# Patient Record
Sex: FEMALE | Race: WHITE | NOT HISPANIC OR LATINO | Employment: OTHER | URBAN - METROPOLITAN AREA
[De-identification: names, ages, dates, MRNs, and addresses within clinical notes are randomized per-mention and may not be internally consistent; named-entity substitution may affect disease eponyms.]

---

## 2017-01-03 ENCOUNTER — OFFICE VISIT (OUTPATIENT)
Dept: MEDICAL GROUP | Facility: MEDICAL CENTER | Age: 72
End: 2017-01-03
Payer: MEDICARE

## 2017-01-03 VITALS
DIASTOLIC BLOOD PRESSURE: 80 MMHG | WEIGHT: 124.78 LBS | RESPIRATION RATE: 20 BRPM | TEMPERATURE: 98.8 F | HEIGHT: 65 IN | HEART RATE: 86 BPM | SYSTOLIC BLOOD PRESSURE: 126 MMHG | OXYGEN SATURATION: 91 % | BODY MASS INDEX: 20.79 KG/M2

## 2017-01-03 DIAGNOSIS — I34.0 MITRAL VALVE INSUFFICIENCY, UNSPECIFIED ETIOLOGY: ICD-10-CM

## 2017-01-03 DIAGNOSIS — J96.11 CHRONIC RESPIRATORY FAILURE WITH HYPOXIA (HCC): ICD-10-CM

## 2017-01-03 DIAGNOSIS — I50.42 CHRONIC COMBINED SYSTOLIC AND DIASTOLIC CONGESTIVE HEART FAILURE (HCC): ICD-10-CM

## 2017-01-03 DIAGNOSIS — G47.33 OBSTRUCTIVE SLEEP APNEA SYNDROME: ICD-10-CM

## 2017-01-03 DIAGNOSIS — J44.9 CHRONIC OBSTRUCTIVE PULMONARY DISEASE, UNSPECIFIED COPD TYPE (HCC): ICD-10-CM

## 2017-01-03 PROCEDURE — 99214 OFFICE O/P EST MOD 30 MIN: CPT | Performed by: FAMILY MEDICINE

## 2017-01-03 NOTE — MR AVS SNAPSHOT
"        Kelly Tejeda [facility]   1/3/2017 10:00 AM   Office Visit   MRN: [id number]    Department:  [place] [address]   Dept Phone:  [phone]    Description:  Female : 1945   Provider:  Ritika Jurado M.D.           Reason for Visit     Follow-Up meds      Allergies as of 1/3/2017     Allergen Noted Reactions    Sulfa Drugs 10/05/2016       Rash       You were diagnosed with     Chronic obstructive pulmonary disease, unspecified COPD type ([facility])   [5389775]       Chronic respiratory failure with hypoxia ([facility])   [[id number]]       Obstructive sleep apnea syndrome   [127509]       Mitral valve insufficiency, unspecified etiology   [6521602]       Chronic combined systolic and diastolic congestive heart failure ([facility])   [035479]         Vital Signs     Blood Pressure Pulse Temperature Respirations Height Weight    126/80 mmHg 86 37.1 °C (98.8 °F) 20 1.638 m (5' 4.5\") 56.6 kg (124 lb 12.5 oz)    Body Mass Index Oxygen Saturation Smoking Status             21.10 kg/m2 91% Former Smoker         Basic Information     Date Of Birth Sex Race Ethnicity Preferred Language    1945 Female White Non- English      Your appointments     2017 11:00 AM   Established Patient with Ritika Jurado M.D.   [facility]    [address]  [address]  [facility] [id number] [phone]           You will be receiving a confirmation call a few days before your appointment from our automated call confirmation system.              Problem List              ICD-10-CM Priority Class Noted - Resolved    COPD (chronic obstructive pulmonary disease) (HCC) J44.9   10/11/2016 - Present    Osteopenia M85.80   10/11/2016 - Present    Obstructive sleep apnea syndrome G47.33   10/11/2016 - Present    Abnormal liver function K76.89   2015 - Present    Aortic atherosclerosis (HCC) I70.0   9/10/2015 - Present    Atrial flutter (HCC) I48.92   12/15/2015 - Present "    Congestive heart failure (HCC) I50.9   7/7/2016 - Present    Chronic respiratory failure with hypoxia (HCC) J96.11   11/23/2015 - Present    History of colonic polyps Z86.010   3/3/2008 - Present    Hyperlipidemia E78.5   9/10/2015 - Present    Hypertension I10   3/10/2008 - Present    Mitral valve insufficiency I34.0   9/17/2012 - Present    Prediabetes R73.03   8/15/2013 - Present      Health Maintenance        Date Due Completion Dates    COLONOSCOPY 10/10/1994 ---    BONE DENSITY 10/10/2009 ---    IMM INFLUENZA (1) 9/1/2016 ---    MAMMOGRAM 12/9/2017 12/9/2016, 12/9/2016, 12/9/2016, 12/9/2016, 11/29/2016, 9/11/2015, 9/11/2015    IMM DTaP/Tdap/Td Vaccine (2 - Td) 9/17/2022 9/17/2012            Current Immunizations     13-VALENT PCV PREVNAR 9/10/2015    Pneumococcal polysaccharide vaccine (PPSV-23) 10/27/2016    SHINGLES VACCINE 8/15/2013    Tdap Vaccine 9/17/2012      Below and/or attached are the medications your provider expects you to take. Review all of your home medications and newly ordered medications with your provider and/or pharmacist. Follow medication instructions as directed by your provider and/or pharmacist. Please keep your medication list with you and share with your provider. Update the information when medications are discontinued, doses are changed, or new medications (including over-the-counter products) are added; and carry medication information at all times in the event of emergency situations     Allergies:  SULFA DRUGS - (reactions not documented)               Medications  Valid as of: January 03, 2017 - 10:36 AM    Generic Name Brand Name Tablet Size Instructions for use    Albuterol Sulfate (Aero Soln) albuterol 108 (90 BASE) MCG/ACT Inhale 2 Puffs by mouth every four hours as needed for Shortness of Breath.        Alendronate Sodium (Tab) FOSAMAX 70 MG Take 1 Tab by mouth every 7 days.        Atorvastatin Calcium (Tab) LIPITOR 10 MG Take 1 Tab by mouth every day.         Dabigatran Etexilate Mesylate (Cap) PRADAXA 150 MG Take 1 Cap by mouth 2 Times a Day.        DiltiaZEM HCl (CAPSULE SR 24 HR) CARDIZEM  MG Take 1 Cap by mouth every day.        Fluticasone-Salmeterol (AEROSOL POWDER, BREATH ACTIVATED) ADVAIR 250-50 MCG/DOSE Inhale 1 Puff by mouth 2 times a day.        Furosemide (Tab) LASIX 20 MG Take 1 Tab by mouth 2 times a day.        Magnesium Oxide (Tab) MAG- MG Take 400 mg by mouth every day.        Potassium Chloride (Tab CR) KLOR-CON 10 MEQ Take 1 Tab by mouth 2 times a day.        Tiotropium Bromide Monohydrate (Aero Soln) Tiotropium Bromide Monohydrate 2.5 MCG/ACT Inhale 2 Inhalation by mouth every day.        .                 Medicines prescribed today were sent to:     [id number] - [place], [place] - [address]    [address] [place] [address]    Phone: [phone] Fax: [phone]    Open 24 Hours?: No      Medication refill instructions:       If your prescription bottle indicates you have medication refills left, it is not necessary to call your provider’s office. Please contact your pharmacy and they will refill your medication.    If your prescription bottle indicates you do not have any refills left, you may request refills at any time through one of the following ways: The online [employer] system (except Urgent Care), by calling your provider’s office, or by asking your pharmacy to contact your provider’s office with a refill request. Medication refills are processed only during regular business hours and may not be available until the next business day. Your provider may request additional information or to have a follow-up visit with you prior to refilling your medication.   *Please Note: Medication refills are assigned a new Rx number when refilled electronically. Your pharmacy may indicate that no refills were authorized even though a new prescription for the same medication is available at the pharmacy. Please request the medicine  by name with the pharmacy before contacting your provider for a refill.        Your To Do List     Future Labs/Procedures Complete By Expires    ECHOCARDIOGRAM COMP W/O CONT  As directed 1/4/2018    Scheduling Instructions:    Complete echo      Referral     A referral request has been sent to our patient care coordination department. Please allow 3-5 business days for us to process this request and contact you either by phone or mail. If you do not hear from us by the 5th business day, please call us at [phone].           [employer] Access Code: Activation code not generated  Current [employer] Status: Active

## 2017-01-03 NOTE — PROGRESS NOTES
Subjective:   Kelly Lovett is a 71 y.o. female here today for COPD    Chief Complaint   Patient presents with   • Follow-Up     meds       1. Chronic obstructive pulmonary disease, unspecified COPD type (HCC)  This is chronic. Patient quit smoking 17 years ago. She is currently on Spiriva, Advair, Ventolin as needed. She is needing to use her Ventolin inhaler most days. She does feel short of breath. She does use oxygen at nighttime. Her FEV1 to FVC ratio is 56%. Her FVC is 30%. Her peripheral pulse oximeter today is 91%. She does not have a pulmonologist. She did not have a pulmonologist in the [facility] system. She feels short of breath and has a chronic cough.    2. Chronic respiratory failure with hypoxia (HCC)  This is chronic. Patient has 2 L of oxygen she wears nightly.    3. Obstructive sleep apnea syndrome  This is chronic. Patient has CPAP. She states that she keeps this on about 5 hours at night and then pulls it off. She uses nasal pillows. She uses oxygen the remainder of the night. She does not have a pulmonologist.    4. Mitral valve insufficiency, unspecified etiology  This is chronic. Patient had an echocardiogram done December 2015. She does not have a cardiologist. She does not have any palpitations or chest pain.  Echocardiogram:  1. Normal left ventricle size and wall thickness.  2. Normal systolic function with LVEF of 55% visually. Despite this, the  LVOT VTI measures 10 cm which suggests decreased cardiac output, likely  secondary to tachycardia.  3. Severe diastolic dysfunction suggested by rapid decel time (decel time  of 130 ms).  4. Severe biatrial enlargement.  5. Posterior mitral leaflet prolapse with at least moderate regurgitation.  6. PASP of 45 mmHg based on CVP of 8 mmHg.    Compared to prior study in 2012, the rate is much faster and is affecting  the cardiac output. Mitral regurgitation is difficult to quantify and left  ventricle is more dilated.      5. Chronic combined  "systolic and diastolic congestive heart failure (HCC)  This is chronic. Patient is currently on Lasix 20 mg twice a day, potassium supplementation, diltiazem 120 mg XR. She is not on a beta blocker or ACE inhibitor. She does not have a cardiologist.      Current medicines (including changes today)  Current Outpatient Prescriptions   Medication Sig Dispense Refill   • dabigatran (PRADAXA) 150 MG Cap capsule Take 1 Cap by mouth 2 Times a Day. 180 Cap 3   • furosemide (LASIX) 20 MG Tab Take 1 Tab by mouth 2 times a day. 180 Tab 3   • atorvastatin (LIPITOR) 10 MG Tab Take 1 Tab by mouth every day. 90 Tab 3   • magnesium oxide (MAG-OX) 400 MG Tab Take 400 mg by mouth every day.     • albuterol (VENTOLIN HFA) 108 (90 BASE) MCG/ACT Aero Soln inhalation aerosol Inhale 2 Puffs by mouth every four hours as needed for Shortness of Breath. 3 Inhaler 3   • Tiotropium Bromide Monohydrate (SPIRIVA RESPIMAT) 2.5 MCG/ACT Aero Soln Inhale 2 Inhalation by mouth every day. 3 Inhaler 3   • diltiazem (DILT-XR) 120 MG XR capsule Take 1 Cap by mouth every day. 90 Cap 3   • fluticasone-salmeterol (ADVAIR) 250-50 MCG/DOSE AEROSOL POWDER, BREATH ACTIVATED Inhale 1 Puff by mouth 2 times a day. 1 Inhaler 5   • alendronate (FOSAMAX) 70 MG Tab Take 1 Tab by mouth every 7 days. 12 Tab 3   • potassium chloride ER (K-TAB) 10 MEQ tablet Take 1 Tab by mouth 2 times a day. 180 Tab 3     No current facility-administered medications for this visit.     She  has a past medical history of COPD (chronic obstructive pulmonary disease) (HCC); Hypertension; and Hyperlipidemia.    ROS   No chest pain, no abdominal pain  Positive for chronic cough     Objective:     Blood pressure 126/80, pulse 86, temperature 37.1 °C (98.8 °F), resp. rate 20, height 1.638 m (5' 4.5\"), weight 56.6 kg (124 lb 12.5 oz), SpO2 91 %. Body mass index is 21.1 kg/(m^2).   Physical Exam:  Constitutional: Alert, no distress.  Skin: Warm, dry, good turgor, no rashes in visible areas.  Eye: " Equal, round and reactive, conjunctiva clear, lids normal.  ENMT: Lips without lesions, good dentition, oropharynx clear.  Neck: Trachea midline, no masses, no thyromegaly. No cervical or supraclavicular lymphadenopathy  Respiratory: Unlabored respiratory effort, lungs clear to auscultation, no wheezes, no ronchi.  Cardiovascular: 3/6 murmur throughout, no edema.  Abdomen: Soft, non-tender, no masses, no hepatosplenomegaly.  Psych: Alert and oriented x3, normal affect and mood.      Assessment and Plan:   The following treatment plan was discussed    1. Chronic obstructive pulmonary disease, unspecified COPD type (HCC)  This is chronic and not controlled  PFTs reviewed  We did discuss changing medication management. I did recommend pulmonology consultation given her chronic respiratory failure, COPD, MARY. Patient agrees that this would be a good idea.  Referral to pulmonology has been placed  Continue current medication regimen until consultation with pulmonology  - REFERRAL TO PULMONOLOGY    2. Chronic respiratory failure with hypoxia (HCC)  This is chronic, likely secondary to COPD and MARY  Continue nighttime oxygen  - REFERRAL TO PULMONOLOGY    3. Obstructive sleep apnea syndrome  This is chronic and stable  Continue CPAP   REFERRAL TO PULMONOLOGY    4. Mitral valve insufficiency, unspecified etiology  This is chronic  Previous echocardiogram from last year reviewed  Repeat echocardiogram  - ECHOCARDIOGRAM COMP W/O CONT; Future    5. Chronic combined systolic and diastolic congestive heart failure (HCC)  This is chronic and stable  Patient is currently not on adequate medical management  Echocardiogram ordered  We did discuss cardiology referral, patient would like to proceed with pulmonology referral for CPAP her echocardiogram says. We will bring this up at the next appointment in 3 months   - ECHOCARDIOGRAM COMP W/O CONT; Future      Followup: Return in about 3 months (around 4/3/2017) for COPD, CHF, aflutter,  long.

## 2017-01-12 DIAGNOSIS — I50.42 CHRONIC COMBINED SYSTOLIC AND DIASTOLIC CONGESTIVE HEART FAILURE (HCC): ICD-10-CM

## 2017-01-12 RX ORDER — POTASSIUM CHLORIDE 750 MG/1
10 TABLET, FILM COATED, EXTENDED RELEASE ORAL 2 TIMES DAILY
Qty: 180 TAB | Refills: 3 | Status: SHIPPED | OUTPATIENT
Start: 2017-01-12 | End: 2017-01-17 | Stop reason: SDUPTHER

## 2017-01-17 DIAGNOSIS — I50.42 CHRONIC COMBINED SYSTOLIC AND DIASTOLIC CONGESTIVE HEART FAILURE (HCC): ICD-10-CM

## 2017-01-17 RX ORDER — POTASSIUM CHLORIDE 750 MG/1
10 TABLET, FILM COATED, EXTENDED RELEASE ORAL DAILY
Qty: 90 TAB | Refills: 3 | Status: SHIPPED | OUTPATIENT
Start: 2017-01-17 | End: 2018-02-07 | Stop reason: SDUPTHER

## 2017-01-23 ENCOUNTER — APPOINTMENT (OUTPATIENT)
Dept: CARDIOLOGY | Facility: MEDICAL CENTER | Age: 72
End: 2017-01-23
Attending: FAMILY MEDICINE
Payer: MEDICARE

## 2017-02-09 ENCOUNTER — APPOINTMENT (OUTPATIENT)
Dept: CARDIOLOGY | Facility: MEDICAL CENTER | Age: 72
End: 2017-02-09
Attending: FAMILY MEDICINE
Payer: MEDICARE

## 2017-03-07 ENCOUNTER — HOSPITAL ENCOUNTER (OUTPATIENT)
Dept: CARDIOLOGY | Facility: MEDICAL CENTER | Age: 72
End: 2017-03-07
Attending: FAMILY MEDICINE
Payer: MEDICARE

## 2017-03-07 DIAGNOSIS — I34.0 MITRAL VALVE INSUFFICIENCY, UNSPECIFIED ETIOLOGY: ICD-10-CM

## 2017-03-07 DIAGNOSIS — I50.42 CHRONIC COMBINED SYSTOLIC AND DIASTOLIC CONGESTIVE HEART FAILURE (HCC): ICD-10-CM

## 2017-03-07 LAB
LV EJECT FRACT  99904: 60
LV EJECT FRACT MOD 2C 99903: 63.46
LV EJECT FRACT MOD 4C 99902: 67.07
LV EJECT FRACT MOD BP 99901: 65.45

## 2017-03-07 PROCEDURE — 93306 TTE W/DOPPLER COMPLETE: CPT

## 2017-03-08 ENCOUNTER — TELEPHONE (OUTPATIENT)
Dept: MEDICAL GROUP | Facility: LAB | Age: 72
End: 2017-03-08

## 2017-03-08 DIAGNOSIS — I34.0 SEVERE MITRAL REGURGITATION: ICD-10-CM

## 2017-03-10 ENCOUNTER — OFFICE VISIT (OUTPATIENT)
Dept: CARDIOLOGY | Facility: MEDICAL CENTER | Age: 72
End: 2017-03-10
Payer: MEDICARE

## 2017-03-10 ENCOUNTER — TELEPHONE (OUTPATIENT)
Dept: CARDIOLOGY | Facility: MEDICAL CENTER | Age: 72
End: 2017-03-10

## 2017-03-10 VITALS
SYSTOLIC BLOOD PRESSURE: 120 MMHG | HEART RATE: 98 BPM | OXYGEN SATURATION: 93 % | WEIGHT: 118 LBS | DIASTOLIC BLOOD PRESSURE: 72 MMHG | HEIGHT: 65 IN | BODY MASS INDEX: 19.66 KG/M2

## 2017-03-10 DIAGNOSIS — I34.0 SEVERE MITRAL REGURGITATION: ICD-10-CM

## 2017-03-10 DIAGNOSIS — I10 ESSENTIAL HYPERTENSION: ICD-10-CM

## 2017-03-10 DIAGNOSIS — I49.8 ATRIAL ARRHYTHMIA: ICD-10-CM

## 2017-03-10 PROCEDURE — G8419 CALC BMI OUT NRM PARAM NOF/U: HCPCS | Performed by: INTERNAL MEDICINE

## 2017-03-10 PROCEDURE — 1101F PT FALLS ASSESS-DOCD LE1/YR: CPT | Performed by: INTERNAL MEDICINE

## 2017-03-10 PROCEDURE — 99205 OFFICE O/P NEW HI 60 MIN: CPT | Performed by: INTERNAL MEDICINE

## 2017-03-10 ASSESSMENT — ENCOUNTER SYMPTOMS
BLOOD IN STOOL: 0
FALLS: 0
NAUSEA: 0
SHORTNESS OF BREATH: 1
DEPRESSION: 0
HEADACHES: 0
PALPITATIONS: 1
EYE PAIN: 0
BLURRED VISION: 0
HALLUCINATIONS: 0
FEVER: 0
ABDOMINAL PAIN: 0
MYALGIAS: 0
VOMITING: 0
CHILLS: 0
LOSS OF CONSCIOUSNESS: 0
ORTHOPNEA: 0
DOUBLE VISION: 0
DIZZINESS: 0
BRUISES/BLEEDS EASILY: 0
CLAUDICATION: 0
EYE DISCHARGE: 0
SENSORY CHANGE: 0
PND: 0
COUGH: 0
SPEECH CHANGE: 0
WEIGHT LOSS: 0

## 2017-03-10 NOTE — PROGRESS NOTES
Subjective:   Kelly Lovett is a 71 y.o. female who presents today for cardiac care and evaluation because of shortness of breath along with abnormal transthoracic echocardiogram finding. Under study, there was evidence of possible severe mitral regurgitation with mitral valve prolapse. I looked at the ultrasound myself and there appears to be redundant tissue at the tips of the mitral valve leaflets causing the mitral valve prolapse and possible severe mitral regurgitation. Patient does have a history of this particular phenomenon which was told to her 5 years ago at [facility] in [place]. Patient does get winded upon walking far distance up incline. She sometimes feel tired. She was diagnosed with COPD recently as well. She is on treatment for that at this time. She also has a history of atrial arrhythmias. She was told that it might have been atrial flutter. I do not have any documentation of that is up now. But patient is on anticoagulation by [place]. Therefore with an educated guess, I presume that patient was diagnosed with atrial flutter.    Past Medical History   Diagnosis Date   • COPD (chronic obstructive pulmonary disease) (CMS-HCC)    • Hypertension    • Hyperlipidemia      Past Surgical History   Procedure Laterality Date   • Oophorectomy     • Appendectomy       1967     Family History   Problem Relation Age of Onset   • Cancer Father      colon cancer    • Hypertension Mother    • Cancer Sister 68     ovarian cancer     History   Smoking status   • Former Smoker -- 1.00 packs/day for 38 years   • Types: Cigarettes   • Quit date: 10/11/2000   Smokeless tobacco   • Never Used     Allergies   Allergen Reactions   • Sulfa Drugs      Rash      Outpatient Encounter Prescriptions as of 3/10/2017   Medication Sig Dispense Refill   • potassium chloride ER (K-TAB) 10 MEQ tablet Take 1 Tab by mouth every day. 90 Tab 3   • dabigatran (PRADAXA) 150 MG Cap capsule Take 1 Cap by mouth 2 Times a  Day. 180 Cap 3   • furosemide (LASIX) 20 MG Tab Take 1 Tab by mouth 2 times a day. 180 Tab 3   • atorvastatin (LIPITOR) 10 MG Tab Take 1 Tab by mouth every day. 90 Tab 3   • magnesium oxide (MAG-OX) 400 MG Tab Take 400 mg by mouth every day.     • albuterol (VENTOLIN HFA) 108 (90 BASE) MCG/ACT Aero Soln inhalation aerosol Inhale 2 Puffs by mouth every four hours as needed for Shortness of Breath. 3 Inhaler 3   • Tiotropium Bromide Monohydrate (SPIRIVA RESPIMAT) 2.5 MCG/ACT Aero Soln Inhale 2 Inhalation by mouth every day. 3 Inhaler 3   • diltiazem (DILT-XR) 120 MG XR capsule Take 1 Cap by mouth every day. 90 Cap 3   • fluticasone-salmeterol (ADVAIR) 250-50 MCG/DOSE AEROSOL POWDER, BREATH ACTIVATED Inhale 1 Puff by mouth 2 times a day. 1 Inhaler 5   • alendronate (FOSAMAX) 70 MG Tab Take 1 Tab by mouth every 7 days. 12 Tab 3     No facility-administered encounter medications on file as of 3/10/2017.     Review of Systems   Constitutional: Negative for fever, chills, weight loss and malaise/fatigue.   HENT: Negative for ear discharge, ear pain, hearing loss and nosebleeds.    Eyes: Negative for blurred vision, double vision, pain and discharge.   Respiratory: Positive for shortness of breath. Negative for cough.    Cardiovascular: Positive for palpitations. Negative for chest pain, orthopnea, claudication, leg swelling and PND.   Gastrointestinal: Negative for nausea, vomiting, abdominal pain, blood in stool and melena.   Genitourinary: Negative for dysuria and hematuria.   Musculoskeletal: Negative for myalgias, joint pain and falls.   Skin: Negative for itching and rash.   Neurological: Negative for dizziness, sensory change, speech change, loss of consciousness and headaches.   Endo/Heme/Allergies: Negative for environmental allergies. Does not bruise/bleed easily.   Psychiatric/Behavioral: Negative for depression, suicidal ideas and hallucinations.        Objective:   /72 mmHg  Pulse 98  Ht 1.638 m (5'  "4.5\")  Wt 53.524 kg (118 lb)  BMI 19.95 kg/m2  SpO2 93%    Physical Exam   Constitutional: She is oriented to person, place, and time.   HENT:   Head: Normocephalic and atraumatic.   Eyes: EOM are normal.   Neck: No JVD present.   Cardiovascular: Normal rate, regular rhythm and intact distal pulses.  Exam reveals no gallop and no friction rub.    Murmur heard.  3/6 systolic mumur heard best at lateral side area (MV).     Pulmonary/Chest: No respiratory distress.   Distant breath sounds due to copd     Abdominal: She exhibits no distension. There is no tenderness. There is no rebound and no guarding.   Musculoskeletal: She exhibits no edema or tenderness.   Lymphadenopathy:     She has no cervical adenopathy.   Neurological: She is alert and oriented to person, place, and time.   Skin: Skin is dry.   Psychiatric: She has a normal mood and affect.   Nursing note and vitals reviewed.      Assessment:     1. Essential hypertension  EKG    RIH ZIO PATCH MONITOR   2. Severe mitral regurgitation  RIH ZIO PATCH MONITOR   3. Atrial arrhythmia  RIH ZIO PATCH MONITOR       Medical Decision Making:  Today's Assessment / Status / Plan:     At this time, I will recommend to proceed with a transesophageal echocardiogram to further assess the nature of her mitral valve.  I also would like to get 2 weeks event monitor to assess for atrial arrhythmias burden.  I explained to her that eventually she might require open heart surgery for her valvular repair or replacement. Patient is agreeable to recommendations at this time.  "

## 2017-03-10 NOTE — MR AVS SNAPSHOT
"        Kelly Tejeda [facility]   3/10/2017 7:45 AM   Office Visit   MRN: [id number]    Department:  [facility]   Dept Phone:  [phone]    Description:  Female : 1945   Provider:  Dalia Li M.D.           Reason for Visit     New Patient           Allergies as of 3/10/2017     Allergen Noted Reactions    Sulfa Drugs 10/05/2016       Rash       You were diagnosed with     Essential hypertension   [3314642]       Severe mitral regurgitation   [944630]       Atrial arrhythmia   [992173]         Vital Signs     Blood Pressure Pulse Height Weight Body Mass Index Oxygen Saturation    120/72 mmHg 98 1.638 m (5' 4.5\") 53.524 kg (118 lb) 19.95 kg/m2 93%    Smoking Status                   Former Smoker           Basic Information     Date Of Birth Sex Race Ethnicity Preferred Language    1945 Female White Non- English      Your appointments     Mar 23, 2017 11:00 AM   XRAY 15 with PULMONARY DX 1   [facility] - Pulmonary ([address])    [address] [place], [place] [address] [place] [address]               Mar 23, 2017 11:20 AM   New Patient Pulmonary with A Rotation   [facility] (--)    [address] [place] [address] [address]  [place] [address]   [phone]            2017 11:00 AM   Established Patient with Ritika Jurado M.D.   [facility] (--)    [address] [facility] [address]  [place] [address] [phone]           You will be receiving a confirmation call a few days before your appointment from our automated call confirmation system.              Problem List              ICD-10-CM Priority Class Noted - Resolved    COPD (chronic obstructive pulmonary disease) (CMS-[facility]) J44.9   10/11/2016 - Present    Osteopenia M85.80   10/11/2016 - Present    Obstructive sleep apnea syndrome G47.33   10/11/2016 - Present    Abnormal liver function K76.89   2015 - Present    Aortic atherosclerosis (CMS-HCC) I70.0   9/10/2015 - Present    Atrial flutter " (CMS-HCC) I48.92   12/15/2015 - Present    Congestive heart failure (CMS-HCC) I50.9   7/7/2016 - Present    Chronic respiratory failure with hypoxia (CMS-HCC) J96.11   11/23/2015 - Present    History of colonic polyps Z86.010   3/3/2008 - Present    Hyperlipidemia E78.5   9/10/2015 - Present    Hypertension I10   3/10/2008 - Present    Mitral valve insufficiency I34.0   9/17/2012 - Present    Prediabetes R73.03   8/15/2013 - Present    Severe mitral regurgitation I34.0   3/8/2017 - Present      Health Maintenance        Date Due Completion Dates    COLONOSCOPY 10/10/1994 ---    BONE DENSITY 10/10/2009 ---    IMM INFLUENZA (1) 9/1/2016 ---    MAMMOGRAM 12/9/2017 12/9/2016, 12/9/2016, 12/9/2016, 12/9/2016, 11/29/2016, 9/11/2015, 9/11/2015    IMM DTaP/Tdap/Td Vaccine (2 - Td) 9/17/2022 9/17/2012            Results       Current Immunizations     13-VALENT PCV PREVNAR 9/10/2015    Pneumococcal polysaccharide vaccine (PPSV-23) 10/27/2016    SHINGLES VACCINE 8/15/2013    Tdap Vaccine 9/17/2012      Below and/or attached are the medications your provider expects you to take. Review all of your home medications and newly ordered medications with your provider and/or pharmacist. Follow medication instructions as directed by your provider and/or pharmacist. Please keep your medication list with you and share with your provider. Update the information when medications are discontinued, doses are changed, or new medications (including over-the-counter products) are added; and carry medication information at all times in the event of emergency situations     Allergies:  SULFA DRUGS - (reactions not documented)               Medications  Valid as of: March 10, 2017 -  8:41 AM    Generic Name Brand Name Tablet Size Instructions for use    Albuterol Sulfate (Aero Soln) albuterol 108 (90 BASE) MCG/ACT Inhale 2 Puffs by mouth every four hours as needed for Shortness of Breath.        Alendronate Sodium (Tab) FOSAMAX 70 MG Take 1 Tab  by mouth every 7 days.        Atorvastatin Calcium (Tab) LIPITOR 10 MG Take 1 Tab by mouth every day.        Dabigatran Etexilate Mesylate (Cap) PRADAXA 150 MG Take 1 Cap by mouth 2 Times a Day.        DiltiaZEM HCl (CAPSULE SR 24 HR) CARDIZEM  MG Take 1 Cap by mouth every day.        Fluticasone-Salmeterol (AEROSOL POWDER, BREATH ACTIVATED) ADVAIR 250-50 MCG/DOSE Inhale 1 Puff by mouth 2 times a day.        Furosemide (Tab) LASIX 20 MG Take 1 Tab by mouth 2 times a day.        Magnesium Oxide (Tab) MAG- MG Take 400 mg by mouth every day.        Potassium Chloride (Tab CR) KLOR-CON 10 MEQ Take 1 Tab by mouth every day.        Tiotropium Bromide Monohydrate (Aero Soln) Tiotropium Bromide Monohydrate 2.5 MCG/ACT Inhale 2 Inhalation by mouth every day.        .                 Medicines prescribed today were sent to:     [id number] - [place], [place] - [id number] [place] [address]    Phone: [phone] Fax: [phone]    Open 24 Hours?: No      Medication refill instructions:       If your prescription bottle indicates you have medication refills left, it is not necessary to call your provider’s office. Please contact your pharmacy and they will refill your medication.    If your prescription bottle indicates you do not have any refills left, you may request refills at any time through one of the following ways: The online [employer] system (except Urgent Care), by calling your provider’s office, or by asking your pharmacy to contact your provider’s office with a refill request. Medication refills are processed only during regular business hours and may not be available until the next business day. Your provider may request additional information or to have a follow-up visit with you prior to refilling your medication.   *Please Note: Medication refills are assigned a new Rx number when refilled electronically. Your pharmacy may indicate that no refills were authorized even  though a new prescription for the same medication is available at the pharmacy. Please request the medicine by name with the pharmacy before contacting your provider for a refill.        Your To Do List     Future Labs/Procedures Complete By Annie GAMBINO PATCH MONITOR  As directed 3/11/2018         [employer] Access Code: Activation code not generated  Current [employer] Status: Active

## 2017-03-10 NOTE — TELEPHONE ENCOUNTER
Patient is scheduled on 3-13-17 for a PAGE with Dr. Guillermo marsh [facility]. Patient was told to hold lasix in am. Patient was told to check in at 7:00 am for a 9:00 procedure. [facility] notified on 3-10-17.

## 2017-03-10 NOTE — Clinical Note
[facility]   [address] [place] [address] [address]  [place] [address]  Phone: [phone]  Fax: [phone]              Kelly Lovett  1945    Encounter Date: 3/10/2017    Dalia Li M.D.          PROGRESS NOTE:  Subjective:   Kelly oLvett is a 71 y.o. female who presents today for cardiac care and evaluation because of shortness of breath along with abnormal transthoracic echocardiogram finding. Under study, there was evidence of possible severe mitral regurgitation with mitral valve prolapse. I looked at the ultrasound myself and there appears to be redundant tissue at the tips of the mitral valve leaflets causing the mitral valve prolapse and possible severe mitral regurgitation. Patient does have a history of this particular phenomenon which was told to her 5 years ago at [facility] in [place]. Patient does get winded upon walking far distance up incline. She sometimes feel tired. She was diagnosed with COPD recently as well. She is on treatment for that at this time. She also has a history of atrial arrhythmias. She was told that it might have been atrial flutter. I do not have any documentation of that is up now. But patient is on anticoagulation by [facility]. Therefore with an educated guess, I presume that patient was diagnosed with atrial flutter.    Past Medical History   Diagnosis Date   • COPD (chronic obstructive pulmonary disease) (CMS-HCC)    • Hypertension    • Hyperlipidemia      Past Surgical History   Procedure Laterality Date   • Oophorectomy     • Appendectomy       1967     Family History   Problem Relation Age of Onset   • Cancer Father      colon cancer    • Hypertension Mother    • Cancer Sister 68     ovarian cancer     History   Smoking status   • Former Smoker -- 1.00 packs/day for 38 years   • Types: Cigarettes   • Quit date: 10/11/2000   Smokeless tobacco   • Never Used     Allergies   Allergen Reactions   •  Sulfa Drugs      Rash      Outpatient Encounter Prescriptions as of 3/10/2017   Medication Sig Dispense Refill   • potassium chloride ER (K-TAB) 10 MEQ tablet Take 1 Tab by mouth every day. 90 Tab 3   • dabigatran (PRADAXA) 150 MG Cap capsule Take 1 Cap by mouth 2 Times a Day. 180 Cap 3   • furosemide (LASIX) 20 MG Tab Take 1 Tab by mouth 2 times a day. 180 Tab 3   • atorvastatin (LIPITOR) 10 MG Tab Take 1 Tab by mouth every day. 90 Tab 3   • magnesium oxide (MAG-OX) 400 MG Tab Take 400 mg by mouth every day.     • albuterol (VENTOLIN HFA) 108 (90 BASE) MCG/ACT Aero Soln inhalation aerosol Inhale 2 Puffs by mouth every four hours as needed for Shortness of Breath. 3 Inhaler 3   • Tiotropium Bromide Monohydrate (SPIRIVA RESPIMAT) 2.5 MCG/ACT Aero Soln Inhale 2 Inhalation by mouth every day. 3 Inhaler 3   • diltiazem (DILT-XR) 120 MG XR capsule Take 1 Cap by mouth every day. 90 Cap 3   • fluticasone-salmeterol (ADVAIR) 250-50 MCG/DOSE AEROSOL POWDER, BREATH ACTIVATED Inhale 1 Puff by mouth 2 times a day. 1 Inhaler 5   • alendronate (FOSAMAX) 70 MG Tab Take 1 Tab by mouth every 7 days. 12 Tab 3     No facility-administered encounter medications on file as of 3/10/2017.     Review of Systems   Constitutional: Negative for fever, chills, weight loss and malaise/fatigue.   HENT: Negative for ear discharge, ear pain, hearing loss and nosebleeds.    Eyes: Negative for blurred vision, double vision, pain and discharge.   Respiratory: Positive for shortness of breath. Negative for cough.    Cardiovascular: Positive for palpitations. Negative for chest pain, orthopnea, claudication, leg swelling and PND.   Gastrointestinal: Negative for nausea, vomiting, abdominal pain, blood in stool and melena.   Genitourinary: Negative for dysuria and hematuria.   Musculoskeletal: Negative for myalgias, joint pain and falls.   Skin: Negative for itching and rash.   Neurological: Negative for dizziness, sensory change, speech change, loss of  "consciousness and headaches.   Endo/Heme/Allergies: Negative for environmental allergies. Does not bruise/bleed easily.   Psychiatric/Behavioral: Negative for depression, suicidal ideas and hallucinations.        Objective:   /72 mmHg  Pulse 98  Ht 1.638 m (5' 4.5\")  Wt 53.524 kg (118 lb)  BMI 19.95 kg/m2  SpO2 93%    Physical Exam   Constitutional: She is oriented to person, place, and time.   HENT:   Head: Normocephalic and atraumatic.   Eyes: EOM are normal.   Neck: No JVD present.   Cardiovascular: Normal rate, regular rhythm and intact distal pulses.  Exam reveals no gallop and no friction rub.    Murmur heard.  3/6 systolic mumur heard best at lateral side area (MV).     Pulmonary/Chest: No respiratory distress.   Distant breath sounds due to copd     Abdominal: She exhibits no distension. There is no tenderness. There is no rebound and no guarding.   Musculoskeletal: She exhibits no edema or tenderness.   Lymphadenopathy:     She has no cervical adenopathy.   Neurological: She is alert and oriented to person, place, and time.   Skin: Skin is dry.   Psychiatric: She has a normal mood and affect.   Nursing note and vitals reviewed.      Assessment:     1. Essential hypertension  EKG    RIH ZIO PATCH MONITOR   2. Severe mitral regurgitation  RIH ZIO PATCH MONITOR   3. Atrial arrhythmia  RIH ZIO PATCH MONITOR       Medical Decision Making:  Today's Assessment / Status / Plan:     At this time, I will recommend to proceed with a transesophageal echocardiogram to further assess the nature of her mitral valve.  I also would like to get 2 weeks event monitor to assess for atrial arrhythmias burden.  I explained to her that eventually she might require open heart surgery for her valvular repair or replacement. Patient is agreeable to recommendations at this time.      Ritika Jurado M.D.  [address] [address]  VIA In Basket                   "

## 2017-03-11 LAB — EKG IMPRESSION: NORMAL

## 2017-03-13 ENCOUNTER — HOSPITAL ENCOUNTER (OUTPATIENT)
Facility: MEDICAL CENTER | Age: 72
End: 2017-03-13
Attending: INTERNAL MEDICINE | Admitting: INTERNAL MEDICINE
Payer: MEDICARE

## 2017-03-13 VITALS
SYSTOLIC BLOOD PRESSURE: 119 MMHG | RESPIRATION RATE: 15 BRPM | DIASTOLIC BLOOD PRESSURE: 71 MMHG | OXYGEN SATURATION: 98 % | HEART RATE: 82 BPM | HEIGHT: 66 IN | TEMPERATURE: 97.2 F | WEIGHT: 117.95 LBS | BODY MASS INDEX: 18.96 KG/M2

## 2017-03-13 PROCEDURE — 700111 HCHG RX REV CODE 636 W/ 250 OVERRIDE (IP)

## 2017-03-13 PROCEDURE — 93312 ECHO TRANSESOPHAGEAL: CPT

## 2017-03-13 PROCEDURE — 93320 DOPPLER ECHO COMPLETE: CPT

## 2017-03-13 PROCEDURE — 93325 DOPPLER ECHO COLOR FLOW MAPG: CPT

## 2017-03-13 RX ORDER — SODIUM CHLORIDE 9 MG/ML
INJECTION, SOLUTION INTRAVENOUS
Status: DISCONTINUED | OUTPATIENT
Start: 2017-03-13 | End: 2017-03-13 | Stop reason: HOSPADM

## 2017-03-13 ASSESSMENT — PAIN SCALES - GENERAL
PAINLEVEL_OUTOF10: 0

## 2017-03-13 NOTE — OR NURSING
0900: Report received from SOY Rubio.    0915: Patient to PPU 06    0917: Patient provided with water. Patient tolerating PO intake.  Patient belongings returned to patient family at bedside.  Patient and family updated regarding Plan of care.    0930: Report given to SOY Acuna.

## 2017-03-13 NOTE — IP AVS SNAPSHOT
3/13/2017          [facility]  [address]  [place] [address]    Dear Kelly:    [facility] wants to ensure your discharge home is safe and you or your loved ones have had all your questions answered regarding your care after you leave the hospital.    You may receive a telephone call within two days of your discharge.  This call is to make certain you understand your discharge instructions as well as ensure we provided you with the best care possible during your stay with us.     The call will only last approximately 3-5 minutes and will be done by a nurse.    Once again, we want to ensure your discharge home is safe and that you have a clear understanding of any next steps in your care.  If you have any questions or concerns, please do not hesitate to contact us, we are here for you.  Thank you for choosing [facility] for your healthcare needs.    Sincerely,    Dio Lomas    [facility]

## 2017-03-13 NOTE — OR NURSING
0981 report received from lee, currently pt aaox4, denies pain, s.o.b, no c/o chest pain, family at the bed side.  1013 discharge instructions given to patient, patient verbalize understanding of the orders, iv discontinued tip intact, prior to dc no c/o chest pain, s.o.b  1015 pt escorted via w/c with all her personal belongings.

## 2017-03-13 NOTE — PROCEDURES
Patient was brought to cath lab holding.  Consent was obtained. Risks and benefits of procedures were explained.    Anesthesia was used for sedation process.    Complication: none    Diagnosis: No evidence of left atrial appendage thrombus. Also there is evidence of SEVERE mitral regurgitation with eccentric jets. There are redundant tissues seen at the mitral valve leaflets.      Dalia Li MD.  [facility].

## 2017-03-13 NOTE — IP AVS SNAPSHOT
" Home Care Instructions                                                                                                                Name:[facility]  Medical Record Number:[id number]  CSN: [id number]    YOB: 1945   Age: 71 y.o.  Sex: female  HT:1.676 m (5' 6\") WT: 53.5 kg (117 lb 15.1 oz)          Admit Date: 3/13/2017     Discharge Date:   Today's Date: 3/13/2017  Attending Doctor:  Dalia Li M.D.                  Allergies:  Sulfa drugs                Discharge Instructions         ACTIVITY: Rest and take it easy for the first 24 hours.  A responsible adult is recommended to remain with you during that time.  It is normal to feel sleepy.  We encourage you to not do anything that requires balance, judgment or coordination.    MILD FLU-LIKE SYMPTOMS ARE NORMAL. YOU MAY EXPERIENCE GENERALIZED MUSCLE ACHES, THROAT IRRITATION, HEADACHE AND/OR SOME NAUSEA.    FOR 24 HOURS DO NOT:  Drive, operate machinery or run household appliances.  Drink beer or alcoholic beverages.   Make important decisions or sign legal documents.    SPECIAL INSTRUCTIONS: follow up with Dr Neri [phone]    DIET: To avoid nausea, slowly advance diet as tolerated, avoiding spicy or greasy foods for the first day.  Add more substantial food to your diet according to your physician's instructions.  Babies can be fed formula or breast milk as soon as they are hungry.  INCREASE FLUIDS AND FIBER TO AVOID CONSTIPATION.    SURGICAL DRESSING/BATHING: follow up with Dr Neri [phone]    FOLLOW-UP APPOINTMENT:  A follow-up appointment should be arranged with your doctor in [phone]; call to schedule.    You should CALL YOUR PHYSICIAN if you develop:  Fever greater than 101 degrees F.  Pain not relieved by medication, or persistent nausea or vomiting.  Excessive bleeding (blood soaking through dressing) or unexpected drainage from the wound.  Extreme redness or swelling around the incision site, drainage of pus or foul smelling " drainage.  Inability to urinate or empty your bladder within 8 hours.  Problems with breathing or chest pain.    You should call 911 if you develop problems with breathing or chest pain.  If you are unable to contact your doctor or surgical center, you should go to the nearest emergency room or urgent care center.  Physician's telephone #: [phone]    If any questions arise, call your doctor.  If your doctor is not available, please feel free to call the Surgical Center at [phone]  The Center is open Monday through Friday from 7AM to 7PM.  You can also call the HEALTH HOTLINE open 24 hours/day, 7 days/week and speak to a nurse at [phone], or toll free at [phone].    A registered nurse may call you a few days after your surgery to see how you are doing after your procedure.    MEDICATIONS: Resume taking daily medication.  Take prescribed pain medication with food.  If no medication is prescribed, you may take non-aspirin pain medication if needed.  PAIN MEDICATION CAN BE VERY CONSTIPATING.  Take a stool softener or laxative such as senokot, pericolace, or milk of magnesia if needed    If your physician has prescribed pain medication that includes Acetaminophen (Tylenol), do not take additional Acetaminophen (Tylenol) while taking the prescribed medication.    Depression / Suicide Risk    As you are discharged from this [facility] facility, it is important to learn how to keep safe from harming yourself.    Recognize the warning signs:  · Abrupt changes in personality, positive or negative- including increase in energy   · Giving away possessions  · Change in eating patterns- significant weight changes-  positive or negative  · Change in sleeping patterns- unable to sleep or sleeping all the time   · Unwillingness or inability to communicate  · Depression  · Unusual sadness, discouragement and loneliness  · Talk of wanting to die  · Neglect of personal appearance   · Rebelliousness- reckless  behavior  · Withdrawal from people/activities they love  · Confusion- inability to concentrate     If you or a loved one observes any of these behaviors or has concerns about self-harm, here's what you can do:  · Talk about it- your feelings and reasons for harming yourself  · Remove any means that you might use to hurt yourself (examples: pills, rope, extension cords, firearm)  · Get professional help from the community (Mental Health, Substance Abuse, psychological counseling)  · Do not be alone:Call your Safe Contact- someone whom you trust who will be there for you.  · Call your local CRISIS HOTLINE 471-0162 or 610-965-7530  · Call your local Children's Mobile Crisis Response Team [place] (067) 569-0868 or www.TradeCloud.nl  · Call the toll free National Suicide Prevention Hotlines   · National Suicide Prevention Lifeline 756-798-OZJB (5662)  [place] [employer] Line Network 800-SUICIDE (579-3958)  Transesophageal Echocardiogram  Transesophageal echocardiography (PAGE) is a special type of test that produces images of the heart by using sound waves (echocardiogram). This type of echocardiography can obtain better images of the heart than standard echocardiography. PAGE is done by passing a flexible tube down the esophagus. The heart is located in front of the esophagus. Because the heart and esophagus are close to one another, your health care provider can take very clear, detailed pictures of the heart via ultrasound waves.  PAGE may be done:  · If your health care provider needs more information based on standard echocardiography findings.  · If you had a stroke. This might have happened because a clot formed in your heart. PAGE can visualize different areas of the heart and check for clots.  · To check valve anatomy and function.  · To check for infection on the inside of your heart (endocarditis).  · To evaluate the dividing wall (septum) of the heart and presence of a hole that did not close after birth  (patent foramen ovale or atrial septal defect).  · To help diagnose a tear in the wall of the aorta (aortic dissection).  · During cardiac valve surgery. This allows the surgeon to assess the valve repair before closing the chest.  · During a variety of other cardiac procedures to guide positioning of catheters.  · Sometimes before a cardioversion, which is a shock to convert heart rhythm back to normal.  LET YOUR HEALTH CARE PROVIDER KNOW ABOUT:   · Any allergies you have.  · All medicines you are taking, including vitamins, herbs, eye drops, creams, and over-the-counter medicines.  · Previous problems you or members of your family have had with the use of anesthetics.  · Any blood disorders you have.  · Previous surgeries you have had.  · Medical conditions you have.  · Swallowing difficulties.  · An esophageal obstruction.  RISKS AND COMPLICATIONS   Generally, PAGE is a safe procedure. However, as with any procedure, complications can occur. Possible complications include an esophageal tear (rupture).  BEFORE THE PROCEDURE   · Do not eat or drink for 6 hours before the procedure or as directed by your health care provider.  · Arrange for someone to drive you home after the procedure. Do not drive yourself home. During the procedure, you will be given medicines that can continue to make you feel drowsy and can impair your reflexes.  · An IV access tube will be started in the arm.  PROCEDURE   · A medicine to help you relax (sedative) will be given through the IV access tube.  · A medicine may be sprayed or gargled to numb the back of the throat.  · Your blood pressure, heart rate, and breathing (vital signs) will be monitored during the procedure.  · The PAGE probe is a long, flexible tube. The tip of the probe is placed into the back of the mouth, and you will be asked to swallow. This helps to pass the tip of the probe into the esophagus. Once the tip of the probe is in the correct area, your health care provider  can take pictures of the heart.  · PAGE is usually not a painful procedure. You may feel the probe press against the back of the throat. The probe does not enter the trachea and does not affect your breathing.  AFTER THE PROCEDURE   · You will be in bed, resting, until you have fully returned to consciousness.  · When you first awaken, your throat may feel slightly sore and will probably still feel numb. This will improve slowly over time.  · You will not be allowed to eat or drink until it is clear that the numbness has improved.  · Once you have been able to drink, urinate, and sit on the edge of the bed without feeling sick to your stomach (nausea) or dizzy, you may be cleared to go home.  · You should have a friend or family member with you for the next 24 hours after your procedure.     This information is not intended to replace advice given to you by your health care provider. Make sure you discuss any questions you have with your health care provider.     Document Released: 03/09/2004 Document Revised: 12/23/2014 Document Reviewed: 06/19/2014  1calendar Interactive Patient Education ©2016 [employer]  ·        Medication List      ASK your doctor about these medications        Instructions    albuterol 108 (90 BASE) MCG/ACT Aers inhalation aerosol   Commonly known as:  VENTOLIN HFA    Inhale 2 Puffs by mouth every four hours as needed for Shortness of Breath.   Dose:  2 Puff       atorvastatin 10 MG Tabs   Commonly known as:  LIPITOR    Take 1 Tab by mouth every day.   Dose:  10 mg       dabigatran 150 MG Caps capsule   Commonly known as:  PRADAXA    Take 1 Cap by mouth 2 Times a Day.   Dose:  150 mg       diltiazem 120 MG XR capsule   Commonly known as:  DILT-XR    Take 1 Cap by mouth every day.   Dose:  120 mg       fluticasone-salmeterol 250-50 MCG/DOSE Aepb   Commonly known as:  ADVAIR    Inhale 1 Puff by mouth 2 times a day.   Dose:  1 Puff       furosemide 20 MG Tabs   Commonly known as:  LASIX    Take  1 Tab by mouth 2 times a day.   Dose:  20 mg       magnesium oxide 400 MG Tabs   Commonly known as:  MAG-OX    Take 400 mg by mouth every day.   Dose:  400 mg       potassium chloride ER 10 MEQ tablet   Commonly known as:  K-TAB    Take 1 Tab by mouth every day.   Dose:  10 mEq       Tiotropium Bromide Monohydrate 2.5 MCG/ACT Aers   Commonly known as:  SPIRIVA RESPIMAT    Inhale 2 Inhalation by mouth every day.   Dose:  2 Inhalation               Medication Information     Above and/or attached are the medications your physician expects you to take upon discharge. Review all of your home medications and newly ordered medications with your doctor and/or pharmacist. Follow medication instructions as directed by your doctor and/or pharmacist. Please keep your medication list with you and share with your physician. Update the information when medications are discontinued, doses are changed, or new medications (including over-the-counter products) are added; and carry medication information at all times in the event of emergency situations.        Resources     Quit Smoking / Tobacco Use:    I understand the use of any tobacco products increases my chance of suffering from future heart disease or stroke and could cause other illnesses which may shorten my life. Quitting the use of tobacco products is the single most important thing I can do to improve my health. For further information on smoking / tobacco cessation call a Toll Free Quit Line at 1-928.675.3422 (*National [employer] [place]) or 1-878.993.6548 (American Lung Association) or you can access the web based program at www.lungusa.org.    [place] Tobacco Users Help Line:  (242) 581-4128       Toll Free: 1-716.367.6643  Quit Tobacco Program [facility] (543)059-5662    Crisis Hotline:    [place] Crisis Hotline:  1-094-FWXNQQC or 1-928.301.8760    [place] Crisis Hotline:    1-868.659.2265 or 124-954-6567    Discharge Survey:   Thank you for  choosing [facility]. We hope we did everything we could to make your hospital stay a pleasant one. You may be receiving a survey and we would appreciate your time and participation in answering the questions. Your input is very valuable to us in our efforts to improve our service to our patients and their families.            Signatures     My signature on this form indicates that:    1. I acknowledge receipt and understanding of these Home Care Instruction.  2. My questions regarding this information have been answered to my satisfaction.  3. I have formulated a plan with my discharge nurse to obtain my prescribed medications for home.    __________________________________      __________________________________                   Patient Signature                                 Guardian/Responsible Adult Signature      __________________________________                 __________       ________                       Nurse Signature                                               Date                 Time

## 2017-03-13 NOTE — DISCHARGE INSTRUCTIONS
ACTIVITY: Rest and take it easy for the first 24 hours.  A responsible adult is recommended to remain with you during that time.  It is normal to feel sleepy.  We encourage you to not do anything that requires balance, judgment or coordination.    MILD FLU-LIKE SYMPTOMS ARE NORMAL. YOU MAY EXPERIENCE GENERALIZED MUSCLE ACHES, THROAT IRRITATION, HEADACHE AND/OR SOME NAUSEA.    FOR 24 HOURS DO NOT:  Drive, operate machinery or run household appliances.  Drink beer or alcoholic beverages.   Make important decisions or sign legal documents.    SPECIAL INSTRUCTIONS: follow up with Dr Neri [phone]    DIET: To avoid nausea, slowly advance diet as tolerated, avoiding spicy or greasy foods for the first day.  Add more substantial food to your diet according to your physician's instructions.  Babies can be fed formula or breast milk as soon as they are hungry.  INCREASE FLUIDS AND FIBER TO AVOID CONSTIPATION.    SURGICAL DRESSING/BATHING: follow up with Dr Neri [phone]    FOLLOW-UP APPOINTMENT:  A follow-up appointment should be arranged with your doctor in [phone]; call to schedule.    You should CALL YOUR PHYSICIAN if you develop:  Fever greater than 101 degrees F.  Pain not relieved by medication, or persistent nausea or vomiting.  Excessive bleeding (blood soaking through dressing) or unexpected drainage from the wound.  Extreme redness or swelling around the incision site, drainage of pus or foul smelling drainage.  Inability to urinate or empty your bladder within 8 hours.  Problems with breathing or chest pain.    You should call 911 if you develop problems with breathing or chest pain.  If you are unable to contact your doctor or surgical center, you should go to the nearest emergency room or urgent care center.  Physician's telephone #: [phone]    If any questions arise, call your doctor.  If your doctor is not available, please feel free to call the Surgical Center at [phone]  The Center is open Monday through  Friday from 7AM to 7PM.  You can also call the HEALTH HOTLINE open 24 hours/day, 7 days/week and speak to a nurse at [phone], or toll free at [phone].    A registered nurse may call you a few days after your surgery to see how you are doing after your procedure.    MEDICATIONS: Resume taking daily medication.  Take prescribed pain medication with food.  If no medication is prescribed, you may take non-aspirin pain medication if needed.  PAIN MEDICATION CAN BE VERY CONSTIPATING.  Take a stool softener or laxative such as senokot, pericolace, or milk of magnesia if needed    If your physician has prescribed pain medication that includes Acetaminophen (Tylenol), do not take additional Acetaminophen (Tylenol) while taking the prescribed medication.    Depression / Suicide Risk    As you are discharged from this [facility] facility, it is important to learn how to keep safe from harming yourself.    Recognize the warning signs:  · Abrupt changes in personality, positive or negative- including increase in energy   · Giving away possessions  · Change in eating patterns- significant weight changes-  positive or negative  · Change in sleeping patterns- unable to sleep or sleeping all the time   · Unwillingness or inability to communicate  · Depression  · Unusual sadness, discouragement and loneliness  · Talk of wanting to die  · Neglect of personal appearance   · Rebelliousness- reckless behavior  · Withdrawal from people/activities they love  · Confusion- inability to concentrate     If you or a loved one observes any of these behaviors or has concerns about self-harm, here's what you can do:  · Talk about it- your feelings and reasons for harming yourself  · Remove any means that you might use to hurt yourself (examples: pills, rope, extension cords, firearm)  · Get professional help from the community (Mental Health, Substance Abuse, psychological counseling)  · Do not be alone:Call your Safe Contact-  someone whom you trust who will be there for you.  · Call your local CRISIS HOTLINE 982-2009 or 088-978-3984  · Call your local Children's Mobile Crisis Response Team [place] (295) 515-9108 or www.LevelUp  · Call the toll free National Suicide Prevention Hotlines   · National Suicide Prevention Lifeline 719-647-UHJP (9937)  [facility] Line Network 800-SUICIDE (921-5483)  Transesophageal Echocardiogram  Transesophageal echocardiography (PAGE) is a special type of test that produces images of the heart by using sound waves (echocardiogram). This type of echocardiography can obtain better images of the heart than standard echocardiography. PAGE is done by passing a flexible tube down the esophagus. The heart is located in front of the esophagus. Because the heart and esophagus are close to one another, your health care provider can take very clear, detailed pictures of the heart via ultrasound waves.  PAGE may be done:  · If your health care provider needs more information based on standard echocardiography findings.  · If you had a stroke. This might have happened because a clot formed in your heart. PAGE can visualize different areas of the heart and check for clots.  · To check valve anatomy and function.  · To check for infection on the inside of your heart (endocarditis).  · To evaluate the dividing wall (septum) of the heart and presence of a hole that did not close after birth (patent foramen ovale or atrial septal defect).  · To help diagnose a tear in the wall of the aorta (aortic dissection).  · During cardiac valve surgery. This allows the surgeon to assess the valve repair before closing the chest.  · During a variety of other cardiac procedures to guide positioning of catheters.  · Sometimes before a cardioversion, which is a shock to convert heart rhythm back to normal.  LET YOUR HEALTH CARE PROVIDER KNOW ABOUT:   · Any allergies you have.  · All medicines you are taking, including vitamins,  herbs, eye drops, creams, and over-the-counter medicines.  · Previous problems you or members of your family have had with the use of anesthetics.  · Any blood disorders you have.  · Previous surgeries you have had.  · Medical conditions you have.  · Swallowing difficulties.  · An esophageal obstruction.  RISKS AND COMPLICATIONS   Generally, PAGE is a safe procedure. However, as with any procedure, complications can occur. Possible complications include an esophageal tear (rupture).  BEFORE THE PROCEDURE   · Do not eat or drink for 6 hours before the procedure or as directed by your health care provider.  · Arrange for someone to drive you home after the procedure. Do not drive yourself home. During the procedure, you will be given medicines that can continue to make you feel drowsy and can impair your reflexes.  · An IV access tube will be started in the arm.  PROCEDURE   · A medicine to help you relax (sedative) will be given through the IV access tube.  · A medicine may be sprayed or gargled to numb the back of the throat.  · Your blood pressure, heart rate, and breathing (vital signs) will be monitored during the procedure.  · The PAGE probe is a long, flexible tube. The tip of the probe is placed into the back of the mouth, and you will be asked to swallow. This helps to pass the tip of the probe into the esophagus. Once the tip of the probe is in the correct area, your health care provider can take pictures of the heart.  · PAGE is usually not a painful procedure. You may feel the probe press against the back of the throat. The probe does not enter the trachea and does not affect your breathing.  AFTER THE PROCEDURE   · You will be in bed, resting, until you have fully returned to consciousness.  · When you first awaken, your throat may feel slightly sore and will probably still feel numb. This will improve slowly over time.  · You will not be allowed to eat or drink until it is clear that the numbness has  improved.  · Once you have been able to drink, urinate, and sit on the edge of the bed without feeling sick to your stomach (nausea) or dizzy, you may be cleared to go home.  · You should have a friend or family member with you for the next 24 hours after your procedure.     This information is not intended to replace advice given to you by your health care provider. Make sure you discuss any questions you have with your health care provider.     Document Released: 03/09/2004 Document Revised: 12/23/2014 Document Reviewed: 06/19/2014  [employer] Interactive Patient Education ©2016 [employer]  ·

## 2017-03-14 ENCOUNTER — TELEPHONE (OUTPATIENT)
Dept: PULMONOLOGY | Facility: HOSPICE | Age: 72
End: 2017-03-14

## 2017-03-14 DIAGNOSIS — J96.11 CHRONIC RESPIRATORY FAILURE WITH HYPOXIA (HCC): ICD-10-CM

## 2017-03-14 DIAGNOSIS — J44.9 CHRONIC OBSTRUCTIVE PULMONARY DISEASE, UNSPECIFIED COPD TYPE (HCC): ICD-10-CM

## 2017-03-14 NOTE — TELEPHONE ENCOUNTER
New pulmonary pt jessi 3/23/2017-no record of recent CXR in EPIC. Please sign order if exam appropriate for visit. Thank you!

## 2017-03-15 ENCOUNTER — OFFICE VISIT (OUTPATIENT)
Dept: CARDIOLOGY | Facility: MEDICAL CENTER | Age: 72
End: 2017-03-15
Payer: MEDICARE

## 2017-03-15 ENCOUNTER — TELEPHONE (OUTPATIENT)
Dept: MEDICAL GROUP | Facility: LAB | Age: 72
End: 2017-03-15

## 2017-03-15 ENCOUNTER — TELEPHONE (OUTPATIENT)
Dept: CARDIOLOGY | Facility: MEDICAL CENTER | Age: 72
End: 2017-03-15

## 2017-03-15 VITALS
OXYGEN SATURATION: 90 % | HEART RATE: 115 BPM | BODY MASS INDEX: 19.61 KG/M2 | HEIGHT: 66 IN | WEIGHT: 122 LBS | DIASTOLIC BLOOD PRESSURE: 82 MMHG | SYSTOLIC BLOOD PRESSURE: 128 MMHG

## 2017-03-15 DIAGNOSIS — I10 ESSENTIAL HYPERTENSION: ICD-10-CM

## 2017-03-15 DIAGNOSIS — F41.9 ANXIETY: ICD-10-CM

## 2017-03-15 DIAGNOSIS — I48.92 ATRIAL FLUTTER, UNSPECIFIED TYPE (HCC): ICD-10-CM

## 2017-03-15 DIAGNOSIS — I49.8 ATRIAL ARRHYTHMIA: ICD-10-CM

## 2017-03-15 DIAGNOSIS — I34.0 SEVERE MITRAL REGURGITATION: ICD-10-CM

## 2017-03-15 PROCEDURE — 3017F COLORECTAL CA SCREEN DOC REV: CPT | Mod: 1P | Performed by: INTERNAL MEDICINE

## 2017-03-15 PROCEDURE — G8419 CALC BMI OUT NRM PARAM NOF/U: HCPCS | Performed by: INTERNAL MEDICINE

## 2017-03-15 PROCEDURE — 3014F SCREEN MAMMO DOC REV: CPT | Performed by: INTERNAL MEDICINE

## 2017-03-15 PROCEDURE — G8484 FLU IMMUNIZE NO ADMIN: HCPCS | Performed by: INTERNAL MEDICINE

## 2017-03-15 PROCEDURE — 99214 OFFICE O/P EST MOD 30 MIN: CPT | Performed by: INTERNAL MEDICINE

## 2017-03-15 PROCEDURE — 1036F TOBACCO NON-USER: CPT | Performed by: INTERNAL MEDICINE

## 2017-03-15 PROCEDURE — 4040F PNEUMOC VAC/ADMIN/RCVD: CPT | Performed by: INTERNAL MEDICINE

## 2017-03-15 PROCEDURE — G8432 DEP SCR NOT DOC, RNG: HCPCS | Performed by: INTERNAL MEDICINE

## 2017-03-15 PROCEDURE — 1101F PT FALLS ASSESS-DOCD LE1/YR: CPT | Performed by: INTERNAL MEDICINE

## 2017-03-15 RX ORDER — ALPRAZOLAM 0.25 MG/1
0.25 TABLET ORAL 3 TIMES DAILY PRN
Qty: 90 TAB | Refills: 0 | Status: SHIPPED | OUTPATIENT
Start: 2017-03-15 | End: 2017-03-16

## 2017-03-15 ASSESSMENT — ENCOUNTER SYMPTOMS
EYE PAIN: 0
PALPITATIONS: 0
COUGH: 0
CHILLS: 0
HEADACHES: 0
MYALGIAS: 0
BRUISES/BLEEDS EASILY: 0
HALLUCINATIONS: 0
WEIGHT LOSS: 0
VOMITING: 0
BLURRED VISION: 0
LOSS OF CONSCIOUSNESS: 0
FALLS: 0
SPEECH CHANGE: 0
DIZZINESS: 0
EYE DISCHARGE: 0
ORTHOPNEA: 0
FEVER: 0
PND: 0
DOUBLE VISION: 0
BLOOD IN STOOL: 0
ABDOMINAL PAIN: 0
NAUSEA: 0
DEPRESSION: 0
SENSORY CHANGE: 0
SHORTNESS OF BREATH: 1
CLAUDICATION: 0

## 2017-03-15 NOTE — MR AVS SNAPSHOT
"        Kelly Tejeda [facility]   3/15/2017 7:45 AM   Office Visit   MRN: [id number]    Department:  [facility]   Dept Phone:  [phone]    Description:  Female : 1945   Provider:  Dalia Li M.D.           Reason for Visit     Follow-Up           Allergies as of 3/15/2017     Allergen Noted Reactions    Sulfa Drugs 10/05/2016       Rash       You were diagnosed with     Essential hypertension   [6027622]       Atrial arrhythmia   [668176]       Severe mitral regurgitation   [721531]         Vital Signs     Blood Pressure Pulse Height Weight Body Mass Index Oxygen Saturation    128/82 mmHg 115 1.676 m (5' 6\") 55.339 kg (122 lb) 19.70 kg/m2 90%    Smoking Status                   Former Smoker           Basic Information     Date Of Birth Sex Race Ethnicity Preferred Language    1945 Female White Non- English      Your appointments     Mar 16, 2017  9:30 AM   ZIOPATCH with HOLTER-CAM B   [facility] (--)    [address], [address]  [place] [id number] [phone]            Mar 16, 2017  1:15 PM   Scheduled Pre Admission with PREADMIT 5   PREADMIT TESTS [facility] (--)    [address]  [place] [id number] [phone]            Mar 23, 2017 11:00 AM   XRAY 15 with PULMONARY DX 1   [facility] - Pulmonary ([address])    [address]  [place] [address]               Mar 23, 2017 11:20 AM   New Patient Pulmonary with A Rotation   [facility] (--)    [address]  [address]  [place] [id number] [phone]            2017 11:00 AM   Established Patient with Ritika Jurado M.D.   [facility] [place] [facility] (--)    [address]  [address]  [place] [address] [phone]           You will be receiving a confirmation call a few days before your appointment from our automated call confirmation system.              Problem List              ICD-10-CM Priority Class Noted - Resolved   "    COPD (chronic obstructive pulmonary disease) (CMS-HCC) J44.9   10/11/2016 - Present    Osteopenia M85.80   10/11/2016 - Present    Obstructive sleep apnea syndrome G47.33   10/11/2016 - Present    Abnormal liver function K76.89   12/9/2015 - Present    Aortic atherosclerosis (CMS-HCC) I70.0   9/10/2015 - Present    Atrial flutter (CMS-HCC) I48.92   12/15/2015 - Present    Congestive heart failure (CMS-HCC) I50.9   7/7/2016 - Present    Chronic respiratory failure with hypoxia (CMS-HCC) J96.11   11/23/2015 - Present    History of colonic polyps Z86.010   3/3/2008 - Present    Hyperlipidemia E78.5   9/10/2015 - Present    Hypertension I10   3/10/2008 - Present    Mitral valve insufficiency I34.0   9/17/2012 - Present    Prediabetes R73.03   8/15/2013 - Present    Severe mitral regurgitation I34.0   3/8/2017 - Present      Health Maintenance        Date Due Completion Dates    COLONOSCOPY 10/10/1994 ---    BONE DENSITY 10/10/2009 ---    IMM INFLUENZA (1) 9/1/2016 ---    MAMMOGRAM 12/9/2017 12/9/2016, 12/9/2016, 12/9/2016, 12/9/2016, 11/29/2016, 9/11/2015, 9/11/2015    IMM DTaP/Tdap/Td Vaccine (2 - Td) 9/17/2022 9/17/2012            Current Immunizations     13-VALENT PCV PREVNAR 9/10/2015    Pneumococcal polysaccharide vaccine (PPSV-23) 10/27/2016    SHINGLES VACCINE 8/15/2013    Tdap Vaccine 9/17/2012      Below and/or attached are the medications your provider expects you to take. Review all of your home medications and newly ordered medications with your provider and/or pharmacist. Follow medication instructions as directed by your provider and/or pharmacist. Please keep your medication list with you and share with your provider. Update the information when medications are discontinued, doses are changed, or new medications (including over-the-counter products) are added; and carry medication information at all times in the event of emergency situations     Allergies:  SULFA DRUGS - (reactions not documented)                Medications  Valid as of: March 15, 2017 -  9:04 AM    Generic Name Brand Name Tablet Size Instructions for use    Albuterol Sulfate (Aero Soln) albuterol 108 (90 BASE) MCG/ACT Inhale 2 Puffs by mouth every four hours as needed for Shortness of Breath.        Atorvastatin Calcium (Tab) LIPITOR 10 MG Take 1 Tab by mouth every day.        Dabigatran Etexilate Mesylate (Cap) PRADAXA 150 MG Take 1 Cap by mouth 2 Times a Day.        DiltiaZEM HCl (CAPSULE SR 24 HR) CARDIZEM  MG Take 1 Cap by mouth every day.        Fluticasone-Salmeterol (AEROSOL POWDER, BREATH ACTIVATED) ADVAIR 250-50 MCG/DOSE Inhale 1 Puff by mouth 2 times a day.        Furosemide (Tab) LASIX 20 MG Take 1 Tab by mouth 2 times a day.        Magnesium Oxide (Tab) MAG- MG Take 400 mg by mouth every day.        Potassium Chloride (Tab CR) KLOR-CON 10 MEQ Take 1 Tab by mouth every day.        Tiotropium Bromide Monohydrate (Aero Soln) Tiotropium Bromide Monohydrate 2.5 MCG/ACT Inhale 2 Inhalation by mouth every day.        .                 Medicines prescribed today were sent to:     [id number] - [place], [place] - [address] [facility] [address]    Phone: [phone] Fax: [phone]    Open 24 Hours?: No      Medication refill instructions:       If your prescription bottle indicates you have medication refills left, it is not necessary to call your provider’s office. Please contact your pharmacy and they will refill your medication.    If your prescription bottle indicates you do not have any refills left, you may request refills at any time through one of the following ways: The online [employer] system (except Urgent Care), by calling your provider’s office, or by asking your pharmacy to contact your provider’s office with a refill request. Medication refills are processed only during regular business hours and may not be available until the next business day. Your provider may request additional  information or to have a follow-up visit with you prior to refilling your medication.   *Please Note: Medication refills are assigned a new Rx number when refilled electronically. Your pharmacy may indicate that no refills were authorized even though a new prescription for the same medication is available at the pharmacy. Please request the medicine by name with the pharmacy before contacting your provider for a refill.           [email or web address] Access Code: Activation code not generated  Current [employer] Status: Active

## 2017-03-15 NOTE — PROGRESS NOTES
Patient requests medication for anxiety. She will be having open heart surgery for her mitral valve. We will discuss this further at her appointment in April, but I have sent in a prescription for Xanax 0.25 mg. Her  was checked. I did outline the risks of these medications with her.    Ritika Jurado M.D.

## 2017-03-15 NOTE — TELEPHONE ENCOUNTER
Patient is scheduled on 3-17-17 for a coronary angiogram diagnostic only per  To to be done with Dr. JOSE LUIS [facility]. Patient was told to hold pradaxa for 48hrs prior and to hold lasix am of procedure. Patient was told to check in at 9:00am for an 11:00 procedure.

## 2017-03-15 NOTE — PROGRESS NOTES
Subjective:   Kelly [place] is a 71 y.o. female who presents today for cardiac care and evaluation because of shortness of breath along with abnormal transthoracic echocardiogram finding. Under study, there was evidence of possible severe mitral regurgitation with mitral valve prolapse. I looked at the ultrasound myself and there appears to be redundant tissue at the tips of the mitral valve leaflets causing the mitral valve prolapse and possible severe mitral regurgitation. Patient does have a history of this particular phenomenon which was told to her 5 years ago at [facility] in [place]. Patient does get winded upon walking far distance up incline. She sometimes feel tired. She was diagnosed with COPD recently as well. She is on treatment for that at this time. She also has a history of atrial arrhythmias. She was told that it might have been atrial flutter. I reviewed her records from [facility] and she saw her cardiologist in November 2015. At that time, she had a diagnosis of atrial flutter. The plan from [place] at that time was to repeat the transthoracic echocardiogram and if she had severe mitral regurgitation while in sinus rhythm and they would refer her for valve surgery.    I performed a transesophageal echocardiogram here recently which showed evidence of severe mitral valve regurgitation. There is evidence of redundant tissue at the tips of her mitral valve leaflets. The leaflets are prolapsing both anterior and posterior. Patient does get winded these days with exertion. She also has COPD.    Past Medical History   Diagnosis Date   • COPD (chronic obstructive pulmonary disease) (CMS-HCC)    • Hypertension    • Hyperlipidemia      Past Surgical History   Procedure Laterality Date   • Oophorectomy     • Appendectomy       1967     Family History   Problem Relation Age of Onset   • Cancer Father      colon cancer    • Hypertension Mother    • Cancer Sister 68     ovarian cancer      History   Smoking status   • Former Smoker -- 1.00 packs/day for 38 years   • Types: Cigarettes   • Quit date: 10/11/2000   Smokeless tobacco   • Never Used     Allergies   Allergen Reactions   • Sulfa Drugs      Rash      Outpatient Encounter Prescriptions as of 3/15/2017   Medication Sig Dispense Refill   • potassium chloride ER (K-TAB) 10 MEQ tablet Take 1 Tab by mouth every day. 90 Tab 3   • dabigatran (PRADAXA) 150 MG Cap capsule Take 1 Cap by mouth 2 Times a Day. 180 Cap 3   • furosemide (LASIX) 20 MG Tab Take 1 Tab by mouth 2 times a day. 180 Tab 3   • atorvastatin (LIPITOR) 10 MG Tab Take 1 Tab by mouth every day. 90 Tab 3   • magnesium oxide (MAG-OX) 400 MG Tab Take 400 mg by mouth every day.     • albuterol (VENTOLIN HFA) 108 (90 BASE) MCG/ACT Aero Soln inhalation aerosol Inhale 2 Puffs by mouth every four hours as needed for Shortness of Breath. 3 Inhaler 3   • Tiotropium Bromide Monohydrate (SPIRIVA RESPIMAT) 2.5 MCG/ACT Aero Soln Inhale 2 Inhalation by mouth every day. 3 Inhaler 3   • diltiazem (DILT-XR) 120 MG XR capsule Take 1 Cap by mouth every day. 90 Cap 3   • fluticasone-salmeterol (ADVAIR) 250-50 MCG/DOSE AEROSOL POWDER, BREATH ACTIVATED Inhale 1 Puff by mouth 2 times a day. 1 Inhaler 5     No facility-administered encounter medications on file as of 3/15/2017.     Review of Systems   Constitutional: Negative for fever, chills, weight loss and malaise/fatigue.   HENT: Negative for ear discharge, ear pain, hearing loss and nosebleeds.    Eyes: Negative for blurred vision, double vision, pain and discharge.   Respiratory: Positive for shortness of breath. Negative for cough.    Cardiovascular: Negative for chest pain, palpitations, orthopnea, claudication, leg swelling and PND.   Gastrointestinal: Negative for nausea, vomiting, abdominal pain, blood in stool and melena.   Genitourinary: Negative for dysuria and hematuria.   Musculoskeletal: Negative for myalgias, joint pain and falls.   Skin:  "Negative for itching and rash.   Neurological: Negative for dizziness, sensory change, speech change, loss of consciousness and headaches.   Endo/Heme/Allergies: Negative for environmental allergies. Does not bruise/bleed easily.   Psychiatric/Behavioral: Negative for depression, suicidal ideas and hallucinations.        Objective:   /82 mmHg  Pulse 115  Ht 1.676 m (5' 6\")  Wt 55.339 kg (122 lb)  BMI 19.70 kg/m2  SpO2 90%    Physical Exam   Constitutional: She is oriented to person, place, and time. No distress.   HENT:   Head: Normocephalic and atraumatic.   Eyes: EOM are normal.   Neck: No JVD present.   Cardiovascular: Normal rate, regular rhythm, normal heart sounds and intact distal pulses.  Exam reveals no gallop and no friction rub.    No murmur heard.  Pulmonary/Chest: No respiratory distress. She has no wheezes. She has no rales. She exhibits no tenderness.   Abdominal: She exhibits no distension. There is no tenderness. There is no rebound and no guarding.   Musculoskeletal: She exhibits no edema or tenderness.   Lymphadenopathy:     She has no cervical adenopathy.   Neurological: She is alert and oriented to person, place, and time.   Skin: Skin is dry.   Psychiatric: She has a normal mood and affect.   Nursing note and vitals reviewed.      Assessment:     1. Essential hypertension     2. Atrial arrhythmia     3. Severe mitral regurgitation     4. Atrial flutter, unspecified type (CMS-HCC)         Medical Decision Making:  Today's Assessment / Status / Plan:     At this time, I do think that patient is indicated to undergo workup for possible surgical repair or replacement of her mitral valve.    I spent a significant amount of time about 45 minutes talking to patient's family and her about the options. At this time, she has agreed to proceed with a diagnostic coronary angiogram. We will also refer her to see our CT surgeon team for further evaluation of possible surgical intervention.    I " will see patient back in clinic with lab tests and studies results in 1 months.    I thank you Dr. Jurado for referring patient to our Cardiology Clinic today.

## 2017-03-15 NOTE — Clinical Note
[facility]   [address]  [place] [address]  Phone: [phone]  Fax: [phone]              Kelly [place]  1945    Encounter Date: 3/15/2017    Dalia Li M.D.          PROGRESS NOTE:  Subjective:   Kelly [place] is a 71 y.o. female who presents today for cardiac care and evaluation because of shortness of breath along with abnormal transthoracic echocardiogram finding. Under study, there was evidence of possible severe mitral regurgitation with mitral valve prolapse. I looked at the ultrasound myself and there appears to be redundant tissue at the tips of the mitral valve leaflets causing the mitral valve prolapse and possible severe mitral regurgitation. Patient does have a history of this particular phenomenon which was told to her 5 years ago at [facility] in [place]. Patient does get winded upon walking far distance up incline. She sometimes feel tired. She was diagnosed with COPD recently as well. She is on treatment for that at this time. She also has a history of atrial arrhythmias. She was told that it might have been atrial flutter. I reviewed her records from [facility] and she saw her cardiologist in November 2015. At that time, she had a diagnosis of atrial flutter. The plan from [place] at that time was to repeat the transthoracic echocardiogram and if she had severe mitral regurgitation while in sinus rhythm and they would refer her for valve surgery.    I performed a transesophageal echocardiogram here recently which showed evidence of severe mitral valve regurgitation. There is evidence of redundant tissue at the tips of her mitral valve leaflets. The leaflets are prolapsing both anterior and posterior. Patient does get winded these days with exertion. She also has COPD.    Past Medical History   Diagnosis Date   • COPD (chronic obstructive pulmonary disease) (CMS-[facility])    • Hypertension      • Hyperlipidemia      Past Surgical History   Procedure Laterality Date   • Oophorectomy     • Appendectomy       1967     Family History   Problem Relation Age of Onset   • Cancer Father      colon cancer    • Hypertension Mother    • Cancer Sister 68     ovarian cancer     History   Smoking status   • Former Smoker -- 1.00 packs/day for 38 years   • Types: Cigarettes   • Quit date: 10/11/2000   Smokeless tobacco   • Never Used     Allergies   Allergen Reactions   • Sulfa Drugs      Rash      Outpatient Encounter Prescriptions as of 3/15/2017   Medication Sig Dispense Refill   • potassium chloride ER (K-TAB) 10 MEQ tablet Take 1 Tab by mouth every day. 90 Tab 3   • dabigatran (PRADAXA) 150 MG Cap capsule Take 1 Cap by mouth 2 Times a Day. 180 Cap 3   • furosemide (LASIX) 20 MG Tab Take 1 Tab by mouth 2 times a day. 180 Tab 3   • atorvastatin (LIPITOR) 10 MG Tab Take 1 Tab by mouth every day. 90 Tab 3   • magnesium oxide (MAG-OX) 400 MG Tab Take 400 mg by mouth every day.     • albuterol (VENTOLIN HFA) 108 (90 BASE) MCG/ACT Aero Soln inhalation aerosol Inhale 2 Puffs by mouth every four hours as needed for Shortness of Breath. 3 Inhaler 3   • Tiotropium Bromide Monohydrate (SPIRIVA RESPIMAT) 2.5 MCG/ACT Aero Soln Inhale 2 Inhalation by mouth every day. 3 Inhaler 3   • diltiazem (DILT-XR) 120 MG XR capsule Take 1 Cap by mouth every day. 90 Cap 3   • fluticasone-salmeterol (ADVAIR) 250-50 MCG/DOSE AEROSOL POWDER, BREATH ACTIVATED Inhale 1 Puff by mouth 2 times a day. 1 Inhaler 5     No facility-administered encounter medications on file as of 3/15/2017.     Review of Systems   Constitutional: Negative for fever, chills, weight loss and malaise/fatigue.   HENT: Negative for ear discharge, ear pain, hearing loss and nosebleeds.    Eyes: Negative for blurred vision, double vision, pain and discharge.   Respiratory: Positive for shortness of breath. Negative for cough.    Cardiovascular: Negative for chest pain,  "palpitations, orthopnea, claudication, leg swelling and PND.   Gastrointestinal: Negative for nausea, vomiting, abdominal pain, blood in stool and melena.   Genitourinary: Negative for dysuria and hematuria.   Musculoskeletal: Negative for myalgias, joint pain and falls.   Skin: Negative for itching and rash.   Neurological: Negative for dizziness, sensory change, speech change, loss of consciousness and headaches.   Endo/Heme/Allergies: Negative for environmental allergies. Does not bruise/bleed easily.   Psychiatric/Behavioral: Negative for depression, suicidal ideas and hallucinations.        Objective:   /82 mmHg  Pulse 115  Ht 1.676 m (5' 6\")  Wt 55.339 kg (122 lb)  BMI 19.70 kg/m2  SpO2 90%    Physical Exam   Constitutional: She is oriented to person, place, and time. No distress.   HENT:   Head: Normocephalic and atraumatic.   Eyes: EOM are normal.   Neck: No JVD present.   Cardiovascular: Normal rate, regular rhythm, normal heart sounds and intact distal pulses.  Exam reveals no gallop and no friction rub.    No murmur heard.  Pulmonary/Chest: No respiratory distress. She has no wheezes. She has no rales. She exhibits no tenderness.   Abdominal: She exhibits no distension. There is no tenderness. There is no rebound and no guarding.   Musculoskeletal: She exhibits no edema or tenderness.   Lymphadenopathy:     She has no cervical adenopathy.   Neurological: She is alert and oriented to person, place, and time.   Skin: Skin is dry.   Psychiatric: She has a normal mood and affect.   Nursing note and vitals reviewed.      Assessment:     1. Essential hypertension     2. Atrial arrhythmia     3. Severe mitral regurgitation     4. Atrial flutter, unspecified type (CMS-HCC)         Medical Decision Making:  Today's Assessment / Status / Plan:     At this time, I do think that patient is indicated to undergo workup for possible surgical repair or replacement of her mitral valve.    I spent a significant " amount of time about 45 minutes talking to patient's family and her about the options. At this time, she has agreed to proceed with a diagnostic coronary angiogram. We will also refer her to see our CT surgeon team for further evaluation of possible surgical intervention.    I will see patient back in clinic with lab tests and studies results in 1 months.    I thank you Dr. Jurado for referring patient to our Cardiology Clinic today.        Ritika Jurado M.D.  [address] [address]  VIA In Basket

## 2017-03-16 DIAGNOSIS — Z01.810 PRE-OPERATIVE CARDIOVASCULAR EXAMINATION: ICD-10-CM

## 2017-03-16 DIAGNOSIS — Z01.812 PRE-OPERATIVE LABORATORY EXAMINATION: ICD-10-CM

## 2017-03-16 LAB
ANION GAP SERPL CALC-SCNC: 11 MMOL/L (ref 0–11.9)
BUN SERPL-MCNC: 21 MG/DL (ref 8–22)
CALCIUM SERPL-MCNC: 10.2 MG/DL (ref 8.5–10.5)
CHLORIDE SERPL-SCNC: 99 MMOL/L (ref 96–112)
CO2 SERPL-SCNC: 28 MMOL/L (ref 20–33)
CREAT SERPL-MCNC: 0.86 MG/DL (ref 0.5–1.4)
EKG IMPRESSION: NORMAL
ERYTHROCYTE [DISTWIDTH] IN BLOOD BY AUTOMATED COUNT: 44.3 FL (ref 35.9–50)
GFR SERPL CREATININE-BSD FRML MDRD: >60 ML/MIN/1.73 M 2
GLUCOSE SERPL-MCNC: 95 MG/DL (ref 65–99)
HCT VFR BLD AUTO: 45.3 % (ref 37–47)
HGB BLD-MCNC: 14.7 G/DL (ref 12–16)
INR PPP: 0.94 (ref 0.87–1.13)
MCH RBC QN AUTO: 31.3 PG (ref 27–33)
MCHC RBC AUTO-ENTMCNC: 32.5 G/DL (ref 33.6–35)
MCV RBC AUTO: 96.4 FL (ref 81.4–97.8)
PLATELET # BLD AUTO: 267 K/UL (ref 164–446)
PMV BLD AUTO: 9.8 FL (ref 9–12.9)
POTASSIUM SERPL-SCNC: 3.6 MMOL/L (ref 3.6–5.5)
PROTHROMBIN TIME: 12.9 SEC (ref 12–14.6)
RBC # BLD AUTO: 4.7 M/UL (ref 4.2–5.4)
SODIUM SERPL-SCNC: 138 MMOL/L (ref 135–145)
WBC # BLD AUTO: 7.8 K/UL (ref 4.8–10.8)

## 2017-03-16 PROCEDURE — 36415 COLL VENOUS BLD VENIPUNCTURE: CPT

## 2017-03-16 PROCEDURE — 80048 BASIC METABOLIC PNL TOTAL CA: CPT

## 2017-03-16 PROCEDURE — 85610 PROTHROMBIN TIME: CPT

## 2017-03-16 PROCEDURE — 85027 COMPLETE CBC AUTOMATED: CPT

## 2017-03-17 ENCOUNTER — HOSPITAL ENCOUNTER (OUTPATIENT)
Facility: MEDICAL CENTER | Age: 72
End: 2017-03-17
Attending: INTERNAL MEDICINE | Admitting: INTERNAL MEDICINE
Payer: MEDICARE

## 2017-03-17 VITALS
BODY MASS INDEX: 19.31 KG/M2 | DIASTOLIC BLOOD PRESSURE: 68 MMHG | HEART RATE: 74 BPM | OXYGEN SATURATION: 90 % | HEIGHT: 66 IN | SYSTOLIC BLOOD PRESSURE: 124 MMHG | WEIGHT: 120.15 LBS | RESPIRATION RATE: 18 BRPM | TEMPERATURE: 98 F

## 2017-03-17 DIAGNOSIS — F41.9 ANXIETY: ICD-10-CM

## 2017-03-17 PROBLEM — I10 ESSENTIAL (PRIMARY) HYPERTENSION: Status: ACTIVE | Noted: 2017-03-17

## 2017-03-17 PROCEDURE — 307093 HCHG TR BAND RADIAL

## 2017-03-17 PROCEDURE — C1892 INTRO/SHEATH,FIXED,PEEL-AWAY: HCPCS

## 2017-03-17 PROCEDURE — 99152 MOD SED SAME PHYS/QHP 5/>YRS: CPT

## 2017-03-17 PROCEDURE — 700102 HCHG RX REV CODE 250 W/ 637 OVERRIDE(OP): Performed by: INTERNAL MEDICINE

## 2017-03-17 PROCEDURE — C1769 GUIDE WIRE: HCPCS

## 2017-03-17 PROCEDURE — 93458 L HRT ARTERY/VENTRICLE ANGIO: CPT

## 2017-03-17 PROCEDURE — 700101 HCHG RX REV CODE 250

## 2017-03-17 PROCEDURE — 700111 HCHG RX REV CODE 636 W/ 250 OVERRIDE (IP)

## 2017-03-17 PROCEDURE — 303418 HCHG 4FR ULTIMATE CATHETER

## 2017-03-17 PROCEDURE — A9270 NON-COVERED ITEM OR SERVICE: HCPCS | Performed by: INTERNAL MEDICINE

## 2017-03-17 RX ORDER — SODIUM CHLORIDE 9 MG/ML
INJECTION, SOLUTION INTRAVENOUS CONTINUOUS
Status: DISCONTINUED | OUTPATIENT
Start: 2017-03-17 | End: 2017-03-17 | Stop reason: HOSPADM

## 2017-03-17 RX ORDER — VERAPAMIL HYDROCHLORIDE 2.5 MG/ML
INJECTION, SOLUTION INTRAVENOUS
Status: COMPLETED
Start: 2017-03-17 | End: 2017-03-17

## 2017-03-17 RX ORDER — ONDANSETRON 2 MG/ML
4 INJECTION INTRAMUSCULAR; INTRAVENOUS EVERY 6 HOURS PRN
Status: DISCONTINUED | OUTPATIENT
Start: 2017-03-17 | End: 2017-03-17 | Stop reason: HOSPADM

## 2017-03-17 RX ORDER — ACETAMINOPHEN 325 MG/1
325 TABLET ORAL EVERY 4 HOURS PRN
Status: DISCONTINUED | OUTPATIENT
Start: 2017-03-17 | End: 2017-03-17 | Stop reason: HOSPADM

## 2017-03-17 RX ORDER — MIDAZOLAM HYDROCHLORIDE 1 MG/ML
INJECTION INTRAMUSCULAR; INTRAVENOUS
Status: COMPLETED
Start: 2017-03-17 | End: 2017-03-17

## 2017-03-17 RX ORDER — HEPARIN SODIUM,PORCINE 1000/ML
VIAL (ML) INJECTION
Status: COMPLETED
Start: 2017-03-17 | End: 2017-03-17

## 2017-03-17 RX ORDER — LIDOCAINE HYDROCHLORIDE 20 MG/ML
INJECTION, SOLUTION INFILTRATION; PERINEURAL
Status: COMPLETED
Start: 2017-03-17 | End: 2017-03-17

## 2017-03-17 RX ADMIN — NITROGLYCERIN 10 ML: 20 INJECTION INTRAVENOUS at 14:13

## 2017-03-17 RX ADMIN — LIDOCAINE HYDROCHLORIDE: 20 INJECTION, SOLUTION INFILTRATION; PERINEURAL at 14:13

## 2017-03-17 RX ADMIN — FENTANYL CITRATE 50 MCG: 50 INJECTION, SOLUTION INTRAMUSCULAR; INTRAVENOUS at 14:18

## 2017-03-17 RX ADMIN — HEPARIN SODIUM: 1000 INJECTION, SOLUTION INTRAVENOUS; SUBCUTANEOUS at 14:13

## 2017-03-17 RX ADMIN — MIDAZOLAM 1.5 MG: 1 INJECTION INTRAMUSCULAR; INTRAVENOUS at 14:18

## 2017-03-17 RX ADMIN — VERAPAMIL HYDROCHLORIDE 5 MG: 2.5 INJECTION, SOLUTION INTRAVENOUS at 14:13

## 2017-03-17 RX ADMIN — HEPARIN SODIUM 2000 UNITS: 200 INJECTION, SOLUTION INTRAVENOUS at 14:13

## 2017-03-17 ASSESSMENT — PAIN SCALES - GENERAL
PAINLEVEL_OUTOF10: 0
PAINLEVEL_OUTOF10: 0

## 2017-03-17 NOTE — IP AVS SNAPSHOT
" Home Care Instructions                                                                                                                Name:Kelly Tejeda [facility]  Medical Record Number:[id number]  CSN: [id number]    YOB: 1945   Age: 71 y.o.  Sex: female  HT:1.676 m (5' 6\") WT: 54.5 kg (120 lb 2.4 oz)          Admit Date: 3/17/2017     Discharge Date:   Today's Date: 3/17/2017  Attending Doctor:  BRII Gaona*                  Allergies:  Sulfa drugs              Follow-up Information     1. Follow up with ELIOT Bean In 1 week.    Specialty:  Cardiology    Contact information    [address]  [address]  [place] [id number] [phone]          2. Follow up with Lacey oPrras M.D. On 3/29/2017.    Specialty:  Cardiac Surgery    Contact information    [address]  [address]  [place] [address]  [phone]          Discharge Instructions         ACTIVITY: Rest and take it easy for the first 24 hours.  A responsible adult is recommended to remain with you during that time.  It is normal to feel sleepy.  We encourage you to not do anything that requires balance, judgment or coordination.    MILD FLU-LIKE SYMPTOMS ARE NORMAL. YOU MAY EXPERIENCE GENERALIZED MUSCLE ACHES, THROAT IRRITATION, HEADACHE AND/OR SOME NAUSEA.    FOR 24 HOURS DO NOT:  Drive, operate machinery or run household appliances.  Drink beer or alcoholic beverages.   Make important decisions or sign legal documents.    SPECIAL INSTRUCTIONS:     DIET: To avoid nausea, slowly advance diet as tolerated, avoiding spicy or greasy foods for the first day.  Add more substantial food to your diet according to your physician's instructions.  Babies can be fed formula or breast milk as soon as they are hungry.  INCREASE FLUIDS AND FIBER TO AVOID CONSTIPATION.    SURGICAL DRESSING/BATHING: May remove dressing in 24 hours. May shower in 24 hours. Do not submerge in water for 7 days.    FOLLOW-UP APPOINTMENT:  A follow-up appointment " should be arranged with your doctor; call to schedule.    You should CALL YOUR PHYSICIAN if you develop:  Fever greater than 101 degrees F.  Pain not relieved by medication, or persistent nausea or vomiting.  Excessive bleeding (blood soaking through dressing) or unexpected drainage from the wound.  Extreme redness or swelling around the incision site, drainage of pus or foul smelling drainage.  Inability to urinate or empty your bladder within 8 hours.  Problems with breathing or chest pain.    You should call 911 if you develop problems with breathing or chest pain.  If you are unable to contact your doctor or surgical center, you should go to the nearest emergency room or urgent care center.  Physician's telephone #: Dr. Porras [phone]      If any questions arise, call your doctor.  If your doctor is not available, please feel free to call the Surgical Center at [phone].  The Center is open Monday through Friday from 7AM to 7PM.  You can also call the [employer] HOTLINE open 24 hours/day, 7 days/week and speak to a nurse at [phone], or toll free at [phone].    A registered nurse may call you a few days after your surgery to see how you are doing after your procedure.    MEDICATIONS: Resume taking daily medication.  Take prescribed pain medication with food.  If no medication is prescribed, you may take non-aspirin pain medication if needed.  PAIN MEDICATION CAN BE VERY CONSTIPATING.  Take a stool softener or laxative such as senokot, pericolace, or milk of magnesia if needed.    If your physician has prescribed pain medication that includes Acetaminophen (Tylenol), do not take additional Acetaminophen (Tylenol) while taking the prescribed medication.    Depression / Suicide Risk    As you are discharged from this [facility] facility, it is important to learn how to keep safe from harming yourself.    Recognize the warning signs:  · Abrupt changes in personality, positive or negative-  including increase in energy   · Giving away possessions  · Change in eating patterns- significant weight changes-  positive or negative  · Change in sleeping patterns- unable to sleep or sleeping all the time   · Unwillingness or inability to communicate  · Depression  · Unusual sadness, discouragement and loneliness  · Talk of wanting to die  · Neglect of personal appearance   · Rebelliousness- reckless behavior  · Withdrawal from people/activities they love  · Confusion- inability to concentrate     If you or a loved one observes any of these behaviors or has concerns about self-harm, here's what you can do:  · Talk about it- your feelings and reasons for harming yourself  · Remove any means that you might use to hurt yourself (examples: pills, rope, extension cords, firearm)  · Get professional help from the community (Mental Health, Substance Abuse, psychological counseling)  · Do not be alone:Call your Safe Contact- someone whom you trust who will be there for you.  · Call your local CRISIS HOTLINE 624-2401 or 751-122-1612  · Call your local Children's Mobile Crisis Response Team [place] (894) 450-6860 or wwwtrueAnthem  · Call the toll free National Suicide Prevention Hotlines   · National Suicide Prevention Lifeline 638-741-VGEX (6457)  National Hope Line Network 800-SUICIDE (258-8258)      Radial Catherization Discharge Instructions      · Do not subject hand/arm to any forceful movements for 24 hours    i.e. supporting weight when rising from the chair or bed.   · Do not drive a car for 24 hours  · You may remove the dressing tomorrow  · You may shower on the day following your procedure.  Do not take a tub bath or submerge the puncture site in water for 3 days following the procedure.  · No Lifting more than 3-5 pounds with affected wrist for 5 days  · Follow up with  2-4 weeks.  · Increase fluids for 2 days post procedure.  · Continue all previous medications unless otherwise  instructed.    If bleeding should occur following discharge:  · Sit down and apply firm pressure to site with your fingers for 10 minutes  · If the bleeding stops, continue to sit quietly, keeping your wrist straight for 2 hours.  Notify physician as soon as possible ( [phone])  · If bleeding does not stop after 10 minutes, or if there is a large amount of bleeding or spurting, call 911 immediately.  Do not drive yourself to the hospital.       Medication List      START taking these medications        Instructions    sertraline 50 MG Tabs   Commonly known as:  ZOLOFT    Doctor's comments:  Take 1/2 tab PO daily x 1 week then increase to 1 tab daily   Take 1 Tab by mouth every day.   Dose:  50 mg         CONTINUE taking these medications        Instructions    albuterol 108 (90 BASE) MCG/ACT Aers inhalation aerosol   Commonly known as:  VENTOLIN HFA    Inhale 2 Puffs by mouth every four hours as needed for Shortness of Breath.   Dose:  2 Puff       atorvastatin 10 MG Tabs   Commonly known as:  LIPITOR    Take 1 Tab by mouth every day.   Dose:  10 mg       dabigatran 150 MG Caps capsule   Commonly known as:  PRADAXA    Take 1 Cap by mouth 2 Times a Day.   Dose:  150 mg       diltiazem 120 MG XR capsule   Commonly known as:  DILT-XR    Take 1 Cap by mouth every day.   Dose:  120 mg       fluticasone-salmeterol 250-50 MCG/DOSE Aepb   Commonly known as:  ADVAIR    Inhale 1 Puff by mouth 2 times a day.   Dose:  1 Puff       furosemide 20 MG Tabs   Commonly known as:  LASIX    Take 1 Tab by mouth 2 times a day.   Dose:  20 mg       magnesium oxide 400 MG Tabs   Commonly known as:  MAG-OX    Take 400 mg by mouth every day.   Dose:  400 mg       potassium chloride ER 10 MEQ tablet   Commonly known as:  K-TAB    Take 1 Tab by mouth every day.   Dose:  10 mEq       Tiotropium Bromide Monohydrate 2.5 MCG/ACT Aers   Commonly known as:  SPIRIVA RESPIMAT    Inhale 2 Inhalation by mouth every day.   Dose:  2 Inhalation                Medication Information     Above and/or attached are the medications your physician expects you to take upon discharge. Review all of your home medications and newly ordered medications with your doctor and/or pharmacist. Follow medication instructions as directed by your doctor and/or pharmacist. Please keep your medication list with you and share with your physician. Update the information when medications are discontinued, doses are changed, or new medications (including over-the-counter products) are added; and carry medication information at all times in the event of emergency situations.        Resources     Quit Smoking / Tobacco Use:    I understand the use of any tobacco products increases my chance of suffering from future heart disease or stroke and could cause other illnesses which may shorten my life. Quitting the use of tobacco products is the single most important thing I can do to improve my health. For further information on smoking / tobacco cessation call a Toll Free Quit Line at 1-961.268.5137 (*National [employer] [place]) or 1-849.141.9020 (American Lung Association) or you can access the web based program at www.lungInMyShow.org.    [place] Tobacco Users Help Line:  (257) 976-8696       Toll Free: 1-334.541.9721  Quit Tobacco Program [facility] (850)859-0850    Crisis Hotline:    [place] Crisis Hotline:  1-142-JCEDZQW or 1-851.959.7834    [place] Crisis Hotline:    1-869.309.3634 or 184-350-6165    Discharge Survey:   Thank you for choosing [facility]. We hope we did everything we could to make your hospital stay a pleasant one. You may be receiving a survey and we would appreciate your time and participation in answering the questions. Your input is very valuable to us in our efforts to improve our service to our patients and their families.            Signatures     My signature on this form indicates that:    1. I acknowledge receipt and understanding of these Home  Care Instruction.  2. My questions regarding this information have been answered to my satisfaction.  3. I have formulated a plan with my discharge nurse to obtain my prescribed medications for home.    __________________________________      __________________________________                   Patient Signature                                 Guardian/Responsible Adult Signature      __________________________________                 __________       ________                       Nurse Signature                                               Date                 Time

## 2017-03-17 NOTE — DISCHARGE INSTRUCTIONS
ACTIVITY: Rest and take it easy for the first 24 hours.  A responsible adult is recommended to remain with you during that time.  It is normal to feel sleepy.  We encourage you to not do anything that requires balance, judgment or coordination.    MILD FLU-LIKE SYMPTOMS ARE NORMAL. YOU MAY EXPERIENCE GENERALIZED MUSCLE ACHES, THROAT IRRITATION, HEADACHE AND/OR SOME NAUSEA.    FOR 24 HOURS DO NOT:  Drive, operate machinery or run household appliances.  Drink beer or alcoholic beverages.   Make important decisions or sign legal documents.    SPECIAL INSTRUCTIONS:     DIET: To avoid nausea, slowly advance diet as tolerated, avoiding spicy or greasy foods for the first day.  Add more substantial food to your diet according to your physician's instructions.  Babies can be fed formula or breast milk as soon as they are hungry.  INCREASE FLUIDS AND FIBER TO AVOID CONSTIPATION.    SURGICAL DRESSING/BATHING: May remove dressing in 24 hours. May shower in 24 hours. Do not submerge in water for 7 days.    FOLLOW-UP APPOINTMENT:  A follow-up appointment should be arranged with your doctor; call to schedule.    You should CALL YOUR PHYSICIAN if you develop:  Fever greater than 101 degrees F.  Pain not relieved by medication, or persistent nausea or vomiting.  Excessive bleeding (blood soaking through dressing) or unexpected drainage from the wound.  Extreme redness or swelling around the incision site, drainage of pus or foul smelling drainage.  Inability to urinate or empty your bladder within 8 hours.  Problems with breathing or chest pain.    You should call 911 if you develop problems with breathing or chest pain.  If you are unable to contact your doctor or surgical center, you should go to the nearest emergency room or urgent care center.  Physician's telephone #: Dr. Porras [phone]      If any questions arise, call your doctor.  If your doctor is not available, please feel free to call the Surgical Center at  [phone].  The Center is open Monday through Friday from 7AM to 7PM.  You can also call the HEALTH HOTLINE open 24 hours/day, 7 days/week and speak to a nurse at [phone], or toll free at [phone].    A registered nurse may call you a few days after your surgery to see how you are doing after your procedure.    MEDICATIONS: Resume taking daily medication.  Take prescribed pain medication with food.  If no medication is prescribed, you may take non-aspirin pain medication if needed.  PAIN MEDICATION CAN BE VERY CONSTIPATING.  Take a stool softener or laxative such as senokot, pericolace, or milk of magnesia if needed.    If your physician has prescribed pain medication that includes Acetaminophen (Tylenol), do not take additional Acetaminophen (Tylenol) while taking the prescribed medication.    Depression / Suicide Risk    As you are discharged from this [facility] facility, it is important to learn how to keep safe from harming yourself.    Recognize the warning signs:  · Abrupt changes in personality, positive or negative- including increase in energy   · Giving away possessions  · Change in eating patterns- significant weight changes-  positive or negative  · Change in sleeping patterns- unable to sleep or sleeping all the time   · Unwillingness or inability to communicate  · Depression  · Unusual sadness, discouragement and loneliness  · Talk of wanting to die  · Neglect of personal appearance   · Rebelliousness- reckless behavior  · Withdrawal from people/activities they love  · Confusion- inability to concentrate     If you or a loved one observes any of these behaviors or has concerns about self-harm, here's what you can do:  · Talk about it- your feelings and reasons for harming yourself  · Remove any means that you might use to hurt yourself (examples: pills, rope, extension cords, firearm)  · Get professional help from the community (Mental Health, Substance Abuse, psychological  counseling)  · Do not be alone:Call your Safe Contact- someone whom you trust who will be there for you.  · Call your local CRISIS HOTLINE 237-4672 or 014-144-2537  · Call your local Children's Mobile Crisis Response Team [place] (045) 330-3799 or www.[employer]  · Call the toll free National Suicide Prevention Hotlines   · National Suicide Prevention Lifeline 116-997-TMXX (7720)  [place] Line Network 800-SUICIDE (630-1365)      Radial Catherization Discharge Instructions      · Do not subject hand/arm to any forceful movements for 24 hours    i.e. supporting weight when rising from the chair or bed.   · Do not drive a car for 24 hours  · You may remove the dressing tomorrow  · You may shower on the day following your procedure.  Do not take a tub bath or submerge the puncture site in water for 3 days following the procedure.  · No Lifting more than 3-5 pounds with affected wrist for 5 days  · Follow up with  2-4 weeks.  · Increase fluids for 2 days post procedure.  · Continue all previous medications unless otherwise instructed.    If bleeding should occur following discharge:  · Sit down and apply firm pressure to site with your fingers for 10 minutes  · If the bleeding stops, continue to sit quietly, keeping your wrist straight for 2 hours.  Notify physician as soon as possible ( [phone])  · If bleeding does not stop after 10 minutes, or if there is a large amount of bleeding or spurting, call 911 immediately.  Do not drive yourself to the hospital.

## 2017-03-17 NOTE — CATH LAB
Immediate Post-Operative Note      PreOp Diagnosis: Severe MR, in need of pre-op coronary evaluation    PostOp Diagnosis: Severe MR, NL LVSF, no sig CAD    Procedure(s) :  Coronary Angiography, Left Heart Catheterization, Left Ventriculography    Surgeon(s):  Rosy Mccallum M.D.    Type of Anesthesia: Moderate Sedation    Specimen: None    Estimated Blood Loss: 5 cc's      Findings: As above    Complications: none      Rosy Mccallum M.D.  3/17/2017 2:22 PM

## 2017-03-17 NOTE — IP AVS SNAPSHOT
3/17/2017          [facility]  [address]  [place] [address]    Dear Kelly:    [facility] wants to ensure your discharge home is safe and you or your loved ones have had all your questions answered regarding your care after you leave the hospital.    You may receive a telephone call within two days of your discharge.  This call is to make certain you understand your discharge instructions as well as ensure we provided you with the best care possible during your stay with us.     The call will only last approximately 3-5 minutes and will be done by a nurse.    Once again, we want to ensure your discharge home is safe and that you have a clear understanding of any next steps in your care.  If you have any questions or concerns, please do not hesitate to contact us, we are here for you.  Thank you for choosing [facility] for your healthcare needs.    Sincerely,    Dio Lomas    [facility]

## 2017-03-20 NOTE — PROCEDURES
DATE OF SERVICE:  03/17/2017    REFERRING CARDIOLOGIST:  Jose Luis Li MD    PREOPERATIVE DIAGNOSIS:  Severe mitral regurgitation, in need of pre-mitral   valve surgery evaluation of coronary artery.    POSTOPERATIVE DIAGNOSES:  1.  Severe mitral regurgitation.  2.  Normal left ventricular systolic function, ejection fraction greater than   75%.  3.  No significant coronary artery disease.    PROCEDURE:  1.  Left heart catheterization with left ventriculography.  2.  Selective coronary angiography.    COMPLICATIONS:  None.    DESCRIPTION OF PROCEDURE:  After informed consent was obtained, the patient   brought to cardiac catheterization laboratory in fasting state.  Quinton test   was carried out on the right wrist and found to be negative.  Right wrist and   right groin were prepped and draped in the usual sterile fashion.  Versed and   fentanyl administered for conscious sedation.  The right wrist was then   prepped.  The 2% Xylocaine was used as a local anesthesia.  A 4-Cuban sheath   was then placed in the right radial artery using Seldinger technique.  A 2.5   mg verapamil, 100 mcg of nitroglycerin, and 3000 units of heparin was then   administered into the radial sheath using 4-Cuban ultimate 1 catheter left   ventricle was entered.  Left ventriculography was performed using 24 mL of   contrast injected over 3 seconds.  Left heart pullback was subsequently   performed.  Next, selective angiography of the right coronary artery was   performed using this catheter.  The same catheter was then used to perform   selective angiography of the left coronary artery in multiple views.    Catheter was subsequently removed.  Radial sheath was then removed.    Hemostasis was obtained using Terumo TR wrist band.  Patient tolerated the   procedure well and left cardiac catheterization laboratory in stable   condition.    FINDINGS:  1.  HEMODYNAMICS:  LV systolic pressure was about 90 with LV index pressure of   10-12.  There  was no gradient across the aortic valve.  2.  LEFT VENTRICULOGRAPHY:  Showed normal-sized left ventricle with   hyperdynamic LV systolic function, ejection fraction of greater than 70%.    There was severe mitral regurgitation and too severely enlarged left atrium.  3.  No significant coronary artery disease.    The left main is a large caliber vessel.  It is angiographically free of   disease.  It bifurcated into left anterior descending artery and left   circumflex artery.    The left anterior descending is a large caliber vessel that extends   slightly beyond the apex. It is mildly calcified in proximal portion. It gives off a   relatively long medium-to-large diagonal branch proximally followed   by medium-sized diagonal branch in the mid segment.  There is about 20-30%   stenosis, proximal portion of the first diagonal branch, about 5-10% proximal   left anterior descending stenosis, but no flow limiting disease seen and this   was normal.    The left circumflex artery is a large caliber vessel as well in the range   of 4 mm in diameter.  It gives rises to a couple relatively large obtuse   marginal branches and one medium sized obtuse marginal branches in the mid   segment.  There is no significant disease in the left circumflex artery or its   major branches.    The right coronary is a dominant system.  The right coronary artery is   around 3.5 mm in diameter, gives rise to a conus branch, several small acute   marginal branches, large posterolateral branch and a posterior descending   artery.  No significant disease noted in the right coronary arteries major   branches.    PLAN:  Limit right wrist movements for 24 hours.  Referred to   cardiovascular surgery for consideration of mitral valve repair or   replacement.       ____________________________________     MD KIM GALLO / WILL    DD:  03/17/2017 16:59:06  DT:  03/17/2017 17:47:34    D#:  [id number]  Job#:  [id number]

## 2017-03-23 ENCOUNTER — APPOINTMENT (OUTPATIENT)
Dept: RADIOLOGY | Facility: IMAGING CENTER | Age: 72
End: 2017-03-23
Payer: MEDICARE

## 2017-03-23 ENCOUNTER — OFFICE VISIT (OUTPATIENT)
Dept: PULMONOLOGY | Facility: HOSPICE | Age: 72
End: 2017-03-23
Payer: MEDICARE

## 2017-03-23 VITALS
HEART RATE: 90 BPM | OXYGEN SATURATION: 92 % | SYSTOLIC BLOOD PRESSURE: 110 MMHG | RESPIRATION RATE: 16 BRPM | DIASTOLIC BLOOD PRESSURE: 80 MMHG | TEMPERATURE: 98.2 F

## 2017-03-23 DIAGNOSIS — G47.33 OBSTRUCTIVE SLEEP APNEA SYNDROME: ICD-10-CM

## 2017-03-23 DIAGNOSIS — J44.9 CHRONIC OBSTRUCTIVE PULMONARY DISEASE, UNSPECIFIED COPD TYPE (HCC): ICD-10-CM

## 2017-03-23 DIAGNOSIS — J96.11 CHRONIC RESPIRATORY FAILURE WITH HYPOXIA (HCC): ICD-10-CM

## 2017-03-23 PROCEDURE — 71020 DX-CHEST-2 VIEWS: CPT | Mod: TC | Performed by: INTERNAL MEDICINE

## 2017-03-23 PROCEDURE — G8420 CALC BMI NORM PARAMETERS: HCPCS | Performed by: INTERNAL MEDICINE

## 2017-03-23 PROCEDURE — 1101F PT FALLS ASSESS-DOCD LE1/YR: CPT | Performed by: INTERNAL MEDICINE

## 2017-03-23 PROCEDURE — 99204 OFFICE O/P NEW MOD 45 MIN: CPT | Performed by: INTERNAL MEDICINE

## 2017-03-23 RX ORDER — DILTIAZEM HYDROCHLORIDE 120 MG/1
CAPSULE, COATED, EXTENDED RELEASE ORAL
Status: ON HOLD | COMMUNITY
Start: 2017-03-13 | End: 2017-04-20

## 2017-03-23 NOTE — PROGRESS NOTES
Kelly Lovett is a 71 y.o. female here for severe COPD. Patient was referred by her primary care doctor.    History of Present Illness:    This lady relocated to [place] in July of last year. She has severe COPD, followed at [facility], in [place]. She is on a good program with Advair or Spiriva and rescue albuterol. She is on oxygen 2 L at night and has been on that for years. I have no adjustments to make her medicine regimen, it works well.    She declines a flu vaccine, believes it caused pneumonia in the past. I tried to explain to her that this was unlikely but at present she will not accept a flu vaccine. Pneumovax and Prevnar have been administered.    Lung function testing shows severe obstruction, performed in October 2016. She has spontaneous pursed lip breathing at times, but does not require oxygen with simple walking. No significant purulence or hemoptysis.    She is anticipating cardiac surgery evaluation for severe mitral regurgitation within the next couple of weeks. Cardiac catheterization showed clear coronaries. We could see her in the perioperative phase if she requires surgery.    She does not have any triggers to include allergy smoke or cold air, her COPD is severe but no active bronchospasm or significant sputum production. Reassess in 3 months.    Constitutional ROS: No unexpected change in weight, No unexplained fevers, sweats, or chills  Eyes: No change in vision or blurring or double vision  Mouth/Throat ROS: No sore throat, No recent change in voice or hoarseness  Pulmonary ROS: See present history for pertinent positives  Cardiovascular ROS: No chest pain  Gastrointestinal ROS: No abdominal pain, No abdominal bloating or early satiety  Musculoskeletal/Extremities ROS: No clubbing, No cyanosis  Hematologic/Lymphatic ROS: No abnormal bleeding  Neurologic ROS: No weakness  Psychiatric ROS: No depression  Allergic/Immunologic: No rhinitis or urticaria     Current Outpatient  Prescriptions   Medication Sig Dispense Refill   • diltiazem CD (CARDIZEM CD) 120 MG CAPSULE SR 24 HR      • sertraline (ZOLOFT) 50 MG Tab Take 1 Tab by mouth every day. 30 Tab 3   • potassium chloride ER (K-TAB) 10 MEQ tablet Take 1 Tab by mouth every day. 90 Tab 3   • dabigatran (PRADAXA) 150 MG Cap capsule Take 1 Cap by mouth 2 Times a Day. 180 Cap 3   • furosemide (LASIX) 20 MG Tab Take 1 Tab by mouth 2 times a day. 180 Tab 3   • atorvastatin (LIPITOR) 10 MG Tab Take 1 Tab by mouth every day. 90 Tab 3   • magnesium oxide (MAG-OX) 400 MG Tab Take 400 mg by mouth every day.     • albuterol (VENTOLIN HFA) 108 (90 BASE) MCG/ACT Aero Soln inhalation aerosol Inhale 2 Puffs by mouth every four hours as needed for Shortness of Breath. 3 Inhaler 3   • Tiotropium Bromide Monohydrate (SPIRIVA RESPIMAT) 2.5 MCG/ACT Aero Soln Inhale 2 Inhalation by mouth every day. 3 Inhaler 3   • diltiazem (DILT-XR) 120 MG XR capsule Take 1 Cap by mouth every day. 90 Cap 3   • fluticasone-salmeterol (ADVAIR) 250-50 MCG/DOSE AEROSOL POWDER, BREATH ACTIVATED Inhale 1 Puff by mouth 2 times a day. 1 Inhaler 5     No current facility-administered medications for this visit.       Social History   Substance Use Topics   • Smoking status: Former Smoker -- 0.50 packs/day for 38 years     Types: Cigarettes     Quit date: 10/11/2001   • Smokeless tobacco: Never Used   • Alcohol Use: 8.4 oz/week     14 Shots of liquor per week      Comment: 3 per day        Past Medical History   Diagnosis Date   • COPD (chronic obstructive pulmonary disease) (CMS-HCC)    • Hypertension    • Hyperlipidemia    • Heart valve disease    • Emphysema of lung (CMS-HCC)    • High cholesterol    • Sleep apnea      uses cpap   • Breath shortness      pt reports using o2 at night 2L, no problems at this time   • Chickenpox    • Mumps        Past Surgical History   Procedure Laterality Date   • Oophorectomy     • Appendectomy       1967       Allergies: Sulfa drugs    Family  History   Problem Relation Age of Onset   • Cancer Father      colon cancer    • Hypertension Mother    • Cancer Sister 68     ovarian cancer       Physical Examination    Filed Vitals:    03/23/17 1122   BP: 110/80   Pulse: 90   Resp: 16   Temp: 36.8 °C (98.2 °F)       General Appearance: alert, no distress  Skin: Skin color, texture, turgor normal. No rashes or lesions.  Eyes: negative  Oropharynx: Lips, mucosa, and tongue normal. Teeth and gums normal. Oropharynx moist and without lesion  Lungs: positive findings: Markedly diminished but clear  Heart: negative. RRR without murmur, gallop, or rubs.  No ectopy.  Abdomen: Abdomen soft, non-tender. BS normal. No masses,  No organomegaly  Extremities: Extremities normal. No deformities, edema, or skin discoloration  Peripheral Pulses: Normal  Neurologic: intact  No unusual adenopathy cervical or supraclavicular    II (soft palate, uvula, fauces visible)    Imaging: Described above    PFTS: Described above      Assessment and Plan  1. Chronic obstructive pulmonary disease, unspecified COPD type (CMS-HCC)    2. Obstructive sleep apnea syndrome      This lady relocated to [place] in July of last year. She has severe COPD, followed at [facility], in [place]. She is on a good program with Advair or Spiriva and rescue albuterol. She is on oxygen 2 L at night and has been on that for years. I have no adjustments to make her medicine regimen, it works well.    She declines a flu vaccine, believes it caused pneumonia in the past. I tried to explain to her that this was unlikely but at present she will not accept a flu vaccine. Pneumovax and Prevnar have been administered.    Lung function testing shows severe obstruction, performed in October 2016. She has spontaneous pursed lip breathing at times, but does not require oxygen with simple walking. No significant purulence or hemoptysis.    She is anticipating cardiac surgery evaluation for severe mitral regurgitation within the  next couple of weeks. Cardiac catheterization showed clear coronaries. We could see her in the perioperative phase if she requires surgery.    She does not have any triggers to include allergy smoke or cold air, her COPD is severe but no active bronchospasm or significant sputum production. Reassess in 3 months.  Followup Return in about 3 months (around 6/23/2017) for follow up visit with Dr. Qian Mario.

## 2017-03-23 NOTE — MR AVS SNAPSHOT
Kelly Tejeda Carrollmervinbernadine   3/23/2017 11:20 AM   Office Visit   MRN: [id number]    Department:  [facility]   Dept Phone:  [phone]    Description:  Female : 1945   Provider:  Qian Mario M.D.           Allergies as of 3/23/2017     Allergen Noted Reactions    Sulfa Drugs 10/05/2016   Rash    Rash       You were diagnosed with     Chronic obstructive pulmonary disease, unspecified COPD type (CMS-HCC)   [0142919]       Obstructive sleep apnea syndrome   [180785]         Vital Signs     Blood Pressure Pulse Temperature Respirations Oxygen Saturation Last Menstrual Period    110/80 mmHg 90 36.8 °C (98.2 °F) 16 92% (LMP Unknown)    Smoking Status                   Former Smoker           Basic Information     Date Of Birth Sex Race Ethnicity Preferred Language    1945 Female White Non- English      Your appointments     2017 11:00 AM   Established Patient with Ritika Jurado M.D.   [facility] (--)    [address] [id number] [phone]           You will be receiving a confirmation call a few days before your appointment from our automated call confirmation system.              Problem List              ICD-10-CM Priority Class Noted - Resolved    COPD (chronic obstructive pulmonary disease) (CMS-[facility]) J44.9   10/11/2016 - Present    Osteopenia M85.80   10/11/2016 - Present    Obstructive sleep apnea syndrome G47.33   10/11/2016 - Present    Abnormal liver function K76.89   2015 - Present    Aortic atherosclerosis (CMS-HCC) I70.0   9/10/2015 - Present    Atrial flutter (CMS-[facility]) I48.92   12/15/2015 - Present    Congestive heart failure (CMS-[facility]) I50.9   2016 - Present    Chronic respiratory failure with hypoxia (CMS-[facility]) J96.11   2015 - Present    History of colonic polyps Z86.010   3/3/2008 - Present    Hyperlipidemia E78.5   9/10/2015 - Present    Hypertension I10   3/10/2008 - Present    Mitral valve insufficiency I34.0   9/17/2012 - Present    Prediabetes R73.03   8/15/2013 - Present    Severe mitral regurgitation I34.0   3/8/2017 - Present    Anxiety F41.9   3/15/2017 - Present    Essential (primary) hypertension I10   3/17/2017 - Present      Health Maintenance        Date Due Completion Dates    COLONOSCOPY 10/10/1994 ---    BONE DENSITY 10/10/2009 ---    IMM INFLUENZA (1) 9/1/2016 ---    MAMMOGRAM 12/9/2017 12/9/2016, 12/9/2016, 12/9/2016, 12/9/2016, 11/29/2016, 9/11/2015, 9/11/2015    IMM DTaP/Tdap/Td Vaccine (2 - Td) 9/17/2022 9/17/2012            Current Immunizations     13-VALENT PCV PREVNAR 9/10/2015    Influenza TIV (IM) 1/1/2014    Pneumococcal polysaccharide vaccine (PPSV-23) 10/27/2016    SHINGLES VACCINE 8/15/2013    Tdap Vaccine 9/17/2012      Below and/or attached are the medications your provider expects you to take. Review all of your home medications and newly ordered medications with your provider and/or pharmacist. Follow medication instructions as directed by your provider and/or pharmacist. Please keep your medication list with you and share with your provider. Update the information when medications are discontinued, doses are changed, or new medications (including over-the-counter products) are added; and carry medication information at all times in the event of emergency situations     Allergies:  SULFA DRUGS - Rash               Medications  Valid as of: March 23, 2017 - 11:40 AM    Generic Name Brand Name Tablet Size Instructions for use    Albuterol Sulfate (Aero Soln) albuterol 108 (90 BASE) MCG/ACT Inhale 2 Puffs by mouth every four hours as needed for Shortness of Breath.        Atorvastatin Calcium (Tab) LIPITOR 10 MG Take 1 Tab by mouth every day.        Dabigatran Etexilate Mesylate (Cap) PRADAXA 150 MG Take 1 Cap by mouth 2 Times a Day.        DiltiaZEM HCl (CAPSULE SR 24 HR) CARDIZEM  MG Take 1 Cap by mouth every day.        DiltiaZEM HCl Coated Beads  (CAPSULE SR 24 HR) CARDIZEM  MG         Fluticasone-Salmeterol (AEROSOL POWDER, BREATH ACTIVATED) ADVAIR 250-50 MCG/DOSE Inhale 1 Puff by mouth 2 times a day.        Furosemide (Tab) LASIX 20 MG Take 1 Tab by mouth 2 times a day.        Magnesium Oxide (Tab) MAG- MG Take 400 mg by mouth every day.        Potassium Chloride (Tab CR) KLOR-CON 10 MEQ Take 1 Tab by mouth every day.        Sertraline HCl (Tab) ZOLOFT 50 MG Take 1 Tab by mouth every day.        Tiotropium Bromide Monohydrate (Aero Soln) Tiotropium Bromide Monohydrate 2.5 MCG/ACT Inhale 2 Inhalation by mouth every day.        .                 Medicines prescribed today were sent to:     [id number] - [place], [place] - [address]    [address] [place] [address]    Phone: [phone] Fax: [phone]    Open 24 Hours?: No      Medication refill instructions:       If your prescription bottle indicates you have medication refills left, it is not necessary to call your provider’s office. Please contact your pharmacy and they will refill your medication.    If your prescription bottle indicates you do not have any refills left, you may request refills at any time through one of the following ways: The online [employer] system (except Urgent Care), by calling your provider’s office, or by asking your pharmacy to contact your provider’s office with a refill request. Medication refills are processed only during regular business hours and may not be available until the next business day. Your provider may request additional information or to have a follow-up visit with you prior to refilling your medication.   *Please Note: Medication refills are assigned a new Rx number when refilled electronically. Your pharmacy may indicate that no refills were authorized even though a new prescription for the same medication is available at the pharmacy. Please request the medicine by name with the pharmacy before contacting your provider for a  refill.           MyChart Access Code: Activation code not generated  Current [employer] Status: Active

## 2017-03-23 NOTE — PATIENT INSTRUCTIONS
This lady relocated to [place] in July of last year. She has severe COPD, followed at [facility], in [place]. She is on a good program with Advair or Spiriva and rescue albuterol. She is on oxygen 2 L at night and has been on that for years. I have no adjustments to make her medicine regimen, it works well.    She declines a flu vaccine, believes it caused pneumonia in the past. I tried to explain to her that this was unlikely but at present she will not accept a flu vaccine. Pneumovax and Prevnar have been administered.    Lung function testing shows severe obstruction, performed in October 2016. She has spontaneous pursed lip breathing at times, but does not require oxygen with simple walking. No significant purulence or hemoptysis.    She is anticipating cardiac surgery evaluation for severe mitral regurgitation within the next couple of weeks. Cardiac catheterization showed clear coronaries. We could see her in the perioperative phase if she requires surgery.    She does not have any triggers to include allergy smoke or cold air, her COPD is severe but no active bronchospasm or significant sputum production. Reassess in 3 months.

## 2017-04-04 ENCOUNTER — OFFICE VISIT (OUTPATIENT)
Dept: MEDICAL GROUP | Facility: LAB | Age: 72
End: 2017-04-04
Payer: MEDICARE

## 2017-04-04 VITALS
BODY MASS INDEX: 20.59 KG/M2 | HEART RATE: 67 BPM | DIASTOLIC BLOOD PRESSURE: 56 MMHG | RESPIRATION RATE: 16 BRPM | TEMPERATURE: 99.3 F | WEIGHT: 120.59 LBS | OXYGEN SATURATION: 93 % | HEIGHT: 64 IN | SYSTOLIC BLOOD PRESSURE: 120 MMHG

## 2017-04-04 DIAGNOSIS — F41.9 ANXIETY: ICD-10-CM

## 2017-04-04 DIAGNOSIS — J44.9 CHRONIC OBSTRUCTIVE PULMONARY DISEASE, UNSPECIFIED COPD TYPE (HCC): ICD-10-CM

## 2017-04-04 DIAGNOSIS — I34.0 SEVERE MITRAL REGURGITATION: ICD-10-CM

## 2017-04-04 PROCEDURE — 4040F PNEUMOC VAC/ADMIN/RCVD: CPT | Performed by: FAMILY MEDICINE

## 2017-04-04 PROCEDURE — 1101F PT FALLS ASSESS-DOCD LE1/YR: CPT | Performed by: FAMILY MEDICINE

## 2017-04-04 PROCEDURE — 1036F TOBACCO NON-USER: CPT | Performed by: FAMILY MEDICINE

## 2017-04-04 PROCEDURE — 3014F SCREEN MAMMO DOC REV: CPT | Performed by: FAMILY MEDICINE

## 2017-04-04 PROCEDURE — 99214 OFFICE O/P EST MOD 30 MIN: CPT | Performed by: FAMILY MEDICINE

## 2017-04-04 PROCEDURE — G8432 DEP SCR NOT DOC, RNG: HCPCS | Performed by: FAMILY MEDICINE

## 2017-04-04 PROCEDURE — G8420 CALC BMI NORM PARAMETERS: HCPCS | Performed by: FAMILY MEDICINE

## 2017-04-04 NOTE — MR AVS SNAPSHOT
"        Kelly Lovett   2017 11:00 AM   Office Visit   MRN: [id number]    Department:  [facility]   Dept Phone:  [phone]    Description:  Female : 1945   Provider:  Ritika Jurado M.D.           Reason for Visit     Follow-Up COPD,      Heart Problem HAVING OPEN HEART SURGERY      Allergies as of 2017     Allergen Noted Reactions    Sulfa Drugs 10/05/2016   Rash    Rash       You were diagnosed with     Severe mitral regurgitation   [825217]         Vital Signs     Blood Pressure Pulse Temperature Respirations Height Weight    120/56 mmHg 67 37.4 °C (99.3 °F) 16 1.638 m (5' 4.49\") 54.7 kg (120 lb 9.5 oz)    Body Mass Index Oxygen Saturation Last Menstrual Period Smoking Status          20.39 kg/m2 93% (LMP Unknown) Former Smoker        Basic Information     Date Of Birth Sex Race Ethnicity Preferred Language    1945 Female White Non- English      Your appointments     2017 10:15 AM   CAROTID DUPLEX with VASCULAR LAB [facility] EXAM 6   NON-INVASIVE LAB [facility] ([facility])    [address] [id number] [phone]           No prep            May 19, 2017 11:00 AM   Established Patient with Ritika Jurado M.D.   [facility] (--)    [address] [id number] [phone]           You will be receiving a confirmation call a few days before your appointment from our automated call confirmation system.              Problem List              ICD-10-CM Priority Class Noted - Resolved    COPD (chronic obstructive pulmonary disease) (CMS-[facility]) J44.9   10/11/2016 - Present    Osteopenia M85.80   10/11/2016 - Present    Obstructive sleep apnea syndrome G47.33   10/11/2016 - Present    Abnormal liver function K76.89   2015 - Present    Aortic atherosclerosis (CMS-[facility]) I70.0   9/10/2015 - Present    Atrial flutter (CMS-[facility]) I48.92   12/15/2015 - Present    Congestive heart failure " (CMS-HCC) I50.9   7/7/2016 - Present    Chronic respiratory failure with hypoxia (CMS-HCC) J96.11   11/23/2015 - Present    History of colonic polyps Z86.010   3/3/2008 - Present    Hyperlipidemia E78.5   9/10/2015 - Present    Hypertension I10   3/10/2008 - Present    Mitral valve insufficiency I34.0   9/17/2012 - Present    Prediabetes R73.03   8/15/2013 - Present    Severe mitral regurgitation I34.0   3/8/2017 - Present    Anxiety F41.9   3/15/2017 - Present    Essential (primary) hypertension I10   3/17/2017 - Present      Health Maintenance        Date Due Completion Dates    COLONOSCOPY 10/10/1994 ---    BONE DENSITY 10/10/2009 ---    MAMMOGRAM 12/9/2017 12/9/2016, 12/9/2016, 12/9/2016, 12/9/2016, 11/29/2016, 9/11/2015, 9/11/2015    IMM DTaP/Tdap/Td Vaccine (2 - Td) 9/17/2022 9/17/2012            Current Immunizations     13-VALENT PCV PREVNAR 9/10/2015    Influenza TIV (IM) 1/1/2014    Pneumococcal polysaccharide vaccine (PPSV-23) 10/27/2016    SHINGLES VACCINE 8/15/2013    Tdap Vaccine 9/17/2012      Below and/or attached are the medications your provider expects you to take. Review all of your home medications and newly ordered medications with your provider and/or pharmacist. Follow medication instructions as directed by your provider and/or pharmacist. Please keep your medication list with you and share with your provider. Update the information when medications are discontinued, doses are changed, or new medications (including over-the-counter products) are added; and carry medication information at all times in the event of emergency situations     Allergies:  SULFA DRUGS - Rash               Medications  Valid as of: April 04, 2017 - 11:21 AM    Generic Name Brand Name Tablet Size Instructions for use    Albuterol Sulfate (Aero Soln) albuterol 108 (90 BASE) MCG/ACT Inhale 2 Puffs by mouth every four hours as needed for Shortness of Breath.        Atorvastatin Calcium (Tab) LIPITOR 10 MG Take 1 Tab by  mouth every day.        Dabigatran Etexilate Mesylate (Cap) PRADAXA 150 MG Take 1 Cap by mouth 2 Times a Day.        DiltiaZEM HCl (CAPSULE SR 24 HR) CARDIZEM  MG Take 1 Cap by mouth every day.        DiltiaZEM HCl Coated Beads (CAPSULE SR 24 HR) CARDIZEM  MG         Fluticasone-Salmeterol (AEROSOL POWDER, BREATH ACTIVATED) ADVAIR 250-50 MCG/DOSE Inhale 1 Puff by mouth 2 times a day.        Furosemide (Tab) LASIX 20 MG Take 1 Tab by mouth 2 times a day.        Magnesium Oxide (Tab) MAG- MG Take 400 mg by mouth every day.        Potassium Chloride (Tab CR) KLOR-CON 10 MEQ Take 1 Tab by mouth every day.        Sertraline HCl (Tab) ZOLOFT 50 MG Take 1 Tab by mouth every day.        Tiotropium Bromide Monohydrate (Aero Soln) Tiotropium Bromide Monohydrate 2.5 MCG/ACT Inhale 2 Inhalation by mouth every day.        .                 Medicines prescribed today were sent to:     [id number] - [place], [place] - [address] [address]    Phone: [phone] Fax: [phone]    Open 24 Hours?: No      Medication refill instructions:       If your prescription bottle indicates you have medication refills left, it is not necessary to call your provider’s office. Please contact your pharmacy and they will refill your medication.    If your prescription bottle indicates you do not have any refills left, you may request refills at any time through one of the following ways: The online [employer] system (except Urgent Care), by calling your provider’s office, or by asking your pharmacy to contact your provider’s office with a refill request. Medication refills are processed only during regular business hours and may not be available until the next business day. Your provider may request additional information or to have a follow-up visit with you prior to refilling your medication.   *Please Note: Medication refills are assigned a new Rx number when refilled electronically. Your  pharmacy may indicate that no refills were authorized even though a new prescription for the same medication is available at the pharmacy. Please request the medicine by name with the pharmacy before contacting your provider for a refill.           MyChart Access Code: Activation code not generated  Current [employer] Status: Active

## 2017-04-04 NOTE — PROGRESS NOTES
Subjective:   Kelly Lovett is a 71 y.o. female here today for   Chief Complaint   Patient presents with   • Follow-Up     COPD,     • Heart Problem     HAVING OPEN HEART SURGERY       1. Severe mitral regurgitation  This is chronic. She had an echocardiogram done 3/13/17 which showed severe mitral regurgitation. We got her in urgently with cardiology, Dr. Li. He referred her to Dr. Porras. She is scheduled for surgery on 4/20/17. She is nervous for the procedure but does feel overall good about it. She has strong family support in town, she lives by houses away from her sister. She also has 2 daughters that are flying into town for the surgery.    Echocardiogram 3/13/17  No mitral stenosis. Severe mitral regurgitation with eccentric jets.   Prolapse of the anterior mitral leaflet was present. Prolapse of the   posterior mitral leaflet was present. There are redundant tissues seen   on the mitral valve leaflets.   No aortic stenosis. Trace aortic insufficiency.   The aortic valve was not thoroughly evaluated due to desaturation and   focus was on the mitral valve.      2. Anxiety  This is chronic. After finding out about an upcoming heart surgery, she became severely anxious. We started Zoloft 50 mg. She is not having any side effects from this medication. She is still feeling anxious at times. We did discuss that heart surgery can result in severe depression and anxiety and getting a counselor now may be a great idea.    3. Chronic obstructive pulmonary disease, unspecified COPD type (CMS-HCC)  This is chronic. Patient is seeing Dr. Qian Mario with pulmonology. He has not changed her medications, Advair and Spiriva. She has a rescue inhaler if needed. She takes oxygen 2 L at nighttime. She has had recent pulmonary lung function testing which shows severe COPD. She denies significant cough but does have shortness of breath while walking  CXR:   Hyperinflation.  Mild cardiomegaly.  Atherosclerotic  "plaque.    Current medicines (including changes today)  Current Outpatient Prescriptions   Medication Sig Dispense Refill   • sertraline (ZOLOFT) 50 MG Tab Take 1 Tab by mouth every day. 30 Tab 3   • Tiotropium Bromide Monohydrate (SPIRIVA RESPIMAT) 2.5 MCG/ACT Aero Soln Inhale 2 Inhalation by mouth every day. 3 Inhaler 3   • fluticasone-salmeterol (ADVAIR) 250-50 MCG/DOSE AEROSOL POWDER, BREATH ACTIVATED Inhale 1 Puff by mouth 2 times a day. 1 Inhaler 5   • diltiazem CD (CARDIZEM CD) 120 MG CAPSULE SR 24 HR      • potassium chloride ER (K-TAB) 10 MEQ tablet Take 1 Tab by mouth every day. 90 Tab 3   • dabigatran (PRADAXA) 150 MG Cap capsule Take 1 Cap by mouth 2 Times a Day. 180 Cap 3   • furosemide (LASIX) 20 MG Tab Take 1 Tab by mouth 2 times a day. 180 Tab 3   • atorvastatin (LIPITOR) 10 MG Tab Take 1 Tab by mouth every day. 90 Tab 3   • magnesium oxide (MAG-OX) 400 MG Tab Take 400 mg by mouth every day.     • albuterol (VENTOLIN HFA) 108 (90 BASE) MCG/ACT Aero Soln inhalation aerosol Inhale 2 Puffs by mouth every four hours as needed for Shortness of Breath. 3 Inhaler 3   • diltiazem (DILT-XR) 120 MG XR capsule Take 1 Cap by mouth every day. 90 Cap 3     No current facility-administered medications for this visit.     She  has a past medical history of COPD (chronic obstructive pulmonary disease) (CMS-HCC); Hypertension; Hyperlipidemia; Heart valve disease; Emphysema of lung (CMS-HCC); High cholesterol; Sleep apnea; Breath shortness; Chickenpox; and Mumps.    ROS   No fevers  No bowel changes  No LE edema  Positive for shortness of breath     Objective:     Blood pressure 120/56, pulse 67, temperature 37.4 °C (99.3 °F), resp. rate 16, height 1.638 m (5' 4.49\"), weight 54.7 kg (120 lb 9.5 oz), SpO2 93 %. Body mass index is 20.39 kg/(m^2).   Physical Exam:  Constitutional: Alert, no distress.  Skin: Warm, dry, good turgor, no rashes in visible areas.  Eye: Equal, round and reactive, conjunctiva clear, lids " normal.  ENMT: Lips without lesions, good dentition, oropharynx clear.  Neck: Trachea midline, no masses, no thyromegaly. No cervical or supraclavicular lymphadenopathy  Respiratory: Unlabored respiratory effort, lungs clear to auscultation, no wheezes, no ronchi.  Cardiovascular: Normal S1, S2, RRR, no murmur, no edema.  Abdomen: Soft, non-tender, no masses, no hepatosplenomegaly.  Psych: Alert and oriented x3, normal affect and mood.      Assessment and Plan:   The following treatment plan was discussed    1. Severe mitral regurgitation  Chronic, not controlled  Patient scheduled for open heart mitral valve replacement with Dr. Porras  I will plan to see her 3-4 weeks postop to check on her mood and recovery    2. Anxiety  Chronic, stable  Discussed importance of getting a counselor now has post heart surgery may cause significant depression or anxiety  Patient does have strong social support in town  Continue Zoloft 50 mg daily    3. Chronic obstructive pulmonary disease, unspecified COPD type (CMS-HCC)  Chronic, severe but stable  Continue Spiriva, Advair, rescue inhaler if needed  Continue nocturnal oxygen 2 L  Follow-up with pulmonology, Dr. Mario      Followup: Return in about 7 weeks (around 5/20/2017) for postop, anxiety.       This note was created using voice recognition software. I have made every reasonable attempt to correct errors, however, I do anticipate some grammatical errors.

## 2017-04-12 DIAGNOSIS — J44.9 CHRONIC OBSTRUCTIVE PULMONARY DISEASE, UNSPECIFIED COPD TYPE (HCC): ICD-10-CM

## 2017-04-12 NOTE — TELEPHONE ENCOUNTER
Was the patient seen in the last year in this department? Yes     Does patient have an active prescription for medications requested? No     Received Request Via: Patient     Last visit:4/4/17

## 2017-04-19 ENCOUNTER — HOSPITAL ENCOUNTER (INPATIENT)
Dept: CARDIOLOGY | Facility: MEDICAL CENTER | Age: 72
DRG: 219 | End: 2017-04-19
Attending: THORACIC SURGERY (CARDIOTHORACIC VASCULAR SURGERY) | Admitting: THORACIC SURGERY (CARDIOTHORACIC VASCULAR SURGERY)
Payer: MEDICARE

## 2017-04-19 ENCOUNTER — HOSPITAL ENCOUNTER (OUTPATIENT)
Dept: RADIOLOGY | Facility: MEDICAL CENTER | Age: 72
DRG: 219 | End: 2017-04-19
Attending: THORACIC SURGERY (CARDIOTHORACIC VASCULAR SURGERY) | Admitting: THORACIC SURGERY (CARDIOTHORACIC VASCULAR SURGERY)
Payer: MEDICARE

## 2017-04-19 ENCOUNTER — HOSPITAL ENCOUNTER (OUTPATIENT)
Dept: RADIOLOGY | Facility: MEDICAL CENTER | Age: 72
DRG: 219 | End: 2017-04-19
Attending: THORACIC SURGERY (CARDIOTHORACIC VASCULAR SURGERY)
Payer: MEDICARE

## 2017-04-19 DIAGNOSIS — I48.91 ATRIAL FIBRILLATION, UNSPECIFIED TYPE (HCC): ICD-10-CM

## 2017-04-19 DIAGNOSIS — Z01.810 PRE-OPERATIVE CARDIOVASCULAR EXAMINATION: ICD-10-CM

## 2017-04-19 DIAGNOSIS — I34.1 J.B. BARLOW'S SYNDROME: ICD-10-CM

## 2017-04-19 LAB
ABO GROUP BLD: NORMAL
ALBUMIN SERPL BCP-MCNC: 4.5 G/DL (ref 3.2–4.9)
ALBUMIN/GLOB SERPL: 1.7 G/DL
ALP SERPL-CCNC: 74 U/L (ref 30–99)
ALT SERPL-CCNC: 16 U/L (ref 2–50)
ANION GAP SERPL CALC-SCNC: 11 MMOL/L (ref 0–11.9)
APPEARANCE UR: CLEAR
APTT PPP: 26.4 SEC (ref 24.7–36)
AST SERPL-CCNC: 24 U/L (ref 12–45)
BASOPHILS # BLD AUTO: 0.8 % (ref 0–1.8)
BASOPHILS # BLD: 0.08 K/UL (ref 0–0.12)
BILIRUB SERPL-MCNC: 0.7 MG/DL (ref 0.1–1.5)
BILIRUB UR QL STRIP.AUTO: NEGATIVE
BLD GP AB SCN SERPL QL: NORMAL
BUN SERPL-MCNC: 22 MG/DL (ref 8–22)
CALCIUM SERPL-MCNC: 9.9 MG/DL (ref 8.5–10.5)
CHLORIDE SERPL-SCNC: 100 MMOL/L (ref 96–112)
CO2 SERPL-SCNC: 23 MMOL/L (ref 20–33)
COLOR UR: YELLOW
CREAT SERPL-MCNC: 0.86 MG/DL (ref 0.5–1.4)
EKG IMPRESSION: NORMAL
EOSINOPHIL # BLD AUTO: 0.08 K/UL (ref 0–0.51)
EOSINOPHIL NFR BLD: 0.8 % (ref 0–6.9)
ERYTHROCYTE [DISTWIDTH] IN BLOOD BY AUTOMATED COUNT: 45.5 FL (ref 35.9–50)
EST. AVERAGE GLUCOSE BLD GHB EST-MCNC: 117 MG/DL
GFR SERPL CREATININE-BSD FRML MDRD: >60 ML/MIN/1.73 M 2
GLOBULIN SER CALC-MCNC: 2.6 G/DL (ref 1.9–3.5)
GLUCOSE SERPL-MCNC: 102 MG/DL (ref 65–99)
GLUCOSE UR STRIP.AUTO-MCNC: NEGATIVE MG/DL
HBA1C MFR BLD: 5.7 % (ref 0–5.6)
HCT VFR BLD AUTO: 40.2 % (ref 37–47)
HGB BLD-MCNC: 13.6 G/DL (ref 12–16)
IMM GRANULOCYTES # BLD AUTO: 0.03 K/UL (ref 0–0.11)
IMM GRANULOCYTES NFR BLD AUTO: 0.3 % (ref 0–0.9)
INR PPP: 0.89 (ref 0.87–1.13)
KETONES UR STRIP.AUTO-MCNC: NEGATIVE MG/DL
LEUKOCYTE ESTERASE UR QL STRIP.AUTO: NEGATIVE
LYMPHOCYTES # BLD AUTO: 1.44 K/UL (ref 1–4.8)
LYMPHOCYTES NFR BLD: 14.6 % (ref 22–41)
MCH RBC QN AUTO: 32.2 PG (ref 27–33)
MCHC RBC AUTO-ENTMCNC: 33.8 G/DL (ref 33.6–35)
MCV RBC AUTO: 95.3 FL (ref 81.4–97.8)
MICRO URNS: NORMAL
MONOCYTES # BLD AUTO: 0.9 K/UL (ref 0–0.85)
MONOCYTES NFR BLD AUTO: 9.1 % (ref 0–13.4)
NEUTROPHILS # BLD AUTO: 7.32 K/UL (ref 2–7.15)
NEUTROPHILS NFR BLD: 74.4 % (ref 44–72)
NITRITE UR QL STRIP.AUTO: NEGATIVE
NRBC # BLD AUTO: 0 K/UL
NRBC BLD AUTO-RTO: 0 /100 WBC
PH UR STRIP.AUTO: 5 [PH]
PLATELET # BLD AUTO: 251 K/UL (ref 164–446)
PMV BLD AUTO: 9.2 FL (ref 9–12.9)
POTASSIUM SERPL-SCNC: 3.4 MMOL/L (ref 3.6–5.5)
PROT SERPL-MCNC: 7.1 G/DL (ref 6–8.2)
PROT UR QL STRIP: NEGATIVE MG/DL
PROTHROMBIN TIME: 12.3 SEC (ref 12–14.6)
RBC # BLD AUTO: 4.22 M/UL (ref 4.2–5.4)
RBC UR QL AUTO: NEGATIVE
RH BLD: NORMAL
SODIUM SERPL-SCNC: 134 MMOL/L (ref 135–145)
SP GR UR STRIP.AUTO: 1.01
WBC # BLD AUTO: 9.9 K/UL (ref 4.8–10.8)

## 2017-04-19 PROCEDURE — 86900 BLOOD TYPING SEROLOGIC ABO: CPT

## 2017-04-19 PROCEDURE — 71020 DX-CHEST-2 VIEWS: CPT

## 2017-04-19 PROCEDURE — 700102 HCHG RX REV CODE 250 W/ 637 OVERRIDE(OP): Performed by: THORACIC SURGERY (CARDIOTHORACIC VASCULAR SURGERY)

## 2017-04-19 PROCEDURE — 81003 URINALYSIS AUTO W/O SCOPE: CPT

## 2017-04-19 PROCEDURE — 700101 HCHG RX REV CODE 250: Performed by: THORACIC SURGERY (CARDIOTHORACIC VASCULAR SURGERY)

## 2017-04-19 PROCEDURE — 86901 BLOOD TYPING SEROLOGIC RH(D): CPT

## 2017-04-19 PROCEDURE — 700111 HCHG RX REV CODE 636 W/ 250 OVERRIDE (IP): Performed by: THORACIC SURGERY (CARDIOTHORACIC VASCULAR SURGERY)

## 2017-04-19 PROCEDURE — 93880 EXTRACRANIAL BILAT STUDY: CPT | Mod: 26 | Performed by: SURGERY

## 2017-04-19 PROCEDURE — 93010 ELECTROCARDIOGRAM REPORT: CPT | Performed by: INTERNAL MEDICINE

## 2017-04-19 PROCEDURE — 85610 PROTHROMBIN TIME: CPT

## 2017-04-19 PROCEDURE — 80053 COMPREHEN METABOLIC PANEL: CPT

## 2017-04-19 PROCEDURE — 85025 COMPLETE CBC W/AUTO DIFF WBC: CPT

## 2017-04-19 PROCEDURE — 93005 ELECTROCARDIOGRAM TRACING: CPT | Performed by: THORACIC SURGERY (CARDIOTHORACIC VASCULAR SURGERY)

## 2017-04-19 PROCEDURE — 700105 HCHG RX REV CODE 258: Performed by: THORACIC SURGERY (CARDIOTHORACIC VASCULAR SURGERY)

## 2017-04-19 PROCEDURE — 85730 THROMBOPLASTIN TIME PARTIAL: CPT

## 2017-04-19 PROCEDURE — 93880 EXTRACRANIAL BILAT STUDY: CPT

## 2017-04-19 PROCEDURE — 83036 HEMOGLOBIN GLYCOSYLATED A1C: CPT

## 2017-04-19 PROCEDURE — 86850 RBC ANTIBODY SCREEN: CPT

## 2017-04-19 NOTE — CARE PLAN
Problem: Pre Op  Goal: Optimal preparation for CABG/Heart Valve surgery  Intervention: Pre Op education to patient/significant other. Provide patient [facility] Patient Guideline for Cardiac Surgery (See Pt. Ed.)  Discussed anatomy and physiology of cardiac surgery with patient and family to include pre-op regimen. Reviewed post-op expectations to include  the use of incentive spirometry with return demonstration, ventilator management, cardiac monitoring, tubes and drains, early ambulation, and expected length of stay. Also provided information on Cardiac Rehab and how to schedule an appointment. Patient and family state full understanding of all information given.  Intervention: Baseline assessment documented to include IS volume, weight, bilateral BP and peripheral pulses.  1500 mL  Intervention: NPO at midnight except cardiac medications. (No ASA, coumadin or Plavix)  Instructed patient nothing to eat or drink after midnight the night prior to scheduled surgery date.  Intervention: Shower with chlorhexidine x 2  Instructed patient to wash entire body with chlorhexedine wipes prior to bedtime the night before surgery.

## 2017-04-20 ENCOUNTER — APPOINTMENT (OUTPATIENT)
Dept: RADIOLOGY | Facility: MEDICAL CENTER | Age: 72
DRG: 219 | End: 2017-04-20
Attending: THORACIC SURGERY (CARDIOTHORACIC VASCULAR SURGERY)
Payer: MEDICARE

## 2017-04-20 ENCOUNTER — RESOLUTE PROFESSIONAL BILLING HOSPITAL PROF FEE (OUTPATIENT)
Dept: OTHER | Facility: MEDICAL CENTER | Age: 72
End: 2017-04-20
Payer: MEDICARE

## 2017-04-20 ENCOUNTER — HOSPITAL ENCOUNTER (INPATIENT)
Facility: MEDICAL CENTER | Age: 72
LOS: 15 days | DRG: 219 | End: 2017-05-05
Attending: THORACIC SURGERY (CARDIOTHORACIC VASCULAR SURGERY) | Admitting: THORACIC SURGERY (CARDIOTHORACIC VASCULAR SURGERY)
Payer: MEDICARE

## 2017-04-20 DIAGNOSIS — I34.0 SEVERE MITRAL REGURGITATION: ICD-10-CM

## 2017-04-20 LAB
ABO GROUP BLD: NORMAL
ACT BLD: 131 SEC (ref 74–137)
ACT BLD: 137 SEC (ref 74–137)
ACT BLD: 693 SEC (ref 74–137)
ACT BLD: 755 SEC (ref 74–137)
ACT BLD: 811 SEC (ref 74–137)
ACT BLD: >1000 SEC (ref 74–137)
APTT PPP: 35.4 SEC (ref 24.7–36)
BASE EXCESS BLDA CALC-SCNC: -2 MMOL/L (ref -4–3)
BASE EXCESS BLDA CALC-SCNC: -3 MMOL/L (ref -4–3)
BASE EXCESS BLDA CALC-SCNC: -4 MMOL/L (ref -4–3)
BASE EXCESS BLDA CALC-SCNC: -4 MMOL/L (ref -4–3)
BASE EXCESS BLDA CALC-SCNC: -5 MMOL/L (ref -4–3)
BASE EXCESS BLDA CALC-SCNC: -9 MMOL/L (ref -4–3)
BASE EXCESS BLDA CALC-SCNC: 0 MMOL/L (ref -4–3)
BASE EXCESS BLDA CALC-SCNC: 0 MMOL/L (ref -4–3)
BASE EXCESS BLDA CALC-SCNC: 1 MMOL/L (ref -4–3)
BASE EXCESS BLDA CALC-SCNC: 2 MMOL/L (ref -4–3)
BASE EXCESS BLDA CALC-SCNC: 3 MMOL/L (ref -4–3)
BASE EXCESS BLDV CALC-SCNC: -1 MMOL/L (ref -4–3)
BODY TEMPERATURE: ABNORMAL DEGREES
CA-I BLD ISE-SCNC: 0.9 MMOL/L (ref 1.1–1.3)
CA-I BLD ISE-SCNC: 0.91 MMOL/L (ref 1.1–1.3)
CA-I BLD ISE-SCNC: 0.94 MMOL/L (ref 1.1–1.3)
CA-I BLD ISE-SCNC: 0.94 MMOL/L (ref 1.1–1.3)
CA-I BLD ISE-SCNC: 0.95 MMOL/L (ref 1.1–1.3)
CA-I BLD ISE-SCNC: 0.96 MMOL/L (ref 1.1–1.3)
CA-I BLD ISE-SCNC: 0.96 MMOL/L (ref 1.1–1.3)
CA-I BLD ISE-SCNC: 1.23 MMOL/L (ref 1.1–1.3)
CO2 BLDA-SCNC: 21 MMOL/L (ref 20–33)
CO2 BLDA-SCNC: 23 MMOL/L (ref 20–33)
CO2 BLDA-SCNC: 24 MMOL/L (ref 20–33)
CO2 BLDA-SCNC: 24 MMOL/L (ref 20–33)
CO2 BLDA-SCNC: 25 MMOL/L (ref 20–33)
CO2 BLDA-SCNC: 26 MMOL/L (ref 20–33)
CO2 BLDA-SCNC: 27 MMOL/L (ref 20–33)
CO2 BLDA-SCNC: 28 MMOL/L (ref 20–33)
CO2 BLDA-SCNC: 28 MMOL/L (ref 20–33)
CO2 BLDA-SCNC: 29 MMOL/L (ref 20–33)
CO2 BLDA-SCNC: 31 MMOL/L (ref 20–33)
CO2 BLDV-SCNC: 25 MMOL/L (ref 20–33)
EKG IMPRESSION: NORMAL
GLUCOSE BLD-MCNC: 101 MG/DL (ref 65–99)
GLUCOSE BLD-MCNC: 131 MG/DL (ref 65–99)
GLUCOSE BLD-MCNC: 177 MG/DL (ref 65–99)
GLUCOSE BLD-MCNC: 185 MG/DL (ref 65–99)
GLUCOSE BLD-MCNC: 88 MG/DL (ref 65–99)
GLUCOSE BLD-MCNC: 89 MG/DL (ref 65–99)
GLUCOSE BLD-MCNC: 95 MG/DL (ref 65–99)
GLUCOSE BLD-MCNC: 95 MG/DL (ref 65–99)
HCO3 BLDA-SCNC: 19.1 MMOL/L (ref 17–25)
HCO3 BLDA-SCNC: 21.7 MMOL/L (ref 17–25)
HCO3 BLDA-SCNC: 22.3 MMOL/L (ref 17–25)
HCO3 BLDA-SCNC: 22.5 MMOL/L (ref 17–25)
HCO3 BLDA-SCNC: 23.7 MMOL/L (ref 17–25)
HCO3 BLDA-SCNC: 24.6 MMOL/L (ref 17–25)
HCO3 BLDA-SCNC: 25.5 MMOL/L (ref 17–25)
HCO3 BLDA-SCNC: 26.1 MMOL/L (ref 17–25)
HCO3 BLDA-SCNC: 26.2 MMOL/L (ref 17–25)
HCO3 BLDA-SCNC: 28 MMOL/L (ref 17–25)
HCO3 BLDA-SCNC: 29 MMOL/L (ref 17–25)
HCO3 BLDV-SCNC: 24.3 MMOL/L (ref 24–28)
HCT VFR BLD CALC: 17 % (ref 37–47)
HCT VFR BLD CALC: 22 % (ref 37–47)
HCT VFR BLD CALC: 22 % (ref 37–47)
HCT VFR BLD CALC: 23 % (ref 37–47)
HCT VFR BLD CALC: 24 % (ref 37–47)
HCT VFR BLD CALC: 29 % (ref 37–47)
HCT VFR BLD CALC: 30 % (ref 37–47)
HGB BLD-MCNC: 10.2 G/DL (ref 12–16)
HGB BLD-MCNC: 5.8 G/DL (ref 12–16)
HGB BLD-MCNC: 7.5 G/DL (ref 12–16)
HGB BLD-MCNC: 7.5 G/DL (ref 12–16)
HGB BLD-MCNC: 7.8 G/DL (ref 12–16)
HGB BLD-MCNC: 8.2 G/DL (ref 12–16)
HGB BLD-MCNC: 9.9 G/DL (ref 12–16)
INR PPP: 1.61 (ref 0.87–1.13)
MAGNESIUM SERPL-MCNC: 2.4 MG/DL (ref 1.5–2.5)
O2/TOTAL GAS SETTING VFR VENT: 100 %
O2/TOTAL GAS SETTING VFR VENT: 40 %
O2/TOTAL GAS SETTING VFR VENT: 80 %
PCO2 BLDA: 36.3 MMHG (ref 26–37)
PCO2 BLDA: 40 MMHG (ref 26–37)
PCO2 BLDA: 42.4 MMHG (ref 26–37)
PCO2 BLDA: 44.8 MMHG (ref 26–37)
PCO2 BLDA: 45.7 MMHG (ref 26–37)
PCO2 BLDA: 48.2 MMHG (ref 26–37)
PCO2 BLDA: 49.5 MMHG (ref 26–37)
PCO2 BLDA: 53.3 MMHG (ref 26–37)
PCO2 BLDA: 55.7 MMHG (ref 26–37)
PCO2 BLDA: 56.2 MMHG (ref 26–37)
PCO2 BLDA: 57.8 MMHG (ref 26–37)
PCO2 BLDV: 40.2 MMHG (ref 41–51)
PCO2 TEMP ADJ BLDA: 46.7 MMHG (ref 26–37)
PCO2 TEMP ADJ BLDA: 49.5 MMHG (ref 26–37)
PCO2 TEMP ADJ BLDA: 53.1 MMHG (ref 26–37)
PCO2 TEMP ADJ BLDA: 53.1 MMHG (ref 26–37)
PCO2 TEMP ADJ BLDA: 59.9 MMHG (ref 26–37)
PH BLDA: 7.14 [PH] (ref 7.4–7.5)
PH BLDA: 7.24 [PH] (ref 7.4–7.5)
PH BLDA: 7.28 [PH] (ref 7.4–7.5)
PH BLDA: 7.29 [PH] (ref 7.4–7.5)
PH BLDA: 7.29 [PH] (ref 7.4–7.5)
PH BLDA: 7.31 [PH] (ref 7.4–7.5)
PH BLDA: 7.33 [PH] (ref 7.4–7.5)
PH BLDA: 7.36 [PH] (ref 7.4–7.5)
PH BLDA: 7.37 [PH] (ref 7.4–7.5)
PH BLDA: 7.43 [PH] (ref 7.4–7.5)
PH BLDA: 7.44 [PH] (ref 7.4–7.5)
PH BLDV: 7.39 [PH] (ref 7.31–7.45)
PH TEMP ADJ BLDA: 7.16 [PH] (ref 7.4–7.5)
PH TEMP ADJ BLDA: 7.25 [PH] (ref 7.4–7.5)
PH TEMP ADJ BLDA: 7.29 [PH] (ref 7.4–7.5)
PH TEMP ADJ BLDA: 7.3 [PH] (ref 7.4–7.5)
PH TEMP ADJ BLDA: 7.33 [PH] (ref 7.4–7.5)
PLATELET # BLD AUTO: 108 K/UL (ref 164–446)
PO2 BLDA: 106 MMHG (ref 64–87)
PO2 BLDA: 330 MMHG (ref 64–87)
PO2 BLDA: 375 MMHG (ref 64–87)
PO2 BLDA: 447 MMHG (ref 64–87)
PO2 BLDA: 473 MMHG (ref 64–87)
PO2 BLDA: 483 MMHG (ref 64–87)
PO2 BLDA: 95 MMHG (ref 64–87)
PO2 BLDA: 96 MMHG (ref 64–87)
PO2 BLDA: 99 MMHG (ref 64–87)
PO2 BLDA: >500 MMHG (ref 64–87)
PO2 BLDA: >500 MMHG (ref 64–87)
PO2 BLDV: 51 MMHG (ref 25–40)
PO2 TEMP ADJ BLDA: 100 MMHG (ref 64–87)
PO2 TEMP ADJ BLDA: 106 MMHG (ref 64–87)
PO2 TEMP ADJ BLDA: 443 MMHG (ref 64–87)
PO2 TEMP ADJ BLDA: 89 MMHG (ref 64–87)
PO2 TEMP ADJ BLDA: 92 MMHG (ref 64–87)
POTASSIUM BLD-SCNC: 3.4 MMOL/L (ref 3.6–5.5)
POTASSIUM BLD-SCNC: 4.2 MMOL/L (ref 3.6–5.5)
POTASSIUM BLD-SCNC: 4.3 MMOL/L (ref 3.6–5.5)
POTASSIUM BLD-SCNC: 4.4 MMOL/L (ref 3.6–5.5)
POTASSIUM BLD-SCNC: 4.6 MMOL/L (ref 3.6–5.5)
POTASSIUM BLD-SCNC: 4.7 MMOL/L (ref 3.6–5.5)
POTASSIUM SERPL-SCNC: 3.2 MMOL/L (ref 3.6–5.5)
POTASSIUM SERPL-SCNC: 4.6 MMOL/L (ref 3.6–5.5)
PROTHROMBIN TIME: 19.6 SEC (ref 12–14.6)
SAO2 % BLDA: 100 % (ref 93–99)
SAO2 % BLDA: 95 % (ref 93–99)
SAO2 % BLDA: 96 % (ref 93–99)
SAO2 % BLDA: 96 % (ref 93–99)
SAO2 % BLDA: 98 % (ref 93–99)
SAO2 % BLDV: 86 %
SODIUM BLD-SCNC: 135 MMOL/L (ref 135–145)
SODIUM BLD-SCNC: 136 MMOL/L (ref 135–145)
SODIUM BLD-SCNC: 137 MMOL/L (ref 135–145)
SODIUM BLD-SCNC: 137 MMOL/L (ref 135–145)
SODIUM BLD-SCNC: 139 MMOL/L (ref 135–145)
SPECIMEN DRAWN FROM PATIENT: ABNORMAL

## 2017-04-20 PROCEDURE — 502627 HCHG HEMOSTAT, SURGICEL 4X4: Performed by: THORACIC SURGERY (CARDIOTHORACIC VASCULAR SURGERY)

## 2017-04-20 PROCEDURE — 700101 HCHG RX REV CODE 250

## 2017-04-20 PROCEDURE — 500016: Performed by: THORACIC SURGERY (CARDIOTHORACIC VASCULAR SURGERY)

## 2017-04-20 PROCEDURE — 85610 PROTHROMBIN TIME: CPT

## 2017-04-20 PROCEDURE — 85014 HEMATOCRIT: CPT | Mod: 91

## 2017-04-20 PROCEDURE — 85347 COAGULATION TIME ACTIVATED: CPT | Mod: 91

## 2017-04-20 PROCEDURE — A6402 STERILE GAUZE <= 16 SQ IN: HCPCS | Performed by: THORACIC SURGERY (CARDIOTHORACIC VASCULAR SURGERY)

## 2017-04-20 PROCEDURE — C2618 PROBE/NEEDLE, CRYO: HCPCS | Performed by: THORACIC SURGERY (CARDIOTHORACIC VASCULAR SURGERY)

## 2017-04-20 PROCEDURE — 88305 TISSUE EXAM BY PATHOLOGIST: CPT

## 2017-04-20 PROCEDURE — 700105 HCHG RX REV CODE 258

## 2017-04-20 PROCEDURE — 71010 DX-CHEST-PORTABLE (1 VIEW): CPT

## 2017-04-20 PROCEDURE — 700102 HCHG RX REV CODE 250 W/ 637 OVERRIDE(OP)

## 2017-04-20 PROCEDURE — A9270 NON-COVERED ITEM OR SERVICE: HCPCS

## 2017-04-20 PROCEDURE — P9045 ALBUMIN (HUMAN), 5%, 250 ML: HCPCS

## 2017-04-20 PROCEDURE — 110371 HCHG SHELL REV 272: Performed by: THORACIC SURGERY (CARDIOTHORACIC VASCULAR SURGERY)

## 2017-04-20 PROCEDURE — 02UG0JZ SUPPLEMENT MITRAL VALVE WITH SYNTHETIC SUBSTITUTE, OPEN APPROACH: ICD-10-PCS | Performed by: THORACIC SURGERY (CARDIOTHORACIC VASCULAR SURGERY)

## 2017-04-20 PROCEDURE — 85730 THROMBOPLASTIN TIME PARTIAL: CPT

## 2017-04-20 PROCEDURE — 700105 HCHG RX REV CODE 258: Performed by: NURSE PRACTITIONER

## 2017-04-20 PROCEDURE — P9047 ALBUMIN (HUMAN), 25%, 50ML: HCPCS

## 2017-04-20 PROCEDURE — 94640 AIRWAY INHALATION TREATMENT: CPT

## 2017-04-20 PROCEDURE — 503000 HCHG SUTURE, OHS: Performed by: THORACIC SURGERY (CARDIOTHORACIC VASCULAR SURGERY)

## 2017-04-20 PROCEDURE — 501519 HCHG SUTURE, E PACK: Performed by: THORACIC SURGERY (CARDIOTHORACIC VASCULAR SURGERY)

## 2017-04-20 PROCEDURE — 160048 HCHG OR STATISTICAL LEVEL 1-5: Performed by: THORACIC SURGERY (CARDIOTHORACIC VASCULAR SURGERY)

## 2017-04-20 PROCEDURE — 500385 HCHG DRAIN, PLEUROVAC ADUL: Performed by: THORACIC SURGERY (CARDIOTHORACIC VASCULAR SURGERY)

## 2017-04-20 PROCEDURE — 110382 HCHG SHELL REV 271: Performed by: THORACIC SURGERY (CARDIOTHORACIC VASCULAR SURGERY)

## 2017-04-20 PROCEDURE — 501506 HCHG SUTURE GUIDE, VALVE REPLACEMENT: Performed by: THORACIC SURGERY (CARDIOTHORACIC VASCULAR SURGERY)

## 2017-04-20 PROCEDURE — 02580ZZ DESTRUCTION OF CONDUCTION MECHANISM, OPEN APPROACH: ICD-10-PCS | Performed by: THORACIC SURGERY (CARDIOTHORACIC VASCULAR SURGERY)

## 2017-04-20 PROCEDURE — 84132 ASSAY OF SERUM POTASSIUM: CPT | Mod: 91

## 2017-04-20 PROCEDURE — C1729 CATH, DRAINAGE: HCPCS | Performed by: THORACIC SURGERY (CARDIOTHORACIC VASCULAR SURGERY)

## 2017-04-20 PROCEDURE — 93005 ELECTROCARDIOGRAM TRACING: CPT | Performed by: NURSE PRACTITIONER

## 2017-04-20 PROCEDURE — 500734 HCHG INSERT, STEALTH: Performed by: THORACIC SURGERY (CARDIOTHORACIC VASCULAR SURGERY)

## 2017-04-20 PROCEDURE — 700105 HCHG RX REV CODE 258: Performed by: THORACIC SURGERY (CARDIOTHORACIC VASCULAR SURGERY)

## 2017-04-20 PROCEDURE — 700102 HCHG RX REV CODE 250 W/ 637 OVERRIDE(OP): Performed by: NURSE PRACTITIONER

## 2017-04-20 PROCEDURE — 160042 HCHG SURGERY MINUTES - EA ADDL 1 MIN LEVEL 5: Performed by: THORACIC SURGERY (CARDIOTHORACIC VASCULAR SURGERY)

## 2017-04-20 PROCEDURE — 700111 HCHG RX REV CODE 636 W/ 250 OVERRIDE (IP): Performed by: THORACIC SURGERY (CARDIOTHORACIC VASCULAR SURGERY)

## 2017-04-20 PROCEDURE — 93321 DOPPLER ECHO F-UP/LMTD STD: CPT

## 2017-04-20 PROCEDURE — 94002 VENT MGMT INPAT INIT DAY: CPT

## 2017-04-20 PROCEDURE — 99292 CRITICAL CARE ADDL 30 MIN: CPT | Performed by: INTERNAL MEDICINE

## 2017-04-20 PROCEDURE — 502240 HCHG MISC OR SUPPLY RC 0272: Performed by: THORACIC SURGERY (CARDIOTHORACIC VASCULAR SURGERY)

## 2017-04-20 PROCEDURE — 160031 HCHG SURGERY MINUTES - 1ST 30 MINS LEVEL 5: Performed by: THORACIC SURGERY (CARDIOTHORACIC VASCULAR SURGERY)

## 2017-04-20 PROCEDURE — A9270 NON-COVERED ITEM OR SERVICE: HCPCS | Performed by: NURSE PRACTITIONER

## 2017-04-20 PROCEDURE — 94770 HCHG CO2 EXPIRED GAS DETERMINATION: CPT

## 2017-04-20 PROCEDURE — 500053 HCHG BANDAGE, ELASTIC 4: Performed by: THORACIC SURGERY (CARDIOTHORACIC VASCULAR SURGERY)

## 2017-04-20 PROCEDURE — 5A1221Z PERFORMANCE OF CARDIAC OUTPUT, CONTINUOUS: ICD-10-PCS | Performed by: THORACIC SURGERY (CARDIOTHORACIC VASCULAR SURGERY)

## 2017-04-20 PROCEDURE — 700111 HCHG RX REV CODE 636 W/ 250 OVERRIDE (IP)

## 2017-04-20 PROCEDURE — B24BZZ4 ULTRASONOGRAPHY OF HEART WITH AORTA, TRANSESOPHAGEAL: ICD-10-PCS | Performed by: THORACIC SURGERY (CARDIOTHORACIC VASCULAR SURGERY)

## 2017-04-20 PROCEDURE — 82803 BLOOD GASES ANY COMBINATION: CPT | Mod: 91

## 2017-04-20 PROCEDURE — C1898 LEAD, PMKR, OTHER THAN TRANS: HCPCS | Performed by: THORACIC SURGERY (CARDIOTHORACIC VASCULAR SURGERY)

## 2017-04-20 PROCEDURE — A4606 OXYGEN PROBE USED W OXIMETER: HCPCS | Performed by: THORACIC SURGERY (CARDIOTHORACIC VASCULAR SURGERY)

## 2017-04-20 PROCEDURE — 500002 HCHG ADHESIVE, DERMABOND: Performed by: THORACIC SURGERY (CARDIOTHORACIC VASCULAR SURGERY)

## 2017-04-20 PROCEDURE — 93325 DOPPLER ECHO COLOR FLOW MAPG: CPT

## 2017-04-20 PROCEDURE — 160009 HCHG ANES TIME/MIN: Performed by: THORACIC SURGERY (CARDIOTHORACIC VASCULAR SURGERY)

## 2017-04-20 PROCEDURE — 94150 VITAL CAPACITY TEST: CPT

## 2017-04-20 PROCEDURE — 84295 ASSAY OF SERUM SODIUM: CPT | Mod: 91

## 2017-04-20 PROCEDURE — 37799 UNLISTED PX VASCULAR SURGERY: CPT

## 2017-04-20 PROCEDURE — 82962 GLUCOSE BLOOD TEST: CPT | Mod: 91

## 2017-04-20 PROCEDURE — 700111 HCHG RX REV CODE 636 W/ 250 OVERRIDE (IP): Performed by: NURSE PRACTITIONER

## 2017-04-20 PROCEDURE — 02L70ZK OCCLUSION OF LEFT ATRIAL APPENDAGE, OPEN APPROACH: ICD-10-PCS | Performed by: THORACIC SURGERY (CARDIOTHORACIC VASCULAR SURGERY)

## 2017-04-20 PROCEDURE — 503001 HCHG PERFUSION: Performed by: THORACIC SURGERY (CARDIOTHORACIC VASCULAR SURGERY)

## 2017-04-20 PROCEDURE — 501673 HCHG TUBING, PRESSURE 6' W/MALE LL: Performed by: THORACIC SURGERY (CARDIOTHORACIC VASCULAR SURGERY)

## 2017-04-20 PROCEDURE — 82330 ASSAY OF CALCIUM: CPT

## 2017-04-20 PROCEDURE — 93010 ELECTROCARDIOGRAM REPORT: CPT | Performed by: INTERNAL MEDICINE

## 2017-04-20 PROCEDURE — 501745 HCHG WIRE, SURGICAL STEEL: Performed by: THORACIC SURGERY (CARDIOTHORACIC VASCULAR SURGERY)

## 2017-04-20 PROCEDURE — 770022 HCHG ROOM/CARE - ICU (200)

## 2017-04-20 PROCEDURE — 83735 ASSAY OF MAGNESIUM: CPT

## 2017-04-20 PROCEDURE — 93312 ECHO TRANSESOPHAGEAL: CPT

## 2017-04-20 PROCEDURE — 85049 AUTOMATED PLATELET COUNT: CPT

## 2017-04-20 PROCEDURE — 82947 ASSAY GLUCOSE BLOOD QUANT: CPT | Mod: 91

## 2017-04-20 PROCEDURE — 700101 HCHG RX REV CODE 250: Performed by: INTERNAL MEDICINE

## 2017-04-20 PROCEDURE — 500890 HCHG PACK, OPEN HEART: Performed by: THORACIC SURGERY (CARDIOTHORACIC VASCULAR SURGERY)

## 2017-04-20 PROCEDURE — 88304 TISSUE EXAM BY PATHOLOGIST: CPT

## 2017-04-20 PROCEDURE — 700102 HCHG RX REV CODE 250 W/ 637 OVERRIDE(OP): Performed by: THORACIC SURGERY (CARDIOTHORACIC VASCULAR SURGERY)

## 2017-04-20 PROCEDURE — 99291 CRITICAL CARE FIRST HOUR: CPT | Performed by: INTERNAL MEDICINE

## 2017-04-20 DEVICE — ANNULO. RING MITRAL 4600-36 ---ALWAYS SHIP OVERNIGHT---: Type: IMPLANTABLE DEVICE | Status: FUNCTIONAL

## 2017-04-20 RX ORDER — LIDOCAINE HYDROCHLORIDE 10 MG/ML
INJECTION, SOLUTION INFILTRATION; PERINEURAL
Status: COMPLETED
Start: 2017-04-20 | End: 2017-04-20

## 2017-04-20 RX ORDER — BISACODYL 10 MG
10 SUPPOSITORY, RECTAL RECTAL
Status: DISCONTINUED | OUTPATIENT
Start: 2017-04-20 | End: 2017-05-05 | Stop reason: HOSPADM

## 2017-04-20 RX ORDER — TIOTROPIUM BROMIDE 18 UG/1
1 CAPSULE ORAL; RESPIRATORY (INHALATION)
Status: DISCONTINUED | OUTPATIENT
Start: 2017-04-20 | End: 2017-04-22

## 2017-04-20 RX ORDER — PROMETHAZINE HYDROCHLORIDE 25 MG/1
25 SUPPOSITORY RECTAL EVERY 6 HOURS PRN
Status: DISCONTINUED | OUTPATIENT
Start: 2017-04-20 | End: 2017-05-05 | Stop reason: HOSPADM

## 2017-04-20 RX ORDER — LACTULOSE 20 G/30ML
30 SOLUTION ORAL
Status: DISCONTINUED | OUTPATIENT
Start: 2017-04-20 | End: 2017-05-05 | Stop reason: HOSPADM

## 2017-04-20 RX ORDER — ACETAMINOPHEN 325 MG/1
650 TABLET ORAL EVERY 4 HOURS PRN
Status: DISCONTINUED | OUTPATIENT
Start: 2017-04-20 | End: 2017-05-05 | Stop reason: HOSPADM

## 2017-04-20 RX ORDER — SODIUM CHLORIDE 9 MG/ML
INJECTION, SOLUTION INTRAVENOUS
Status: COMPLETED
Start: 2017-04-20 | End: 2017-04-20

## 2017-04-20 RX ORDER — OXYCODONE HYDROCHLORIDE 10 MG/1
10 TABLET ORAL
Status: DISCONTINUED | OUTPATIENT
Start: 2017-04-20 | End: 2017-04-29

## 2017-04-20 RX ORDER — NITROGLYCERIN 20 MG/100ML
0-100 INJECTION INTRAVENOUS CONTINUOUS
Status: DISCONTINUED | OUTPATIENT
Start: 2017-04-20 | End: 2017-04-21

## 2017-04-20 RX ORDER — IPRATROPIUM BROMIDE AND ALBUTEROL SULFATE 2.5; .5 MG/3ML; MG/3ML
SOLUTION RESPIRATORY (INHALATION)
Status: COMPLETED
Start: 2017-04-20 | End: 2017-04-20

## 2017-04-20 RX ORDER — DIPHENHYDRAMINE HCL 25 MG
25 TABLET ORAL
Status: DISCONTINUED | OUTPATIENT
Start: 2017-04-20 | End: 2017-05-05 | Stop reason: HOSPADM

## 2017-04-20 RX ORDER — DEXTROSE MONOHYDRATE 25 G/50ML
25 INJECTION, SOLUTION INTRAVENOUS PRN
Status: DISCONTINUED | OUTPATIENT
Start: 2017-04-20 | End: 2017-04-21

## 2017-04-20 RX ORDER — AMOXICILLIN 250 MG
1 CAPSULE ORAL NIGHTLY
Status: DISCONTINUED | OUTPATIENT
Start: 2017-04-20 | End: 2017-05-05 | Stop reason: HOSPADM

## 2017-04-20 RX ORDER — POTASSIUM CHLORIDE 7.45 MG/ML
10 INJECTION INTRAVENOUS ONCE
Status: COMPLETED | OUTPATIENT
Start: 2017-04-20 | End: 2017-04-20

## 2017-04-20 RX ORDER — CEFAZOLIN SODIUM 2 G/100ML
2 INJECTION, SOLUTION INTRAVENOUS ONCE
Status: ACTIVE | OUTPATIENT
Start: 2017-04-20 | End: 2017-04-21

## 2017-04-20 RX ORDER — ESMOLOL HYDROCHLORIDE 10 MG/ML
0-300 INJECTION, SOLUTION INTRAVENOUS CONTINUOUS
Status: DISCONTINUED | OUTPATIENT
Start: 2017-04-20 | End: 2017-04-21

## 2017-04-20 RX ORDER — ONDANSETRON 2 MG/ML
4 INJECTION INTRAMUSCULAR; INTRAVENOUS EVERY 6 HOURS PRN
Status: DISCONTINUED | OUTPATIENT
Start: 2017-04-20 | End: 2017-05-05 | Stop reason: HOSPADM

## 2017-04-20 RX ORDER — ALBUTEROL SULFATE 90 UG/1
2 AEROSOL, METERED RESPIRATORY (INHALATION) EVERY 4 HOURS PRN
Status: DISCONTINUED | OUTPATIENT
Start: 2017-04-20 | End: 2017-05-05 | Stop reason: HOSPADM

## 2017-04-20 RX ORDER — ACETAMINOPHEN 650 MG/1
650 SUPPOSITORY RECTAL EVERY 4 HOURS PRN
Status: DISCONTINUED | OUTPATIENT
Start: 2017-04-20 | End: 2017-05-05 | Stop reason: HOSPADM

## 2017-04-20 RX ORDER — SODIUM CHLORIDE 9 MG/ML
INJECTION, SOLUTION INTRAVENOUS CONTINUOUS
Status: DISCONTINUED | OUTPATIENT
Start: 2017-04-20 | End: 2017-05-05 | Stop reason: HOSPADM

## 2017-04-20 RX ORDER — IPRATROPIUM BROMIDE AND ALBUTEROL SULFATE 2.5; .5 MG/3ML; MG/3ML
3 SOLUTION RESPIRATORY (INHALATION)
Status: DISCONTINUED | OUTPATIENT
Start: 2017-04-20 | End: 2017-05-05 | Stop reason: HOSPADM

## 2017-04-20 RX ORDER — BUDESONIDE AND FORMOTEROL FUMARATE DIHYDRATE 160; 4.5 UG/1; UG/1
2 AEROSOL RESPIRATORY (INHALATION) 2 TIMES DAILY
Status: DISCONTINUED | OUTPATIENT
Start: 2017-04-20 | End: 2017-05-05 | Stop reason: HOSPADM

## 2017-04-20 RX ORDER — HYDROCODONE BITARTRATE AND ACETAMINOPHEN 5; 325 MG/1; MG/1
1-2 TABLET ORAL EVERY 4 HOURS PRN
Status: DISCONTINUED | OUTPATIENT
Start: 2017-04-20 | End: 2017-04-23

## 2017-04-20 RX ORDER — OXYCODONE HYDROCHLORIDE 5 MG/1
5 TABLET ORAL
Status: DISCONTINUED | OUTPATIENT
Start: 2017-04-20 | End: 2017-04-29

## 2017-04-20 RX ORDER — IPRATROPIUM BROMIDE AND ALBUTEROL SULFATE 2.5; .5 MG/3ML; MG/3ML
3 SOLUTION RESPIRATORY (INHALATION)
Status: DISCONTINUED | OUTPATIENT
Start: 2017-04-20 | End: 2017-04-21

## 2017-04-20 RX ORDER — FUROSEMIDE 20 MG/1
20 TABLET ORAL DAILY
COMMUNITY
End: 2018-06-18 | Stop reason: SDUPTHER

## 2017-04-20 RX ORDER — ATORVASTATIN CALCIUM 10 MG/1
10 TABLET, FILM COATED ORAL DAILY
Status: DISCONTINUED | OUTPATIENT
Start: 2017-04-20 | End: 2017-05-05 | Stop reason: HOSPADM

## 2017-04-20 RX ORDER — SODIUM CHLORIDE 9 MG/ML
500 INJECTION, SOLUTION INTRAVENOUS ONCE
Status: COMPLETED | OUTPATIENT
Start: 2017-04-20 | End: 2017-04-20

## 2017-04-20 RX ORDER — TRAMADOL HYDROCHLORIDE 50 MG/1
50 TABLET ORAL EVERY 4 HOURS PRN
Status: DISCONTINUED | OUTPATIENT
Start: 2017-04-20 | End: 2017-05-05 | Stop reason: HOSPADM

## 2017-04-20 RX ORDER — POTASSIUM CHLORIDE 14.9 MG/ML
20 INJECTION INTRAVENOUS ONCE
Status: COMPLETED | OUTPATIENT
Start: 2017-04-20 | End: 2017-04-20

## 2017-04-20 RX ORDER — AMOXICILLIN 250 MG
1 CAPSULE ORAL
Status: DISCONTINUED | OUTPATIENT
Start: 2017-04-20 | End: 2017-05-05 | Stop reason: HOSPADM

## 2017-04-20 RX ORDER — MIDAZOLAM HYDROCHLORIDE 1 MG/ML
.5-2 INJECTION INTRAMUSCULAR; INTRAVENOUS
Status: DISCONTINUED | OUTPATIENT
Start: 2017-04-20 | End: 2017-04-21

## 2017-04-20 RX ORDER — ENEMA 19; 7 G/133ML; G/133ML
1 ENEMA RECTAL
Status: DISCONTINUED | OUTPATIENT
Start: 2017-04-20 | End: 2017-05-05 | Stop reason: HOSPADM

## 2017-04-20 RX ORDER — DOCUSATE SODIUM 100 MG/1
100 CAPSULE, LIQUID FILLED ORAL EVERY MORNING
Status: DISCONTINUED | OUTPATIENT
Start: 2017-04-20 | End: 2017-05-05 | Stop reason: HOSPADM

## 2017-04-20 RX ORDER — MORPHINE SULFATE 4 MG/ML
4 INJECTION, SOLUTION INTRAMUSCULAR; INTRAVENOUS
Status: DISCONTINUED | OUTPATIENT
Start: 2017-04-20 | End: 2017-04-21

## 2017-04-20 RX ORDER — ALUMINA, MAGNESIA, AND SIMETHICONE 2400; 2400; 240 MG/30ML; MG/30ML; MG/30ML
30 SUSPENSION ORAL EVERY 4 HOURS PRN
Status: DISCONTINUED | OUTPATIENT
Start: 2017-04-20 | End: 2017-05-05 | Stop reason: HOSPADM

## 2017-04-20 RX ORDER — SODIUM CHLORIDE, SODIUM GLUCONATE, SODIUM ACETATE, POTASSIUM CHLORIDE AND MAGNESIUM CHLORIDE 526; 502; 368; 37; 30 MG/100ML; MG/100ML; MG/100ML; MG/100ML; MG/100ML
INJECTION, SOLUTION INTRAVENOUS PRN
Status: DISCONTINUED | OUTPATIENT
Start: 2017-04-20 | End: 2017-04-21

## 2017-04-20 RX ORDER — CEFAZOLIN SODIUM 2 G/100ML
2 INJECTION, SOLUTION INTRAVENOUS ONCE
Status: COMPLETED | OUTPATIENT
Start: 2017-04-20 | End: 2017-04-20

## 2017-04-20 RX ADMIN — SODIUM CHLORIDE 500 ML: 9 INJECTION, SOLUTION INTRAVENOUS at 12:07

## 2017-04-20 RX ADMIN — HYDROCODONE BITARTRATE AND ACETAMINOPHEN 1 TABLET: 5; 325 TABLET ORAL at 00:00

## 2017-04-20 RX ADMIN — LIDOCAINE HYDROCHLORIDE 0.3 ML: 10 INJECTION, SOLUTION INFILTRATION; PERINEURAL at 05:45

## 2017-04-20 RX ADMIN — TRAMADOL HYDROCHLORIDE 50 MG: 50 TABLET, COATED ORAL at 20:46

## 2017-04-20 RX ADMIN — SODIUM CHLORIDE 500 ML: 9 INJECTION, SOLUTION INTRAVENOUS at 20:17

## 2017-04-20 RX ADMIN — IPRATROPIUM BROMIDE AND ALBUTEROL SULFATE 3 ML: .5; 3 SOLUTION RESPIRATORY (INHALATION) at 19:29

## 2017-04-20 RX ADMIN — SODIUM CHLORIDE, SODIUM GLUCONATE, SODIUM ACETATE, POTASSIUM CHLORIDE AND MAGNESIUM CHLORIDE 1000 ML: 526; 502; 368; 37; 30 INJECTION, SOLUTION INTRAVENOUS at 16:13

## 2017-04-20 RX ADMIN — AMIODARONE HYDROCHLORIDE 150 MG: 50 INJECTION, SOLUTION INTRAVENOUS at 20:19

## 2017-04-20 RX ADMIN — STANDARDIZED SENNA CONCENTRATE AND DOCUSATE SODIUM 1 TABLET: 8.6; 5 TABLET, FILM COATED ORAL at 20:17

## 2017-04-20 RX ADMIN — MAGNESIUM SULFATE IN DEXTROSE 1 G: 10 INJECTION, SOLUTION INTRAVENOUS at 12:27

## 2017-04-20 RX ADMIN — INSULIN HUMAN 1 UNITS: 100 INJECTION, SOLUTION PARENTERAL at 13:00

## 2017-04-20 RX ADMIN — SODIUM BICARBONATE 50 MEQ: 84 INJECTION, SOLUTION INTRAVENOUS at 14:14

## 2017-04-20 RX ADMIN — ONDANSETRON 4 MG: 2 INJECTION INTRAMUSCULAR; INTRAVENOUS at 17:48

## 2017-04-20 RX ADMIN — SODIUM CHLORIDE 500 ML: 9 INJECTION, SOLUTION INTRAVENOUS at 16:13

## 2017-04-20 RX ADMIN — POTASSIUM CHLORIDE 10 MEQ: 7.46 INJECTION, SOLUTION INTRAVENOUS at 19:17

## 2017-04-20 RX ADMIN — ONDANSETRON 4 MG: 2 INJECTION INTRAMUSCULAR; INTRAVENOUS at 23:55

## 2017-04-20 RX ADMIN — SODIUM BICARBONATE 50 MEQ: 84 INJECTION, SOLUTION INTRAVENOUS at 13:40

## 2017-04-20 RX ADMIN — PHENYLEPHRINE HYDROCHLORIDE 10 MCG/MIN: 10 INJECTION INTRAVENOUS at 21:26

## 2017-04-20 RX ADMIN — POTASSIUM CHLORIDE 20 MEQ: 14.9 INJECTION, SOLUTION INTRAVENOUS at 20:50

## 2017-04-20 RX ADMIN — CALCIUM GLUCONATE 3 G: 94 INJECTION, SOLUTION INTRAVENOUS at 16:55

## 2017-04-20 RX ADMIN — SODIUM CHLORIDE, SODIUM GLUCONATE, SODIUM ACETATE, POTASSIUM CHLORIDE AND MAGNESIUM CHLORIDE 1000 ML: 526; 502; 368; 37; 30 INJECTION, SOLUTION INTRAVENOUS at 12:00

## 2017-04-20 RX ADMIN — AMIODARONE HYDROCHLORIDE 1 MG/MIN: 50 INJECTION, SOLUTION INTRAVENOUS at 22:41

## 2017-04-20 RX ADMIN — BUDESONIDE AND FORMOTEROL FUMARATE DIHYDRATE 2 PUFF: 160; 4.5 AEROSOL RESPIRATORY (INHALATION) at 19:28

## 2017-04-20 RX ADMIN — MUPIROCIN 1 APPLICATION: 20 OINTMENT TOPICAL at 05:45

## 2017-04-20 RX ADMIN — IPRATROPIUM BROMIDE AND ALBUTEROL SULFATE 3 ML: .5; 3 SOLUTION RESPIRATORY (INHALATION) at 11:40

## 2017-04-20 RX ADMIN — SODIUM CHLORIDE, SODIUM GLUCONATE, SODIUM ACETATE, POTASSIUM CHLORIDE AND MAGNESIUM CHLORIDE 1000 ML: 526; 502; 368; 37; 30 INJECTION, SOLUTION INTRAVENOUS at 13:11

## 2017-04-20 RX ADMIN — HYDROCODONE BITARTRATE AND ACETAMINOPHEN 1 TABLET: 5; 325 TABLET ORAL at 18:11

## 2017-04-20 RX ADMIN — CEFAZOLIN SODIUM 2 G: 2 INJECTION, SOLUTION INTRAVENOUS at 16:12

## 2017-04-20 RX ADMIN — SODIUM BICARBONATE 50 MEQ: 84 INJECTION, SOLUTION INTRAVENOUS at 14:22

## 2017-04-20 RX ADMIN — SODIUM CHLORIDE 2 UNITS/HR: 9 INJECTION, SOLUTION INTRAVENOUS at 12:00

## 2017-04-20 RX ADMIN — IPRATROPIUM BROMIDE AND ALBUTEROL SULFATE 3 ML: .5; 3 SOLUTION RESPIRATORY (INHALATION) at 22:38

## 2017-04-20 ASSESSMENT — PULMONARY FUNCTION TESTS: FVC: 1.2

## 2017-04-20 ASSESSMENT — LIFESTYLE VARIABLES
EVER_SMOKED: YES
ALCOHOL_USE: YES
EVER_SMOKED: YES

## 2017-04-20 ASSESSMENT — COPD QUESTIONNAIRES
COPD SCREENING SCORE: 7
HAVE YOU SMOKED AT LEAST 100 CIGARETTES IN YOUR ENTIRE LIFE: YES
DO YOU EVER COUGH UP ANY MUCUS OR PHLEGM?: NO/ONLY WITH OCCASIONAL COLDS OR INFECTIONS
DURING THE PAST 4 WEEKS HOW MUCH DID YOU FEEL SHORT OF BREATH: SOME OF THE TIME

## 2017-04-20 ASSESSMENT — PAIN SCALES - GENERAL
PAINLEVEL_OUTOF10: 3
PAINLEVEL_OUTOF10: 0
PAINLEVEL_OUTOF10: 0
PAINLEVEL_OUTOF10: 4
PAINLEVEL_OUTOF10: 0

## 2017-04-20 NOTE — PROGRESS NOTES
Most recent ABG showing mixed respiratory metabolic acidosis. 1 Amp of Bicarb given per protocol. Will recheck in 30 mins. Pt now raising eyebrows when spoken to, not following commands yet.

## 2017-04-20 NOTE — IP AVS SNAPSHOT
" <p align=\"LEFT\"><IMG SRC=\"//[email or web address]/blob$/Images/[email or web address]\" alt=\"Image\" WIDTH=\"50%\" HEIGHT=\"200\" BORDER=\"\"></p>                   [facility]  Medical Record Number:[id number]  CSN: [id number]    YOB: 1945   Age: 71 y.o.  Sex: female  HT:1.638 m (5' 4.49\") WT: 54.5 kg (120 lb 2.4 oz)          Admit Date: 4/20/2017     Discharge Date:   Today's Date: 5/5/2017  Attending Doctor:  Lacey Porras M.D.                  Allergies:  Sulfa drugs          Your appointments     May 19, 2017 11:00 AM   Established Patient with Ritika Jurado M.D.   [facility] (--)    [address]  [address]  [place] [address] [phone]           You will be receiving a confirmation call a few days before your appointment from our automated call confirmation system.            May 25, 2017 12:40 PM   PREVIOUS PATIENT with ELIOT Roy   [facility] (--)    [address], [address]  [place] [id number] [phone]              Follow-up Information     1. Follow up with Lacey Porras M.D. On 6/5/2017.    Specialty:  Cardiac Surgery    Why:  11:30 am    Contact information    [id number]  [address]  [facility] [id number] [phone]           Medication List      Take these Medications        Instructions    albuterol 108 (90 BASE) MCG/ACT Aers inhalation aerosol   Commonly known as:  VENTOLIN HFA    Inhale 2 Puffs by mouth every four hours as needed for Shortness of Breath.   Dose:  2 Puff       amiodarone 200 MG Tabs   Commonly known as:  CORDARONE    Take 1 Tab by mouth every day.   Dose:  200 mg       aspirin 81 MG Chew chewable tablet   Commonly known as:  ASA    Take 1 Tab by mouth every day.   Dose:  81 mg       atorvastatin 10 MG Tabs   Commonly known as:  LIPITOR    Take 1 Tab by mouth every day.   Dose:  10 mg       fluticasone-salmeterol 250-50 MCG/DOSE Aepb   Commonly known as:  ADVAIR    Inhale 1 Puff by mouth 2 " times a day.   Dose:  1 Puff       furosemide 20 MG Tabs   Commonly known as:  LASIX    Take 20 mg by mouth every day.   Dose:  20 mg       magnesium oxide 400 MG Tabs   Commonly known as:  MAG-OX    Take 400 mg by mouth 2 times a day.   Dose:  400 mg       potassium chloride ER 10 MEQ tablet   Commonly known as:  K-TAB    Take 1 Tab by mouth every day.   Dose:  10 mEq       sertraline 50 MG Tabs   Commonly known as:  ZOLOFT    Doctor's comments:  Take 1/2 tab PO daily x 1 week then increase to 1 tab daily   Take 1 Tab by mouth every day.   Dose:  50 mg       Tiotropium Bromide Monohydrate 2.5 MCG/ACT Aers   Commonly known as:  SPIRIVA RESPIMAT    Inhale 2 Inhalation by mouth every day.   Dose:  2 Inhalation       tramadol 50 MG Tabs   Commonly known as:  ULTRAM    Take 1 Tab by mouth every 6 hours as needed (Moderate Pain (NRS Pain Scale 4-6; CPOT Pain Scale 3-5) if opiates not ordered or tolerated).   Dose:  50 mg       warfarin 5 MG Tabs   Commonly known as:  COUMADIN    Take 1 Tab by mouth COUMADIN-DAILY. Titrate to INR 2-3.   Dose:  5 mg

## 2017-04-20 NOTE — FLOWSHEET NOTE
04/20/17 1645   Weaning Parameters   RR (bpm) 13   #FVC / Vital Capacity (liters)  1.2   NIF (cm H2O)  -30   Rapid Shallow Breathing Index (RR/VT) 38   Spontaneous VE 6.8   Spontaneous    Weaning Trial   Weaning Trial Initiated Yes

## 2017-04-20 NOTE — OP REPORT
DATE OF SERVICE:  04/20/2017    REFERRING PHYSICIAN:  Jose Luis Li MD    PREOPERATIVE DIAGNOSES:  Severe mitral regurgitation (4+, degenerative),   bileaflet mitral valve prolapse, paroxysmal atrial fibrillation, severe   chronic obstructive pulmonary disease (on home oxygen and steroids),   hypertension, dyslipidemia.    POSTOPERATIVE DIAGNOSES:  Severe mitral regurgitation (4+, degenerative),   bileaflet mitral valve prolapse, paroxysmal atrial fibrillation, severe   chronic obstructive pulmonary disease (on home oxygen and steroids),   hypertension, dyslipidemia.    PROCEDURE:  Radical mitral valve repair (P2, triangular resection, 36 mm   Anna flexible annuloplasty band), left-sided maze procedure, left atrial   appendage ligation and intraoperative transesophageal echocardiography.    SURGEON:  Lacey Porras MD    FIRST ASSISTANT:  EDY See.    ANESTHESIOLOGIST:  Sae Beaulieu MD    ANESTHESIA:  General endotracheal.    DRAINS:  Mediastinal chest tubes x2 (32-Czech straight and angled).    MISCELLANEOUS:  Temporary epicardial ventricular pacemaker wires.    COMPLICATIONS:  None.    INDICATIONS:  Patient is a very pleasant 71-year-old white female with severe   chronic obstructive pulmonary disease.  Echocardiography showed severe mitral   regurgitation and good left ventricular ejection fraction of approximately   70%.  Cardiac catheterization did not show any hemodynamically significant   coronary artery disease.    DESCRIPTION OF PROCEDURE:  The patient was brought to the operating room and   placed on the operating room table in the supine position.  After successful   induction of general anesthesia and endotracheal intubation, the patient was   prepped and draped in the usual sterile fashion.  EDY See, assisted   with retraction during the operation and closed the sternal wound.    Intraoperative transesophageal echocardiography showed severe mitral   regurgitation, prolapse of the  P2 segment of the posterior mitral valve   leaflet, ruptured P2 primary cord and moderate tricuspid regurgitation.  Her   left ventricular ejection fraction was normal at approximately 60%.  An   incision was made from a sternal notch to the xiphoid.  The sternum was opened   longitudinally with a sternal saw.  Hemostasis was obtained with   electrocautery at the sternal edges.  The patient was systemically   heparinized.  The pericardium was opened longitudinally and tented anteriorly   with Ethibond stay stitches.  The aortic cannula was inserted first followed   by dual-stage venous cannula.  An antegrade cardioplegia cannula was placed in   the ascending aorta.  Cardiopulmonary bypass was instituted.  The aorta was   cross-clamped and the patient was given 1 L of cold cardioplegia in an   antegrade fashion.  There was prompt cardiac arrest.  Ice slush was placed on   the heart for further myocardial protection.  A phrenic nerve protector pad   was used.  From this point on, cardioplegia was given in an antegrade fashion   every 15-20 minutes while the aorta was crossclamped.  The left atrial   appendage was ligated at its base and excised.  The stump was oversewn in 2   layers using #4-0 Prolene sutures.  A left atriotomy was performed just below   the interatrial groove.  The Whisktronic cryoprobe was used to perform the   left-sided maze procedure.  Ablations were done around the pulmonary veins,   between the incision and the mitral valve annulus at the 5 o'clock position   and between the left inferior pulmonary vein and the orifice of the left   atrial appendage.  There was severe prolapse of the P2 scallop of the   posterior mitral valve leaflet.  A small triangular resection of this scallop   was performed and the edges were reapproximated using #4-0 Prolene sutures in   a figure-of-eight fashion.  #2-0 Ethibond stitches were then placed around   the mitral valve annulus from trigone to trigone.  A 36 mm  Anna flexible   annuloplasty band was then placed in the mitral valve annulus and secured in   place with the Ethibond stitches.  Testing of the repair with cold saline did   not show any mitral regurgitation.  Rewarming of the patient was initiated.    The left atriotomy was closed in 2 layers using #4-0 Prolene sutures.  The   aortic cross-clamp was removed.  Aortic cross-clamp time was 67 minutes.    Total cardiopulmonary bypass time was 107 minutes.  The left ventricle was   deaired in the usual fashion.  The carbon dioxide, which had been released   over the operative field during the operation was discontinued.  A straight   and an angled 32-Cambodian chest tubes were placed in the mediastinum.  Temporary   epicardial ventricular pacemaker wires were inserted.  The patient was   electrically cardioverted into a third degree heart block.  She was then   externally paced in a ventricular fashion at the rate of 90.  She eventually   returned into sinus rhythm and the external pacing was discontinued.  The   antegrade cardioplegia cannula was removed.  When the patient was adequately   warmed, she was slowly taken off cardiopulmonary bypass, which she did not   tolerate well.  We had great difficulty ventilating the patient.    Cardiopulmonary bypass was reinstituted and Dr. Beaulieu used the bronchoscope to   examine the airway.  She then received albuterol treatment and was given   Solu-Medrol.  The patient is slowly responding and at this point, we are able   to ventilator a little better.  She was again taken off cardiopulmonary bypass   and this time, she tolerated much better.  The dual-stage venous cannula was   removed.  Protamine was given to reverse the effects of the heparin.  The   aortic cannula was removed.  When adequate hemostasis had been obtained, the   sternum was reapproximated using size 5 sternal wires and the remainder of the   incision was closed in 3 layers using Vicryl sutures.   Intraoperative   transesophageal echocardiography showed an excellent mitral valve repair with   no residual mitral regurgitation.  Her left ventricular ejection fraction   remained the same at approximately 60-70%.  There were no apparent   complications.  The patient tolerated the procedure well and left the   operating room in guarded condition.       ____________________________________     MD ALESSANDRO SINGH / WILL    DD:  04/20/2017 11:11:14  DT:  04/20/2017 11:45:18    D#:  [id number]  Job#:  [id number]

## 2017-04-20 NOTE — IP AVS SNAPSHOT
" Home Care Instructions                                                                                                                  Name:Kelly Tejeda [facility]  Medical Record Number:[id number]  CSN: [id number]    YOB: 1945   Age: 71 y.o.  Sex: female  HT:1.638 m (5' 4.49\") WT: 54.5 kg (120 lb 2.4 oz)          Admit Date: 4/20/2017     Discharge Date:   Today's Date: 5/5/2017  Attending Doctor:  Lacey Porras M.D.                  Allergies:  Sulfa drugs            Discharge Instructions       Discharge Instructions    Discharged to home by car with relative. Discharged via wheelchair, hospital escort: Yes.  Special equipment needed: Not Applicable    Be sure to schedule a follow-up appointment with your primary care doctor or any specialists as instructed.     Discharge Plan:   Influenza Vaccine Indication: Patient Refuses    I understand that a diet low in cholesterol, fat, and sodium is recommended for good health. Unless I have been given specific instructions below for another diet, I accept this instruction as my diet prescription.   Other diet: cardiac    Special Instructions: heart surgery and coumadin    Cardiac Surgery Discharge Instructions/[facility]    Activity:  1. NO driving for 4 weeks after surgery. You may ride as a passenger.  2. NO lifting of any item over 10 lbs (e.g. gallon of milk) for 6 weeks after surgery.  Do not raise both arms above head, only one at a time.  Do not push or pull with your arms.  3. Walk as much as possible! Walk a minimum of 4 times per day. Depending on your fatigue and comfort level, you may walk as much as you wish. There is no maximum.  4. Other physical activities (sex, housework, gardening, etc.) are OK after 4 weeks or after 8 weeks if you want to golf (start by putting, then advance to chipping, then to driving).  5. Continue using incentive spirometer for 2 weeks.  If you are going home on oxygen and you were not on oxygen prior to " surgery, keep using until you are oxygen free.  6. Weigh daily and write it down starting  the next morning after you arrive home. Call your doctor for a weight gain of 2-4 lbs in 1-2 days.    Incision Care:  1. SHOWER EVERYDAY-no baths. Make sure to clean your incision(s) twice daily with plain, perfume and dye-free soap (if you shower in the morning, stand at the sink at bedtime and clean incision with soap and water). Then pat incision(s) dry with clean towel. Avoid creams or lotions on the incision(s).    2. If there is any increase in redness or swelling, or separation of the incision line, or thick drainage* from any of the incisions, call [facility] ([phone]). * Clear, thin drainage is not abnormal especially from the leg incision and/or chest tube sites.                    3. Continue to wear your AJITH Stockings for 4 weeks on the leg(s) with the incision(s) or swelling. You may take off the stocking(s) when in bed or when the leg(s) are elevated.    General Instructions:  1. If you are on the blood thinner Coumadin (warfarin), you will need your coumadin levels checked periodically.  2. You have been referred to Cardiac Rehab which is highly recommended for you after heart surgery. You can start Cardiac Rehab 30 days after surgery.  If you do not have an appointment at the time of discharge call [phone] to schedule an appointment.  3. Your Primary Care Doctor typically handles Home Oxygen. The Home Oxygen service that drops off your tanks should be checking your oxygen saturation levels. Oxygen may be stopped when you are > 90 % saturated on room air. Check with your Primary Care Doctor if you are unsure.    Prescription Refills/Questions:   Call your usual Pharmacy for ALL refills   1. Pain medication questions only: Call [facility] at [phone].  (Remember that refills may require additional physician approval and will need at least 24 hours’ notice. Please call for refills  on Mondays through Fridays from 9 am to 4 pm).  2. For all other medications (except pain medication): Call your Cardiology Group or Primary Care Doctor (not [facility]) for refills or questions.    [facility] IS ALWAYS AVAILABLE TO ANSWER YOUR QUESTIONS. DO NOT HESITATE TO CALL!    · Is patient discharged on Warfarin / Coumadin?   Yes    You are on the blood thinner Coumadin (warfarin) and you will need your coumadin levels checked periodically. For any questions call the [facility] @ [phone]. The home health nurse will draw your blood and the coumadin clinic will call with any change to your dose.      IMPORTANT: HOW TO USE THIS INFORMATION:  This is a summary and does NOT have all possible information about this product. This information does not assure that this product is safe, effective, or appropriate for you. This information is not individual medical advice and does not substitute for the advice of your health care professional. Always ask your health care professional for complete information about this product and your specific health needs.      WARFARIN - ORAL (WARF-uh-rin)      COMMON BRAND NAME(S): Coumadin      WARNING:  Warfarin can cause very serious (possibly fatal) bleeding. This is more likely to occur when you first start taking this medication or if you take too much warfarin. To decrease your risk for bleeding, your doctor or other health care provider will monitor you closely and check your lab results (INR test) to make sure you are not taking too much warfarin. Keep all medical and laboratory appointments. Tell your doctor right away if you notice any signs of serious bleeding. See also Side Effects section.      USES:  This medication is used to treat blood clots (such as in deep vein thrombosis-DVT or pulmonary embolus-PE) and/or to prevent new clots from forming in your body. Preventing harmful blood clots helps to reduce the risk of a stroke or heart  "attack. Conditions that increase your risk of developing blood clots include a certain type of irregular heart rhythm (atrial fibrillation), heart valve replacement, recent heart attack, and certain surgeries (such as hip/knee replacement). Warfarin is commonly called a \"blood thinner,\" but the more correct term is \"anticoagulant.\" It helps to keep blood flowing smoothly in your body by decreasing the amount of certain substances (clotting proteins) in your blood.      HOW TO USE:  Read the Medication Guide provided by your pharmacist before you start taking warfarin and each time you get a refill. If you have any questions, ask your doctor or pharmacist. Take this medication by mouth with or without food as directed by your doctor or other health care professional, usually once a day. It is very important to take it exactly as directed. Do not increase the dose, take it more frequently, or stop using it unless directed by your doctor. Dosage is based on your medical condition, laboratory tests (such as INR), and response to treatment. Your doctor or other health care provider will monitor you closely while you are taking this medication to determine the right dose for you. Use this medication regularly to get the most benefit from it. To help you remember, take it at the same time each day. It is important to eat a balanced, consistent diet while taking warfarin. Some foods can affect how warfarin works in your body and may affect your treatment and dose. Avoid sudden large increases or decreases in your intake of foods high in vitamin K (such as broccoli, cauliflower, cabbage, brussels sprouts, kale, spinach, and other green leafy vegetables, liver, green tea, certain vitamin supplements). If you are trying to lose weight, check with your doctor before you try to go on a diet. Cranberry products may also affect how your warfarin works. Limit the amount of cranberry juice (16 ounces/480 milliliters a day) or other " cranberry products you may drink or eat.      SIDE EFFECTS:  Nausea, loss of appetite, or stomach/abdominal pain may occur. If any of these effects persist or worsen, tell your doctor or pharmacist promptly. Remember that your doctor has prescribed this medication because he or she has judged that the benefit to you is greater than the risk of side effects. Many people using this medication do not have serious side effects. This medication can cause serious bleeding if it affects your blood clotting proteins too much (shown by unusually high INR lab results). Even if your doctor stops your medication, this risk of bleeding can continue for up to a week. Tell your doctor right away if you have any signs of serious bleeding, including: unusual pain/swelling/discomfort, unusual/easy bruising, prolonged bleeding from cuts or gums, persistent/frequent nosebleeds, unusually heavy/prolonged menstrual flow, pink/dark urine, coughing up blood, vomit that is bloody or looks like coffee grounds, severe headache, dizziness/fainting, unusual or persistent tiredness/weakness, bloody/black/tarry stools, chest pain, shortness of breath, difficulty swallowing. Tell your doctor right away if any of these unlikely but serious side effects occur: persistent nausea/vomiting, severe stomach/abdominal pain, yellowing eyes/skin. This drug rarely has caused very serious (possibly fatal) problems if its effects lead to small blood clots (usually at the beginning of treatment). This can lead to severe skin/tissue damage that may require surgery or amputation if left untreated. Patients with certain blood conditions (protein C or S deficiency) may be at greater risk. Get medical help right away if any of these rare but serious side effects occur: painful/red/purplish patches on the skin (such as on the toe, breast, abdomen), change in the amount of urine, vision changes, confusion, slurred speech, weakness on one side of the body. A very  serious allergic reaction to this drug is rare. However, get medical help right away if you notice any symptoms of a serious allergic reaction, including: rash, itching/swelling (especially of the face/tongue/throat), severe dizziness, trouble breathing. This is not a complete list of possible side effects. If you notice other effects not listed above, contact your doctor or pharmacist. In the [place] - Call your doctor for medical advice about side effects. You may report side effects to FDA at 2-467-YDW-6939. In [place] - Call your doctor for medical advice about side effects. You may report side effects to [facility] [place] at 1-146.130.4822.      PRECAUTIONS:  Before taking warfarin, tell your doctor or pharmacist if you are allergic to it; or if you have any other allergies. This product may contain inactive ingredients, which can cause allergic reactions or other problems. Talk to your pharmacist for more details. Before using this medication, tell your doctor or pharmacist your medical history, especially of: blood disorders (such as anemia, hemophilia), bleeding problems (such as bleeding of the stomach/intestines, bleeding in the brain), blood vessel disorders (such as aneurysms), recent major injury/surgery, liver disease, alcohol use, mental/mood disorders (including memory problems), frequent falls/injuries. It is important that all your doctors and dentists know that you take warfarin. Before having surgery or any medical/dental procedures, tell your doctor or dentist that you are taking this medication and about all the products you use (including prescription drugs, nonprescription drugs, and herbal products). Avoid getting injections into the muscles. If you must have an injection into a muscle (for example, a flu shot), it should be given in the arm. This way, it will be easier to check for bleeding and/or apply pressure bandages. This medication may cause stomach bleeding. Daily use of alcohol while using  this medicine will increase your risk for stomach bleeding and may also affect how this medication works. Limit or avoid alcoholic beverages. If you have not been eating well, if you have an illness or infection that causes fever, vomiting, or diarrhea for more than 2 days, or if you start using any antibiotic medications, contact your doctor or pharmacist immediately because these conditions can affect how warfarin works. This medication can cause heavy bleeding. To lower the chance of getting cut, bruised, or injured, use great caution with sharp objects like safety razors and nail cutters. Use an electric razor when shaving and a soft toothbrush when brushing your teeth. Avoid activities such as contact sports. If you fall or injure yourself, especially if you hit your head, call your doctor immediately. Your doctor may need to check you. The Food & Drug Administration has stated that generic warfarin products are interchangeable. However, consult your doctor or pharmacist before switching warfarin products. Be careful not to take more than one medication that contains warfarin unless specifically directed by the doctor or health care provider who is monitoring your warfarin treatment. Older adults may be at greater risk for bleeding while using this drug. This medication is not recommended for use during pregnancy because of serious (possibly fatal) harm to an unborn baby. Discuss the use of reliable forms of birth control with your doctor. If you become pregnant or think you may be pregnant, tell your doctor immediately. If you are planning pregnancy, discuss a plan for managing your condition with your doctor before you become pregnant. Your doctor may switch the type of medication you use during pregnancy. Very small amounts of this medication may pass into breast milk but is unlikely to harm a nursing infant. Consult your doctor before breast-feeding.      DRUG INTERACTIONS:  Drug interactions may change how  "your medications work or increase your risk for serious side effects. This document does not contain all possible drug interactions. Keep a list of all the products you use (including prescription/nonprescription drugs and herbal products) and share it with your doctor and pharmacist. Do not start, stop, or change the dosage of any medicines without your doctor's approval. Warfarin interacts with many prescription, nonprescription, vitamin, and herbal products. This includes medications that are applied to the skin or inside the vagina or rectum. The interactions with warfarin usually result in an increase or decrease in the \"blood-thinning\" (anticoagulant) effect. Your doctor or other health care professional should closely monitor you to prevent serious bleeding or clotting problems. While taking warfarin, it is very important to tell your doctor or pharmacist of any changes in medications, vitamins, or herbal products that you are taking. Some products that may interact with this drug include: capecitabine, imatinib, mifepristone. Aspirin, aspirin-like drugs (salicylates), and nonsteroidal anti-inflammatory drugs (NSAIDs such as ibuprofen, naproxen, celecoxib) may have effects similar to warfarin. These drugs may increase the risk of bleeding problems if taken during treatment with warfarin. Carefully check all prescription/nonprescription product labels (including drugs applied to the skin such as pain-relieving creams) since the products may contain NSAIDs or salicylates. Talk to your doctor about using a different medication (such as acetaminophen) to treat pain/fever. Low-dose aspirin and related drugs (such as clopidogrel, ticlopidine) should be continued if prescribed by your doctor for specific medical reasons such as heart attack or stroke prevention. Consult your doctor or pharmacist for more details. Many herbal products interact with warfarin. Tell your doctor before taking any herbal products, " especially bromelains, coenzyme Q10, cranberry, danshen, dong quai, fenugreek, garlic, ginkgo biloba, ginseng, and Ashley's wort, among others. This medication may interfere with a certain laboratory test to measure theophylline levels, possibly causing false test results. Make sure laboratory personnel and all your doctors know you use this drug.      OVERDOSE:  If overdose is suspected, contact a poison control center or emergency room immediately. [place] residents can call the  National Poison Hotline at 1-509.162.3738. [place] residents can call a provincial poison control center. Symptoms of overdose may include: bloody/black/tarry stools, pink/dark urine, unusual/prolonged bleeding.      NOTES:  Do not share this medication with others. Laboratory and/or medical tests (such as INR, complete blood count) must be performed periodically to monitor your progress or check for side effects. Consult your doctor for more details.      MISSED DOSE:  For the best possible benefit, do not miss any doses. If you do miss a dose and remember on the same day, take it as soon as you remember. If you remember on the next day, skip the missed dose and resume your usual dosing schedule. Do not double the dose to catch up because this could increase your risk for bleeding. Keep a record of missed doses to give to your doctor or pharmacist. Contact your doctor or pharmacist if you miss 2 or more doses in a row.      STORAGE:  Store at room temperature away from light and moisture. Do not store in the bathroom. Keep all medications away from children and pets. Do not flush medications down the toilet or pour them into a drain unless instructed to do so. Properly discard this product when it is  or no longer needed. Consult your pharmacist or local waste disposal company for more details about how to safely discard your product.      MEDICAL ALERT:  Your condition and medication can cause complications in a medical emergency.  For information about enrolling in MedicAlert, call 1-745.332.2376 ([place]) or 1-685.270.8361 ([place]).      Information last revised October 2010 Copyright(c) 2010 [employer]      · Is patient Post Blood Transfusion?  No    Depression / Suicide Risk    As you are discharged from this [facility] facility, it is important to learn how to keep safe from harming yourself.    Recognize the warning signs:  · Abrupt changes in personality, positive or negative- including increase in energy   · Giving away possessions  · Change in eating patterns- significant weight changes-  positive or negative  · Change in sleeping patterns- unable to sleep or sleeping all the time   · Unwillingness or inability to communicate  · Depression  · Unusual sadness, discouragement and loneliness  · Talk of wanting to die  · Neglect of personal appearance   · Rebelliousness- reckless behavior  · Withdrawal from people/activities they love  · Confusion- inability to concentrate     If you or a loved one observes any of these behaviors or has concerns about self-harm, here's what you can do:  · Talk about it- your feelings and reasons for harming yourself  · Remove any means that you might use to hurt yourself (examples: pills, rope, extension cords, firearm)  · Get professional help from the community (Mental Health, Substance Abuse, psychological counseling)  · Do not be alone:Call your Safe Contact- someone whom you trust who will be there for you.  · Call your local CRISIS HOTLINE 758-8715 or 553-731-6290  · Call your local Children's Mobile Crisis Response Team [place] (129) 617-8121 or www.[employer]  · Call the toll free National Suicide Prevention Hotlines   · National Suicide Prevention Lifeline 842-117-LCSF (3765)  · National Hope Line Network 800-SUICIDE (936-7942)        Your appointments     May 19, 2017 11:00 AM   Established Patient with Ritika Jurado M.D.   [facility] (--)       [address]  [address]  [place] [id number] [phone]           You will be receiving a confirmation call a few days before your appointment from our automated call confirmation system.            May 25, 2017 12:40 PM   PREVIOUS PATIENT with LISSETH Roy.   [facility] (--)    [address], [address]  [place] [id number] [phone]              Follow-up Information     1. Follow up with Lacey Porras M.D. On 6/5/2017.    Specialty:  Cardiac Surgery    Why:  11:30 am    Contact information    [id number]  [address]  [place] [address]  [phone]           Discharge Medication Instructions:    Below are the medications your physician expects you to take upon discharge:    Review all your home medications and newly ordered medications with your doctor and/or pharmacist. Follow medication instructions as directed by your doctor and/or pharmacist.    Please keep your medication list with you and share with your physician.               Medication List      START taking these medications        Instructions    Morning Afternoon Evening Bedtime    amiodarone 200 MG Tabs   Last time this was given:  200 mg on 5/5/2017  8:50 AM   Commonly known as:  CORDARONE        Take 1 Tab by mouth every day.   Dose:  200 mg                        tramadol 50 MG Tabs   Last time this was given:  50 mg on 5/5/2017 12:52 PM   Commonly known as:  ULTRAM        Take 1 Tab by mouth every 6 hours as needed (Moderate Pain (NRS Pain Scale 4-6; CPOT Pain Scale 3-5) if opiates not ordered or tolerated).   Dose:  50 mg                        warfarin 5 MG Tabs   Last time this was given:  5 mg on 5/4/2017  5:19 PM   Commonly known as:  COUMADIN        Take 1 Tab by mouth COUMADIN-DAILY. Titrate to INR 2-3.   Dose:  5 mg                          CONTINUE taking these medications        Instructions    Morning Afternoon Evening Bedtime    albuterol 108 (90 BASE) MCG/ACT Aers  inhalation aerosol   Last time this was given:  2 Puffs on 5/3/2017  8:07 AM   Commonly known as:  VENTOLIN HFA        Inhale 2 Puffs by mouth every four hours as needed for Shortness of Breath.   Dose:  2 Puff                        aspirin 81 MG Chew chewable tablet   Commonly known as:  ASA        Take 1 Tab by mouth every day.   Dose:  81 mg                        atorvastatin 10 MG Tabs   Last time this was given:  10 mg on 5/5/2017  8:50 AM   Commonly known as:  LIPITOR        Take 1 Tab by mouth every day.   Dose:  10 mg                        fluticasone-salmeterol 250-50 MCG/DOSE Aepb   Commonly known as:  ADVAIR        Inhale 1 Puff by mouth 2 times a day.   Dose:  1 Puff                        furosemide 20 MG Tabs   Last time this was given:  20 mg on 5/5/2017  8:50 AM   Commonly known as:  LASIX        Take 20 mg by mouth every day.   Dose:  20 mg                        magnesium oxide 400 MG Tabs   Commonly known as:  MAG-OX        Take 400 mg by mouth 2 times a day.   Dose:  400 mg                        potassium chloride ER 10 MEQ tablet   Last time this was given:  20 mEq on 5/5/2017  8:49 AM   Commonly known as:  K-TAB        Take 1 Tab by mouth every day.   Dose:  10 mEq                        sertraline 50 MG Tabs   Last time this was given:  50 mg on 5/5/2017  8:49 AM   Commonly known as:  ZOLOFT        Doctor's comments:  Take 1/2 tab PO daily x 1 week then increase to 1 tab daily   Take 1 Tab by mouth every day.   Dose:  50 mg                        Tiotropium Bromide Monohydrate 2.5 MCG/ACT Aers   Commonly known as:  SPIRIVA RESPIMAT        Inhale 2 Inhalation by mouth every day.   Dose:  2 Inhalation                          STOP taking these medications     dabigatran 150 MG Caps capsule   Commonly known as:  PRADAXA               diltiazem 120 MG XR capsule   Commonly known as:  DILT-XR                    Where to Get Your Medications      Information about where to get these medications  is not yet available     ! Ask your nurse or doctor about these medications    - amiodarone 200 MG Tabs  - tramadol 50 MG Tabs  - warfarin 5 MG Tabs            Orders for after discharge     DME O2 New Set Up    Complete by:  As directed        REFERRAL TO ANTICOAGULATION MONITORING    Complete by:  As directed    If this Referral to the anticoagulation clinic is being ordered with a Referral to home health, then schedule the anticoagulation visit after the home health treatments are completed.       REFERRAL TO HOME HEALTH    Complete by:  As directed    Home health will create and establish a plan of care       REFERRAL TO HOME HEALTH    Complete by:  As directed    Home health will create and establish a plan of care       REFERRAL TO INTENSIVE CARDIAC REHAB/CARDIAC REHAB    Complete by:  As directed    Qualifying Diagnosis for Cardiac Rehab:    -Myocardial Infarction  -Old Myocardial Infarction  -Coronary Artery Bypass Surgery  -Stable Angina Pectoris  -PTCA Status with or without Stents  -Heart Valve Replaced by other means  -Heart Transplant   -Aneurysm Repair    Provider Exercise Prescription for Treatment Plan:  -Outpatient Cardiac Rehab (CR)/Intensive Cardiac Rehab (ICR), duration based on patient progress 1-3 times per week up to a total of 36/72 sessions over a period of up to 18/36 weeks    -Progressive interval exercise training for 20-45 minutes, 1-3 times per week in Cardiac Rehab utilizing Treadmill, Elliptical, Stationary Cycling, Arm Ergometer, other conditioning activities and appropriate home program supplement    -Light resistance training 2-4 weeks post PTCA, 2-4 weeks post MI, or 4-6 weeks post CABG, 4-6 weeks post aneurysm repair (20 # max)    -% TARGET HEART RATE OR MET’s:        RPE of 11-16 on a scale of 6-20       GXT Data:  40% - 80% exercise capacity using %HR max       HR below ischemic threshold (if determined, HR restriction)    -Education to promote an active healthy lifestyle and  reduction of personal health risk factors    -Follow [facility] Policies and Procedures for Phase II CR/ICR             Instructions           Diet / Nutrition:    Follow any diet instructions given to you by your doctor or the dietician, including how much salt (sodium) you are allowed each day.    If you are overweight, talk to your doctor about a weight reduction plan.    Activity:    Remain physically active following your doctor's instructions about exercise and activity.    Rest often.     Any time you become even a little tired or short of breath, SIT DOWN and rest.    Worsening Symptoms:    Report any of the following signs and symptoms to the doctor's office immediately:    *Pain of jaw, arm, or neck  *Chest pain not relieved by medication                               *Dizziness or loss of consciousness  *Difficulty breathing even when at rest   *More tired than usual                                       *Bleeding drainage or swelling of surgical site  *Swelling of feet, ankles, legs or stomach                 *Fever (>100ºF)  *Pink or blood tinged sputum  *Weight gain (3lbs/day or 5lbs /week)           *Shock from internal defibrillator (if applicable)  *Palpitations or irregular heartbeats                *Cool and/or numb extremities    Stroke Awareness    Common Risk Factors for Stroke include:    Age  Atrial Fibrillation  Carotid Artery Stenosis  Diabetes Mellitus  Excessive alcohol consumption  High blood pressure  Overweight   Physical inactivity  Smoking    Warning signs and symptoms of a stroke include:    *Sudden numbness or weakness of the face, arm or leg (especially on one side of the body).  *Sudden confusion, trouble speaking or understanding.  *Sudden trouble seeing in one or both eyes.  *Sudden trouble walking, dizziness, loss of balance or coordination.Sudden severe headache with no known cause.    It is very important to get treatment quickly when a stroke occurs. If you experience any of the  above warning signs, call [phone] immediately.                   Disclaimer         Quit Smoking / Tobacco Use:    I understand the use of any tobacco products increases my chance of suffering from future heart disease or stroke and could cause other illnesses which may shorten my life. Quitting the use of tobacco products is the single most important thing I can do to improve my health. For further information on smoking / tobacco cessation call a Toll Free Quit Line at 1-272.296.9251 (*[employer] [place]) or 1-591.260.4140 (American Lung Association) or you can access the web based program at www.lungusa.org.    [place] Tobacco Users Help Line:  (551) 695-7145       Toll Free: 1-668.952.3589  Quit Tobacco Program [facility] Management Services [phone]    Crisis Hotline:    Day Crisis Hotline:  0-619-KYLNQHN or 1-234.310.2311    [place] Crisis Hotline:    1-253.738.7253 or 134-743-5081    Discharge Survey:   Thank you for choosing [facility]. We hope we did everything we could to make your hospital stay a pleasant one. You may be receiving a phone survey and we would appreciate your time and participation in answering the questions. Your input is very valuable to us in our efforts to improve our service to our patients and their families.        My signature on this form indicates that:    1. I have reviewed and understand the above information.  2. My questions regarding this information have been answered to my satisfaction.  3. I have formulated a plan with my discharge nurse to obtain my prescribed medications for home.                  Disclaimer         __________________________________                     __________       ________                       Patient Signature                                                 Date                    Time

## 2017-04-20 NOTE — IP AVS SNAPSHOT
5/5/2017    [facility]  [address]  [place] [address]    Dear Kelly:    [facility] wants to ensure your discharge home is safe and you or your loved ones have had all of your questions answered regarding your care after you leave the hospital.    Below is a list of resources and contact information should you have any questions regarding your hospital stay, follow-up instructions, or active medical symptoms.    Questions or Concerns Regarding… Contact   Medical Questions Related to Your Discharge  (7 days a week, 8am-5pm) Contact a Nurse Care Coordinator   [phone]   Medical Questions Not Related to Your Discharge  (24 hours a day / 7 days a week)  Contact the Nurse Health Line   [phone]    Medications or Discharge Instructions Refer to your discharge packet   or contact your [facility] Primary Care Provider   [phone]   Follow-up Appointment(s) Schedule your appointment via Pro[employer]   or contact Scheduling [phone]   Billing Review your statement via Proterro  or contact Billing [phone]   Medical Records Review your records via Proterro   or contact Medical Records [phone]     You may receive a telephone call within two days of discharge. This call is to make certain you understand your discharge instructions and have the opportunity to have any questions answered. You can also easily access your medical information, test results and upcoming appointments via the Pro[employer]ro free online health management tool. You can learn more and sign up at [employer]/Proterro. For assistance setting up your Proter[employer] account, please call [phone].    Once again, we want to ensure your discharge home is safe and that you have a clear understanding of any next steps in your care. If you have any questions or concerns, please do not hesitate to contact us, we are here for you. Thank you for choosing [facility] for your healthcare needs.    Sincerely,    Your [facility] Healthcare Team

## 2017-04-20 NOTE — OR SURGEON
Immediate Post-Operative Note      PreOp Diagnosis: MR, AF, COPD    PostOp Diagnosis: MR, AF, COPD    Procedure(s):  RADICAL MVRepair (P2 triangular resection, 36mm Anna flexible annuloplasty band)  L SIDED MAZE PROCEDURE  PAGE    Surgeon(s):  Lacey Porras M.D.    Assistant:  EDY See    Anesthesiologist/Type of Anesthesia:  Anesthesiologist: Sae Beaulieu M.D.  Anesthesia Technician: Danilo Bravo/General    Surgical Staff:  Assistant: IGOR Wan  Circulator: Osiris Seaman R.N.  Perfusionist: Josue Albright  Scrub Person: JONATHAN Mckay IV; Adina Urbano    Specimen: ERI, P2 piece    Estimated Blood Loss: Min    Findings: MR    Complications: None        4/20/2017 11:00 AM Lacey Porras

## 2017-04-20 NOTE — PROGRESS NOTES
Call placed to Donna XIE, regarding current patient status including vitals, after 2L's of Plamsalyte now finished, Pt more Hypotensive, and requiring increased dose of Epi. Orders received to give 1 amp of Ca+ Chloride and a 3rd Liter of plasmalyte.

## 2017-04-20 NOTE — PROGRESS NOTES
Pt arrived to room with OR team. Bedside report received from Dr. Beaulieu. All lines, tubes, and drips verified. Art line zeroed. Pt with stable /65, SR 95. RT and 2nd RN at bedside. Dr. Morales also at bedside at request of Dr. Beaulieu. EKG called. Will draw labs and check ABG shortly.

## 2017-04-20 NOTE — CONSULTS
DATE OF SERVICE:  04/20/2017    REQUESTING PHYSICIAN:  Lacey Porras MD    CONSULTING PHYSICIAN:  Fernandez Morales MD    TYPE OF CONSULTATION:  Pulmonary medicine and critical care medicine.    REASON FOR CONSULTATION:  Evaluation and management of acute exacerbation of   chronic obstructive pulmonary disease as well as postoperative ventilator   management and assist with critical care management.    CHIEF COMPLAINT:  The patient is not able to provide.    HISTORY OF PRESENT ILLNESS:  I was kindly asked by Dr. Porras to see and   evaluate this lady regarding the above problems.  This is a 71-year-old lady.    She has a history of severe stage III COPD with an FEV1 of 0.94 liters, which   is 41% of predicted.  Her DLCO was 29% of predicted.  She has chronic   hypoxemic respiratory failure and is on 2 L of domiciliary oxygen 24 hours a   day.  She recently saw Dr. Qian Mario with [facility] in the office   for preoperative evaluation.  She was doing well on her bronchodilator   regimen.  Today, she was admitted to the hospital for cardiac surgery.  She   has a history of mitral regurgitation and today, Dr. Porras took her to the   operating theater and performed radical mitral valve repair (P2, triangular   resection, 36 mm Anna flexible annuloplasty band).  Additionally, she   underwent a left-sided maze procedure with left atrial appendage ligation and   intraoperative echocardiography.  I have discussed the case at length with Dr. Sae Beaulieu her anesthesiologist as well as Dr. Porras at the bedside.    Her surgery went rather well and was uncomplicated.  Towards the end of the   case as she was coming off of cardiopulmonary bypass, she had difficulty with   ventilation and her lung compliance deteriorated with marked increase in   airway resistance.  She received 250 mg of intravenous methylprednisolone and   multiple doses of nebulized albuterol.  With these interventions, she    improved.  She was successfully weaned off of bypass.  She is now in the   cardiac intensive care unit.  She is critically ill on full mechanical   ventilatory support.  She was unable to be ventilated with adaptive servo   ventilation and she is now on controlled mechanical ventilation without   difficulty.  I have reviewed her end-tidal CO2 waveforms and she clearly has   significant obstructive airways disease with changes consistent with severe   COPD.    CURRENT MEDICATIONS:  She is on dexmedetomidine drip, aspirin 81 mg a day,   atorvastatin 10 mg a day, cefazolin per protocol, magnesium supplements.    ALLERGIES:  TO SULFA.    PAST SURGICAL HISTORY:  She has had an oophorectomy and appendectomy.    ILLNESSES:  Severe mitral regurgitation, severe stage III COPD, chronic   hypoxemic respiratory failure, on oxygen at night, atrial flutter, systemic   arterial hypertension, dyslipidemia, anxiety, obstructive sleep apnea, she is   on CPAP at night.    SOCIAL HISTORY:  Review of the record showed that  she quit smoking in 2000,   prior to that she smoked a pack of cigarettes a day for 38 years.  She   apparently drinks 3 vodka beverages a day.    FAMILY HISTORY:  Not obtainable.    REVIEW OF SYSTEMS:  Not obtainable.    PHYSICAL EXAMINATION:  VITAL SIGNS:  Her temperature is 97, her blood pressure is 89/56, heart rate   94, respiratory rate is 20.  GENERAL:  She is a sedated lady on the ventilator.  HEENT:  Her head is normocephalic, atraumatic.  Her sinuses are nontender.    Nares patent.  Oropharynx with dry mucous membranes.  An endotracheal tube is   in place.  NECK:  Trachea midline, supple.  CHEST:  Symmetrical dressing is in place.  Chest tubes are noted.  HEART:  Regular rhythm.  She is in sinus rhythm.  LUNGS:  Breath sounds are markedly diminished.  There is reasonable air   movement.  There is no dullness to percussion.  There is no wheezing.  ABDOMEN:  Soft, nondistended, nontender.  No  masses.  EXTREMITIES:  No clubbing, cyanosis or edema.  NEUROLOGIC:  She is sedated.    DIAGNOSTIC DATA:  Her chest x-ray shows markedly hyperexpanded lungs.  Her   hemoglobin is 10.2, hematocrit 30.  Sodium 134, potassium 3.4, chloride 100,   CO2 of 23, BUN 22, creatinine 0.86, glucose is 102.  Arterial blood gas   reveals a pH of 7.29, pCO2 of 47, pO2 of 443.    IMPRESSION:  1.  Postoperative ventilator management.  2.  Status post mitral valve repair, left atrial appendage ligation and maze   procedure.  3.  History of atrial flutter and severe mitral regurgitation.  4.  Acute exacerbation of severe stage III chronic obstructive pulmonary disease.  5.  History of atrial flutter, she is in sinus rhythm.  6.  Systemic arterial hypertension.  7.  Dyslipidemia.  8.  Anxiety.  9.  Obstructive sleep apnea.  She uses CPAP at night.  10.  Regular alcohol use.    PLAN AND MEDICAL DECISION MAKING:  This lady is critically ill.  She is on   full mechanical ventilatory support.  I am going to deliver bronchodilators in   line.  We will wean her sedation as tolerated and try to wean her from the   ventilator as tolerated.  Her blood sugars will be very strictly controlled.    At the current time, her prognosis is quite guarded and she is critically ill.    She had significant bronchospasm, poor lung compliance, increased airway   resistance as she was coming off of cardiopulmonary bypass.    I have spent 90 minutes providing direct critical care services at the   bedside.  There has been no time overlap.  The time spent excludes the time   spent performing procedures (39854, [id number]).    The case has been reviewed at length with Dr. Porras at the bedside, Dr. Sae Beaulieu at the bedside as well as with nursing and respiratory therapy.    Thank you Dr. Porras for allowing us to participate in the care of this   lady.  We will continue to follow her with great interest.       ____________________________________     PING  MD OLY RODRÍGUEZ / WILL    DD:  04/20/2017 12:02:47  DT:  04/20/2017 12:39:54    D#:  [id number]  Job#:  [id number]    cc: ADRIANA MITCHELL MD

## 2017-04-20 NOTE — PROGRESS NOTES
Repeat ABG showed to Dr. Morales. Ph 7.27, pCO2 53.1, PO2 92, BE -4. V.O. From Dr. Morales to give 2 more amps of Bicarb at this time.

## 2017-04-20 NOTE — IP AVS SNAPSHOT
Bloomspot Access Code: Activation code not generated  Current Bloomspot Status: Active    [employer]  A secure, online tool to manage your health information     [email or web address]’s Bloomspot® is a secure, online tool that connects you to your personalized health information from the privacy of your home -- day or night - making it very easy for you to manage your healthcare. Once the activation process is completed, you can even access your medical information using the Bloomspot felicitas, which is available for free in the Apple Felicitas store or Google Play store.     Bloomspot provides the following levels of access (as shown below):   My Chart Features   [facility] Primary Care Doctor [facility]  Specialists [facility]  Urgent  Care Non-[facility]  Primary Care  Doctor   Email your healthcare team securely and privately 24/7 X X X X   Manage appointments: schedule your next appointment; view details of past/upcoming appointments X      Request prescription refills. X      View recent personal medical records, including lab and immunizations X X X X   View health record, including health history, allergies, medications X X X X   Read reports about your outpatient visits, procedures, consult and ER notes X X X X   See your discharge summary, which is a recap of your hospital and/or ER visit that includes your diagnosis, lab results, and care plan. X X       How to register for Bloomspot:  1. Go to  [email or web address].  2. Click on the Sign Up Now box, which takes you to the New Member Sign Up page. You will need to provide the following information:  a. Enter your Bloomspot Access Code exactly as it appears at the top of this page. (You will not need to use this code after you’ve completed the sign-up process. If you do not sign up before the expiration date, you must request a new code.)   b. Enter your date of birth.   c. Enter your home email address.   d. Click Submit, and follow the next screen’s instructions.  3. Create a Bloomspot ID. This will  be your [employer] login ID and cannot be changed, so think of one that is secure and easy to remember.  4. Create a [employer] password. You can change your password at any time.  5. Enter your Password Reset Question and Answer. This can be used at a later time if you forget your password.   6. Enter your e-mail address. This allows you to receive e-mail notifications when new information is available in [employer].  7. Click Sign Up. You can now view your health information.    For assistance activating your [employer] account, call [phone]

## 2017-04-20 NOTE — PROGRESS NOTES
Pt's vent settings have been weaned down over past 2 hours. Has now been on Spontaneous 5/8 for last 30 mins. Repeat ABG still showing slight respiratory acidosis Ph 7.30, pCO2 59.9, PaO2 100%. Breathing parameters also met, Spoke with Dr. Morales who gave orders to extubate.

## 2017-04-20 NOTE — PROGRESS NOTES
Pt placed on SIMV settings, off of normal weaning parameters per heart protocol. Will work closely with Dr. Morales regarding readiness to extubate.

## 2017-04-20 NOTE — PROGRESS NOTES
The Medication Reconciliation process has been completed by interviewing the patient    Allergies have been reviewed  Antibiotic use in 30 days - NONE    Home Pharmacy:  [facility]

## 2017-04-21 ENCOUNTER — APPOINTMENT (OUTPATIENT)
Dept: RADIOLOGY | Facility: MEDICAL CENTER | Age: 72
DRG: 219 | End: 2017-04-21
Attending: NURSE PRACTITIONER
Payer: MEDICARE

## 2017-04-21 LAB
ANION GAP SERPL CALC-SCNC: 6 MMOL/L (ref 0–11.9)
BUN SERPL-MCNC: 14 MG/DL (ref 8–22)
CALCIUM SERPL-MCNC: 7.4 MG/DL (ref 8.5–10.5)
CHLORIDE SERPL-SCNC: 104 MMOL/L (ref 96–112)
CO2 SERPL-SCNC: 26 MMOL/L (ref 20–33)
CREAT SERPL-MCNC: 0.65 MG/DL (ref 0.5–1.4)
EKG IMPRESSION: NORMAL
ERYTHROCYTE [DISTWIDTH] IN BLOOD BY AUTOMATED COUNT: 47.3 FL (ref 35.9–50)
GFR SERPL CREATININE-BSD FRML MDRD: >60 ML/MIN/1.73 M 2
GLUCOSE BLD-MCNC: 107 MG/DL (ref 65–99)
GLUCOSE BLD-MCNC: 112 MG/DL (ref 65–99)
GLUCOSE BLD-MCNC: 112 MG/DL (ref 65–99)
GLUCOSE BLD-MCNC: 114 MG/DL (ref 65–99)
GLUCOSE BLD-MCNC: 117 MG/DL (ref 65–99)
GLUCOSE BLD-MCNC: 118 MG/DL (ref 65–99)
GLUCOSE BLD-MCNC: 121 MG/DL (ref 65–99)
GLUCOSE BLD-MCNC: 122 MG/DL (ref 65–99)
GLUCOSE BLD-MCNC: 124 MG/DL (ref 65–99)
GLUCOSE BLD-MCNC: 129 MG/DL (ref 65–99)
GLUCOSE BLD-MCNC: 134 MG/DL (ref 65–99)
GLUCOSE BLD-MCNC: 135 MG/DL (ref 65–99)
GLUCOSE BLD-MCNC: 140 MG/DL (ref 65–99)
GLUCOSE BLD-MCNC: 140 MG/DL (ref 65–99)
GLUCOSE BLD-MCNC: 143 MG/DL (ref 65–99)
GLUCOSE BLD-MCNC: 148 MG/DL (ref 65–99)
GLUCOSE BLD-MCNC: 163 MG/DL (ref 65–99)
GLUCOSE BLD-MCNC: 186 MG/DL (ref 65–99)
GLUCOSE BLD-MCNC: 67 MG/DL (ref 65–99)
GLUCOSE BLD-MCNC: 77 MG/DL (ref 65–99)
GLUCOSE BLD-MCNC: 80 MG/DL (ref 65–99)
GLUCOSE BLD-MCNC: 84 MG/DL (ref 65–99)
GLUCOSE BLD-MCNC: 99 MG/DL (ref 65–99)
GLUCOSE SERPL-MCNC: 127 MG/DL (ref 65–99)
HCT VFR BLD AUTO: 28.2 % (ref 37–47)
HGB BLD-MCNC: 9.1 G/DL (ref 12–16)
INR PPP: 1.1 (ref 0.87–1.13)
LV EJECT FRACT  99904: 65
MAGNESIUM SERPL-MCNC: 2.1 MG/DL (ref 1.5–2.5)
MCH RBC QN AUTO: 31.8 PG (ref 27–33)
MCHC RBC AUTO-ENTMCNC: 32.3 G/DL (ref 33.6–35)
MCV RBC AUTO: 98.6 FL (ref 81.4–97.8)
PLATELET # BLD AUTO: 123 K/UL (ref 164–446)
PMV BLD AUTO: 10.3 FL (ref 9–12.9)
POTASSIUM SERPL-SCNC: 4 MMOL/L (ref 3.6–5.5)
POTASSIUM SERPL-SCNC: 4.1 MMOL/L (ref 3.6–5.5)
PROTHROMBIN TIME: 14.6 SEC (ref 12–14.6)
RBC # BLD AUTO: 2.86 M/UL (ref 4.2–5.4)
SODIUM SERPL-SCNC: 136 MMOL/L (ref 135–145)
WBC # BLD AUTO: 19.6 K/UL (ref 4.8–10.8)

## 2017-04-21 PROCEDURE — 770022 HCHG ROOM/CARE - ICU (200)

## 2017-04-21 PROCEDURE — 94640 AIRWAY INHALATION TREATMENT: CPT

## 2017-04-21 PROCEDURE — 93010 ELECTROCARDIOGRAM REPORT: CPT | Performed by: INTERNAL MEDICINE

## 2017-04-21 PROCEDURE — 99233 SBSQ HOSP IP/OBS HIGH 50: CPT | Performed by: INTERNAL MEDICINE

## 2017-04-21 PROCEDURE — 94760 N-INVAS EAR/PLS OXIMETRY 1: CPT

## 2017-04-21 PROCEDURE — 82962 GLUCOSE BLOOD TEST: CPT | Mod: 91

## 2017-04-21 PROCEDURE — 80048 BASIC METABOLIC PNL TOTAL CA: CPT

## 2017-04-21 PROCEDURE — 84132 ASSAY OF SERUM POTASSIUM: CPT

## 2017-04-21 PROCEDURE — 700111 HCHG RX REV CODE 636 W/ 250 OVERRIDE (IP): Performed by: NURSE PRACTITIONER

## 2017-04-21 PROCEDURE — 83735 ASSAY OF MAGNESIUM: CPT

## 2017-04-21 PROCEDURE — 700102 HCHG RX REV CODE 250 W/ 637 OVERRIDE(OP): Performed by: NURSE PRACTITIONER

## 2017-04-21 PROCEDURE — A9270 NON-COVERED ITEM OR SERVICE: HCPCS | Performed by: NURSE PRACTITIONER

## 2017-04-21 PROCEDURE — 71010 DX-CHEST-PORTABLE (1 VIEW): CPT

## 2017-04-21 PROCEDURE — 85610 PROTHROMBIN TIME: CPT

## 2017-04-21 PROCEDURE — 85027 COMPLETE CBC AUTOMATED: CPT

## 2017-04-21 PROCEDURE — 51798 US URINE CAPACITY MEASURE: CPT

## 2017-04-21 PROCEDURE — 700102 HCHG RX REV CODE 250 W/ 637 OVERRIDE(OP): Performed by: INTERNAL MEDICINE

## 2017-04-21 PROCEDURE — 93005 ELECTROCARDIOGRAM TRACING: CPT | Performed by: NURSE PRACTITIONER

## 2017-04-21 PROCEDURE — 700101 HCHG RX REV CODE 250: Performed by: INTERNAL MEDICINE

## 2017-04-21 PROCEDURE — 700112 HCHG RX REV CODE 229: Performed by: NURSE PRACTITIONER

## 2017-04-21 PROCEDURE — 700111 HCHG RX REV CODE 636 W/ 250 OVERRIDE (IP): Performed by: THORACIC SURGERY (CARDIOTHORACIC VASCULAR SURGERY)

## 2017-04-21 PROCEDURE — A9270 NON-COVERED ITEM OR SERVICE: HCPCS | Performed by: INTERNAL MEDICINE

## 2017-04-21 RX ORDER — POTASSIUM CHLORIDE 750 MG/1
10 TABLET, FILM COATED, EXTENDED RELEASE ORAL DAILY
Status: DISCONTINUED | OUTPATIENT
Start: 2017-04-21 | End: 2017-04-24

## 2017-04-21 RX ORDER — FUROSEMIDE 10 MG/ML
20 INJECTION INTRAMUSCULAR; INTRAVENOUS
Status: DISCONTINUED | OUTPATIENT
Start: 2017-04-21 | End: 2017-05-04

## 2017-04-21 RX ORDER — POTASSIUM CHLORIDE 7.45 MG/ML
10 INJECTION INTRAVENOUS ONCE
Status: COMPLETED | OUTPATIENT
Start: 2017-04-21 | End: 2017-04-21

## 2017-04-21 RX ORDER — IPRATROPIUM BROMIDE AND ALBUTEROL SULFATE 2.5; .5 MG/3ML; MG/3ML
3 SOLUTION RESPIRATORY (INHALATION)
Status: DISCONTINUED | OUTPATIENT
Start: 2017-04-22 | End: 2017-04-22

## 2017-04-21 RX ORDER — AMIODARONE HYDROCHLORIDE 200 MG/1
400 TABLET ORAL TWICE DAILY
Status: DISCONTINUED | OUTPATIENT
Start: 2017-04-21 | End: 2017-05-01

## 2017-04-21 RX ADMIN — INSULIN HUMAN 15 UNITS: 100 INJECTION, SUSPENSION SUBCUTANEOUS at 09:42

## 2017-04-21 RX ADMIN — HYDROCODONE BITARTRATE AND ACETAMINOPHEN 1 TABLET: 5; 325 TABLET ORAL at 10:19

## 2017-04-21 RX ADMIN — AMIODARONE HYDROCHLORIDE 400 MG: 200 TABLET ORAL at 20:35

## 2017-04-21 RX ADMIN — SERTRALINE 50 MG: 50 TABLET, FILM COATED ORAL at 09:33

## 2017-04-21 RX ADMIN — TRAMADOL HYDROCHLORIDE 50 MG: 50 TABLET, COATED ORAL at 04:00

## 2017-04-21 RX ADMIN — HYDROCODONE BITARTRATE AND ACETAMINOPHEN 1 TABLET: 5; 325 TABLET ORAL at 14:23

## 2017-04-21 RX ADMIN — STANDARDIZED SENNA CONCENTRATE AND DOCUSATE SODIUM 1 TABLET: 8.6; 5 TABLET, FILM COATED ORAL at 20:35

## 2017-04-21 RX ADMIN — IPRATROPIUM BROMIDE AND ALBUTEROL SULFATE 3 ML: .5; 3 SOLUTION RESPIRATORY (INHALATION) at 19:45

## 2017-04-21 RX ADMIN — POTASSIUM CHLORIDE 10 MEQ: 7.46 INJECTION, SOLUTION INTRAVENOUS at 06:21

## 2017-04-21 RX ADMIN — TRAMADOL HYDROCHLORIDE 50 MG: 50 TABLET, COATED ORAL at 20:35

## 2017-04-21 RX ADMIN — BUDESONIDE AND FORMOTEROL FUMARATE DIHYDRATE 2 PUFF: 160; 4.5 AEROSOL RESPIRATORY (INHALATION) at 19:47

## 2017-04-21 RX ADMIN — AMIODARONE HYDROCHLORIDE 400 MG: 200 TABLET ORAL at 09:33

## 2017-04-21 RX ADMIN — HYDROCODONE BITARTRATE AND ACETAMINOPHEN 1 TABLET: 5; 325 TABLET ORAL at 17:43

## 2017-04-21 RX ADMIN — FUROSEMIDE 20 MG: 10 INJECTION, SOLUTION INTRAVENOUS at 09:34

## 2017-04-21 RX ADMIN — ATORVASTATIN CALCIUM 10 MG: 10 TABLET, FILM COATED ORAL at 09:33

## 2017-04-21 RX ADMIN — ASPIRIN 81 MG: 81 TABLET ORAL at 09:33

## 2017-04-21 RX ADMIN — INSULIN HUMAN 1 UNITS: 100 INJECTION, SOLUTION PARENTERAL at 00:04

## 2017-04-21 RX ADMIN — INSULIN HUMAN 1 UNITS: 100 INJECTION, SOLUTION PARENTERAL at 08:12

## 2017-04-21 RX ADMIN — INSULIN HUMAN 1 UNITS: 100 INJECTION, SOLUTION PARENTERAL at 03:32

## 2017-04-21 RX ADMIN — BUDESONIDE AND FORMOTEROL FUMARATE DIHYDRATE 2 PUFF: 160; 4.5 AEROSOL RESPIRATORY (INHALATION) at 07:14

## 2017-04-21 RX ADMIN — IPRATROPIUM BROMIDE AND ALBUTEROL SULFATE 3 ML: .5; 3 SOLUTION RESPIRATORY (INHALATION) at 11:23

## 2017-04-21 RX ADMIN — MAGNESIUM SULFATE IN DEXTROSE 1 G: 10 INJECTION, SOLUTION INTRAVENOUS at 12:11

## 2017-04-21 RX ADMIN — POTASSIUM CHLORIDE 10 MEQ: 750 TABLET, FILM COATED, EXTENDED RELEASE ORAL at 09:33

## 2017-04-21 RX ADMIN — INSULIN HUMAN 15 UNITS: 100 INJECTION, SUSPENSION SUBCUTANEOUS at 14:19

## 2017-04-21 RX ADMIN — DOCUSATE SODIUM 100 MG: 100 CAPSULE ORAL at 09:33

## 2017-04-21 RX ADMIN — INSULIN HUMAN 1 UNITS: 100 INJECTION, SOLUTION PARENTERAL at 06:17

## 2017-04-21 RX ADMIN — POTASSIUM CHLORIDE 10 MEQ: 7.46 INJECTION, SOLUTION INTRAVENOUS at 02:41

## 2017-04-21 RX ADMIN — HYDROCODONE BITARTRATE AND ACETAMINOPHEN 1 TABLET: 5; 325 TABLET ORAL at 08:35

## 2017-04-21 RX ADMIN — INSULIN HUMAN 15 UNITS: 100 INJECTION, SUSPENSION SUBCUTANEOUS at 21:16

## 2017-04-21 RX ADMIN — IPRATROPIUM BROMIDE AND ALBUTEROL SULFATE 3 ML: .5; 3 SOLUTION RESPIRATORY (INHALATION) at 07:12

## 2017-04-21 RX ADMIN — HYDROCODONE BITARTRATE AND ACETAMINOPHEN 1 TABLET: 5; 325 TABLET ORAL at 06:11

## 2017-04-21 RX ADMIN — TRAMADOL HYDROCHLORIDE 50 MG: 50 TABLET, COATED ORAL at 09:33

## 2017-04-21 RX ADMIN — TIOTROPIUM BROMIDE 1 CAPSULE: 18 CAPSULE ORAL; RESPIRATORY (INHALATION) at 07:14

## 2017-04-21 RX ADMIN — ONDANSETRON 4 MG: 2 INJECTION INTRAMUSCULAR; INTRAVENOUS at 08:35

## 2017-04-21 RX ADMIN — PROCHLORPERAZINE EDISYLATE 10 MG: 5 INJECTION INTRAMUSCULAR; INTRAVENOUS at 10:14

## 2017-04-21 ASSESSMENT — PAIN SCALES - GENERAL
PAINLEVEL_OUTOF10: 0
PAINLEVEL_OUTOF10: 8
PAINLEVEL_OUTOF10: 6
PAINLEVEL_OUTOF10: 3
PAINLEVEL_OUTOF10: 2
PAINLEVEL_OUTOF10: 7
PAINLEVEL_OUTOF10: 7
PAINLEVEL_OUTOF10: 3
PAINLEVEL_OUTOF10: 4
PAINLEVEL_OUTOF10: 0
PAINLEVEL_OUTOF10: 8
PAINLEVEL_OUTOF10: 3
PAINLEVEL_OUTOF10: 0
PAINLEVEL_OUTOF10: 7
PAINLEVEL_OUTOF10: 8
PAINLEVEL_OUTOF10: 1

## 2017-04-21 ASSESSMENT — ENCOUNTER SYMPTOMS
EYES NEGATIVE: 1
PSYCHIATRIC NEGATIVE: 1
CARDIOVASCULAR NEGATIVE: 1
NEUROLOGICAL NEGATIVE: 1
MUSCULOSKELETAL NEGATIVE: 1
RESPIRATORY NEGATIVE: 1
GASTROINTESTINAL NEGATIVE: 1

## 2017-04-21 NOTE — PROGRESS NOTES
Pulmonary Critical Care Progress Note    Date of service: 4/21/2017     Interval Events:  24 hour interval history reviewed  Reason for visit:  Atelectasis, COPD, MARY      2 lpm NC  1200 IS      PFSH:  No change.    Respiratory:     Pulse Oximetry: 99 %  CXR with increased bibasilar atelectasis  2 L NC  Few coarse crackles, no wheezing  No increased SOB or cough     Recent Labs      04/20/17   1405  04/20/17   1525  04/20/17   1650   ISTATAPH  7.237*  7.330*  7.309*   ISTATAPCO2  55.7*  49.5*  57.8*   ISTATAPO2  99*  106*  95*   ISTATATCO2  25  28  31   [id number]  96  98  96   ISTATARTHCO3  23.7  26.1*  29.0*   ISTATARTBE  -4  0  2   ISTATTEMP  35.9 C  37.0 C  37.8 C   ISTATFIO2  40  40  40   ISTATSPEC  Arterial  Arterial  Arterial   ISTATAPHTC  7.252*  7.330*  7.297*   RCJJHMTO4MP  92*  106*  100*       HemoDynamics:  Pulse: 76, Heart Rate (Monitored): 81  Arterial BP: 103/63 mmHg, NIBP: 105/69 mmHg  CVP (mm Hg): (!) 147 MM HG  SR  No angina, palp, syncope    Neuro:  Awake and alert  No HA, Sz    Fluids:  Intake/Output       04/19/17 0700 - 04/20/17 0659 (Not Admitted) 04/20/17 0700 - 04/21/17 0659 04/21/17 0700 - 04/22/17 0659      9148-7016 0788-3836 Total 8465-1026 7898-6260 Total 8183-3360 7425-6049 Total       Intake    P.O.  --  -- --  100  800 900  --  -- --    P.O. -- -- -- 100 800 900 -- -- --    I.V.  --  -- --  4389.5  1136.8 5526.3  --  -- --    Crystalloid Intake -- -- -- 1300 -- 1300 -- -- --    Precedex Volume -- -- -- 19.8 -- 19.8 -- -- --    Amiodarone Volume -- -- -- -- 325.7 325.7 -- -- --    Phenylephrine Volume -- -- -- -- 32 32 -- -- --    Insulin Volume -- -- -- 48 70.4 118.4 -- -- --    Epinephrine Volume -- -- -- 21.7 8.7 30.4 -- -- --    IV Volume (Plasmalyte) -- -- -- 3000 500 3500 -- -- --    IV Piggyback Volume (IV Piggyback) -- -- -- -- 200 200 -- -- --    Blood  --  -- --  325  -- 325  --  -- --    Cell Saver Volume (mL) -- -- -- 325 -- 325 -- -- --    Total Intake -- -- -- 4814.5  1936.8 6751.3 -- -- --       Output    Urine  --  -- --    750 2885  --  -- --    Indwelling Cathether -- -- --  750 2885 -- -- --    Drains  --  -- --  210  220 430  --  -- --    Mediastinal Chest Tube 1 -- -- -- 210 220 430 -- -- --    Total Output -- -- -- 2345 970 3315 -- -- --       Net I/O     -- -- -- 2469.5 966.8 3436.3 -- -- --        Weight: 59.5 kg (131 lb 2.8 oz)  Recent Labs      17   1730 17   0500   SODIUM  134*   --    --    --   136   POTASSIUM  3.4*  4.6  3.2*  4.1  4.0   CHLORIDE  100   --    --    --   104   CO2  23   --    --    --   26   BUN  22   --    --    --   14   CREATININE  0.86   --    --    --   0.65   MAGNESIUM   --   2.4   --   2.1   --    CALCIUM  9.9   --    --    --   7.4*       GI/Nutrition:  Abd soft ND/NT  No N/V/P    Liver Function  Recent Labs      1735  17   0500   ALTSGPT  16   --    ASTSGOT  24   --    ALKPHOSPHAT  74   --    TBILIRUBIN  0.7   --    GLUCOSE  102*  127*       Heme:  Recent Labs      17   1120  17   0500   RBC  4.22   --   2.86*   HEMOGLOBIN  13.6   --   9.1*   HEMATOCRIT  40.2   --   28.2*   PLATELETCT  251  108*  123*   PROTHROMBTM  12.3  19.6*  14.6   APTT  26.4  35.4   --    INR  0.89  1.61*  1.10       Infectious Disease:  Temp  Av.5 °C (99.5 °F)  Min: 37.5 °C (99.5 °F)  Max: 37.5 °C (99.5 °F)  Monitored Temp  Av.8 °C (98.2 °F)  Min: 35.7 °C (96.3 °F)  Max: 37.9 °C (100.2 °F)    Recent Labs      17   0935  17   0500   WBC  9.9  19.6*   NEUTSPOLYS  74.40*   --    LYMPHOCYTES  14.60*   --    MONOCYTES  9.10   --    EOSINOPHILS  0.80   --    BASOPHILS  0.80   --    ASTSGOT  24   --    ALTSGPT  16   --    ALKPHOSPHAT  74   --    TBILIRUBIN  0.7   --      Current Facility-Administered Medications   Medication Dose Frequency Provider Last Rate Last Dose   • potassium chloride in water (KCL) ivpb 10 mEq  10 mEq Once Donna L  Johnathan A.P.N.   10 mEq at 04/21/17 0621   • insulin NPH (HUMULIN,NOVOLIN) injection 15 Units  15 Units Q8HRS Donna Mann A.P.N.       • insulin lispro (HUMALOG) injection 11 Units  11 Units TID AC Donna Mann A.P.N.       • insulin lispro (HUMALOG) injection 0-20 Units  0-20 Units ACHS & 0200 Donna Mann A.P.N.       • insulin lispro (HUMALOG) injection 3 Units  3 Units PRN Donna Mann A.P.N.       • atorvastatin (LIPITOR) tablet 10 mg  10 mg DAILY YUMIKO Wan.P.N.   Stopped at 04/20/17 1145   • budesonide-formoterol (SYMBICORT) 160-4.5 MCG/ACT inhaler 2 Puff  2 Puff BID YUMIKO Wan.P.N.   2 Puff at 04/20/17 1928   • sertraline (ZOLOFT) tablet 50 mg  50 mg DAILY YUMIKO Wan.P.N.   Stopped at 04/20/17 1145   • albuterol inhaler 2 Puff  2 Puff Q4HRS PRN YUMIKO Wan.P.N.       • Respiratory Care per Protocol   Continuous RT YUMIKO Wan.P.N.       • NS infusion   Continuous YUMIKO Wan.P.N. 10 mL/hr at 04/20/17 1207 500 mL at 04/20/17 1207   • K+ Scale: Goal of 4.5  1 Each Q6HRS YUMIKO Wan.P.N.   1 Each at 04/21/17 0600   • Pharmacy Consult Request ...Pain Management Review 1 Each  1 Each PRN YUMIKO Wan.P.N.       • docusate sodium (COLACE) capsule 100 mg  100 mg QAM YUMIKO Wan.P.N.   Stopped at 04/20/17 1145    And   • senna-docusate (PERICOLACE or SENOKOT S) 8.6-50 MG per tablet 1 Tab  1 Tab Nightly YUMIKO Wan.P.N.   1 Tab at 04/20/17 2017    And   • senna-docusate (PERICOLACE or SENOKOT S) 8.6-50 MG per tablet 1 Tab  1 Tab Q24HRS PRN Tayler Kimball, A.P.N.        And   • lactulose 20 GM/30ML solution 30 mL  30 mL Q24HRS PRN Tayler Kimball, A.P.N.        And   • bisacodyl (DULCOLAX) suppository 10 mg  10 mg Q24HRS PRN Tayler Kimball, A.P.N.        And   • fleet enema 133 mL  1 Each Once PRN Tayler Kimball, A.P.N.       • magnesium sulfate ivpb premix 1 g  1 g QDAY Tayler Kimball, A.P.N.   Stopped at 04/20/17 1327   • aspirin EC  (ECOTRIN) tablet 81 mg  81 mg DAILY Tayler Kimball A.P.N.       • MD ALERT... warfarin (COUMADIN) per pharmacy protocol   pharmacy to dose Tayler Kimball A.P.N.       • electrolyte-A (PLASMALYTE-A) infusion   PRN Tayler Kimball A.P.N.   1,000 mL at 04/20/17 1613   • clevidipine (CLEVIPREX) IV emulsion  0-10 mg/hr Continuous Tayler Kimball, A.P.N.   Stopped at 04/20/17 1145   • nitroglycerin 50 mg in D5W 250 ml infusion  0-100 mcg/min Continuous Tayler Kimball, A.P.N.   Stopped at 04/20/17 1145   • esmolol (BREVIBLOC) 2.5 g/250 mL NS infusion (PREMIX)  0-300 mcg/kg/min Continuous Tayler Kimball, A.P.N.   Stopped at 04/20/17 1145   • oxycodone immediate-release (ROXICODONE) tablet 5 mg  5 mg Q3HRS PRN Tayler Kimball A.P.N.   5 mg at 04/20/17 2029   • oxycodone immediate release (ROXICODONE) tablet 10 mg  10 mg Q3HRS PRN Tayler Kimball, A.P.N.       • tramadol (ULTRAM) 50 MG tablet 50 mg  50 mg Q4HRS PRN Tayler Kimball, A.P.N.   50 mg at 04/21/17 0400   • midazolam (VERSED) 2 MG/2ML injection 0.5-2 mg  0.5-2 mg Q HOUR PRN Tayler Kimball, A.P.N.       • dexmedetomidine (PRECEDEX) 200 mcg in NS 50 mL infusion  0-1.5 mcg/kg/hr Continuous Tayler Kimball, A.P.N.   Stopped at 04/20/17 1530   • sodium bicarbonate 8.4 % injection 50 mEq  50 mEq Q HOUR PRN Tayler Kimball, A.P.N.   50 mEq at 04/20/17 1422   • morphine (pf) 4 mg/ml injection 4 mg  4 mg Q HOUR PRN Tayler Kimball, A.P.N.       • ondansetron (ZOFRAN) syringe/vial injection 4 mg  4 mg Q6HRS PRN Tayler Kimball, A.P.N.   4 mg at 04/20/17 3185    Or   • prochlorperazine (COMPAZINE) injection 10 mg  10 mg Q6HRS PRN Tayler Kimball, A.P.N.        Or   • promethazine (PHENERGAN) suppository 25 mg  25 mg Q6HRS PRN Tayler Kimball, A.P.N.       • acetaminophen (TYLENOL) tablet 650 mg  650 mg Q4HRS PRN Tayler Kimball, A.P.N.        Or   • acetaminophen (TYLENOL) suppository 650 mg  650 mg Q4HRS PRN Tayler Kimball, A.P.N.       • mag hydrox-al hydrox-simeth (MAALOX PLUS ES  or MYLANTA DS) suspension 30 mL  30 mL Q4HRS PRN Tayler Kimball A.P.N.       • diphenhydrAMINE (BENADRYL) tablet/capsule 25 mg  25 mg HS PRN - MR X 1 Tayler Kimball, A.P.N.       • hydrocodone-acetaminophen (NORCO) 5-325 MG per tablet 1-2 Tab  1-2 Tab Q4HRS PRN Tayler Kimball A.P.N.   1 Tab at 04/21/17 0611   • insulin regular human (HUMULIN/NOVOLIN R) 62.5 Units in  mL infusion per protocol  1-6 Units/hr Continuous Lacey Porras M.D. 14 mL/hr at 04/21/17 0617 3.5 Units/hr at 04/21/17 0617    And   • insulin regular (HUMULIN R) injection 0-10 Units  0-10 Units PRN Lacey Porras M.D.   1 Units at 04/21/17 0617    And   • dextrose 50% (D50W) injection 25 mL  25 mL PRN Lacey Porras M.D.       • ipratropium-albuterol (DUONEB) nebulizer solution 3 mL  3 mL Q4HRS (RT) Fernandez Hays M.D.   3 mL at 04/20/17 2238   • ipratropium-albuterol (DUONEB) nebulizer solution 3 mL  3 mL Q2HRS PRN (RT) Fernandez Hays M.D.       • tiotropium (SPIRIVA) 18 MCG inhalation capsule 1 Cap  1 Cap QDAILY (RT) Fernandez Hays M.D.   Stopped at 04/20/17 1730   • phenylephrine (JOSEE-SYNEPHRINE) 40,000 mcg in  mL Infusion  0-50 mcg/min Continuous Donna Mann A.P.N.   Stopped at 04/21/17 0530   • amiodarone (CORDARONE) 450 mg in D5W 250 mL Infusion  0.5-1 mg/min Continuous Tayler Kimball A.P.N. 17 mL/hr at 04/21/17 0501 0.5 mg/min at 04/21/17 0501   • epinephrine 1 mg/mL(1:1000) 4 mg in  mL Infusion  0-0.2 mcg/kg/min Continuous Lacey Porras M.D.   Stopped at 04/20/17 2127     Last reviewed on 4/20/2017  6:03 AM by Randy Licona    Quality  Measures:  Labs reviewed, Medications reviewed and Radiology images reviewed  Avila catheter: No Avila      DVT Prophylaxis: Warfarin (Coumadin)  DVT prophylaxis - mechanical: SCDs  Ulcer prophylaxis: Not indicated            Assessment and Plan:    Atelectasis   - cont IS, PEP therapy  Status post mitral valve repair, left atrial appendage  ligation and maze procedure  Acute exacerbation of severe stage III chronic obstructive pulmonary disease   - improved   - cont BDs  History of atrial flutter - in SR  Systemic arterial hypertension   - cont BP control  Dyslipidemia  Anxiety  Obstructive sleep apnea - CPAP at night    Discussed with RN, RT, Team     I, Shanda Roger (Scribe), am scribing for, and in the presence of, Fernandez Morales M.D.    Electronically signed by: Shanda Roger (Scribe), 4/21/2017    IFernandez M.D. personally performed the services described in this documentation, as scribed by Shanda Rogre in my presence, and it is both accurate and complete.

## 2017-04-21 NOTE — PROGRESS NOTES
Assumed care of patient at shift change. Bedside report received from previous RN. Pt awake, HIDALGO, denies pain at this time. VSS. All gtts and lines verified.   at bedside.

## 2017-04-21 NOTE — CARE PLAN
Problem: Post Op Day 1 CABG/Heart Valve Replacement  Goal: Optimal care of the post op CABG/heart valve replacement Post Op Day 1  Intervention: EKG and CXR completed  done  Intervention: All valve patients: PT/INR daily  done  Intervention: Antibiotics are discontinued within 24 hours of anesthesia end time unless indication documented for continuation beyond 24 hours  D/c'd      Intervention: Daily Weights  documented  Intervention: Up in chair for all meals  For breakfast, refusing for lunch, not hungry   Intervention: Ambulate in am if stable. First ambulation is 25 feet. Repeat x 3 as tolerated. Ambulate again before bed.  25 feet, with standby assist   Intervention: Discontinue larsen catheter unless documented reason for continuation  done  Intervention: Remove original surgical dressing after 24 hrs, leave open to air unless otherwise specified by physician  Removed          Intervention: Consider chest tube removal by MD  To remain per APRN   Intervention: IS q 1 hour while awake and record best IS volume  Best IS 1250mL   Intervention: Saline lock IV  Done     Intervention: Transfer to [facility], begin VS q 4 hours  Transitioned   Intervention: After 24th hour post-anesthesia end time, transition patient to Cardiac Surgery SQ Insulin Protocol  Transitioned

## 2017-04-21 NOTE — CARE PLAN
Problem: Day of surgery post CABG/Heart valve replacement  Goal: Stabilization in immediate post op period  Intervention: VS q 15 min x 4 hours, then q 1 hour. Include temperature immediately upon arrival. Check CO/CI q 2-4 hours and PRN  Done      Intervention: If radial artery used, elevate arm, no BP checks or needle sticks from affected arm, monitor ulnar pulse and capillary refill  Done  Intervention: First post op hour labs and diagnostics per MD order  Done  Intervention: Serum K q 6 hours x 24 hours. ABG and CBC prn.  Done  Intervention: For FSBS greater than 130, start Post Cardiac Surgery Insulin Drip Protocol  Started   Intervention: FSBS frequency as per Cardiac Surgery Insulin Drip Protocol  Done  Intervention: For patients on Beta Blockers: verify dose given prior to surgery or within 6 hours after arrival to the unit  N/A  Intervention: Chest tube to 20 cm suction, record CT drainage with VS  DOne                Intervention: For CT drainage > 300 cc in first post op hour and/or 150 cc in subsequent hours: platelets, coag screen, fibrinogen, H&H per order  N/A  Intervention: Titrate and wean off vasoactive drips per patient’s condition and per MD order while maintaining SBP  mmHg per MD order  Done     Intervention: VAP protocol in place  In place                Intervention: Wean from vent per protocol (see protocol), extubation goal with 2-6 hours post op.  Weaned down and extubated per Pulmonolgy. Protocol wean not appropriate with this Pt.   Intervention: IS q 1 hour while awake post extubation  DOne

## 2017-04-21 NOTE — CARE PLAN
Problem: Day of surgery post CABG/Heart valve replacement  Goal: Stabilization in immediate post op period  Intervention: VS q 15 min x 4 hours, then q 1 hour. Include temperature immediately upon arrival. Check CO/CI q 2-4 hours and PRN  done  Intervention: If radial artery used, elevate arm, no BP checks or needle sticks from affected arm, monitor ulnar pulse and capillary refill  done  Intervention: Serum K q 6 hours x 24 hours. ABG and CBC prn.  done  Intervention: FSBS frequency as per Cardiac Surgery Insulin Drip Protocol  done  Intervention: Chest tube to 20 cm suction, record CT drainage with VS  done  Intervention: For CT drainage > 300 cc in first post op hour and/or 150 cc in subsequent hours: platelets, coag screen, fibrinogen, H&H per order  N/A  Intervention: Titrate and wean off vasoactive drips per patient’s condition and per MD order while maintaining SBP  mmHg per MD order  Weaned off ronnie gtt at 0530  Intervention: IS q 1 hour while awake post extubation  Best IS 1250  Intervention: Out of bed, dangle 4 hours post extubation  done  Intervention: Up in chair 4 hours, day of extubation  done  Intervention: Maintain all original surgical dressings for 24 hours  done  Intervention: Clear liquids post extubation, advance as tolerated  done  Intervention: A-Fib and DVT prophylaxis per MD order or contraindications documented (refer to DVT/VTE problem on Care Plan)  completed  Intervention: Amiodarone protocol per MD order  done

## 2017-04-21 NOTE — PROGRESS NOTES
Cardiovascular Surgery Progress Note    [facility]  [facility] [id number]  : 1945  Admit Date: 2017  5:20 AM  Procedure:  Procedure(s) and Anesthesia Type:     * MITRAL VALVE REPAIR  - General     * MAZE PROCEDURE, Left atrial appendage ligation - General     * PAGE - General  1 Day Post-Op    Vitals:  Patient Vitals for the past 8 hrs:   Monitored Temp SpO2 O2 Delivery O2 (LPM) Pulse Heart Rate (Monitored) Resp NIBP Weight   17 0715 - 100 % - 2 79 79 15 - -   17 0700 - - - - - - - - 59.5 kg (131 lb 2.8 oz)   17 0600 37 °C (98.6 °F) 99 % Silicone Nasal Cannula 2 76 81 (!) 22 - -   17 0500 36.8 °C (98.2 °F) 100 % - - 78 78 18 105/69 mmHg -   17 0400 36.6 °C (97.9 °F) 100 % CPAP 2 75 76 (!) 10 (!) 97/63 mmHg -   17 0307 - 99 % - - 79 79 16 - -   17 0300 36.6 °C (97.9 °F) 99 % - - 77 77 12 (!) 81/55 mmHg -     Temp (24hrs), Av.5 °C (99.5 °F), Min:37.5 °C (99.5 °F), Max:37.5 °C (99.5 °F)      Respiratory:  Coleman Vent Mode: Spont, Rate (breaths/min): 8, PEEP/CPAP: 8, FiO2: 40, P Peak (PIP): 15, P MEAN: 9.5 Respiration: 15, Pulse Oximetry: 100 %, O2 Daily Delivery Respiratory : Silicone Nasal Cannula  Chest Tube Group Right;Left;Mediastinal Straight, Angled 32-Tube Status / Drainage: Patent;Sutured in Place;Serosanguinous;Small, Chest Tube Group Right;Left;Mediastinal Straight, Angled 32-Device: Closed Drainage System;Suction 20 cm Water;Air Leak Present  Chest Tube Drains:     Mediastinal Chest Tube 1: 60 ml    Fluids:    Intake/Output Summary (Last 24 hours) at 17 1014  Last data filed at 17 0600   Gross per 24 hour   Intake 6751.3 ml   Output   3315 ml   Net 3436.3 ml     Admit weight: Weight: 54.7 kg (120 lb 9.5 oz)  Current weight: Weight: 59.5 kg (131 lb 2.8 oz) (17 0700)    Labs:  Recent Labs      17   0935  17   1120  17   0500   WBC  9.9   --   19.6*   RBC  4.22   --   2.86*   HEMOGLOBIN  13.6   --   9.1*    HEMATOCRIT  40.2   --   28.2*   MCV  95.3   --   98.6*   MCH  32.2   --   31.8   MCHC  33.8   --   32.3*   RDW  45.5   --   47.3   PLATELETCT  251  108*  123*   MPV  9.2   --   10.3     Recent Labs      04/19/17   0935   NEUTSPOLYS  74.40*   LYMPHOCYTES  14.60*   MONOCYTES  9.10   EOSINOPHILS  0.80   BASOPHILS  0.80     Recent Labs      04/19/17   0935   04/20/17   1730 04/21/17 04/21/17   0500   SODIUM  134*   --    --    --   136   POTASSIUM  3.4*   < >  3.2*  4.1  4.0   CHLORIDE  100   --    --    --   104   CO2  23   --    --    --   26   GLUCOSE  102*   --    --    --   127*   BUN  22   --    --    --   14   CREATININE  0.86   --    --    --   0.65   CALCIUM  9.9   --    --    --   7.4*    < > = values in this interval not displayed.     Recent Labs      04/19/17   0935  04/20/17   1120  04/21/17   0500   APTT  26.4  35.4   --    INR  0.89  1.61*  1.10       Medications:  • insulin NPH  15 Units     • insulin lispro  11 Units     • insulin lispro  0-20 Units     • amiodarone  400 mg     • furosemide  20 mg     • potassium chloride (KCl)  10 mEq     • atorvastatin  10 mg     • budesonide-formoterol  2 Puff     • sertraline  50 mg     • K+ Scale: Goal of 4.5  1 Each     • docusate sodium  100 mg      And   • senna-docusate  1 Tab     • magnesium sulfate  1 g Stopped (04/20/17 1327)   • aspirin EC  81 mg     • MD ALERT... warfarin       • ipratropium-albuterol  3 mL     • tiotropium  1 Cap         Exam:   Review of Systems   Constitutional: Positive for malaise/fatigue.   HENT: Negative.    Eyes: Negative.    Respiratory: Negative.    Cardiovascular: Negative.    Gastrointestinal: Negative.    Genitourinary: Negative.    Musculoskeletal: Negative.    Skin: Negative.    Neurological: Negative.    Endo/Heme/Allergies: Negative.    Psychiatric/Behavioral: Negative.        Physical Exam   Constitutional: She is oriented to person, place, and time. She appears well-developed and well-nourished.   HENT:   Head:  Normocephalic.   Eyes: Pupils are equal, round, and reactive to light.   Neck: Normal range of motion. No JVD present.   Cardiovascular: Normal rate, regular rhythm and normal heart sounds.    Pulmonary/Chest: Effort normal and breath sounds normal.   Bases diminished   Abdominal: Soft. Bowel sounds are normal. She exhibits no distension. There is no guarding.   Genitourinary:   larsen   Musculoskeletal: Normal range of motion.   Neurological: She is alert and oriented to person, place, and time.   Skin: Skin is warm and dry.   Surgical incisions CDI   Psychiatric: She has a normal mood and affect. Her behavior is normal.       Quality Measures:   EKG reviewed, Medications reviewed, Labs reviewed and Radiology images reviewed  Larsen catheter: One or Two Days Post Surgery (Day of Surgery being Day 0)  Central line in place: Concentrated IV drugs and Need for access    DVT Prophylaxis: Contraindicated - High bleeding risk  DVT prophylaxis - mechanical: SCDs  Ulcer prophylaxis: Yes    Assessed for rehab: Patient was assess for and/or received rehabilitation services during this hospitalization      Assessment/Plan:  POD 1 HDS, NSR--some runs VT ?afib with aberrancy? Last night, started on amio gtt.  Extubated, neuro grossly intact.  CT output min, mod airleak.  CXR no pneumothorax.  Adequate UOP, wts up, +3L. labs noted.  PLAN:  Keep CTs.  DC larsen.  STart gentle diuresis.  Change to PO amio.  Stress cough/deepbreath/IS. AMB.        Active Hospital Problems    Diagnosis   • Severe mitral regurgitation [I34.0]

## 2017-04-21 NOTE — PROGRESS NOTES
EDY wilcox updated that patient having runs of vtach, pt had 5 beat runs x2. Pt needing low dose epi to maintain pressures. Order recevied for amiodarone bolus over 30 minutes and 500 ml bolus. If needed start low neosynephrine gtt.

## 2017-04-21 NOTE — DISCHARGE PLANNING
Care Transition Team Assessment    Completed screening and pt anticipates discharging with home health services.     Information Source  Orientation : Oriented x 4  Information Given By: Patient  Informant's Name: Kelly  Who is responsible for making decisions for patient? : Patient    Elopement Risk  Legal Hold: No  Ambulatory or Self Mobile in Wheelchair: No-Not an Elopement Risk  Elopement Risk: Not at Risk for Elopement    Interdisciplinary Discharge Planning  Primary Care Physician: Dr. Metcalf  Lives with - Patient's Self Care Capacity: Spouse  Patient or legal guardian wants to designate a caregiver (see row info): Yes  Caregiver name: Cruz Lovett  Caregiver relationship to patient:   Caregiver contact info: [phone]  ([facility] – [place], [place]) Authorization for Release of Health Information has been completed: Yes  Support Systems: Spouse / Significant Other, Family Member(s)  Do You Take your Prescribed Medications Regularly: Yes  Able to Return to Previous ADL's: Future Time w/Therapy  Mobility Issues: No  Prior Services: None  Patient Expects to be Discharged to:: Home with home health  Assistance Needed: No  Durable Medical Equipment: Home Oxygen  DME Provider / Phone: Paid for by family    Discharge Preparedness  What is your plan after discharge?: Home health care  What are your discharge supports?: Spouse (daughters)  Prior Functional Level: Ambulatory, Drives Self, Independent with Activities of Daily Living, Independent with Medication Management  Difficulity with ADLs: None  Difficulity with IADLs: None    Functional Assesment  Prior Functional Level: Ambulatory, Drives Self, Independent with Activities of Daily Living, Independent with Medication Management    Finances  Financial Barriers to Discharge: No  Prescription Coverage: Yes ([employer])    Vision / Hearing Impairment  Vision Impairment : No  Hearing Impairment : No    Values / Beliefs / Concerns  Values / Beliefs Concerns :  No  Spiritual Requests During Hospitalization: No    Domestic Abuse  Physical Abuse or Sexual Abuse: No  Verbal Abuse or Emotional Abuse: No    Psychological Assessment  History of Substance Abuse: None    Discharge Risks or Barriers  Discharge risks or barriers?: No    Anticipated Discharge Information  Anticipated discharge disposition: Home  Discharge Address: [address] [place] [address] [place] [id number]  Discharge Contact Phone Number: [phone]

## 2017-04-21 NOTE — CARE PLAN
Problem: Nutritional:  Goal: Patient to verbalize or demonstrate understanding of diet  Outcome: MET Date Met:  04/21/17

## 2017-04-21 NOTE — PROGRESS NOTES
Inpatient Anticoagulation Service Note    Date: 4/21/2017  Reason for Anticoagulation: Mitral Valve Repair        Hemoglobin Value: 9.1  Hematocrit Value: 28.2  Lab Platelet Value: 123  Target INR: 2.0 to 3.0    INR from last 7 days     Date/Time INR Value    04/21/17 0500 1.1    04/20/17 1120 (!)1.61    04/19/17 0935 0.89        Dose from last 7 days     Date/Time Dose (mg)    04/21/17 1300 0        Average Dose (mg):  (new start)  Significant Interactions: Aspirin, Statin, Other (Comments) (Zoloft)  Bridge Therapy: No     Comments: Pt is POD 1 for MV repair. Chest tubes to remain in place. Will not start warfarin dosing.Reassess tomorrow.          Steven Sin, PharmD

## 2017-04-21 NOTE — PROGRESS NOTES
12 hour chart check     Monitor Summary   Sinus Rhythm, first degree block, HR 72-84  ME 0.2 QRS 0.08 QT 0.4

## 2017-04-22 ENCOUNTER — APPOINTMENT (OUTPATIENT)
Dept: RADIOLOGY | Facility: MEDICAL CENTER | Age: 72
DRG: 219 | End: 2017-04-22
Attending: NURSE PRACTITIONER
Payer: MEDICARE

## 2017-04-22 LAB
ANION GAP SERPL CALC-SCNC: 5 MMOL/L (ref 0–11.9)
BUN SERPL-MCNC: 18 MG/DL (ref 8–22)
CALCIUM SERPL-MCNC: 8 MG/DL (ref 8.5–10.5)
CHLORIDE SERPL-SCNC: 100 MMOL/L (ref 96–112)
CO2 SERPL-SCNC: 29 MMOL/L (ref 20–33)
CREAT SERPL-MCNC: 0.71 MG/DL (ref 0.5–1.4)
ERYTHROCYTE [DISTWIDTH] IN BLOOD BY AUTOMATED COUNT: 48.5 FL (ref 35.9–50)
GFR SERPL CREATININE-BSD FRML MDRD: >60 ML/MIN/1.73 M 2
GLUCOSE BLD-MCNC: 101 MG/DL (ref 65–99)
GLUCOSE BLD-MCNC: 111 MG/DL (ref 65–99)
GLUCOSE BLD-MCNC: 153 MG/DL (ref 65–99)
GLUCOSE BLD-MCNC: 90 MG/DL (ref 65–99)
GLUCOSE BLD-MCNC: 93 MG/DL (ref 65–99)
GLUCOSE SERPL-MCNC: 73 MG/DL (ref 65–99)
HCT VFR BLD AUTO: 27.1 % (ref 37–47)
HGB BLD-MCNC: 8.7 G/DL (ref 12–16)
INR PPP: 0.97 (ref 0.87–1.13)
MCH RBC QN AUTO: 31.9 PG (ref 27–33)
MCHC RBC AUTO-ENTMCNC: 32.1 G/DL (ref 33.6–35)
MCV RBC AUTO: 99.3 FL (ref 81.4–97.8)
PLATELET # BLD AUTO: 120 K/UL (ref 164–446)
PMV BLD AUTO: 10.3 FL (ref 9–12.9)
POTASSIUM SERPL-SCNC: 3.9 MMOL/L (ref 3.6–5.5)
PROTHROMBIN TIME: 13.2 SEC (ref 12–14.6)
RBC # BLD AUTO: 2.73 M/UL (ref 4.2–5.4)
SODIUM SERPL-SCNC: 134 MMOL/L (ref 135–145)
WBC # BLD AUTO: 18.9 K/UL (ref 4.8–10.8)

## 2017-04-22 PROCEDURE — A9270 NON-COVERED ITEM OR SERVICE: HCPCS | Performed by: NURSE PRACTITIONER

## 2017-04-22 PROCEDURE — G8979 MOBILITY GOAL STATUS: HCPCS | Mod: CI

## 2017-04-22 PROCEDURE — G8978 MOBILITY CURRENT STATUS: HCPCS | Mod: CJ

## 2017-04-22 PROCEDURE — G8988 SELF CARE GOAL STATUS: HCPCS | Mod: CI

## 2017-04-22 PROCEDURE — 80048 BASIC METABOLIC PNL TOTAL CA: CPT

## 2017-04-22 PROCEDURE — 94640 AIRWAY INHALATION TREATMENT: CPT

## 2017-04-22 PROCEDURE — 85610 PROTHROMBIN TIME: CPT

## 2017-04-22 PROCEDURE — 700102 HCHG RX REV CODE 250 W/ 637 OVERRIDE(OP): Performed by: NURSE PRACTITIONER

## 2017-04-22 PROCEDURE — 94660 CPAP INITIATION&MGMT: CPT

## 2017-04-22 PROCEDURE — 99233 SBSQ HOSP IP/OBS HIGH 50: CPT | Performed by: INTERNAL MEDICINE

## 2017-04-22 PROCEDURE — G8987 SELF CARE CURRENT STATUS: HCPCS | Mod: CJ

## 2017-04-22 PROCEDURE — 97163 PT EVAL HIGH COMPLEX 45 MIN: CPT

## 2017-04-22 PROCEDURE — 700111 HCHG RX REV CODE 636 W/ 250 OVERRIDE (IP): Performed by: NURSE PRACTITIONER

## 2017-04-22 PROCEDURE — 770022 HCHG ROOM/CARE - ICU (200)

## 2017-04-22 PROCEDURE — 51798 US URINE CAPACITY MEASURE: CPT

## 2017-04-22 PROCEDURE — 700112 HCHG RX REV CODE 229: Performed by: NURSE PRACTITIONER

## 2017-04-22 PROCEDURE — 71010 DX-CHEST-PORTABLE (1 VIEW): CPT

## 2017-04-22 PROCEDURE — 97165 OT EVAL LOW COMPLEX 30 MIN: CPT

## 2017-04-22 PROCEDURE — 85027 COMPLETE CBC AUTOMATED: CPT

## 2017-04-22 PROCEDURE — 82962 GLUCOSE BLOOD TEST: CPT | Mod: 91

## 2017-04-22 PROCEDURE — 700101 HCHG RX REV CODE 250: Performed by: INTERNAL MEDICINE

## 2017-04-22 RX ORDER — TIOTROPIUM BROMIDE 18 UG/1
1 CAPSULE ORAL; RESPIRATORY (INHALATION) DAILY
Status: DISCONTINUED | OUTPATIENT
Start: 2017-04-23 | End: 2017-05-05 | Stop reason: HOSPADM

## 2017-04-22 RX ORDER — ALPRAZOLAM 0.25 MG/1
0.25 TABLET ORAL 3 TIMES DAILY PRN
Status: DISCONTINUED | OUTPATIENT
Start: 2017-04-22 | End: 2017-05-05 | Stop reason: HOSPADM

## 2017-04-22 RX ADMIN — SERTRALINE 50 MG: 50 TABLET, FILM COATED ORAL at 07:50

## 2017-04-22 RX ADMIN — TRAMADOL HYDROCHLORIDE 50 MG: 50 TABLET, COATED ORAL at 05:46

## 2017-04-22 RX ADMIN — AMIODARONE HYDROCHLORIDE 400 MG: 200 TABLET ORAL at 07:48

## 2017-04-22 RX ADMIN — POTASSIUM CHLORIDE 10 MEQ: 750 TABLET, FILM COATED, EXTENDED RELEASE ORAL at 07:51

## 2017-04-22 RX ADMIN — ATORVASTATIN CALCIUM 10 MG: 10 TABLET, FILM COATED ORAL at 07:50

## 2017-04-22 RX ADMIN — IPRATROPIUM BROMIDE AND ALBUTEROL SULFATE 3 ML: .5; 3 SOLUTION RESPIRATORY (INHALATION) at 08:10

## 2017-04-22 RX ADMIN — HYDROCODONE BITARTRATE AND ACETAMINOPHEN 1 TABLET: 5; 325 TABLET ORAL at 13:15

## 2017-04-22 RX ADMIN — TRAMADOL HYDROCHLORIDE 50 MG: 50 TABLET, COATED ORAL at 18:52

## 2017-04-22 RX ADMIN — ALPRAZOLAM 0.25 MG: 0.25 TABLET ORAL at 13:15

## 2017-04-22 RX ADMIN — AMIODARONE HYDROCHLORIDE 400 MG: 200 TABLET ORAL at 21:27

## 2017-04-22 RX ADMIN — HYDROCODONE BITARTRATE AND ACETAMINOPHEN 1 TABLET: 5; 325 TABLET ORAL at 07:50

## 2017-04-22 RX ADMIN — HYDROCODONE BITARTRATE AND ACETAMINOPHEN 1 TABLET: 5; 325 TABLET ORAL at 02:00

## 2017-04-22 RX ADMIN — STANDARDIZED SENNA CONCENTRATE AND DOCUSATE SODIUM 1 TABLET: 8.6; 5 TABLET, FILM COATED ORAL at 21:27

## 2017-04-22 RX ADMIN — DIPHENHYDRAMINE HCL 25 MG: 25 TABLET ORAL at 21:27

## 2017-04-22 RX ADMIN — DOCUSATE SODIUM 100 MG: 100 CAPSULE ORAL at 07:50

## 2017-04-22 RX ADMIN — ASPIRIN 81 MG: 81 TABLET ORAL at 07:50

## 2017-04-22 RX ADMIN — IPRATROPIUM BROMIDE AND ALBUTEROL SULFATE 3 ML: .5; 3 SOLUTION RESPIRATORY (INHALATION) at 15:06

## 2017-04-22 RX ADMIN — FUROSEMIDE 20 MG: 10 INJECTION, SOLUTION INTRAVENOUS at 07:51

## 2017-04-22 RX ADMIN — TIOTROPIUM BROMIDE 1 CAPSULE: 18 CAPSULE ORAL; RESPIRATORY (INHALATION) at 08:13

## 2017-04-22 RX ADMIN — BUDESONIDE AND FORMOTEROL FUMARATE DIHYDRATE 2 PUFF: 160; 4.5 AEROSOL RESPIRATORY (INHALATION) at 08:12

## 2017-04-22 RX ADMIN — IPRATROPIUM BROMIDE AND ALBUTEROL SULFATE 3 ML: .5; 3 SOLUTION RESPIRATORY (INHALATION) at 12:28

## 2017-04-22 RX ADMIN — DIPHENHYDRAMINE HCL 25 MG: 25 TABLET ORAL at 00:09

## 2017-04-22 RX ADMIN — MAGNESIUM SULFATE IN DEXTROSE 1 G: 10 INJECTION, SOLUTION INTRAVENOUS at 13:17

## 2017-04-22 ASSESSMENT — ENCOUNTER SYMPTOMS
MUSCULOSKELETAL NEGATIVE: 1
EYES NEGATIVE: 1
GASTROINTESTINAL NEGATIVE: 1
NEUROLOGICAL NEGATIVE: 1
PSYCHIATRIC NEGATIVE: 1
RESPIRATORY NEGATIVE: 1
CARDIOVASCULAR NEGATIVE: 1

## 2017-04-22 ASSESSMENT — PAIN SCALES - GENERAL
PAINLEVEL_OUTOF10: 5
PAINLEVEL_OUTOF10: 4
PAINLEVEL_OUTOF10: 5
PAINLEVEL_OUTOF10: 5
PAINLEVEL_OUTOF10: 3
PAINLEVEL_OUTOF10: 5
PAINLEVEL_OUTOF10: 3
PAINLEVEL_OUTOF10: 2

## 2017-04-22 ASSESSMENT — GAIT ASSESSMENTS
ASSISTIVE DEVICE: WHEELCHAIR PUSH
DISTANCE (FEET): 100
GAIT LEVEL OF ASSIST: CONTACT GUARD ASSIST

## 2017-04-22 ASSESSMENT — ACTIVITIES OF DAILY LIVING (ADL): TOILETING: INDEPENDENT

## 2017-04-22 NOTE — PROGRESS NOTES
PMA updated that patient air leak worse overnight, subcutaneous air now noted, CT output 380 ml overnight. BP stable, HR stable. CXR ordered. Also notified that patient not able to void on own, last bladder scan result 138 ml. Per Dr. Braxton monitor for now. Pt not having any distress.

## 2017-04-22 NOTE — CARE PLAN
Problem: Post op day 2 CABG/Heart Valve Replacement  Goal: Optimal care of the post op CABG/heart valve replacement post op day 2  Intervention: FSBS: when 2 consecutive BS < 130 after post op day 2, discontinue FSBS unless patient is insulin dependent diabetic  To be done day shift  Intervention: Daily Weights  Pt refused to get on scale this am. Day shift RN informed  Intervention: Up in chair for all meals  Pt up in chair for breakfast  Intervention: Ambulate, increasing the distance each time x 3 and before bed  done  Intervention: Stand at sink and wash up with assistance. Clean incisions twice daily with soap and water.  To be done day shift  Intervention: IS q 1 hour while awake and record best IS volume  IS 1000  Intervention: Consider removal of larsen and chest tube if not already done  Pt having urinary retention, larsen may be replaced, CT output almost 400 ml overnight.

## 2017-04-22 NOTE — PROGRESS NOTES
Pulmonary Critical Care Progress Note    Date of service: 4/22/2017     Interval Events:  24 hour interval history reviewed  Reason for visit:  Atelectasis, COPD, MARY      Air leak present  SQ on right neck and chest  PAF - amiodarone  Chest tube 450  NC 1L  Incentive spirometry 1000 mL       PFSH:  No change.    Respiratory:     Pulse Oximetry: 95 %  CXR with unchanged atelectasis - no PTX  Air leak in chest tube  1 L NC  Few coarse crackles, no wheezing  No increased SOB or cough     Recent Labs      04/20/17   1405  04/20/17   1525  04/20/17   1650   ISTATAPH  7.237*  7.330*  7.309*   ISTATAPCO2  55.7*  49.5*  57.8*   ISTATAPO2  99*  106*  95*   ISTATATCO2  25  28  31   [id number]  96  98  96   ISTATARTHCO3  23.7  26.1*  29.0*   ISTATARTBE  -4  0  2   ISTATTEMP  35.9 C  37.0 C  37.8 C   ISTATFIO2  40  40  40   ISTATSPEC  Arterial  Arterial  Arterial   ISTATAPHTC  7.252*  7.330*  7.297*   [id number]  92*  106*  100*       HemoDynamics:  Pulse: 88, Heart Rate (Monitored): 88  Arterial BP: 109/58 mmHg, NIBP: 112/58 mmHg  CVP (mm Hg): 6 MM HG  SR  No angina, palp, syncope    Neuro:  Awake and alert  No HA, Sz    Fluids:  Intake/Output       04/20/17 0700 - 04/21/17 0659 04/21/17 0700 - 04/22/17 0659 04/22/17 0700 - 04/23/17 0659      3147-2516 7290-4003 Total 6224-9622 5307-7674 Total 6187-0059 2856-2458 Total       Intake    P.O.  100  800 900  720  940 1660  --  -- --    P.O. 100 800 900  -- -- --    I.V.  4389.5  1136.8 5526.3  205.1  -- 205.1  --  -- --    Crystalloid Intake 1300 -- 1300 -- -- -- -- -- --    Precedex Volume 19.8 -- 19.8 -- -- -- -- -- --    Amiodarone Volume -- 325.7 325.7 34 -- 34 -- -- --    Phenylephrine Volume -- 32 32 -- -- -- -- -- --    Insulin Volume 48 70.4 118.4 71.1 -- 71.1 -- -- --    Epinephrine Volume 21.7 8.7 30.4 -- -- -- -- -- --    IV Volume (Plasmalyte) 3000 500 3500 -- -- -- -- -- --    IV Piggyback Volume (IV Piggyback) -- 200 200 100 -- 100 -- -- --    Blood   325  -- 325  --  -- --  --  -- --    Cell Saver Volume (mL) 325 -- 325 -- -- -- -- -- --    Total Intake 4814.5 1936.8 6751.3 925.1 940 1865.1 -- -- --       Output    Urine  2135  750 2885  375  650 1025  --  -- --    Indwelling Cathether 2135 750 2885 375 -- 375 -- -- --    Void (ml) -- -- -- -- 650 650 -- -- --    Drains  210  220 430  240  360 600  --  -- --    Mediastinal Chest Tube 1 210 220 430 240 360 600 -- -- --    Total Output 2345 970 3315 615 1010 1625 -- -- --       Net I/O     2469.5 966.8 3436.3 310.1 -70 240.1 -- -- --        Weight:  (pt refused to get on scale at this time)  Recent Labs      17   0500  17   0540   SODIUM  134*   --    --    --   136  134*   POTASSIUM  3.4*  4.6   < >  4.1  4.0  3.9   CHLORIDE  100   --    --    --   104  100   CO2  23   --    --    --   26  29   BUN  22   --    --    --   14  18   CREATININE  0.86   --    --    --   0.65  0.71   MAGNESIUM   --   2.4   --   2.1   --    --    CALCIUM  9.9   --    --    --   7.4*  8.0*    < > = values in this interval not displayed.       GI/Nutrition:  Abd soft ND/NT  No N/V/P    Liver Function  Recent Labs      17   0500  17   0540   ALTSGPT  16   --    --    ASTSGOT  24   --    --    ALKPHOSPHAT  74   --    --    TBILIRUBIN  0.7   --    --    GLUCOSE  102*  127*  73       Heme:  Recent Labs      17   1120  17   0500  17   0540   RBC  4.22   --   2.86*  2.73*   HEMOGLOBIN  13.6   --   9.1*  8.7*   HEMATOCRIT  40.2   --   28.2*  27.1*   PLATELETCT  251  108*  123*  120*   PROTHROMBTM  12.3  19.6*  14.6  13.2   APTT  26.4  35.4   --    --    INR  0.89  1.61*  1.10  0.97       Infectious Disease:  Temp  Av.6 °C (97.8 °F)  Min: 36 °C (96.8 °F)  Max: 37 °C (98.6 °F)  Monitored Temp  Av.9 °C (98.4 °F)  Min: 36.9 °C (98.4 °F)  Max: 36.9 °C (98.4 °F)    Recent Labs      17   0935  17   8927   04/22/17   0540   WBC  9.9  19.6*  18.9*   NEUTSPOLYS  74.40*   --    --    LYMPHOCYTES  14.60*   --    --    MONOCYTES  9.10   --    --    EOSINOPHILS  0.80   --    --    BASOPHILS  0.80   --    --    ASTSGOT  24   --    --    ALTSGPT  16   --    --    ALKPHOSPHAT  74   --    --    TBILIRUBIN  0.7   --    --      Current Facility-Administered Medications   Medication Dose Frequency Provider Last Rate Last Dose   • insulin NPH (HUMULIN,NOVOLIN) injection 15 Units  15 Units Q8HRS Donna Mann, A.P.N.   Stopped at 04/22/17 0600   • insulin lispro (HUMALOG) injection 11 Units  11 Units TID AC Donna Mann, A.P.N.   Stopped at 04/21/17 1700   • insulin lispro (HUMALOG) injection 0-20 Units  0-20 Units ACHS & 0200 Donna Mann, A.P.N.   Stopped at 04/22/17 0200   • amiodarone (CORDARONE) tablet 400 mg  400 mg TWICE DAILY Donna Mann, A.P.N.   400 mg at 04/21/17 2035   • furosemide (LASIX) injection 20 mg  20 mg Q DAY Donna Mann, A.P.N.   20 mg at 04/21/17 0934   • potassium chloride ER (KLOR-CON) tablet 10 mEq  10 mEq DAILY Donna Mann, A.P.N.   10 mEq at 04/21/17 0933   • ipratropium-albuterol (DUONEB) nebulizer solution 3 mL  3 mL 4X/DAY (RT) Fernandez Hays M.D.       • atorvastatin (LIPITOR) tablet 10 mg  10 mg DAILY Tayler Kibmall A.P.N.   10 mg at 04/21/17 0933   • budesonide-formoterol (SYMBICORT) 160-4.5 MCG/ACT inhaler 2 Puff  2 Puff BID Tayler Kimball A.P.N.   2 Puff at 04/21/17 1947   • sertraline (ZOLOFT) tablet 50 mg  50 mg DAILY Tayler Kimball A.P.N.   50 mg at 04/21/17 0933   • albuterol inhaler 2 Puff  2 Puff Q4HRS PRN YUMIKO Wan.P.N.       • Respiratory Care per Protocol   Continuous RT YUMIKO Wan.P.N.       • NS infusion   Continuous YUMIKO Wan.P.N. 10 mL/hr at 04/20/17 1207 500 mL at 04/20/17 1207   • Pharmacy Consult Request ...Pain Management Review 1 Each  1 Each PRN YUMIKO Wan.P.N.       • docusate sodium (COLACE) capsule 100 mg  100 mg  QAM Tayler Kimball, A.P.N.   100 mg at 04/21/17 0933    And   • senna-docusate (PERICOLACE or SENOKOT S) 8.6-50 MG per tablet 1 Tab  1 Tab Nightly Tayler Kimball, A.P.N.   1 Tab at 04/21/17 2035    And   • senna-docusate (PERICOLACE or SENOKOT S) 8.6-50 MG per tablet 1 Tab  1 Tab Q24HRS PRN Tayler Kimball, A.P.N.        And   • lactulose 20 GM/30ML solution 30 mL  30 mL Q24HRS PRN Tayler Kimball A.P.N.        And   • bisacodyl (DULCOLAX) suppository 10 mg  10 mg Q24HRS PRN Tayler Kimball, A.P.N.        And   • fleet enema 133 mL  1 Each Once PRN Tayler Kimball, A.P.N.       • magnesium sulfate ivpb premix 1 g  1 g QDAY Tayler Kimball A.P.N.   Stopped at 04/21/17 1311   • aspirin EC (ECOTRIN) tablet 81 mg  81 mg DAILY Tayler Kimball A.P.N.   81 mg at 04/21/17 0933   • MD ALERT... warfarin (COUMADIN) per pharmacy protocol   pharmacy to dose Tayler Kimball A.P.N.       • oxycodone immediate-release (ROXICODONE) tablet 5 mg  5 mg Q3HRS PRN Tayler Kimball, A.P.N.   5 mg at 04/20/17 2029   • oxycodone immediate release (ROXICODONE) tablet 10 mg  10 mg Q3HRS PRN Tayler Kimball, A.P.N.       • tramadol (ULTRAM) 50 MG tablet 50 mg  50 mg Q4HRS PRN Tayler Kimball, A.P.N.   50 mg at 04/22/17 0546   • ondansetron (ZOFRAN) syringe/vial injection 4 mg  4 mg Q6HRS PRN Tayler Kimball, A.P.N.   4 mg at 04/21/17 0835    Or   • prochlorperazine (COMPAZINE) injection 10 mg  10 mg Q6HRS PRN Tayler Kimball, A.P.N.   10 mg at 04/21/17 1014    Or   • promethazine (PHENERGAN) suppository 25 mg  25 mg Q6HRS PRN Tayler Kimball, A.P.N.       • acetaminophen (TYLENOL) tablet 650 mg  650 mg Q4HRS PRN Tayler Kimball, A.P.N.        Or   • acetaminophen (TYLENOL) suppository 650 mg  650 mg Q4HRS PRN Tayler Kimball, A.P.N.       • mag hydrox-al hydrox-simeth (MAALOX PLUS ES or MYLANTA DS) suspension 30 mL  30 mL Q4HRS PRN Tayler Kimball, A.P.N.       • diphenhydrAMINE (BENADRYL) tablet/capsule 25 mg  25 mg HS PRN - MR X 1 Tayler Kimball, A.P.N.    25 mg at 04/22/17 0009   • hydrocodone-acetaminophen (NORCO) 5-325 MG per tablet 1-2 Tab  1-2 Tab Q4HRS PRN IGOR Wan   1 Tab at 04/22/17 0200   • ipratropium-albuterol (DUONEB) nebulizer solution 3 mL  3 mL Q2HRS PRN (RT) Fernandez Morales M.D.       • tiotropium (SPIRIVA) 18 MCG inhalation capsule 1 Cap  1 Cap QDAILY (RT) Fernandez Morales M.D.   1 Cap at 04/21/17 0714     Last reviewed on 4/20/2017  6:03 AM by Hyacinth Keith NASH    Quality  Measures:  Labs reviewed, Medications reviewed and Radiology images reviewed  Avila catheter: No Avila      DVT Prophylaxis: Warfarin (Coumadin)  DVT prophylaxis - mechanical: SCDs  Ulcer prophylaxis: Not indicated            Assessment and Plan:    Atelectasis   - cont IS, PEP therapy  Status post mitral valve repair, left atrial appendage ligation and maze procedure  Acute exacerbation of severe stage III chronic obstructive pulmonary disease   - improved   - cont BDs  Subcutaneous emphysema - no PTX on CXR   - keep chest tubes  History of atrial flutter - in SR  Systemic arterial hypertension   - cont BP control  Dyslipidemia  Anxiety  Obstructive sleep apnea - CPAP at night    Discussed with RN, RT, Team     ILourdes (Sigrid), am scribing for, and in the presence of, Fernandez Morales M.D.    Electronically signed by: Lourdes Miller (Sigrid), 4/22/2017    IFernandez M.D. personally performed the services described in this documentation, as scribed by Lourdes Miller in my presence, and it is both accurate and complete.

## 2017-04-22 NOTE — THERAPY
"Occupational Therapy Evaluation completed.   Functional Status:  Min assist to CGA for functional transfers with reminders to maintain precautions; assist for LE care; SBA for light grooming/hygiene at sink;   Plan of Care: Will benefit from Occupational Therapy 2 times per week  Discharge Recommendations:  Equipment: Will Continue to Assess for Equipment Needs. Post-acute therapy Discharge to home with outpatient or home health for additional skilled therapy services.    See \"Rehab Therapy-Acute\" Patient Summary Report for complete documentation.    "

## 2017-04-22 NOTE — PROGRESS NOTES
APN also updated on patient worsening air leak from chest tubes and subcutaneous air, also on patient inability to void. Per APN insert larsen if unable to void again, no straight cath, and call if CXR reveals pneumothorax. This RN passed this information on to day RN.

## 2017-04-22 NOTE — PROGRESS NOTES
Pt not voided since catheter was removed during day shift. Bladder scan done, showed 820 ml. Pt straight cathed per protocol. 650 ml out.

## 2017-04-22 NOTE — THERAPY
"Physical Therapy Evaluation completed.   Bed Mobility:  Supine to Sit:  (pt sitting in chair upon arrival )  Transfers: Sit to Stand: Moderate Assist  Gait: Level Of Assist: Contact Guard Assist with Wheelchair     Pt can be self limiting, anxious.   Plan of Care: Will benefit from Physical Therapy 4 times per week  Discharge Recommendations: Equipment: Will Continue to Assess for Equipment Needs. Post-acute therapy Discharge to home with outpatient or home health for additional skilled therapy services.    See \"Rehab Therapy-Acute\" Patient Summary Report for complete documentation.     "

## 2017-04-22 NOTE — PROGRESS NOTES
Cardiovascular Surgery Progress Note    Name: Kelly Lovett  MRN: [id number]  : 1945  Admit Date: 2017  5:20 AM  Procedure:  Procedure(s) and Anesthesia Type:     * MITRAL VALVE REPAIR  - General     * MAZE PROCEDURE, Left atrial appendage ligation - General     * PAGE - General  2 Day Post-Op    Vitals:  Patient Vitals for the past 8 hrs:   Temp SpO2 O2 Delivery O2 (LPM) Pulse Resp NIBP Weight   17 0815 - 95 % - 2 85 18 - -   17 0800 36.1 °C (97 °F) - None (Room Air) - - - (!) 90/58 mmHg -   17 0600 36.6 °C (97.9 °F) - - - 88 (!) 22 112/58 mmHg -   17 0400 - 95 % Nasal CPAP 2 - - - -     Temp (24hrs), Av.4 °C (97.6 °F), Min:36 °C (96.8 °F), Max:37 °C (98.6 °F)      Respiratory:    Respiration: 18, Pulse Oximetry: 95 %, O2 Daily Delivery Respiratory : Silicone Nasal Cannula  Chest Tube Group Right;Left;Mediastinal Straight, Angled 32-Tube Status / Drainage: Patent;Sutured in Place;Small;Sanguinous, Chest Tube Group Right;Left;Mediastinal Straight, Angled 32-Device: Dry Closed Drainage System;Suction 20 cm Water;Air Leak Present  Chest Tube Drains:     Mediastinal Chest Tube 1: 70 ml    Fluids:    Intake/Output Summary (Last 24 hours) at 17 1037  Last data filed at 17 0600   Gross per 24 hour   Intake   1541 ml   Output   1130 ml   Net    411 ml     Admit weight: Weight: 54.7 kg (120 lb 9.5 oz)  Current weight: Weight:  (pt refused to get on scale at this time) (17 0600)    Labs:  Recent Labs      17   1120  17   0500  17   0540   WBC   --   19.6*  18.9*   RBC   --   2.86*  2.73*   HEMOGLOBIN   --   9.1*  8.7*   HEMATOCRIT   --   28.2*  27.1*   MCV   --   98.6*  99.3*   MCH   --   31.8  31.9   MCHC   --   32.3*  32.1*   RDW   --   47.3  48.5   PLATELETCT  108*  123*  120*   MPV   --   10.3  10.3         Recent Labs     17   0500  17   0540   SODIUM   --   136  134*   POTASSIUM  4.1  4.0  3.9   CHLORIDE   --   104   100   CO2   --   26  29   GLUCOSE   --   127*  73   BUN   --   14  18   CREATININE   --   0.65  0.71   CALCIUM   --   7.4*  8.0*     Recent Labs      04/20/17   1120  04/21/17   0500  04/22/17   0540   APTT  35.4   --    --    INR  1.61*  1.10  0.97       Medications:  • insulin NPH  15 Units     • insulin lispro  11 Units     • insulin lispro  0-20 Units     • amiodarone  400 mg     • furosemide  20 mg     • potassium chloride (KCl)  10 mEq     • ipratropium-albuterol  3 mL     • atorvastatin  10 mg     • budesonide-formoterol  2 Puff     • sertraline  50 mg     • docusate sodium  100 mg      And   • senna-docusate  1 Tab     • magnesium sulfate  1 g Stopped (04/21/17 1311)   • aspirin EC  81 mg     • MD ALERT... warfarin       • tiotropium  1 Cap         Exam:   Review of Systems   HENT: Negative.    Eyes: Negative.    Respiratory: Negative.    Cardiovascular: Negative.    Gastrointestinal: Negative.    Genitourinary: Negative.    Musculoskeletal: Negative.    Skin: Negative.    Neurological: Negative.    Endo/Heme/Allergies: Negative.    Psychiatric/Behavioral: Negative.        Physical Exam   Constitutional: She is oriented to person, place, and time. She appears well-developed and well-nourished.   HENT:   Head: Normocephalic.   Eyes: Pupils are equal, round, and reactive to light.   Neck: Normal range of motion. No JVD present.   Cardiovascular: Normal rate, regular rhythm and normal heart sounds.    Pulmonary/Chest: Effort normal and breath sounds normal.   Bases diminished  R chest crepitus, mild   Abdominal: Soft. Bowel sounds are normal. She exhibits no distension. There is no guarding.   Musculoskeletal: Normal range of motion.   Neurological: She is alert and oriented to person, place, and time.   Skin: Skin is warm and dry.   Surgical incisions CDI   Psychiatric: She has a normal mood and affect. Her behavior is normal.       Quality Measures:   EKG reviewed, Medications reviewed, Labs reviewed and  Radiology images reviewed  Larsen catheter: One or Two Days Post Surgery (Day of Surgery being Day 0)  Central line in place: Concentrated IV drugs and Need for access    DVT Prophylaxis: Contraindicated - High bleeding risk  DVT prophylaxis - mechanical: SCDs  Ulcer prophylaxis: Yes    Assessed for rehab: Patient was assess for and/or received rehabilitation services during this hospitalization      Assessment/Plan:  POD 1 HDS, NSR--some runs VT ?afib with aberrancy? Last night, started on amio gtt.  Extubated, neuro grossly intact.  CT output min, mod airleak.  CXR no pneumothorax.  Adequate UOP, wts up, +3L. labs noted.  PLAN:  Keep CTs.  DC larsen.  STart gentle diuresis.  Change to PO amio.  Stress cough/deepbreath/IS. AMB.    POD 2 HDS, NSR.  Crepitus Right upper chest, mild. CXR without pnuemothorax. On 2 l nc. CT output min, mod airleak remains--connections recheck, and redressed. Good bowel sounds, not passing gas, no distention/n/v. W/w. INR trending up.  DC temp wires. Add xanax anxiety. Urine retention overnight, straight cath--to reinsert larsen if unable to void again.  AMB/IS.  CXR in AM.       Active Hospital Problems    Diagnosis   • Severe mitral regurgitation [I34.0]

## 2017-04-22 NOTE — PROGRESS NOTES
Inpatient Anticoagulation Service Note    Date: 4/22/2017  Reason for Anticoagulation: Mitral Valve Repair        Hemoglobin Value: 8.7  Hematocrit Value: 27.1  Lab Platelet Value: 120  Target INR: 2.0 to 3.0    INR from last 7 days     Date/Time INR Value    04/22/17 0540 0.97    04/21/17 0500 1.1    04/20/17 1120 (!)1.61    04/19/17 0935 0.89        Dose from last 7 days     Date/Time Dose (mg)    04/22/17 1300 0    04/21/17 1300 0        Average Dose (mg):  (new start)  Significant Interactions: Aspirin, Statin, Other (Comments) (Zoloft)  Bridge Therapy: No     Comments: Chest tube to remain in place. Will not start warfarin yet. Reassess tomorrow.          Steven Sin, PharmD

## 2017-04-23 ENCOUNTER — APPOINTMENT (OUTPATIENT)
Dept: RADIOLOGY | Facility: MEDICAL CENTER | Age: 72
DRG: 219 | End: 2017-04-23
Attending: NURSE PRACTITIONER
Payer: MEDICARE

## 2017-04-23 ENCOUNTER — APPOINTMENT (OUTPATIENT)
Dept: RADIOLOGY | Facility: MEDICAL CENTER | Age: 72
DRG: 219 | End: 2017-04-23
Attending: INTERNAL MEDICINE
Payer: MEDICARE

## 2017-04-23 LAB
ANION GAP SERPL CALC-SCNC: 3 MMOL/L (ref 0–11.9)
BUN SERPL-MCNC: 19 MG/DL (ref 8–22)
CALCIUM SERPL-MCNC: 7.9 MG/DL (ref 8.5–10.5)
CHLORIDE SERPL-SCNC: 99 MMOL/L (ref 96–112)
CO2 SERPL-SCNC: 31 MMOL/L (ref 20–33)
CREAT SERPL-MCNC: 0.66 MG/DL (ref 0.5–1.4)
ERYTHROCYTE [DISTWIDTH] IN BLOOD BY AUTOMATED COUNT: 46.7 FL (ref 35.9–50)
GFR SERPL CREATININE-BSD FRML MDRD: >60 ML/MIN/1.73 M 2
GLUCOSE BLD-MCNC: 245 MG/DL (ref 65–99)
GLUCOSE SERPL-MCNC: 94 MG/DL (ref 65–99)
HCT VFR BLD AUTO: 25.4 % (ref 37–47)
HGB BLD-MCNC: 8.2 G/DL (ref 12–16)
INR PPP: 0.98 (ref 0.87–1.13)
MCH RBC QN AUTO: 32 PG (ref 27–33)
MCHC RBC AUTO-ENTMCNC: 32.3 G/DL (ref 33.6–35)
MCV RBC AUTO: 99.2 FL (ref 81.4–97.8)
PLATELET # BLD AUTO: 129 K/UL (ref 164–446)
PMV BLD AUTO: 10.2 FL (ref 9–12.9)
POTASSIUM SERPL-SCNC: 4.6 MMOL/L (ref 3.6–5.5)
PROTHROMBIN TIME: 13.3 SEC (ref 12–14.6)
RBC # BLD AUTO: 2.56 M/UL (ref 4.2–5.4)
SODIUM SERPL-SCNC: 133 MMOL/L (ref 135–145)
WBC # BLD AUTO: 14.8 K/UL (ref 4.8–10.8)

## 2017-04-23 PROCEDURE — 80048 BASIC METABOLIC PNL TOTAL CA: CPT

## 2017-04-23 PROCEDURE — 770022 HCHG ROOM/CARE - ICU (200)

## 2017-04-23 PROCEDURE — 700112 HCHG RX REV CODE 229: Performed by: NURSE PRACTITIONER

## 2017-04-23 PROCEDURE — 700111 HCHG RX REV CODE 636 W/ 250 OVERRIDE (IP): Performed by: NURSE PRACTITIONER

## 2017-04-23 PROCEDURE — 94660 CPAP INITIATION&MGMT: CPT

## 2017-04-23 PROCEDURE — 71250 CT THORAX DX C-: CPT

## 2017-04-23 PROCEDURE — 32551 INSERTION OF CHEST TUBE: CPT | Performed by: INTERNAL MEDICINE

## 2017-04-23 PROCEDURE — A9270 NON-COVERED ITEM OR SERVICE: HCPCS | Performed by: NURSE PRACTITIONER

## 2017-04-23 PROCEDURE — 700102 HCHG RX REV CODE 250 W/ 637 OVERRIDE(OP): Performed by: NURSE PRACTITIONER

## 2017-04-23 PROCEDURE — 31500 INSERT EMERGENCY AIRWAY: CPT

## 2017-04-23 PROCEDURE — 99291 CRITICAL CARE FIRST HOUR: CPT | Performed by: INTERNAL MEDICINE

## 2017-04-23 PROCEDURE — 71010 DX-CHEST-PORTABLE (1 VIEW): CPT

## 2017-04-23 PROCEDURE — 70450 CT HEAD/BRAIN W/O DYE: CPT

## 2017-04-23 PROCEDURE — 82962 GLUCOSE BLOOD TEST: CPT

## 2017-04-23 PROCEDURE — 0BH17EZ INSERTION OF ENDOTRACHEAL AIRWAY INTO TRACHEA, VIA NATURAL OR ARTIFICIAL OPENING: ICD-10-PCS | Performed by: INTERNAL MEDICINE

## 2017-04-23 PROCEDURE — 99292 CRITICAL CARE ADDL 30 MIN: CPT | Mod: 25 | Performed by: INTERNAL MEDICINE

## 2017-04-23 PROCEDURE — 700101 HCHG RX REV CODE 250: Performed by: INTERNAL MEDICINE

## 2017-04-23 PROCEDURE — 5A1945Z RESPIRATORY VENTILATION, 24-96 CONSECUTIVE HOURS: ICD-10-PCS | Performed by: INTERNAL MEDICINE

## 2017-04-23 PROCEDURE — 0W9930Z DRAINAGE OF RIGHT PLEURAL CAVITY WITH DRAINAGE DEVICE, PERCUTANEOUS APPROACH: ICD-10-PCS | Performed by: INTERNAL MEDICINE

## 2017-04-23 PROCEDURE — 700111 HCHG RX REV CODE 636 W/ 250 OVERRIDE (IP): Performed by: INTERNAL MEDICINE

## 2017-04-23 PROCEDURE — 85610 PROTHROMBIN TIME: CPT

## 2017-04-23 PROCEDURE — 94640 AIRWAY INHALATION TREATMENT: CPT

## 2017-04-23 PROCEDURE — 31500 INSERT EMERGENCY AIRWAY: CPT | Performed by: INTERNAL MEDICINE

## 2017-04-23 PROCEDURE — 32551 INSERTION OF CHEST TUBE: CPT

## 2017-04-23 PROCEDURE — 85027 COMPLETE CBC AUTOMATED: CPT

## 2017-04-23 RX ORDER — VECURONIUM BROMIDE 1 MG/ML
10 INJECTION, POWDER, LYOPHILIZED, FOR SOLUTION INTRAVENOUS ONCE
Status: COMPLETED | OUTPATIENT
Start: 2017-04-23 | End: 2017-04-23

## 2017-04-23 RX ORDER — SODIUM CHLORIDE 9 MG/ML
500 INJECTION, SOLUTION INTRAVENOUS ONCE
Status: COMPLETED | OUTPATIENT
Start: 2017-04-23 | End: 2017-04-23

## 2017-04-23 RX ORDER — LIDOCAINE HYDROCHLORIDE 10 MG/ML
1-2 INJECTION, SOLUTION INFILTRATION; PERINEURAL
Status: DISCONTINUED | OUTPATIENT
Start: 2017-04-23 | End: 2017-04-26 | Stop reason: ALTCHOICE

## 2017-04-23 RX ORDER — ETOMIDATE 2 MG/ML
20 INJECTION INTRAVENOUS ONCE
Status: COMPLETED | OUTPATIENT
Start: 2017-04-23 | End: 2017-04-23

## 2017-04-23 RX ORDER — NOREPINEPHRINE BITARTRATE 1 MG/ML
INJECTION, SOLUTION INTRAVENOUS
Status: ACTIVE
Start: 2017-04-23 | End: 2017-04-24

## 2017-04-23 RX ORDER — FAMOTIDINE 20 MG/1
20 TABLET, FILM COATED ORAL EVERY 12 HOURS
Status: DISCONTINUED | OUTPATIENT
Start: 2017-04-23 | End: 2017-04-27

## 2017-04-23 RX ORDER — CHLORHEXIDINE GLUCONATE ORAL RINSE 1.2 MG/ML
15 SOLUTION DENTAL 2 TIMES DAILY
Status: DISCONTINUED | OUTPATIENT
Start: 2017-04-23 | End: 2017-04-26

## 2017-04-23 RX ORDER — PROPOFOL 10 MG/ML
30 INJECTION, EMULSION INTRAVENOUS ONCE
Status: COMPLETED | OUTPATIENT
Start: 2017-04-23 | End: 2017-04-23

## 2017-04-23 RX ORDER — DEXTROSE MONOHYDRATE 50 MG/ML
INJECTION, SOLUTION INTRAVENOUS
Status: ACTIVE
Start: 2017-04-23 | End: 2017-04-24

## 2017-04-23 RX ORDER — SODIUM CHLORIDE 9 MG/ML
INJECTION, SOLUTION INTRAVENOUS
Status: ACTIVE
Start: 2017-04-23 | End: 2017-04-24

## 2017-04-23 RX ADMIN — PROPOFOL 30 MG: 10 INJECTION, EMULSION INTRAVENOUS at 21:35

## 2017-04-23 RX ADMIN — SERTRALINE 50 MG: 50 TABLET, FILM COATED ORAL at 08:01

## 2017-04-23 RX ADMIN — SODIUM CHLORIDE 500 ML: 9 INJECTION, SOLUTION INTRAVENOUS at 22:10

## 2017-04-23 RX ADMIN — ATORVASTATIN CALCIUM 10 MG: 10 TABLET, FILM COATED ORAL at 08:01

## 2017-04-23 RX ADMIN — IPRATROPIUM BROMIDE AND ALBUTEROL SULFATE 3 ML: .5; 3 SOLUTION RESPIRATORY (INHALATION) at 20:55

## 2017-04-23 RX ADMIN — FUROSEMIDE 20 MG: 10 INJECTION, SOLUTION INTRAVENOUS at 08:03

## 2017-04-23 RX ADMIN — HYDROCODONE BITARTRATE AND ACETAMINOPHEN 2 TABLET: 5; 325 TABLET ORAL at 04:45

## 2017-04-23 RX ADMIN — BUDESONIDE AND FORMOTEROL FUMARATE DIHYDRATE 2 PUFF: 160; 4.5 AEROSOL RESPIRATORY (INHALATION) at 08:00

## 2017-04-23 RX ADMIN — PROPOFOL 20 MCG/KG/MIN: 10 INJECTION, EMULSION INTRAVENOUS at 21:35

## 2017-04-23 RX ADMIN — AMIODARONE HYDROCHLORIDE 400 MG: 200 TABLET ORAL at 08:01

## 2017-04-23 RX ADMIN — VECURONIUM BROMIDE 10 MG: 1 INJECTION, POWDER, LYOPHILIZED, FOR SOLUTION INTRAVENOUS at 21:20

## 2017-04-23 RX ADMIN — ASPIRIN 81 MG: 81 TABLET ORAL at 08:01

## 2017-04-23 RX ADMIN — TIOTROPIUM BROMIDE 1 CAPSULE: 18 CAPSULE ORAL; RESPIRATORY (INHALATION) at 08:02

## 2017-04-23 RX ADMIN — FENTANYL CITRATE 100 MCG: 50 INJECTION INTRAMUSCULAR; INTRAVENOUS at 21:50

## 2017-04-23 RX ADMIN — ETOMIDATE 20 MG: 2 INJECTION, SOLUTION INTRAVENOUS at 21:20

## 2017-04-23 RX ADMIN — POTASSIUM CHLORIDE 10 MEQ: 750 TABLET, FILM COATED, EXTENDED RELEASE ORAL at 08:01

## 2017-04-23 RX ADMIN — DOCUSATE SODIUM 100 MG: 100 CAPSULE ORAL at 08:01

## 2017-04-23 ASSESSMENT — ENCOUNTER SYMPTOMS
CARDIOVASCULAR NEGATIVE: 1
PSYCHIATRIC NEGATIVE: 1
EYES NEGATIVE: 1
NEUROLOGICAL NEGATIVE: 1
MUSCULOSKELETAL NEGATIVE: 1
RESPIRATORY NEGATIVE: 1
GASTROINTESTINAL NEGATIVE: 1

## 2017-04-23 ASSESSMENT — PAIN SCALES - GENERAL
PAINLEVEL_OUTOF10: 8
PAINLEVEL_OUTOF10: 0
PAINLEVEL_OUTOF10: 3
PAINLEVEL_OUTOF10: 0
PAINLEVEL_OUTOF10: 0

## 2017-04-23 NOTE — PROGRESS NOTES
Airleak in C tubes.  Sub q air has extended up neck to face (r side) and in L neck. , No resp distress. VSS, A & O x 4. Pt denies any CP, SOB, dizziness, or other pain. POC explained, pt verbalizes an understanding. Educated pt regarding falls and fall safety, pt verbalizes an understanding. Socks on, call light in place, bed alarm on, bed lowered and locked, all comfort measures in place and needs met at this time.

## 2017-04-23 NOTE — PROGRESS NOTES
Pt and family concerned about increased in swelling in face and neck.  Pt unable to open right eye.  MD Cresencio Sullivan at bedside to explain POC to family. Per MD increase ct suction to 40.

## 2017-04-23 NOTE — CARE PLAN
Problem: Post op day 2 CABG/Heart Valve Replacement  Goal: Optimal care of the post op CABG/heart valve replacement post op day 2  Outcome: PROGRESSING AS EXPECTED  Intervention: FSBS: when 2 consecutive BS < 130 after post op day 2, discontinue FSBS unless patient is insulin dependent diabetic  fsbs d'cd. Two consecutive bs less than 130.  Intervention: Daily Weights  Pt. Weighed on standing scale. 63.5 kg  Intervention: Up in chair for all meals  Up to chair today for all meals  Intervention: Ambulate, increasing the distance each time x 3 and before bed  Ambulate in mays three times today  Intervention: Stand at sink and wash up with assistance. Clean incisions twice daily with soap and water.  Stood at sink and brushed teeth  Intervention: IS q 1 hour while awake and record best IS volume  IS q hour, pulled 1000  Intervention: Consider pacer wire removal by MD  Pacer wires removed today  Intervention: Consider removal of larsen and chest tube if not already done  Larsen removed, chest tube remains

## 2017-04-23 NOTE — PROGRESS NOTES
Cardiovascular Surgery Progress Note    [facility]  [facility]N: [id number]  : 1945  Admit Date: 2017  5:20 AM  Procedure:  Procedure(s) and Anesthesia Type:     * MITRAL VALVE REPAIR  - General     * MAZE PROCEDURE, Left atrial appendage ligation - General     * PAGE - General  3 Day Post-Op    Vitals:  Patient Vitals for the past 8 hrs:   Temp SpO2 O2 Delivery O2 (LPM) Pulse Resp NIBP   17 1058 - 95 % - - 79 - -   17 0800 36.3 °C (97.4 °F) - Nasal Cannula 2 - - -   17 0752 - 94 % - 2 85 16 -   17 0700 - 100 % - - 76 - -   17 0600 - 100 % - - 75 - -   17 0500 - 100 % - - 76 - (!) 99/49 mmHg     Temp (24hrs), Av.7 °C (98.1 °F), Min:36.3 °C (97.4 °F), Max:37.1 °C (98.8 °F)      Respiratory:    Respiration: 16, Pulse Oximetry: 95 %, O2 Daily Delivery Respiratory : Silicone Nasal Cannula  Chest Tube Group Right;Left;Mediastinal Straight, Angled 32-Tube Status / Drainage: Patent;Sutured in Place;Small;Sanguinous, Chest Tube Group Right;Left;Mediastinal Straight, Angled 32-Device: Dry Closed Drainage System;Suction 20 cm Water;Air Leak Present  Chest Tube Drains:     Mediastinal Chest Tube 1: 250 ml    Fluids:    Intake/Output Summary (Last 24 hours) at 17 1201  Last data filed at 17 0200   Gross per 24 hour   Intake      0 ml   Output    590 ml   Net   -590 ml     Admit weight: Weight: 54.7 kg (120 lb 9.5 oz)  Current weight: Weight:  (pt refused to get on scale at this time) (17 0600)    Labs:  Recent Labs      17   0500  17   0540  17   0555   WBC  19.6*  18.9*  14.8*   RBC  2.86*  2.73*  2.56*   HEMOGLOBIN  9.1*  8.7*  8.2*   HEMATOCRIT  28.2*  27.1*  25.4*   MCV  98.6*  99.3*  99.2*   MCH  31.8  31.9  32.0   MCHC  32.3*  32.1*  32.3*   RDW  47.3  48.5  46.7   PLATELETCT  123*  120*  129*   MPV  10.3  10.3  10.2         Recent Labs      17   0500  17   0540  17   0555   SODIUM  136  134*  133*   POTASSIUM  " 4.0  3.9  4.6   CHLORIDE  104  100  99   CO2  26  29  31   GLUCOSE  127*  73  94   BUN  14  18  19   CREATININE  0.65  0.71  0.66   CALCIUM  7.4*  8.0*  7.9*     Recent Labs      04/21/17   0500  04/22/17   0540  04/23/17   0555   INR  1.10  0.97  0.98       Medications:  • tiotropium  1 Cap     • amiodarone  400 mg     • furosemide  20 mg     • potassium chloride (KCl)  10 mEq     • atorvastatin  10 mg     • budesonide-formoterol  2 Puff     • sertraline  50 mg     • docusate sodium  100 mg      And   • senna-docusate  1 Tab     • aspirin EC  81 mg     • MD ALERT... warfarin           Exam:   Review of Systems   HENT: Negative.         Right cheek \"puffed up\"   Eyes: Negative.    Respiratory: Negative.    Cardiovascular: Negative.    Gastrointestinal: Negative.    Genitourinary: Negative.    Musculoskeletal: Negative.    Skin: Negative.    Neurological: Negative.    Endo/Heme/Allergies: Negative.    Psychiatric/Behavioral: Negative.        Physical Exam   Constitutional: She is oriented to person, place, and time. She appears well-developed and well-nourished.   HENT:   Head: Normocephalic.   Eyes: Pupils are equal, round, and reactive to light.   Neck: Normal range of motion. No JVD present.   Cardiovascular: Normal rate, regular rhythm and normal heart sounds.    Pulmonary/Chest: Effort normal and breath sounds normal.   Bases diminished  R chest crepitus  SUB q air extends up to R face   Abdominal: Soft. Bowel sounds are normal. She exhibits no distension. There is no guarding.   Musculoskeletal: Normal range of motion.   Neurological: She is alert and oriented to person, place, and time.   Skin: Skin is warm and dry.   Surgical incisions CDI   Psychiatric: She has a normal mood and affect. Her behavior is normal.       Quality Measures:   EKG reviewed, Medications reviewed, Labs reviewed and Radiology images reviewed  Avila catheter: No Avila  Central line in place: Concentrated IV drugs and Need for " access    DVT Prophylaxis: Contraindicated - High bleeding risk  DVT prophylaxis - mechanical: SCDs  Ulcer prophylaxis: Yes    Assessed for rehab: Patient was assess for and/or received rehabilitation services during this hospitalization      Assessment/Plan:  POD 1 HDS, NSR--some runs VT ?afib with aberrancy? Last night, started on amio gtt.  Extubated, neuro grossly intact.  CT output min, mod airleak.  CXR no pneumothorax.  Adequate UOP, wts up, +3L. labs noted.  PLAN:  Keep CTs.  DC larsen.  STart gentle diuresis.  Change to PO amio.  Stress cough/deepbreath/IS. AMB.    POD 2 HDS, NSR.  Crepitus Right upper chest, mild. CXR without pnuemothorax. On 2 l nc. CT output min, mod airleak remains--connections recheck, and redressed. Good bowel sounds, not passing gas, no distention/n/v. W/w. INR trending up.  DC temp wires. Add xanax anxiety. Urine retention overnight, straight cath--to reinsert larsen if unable to void again.  AMB/IS.  CXR in AM.   POD 3 HDS NSR.  Airleak remains.  Sub q air has now extended up neck, Right facial swelling.  No resp distress.  CXR with bilateral chest wall air and pneumomediastinum.  Dr. Estrada consulted, will see patient Monday AM.  CT chest today.  CTs to be placed to 2 separate pleural vacs. DC temp wires.  CPM.      Active Hospital Problems    Diagnosis   • Severe mitral regurgitation [I34.0]

## 2017-04-23 NOTE — PROGRESS NOTES
Ambulated in mays pushing wc. Tolerated fair. Becomes sob with minimal activity. 02 increased to 6 L when walking. Currently sitting up in chair eating dinner. Daughter at bedside. Monitor, SR, 80's  .20/.08/36

## 2017-04-23 NOTE — PROGRESS NOTES
Inpatient Anticoagulation Service Note    Date: 4/23/2017  Reason for Anticoagulation: Mitral Valve Repair        Hemoglobin Value: 8.2  Hematocrit Value: 25.4  Lab Platelet Value: 129  Target INR: 2.0 to 3.0    INR from last 7 days     Date/Time INR Value    04/23/17 0555 0.98    04/22/17 0540 0.97    04/21/17 0500 1.1    04/20/17 1120 (!)1.61    04/19/17 0935 0.89        Dose from last 7 days     Date/Time Dose (mg)    04/23/17 0555 0    04/22/17 1300 0    04/21/17 1300 0        Average Dose (mg):  (new start)  Significant Interactions: Amiodarone, Aspirin, Statin, Other (Comments)  Bridge Therapy: No     Comments: Chest tubes to remain in place for now.  Continue to hold warfarin.    Plan:  INR with morning labs.     Pharmacist suggested discharge dosing: Unable to determine at this time     Angelica Ellison

## 2017-04-23 NOTE — CARE PLAN
Problem: Post Op Day 3 CABG/Heart Valve replacement  Goal: Optimal care of the post op CABG/Heart Valve replacement post op day 3  Intervention: Shower daily and clean incisions twice daily with soap and water  CHG bath given and incisions cleaned x2  Intervention: Up in chair for all meals  Up to chair with meals  Intervention: Ambulate, increasing the distance each time x 3 and before bed  Pt ambulating  Intervention: IS q 1 hour while awake and record best IS volume  IS 1250  Intervention: Consider removal of larsen, chest tube and pacer wire if not already done  Chest tube in place.

## 2017-04-24 ENCOUNTER — APPOINTMENT (OUTPATIENT)
Dept: RADIOLOGY | Facility: MEDICAL CENTER | Age: 72
DRG: 219 | End: 2017-04-24
Attending: INTERNAL MEDICINE
Payer: MEDICARE

## 2017-04-24 PROBLEM — J86.0 BRONCHOPLEURAL FISTULA (HCC): Status: ACTIVE | Noted: 2017-04-24

## 2017-04-24 LAB
ALBUMIN SERPL BCP-MCNC: 3.1 G/DL (ref 3.2–4.9)
ALBUMIN/GLOB SERPL: 1.7 G/DL
ALP SERPL-CCNC: 70 U/L (ref 30–99)
ALT SERPL-CCNC: 23 U/L (ref 2–50)
ANION GAP SERPL CALC-SCNC: 4 MMOL/L (ref 0–11.9)
AST SERPL-CCNC: 25 U/L (ref 12–45)
BASE EXCESS BLDA CALC-SCNC: 7 MMOL/L (ref -4–3)
BASOPHILS # BLD AUTO: 0.1 % (ref 0–1.8)
BASOPHILS # BLD: 0.01 K/UL (ref 0–0.12)
BILIRUB SERPL-MCNC: 0.4 MG/DL (ref 0.1–1.5)
BODY TEMPERATURE: ABNORMAL DEGREES
BUN SERPL-MCNC: 17 MG/DL (ref 8–22)
CALCIUM SERPL-MCNC: 7.9 MG/DL (ref 8.5–10.5)
CHLORIDE SERPL-SCNC: 101 MMOL/L (ref 96–112)
CO2 BLDA-SCNC: 34 MMOL/L (ref 20–33)
CO2 SERPL-SCNC: 30 MMOL/L (ref 20–33)
COMMENT 1642: NORMAL
CREAT SERPL-MCNC: 0.59 MG/DL (ref 0.5–1.4)
CRP SERPL HS-MCNC: 4.37 MG/DL (ref 0–0.75)
EOSINOPHIL # BLD AUTO: 0.07 K/UL (ref 0–0.51)
EOSINOPHIL NFR BLD: 0.6 % (ref 0–6.9)
ERYTHROCYTE [DISTWIDTH] IN BLOOD BY AUTOMATED COUNT: 45.9 FL (ref 35.9–50)
GFR SERPL CREATININE-BSD FRML MDRD: >60 ML/MIN/1.73 M 2
GLOBULIN SER CALC-MCNC: 1.8 G/DL (ref 1.9–3.5)
GLUCOSE SERPL-MCNC: 169 MG/DL (ref 65–99)
HCO3 BLDA-SCNC: 32.5 MMOL/L (ref 17–25)
HCT VFR BLD AUTO: 25.4 % (ref 37–47)
HGB BLD-MCNC: 8.2 G/DL (ref 12–16)
IMM GRANULOCYTES # BLD AUTO: 0.07 K/UL (ref 0–0.11)
IMM GRANULOCYTES NFR BLD AUTO: 0.6 % (ref 0–0.9)
INR PPP: 0.99 (ref 0.87–1.13)
LYMPHOCYTES # BLD AUTO: 1.42 K/UL (ref 1–4.8)
LYMPHOCYTES NFR BLD: 12.4 % (ref 22–41)
MAGNESIUM SERPL-MCNC: 1.8 MG/DL (ref 1.5–2.5)
MCH RBC QN AUTO: 31.5 PG (ref 27–33)
MCHC RBC AUTO-ENTMCNC: 32.3 G/DL (ref 33.6–35)
MCV RBC AUTO: 97.7 FL (ref 81.4–97.8)
MONOCYTES # BLD AUTO: 1.11 K/UL (ref 0–0.85)
MONOCYTES NFR BLD AUTO: 9.7 % (ref 0–13.4)
MORPHOLOGY BLD-IMP: NORMAL
NEUTROPHILS # BLD AUTO: 8.73 K/UL (ref 2–7.15)
NEUTROPHILS NFR BLD: 76.6 % (ref 44–72)
NRBC # BLD AUTO: 0 K/UL
NRBC BLD AUTO-RTO: 0 /100 WBC
O2/TOTAL GAS SETTING VFR VENT: 100 %
PCO2 BLDA: 49.6 MMHG (ref 26–37)
PCO2 TEMP ADJ BLDA: 48.7 MMHG (ref 26–37)
PH BLDA: 7.42 [PH] (ref 7.4–7.5)
PH TEMP ADJ BLDA: 7.43 [PH] (ref 7.4–7.5)
PHOSPHATE SERPL-MCNC: 1.8 MG/DL (ref 2.5–4.5)
PLATELET # BLD AUTO: 138 K/UL (ref 164–446)
PMV BLD AUTO: 10.3 FL (ref 9–12.9)
PO2 BLDA: 350 MMHG (ref 64–87)
PO2 TEMP ADJ BLDA: 348 MMHG (ref 64–87)
POTASSIUM SERPL-SCNC: 3.7 MMOL/L (ref 3.6–5.5)
PREALB SERPL-MCNC: 8 MG/DL (ref 18–38)
PROT SERPL-MCNC: 4.9 G/DL (ref 6–8.2)
PROTHROMBIN TIME: 13.4 SEC (ref 12–14.6)
RBC # BLD AUTO: 2.6 M/UL (ref 4.2–5.4)
SAO2 % BLDA: 100 % (ref 93–99)
SODIUM SERPL-SCNC: 135 MMOL/L (ref 135–145)
SPECIMEN DRAWN FROM PATIENT: ABNORMAL
WBC # BLD AUTO: 11.4 K/UL (ref 4.8–10.8)

## 2017-04-24 PROCEDURE — 700102 HCHG RX REV CODE 250 W/ 637 OVERRIDE(OP): Performed by: INTERNAL MEDICINE

## 2017-04-24 PROCEDURE — 700101 HCHG RX REV CODE 250: Performed by: THORACIC SURGERY (CARDIOTHORACIC VASCULAR SURGERY)

## 2017-04-24 PROCEDURE — 700102 HCHG RX REV CODE 250 W/ 637 OVERRIDE(OP): Performed by: NURSE PRACTITIONER

## 2017-04-24 PROCEDURE — 99291 CRITICAL CARE FIRST HOUR: CPT | Mod: 25 | Performed by: INTERNAL MEDICINE

## 2017-04-24 PROCEDURE — 700111 HCHG RX REV CODE 636 W/ 250 OVERRIDE (IP): Performed by: NURSE PRACTITIONER

## 2017-04-24 PROCEDURE — 700105 HCHG RX REV CODE 258: Performed by: THORACIC SURGERY (CARDIOTHORACIC VASCULAR SURGERY)

## 2017-04-24 PROCEDURE — 71010 DX-CHEST-PORTABLE (1 VIEW): CPT

## 2017-04-24 PROCEDURE — 37799 UNLISTED PX VASCULAR SURGERY: CPT

## 2017-04-24 PROCEDURE — 82803 BLOOD GASES ANY COMBINATION: CPT

## 2017-04-24 PROCEDURE — 84100 ASSAY OF PHOSPHORUS: CPT

## 2017-04-24 PROCEDURE — 770022 HCHG ROOM/CARE - ICU (200)

## 2017-04-24 PROCEDURE — 700101 HCHG RX REV CODE 250: Performed by: INTERNAL MEDICINE

## 2017-04-24 PROCEDURE — 85610 PROTHROMBIN TIME: CPT

## 2017-04-24 PROCEDURE — 86140 C-REACTIVE PROTEIN: CPT

## 2017-04-24 PROCEDURE — 700111 HCHG RX REV CODE 636 W/ 250 OVERRIDE (IP): Performed by: THORACIC SURGERY (CARDIOTHORACIC VASCULAR SURGERY)

## 2017-04-24 PROCEDURE — 03HY32Z INSERTION OF MONITORING DEVICE INTO UPPER ARTERY, PERCUTANEOUS APPROACH: ICD-10-PCS | Performed by: INTERNAL MEDICINE

## 2017-04-24 PROCEDURE — 4A133B1 MONITORING OF ARTERIAL PRESSURE, PERIPHERAL, PERCUTANEOUS APPROACH: ICD-10-PCS | Performed by: INTERNAL MEDICINE

## 2017-04-24 PROCEDURE — A9270 NON-COVERED ITEM OR SERVICE: HCPCS | Performed by: INTERNAL MEDICINE

## 2017-04-24 PROCEDURE — 84134 ASSAY OF PREALBUMIN: CPT

## 2017-04-24 PROCEDURE — 83735 ASSAY OF MAGNESIUM: CPT

## 2017-04-24 PROCEDURE — 94003 VENT MGMT INPAT SUBQ DAY: CPT

## 2017-04-24 PROCEDURE — 85025 COMPLETE CBC W/AUTO DIFF WBC: CPT

## 2017-04-24 PROCEDURE — A9270 NON-COVERED ITEM OR SERVICE: HCPCS | Performed by: NURSE PRACTITIONER

## 2017-04-24 PROCEDURE — 80053 COMPREHEN METABOLIC PANEL: CPT

## 2017-04-24 PROCEDURE — 700105 HCHG RX REV CODE 258: Performed by: INTERNAL MEDICINE

## 2017-04-24 PROCEDURE — 36620 INSERTION CATHETER ARTERY: CPT | Performed by: INTERNAL MEDICINE

## 2017-04-24 PROCEDURE — 4A133J1 MONITORING OF ARTERIAL PULSE, PERIPHERAL, PERCUTANEOUS APPROACH: ICD-10-PCS | Performed by: INTERNAL MEDICINE

## 2017-04-24 PROCEDURE — 700111 HCHG RX REV CODE 636 W/ 250 OVERRIDE (IP): Performed by: INTERNAL MEDICINE

## 2017-04-24 RX ORDER — SODIUM CHLORIDE 9 MG/ML
INJECTION, SOLUTION INTRAVENOUS
Status: ACTIVE
Start: 2017-04-24 | End: 2017-04-24

## 2017-04-24 RX ORDER — POTASSIUM CHLORIDE 1.5 G/1.58G
10 POWDER, FOR SOLUTION ORAL DAILY
Status: DISCONTINUED | OUTPATIENT
Start: 2017-04-24 | End: 2017-04-25

## 2017-04-24 RX ORDER — POTASSIUM CHLORIDE 750 MG/1
10 TABLET, FILM COATED, EXTENDED RELEASE ORAL DAILY
Status: DISCONTINUED | OUTPATIENT
Start: 2017-04-24 | End: 2017-04-25

## 2017-04-24 RX ORDER — MAGNESIUM SULFATE HEPTAHYDRATE 40 MG/ML
2 INJECTION, SOLUTION INTRAVENOUS ONCE
Status: COMPLETED | OUTPATIENT
Start: 2017-04-24 | End: 2017-04-24

## 2017-04-24 RX ADMIN — MAGNESIUM SULFATE IN WATER 2 G: 40 INJECTION, SOLUTION INTRAVENOUS at 17:29

## 2017-04-24 RX ADMIN — PROPOFOL 40 MCG/KG/MIN: 10 INJECTION, EMULSION INTRAVENOUS at 18:26

## 2017-04-24 RX ADMIN — CHLORHEXIDINE GLUCONATE 15 ML: 1.2 RINSE ORAL at 20:20

## 2017-04-24 RX ADMIN — CHLORHEXIDINE GLUCONATE 15 ML: 1.2 RINSE ORAL at 01:59

## 2017-04-24 RX ADMIN — CHLORHEXIDINE GLUCONATE 15 ML: 1.2 RINSE ORAL at 08:14

## 2017-04-24 RX ADMIN — SODIUM PHOSPHATE, MONOBASIC, MONOHYDRATE AND SODIUM PHOSPHATE, DIBASIC, ANHYDROUS 30 MMOL: 276; 142 INJECTION, SOLUTION INTRAVENOUS at 11:01

## 2017-04-24 RX ADMIN — STANDARDIZED SENNA CONCENTRATE AND DOCUSATE SODIUM 1 TABLET: 8.6; 5 TABLET, FILM COATED ORAL at 20:20

## 2017-04-24 RX ADMIN — ASPIRIN 81 MG: 81 TABLET ORAL at 08:15

## 2017-04-24 RX ADMIN — FAMOTIDINE 20 MG: 20 TABLET, FILM COATED ORAL at 01:59

## 2017-04-24 RX ADMIN — ATORVASTATIN CALCIUM 10 MG: 10 TABLET, FILM COATED ORAL at 08:15

## 2017-04-24 RX ADMIN — AMIODARONE HYDROCHLORIDE 400 MG: 200 TABLET ORAL at 08:14

## 2017-04-24 RX ADMIN — POTASSIUM CHLORIDE 10 MEQ: 1.5 POWDER, FOR SOLUTION ORAL at 10:21

## 2017-04-24 RX ADMIN — PROPOFOL 40 MCG/KG/MIN: 10 INJECTION, EMULSION INTRAVENOUS at 02:50

## 2017-04-24 RX ADMIN — SERTRALINE 50 MG: 50 TABLET, FILM COATED ORAL at 08:15

## 2017-04-24 RX ADMIN — FENTANYL CITRATE 25 MCG/HR: 50 INJECTION, SOLUTION INTRAMUSCULAR; INTRAVENOUS at 00:46

## 2017-04-24 RX ADMIN — NOREPINEPHRINE BITARTRATE 5 MCG/MIN: 1 INJECTION INTRAVENOUS at 10:21

## 2017-04-24 RX ADMIN — FENTANYL CITRATE 25 MCG: 50 INJECTION INTRAMUSCULAR; INTRAVENOUS at 10:26

## 2017-04-24 RX ADMIN — FAMOTIDINE 20 MG: 10 INJECTION INTRAVENOUS at 08:15

## 2017-04-24 RX ADMIN — FAMOTIDINE 20 MG: 20 TABLET, FILM COATED ORAL at 20:20

## 2017-04-24 RX ADMIN — AMIODARONE HYDROCHLORIDE 400 MG: 200 TABLET ORAL at 20:20

## 2017-04-24 RX ADMIN — NOREPINEPHRINE BITARTRATE 4 MCG/MIN: 1 INJECTION INTRAVENOUS at 05:41

## 2017-04-24 RX ADMIN — PROPOFOL 40 MCG/KG/MIN: 10 INJECTION, EMULSION INTRAVENOUS at 10:21

## 2017-04-24 RX ADMIN — FUROSEMIDE 20 MG: 10 INJECTION, SOLUTION INTRAVENOUS at 08:15

## 2017-04-24 RX ADMIN — FENTANYL CITRATE 50 MCG: 50 INJECTION INTRAMUSCULAR; INTRAVENOUS at 01:05

## 2017-04-24 ASSESSMENT — ENCOUNTER SYMPTOMS
EYES NEGATIVE: 1
MUSCULOSKELETAL NEGATIVE: 1
CARDIOVASCULAR NEGATIVE: 1
RESPIRATORY NEGATIVE: 1
NEUROLOGICAL NEGATIVE: 1
PSYCHIATRIC NEGATIVE: 1
GASTROINTESTINAL NEGATIVE: 1

## 2017-04-24 ASSESSMENT — PAIN SCALES - GENERAL
PAINLEVEL_OUTOF10: ASSUMED PAIN PRESENT

## 2017-04-24 NOTE — PROGRESS NOTES
"  Trauma/Surgical Progress Note    Author: Cash Estrada Date & Time created: 4/24/2017   10:53 AM     Interval Events:  Perioperative air leak needing multiple chest tubes  Moderate bilateral pneumothorax on CT treated with bilateral chest tubes  Mediastinal tubes in place as well  Lungs expanded on current cxr  Continuous air leak in one tube  Continue current chest tubes  Thoracoscopy as last resort.    Review of Systems   Unable to perform ROS: intubated     Hemodynamics:  Pulse 81, temperature 36.2 °C (97.2 °F), resp. rate 18, height 1.638 m (5' 4.49\"), weight 61.1 kg (134 lb 11.2 oz), SpO2 99 %, not currently breastfeeding.     Respiratory:  Coleman Vent Mode: APVCMV, Rate (breaths/min): 16, PEEP/CPAP: 5, FiO2: 30, P Peak (PIP): 23, P MEAN: 9 Respiration: 18, Pulse Oximetry: 99 %, O2 Daily Delivery Respiratory : Silicone Nasal Cannula  Chest Tube Group Right-Tube Status / Drainage: Sutured in Place;Patent;Draining, Chest Tube Group Left-Tube Status / Drainage: Patent;Sutured in Place;Draining, Chest Tube Group Right;Left;Mediastinal Straight, Angled 32-Tube Status / Drainage: Patent;Sutured in Place;Small;Sanguinous, Chest Tube Group Right-Device: Suction 20 cm Water;Closed Drainage System, Chest Tube Group Left-Device: Suction 20 cm Water;Closed Drainage System, Chest Tube Group Right;Left;Mediastinal Straight, Angled 32-Device: Dry Closed Drainage System;Suction 20 cm Water;Air Leak Present  Given By:: Mouthpiece, Work Of Breathing / Effort: Vented  RUL Breath Sounds: Crackles, RML Breath Sounds: Crackles, RLL Breath Sounds: Diminished, DELLA Breath Sounds: Crackles, LLL Breath Sounds: Diminished  Fluids:    Intake/Output Summary (Last 24 hours) at 04/24/17 1053  Last data filed at 04/24/17 0600   Gross per 24 hour   Intake 1019.97 ml   Output   2632 ml   Net -1612.03 ml     Admit Weight: 54.7 kg (120 lb 9.5 oz)  Current Weight: 61.1 kg (134 lb 11.2 oz)    Physical Exam   Eyes:   Subcutaneous air "   Cardiovascular: Regular rhythm.    Pulmonary/Chest: She has no wheezes.   intubated   Skin: She is not diaphoretic.       Medical Decision Making/Problem List:    Active Hospital Problems    Diagnosis   • Severe mitral regurgitation [I34.0]     Core Measures & Quality Metrics:  Labs reviewed, Medications reviewed and Radiology images reviewed  Avila catheter: Critically Ill - Requiring Accurate Measurement of Urinary Output                  EDY Score  Discussed patient condition with Family and RN.

## 2017-04-24 NOTE — PROCEDURES
Procedure Note     Date: 4/23/2017    Time: 2115    Procedure: Chest tube placement    Site: R chest     Pre-operative diagnosis: Large pneumothorax with SQ emphysema and acute hypoxemic respiratory failure    Post-operative diagnosis: Large pneumothorax with SQ emphysema and acute hypoxemic respiratory failure    Consent: Emergency, implied consent     Procedure: Patient positioned, prepped, and draped in as sterile fashion. Using landmark technique, a 2 cm incision with an 11# blade scalpel made in the R mid-axillary line in the 4-5th intercostal space. Incision widened with hemostats using blunt dissection and pleural space entered. A 20 F chest tube placed into the pleural space and connected to pleurovac with continuous wall suction at 20cm H2O. Air and minimal serous fluid returned into the pleurovac and patient had improvement in oxygenation and tachycardia. Chest tube secured in place with 0-0 silk suture and dressed with vasoline gauze and tape.     EBL: minimal    Complications: none    CXR: appropriately positioned R chest tube with side-port well into pleural cavity and no obvious residual PTX    Jeremy Gonda, MD   Critical Care Medicine

## 2017-04-24 NOTE — PROGRESS NOTES
Pt with increased SOB after getting back to bed from bathroom.  Oxygen needs increased, RT administered breathing treatment emergently, then applied oxymask at 15 L, then BVM.  Pulmonologist paged, pt was intubated emergently.  Daughter at bedside.

## 2017-04-24 NOTE — CARE PLAN
Problem: Post Op Day 3 CABG/Heart Valve replacement  Goal: Optimal care of the post op CABG/Heart Valve replacement post op day 3  Intervention: Daily Weights  done  Intervention: Shower daily and clean incisions twice daily with soap and water  Pt intubated  Intervention: Up in chair for all meals  Pt intubated  Intervention: Ambulate, increasing the distance each time x 3 and before bed  Pt intubated  Intervention: IS q 1 hour while awake and record best IS volume  Pt intubated  Intervention: Consider removal of larsen, chest tube and pacer wire if not already done  Chest tubes placed and new larsen placed.

## 2017-04-24 NOTE — PROCEDURES
Procedure Note    Date: 4/23/2017  Time: 2100    Procedure: Intubation    Indication: Acute respiratory failure, AMS  Consent: Informed consent obtained from patient or designated decision maker after explaining the benefits/risks of the procedure including but not limited to airway/dental trauma, hypoxia/hypercarbia, bleeding, aspiration/infection, vascular/nerve or other deep structure injury, arrythmia, or death. Patient or surrogate expressed understanding and agreement and signed consent which can be found in the patient's chart.    Procedure: After obtaining consent, a time-out was performed. Airway assessed and patient found to have Mallampati class 1.  Equipment prepared and RT/RN at bedside. Patient pre-oxygenated with 100% FiO2.  After medication delivery, a 4.0 Glidescope blade used to acheive direct visualization and a grade 1 view. A 7.5 ETT was placed with 1 attempt(s) into the trachea directly through the vocal cords. Appropriate placement confirmed by direct visualization, bilateral chest and epigastric auscultation, misting in the ETT, and ETCO2 detector. ETT secured in place and patient connected to ventilator. Sedation/analgesia continued as appropriate. Patient tolerated procedure well without any difficulties and remains in care of bedside nurse and respiratory therapy. CXR will be performed to confirm appropriate placement of ETT.    Medications: etomidate 20 mg, vecuronium 10 mg  Complications: none  CXR: pending    Jeremy Gonda, MD  Critical Care Medicine

## 2017-04-24 NOTE — PROGRESS NOTES
Assumed care of patient at 2130. PMA and APN STAT paged for patient desaturating in the 50's, not responding. Pt needing BVM to get oxygen saturations up. Dr. Gonda at bedside, pt urgently intubated and two new chest tubes placed, one left pleural and one right pleural.     Dr. Brice heart surgeon and APN up to bedside around 2200. Updated family on pt's current status.     Dr. Gonda placed arterial line at 0115.

## 2017-04-24 NOTE — PROCEDURES
Procedure Note     Date: 4/23/2017    Time: 2125    Procedure: Chest tube placement    Site: Left chest     Pre-operative diagnosis: Large pneumothorax with SQ emphysema and acute hypoxemic respiratory failure    Post-operative diagnosis: Large pneumothorax with SQ emphysema and acute hypoxemic respiratory failure    Consent: Emergency, implied consent     Procedure: Patient positioned, prepped, and draped in as sterile fashion. Using landmarks technique, a 2 cm incision with an 11# blade scalpel made in the left mid-axillary line in the 4-5th intercostal space. Incision widened with hemostats using blunt dissection and pleural space entered. A 20 F chest tube placed into the pleural space and connected to pleurovac with continuous wall suction at 20cm H2O. Air and serous fluid returned into the pleurovac and patient showed improved oxygenation and tachycardia. Chest tube secured in place with 0-0 silk suture and dressed with vasoline gauze and tape.     EBL: 3 mL    Complications: none    CXR: appropriately positioned chest tube with tip at apex of pleural cavity, no obvious residual PTX    Jeremy Gonda, MD   Critical Care Medicine

## 2017-04-24 NOTE — CONSULTS
CHIEF COMPLAINT:  Persisant air leak following cardiac surgery    REQUESTING PHYSICIAN: Dr. Brice    HISTORY OF PRESENT ILLNESS:   Mediastinal chest tube air leak and subcutaneous air following mitral valve replacement.  Bilateral chest tube placement for pneumothorax.  Lungs are now expanded.  Persisent air leak from mediastinal tube persists.  Mechanical ventilation.        PAST MEDICAL HISTORY:  has a past medical history of COPD (chronic obstructive pulmonary disease) (CMS-[facility]); Hypertension; Hyperlipidemia; Heart valve disease; Emphysema of lung (CMS-[facility]); High cholesterol; Sleep apnea; and Breath shortness.     PAST SURGICAL HISTORY:  has past surgical history that includes oophorectomy; appendectomy; mitral valve repair (4/20/2017); maze procedure (Left, 4/20/2017); and idalmis (4/20/2017).     ALLERGIES:   Allergies   Allergen Reactions   • Sulfa Drugs Rash     Rxn - years ago in her 20's        CURRENT MEDICATIONS:   Home Medications     Reviewed by Randy Licona (Pharmacy Tech) on 04/20/17 at 0603  Med List Status: Complete    Medication Last Dose Status    albuterol (VENTOLIN HFA) 108 (90 BASE) MCG/ACT Aero Soln inhalation aerosol <week Active    atorvastatin (LIPITOR) 10 MG Tab 4/19/2017 Active    dabigatran (PRADAXA) 150 MG Cap capsule 4/16/2017 Active    diltiazem (DILT-XR) 120 MG XR capsule 4/19/2017 Active    fluticasone-salmeterol (ADVAIR) 250-50 MCG/DOSE AEROSOL POWDER, BREATH ACTIVATED 4/19/2017 Active    furosemide (LASIX) 20 MG Tab 4/19/2017 Active    magnesium oxide (MAG-OX) 400 MG Tab 4/19/2017 Active    potassium chloride ER (K-TAB) 10 MEQ tablet 3/16/2017 Active    sertraline (ZOLOFT) 50 MG Tab 4/19/2017 Active    Tiotropium Bromide Monohydrate (SPIRIVA RESPIMAT) 2.5 MCG/ACT Aero Soln 4/19/2017 Active                FAMILY HISTORY:   Family History   Problem Relation Age of Onset   • Cancer Father      colon cancer    • Hypertension Mother    • Cancer Sister 68     ovarian cancer     "    SOCIAL HISTORY:   Social History     Social History Main Topics   • Smoking status: Former Smoker -- 0.50 packs/day for 38 years     Types: Cigarettes     Quit date: 10/11/2001   • Smokeless tobacco: Never Used   • Alcohol Use: 8.4 oz/week     14 Shots of liquor per week      Comment: 3 per day   • Drug Use: No   • Sexual Activity:     Partners: Male       REVIEW OF SYSTEMS: Comprehensive review of systems is not possible.  Intubated    PHYSICAL EXAMINATION:     GENERAL:  sedate  VITAL SIGNS: Pulse 81, temperature 36.2 °C (97.2 °F), resp. rate 18, height 1.638 m (5' 4.49\"), weight 61.1 kg (134 lb 11.2 oz), SpO2 99 %, not currently breastfeeding.  HEAD AND NECK: Pupils: equal    NECK: No JVD. Trachea midline.   CHEST: Breath sounds   CARDIOVASCULAR: Regular rhythm  ABDOMEN: Soft, no tenderness guarding or peritoneal findings  EXTREMITIES: Examination of the upper and lower extremities : edema  NEUROLOGIC: Glenda Coma Score is 11 T    LABORATORY VALUES:   Recent Labs      04/22/17   0540  04/23/17   0555  04/24/17   0530   WBC  18.9*  14.8*  11.4*   RBC  2.73*  2.56*  2.60*   HEMOGLOBIN  8.7*  8.2*  8.2*   HEMATOCRIT  27.1*  25.4*  25.4*   MCV  99.3*  99.2*  97.7   MCH  31.9  32.0  31.5   MCHC  32.1*  32.3*  32.3*   RDW  48.5  46.7  45.9   PLATELETCT  120*  129*  138*   MPV  10.3  10.2  10.3     Recent Labs      04/22/17   0540  04/23/17   0555  04/24/17   0530   SODIUM  134*  133*  135   POTASSIUM  3.9  4.6  3.7   CHLORIDE  100  99  101   CO2  29  31  30   GLUCOSE  73  94  169*   BUN  18  19  17   CREATININE  0.71  0.66  0.59   CALCIUM  8.0*  7.9*  7.9*     Recent Labs      04/22/17   0540  04/23/17   0555  04/24/17   0530   ASTSGOT   --    --   25   ALTSGPT   --    --   23   TBILIRUBIN   --    --   0.4   ALKPHOSPHAT   --    --   70   GLOBULIN   --    --   1.8*   INR  0.97  0.98  0.99     Recent Labs      04/22/17   0540  04/23/17   0555  04/24/17   0530   INR  0.97  0.98  0.99        IMAGING: "   DX-CHEST-PORTABLE (1 VIEW)   Final Result      No significant change      DX-ABDOMEN FOR TUBE PLACEMENT   Final Result      Enteric tube has been placed and appears to enter the stomach.      CT-HEAD W/O   Final Result      No acute intracranial abnormality identified.      DX-CHEST-PORTABLE (1 VIEW)   Final Result      1.  All lines and tubes appear appropriately located      2.  Extensive chest wall air      CT-CHEST (THORAX) W/O   Final Result      1.  Moderate size pneumothorax noted bilaterally. Pleural fluid is also noted bilaterally.      2.  Consolidation noted posteriorly in each lung base and in the right middle lobe could be due to atelectasis although pneumonia is also possible.   3.  2. Midline chest tubes are noted. One extends below the left cardiac ventricle and the other extends just to the right of midline into the mid to superior mediastinal region.      4.  Extensive soft tissue emphysema is identified as described above.      5.  Sternotomy noted with sternotomy wires in place.               DX-CHEST-PORTABLE (1 VIEW)   Final Result      No significant change      DX-CHEST-PORTABLE (1 VIEW)   Final Result         1. No significant interval change.      TRANSESOPHAGEAL ECHO W/O CONT   Final Result      DX-CHEST-PORTABLE (1 VIEW)   Final Result         1. Interval extubation. New patchy bibasilar opacities, likely atelectasis.      DX-CHEST-PORTABLE (1 VIEW)   Final Result      1.  Endotracheal tube tip projects just below the thoracic inlet. Consider advancement.      2.  Lines and tubes appear otherwise appropriately located      Carotid Duplex STAT   Final Result      DX-CHEST-2 VIEWS   Final Result      1.  Stable mild cardiomegaly.      2.  Pulmonary hyperinflation.      3.  Atherosclerosis          IMPRESSION AND PLAN:     Active Hospital Problems    Diagnosis   • Severe mitral regurgitation [I34.0]     Bronchopleural fistula.  Continuious air leak.  Chest tubes in place.  Observation .   Thoracotomy for continued leak.  Hopefully this can be avoided. Patient and Family counseled.     ____________________________________   Cash Estrada MD, FACS      DD: 4/24/2017   DT: 10:38 AM

## 2017-04-24 NOTE — PROGRESS NOTES
Pulmonary/Critical Care Medicine   Progress Note    Date of service: 4/23/2017  Time: 2050    Called to bedside emergently for sudden onset hypoxemia and now AMS after getting out of bed to bathroom. O2 saturations dropped down to the 50% range temporarily. Able to get back in bed with assistance but rapidly becoming less responsive and mottled. RT providing BVM assistance at time of my arrival with SaO2 to 100% but remained minimally responsive but Roberto and able to stick out tongue to command. Noted CT chest from earlier today that showed moderate sized B pneumothoraces. On exam, patient with significant SQ emphysema of chest wall, neck, face and periorbital (R>L) with diminshed BS bilaterally and hyperexpanded chest with decreased airflow. /85 at time but . Decision made to intubate patient and start on full vent support with B chest tube placement for probable worsening PTX with early tamponade physiology that seemed to occur after getting out of bed to BR. Mediastinal CTs noted to water seal and placed immediately to suction. See procedure notes for additional details. Procedures went well and patient given 500NS bolus for transient hypotension. Levophed gtt ordered to keep MAP>65 but not yet needed. VS improved after procedures and a CT head ordered to eval for possible CVA as another cause of decompensation. Family at bedside throughout and questions/concerns addressed. Dr. Summers arrived to examine patient and assist as well. Plans for thoracic surgery (Dr. Estrada) to see in the morning, CT head now, sedation vacation in the morning and resume daily SBTs unless plans to operate on chest in the near future. Avila catheter and NGT replaced.     I spent 98 min in reviewing the patient's condition, physical examination, laboratory and imaging data, prior documentation, in discussion with RN, RT, family, patient, /daughter/sister/brother-in-law, and Dr. Summers, and in  formulating an assessment/plan.    Critical Care time: 98 min. No time overlap, procedures not included in time.  [id number]

## 2017-04-24 NOTE — DIETARY
"  Nutrition Support Assessment - Female    Kelly Lovett is a 71 y.o. female with admitting DX of MV repair  MAZE      Pertinent History: COPD, hypertension, hyperlipidemia, heart valve disease, emphysema of lung, high cholesterol, sleep apnea, shortness of breath  Allergies: Sulfa drugs  Height: 163.8 cm (5' 4.49\")  Weight: 61.1 kg (134 lb 11.2 oz)   Weight used in calculations:  52.4 kg (115 lbs) - BMI 19.5; stand up scale wt.  Pt fluid +3676 mL for recent wt taken  Ideal Body Weight:  55.6 kg (122.5 lbs)    Pertinent Labs: Glucose 169, Albumin 3.1, Phosphorus 1.8, Pre-albumin 8.0  Last BM: 17  Pertinent Medications: Lipitor, colace, pericolace, pepcid, lasix, magnesium sulfate, KLOR-CON, sodium phosphate, Levophed (4 mcg), Propofol (12.5 mL/hr)  Pertinent Fluids: NS infusion 10 mL/hr  Surgery / Procedures: Radical MV Repair, L Sided Maze procedure, left atrial appendage ligation and intraoperative transeophageal echocardiography, PAGE, Intubated (), chest tube placement (right and left), arterial line placement  Skin:  Surgical incision heart prep; not being followed by the Wound Team at this time       Estimated Needs:  MSJ x 1.2 = 1233 kcal/day; [facility] RMR = (vent L/min 5.3, T max/24 hours 36.3) = 999 kcal/day   Total Calories/day:  1000 - 1300 (Calories/k - 25)  Total grams protein/day:  63 - 73 (gram protein/k.2 - 1.4)  Total fluids/day:  1312 - 1572 ml (25-30 mL/kg)        Assessment / Evaluation:   Pt admitted for scheduled Radical mitral valve repair, Maze procedure, PAGE  Pt was intubated following acute respiratory failure, AMS  Pt currently on 12.5 mL/hr of propofol, providing 330 kcal/day  Orders to start TF today @ 25 mL/hr  Consult received for indirect metabolic cart; study not indicated at this time.  Will order PRN.    Plan / Recommendation:     · While on propofol, change TF to Peptamin Intense @ 25 mL/hr and advance per protocol to goal rate of 35 mL/hr " providing 1170 kcal, 78 grams of protein and 705 mL of free water per day.     · When off of propofol, change TF to Impact Peptide 1.5 @ 35 mL/hr providing 1260 kcal, 79 grams of protein and 647 mL of free water per day.  · Fluids per MD    RD will continue to follow per department policy.

## 2017-04-24 NOTE — PROGRESS NOTES
Assumed care at 1900. Bedside report received from Jerri. Patient's chart and MAR reviewed. Pt complains of mild  pain at chest tube sites at this time. Pt is A & O 4. Patient was updated on plan of care for the day. Questions answered and concerns addressed.  Pt denies any additional needs at this time. White board updated. Call light, phone and personal belongings within reach.

## 2017-04-24 NOTE — PROGRESS NOTES
Cardiovascular Surgery Progress Note    [facility]  [facility]: [id number]  : 1945  Admit Date: 2017  5:20 AM  Procedure:  Procedure(s) and Anesthesia Type:     * MITRAL VALVE REPAIR  - General     * MAZE PROCEDURE, Left atrial appendage ligation - General     * PAGE - General  4 Day Post-Op    Vitals:  Patient Vitals for the past 8 hrs:   Monitored Temp SpO2 O2 Delivery Pulse Heart Rate (Monitored) Resp NIBP Weight   17 0814 - - - 81 - - - -   17 0717 - 99 % - - 79 - - -   17 0500 36.5 °C (97.7 °F) 97 % - 75 74 18 (!) 86/63 mmHg 61.1 kg (134 lb 11.2 oz)   17 0400 36.5 °C (97.7 °F) 97 % Ventilator 74 74 16 119/72 mmHg -   17 0300 36.4 °C (97.5 °F) 97 % - 67 67 16 (!) 92/64 mmHg -   17 0241 - 100 % - - 67 - - -   17 0200 36.5 °C (97.7 °F) 100 % - 67 67 18 (!) 92/65 mmHg -   17 0130 - 100 % - - 65 - - -     Temp (24hrs), Av.3 °C (97.3 °F), Min:36.2 °C (97.2 °F), Max:36.3 °C (97.4 °F)      Respiratory:  Coleman Vent Mode: APVCMV, Rate (breaths/min): 16, PEEP/CPAP: 5, FiO2: 30, P Peak (PIP): 23, P MEAN: 9 Respiration: 18, Pulse Oximetry: 99 %, O2 Daily Delivery Respiratory : Silicone Nasal Cannula  Chest Tube Group Right-Tube Status / Drainage: Sutured in Place;Patent;Draining, Chest Tube Group Left-Tube Status / Drainage: Patent;Sutured in Place;Draining, Chest Tube Group Right;Left;Mediastinal Straight, Angled 32-Tube Status / Drainage: Patent;Sutured in Place;Small;Sanguinous, Chest Tube Group Right-Device: Suction 20 cm Water;Closed Drainage System, Chest Tube Group Left-Device: Suction 20 cm Water;Closed Drainage System, Chest Tube Group Right;Left;Mediastinal Straight, Angled 32-Device: Dry Closed Drainage System;Suction 20 cm Water;Air Leak Present  Chest Tube Drains:   Left Chest Tube 1: 0 mlRight Chest Tube 1: 5 mlMediastinal Chest Tube 1: 5 ml    Fluids:    Intake/Output Summary (Last 24 hours) at 17 0938  Last data filed at  "04/24/17 0600   Gross per 24 hour   Intake 1019.97 ml   Output   3407 ml   Net -2387.03 ml     Admit weight: Weight: 54.7 kg (120 lb 9.5 oz)  Current weight: Weight: 61.1 kg (134 lb 11.2 oz) (04/24/17 0500)    Labs:  Recent Labs      04/22/17   0540  04/23/17   0555  04/24/17   0530   WBC  18.9*  14.8*  11.4*   RBC  2.73*  2.56*  2.60*   HEMOGLOBIN  8.7*  8.2*  8.2*   HEMATOCRIT  27.1*  25.4*  25.4*   MCV  99.3*  99.2*  97.7   MCH  31.9  32.0  31.5   MCHC  32.1*  32.3*  32.3*   RDW  48.5  46.7  45.9   PLATELETCT  120*  129*  138*   MPV  10.3  10.2  10.3     Recent Labs      04/24/17   0530   NEUTSPOLYS  76.60*   LYMPHOCYTES  12.40*   MONOCYTES  9.70   EOSINOPHILS  0.60   BASOPHILS  0.10     Recent Labs      04/22/17   0540  04/23/17   0555  04/24/17   0530   SODIUM  134*  133*  135   POTASSIUM  3.9  4.6  3.7   CHLORIDE  100  99  101   CO2  29  31  30   GLUCOSE  73  94  169*   BUN  18  19  17   CREATININE  0.71  0.66  0.59   CALCIUM  8.0*  7.9*  7.9*     Recent Labs      04/22/17   0540  04/23/17   0555  04/24/17   0530   INR  0.97  0.98  0.99       Medications:  • NS       • potassium chloride ER  10 mEq      Or   • potassium chloride  10 mEq     • magnesium sulfate  2 g     • sodium phosphate 30 mmol ivpb  30 mmol     • NS       • chlorhexidine  15 mL     • MD ALERT...Adult ICU Electrolyte Replacement per Pharmacy Protocol       • famotidine  20 mg      Or   • famotidine  20 mg     • tiotropium  1 Cap     • amiodarone  400 mg     • furosemide  20 mg     • atorvastatin  10 mg     • budesonide-formoterol  2 Puff     • sertraline  50 mg     • docusate sodium  100 mg      And   • senna-docusate  1 Tab     • aspirin EC  81 mg     • MD ALERT... warfarin           Exam:   Review of Systems   HENT: Negative.         Right cheek \"puffed up\"   Eyes: Negative.    Respiratory: Negative.    Cardiovascular: Negative.    Gastrointestinal: Negative.    Genitourinary: Negative.    Musculoskeletal: Negative.    Skin: Negative.  "   Neurological: Negative.    Endo/Heme/Allergies: Negative.    Psychiatric/Behavioral: Negative.        Physical Exam   Constitutional: She is oriented to person, place, and time. She appears well-developed and well-nourished.   HENT:   Head: Normocephalic.   Eyes: Pupils are equal, round, and reactive to light.   Neck: Normal range of motion. No JVD present.   Cardiovascular: Normal rate, regular rhythm and normal heart sounds.    Pulmonary/Chest: Effort normal and breath sounds normal.   Bases diminished  R chest crepitus  SUB q air extends up to R face   Abdominal: Soft. Bowel sounds are normal. She exhibits no distension. There is no guarding.   Musculoskeletal: Normal range of motion.   Neurological: She is alert and oriented to person, place, and time.   Skin: Skin is warm and dry.   Surgical incisions CDI   Psychiatric: She has a normal mood and affect. Her behavior is normal.       Quality Measures:   EKG reviewed, Medications reviewed, Labs reviewed and Radiology images reviewed  Larsen catheter: No Larsen  Central line in place: Concentrated IV drugs and Need for access    DVT Prophylaxis: Contraindicated - High bleeding risk  DVT prophylaxis - mechanical: SCDs  Ulcer prophylaxis: Yes    Assessed for rehab: Patient was assess for and/or received rehabilitation services during this hospitalization      Assessment/Plan:  POD 1 HDS, NSR--some runs VT ?afib with aberrancy? Last night, started on amio gtt.  Extubated, neuro grossly intact.  CT output min, mod airleak.  CXR no pneumothorax.  Adequate UOP, wts up, +3L. labs noted.  PLAN:  Keep CTs.  DC larsen.  STart gentle diuresis.  Change to PO amio.  Stress cough/deepbreath/IS. AMB.    POD 2 HDS, NSR.  Crepitus Right upper chest, mild. CXR without pnuemothorax. On 2 l nc. CT output min, mod airleak remains--connections recheck, and redressed. Good bowel sounds, not passing gas, no distention/n/v. W/w. INR trending up.  DC temp wires. Add xanax anxiety. Urine  retention overnight, straight cath--to reinsert larsen if unable to void again.  AMB/IS.  CXR in AM.   POD 3 HDS NSR.  Airleak remains.  Sub q air has now extended up neck, Right facial swelling.  No resp distress.  CXR with bilateral chest wall air and pneumomediastinum.  Dr. Estrada consulted, will see patient Monday AM.  CT chest today.  CTs to be placed to 2 separate pleural vacs. DC temp wires.  CPM.  POD 4 re-intubated last night.  Chest tubes inserted pleural.  Per CT scan moderate bilateral pneumothorax.  Sedated.  + air leaks  Both chest tubes.  Sean to see patient today.        Active Hospital Problems    Diagnosis   • Severe mitral regurgitation [I34.0]

## 2017-04-24 NOTE — PROCEDURES
Procedure Note    Date: 4/24/2017  Time: 0115    Procedure: Arterial Line placement  Site: R femoral artery    Indication: Shock needing continuous BP monitoring  Consent: Informed consent obtained from patient or designated decision maker after explaining the benefits/risks of the procedure including but not limited to bleeding, infection, nerve or other deep structure injury, or failure of placement. Patient or surrogate expressed understanding and agreement and signed consent which can be found in the patient's chart.    Procedure: After obtaining consent, a time-out was performed. Patient positioned, prepped, and draped in sterile fashion.  0 mL of local anesthetic injected (1% lidocaine without epinephrine) achieving appropriate comfort level for patient. A 18 gauge arrow catheter was placed using Seldinger technique. Appropriate arterial blood visualized from the catheter and the arterial waveform confirmed on the monitor. Line secured and dressed in place. Patient tolerated procedure well without any difficulties and left in care of bedside nurse.     EBL: minimal  Complications: none, failed attempt (vasospasm occurred) at R radial artery after checking Quinton's test to assess for collateral circulation    Jeremy Gonda, MD  Critical Care Medicine

## 2017-04-24 NOTE — PROGRESS NOTES
Pulmonary Critical Care Progress Note    Date of service: 4/24/2017     Interval Events:  24 hour interval history reviewed  Reason for visit:  Atelectasis, COPD, MARY, pneumothoraces    TM 97.9   Negative 2.2 over last 24 hours, Positive 800 cc since admit.  97% on 30% FIO2  CXR Ongoing sub Q emphysema, b/l lungs re expanded  Reintubated last night and bilateral chest tubes placed.  Left chest tube with leak.   Sedated but follows and awakens   SR, BP support with levo at 4  Bolus last night.   No SBTs.         PFSH:  No change.    Respiratory:  Coleman Vent Mode: APVCMV, Rate (breaths/min): 16, Vt Target (mL): 330, PEEP/CPAP: 5, FiO2: 30, Static Compliance (ml / cm H2O): 43, Control VTE (exp VT): 334  Pulse Oximetry: 99 %  Air leak in Lt chest tube  Few coarse crackles, no wheezing       Recent Labs      04/24/17   0137   ISTATAPH  7.425   ISTATAPCO2  49.6*   ISTATAPO2  350*   ISTATATCO2  34*   [id number]  100*   ISTATARTHCO3  32.5*   ISTATARTBE  7*   ISTATTEMP  36.6 C   ISTATFIO2  100   ISTATSPEC  Arterial   ISTATAPHTC  7.431   [id number]  348*       HemoDynamics:  Pulse: 75, Heart Rate (Monitored): 79  Arterial BP: 110/56 mmHg, NIBP: (!) 86/63 mmHg  CVP (mm Hg): (!) 8 MM HG  SR    Neuro:  sedate    Fluids:  Intake/Output       04/22/17 0700 - 04/23/17 0659 04/23/17 0700 - 04/24/17 0659 04/24/17 0700 - 04/25/17 0659      1140-0440 4439-3852 Total 4629-9454 7071-9275 Total 7096-5155 5770-0253 Total       Intake    P.O.  --  -- --  560  -- 560  --  -- --    P.O. -- -- -- 560 -- 560 -- -- --    I.V.  --  -- --  --  680 680  --  -- --    Propofol Volume -- -- -- -- 125.5 125.5 -- -- --    Norepinephrine Volume -- -- -- -- 54.5 54.5 -- -- --    IV Volume (Plasmalyte) -- -- -- -- 500 500 -- -- --    Enteral  --  -- --  --  100 100  --  -- --    Enteral Volume -- -- -- -- 100 100 -- -- --    Total Intake -- -- --  -- -- --       Output    Urine  --   2466 1470  --  -- --    Number of Times  Voided 1 x 3 x 4 x 3 x -- 3 x -- -- --    Indwelling Cathether -- -- -- -- 1000 1000 -- -- --    Void (ml) --  300 2140 -- -- --    Drains  250  -- 250  250  217 467  --  -- --    Mediastinal Chest Tube 2 -- -- -- 140 67 207 -- -- --    Right Chest Tube 1 -- -- -- -- 30 30 -- -- --    Mediastinal Chest Tube 1 250 -- 250 110 35 145 -- -- --    Left Chest Tube 1 -- -- -- -- 85 85 -- -- --    Stool  --  -- --  --  -- --  --  -- --    Number of Times Stooled -- -- -- 1 x -- 1 x -- -- --    Total Output 250 019 155 [id number] 3726 2235 2688 -- -- --       Net I/O     -250 -875 -936 -9597 -290 -1785 -- -- --        Weight: 61.1 kg (134 lb 11.2 oz)  Recent Labs      1740  1755  17   0530   SODIUM  134*  133*  135   POTASSIUM  3.9  4.6  3.7   CHLORIDE  100  99  101   CO2  29  31  30   BUN  18  19  17   CREATININE  0.71  0.66  0.59   MAGNESIUM   --    --   1.8   PHOSPHORUS   --    --   1.8*   CALCIUM  8.0*  7.9*  7.9*       GI/Nutrition:  Abd soft ND/NT  No N/V/P    Liver Function  Recent Labs      17   0517   0530   ALTSGPT   --    --   23   ASTSGOT   --    --   25   ALKPHOSPHAT   --    --   70   TBILIRUBIN   --    --   0.4   PREALBUMIN   --    --   8.0*   GLUCOSE  73  94  169*       Heme:  Recent Labs      1755  17   0530   RBC  2.73*  2.56*   --    HEMOGLOBIN  8.7*  8.2*   --    HEMATOCRIT  27.1*  25.4*   --    PLATELETCT  120*  129*   --    PROTHROMBTM  13.2  13.3  13.4   INR  0.97  0.98  0.99       Infectious Disease:  Temp  Av.3 °C (97.3 °F)  Min: 36.2 °C (97.2 °F)  Max: 36.3 °C (97.4 °F)  Monitored Temp  Av.5 °C (97.7 °F)  Min: 36.4 °C (97.5 °F)  Max: 36.6 °C (97.9 °F)    Recent Labs      17   0540  17   0555  17   0530   WBC  18.9*  14.8*   --    ASTSGOT   --    --   25   ALTSGPT   --    --   23   ALKPHOSPHAT   --    --   70   TBILIRUBIN   --    --   0.4     Current Facility-Administered  Medications   Medication Dose Frequency Provider Last Rate Last Dose   • SODIUM CHLORIDE 0.9 % IV SOLN           • propofol (DIPRIVAN) injection  5-80 mcg/kg/min Continuous Jeremy M Gonda, M.D. 12.5 mL/hr at 04/24/17 0547 35 mcg/kg/min at 04/24/17 0547   • SODIUM CHLORIDE 0.9 % IV SOLN           • NOREPINEPHRINE BITARTRATE 1 MG/ML IV SOLN           • DEXTROSE 5 % IV SOLN           • norepinephrine (LEVOPHED) 8 mg in  mL Infusion  0.5-30 mcg/min Continuous Jeremy M Gonda, M.D. 9.4 mL/hr at 04/24/17 0634 5 mcg/min at 04/24/17 0634   • chlorhexidine (PERIDEX) 0.12 % solution 15 mL  15 mL BID Jeremy M Gonda, M.D.   15 mL at 04/24/17 0159   • lidocaine (XYLOCAINE) 1%  injection  1-2 mL Q30 MIN PRN Jeremy M Gonda, M.D.       • MD ALERT...Adult ICU Electrolyte Replacement per Pharmacy Protocol   pharmacy to dose Jeremy M Gonda, M.D.       • fentaNYL (SUBLIMAZE) injection 25 mcg  25 mcg Q HOUR PRN Jeremy M Gonda, M.D.        Or   • fentaNYL (SUBLIMAZE) injection 50 mcg  50 mcg Q HOUR PRN Jeremy M Gonda, M.D.   50 mcg at 04/24/17 0105    Or   • fentaNYL (SUBLIMAZE) injection 100 mcg  100 mcg Q HOUR PRN Jeremy M Gonda, M.D.       • fentaNYL (SUBLIMAZE) 50 mcg/mL in 50mL   Continuous Jeremy M Gonda, M.D. 0.5 mL/hr at 04/24/17 0046 25 mcg/hr at 04/24/17 0046   • famotidine (PEPCID) tablet 20 mg  20 mg Q12HRS Jeremy M Gonda, M.D.   20 mg at 04/24/17 0159    Or   • famotidine (PEPCID) injection 20 mg  20 mg Q12HRS Jeremy M Gonda, M.D.       • alprazolam (XANAX) tablet 0.25 mg  0.25 mg TID PRN Donna L Johnathan, A.P.N.   0.25 mg at 04/22/17 1315   • tiotropium (SPIRIVA) 18 MCG inhalation capsule 1 Cap  1 Cap DAILY Fernandez Hays M.D.   Stopped at 04/24/17 0900   • amiodarone (CORDARONE) tablet 400 mg  400 mg TWICE DAILY Donna Mann A.P.N.   Stopped at 04/23/17 2100   • furosemide (LASIX) injection 20 mg  20 mg Q DAY Donna Mann A.P.N.   20 mg at 04/23/17 0803   • potassium chloride ER (KLOR-CON) tablet 10 mEq   10 mEq DAILY Donna Mann A.P.N.   10 mEq at 04/23/17 0801   • atorvastatin (LIPITOR) tablet 10 mg  10 mg DAILY Tayler Kimball A.P.N.   10 mg at 04/23/17 0801   • budesonide-formoterol (SYMBICORT) 160-4.5 MCG/ACT inhaler 2 Puff  2 Puff BID Tayler Kimball A.P.N.   Stopped at 04/23/17 2100   • sertraline (ZOLOFT) tablet 50 mg  50 mg DAILY Tayler Kimball A.P.N.   50 mg at 04/23/17 0801   • albuterol inhaler 2 Puff  2 Puff Q4HRS PRN Tayler Kimball A.P.N.   2 Puff at 04/22/17 1516   • Respiratory Care per Protocol   Continuous RT Tayler Kimball A.P.N.       • NS infusion   Continuous Tayler Kimball A.P.N. 10 mL/hr at 04/20/17 1207 500 mL at 04/20/17 1207   • Pharmacy Consult Request ...Pain Management Review 1 Each  1 Each PRN Tayler Kimball A.P.N.       • docusate sodium (COLACE) capsule 100 mg  100 mg QAM Tayler Kimball A.P.N.   Stopped at 04/24/17 0900    And   • senna-docusate (PERICOLACE or SENOKOT S) 8.6-50 MG per tablet 1 Tab  1 Tab Nightly Tayler Kimball A.P.N.   Stopped at 04/23/17 2100    And   • senna-docusate (PERICOLACE or SENOKOT S) 8.6-50 MG per tablet 1 Tab  1 Tab Q24HRS PRN Tayler Kimball A.P.N.        And   • lactulose 20 GM/30ML solution 30 mL  30 mL Q24HRS PRN Tayler Kimball A.P.N.        And   • bisacodyl (DULCOLAX) suppository 10 mg  10 mg Q24HRS PRN Tayler Kimball A.P.N.        And   • fleet enema 133 mL  1 Each Once PRN Tayler Kimball A.P.N.       • aspirin EC (ECOTRIN) tablet 81 mg  81 mg DAILY YUMIKO Wan.P.N.   81 mg at 04/23/17 0801   • MD ALERT... warfarin (COUMADIN) per pharmacy protocol   pharmacy to dose YUMIKO Wan.P.N.       • oxycodone immediate-release (ROXICODONE) tablet 5 mg  5 mg Q3HRS PRN Tayler Kimball A.P.N.   5 mg at 04/20/17 2029   • oxycodone immediate release (ROXICODONE) tablet 10 mg  10 mg Q3HRS PRN Tayler Kimball A.P.N.       • tramadol (ULTRAM) 50 MG tablet 50 mg  50 mg Q4HRS PRN Tayler Kimball A.P.N.   50 mg at 04/22/17 1852   •  ondansetron (ZOFRAN) syringe/vial injection 4 mg  4 mg Q6HRS PRN Tayler Kimball, A.P.N.   4 mg at 04/21/17 0835    Or   • prochlorperazine (COMPAZINE) injection 10 mg  10 mg Q6HRS PRN Tayler Kimball, A.P.N.   10 mg at 04/21/17 1014    Or   • promethazine (PHENERGAN) suppository 25 mg  25 mg Q6HRS PRN Tayler Kimball, A.P.N.       • acetaminophen (TYLENOL) tablet 650 mg  650 mg Q4HRS PRN Tayler Kimball, A.P.N.        Or   • acetaminophen (TYLENOL) suppository 650 mg  650 mg Q4HRS PRN Tayler Kimball, A.P.N.       • mag hydrox-al hydrox-simeth (MAALOX PLUS ES or MYLANTA DS) suspension 30 mL  30 mL Q4HRS PRN Tayler Kimball, A.P.N.       • diphenhydrAMINE (BENADRYL) tablet/capsule 25 mg  25 mg HS PRN - MR X 1 Tayler Kimball, A.P.N.   25 mg at 04/22/17 2127   • ipratropium-albuterol (DUONEB) nebulizer solution 3 mL  3 mL Q2HRS PRN (RT) Fernandez Hays M.D.   3 mL at 04/23/17 2055     Last reviewed on 4/20/2017  6:03 AM by Randy Licona    Quality  Measures:  Labs reviewed, Medications reviewed and Radiology images reviewed  Avila catheter: No Avila      DVT Prophylaxis: Warfarin (Coumadin)  DVT prophylaxis - mechanical: SCDs  Ulcer prophylaxis: Not indicated        Lines:   RIJ 4/20    Drips:   fentanyl at 50  levophed at 5  prop at 35     ABX: none     Cultures: none     Echo from 4/20 with EF of 65%       Assessment and Plan:  Bilateral pneumothoraces with bronchopleural fistula and increased subcutaneous emphysema   - CT reviewed   - keep CT to suction   - s/p thoracic surgery consult   - intubated 4/23   Status post mitral valve repair, left atrial appendage ligation and maze procedure  Acute exacerbation of severe stage III chronic obstructive pulmonary disease   - cont BDs   - rt protocol   - monitor for need of steroids/abx  History of atrial flutter - in SR  Systemic arterial hypertension   - cont BP control  Dyslipidemia  Anxiety  Obstructive sleep apnea - CPAP at night      Critical Care Time:   35 minutes  Date of service:  4/24/17  No time overlap  Time excludes procedures  Discussed with RN, RT, Team    I, Monica Estrella (Sigrid), am scribing for, and in the presence of, Dr. Leda M.D..  Electronically signed by: Monica Estrella (Sigrid), 4/24/2017  I, Dr. Leda M.D. personally performed the services described in this documentation, as scribed by Monica Estrella in my presence, and it is both accurate and complete.

## 2017-04-24 NOTE — DOCUMENTATION QUERY
DOCUMENTATION QUERY    PROVIDERS: Please select “Cosign w/ note”to reply to query.    To better represent the severity of illness of your patient, please review the following information and exercise your independent professional judgment in responding to this query.     Shock is documented in the Procedure note for Arterial Line placement dated 4/24/17. Based upon the clinical findings, risk factors, and treatment, can this diagnosis be further specified?    • Septic shock  • Cardiogenic shock  • Hypovolemic shock  • Other type of shock  • Hypotension without shock  • Unable to determine  • Other explanation of clinical findings          The medical record reflects the following:   Clinical Findings Shock  - needing continuous Blood Pressure monitoring    Treatment Femoral arterial line placement, right  Levophed infusion   Ventilated  Chest tubes to suction, bilateral   Risk Factors Age  Mitral valve repair  Bilateral pneumothorax - large  Acute hypoxemic respiratory failure  SQ emphysema  Bronchopleural fistula   Location within medical record  Progress Notes, Radiology Results and Procedure Notes     Thank you,   Rina Roger RN  Clinical   [phone]

## 2017-04-24 NOTE — RESPIRATORY CARE
Intubation Assist    Intubation assist performed yes  Reason for intubation resp failure  Positive Color Change on EZCap? yes  Difficult Intubation/Number of attempts no/1  Evidence of aspiration none    Events/Summary/Plan: Pt intubated with MD  (04/23/17 2120)

## 2017-04-24 NOTE — CARE PLAN
Problem: Ventilation Defect:  Goal: Ability to achieve and maintain unassisted ventilation or tolerate decreased levels of ventilator support  Outcome: PROGRESSING AS EXPECTED  Intervention: Support and monitor invasive and noninvasive mechanical ventilation  Adult Ventilation Update    Total Vent Days: 2      Patient Lines/Drains/Airways Status    Active Airway      Name: Placement date: Placement time: Site: Days:     Airway Group ET Tube Oral 7.5 04/23/17 2120  Oral  less than 1                     Intervention: Perform ventilator associated pneumonia prevention interventions  See VAP Flowsheet

## 2017-04-24 NOTE — CARE PLAN
Problem: Ventilation Defect:  Goal: Ability to achieve and maintain unassisted ventilation or tolerate decreased levels of ventilator support  Adult Ventilation Update    Total Vent Days: 2      Patient Lines/Drains/Airways Status    Active Airway      Name: Placement date: Placement time: Site: Days:     Airway Group ET Tube Oral 7.5 04/23/17 2120  Oral  less than 1                     #FVC / Vital Capacity (liters) : 1.2 (04/20/17 1645)  NIF (cm H2O) : -30 (04/20/17 1645)  Rapid Shallow Breathing Index (RR/VT): 38 (04/20/17 1645)  Plateau Pressure (Q Shift): 22 (04/24/17 0241)  Static Compliance (ml / cm H2O): 39 (04/24/17 1441)    Patient failed trials because of Barriers to Wean: Other (Comments) (RN requested to hold SBT) (04/24/17 0717)  Barriers to SBT    Length of Weaning Trial                   Cough: Productive (04/24/17 1441)  Sputum Amount: Scant (04/24/17 1441)  Sputum Color: White;Clear (04/24/17 1441)  Sputum Consistency: Thick;Thin (04/24/17 1441)      Events/Summary/Plan: Vent check, no changs made at this time, pt remains on CMV. (04/24/17 1441)

## 2017-04-25 ENCOUNTER — APPOINTMENT (OUTPATIENT)
Dept: RADIOLOGY | Facility: MEDICAL CENTER | Age: 72
DRG: 219 | End: 2017-04-25
Attending: INTERNAL MEDICINE
Payer: MEDICARE

## 2017-04-25 LAB
ANION GAP SERPL CALC-SCNC: 5 MMOL/L (ref 0–11.9)
BASE EXCESS BLDA CALC-SCNC: 10 MMOL/L (ref -4–3)
BASOPHILS # BLD AUTO: 0.1 % (ref 0–1.8)
BASOPHILS # BLD: 0.01 K/UL (ref 0–0.12)
BODY TEMPERATURE: ABNORMAL DEGREES
BUN SERPL-MCNC: 18 MG/DL (ref 8–22)
CALCIUM SERPL-MCNC: 7.7 MG/DL (ref 8.5–10.5)
CHLORIDE SERPL-SCNC: 101 MMOL/L (ref 96–112)
CO2 BLDA-SCNC: 36 MMOL/L (ref 20–33)
CO2 SERPL-SCNC: 34 MMOL/L (ref 20–33)
CREAT SERPL-MCNC: 0.53 MG/DL (ref 0.5–1.4)
EKG IMPRESSION: NORMAL
EOSINOPHIL # BLD AUTO: 0.23 K/UL (ref 0–0.51)
EOSINOPHIL NFR BLD: 2.4 % (ref 0–6.9)
ERYTHROCYTE [DISTWIDTH] IN BLOOD BY AUTOMATED COUNT: 47 FL (ref 35.9–50)
GFR SERPL CREATININE-BSD FRML MDRD: >60 ML/MIN/1.73 M 2
GLUCOSE SERPL-MCNC: 123 MG/DL (ref 65–99)
HCO3 BLDA-SCNC: 34.4 MMOL/L (ref 17–25)
HCT VFR BLD AUTO: 25.5 % (ref 37–47)
HGB BLD-MCNC: 8.3 G/DL (ref 12–16)
IMM GRANULOCYTES # BLD AUTO: 0.06 K/UL (ref 0–0.11)
IMM GRANULOCYTES NFR BLD AUTO: 0.6 % (ref 0–0.9)
INR PPP: 1.04 (ref 0.87–1.13)
LYMPHOCYTES # BLD AUTO: 1.82 K/UL (ref 1–4.8)
LYMPHOCYTES NFR BLD: 19.1 % (ref 22–41)
MAGNESIUM SERPL-MCNC: 2 MG/DL (ref 1.5–2.5)
MCH RBC QN AUTO: 32.2 PG (ref 27–33)
MCHC RBC AUTO-ENTMCNC: 32.5 G/DL (ref 33.6–35)
MCV RBC AUTO: 98.8 FL (ref 81.4–97.8)
MONOCYTES # BLD AUTO: 1.26 K/UL (ref 0–0.85)
MONOCYTES NFR BLD AUTO: 13.2 % (ref 0–13.4)
NEUTROPHILS # BLD AUTO: 6.17 K/UL (ref 2–7.15)
NEUTROPHILS NFR BLD: 64.6 % (ref 44–72)
NRBC # BLD AUTO: 0 K/UL
NRBC BLD AUTO-RTO: 0 /100 WBC
O2/TOTAL GAS SETTING VFR VENT: 30 %
PCO2 BLDA: 42.8 MMHG (ref 26–37)
PCO2 TEMP ADJ BLDA: 43.8 MMHG (ref 26–37)
PH BLDA: 7.51 [PH] (ref 7.4–7.5)
PH TEMP ADJ BLDA: 7.5 [PH] (ref 7.4–7.5)
PHOSPHATE SERPL-MCNC: 2.7 MG/DL (ref 2.5–4.5)
PLATELET # BLD AUTO: 171 K/UL (ref 164–446)
PMV BLD AUTO: 9.9 FL (ref 9–12.9)
PO2 BLDA: 72 MMHG (ref 64–87)
PO2 TEMP ADJ BLDA: 75 MMHG (ref 64–87)
POTASSIUM SERPL-SCNC: 3.4 MMOL/L (ref 3.6–5.5)
PROTHROMBIN TIME: 13.9 SEC (ref 12–14.6)
RBC # BLD AUTO: 2.58 M/UL (ref 4.2–5.4)
SAO2 % BLDA: 96 % (ref 93–99)
SODIUM SERPL-SCNC: 140 MMOL/L (ref 135–145)
SPECIMEN DRAWN FROM PATIENT: ABNORMAL
TRIGL SERPL-MCNC: 109 MG/DL (ref 0–149)
WBC # BLD AUTO: 9.6 K/UL (ref 4.8–10.8)

## 2017-04-25 PROCEDURE — 700102 HCHG RX REV CODE 250 W/ 637 OVERRIDE(OP): Performed by: NURSE PRACTITIONER

## 2017-04-25 PROCEDURE — A9270 NON-COVERED ITEM OR SERVICE: HCPCS | Performed by: NURSE PRACTITIONER

## 2017-04-25 PROCEDURE — 83735 ASSAY OF MAGNESIUM: CPT

## 2017-04-25 PROCEDURE — 94667 MNPJ CHEST WALL 1ST: CPT

## 2017-04-25 PROCEDURE — 85610 PROTHROMBIN TIME: CPT

## 2017-04-25 PROCEDURE — 37799 UNLISTED PX VASCULAR SURGERY: CPT

## 2017-04-25 PROCEDURE — 700102 HCHG RX REV CODE 250 W/ 637 OVERRIDE(OP): Performed by: INTERNAL MEDICINE

## 2017-04-25 PROCEDURE — 85025 COMPLETE CBC W/AUTO DIFF WBC: CPT

## 2017-04-25 PROCEDURE — A9270 NON-COVERED ITEM OR SERVICE: HCPCS | Performed by: INTERNAL MEDICINE

## 2017-04-25 PROCEDURE — 93010 ELECTROCARDIOGRAM REPORT: CPT | Performed by: INTERNAL MEDICINE

## 2017-04-25 PROCEDURE — 82803 BLOOD GASES ANY COMBINATION: CPT

## 2017-04-25 PROCEDURE — 84100 ASSAY OF PHOSPHORUS: CPT

## 2017-04-25 PROCEDURE — 700111 HCHG RX REV CODE 636 W/ 250 OVERRIDE (IP): Performed by: NURSE PRACTITIONER

## 2017-04-25 PROCEDURE — 94150 VITAL CAPACITY TEST: CPT

## 2017-04-25 PROCEDURE — 94669 MECHANICAL CHEST WALL OSCILL: CPT

## 2017-04-25 PROCEDURE — 80048 BASIC METABOLIC PNL TOTAL CA: CPT

## 2017-04-25 PROCEDURE — 94003 VENT MGMT INPAT SUBQ DAY: CPT

## 2017-04-25 PROCEDURE — 99291 CRITICAL CARE FIRST HOUR: CPT | Performed by: INTERNAL MEDICINE

## 2017-04-25 PROCEDURE — 700111 HCHG RX REV CODE 636 W/ 250 OVERRIDE (IP): Performed by: INTERNAL MEDICINE

## 2017-04-25 PROCEDURE — 770022 HCHG ROOM/CARE - ICU (200)

## 2017-04-25 PROCEDURE — 71010 DX-CHEST-PORTABLE (1 VIEW): CPT

## 2017-04-25 PROCEDURE — 84478 ASSAY OF TRIGLYCERIDES: CPT

## 2017-04-25 PROCEDURE — 93005 ELECTROCARDIOGRAM TRACING: CPT | Performed by: INTERNAL MEDICINE

## 2017-04-25 RX ORDER — POTASSIUM CHLORIDE 1.5 G/1.58G
20 POWDER, FOR SOLUTION ORAL 2 TIMES DAILY
Status: DISCONTINUED | OUTPATIENT
Start: 2017-04-25 | End: 2017-05-05 | Stop reason: HOSPADM

## 2017-04-25 RX ORDER — POTASSIUM CHLORIDE 750 MG/1
20 TABLET, FILM COATED, EXTENDED RELEASE ORAL 2 TIMES DAILY
Status: DISCONTINUED | OUTPATIENT
Start: 2017-04-25 | End: 2017-05-05 | Stop reason: HOSPADM

## 2017-04-25 RX ADMIN — BUDESONIDE AND FORMOTEROL FUMARATE DIHYDRATE 2 PUFF: 160; 4.5 AEROSOL RESPIRATORY (INHALATION) at 20:15

## 2017-04-25 RX ADMIN — ONDANSETRON 4 MG: 2 INJECTION INTRAMUSCULAR; INTRAVENOUS at 15:55

## 2017-04-25 RX ADMIN — AMIODARONE HYDROCHLORIDE 400 MG: 200 TABLET ORAL at 08:14

## 2017-04-25 RX ADMIN — AMIODARONE HYDROCHLORIDE 400 MG: 200 TABLET ORAL at 20:09

## 2017-04-25 RX ADMIN — FUROSEMIDE 20 MG: 10 INJECTION, SOLUTION INTRAVENOUS at 08:15

## 2017-04-25 RX ADMIN — ATORVASTATIN CALCIUM 10 MG: 10 TABLET, FILM COATED ORAL at 08:15

## 2017-04-25 RX ADMIN — POTASSIUM CHLORIDE 20 MEQ: 1.5 POWDER, FOR SOLUTION ORAL at 12:14

## 2017-04-25 RX ADMIN — FAMOTIDINE 20 MG: 20 TABLET, FILM COATED ORAL at 08:14

## 2017-04-25 RX ADMIN — POTASSIUM CHLORIDE 20 MEQ: 1.5 POWDER, FOR SOLUTION ORAL at 20:09

## 2017-04-25 RX ADMIN — FAMOTIDINE 20 MG: 20 TABLET, FILM COATED ORAL at 20:09

## 2017-04-25 RX ADMIN — SERTRALINE 50 MG: 50 TABLET, FILM COATED ORAL at 08:14

## 2017-04-25 RX ADMIN — TRAMADOL HYDROCHLORIDE 50 MG: 50 TABLET, COATED ORAL at 20:09

## 2017-04-25 RX ADMIN — POTASSIUM CHLORIDE 10 MEQ: 1.5 POWDER, FOR SOLUTION ORAL at 08:14

## 2017-04-25 RX ADMIN — ASPIRIN 81 MG: 81 TABLET ORAL at 08:15

## 2017-04-25 RX ADMIN — PROPOFOL 40 MCG/KG/MIN: 10 INJECTION, EMULSION INTRAVENOUS at 02:00

## 2017-04-25 ASSESSMENT — PAIN SCALES - GENERAL
PAINLEVEL_OUTOF10: 4
PAINLEVEL_OUTOF10: ASSUMED PAIN PRESENT
PAINLEVEL_OUTOF10: 4
PAINLEVEL_OUTOF10: ASSUMED PAIN PRESENT
PAINLEVEL_OUTOF10: ASSUMED PAIN PRESENT
PAINLEVEL_OUTOF10: 6
PAINLEVEL_OUTOF10: ASSUMED PAIN PRESENT
PAINLEVEL_OUTOF10: 4
PAINLEVEL_OUTOF10: 4
PAINLEVEL_OUTOF10: ASSUMED PAIN PRESENT
PAINLEVEL_OUTOF10: 4
PAINLEVEL_OUTOF10: 4

## 2017-04-25 ASSESSMENT — PULMONARY FUNCTION TESTS: FVC: .8

## 2017-04-25 ASSESSMENT — ENCOUNTER SYMPTOMS
PSYCHIATRIC NEGATIVE: 1
GASTROINTESTINAL NEGATIVE: 1
CARDIOVASCULAR NEGATIVE: 1
EYES NEGATIVE: 1
MUSCULOSKELETAL NEGATIVE: 1
RESPIRATORY NEGATIVE: 1
NEUROLOGICAL NEGATIVE: 1

## 2017-04-25 NOTE — CARE PLAN
Problem: Knowledge Deficit  Goal: Knowledge of disease process/condition, treatment plan, diagnostic tests, and medications will improve  Outcome: PROGRESSING AS EXPECTED  Patient educated on extubation process.     Problem: Pain Management  Goal: Pain level will decrease to patient’s comfort goal  Outcome: PROGRESSING AS EXPECTED  Pain managed with medications.

## 2017-04-25 NOTE — PROGRESS NOTES
Pulmonary Critical Care Progress Note    Date of service: 4/25/2017     Interval Events:  24 hour interval history reviewed  Reason for visit:  Atelectasis, COPD, MARY, pneumothoraces    Tmax 100.6  -1.1L over the last 24hr, even since admit  98% on 30% FiO2  CXR with ongoing diffuse sub Q air, possible trace right apical pneumo. Otherwise, grossly unchanged from prior  Coumadin held for chest tubes  Left Ct with ongoing air leak    PFSH:  No change.      Respiratory:  Coleman Vent Mode: APVCMV, Rate (breaths/min): 16, Vt Target (mL): 330, PEEP/CPAP: 5, FiO2: 30, Static Compliance (ml / cm H2O): 38, Control VTE (exp VT): 327  Pulse Oximetry: 98 %  Air leak in left chest tube  Few coarse crackles, no wheezing  SBT now, tolerating well  PEEP 5 at 30%     Recent Labs      04/24/17   0137  04/25/17   0438   ISTATAPH  7.425  7.512*   ISTATAPCO2  49.6*  42.8*   ISTATAPO2  350*  72   ISTATATCO2  34*  36*   [id number]  100*  96   ISTATARTHCO3  32.5*  34.4*   ISTATARTBE  7*  10*   ISTATTEMP  36.6 C  37.5 C   ISTATFIO2  100  30   ISTATSPEC  Arterial  Arterial   ISTATAPHTC  7.431  7.504*   [id number]  348*  75       HemoDynamics:  Pulse: 73, Heart Rate (Monitored): 73  Arterial BP: 114/56 mmHg, NIBP: (!) 92/62 mmHg  CVP (mm Hg): (!) 7 MM HG  SR      Neuro:  Fentanyl 50  Levophed off  Prop off      Fluids:  Intake/Output       04/23/17 0700 - 04/24/17 0659 04/24/17 0700 - 04/25/17 0659 04/25/17 0700 - 04/26/17 0659      0700-1859 1900-0659 Total 0700-1859 1900-0659 Total 0700-1859 1900-0659 Total       Intake    P.O.  560  -- 560  --  -- --  --  -- --    P.O. 560 -- 560 -- -- -- -- -- --    I.V.  --  680 680  218.6  267.3 485.9  --  -- --    Propofol Volume -- 125.5 125.5 135.2 198 333.2 -- -- --    Norepinephrine Volume -- 54.5 54.5 83.4 69.3 152.7 -- -- --    IV Volume (Plasmalyte) -- 500 500 -- -- -- -- -- --    Other  --  -- --  --  90 90  --  -- --    Medications (P.O./ Enteral Liquids) -- -- -- -- 90 90 -- -- --     Enteral  --  100 100  250  490 740  --  -- --    Enteral Volume -- 100 100 250 400 650 -- -- --    Free Water / Tube Flush -- -- -- -- 90 90 -- -- --    Total Intake  468.6 847.3 1315.9 -- -- --       Output    Urine  1840  1300 3140  1950  360 2310  --  -- --    Number of Times Voided 3 x -- 3 x -- -- -- -- -- --    Indwelling Cathether -- 1000 1000 2938 386 4062 -- -- --    Void (ml) 8232 944 4373 -- -- -- -- -- --    Drains  250  217 467  41  75 116  --  -- --    Mediastinal Chest Tube 2 140 67 207 3 20 23 -- -- --    Residual Amount (ml) (Discarded) -- -- -- -- 0 0 -- -- --    Right Chest Tube 1 -- 30 30 3 20 23 -- -- --    Mediastinal Chest Tube 1 110 35 145 20 10 30 -- -- --    Left Chest Tube 1 -- 85 85 15 25 40 -- -- --    Stool  --  -- --  --  -- --  --  -- --    Number of Times Stooled 1 x -- 1 x -- -- -- -- -- --    Total Output 2090 1517 3607 9759 568 9636 -- -- --       Net I/O     -1530 -737 -2267 -1522.4 412.3 -1110.1 -- -- --        Weight: 61.3 kg (135 lb 2.3 oz)  Recent Labs      17   0517   0530  17   0450   SODIUM  133*  135  140   POTASSIUM  4.6  3.7  3.4*   CHLORIDE  99  101  101   CO2  31  30  34*   BUN  19  17  18   CREATININE  0.66  0.59  0.53   MAGNESIUM   --   1.8  2.0   PHOSPHORUS   --   1.8*  2.7   CALCIUM  7.9*  7.9*  7.7*       GI/Nutrition:  Abd soft ND/NT  No N/V/P  Tolerating enteral TF      Liver Function  Recent Labs      17   0555  17   0530  17   0450   ALTSGPT   --   23   --    ASTSGOT   --   25   --    ALKPHOSPHAT   --   70   --    TBILIRUBIN   --   0.4   --    PREALBUMIN   --   8.0*   --    GLUCOSE  94  169*  123*       Heme:  Recent Labs      17   0555  17   0530  17   0450   RBC  2.56*  2.60*  2.58*   HEMOGLOBIN  8.2*  8.2*  8.3*   HEMATOCRIT  25.4*  25.4*  25.5*   PLATELETCT  129*  138*  171   PROTHROMBTM  13.3  13.4  13.9   INR  0.98  0.99  1.04       Infectious Disease:  Temp  Av.8 °C (100.1 °F)   Min: 37.6 °C (99.7 °F)  Max: 38.1 °C (100.6 °F)  Monitored Temp  Av.7 °C (99.8 °F)  Min: 37.2 °C (99 °F)  Max: 38.1 °C (100.6 °F)    Recent Labs      17   0555  17   0530  17   0450   WBC  14.8*  11.4*  9.6   NEUTSPOLYS   --   76.60*  64.60   LYMPHOCYTES   --   12.40*  19.10*   MONOCYTES   --   9.70  13.20   EOSINOPHILS   --   0.60  2.40   BASOPHILS   --   0.10  0.10   ASTSGOT   --   25   --    ALTSGPT   --   23   --    ALKPHOSPHAT   --   70   --    TBILIRUBIN   --   0.4   --      Current Facility-Administered Medications   Medication Dose Frequency Provider Last Rate Last Dose   • potassium chloride ER (KLOR-CON) tablet 10 mEq  10 mEq DAILY Donna L Johnathan, A.P.N.        Or   • potassium chloride (KLOR-CON) 20 MEQ packet 10 mEq  10 mEq DAILY Donna L Johnathan, A.P.N.   10 mEq at 17 1021   • propofol (DIPRIVAN) injection  5-80 mcg/kg/min Continuous Jeremy M Gonda, M.D. 14.3 mL/hr at 17 0200 40 mcg/kg/min at 17 0200   • norepinephrine (LEVOPHED) 8 mg in  mL Infusion  0.5-30 mcg/min Continuous Jeremy M Gonda, M.D. 5.6 mL/hr at 17 0250 3 mcg/min at 17 0250   • chlorhexidine (PERIDEX) 0.12 % solution 15 mL  15 mL BID Jeremy M Gonda, M.D.   15 mL at 17 2020   • lidocaine (XYLOCAINE) 1%  injection  1-2 mL Q30 MIN PRN Jeremy M Gonda, M.D.       • MD ALERT...Adult ICU Electrolyte Replacement per Pharmacy Protocol   pharmacy to dose Jeremy M Gonda, M.D.       • fentaNYL (SUBLIMAZE) injection 25 mcg  25 mcg Q HOUR PRN Jeremy M Gonda, M.D.   25 mcg at 17 1026    Or   • fentaNYL (SUBLIMAZE) injection 50 mcg  50 mcg Q HOUR PRN Jeremy M Gonda, M.D.   50 mcg at 17 0105    Or   • fentaNYL (SUBLIMAZE) injection 100 mcg  100 mcg Q HOUR PRN Jeremy M Gonda, M.D.       • fentaNYL (SUBLIMAZE) 50 mcg/mL in 50mL   Continuous Jeremy M Gonda, M.D. 1 mL/hr at 17 50 mcg/hr at 17   • famotidine (PEPCID) tablet 20 mg  20 mg Q12HRS Jeremy M Gonda,  M.D.   20 mg at 04/24/17 2020    Or   • famotidine (PEPCID) injection 20 mg  20 mg Q12HRS Jeremy M Gonda, M.D.   20 mg at 04/24/17 0815   • alprazolam (XANAX) tablet 0.25 mg  0.25 mg TID PRN Donna Mann, A.P.N.   0.25 mg at 04/22/17 1315   • tiotropium (SPIRIVA) 18 MCG inhalation capsule 1 Cap  1 Cap DAILY Fernandez Hays M.D.   Stopped at 04/24/17 0900   • amiodarone (CORDARONE) tablet 400 mg  400 mg TWICE DAILY Donna Mann, A.P.N.   400 mg at 04/24/17 2020   • furosemide (LASIX) injection 20 mg  20 mg Q DAY Donna Mann, A.P.N.   20 mg at 04/24/17 0815   • atorvastatin (LIPITOR) tablet 10 mg  10 mg DAILY Tayler Kimball A.P.N.   10 mg at 04/24/17 0815   • budesonide-formoterol (SYMBICORT) 160-4.5 MCG/ACT inhaler 2 Puff  2 Puff BID Tayler Kimball A.P.N.   Stopped at 04/23/17 2100   • sertraline (ZOLOFT) tablet 50 mg  50 mg DAILY Tayler Kimball, A.P.N.   50 mg at 04/24/17 0815   • albuterol inhaler 2 Puff  2 Puff Q4HRS PRN Tayler Kimball A.P.N.   2 Puff at 04/22/17 1516   • Respiratory Care per Protocol   Continuous RT Tayler Kimball, A.P.N.       • NS infusion   Continuous Tayler Kimball A.P.N. 10 mL/hr at 04/20/17 1207 500 mL at 04/20/17 1207   • Pharmacy Consult Request ...Pain Management Review 1 Each  1 Each PRN Tayler Kimball A.P.N.       • docusate sodium (COLACE) capsule 100 mg  100 mg QAM Tayler Kimball A.P.N.   Stopped at 04/24/17 0900    And   • senna-docusate (PERICOLACE or SENOKOT S) 8.6-50 MG per tablet 1 Tab  1 Tab Nightly Tayler Kimball, A.P.N.   1 Tab at 04/24/17 2020    And   • senna-docusate (PERICOLACE or SENOKOT S) 8.6-50 MG per tablet 1 Tab  1 Tab Q24HRS PRN Tayler Kimball, A.P.N.        And   • lactulose 20 GM/30ML solution 30 mL  30 mL Q24HRS PRN Tayler Kimball, A.P.N.        And   • bisacodyl (DULCOLAX) suppository 10 mg  10 mg Q24HRS PRN Tayler Kimball, A.P.N.        And   • fleet enema 133 mL  1 Each Once PRN Tayler Kimball, A.P.N.       • aspirin EC (ECOTRIN) tablet  81 mg  81 mg DAILY Tayler Kimball, A.P.N.   81 mg at 04/24/17 0815   • oxycodone immediate-release (ROXICODONE) tablet 5 mg  5 mg Q3HRS PRN Tayler Kimball, A.P.N.   5 mg at 04/20/17 2029   • oxycodone immediate release (ROXICODONE) tablet 10 mg  10 mg Q3HRS PRN Tayler Kimball, A.P.N.   10 mg at 04/24/17 1021   • tramadol (ULTRAM) 50 MG tablet 50 mg  50 mg Q4HRS PRN Tayler Kimball, A.P.N.   50 mg at 04/22/17 1852   • ondansetron (ZOFRAN) syringe/vial injection 4 mg  4 mg Q6HRS PRN Tayler Kimball, A.P.N.   4 mg at 04/21/17 0835    Or   • prochlorperazine (COMPAZINE) injection 10 mg  10 mg Q6HRS PRN Tayler Kimball, A.P.N.   10 mg at 04/21/17 1014    Or   • promethazine (PHENERGAN) suppository 25 mg  25 mg Q6HRS PRN Tayler Kimball, A.P.N.       • acetaminophen (TYLENOL) tablet 650 mg  650 mg Q4HRS PRN Tayler Kimball, A.P.N.        Or   • acetaminophen (TYLENOL) suppository 650 mg  650 mg Q4HRS PRN Tayler Kimball, A.P.N.       • mag hydrox-al hydrox-simeth (MAALOX PLUS ES or MYLANTA DS) suspension 30 mL  30 mL Q4HRS PRN Tayler Kimball, A.P.N.       • diphenhydrAMINE (BENADRYL) tablet/capsule 25 mg  25 mg HS PRN - MR X 1 Tayler Kimball, A.P.N.   25 mg at 04/22/17 2127   • ipratropium-albuterol (DUONEB) nebulizer solution 3 mL  3 mL Q2HRS PRN (RT) Fernandez Hays M.D.   3 mL at 04/23/17 2055     Last reviewed on 4/20/2017  6:03 AM by Hyacinth P. Inna, PhT    Quality  Measures:  Labs reviewed, Medications reviewed and Radiology images reviewed  Avila catheter: No Avila      DVT Prophylaxis: Warfarin (Coumadin)  DVT prophylaxis - mechanical: SCDs  Ulcer prophylaxis: Not indicated        Lines:   RIJ 4/20    Drips:   Fentanyl 50  Levophed off  Prop off    ABX:   None     Cultures:   None     Echo from 4/20 with EF of 65%       Assessment and Plan:  Bilateral pneumothoraces with bronchopleural fistula and increased subcutaneous emphysema   - CT reviewed   - keep CT to suction   - s/p thoracic surgery consult   -  intubated 4/23    - left ct with ongoing leak   - sbt and plan to extubate  Status post mitral valve repair, left atrial appendage ligation and maze procedure  Acute exacerbation of severe stage III chronic obstructive pulmonary disease   - cont BDs   - rt protocol   - monitor for need of steroids/abx  History of atrial flutter - in SR  Systemic arterial hypertension   - cont BP control  Dyslipidemia  Anxiety  Obstructive sleep apnea - CPAP at night      Critical Care Time:  37 minutes  Date of service:  4/25/17  No time overlap  Time excludes procedures  Discussed with RN, RT, Team      I, Lourdes Miller (Teraibe), am scribing for, and in the presence of, Lew Tompkins M.D.    Electronically signed by: Lourdes Miller (Sigrid), 4/25/2017    ILew M.D. personally performed the services described in this documentation, as scribed by Lourdes Miller in my presence, and it is both accurate and complete.

## 2017-04-25 NOTE — RESPIRATORY CARE
Ventilator Weaning Update    Patient is on vent day 3.  SBT was tolerated for a minimum of 1 hours on settings of 5/5.    Wean parameters for this SBT were:  #FVC / Vital Capacity (liters) : 0.8 (04/25/17 0920)  NIF (cm H2O) : -29 (04/25/17 0920)  Rapid Shallow Breathing Index (RR/VT): 31 (04/25/17 0920)  RR (bpm): 13 (04/25/17 0920)  Spontaneous VE: 6.5 (04/25/17 0920)  Spontaneous VT: 435 (04/25/17 0920)            Events/Summary/Plan: Parameters collected pt placed back on rest settings (04/25/17 0920)

## 2017-04-25 NOTE — CARE PLAN
Problem: Post Op Day 4 CABG/Heart Valve Replacement  Goal: Optimal care of the Post Op CABG/Heart Valve replacement Post Op Day 4  Intervention: Daily Weights  Weight taken in am  Intervention: Shower daily and clean incisions twice daily with soap and water  Patient reintubated, CHG completed  Intervention: Up in chair for all meals  Patient intubated and on bed rest  Intervention: Ambulate, increasing the distance each time x 3 and before bed  Bed rest  Intervention: IS q 1 hour while awake and record best IS volume  intubated  Intervention: Consider removal of larsen, chest tube and pacer wire if not already done  New chest tubes placed and larsen still requried

## 2017-04-25 NOTE — PROGRESS NOTES
Cardiovascular Surgery Progress Note    Name: Kelly Lovett  MRN: [id number]  : 1945  Admit Date: 2017  5:20 AM  Procedure:  Procedure(s) and Anesthesia Type:     * MITRAL VALVE REPAIR  - General     * MAZE PROCEDURE, Left atrial appendage ligation - General     * PAGE - General  4 Day Post-Op    Vitals:  Patient Vitals for the past 8 hrs:   Temp Monitored Temp SpO2 O2 Delivery Pulse Heart Rate (Monitored) Resp NIBP Weight   17 0813 - - 96 % - - 74 - - -   17 0700 - 37.7 °C (99.9 °F) 98 % - 73 73 16 (!) 92/62 mmHg -   17 0644 - - 98 % - - 71 - - -   17 0600 - 37.6 °C (99.7 °F) 98 % - 69 69 16 (!) 85/59 mmHg -   17 0500 - 37.5 °C (99.5 °F) 98 % - 70 70 16 (!) 94/66 mmHg -   17 0437 - - 98 % - - 70 - - -   17 0400 37.6 °C (99.7 °F) 37.6 °C (99.7 °F) 97 % Ventilator 70 70 16 (!) 84/60 mmHg 61.3 kg (135 lb 2.3 oz)   17 0301 - - 97 % - - 68 - - -   17 0300 - 37.7 °C (99.9 °F) 97 % - 68 - 16 (!) 76/56 mmHg -   17 0200 - 37.7 °C (99.9 °F) 96 % - 65 65 16 (!) 79/56 mmHg -     Temp (24hrs), Av.8 °C (100.1 °F), Min:37.6 °C (99.7 °F), Max:38.1 °C (100.6 °F)      Respiratory:  Coleman Vent Mode: Spont, Rate (breaths/min): 16, PEEP/CPAP: 5, FiO2: 30, P Peak (PIP): 11, P MEAN: 7 Respiration: 16, Pulse Oximetry: 96 %  Chest Tube Group Right-Tube Status / Drainage: Sutured in Place;Patent;Draining;Small;Serosanguinous, Chest Tube Group Left-Tube Status / Drainage: Patent;Sutured in Place;Draining, Chest Tube Group Right;Left;Mediastinal Straight, Angled 32-Tube Status / Drainage: Small;Sanguinous;Subcutaneous Air;Patent;Sutured in Place, Chest Tube Group Right-Device: Suction 20 cm Water;Dry Closed Drainage System, Chest Tube Group Left-Device: Suction 20 cm Water;Closed Drainage System;Air Leak Present, Chest Tube Group Right;Left;Mediastinal Straight, Angled 32-Device: Dry Closed Drainage System;Suction 20 cm Water;Air Leak Present  Chest Tube  "Drains:   Left Chest Tube 1: 25 mlRight Chest Tube 1: 20 mlMediastinal Chest Tube 1: 10 ml    Fluids:    Intake/Output Summary (Last 24 hours) at 04/25/17 0902  Last data filed at 04/25/17 0600   Gross per 24 hour   Intake 1223.19 ml   Output   2256 ml   Net -1032.81 ml     Admit weight: Weight: 54.7 kg (120 lb 9.5 oz)  Current weight: Weight: 61.3 kg (135 lb 2.3 oz) (04/25/17 0400)    Labs:  Recent Labs      04/23/17   0555  04/24/17   0530  04/25/17   0450   WBC  14.8*  11.4*  9.6   RBC  2.56*  2.60*  2.58*   HEMOGLOBIN  8.2*  8.2*  8.3*   HEMATOCRIT  25.4*  25.4*  25.5*   MCV  99.2*  97.7  98.8*   MCH  32.0  31.5  32.2   MCHC  32.3*  32.3*  32.5*   RDW  46.7  45.9  47.0   PLATELETCT  129*  138*  171   MPV  10.2  10.3  9.9     Recent Labs      04/24/17   0530  04/25/17   0450   NEUTSPOLYS  76.60*  64.60   LYMPHOCYTES  12.40*  19.10*   MONOCYTES  9.70  13.20   EOSINOPHILS  0.60  2.40   BASOPHILS  0.10  0.10     Recent Labs      04/23/17   0555  04/24/17   0530  04/25/17   0450   SODIUM  133*  135  140   POTASSIUM  4.6  3.7  3.4*   CHLORIDE  99  101  101   CO2  31  30  34*   GLUCOSE  94  169*  123*   BUN  19  17  18   CREATININE  0.66  0.59  0.53   CALCIUM  7.9*  7.9*  7.7*     Recent Labs      04/23/17   0555  04/24/17   0530  04/25/17   0450   INR  0.98  0.99  1.04       Medications:  • potassium chloride ER  10 mEq      Or   • potassium chloride  10 mEq     • chlorhexidine  15 mL     • MD ALERT...Adult ICU Electrolyte Replacement per Pharmacy Protocol       • famotidine  20 mg      Or   • famotidine  20 mg     • tiotropium  1 Cap     • amiodarone  400 mg     • furosemide  20 mg     • atorvastatin  10 mg     • budesonide-formoterol  2 Puff     • sertraline  50 mg     • docusate sodium  100 mg      And   • senna-docusate  1 Tab     • aspirin EC  81 mg         Exam:   Review of Systems   Unable to perform ROS: intubated   HENT: Negative.         Right cheek \"puffed up\"   Eyes: Negative.    Respiratory: Negative.  "   Cardiovascular: Negative.    Gastrointestinal: Negative.    Genitourinary: Negative.    Musculoskeletal: Negative.    Skin: Negative.    Neurological: Negative.    Endo/Heme/Allergies: Negative.    Psychiatric/Behavioral: Negative.        Physical Exam   Constitutional: She is oriented to person, place, and time. She appears well-developed and well-nourished.   vented   HENT:   Head: Normocephalic.   Eyes: Pupils are equal, round, and reactive to light.   Neck: Normal range of motion. No JVD present.   Cardiovascular: Normal rate, regular rhythm and normal heart sounds.    Pulmonary/Chest: Effort normal and breath sounds normal.   Bases diminished  R chest crepitus  SUB q air extends up to R face   Abdominal: Soft. Bowel sounds are normal. She exhibits no distension. There is no guarding.   Musculoskeletal: Normal range of motion.   Neurological: She is alert and oriented to person, place, and time.   Skin: Skin is warm and dry.   Surgical incisions CDI   Psychiatric: She has a normal mood and affect. Her behavior is normal.       Quality Measures:   EKG reviewed, Medications reviewed, Labs reviewed and Radiology images reviewed  Larsen catheter: No Larsen  Central line in place: Concentrated IV drugs and Need for access    DVT Prophylaxis: Contraindicated - High bleeding risk  DVT prophylaxis - mechanical: SCDs  Ulcer prophylaxis: Yes    Assessed for rehab: Patient was assess for and/or received rehabilitation services during this hospitalization      Assessment/Plan:  POD 1 HDS, NSR--some runs VT ?afib with aberrancy? Last night, started on amio gtt.  Extubated, neuro grossly intact.  CT output min, mod airleak.  CXR no pneumothorax.  Adequate UOP, wts up, +3L. labs noted.  PLAN:  Keep CTs.  DC larsen.  STart gentle diuresis.  Change to PO amio.  Stress cough/deepbreath/IS. AMB.    POD 2 HDS, NSR.  Crepitus Right upper chest, mild. CXR without pnuemothorax. On 2 l nc. CT output min, mod airleak remains--connections  recheck, and redressed. Good bowel sounds, not passing gas, no distention/n/v. W/w. INR trending up.  DC temp wires. Add xanax anxiety. Urine retention overnight, straight cath--to reinsert larsen if unable to void again.  AMB/IS.  CXR in AM.   POD 3 HDS NSR.  Airleak remains.  Sub q air has now extended up neck, Right facial swelling.  No resp distress.  CXR with bilateral chest wall air and pneumomediastinum.  Dr. Estrada consulted, will see patient Monday AM.  CT chest today.  CTs to be placed to 2 separate pleural vacs. DC temp wires.  CPM.  POD 4 re-intubated last night.  Chest tubes inserted pleural.  Per CT scan moderate bilateral pneumothorax.  Sedated.  + air leaks  Both chest tubes.  Sean to see patient today.    POD 5 Sedated/levo---wakes and follows appropriately, weaning off levo as wakes.  CXR small R apical pneumothorax.  Right face swelling improved.  SBTs this AM< likely to extubate today.  Ailreak on 1 CT remains.  Keep all CTs today.  CPM.       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

## 2017-04-25 NOTE — PROGRESS NOTES
Report received from gilberto Lima. Patient intubated and sedated, levophed running with stable vitals. Continuous fent drip running, femoral art line in place in trendelenburg position. Daughter at bedside, all questions answered.

## 2017-04-26 ENCOUNTER — APPOINTMENT (OUTPATIENT)
Dept: RADIOLOGY | Facility: MEDICAL CENTER | Age: 72
DRG: 219 | End: 2017-04-26
Attending: INTERNAL MEDICINE
Payer: MEDICARE

## 2017-04-26 LAB
ANION GAP SERPL CALC-SCNC: 3 MMOL/L (ref 0–11.9)
BASOPHILS # BLD AUTO: 0.2 % (ref 0–1.8)
BASOPHILS # BLD: 0.02 K/UL (ref 0–0.12)
BUN SERPL-MCNC: 23 MG/DL (ref 8–22)
CALCIUM SERPL-MCNC: 8.5 MG/DL (ref 8.5–10.5)
CHLORIDE SERPL-SCNC: 101 MMOL/L (ref 96–112)
CO2 SERPL-SCNC: 38 MMOL/L (ref 20–33)
CREAT SERPL-MCNC: 0.53 MG/DL (ref 0.5–1.4)
EOSINOPHIL # BLD AUTO: 0.21 K/UL (ref 0–0.51)
EOSINOPHIL NFR BLD: 2.1 % (ref 0–6.9)
ERYTHROCYTE [DISTWIDTH] IN BLOOD BY AUTOMATED COUNT: 48.3 FL (ref 35.9–50)
GFR SERPL CREATININE-BSD FRML MDRD: >60 ML/MIN/1.73 M 2
GLUCOSE BLD-MCNC: 136 MG/DL (ref 65–99)
GLUCOSE SERPL-MCNC: 137 MG/DL (ref 65–99)
HCT VFR BLD AUTO: 26.4 % (ref 37–47)
HCT VFR BLD CALC: 37 % (ref 37–47)
HGB BLD-MCNC: 12.6 G/DL (ref 12–16)
HGB BLD-MCNC: 8.3 G/DL (ref 12–16)
IMM GRANULOCYTES # BLD AUTO: 0.08 K/UL (ref 0–0.11)
IMM GRANULOCYTES NFR BLD AUTO: 0.8 % (ref 0–0.9)
INR PPP: 0.95 (ref 0.87–1.13)
LYMPHOCYTES # BLD AUTO: 1.08 K/UL (ref 1–4.8)
LYMPHOCYTES NFR BLD: 10.6 % (ref 22–41)
MAGNESIUM SERPL-MCNC: 1.9 MG/DL (ref 1.5–2.5)
MCH RBC QN AUTO: 31.7 PG (ref 27–33)
MCHC RBC AUTO-ENTMCNC: 31.4 G/DL (ref 33.6–35)
MCV RBC AUTO: 100.8 FL (ref 81.4–97.8)
MONOCYTES # BLD AUTO: 1.38 K/UL (ref 0–0.85)
MONOCYTES NFR BLD AUTO: 13.5 % (ref 0–13.4)
NEUTROPHILS # BLD AUTO: 7.42 K/UL (ref 2–7.15)
NEUTROPHILS NFR BLD: 72.8 % (ref 44–72)
NRBC # BLD AUTO: 0 K/UL
NRBC BLD AUTO-RTO: 0 /100 WBC
PHOSPHATE SERPL-MCNC: 3.6 MG/DL (ref 2.5–4.5)
PLATELET # BLD AUTO: 180 K/UL (ref 164–446)
PMV BLD AUTO: 9.9 FL (ref 9–12.9)
POTASSIUM SERPL-SCNC: 4.2 MMOL/L (ref 3.6–5.5)
PROTHROMBIN TIME: 13 SEC (ref 12–14.6)
RBC # BLD AUTO: 2.62 M/UL (ref 4.2–5.4)
SODIUM SERPL-SCNC: 142 MMOL/L (ref 135–145)
WBC # BLD AUTO: 10.2 K/UL (ref 4.8–10.8)

## 2017-04-26 PROCEDURE — 82962 GLUCOSE BLOOD TEST: CPT

## 2017-04-26 PROCEDURE — 83735 ASSAY OF MAGNESIUM: CPT

## 2017-04-26 PROCEDURE — 85610 PROTHROMBIN TIME: CPT

## 2017-04-26 PROCEDURE — 97535 SELF CARE MNGMENT TRAINING: CPT

## 2017-04-26 PROCEDURE — 700102 HCHG RX REV CODE 250 W/ 637 OVERRIDE(OP): Performed by: INTERNAL MEDICINE

## 2017-04-26 PROCEDURE — 97530 THERAPEUTIC ACTIVITIES: CPT

## 2017-04-26 PROCEDURE — 99291 CRITICAL CARE FIRST HOUR: CPT | Performed by: INTERNAL MEDICINE

## 2017-04-26 PROCEDURE — 700111 HCHG RX REV CODE 636 W/ 250 OVERRIDE (IP): Performed by: INTERNAL MEDICINE

## 2017-04-26 PROCEDURE — 71010 DX-CHEST-PORTABLE (1 VIEW): CPT

## 2017-04-26 PROCEDURE — 700102 HCHG RX REV CODE 250 W/ 637 OVERRIDE(OP): Performed by: NURSE PRACTITIONER

## 2017-04-26 PROCEDURE — 94669 MECHANICAL CHEST WALL OSCILL: CPT

## 2017-04-26 PROCEDURE — 80048 BASIC METABOLIC PNL TOTAL CA: CPT

## 2017-04-26 PROCEDURE — A9270 NON-COVERED ITEM OR SERVICE: HCPCS | Performed by: INTERNAL MEDICINE

## 2017-04-26 PROCEDURE — P9047 ALBUMIN (HUMAN), 25%, 50ML: HCPCS | Performed by: INTERNAL MEDICINE

## 2017-04-26 PROCEDURE — A9270 NON-COVERED ITEM OR SERVICE: HCPCS | Performed by: NURSE PRACTITIONER

## 2017-04-26 PROCEDURE — 85025 COMPLETE CBC W/AUTO DIFF WBC: CPT

## 2017-04-26 PROCEDURE — 84100 ASSAY OF PHOSPHORUS: CPT

## 2017-04-26 PROCEDURE — 700111 HCHG RX REV CODE 636 W/ 250 OVERRIDE (IP): Performed by: NURSE PRACTITIONER

## 2017-04-26 PROCEDURE — 700112 HCHG RX REV CODE 229: Performed by: NURSE PRACTITIONER

## 2017-04-26 PROCEDURE — 94668 MNPJ CHEST WALL SBSQ: CPT

## 2017-04-26 PROCEDURE — 700111 HCHG RX REV CODE 636 W/ 250 OVERRIDE (IP)

## 2017-04-26 PROCEDURE — 770022 HCHG ROOM/CARE - ICU (200)

## 2017-04-26 RX ORDER — MAGNESIUM SULFATE HEPTAHYDRATE 40 MG/ML
2 INJECTION, SOLUTION INTRAVENOUS ONCE
Status: COMPLETED | OUTPATIENT
Start: 2017-04-26 | End: 2017-04-26

## 2017-04-26 RX ORDER — SODIUM CHLORIDE 9 MG/ML
INJECTION, SOLUTION INTRAVENOUS
Status: ACTIVE
Start: 2017-04-26 | End: 2017-04-27

## 2017-04-26 RX ORDER — ALBUMIN (HUMAN) 12.5 G/50ML
25 SOLUTION INTRAVENOUS ONCE
Status: COMPLETED | OUTPATIENT
Start: 2017-04-26 | End: 2017-04-26

## 2017-04-26 RX ADMIN — BUDESONIDE AND FORMOTEROL FUMARATE DIHYDRATE 2 PUFF: 160; 4.5 AEROSOL RESPIRATORY (INHALATION) at 08:00

## 2017-04-26 RX ADMIN — DIPHENHYDRAMINE HCL 25 MG: 25 TABLET ORAL at 22:07

## 2017-04-26 RX ADMIN — AMIODARONE HYDROCHLORIDE 400 MG: 200 TABLET ORAL at 07:46

## 2017-04-26 RX ADMIN — AMIODARONE HYDROCHLORIDE 400 MG: 200 TABLET ORAL at 20:23

## 2017-04-26 RX ADMIN — BUDESONIDE AND FORMOTEROL FUMARATE DIHYDRATE 2 PUFF: 160; 4.5 AEROSOL RESPIRATORY (INHALATION) at 20:25

## 2017-04-26 RX ADMIN — ATORVASTATIN CALCIUM 10 MG: 10 TABLET, FILM COATED ORAL at 07:46

## 2017-04-26 RX ADMIN — FAMOTIDINE 20 MG: 20 TABLET, FILM COATED ORAL at 07:46

## 2017-04-26 RX ADMIN — TIOTROPIUM BROMIDE 1 CAPSULE: 18 CAPSULE ORAL; RESPIRATORY (INHALATION) at 08:01

## 2017-04-26 RX ADMIN — TRAMADOL HYDROCHLORIDE 50 MG: 50 TABLET, COATED ORAL at 06:50

## 2017-04-26 RX ADMIN — TRAMADOL HYDROCHLORIDE 50 MG: 50 TABLET, COATED ORAL at 02:44

## 2017-04-26 RX ADMIN — ASPIRIN 81 MG: 81 TABLET ORAL at 07:46

## 2017-04-26 RX ADMIN — Medication 25 G: at 10:45

## 2017-04-26 RX ADMIN — FAMOTIDINE 20 MG: 20 TABLET, FILM COATED ORAL at 20:24

## 2017-04-26 RX ADMIN — MAGNESIUM SULFATE IN WATER 2 G: 40 INJECTION, SOLUTION INTRAVENOUS at 08:06

## 2017-04-26 RX ADMIN — STANDARDIZED SENNA CONCENTRATE AND DOCUSATE SODIUM 1 TABLET: 8.6; 5 TABLET, FILM COATED ORAL at 20:21

## 2017-04-26 RX ADMIN — FENTANYL CITRATE 75 MCG/HR: 50 INJECTION, SOLUTION INTRAMUSCULAR; INTRAVENOUS at 02:35

## 2017-04-26 RX ADMIN — TRAMADOL HYDROCHLORIDE 50 MG: 50 TABLET, COATED ORAL at 20:20

## 2017-04-26 RX ADMIN — SERTRALINE 50 MG: 50 TABLET, FILM COATED ORAL at 07:46

## 2017-04-26 RX ADMIN — FUROSEMIDE 20 MG: 10 INJECTION, SOLUTION INTRAVENOUS at 07:47

## 2017-04-26 RX ADMIN — DOCUSATE SODIUM 100 MG: 100 CAPSULE ORAL at 08:06

## 2017-04-26 RX ADMIN — POTASSIUM CHLORIDE 20 MEQ: 750 TABLET, FILM COATED, EXTENDED RELEASE ORAL at 20:22

## 2017-04-26 RX ADMIN — POTASSIUM CHLORIDE 20 MEQ: 1.5 POWDER, FOR SOLUTION ORAL at 07:47

## 2017-04-26 ASSESSMENT — ENCOUNTER SYMPTOMS
CARDIOVASCULAR NEGATIVE: 1
PSYCHIATRIC NEGATIVE: 1
MUSCULOSKELETAL NEGATIVE: 1
EYES NEGATIVE: 1
GASTROINTESTINAL NEGATIVE: 1
NEUROLOGICAL NEGATIVE: 1
RESPIRATORY NEGATIVE: 1

## 2017-04-26 ASSESSMENT — PAIN SCALES - GENERAL
PAINLEVEL_OUTOF10: 4
PAINLEVEL_OUTOF10: 3
PAINLEVEL_OUTOF10: 2
PAINLEVEL_OUTOF10: 2
PAINLEVEL_OUTOF10: 3
PAINLEVEL_OUTOF10: 3
PAINLEVEL_OUTOF10: 4
PAINLEVEL_OUTOF10: 2
PAINLEVEL_OUTOF10: 3
PAINLEVEL_OUTOF10: 3
PAINLEVEL_OUTOF10: 4

## 2017-04-26 ASSESSMENT — LIFESTYLE VARIABLES
AVERAGE NUMBER OF DAYS PER WEEK YOU HAVE A DRINK CONTAINING ALCOHOL: 7
DO YOU DRINK ALCOHOL: YES
TOTAL SCORE: 2
EVER HAD A DRINK FIRST THING IN THE MORNING TO STEADY YOUR NERVES TO GET RID OF A HANGOVER: NO
DOES PATIENT WANT TO STOP DRINKING: YES
TOTAL SCORE: 2
DOES PATIENT WANT TO TALK TO SOMEONE ABOUT QUITTING: NO
TOTAL SCORE: 2
CONSUMPTION TOTAL: POSITIVE
HAVE YOU EVER FELT YOU SHOULD CUT DOWN ON YOUR DRINKING: YES
HOW MANY TIMES IN THE PAST YEAR HAVE YOU HAD 5 OR MORE DRINKS IN A DAY: 0
EVER FELT BAD OR GUILTY ABOUT YOUR DRINKING: YES
ON A TYPICAL DAY WHEN YOU DRINK ALCOHOL HOW MANY DRINKS DO YOU HAVE: 2
HAVE PEOPLE ANNOYED YOU BY CRITICIZING YOUR DRINKING: NO

## 2017-04-26 ASSESSMENT — GAIT ASSESSMENTS: GAIT LEVEL OF ASSIST: UNABLE TO PARTICIPATE

## 2017-04-26 NOTE — CARE PLAN
Problem: Post Op Day 4 CABG/Heart Valve Replacement  Goal: Optimal care of the Post Op CABG/Heart Valve replacement Post Op Day 4  Intervention: Daily Weights  Done  Intervention: Shower daily and clean incisions twice daily with soap and water  Done  Intervention: Up in chair for all meals  In chair for breakfast  Intervention: Ambulate, increasing the distance each time x 3 and before bed  Stood and transferred to chair  Intervention: IS q 1 hour while awake and record best IS volume  Best   Intervention: Consider removal of larsen, chest tube and pacer wire if not already done  To be reevaluated

## 2017-04-26 NOTE — CARE PLAN
Problem: Nutritional:  Goal: Achieve adequate nutritional intake  Patient will consume ~50% of meals.  Outcome: NOT MET

## 2017-04-26 NOTE — DIETARY
Nutrition Services: Transition to PO diet    Cortrak removed, TF D/c'd, and pt started on PO diet. Pt to meet nutritional needs with adequate PO intake.    Nutrition Representative will see pt for menu options.  RD following for adequate intake.

## 2017-04-26 NOTE — THERAPY
"Occupational Therapy Treatment completed with focus on ADLs, ADL transfers, patient education, caregiver training and upper extremity function.  Functional Status:  Pt seen today for OT tx now that pt is s/p extubation. Pt able to perform sit<>stand with Luis F, cues for scooting/rocking. Pt maintained standing for 1 min, BP taken and low so patient was returned to sitting at RN's request. Pt able to don/doff socks with supv in 4 point. Tolerated gentle AROM to BUE to maintain ROM. Pt edu on appropriate AE/DME and energy conservation. Pt limited by weakness, fatigue which impacts independence in ADLs and functional mobility.   Plan of Care: Will benefit from Occupational Therapy 3 times per week  Discharge Recommendations:  Equipment Will Continue to Assess for Equipment Needs. Post-acute therapy Discharge to home with outpatient or home health for additional skilled therapy services.    See \"Rehab Therapy-Acute\" Patient Summary Report for complete documentation.   "

## 2017-04-26 NOTE — CARE PLAN
Problem: Pain Management  Goal: Pain level will decrease to patient’s comfort goal  Outcome: PROGRESSING AS EXPECTED  Intervention: Follow pain managment plan developed in collaboration with patient and Interdisciplinary Team  Drips stopped. Pt tolerating PO meds.      Problem: Psychosocial Needs:  Goal: Level of anxiety will decrease  Outcome: PROGRESSING AS EXPECTED  Anxiety triggered when moving forward with treatment plans. Pt reminded of support surrounding patient.

## 2017-04-26 NOTE — THERAPY
"Physical Therapy Treatment completed.   Bed Mobility:  Supine to Sit:  (up in chair)  Transfers: Sit to Stand: Minimal Assist (x2)  Gait: Level Of Assist: Unable to Participate with Will Continue to Assess for Equipment Needs       Plan of Care: Will benefit from Physical Therapy 4 times per week  Discharge Recommendations: Equipment: Will Continue to Assess for Equipment Needs. Post-acute therapy Discharge to home with outpatient or home health for additional skilled therapy services.     See \"Rehab Therapy-Acute\" Patient Summary Report for complete documentation.       "

## 2017-04-26 NOTE — PROGRESS NOTES
Pulmonary Critical Care Progress Note    Date of service: 4/26/2017     Interval Events:  24 hour interval history reviewed  Reason for visit:  Atelectasis, COPD, MARY, pneumothoraces  Tmax 100.2  -1.1 over last 24 hours and -1.5 since admit   CXR with ongoing suq q air. Otherwise unchanged.   4/10 pain   Stopped fent   SR, BP ok  Swallowing well   2L NC   Hematoma to left forearm noted and bruising to femoral art line.   incentive spirometry 1000 mL      PFSH:  No change.      Respiratory:     Pulse Oximetry: 97 %   Improved sub q air. Breath sounds are diminished with some crepitations.    Air leak in left chest tube       Recent Labs      04/24/17   0137  04/25/17   0438   ISTATAPH  7.425  7.512*   ISTATAPCO2  49.6*  42.8*   ISTATAPO2  350*  72   ISTATATCO2  34*  36*   [id number]  100*  96   ISTATARTHCO3  32.5*  34.4*   ISTATARTBE  7*  10*   ISTATTEMP  36.6 C  37.5 C   ISTATFIO2  100  30   ISTATSPEC  Arterial  Arterial   ISTATAPHTC  7.431  7.504*   [id number]  348*  75       HemoDynamics:  Pulse: 80, Heart Rate (Monitored): 80  Arterial BP: 122/56 mmHg, NIBP: (!) 87/53 mmHg  CVP (mm Hg): 3 MM HG  SR. 1+ lower extremity edema       Neuro:  Alert and oriented X4. Follows commands.       Fluids:  Intake/Output       04/24/17 0700 - 04/25/17 0659 04/25/17 0700 - 04/26/17 0659 04/26/17 0700 - 04/27/17 0659      0700-1859 1900-0659 Total 5540-9098 7807-9229 Total 4926-3241 8279-3765 Total       Intake    I.V.  218.6  267.3 485.9  50.8  6.4 57.2  --  -- --    PCA End of Shift Total Volume (ml) -- -- -- -- 6.4 6.4 -- -- --    Propofol Volume 135.2 198 333.2 30.3 -- 30.3 -- -- --    Norepinephrine Volume 83.4 69.3 152.7 20.6 -- 20.6 -- -- --    Other  --  90 90  180  60 240  --  -- --    Medications (P.O./ Enteral Liquids) -- 90 90 180 60 240 -- -- --    Enteral  250  490 740  600  510 1110  --  -- --    Enteral Volume 250 400 650 420 420 840 -- -- --    Free Water / Tube Flush -- 90 90 180 90 270 -- -- --    Total  Intake 468.6 847.3 1315.9 830.8 576.4 1407.2 -- -- --       Output    Urine    360 2310  1800  565 2365  --  -- --    Indwelling Cathether 1823 401 1040 8961 641 1143 -- -- --    Drains  41  75 116  85  95 180  --  -- --    Mediastinal Chest Tube 2 3 20 23 20 30 50 -- -- --    Residual Amount (ml) (Discarded) -- 0 0 -- 0 0 -- -- --    Right Chest Tube 1 3 20 23 10 30 40 -- -- --    Mediastinal Chest Tube 1 20 10 30 10 15 25 -- -- --    Left Chest Tube 1 15 25 40 45 20 65 -- -- --    Total Output 8871 177 6722 9707 219 4724 -- -- --       Net I/O     -1522.4 412.3 -1110.1 -1054.2 -83.6 -1137.8 -- -- --        Weight: 59.9 kg (132 lb 0.9 oz)  Recent Labs      1720   SODIUM  135  140  142   POTASSIUM  3.7  3.4*  4.2   CHLORIDE  101  101  101   CO2  30  34*  38*   BUN  17  18  23*   CREATININE  0.59  0.53  0.53   MAGNESIUM  1.8  2.0  1.9   PHOSPHORUS  1.8*  2.7  3.6   CALCIUM  7.9*  7.7*  8.5       GI/Nutrition:  Abdomen is soft with positive bowel sounds.   Tolerating enteral TF      Liver Function  Recent Labs      17   0520   ALTSGPT  23   --    --    ASTSGOT  25   --    --    ALKPHOSPHAT  70   --    --    TBILIRUBIN  0.4   --    --    PREALBUMIN  8.0*   --    --    GLUCOSE  169*  123*  137*       Heme:  Recent Labs      17   0520  17   0608   RBC  2.60*  2.58*  2.62*   --    HEMOGLOBIN  8.2*  8.3*  8.3*   --    HEMATOCRIT  25.4*  25.5*  26.4*   --    PLATELETCT  138*  171  180   --    PROTHROMBTM  13.4  13.9   --   13.0   INR  0.99  1.04   --   0.95       Infectious Disease:  Temp  Av.5 °C (99.5 °F)  Min: 37.2 °C (99 °F)  Max: 37.9 °C (100.2 °F)  Monitored Temp  Av.5 °C (99.5 °F)  Min: 37.2 °C (99 °F)  Max: 37.9 °C (100.2 °F)    Recent Labs      17   0530  17   0450  17   0520   WBC  11.4*  9.6  10.2   NEUTSPOLYS  76.60*  64.60  72.80*   LYMPHOCYTES   12.40*  19.10*  10.60*   MONOCYTES  9.70  13.20  13.50*   EOSINOPHILS  0.60  2.40  2.10   BASOPHILS  0.10  0.10  0.20   ASTSGOT  25   --    --    ALTSGPT  23   --    --    ALKPHOSPHAT  70   --    --    TBILIRUBIN  0.4   --    --      Current Facility-Administered Medications   Medication Dose Frequency Provider Last Rate Last Dose   • potassium chloride ER (KLOR-CON) tablet 20 mEq  20 mEq BID Lew Tompkins M.D.        Or   • potassium chloride (KLOR-CON) 20 MEQ packet 20 mEq  20 mEq BID Lew Tompkins M.D.   20 mEq at 04/25/17 2009   • propofol (DIPRIVAN) injection  5-80 mcg/kg/min Continuous Jeremy M Gonda, M.D.   Stopped at 04/25/17 0807   • norepinephrine (LEVOPHED) 8 mg in  mL Infusion  0.5-30 mcg/min Continuous Jeremy M Gonda, M.D.   Stopped at 04/25/17 1000   • chlorhexidine (PERIDEX) 0.12 % solution 15 mL  15 mL BID Jeremy M Gonda, M.D.   Stopped at 04/25/17 0900   • lidocaine (XYLOCAINE) 1%  injection  1-2 mL Q30 MIN PRN Jeremy M Gonda, M.D.       • MD ALERT...Adult ICU Electrolyte Replacement per Pharmacy Protocol   pharmacy to dose Jeremy M Gonda, M.D.       • fentaNYL (SUBLIMAZE) injection 25 mcg  25 mcg Q HOUR PRN Jeremy M Gonda, M.D.   25 mcg at 04/24/17 1026    Or   • fentaNYL (SUBLIMAZE) injection 50 mcg  50 mcg Q HOUR PRN Jeremy M Gonda, M.D.   50 mcg at 04/24/17 0105    Or   • fentaNYL (SUBLIMAZE) injection 100 mcg  100 mcg Q HOUR PRN Jeremy M Gonda, M.D.       • fentaNYL (SUBLIMAZE) 50 mcg/mL in 50mL   Continuous Jeremy M Gonda, M.D. 1.5 mL/hr at 04/26/17 0235 75 mcg/hr at 04/26/17 0235   • famotidine (PEPCID) tablet 20 mg  20 mg Q12HRS Jeremy M Gonda, M.D.   20 mg at 04/25/17 2009   • alprazolam (XANAX) tablet 0.25 mg  0.25 mg TID PRN Donna Mann A.P.N.   0.25 mg at 04/22/17 1315   • tiotropium (SPIRIVA) 18 MCG inhalation capsule 1 Cap  1 Cap DAILY Fernandez Hays M.D.   Stopped at 04/24/17 0900   • amiodarone (CORDARONE) tablet 400 mg  400 mg TWICE DAILY Donna Mann,  A.P.N.   400 mg at 04/25/17 2009   • furosemide (LASIX) injection 20 mg  20 mg Q DAY Donna Mann A.P.N.   20 mg at 04/25/17 0815   • atorvastatin (LIPITOR) tablet 10 mg  10 mg DAILY Tayler Kimball A.P.N.   10 mg at 04/25/17 0815   • budesonide-formoterol (SYMBICORT) 160-4.5 MCG/ACT inhaler 2 Puff  2 Puff BID Tayler Kimball A.P.N.   2 Puff at 04/25/17 2015   • sertraline (ZOLOFT) tablet 50 mg  50 mg DAILY Tayler Kimball A.P.N.   50 mg at 04/25/17 0814   • albuterol inhaler 2 Puff  2 Puff Q4HRS PRN Tayler Kimball A.P.N.   2 Puff at 04/22/17 1516   • Respiratory Care per Protocol   Continuous RT Tayler Kimball A.P.N.       • NS infusion   Continuous Tayler Kimball A.P.N. 10 mL/hr at 04/20/17 1207 500 mL at 04/20/17 1207   • Pharmacy Consult Request ...Pain Management Review 1 Each  1 Each PRN Tayler Kimball A.P.N.       • docusate sodium (COLACE) capsule 100 mg  100 mg QAM Tayler Kimball A.P.N.   Stopped at 04/24/17 0900    And   • senna-docusate (PERICOLACE or SENOKOT S) 8.6-50 MG per tablet 1 Tab  1 Tab Nightly Tayler Kimball A.P.N.   1 Tab at 04/24/17 2020    And   • senna-docusate (PERICOLACE or SENOKOT S) 8.6-50 MG per tablet 1 Tab  1 Tab Q24HRS PRN Tayler Kimball A.P.N.        And   • lactulose 20 GM/30ML solution 30 mL  30 mL Q24HRS PRN Tayler Kimball A.P.N.        And   • bisacodyl (DULCOLAX) suppository 10 mg  10 mg Q24HRS PRN Tayler Kimball A.P.N.        And   • fleet enema 133 mL  1 Each Once PRN Tayler Kimball, A.P.N.       • aspirin EC (ECOTRIN) tablet 81 mg  81 mg DAILY Tayler Kimball, A.P.N.   81 mg at 04/25/17 0815   • oxycodone immediate-release (ROXICODONE) tablet 5 mg  5 mg Q3HRS PRN Tayler Kimball, A.P.N.   5 mg at 04/20/17 2029   • oxycodone immediate release (ROXICODONE) tablet 10 mg  10 mg Q3HRS PRN Tayler Kimball, A.P.N.   10 mg at 04/24/17 1021   • tramadol (ULTRAM) 50 MG tablet 50 mg  50 mg Q4HRS PRN Tayler Kimball, A.P.N.   50 mg at 04/26/17 0650   • ondansetron (ZOFRAN)  syringe/vial injection 4 mg  4 mg Q6HRS PRN Tayler Kimball, A.P.N.   4 mg at 04/25/17 1555    Or   • prochlorperazine (COMPAZINE) injection 10 mg  10 mg Q6HRS PRN Tayler Kimball, A.P.N.   10 mg at 04/21/17 1014    Or   • promethazine (PHENERGAN) suppository 25 mg  25 mg Q6HRS PRN Tayler Kimball, A.P.N.       • acetaminophen (TYLENOL) tablet 650 mg  650 mg Q4HRS PRN Tayler Kimball, A.P.N.        Or   • acetaminophen (TYLENOL) suppository 650 mg  650 mg Q4HRS PRN Tayler Kimball, A.P.N.       • mag hydrox-al hydrox-simeth (MAALOX PLUS ES or MYLANTA DS) suspension 30 mL  30 mL Q4HRS PRN Tayler Kimball, A.P.N.       • diphenhydrAMINE (BENADRYL) tablet/capsule 25 mg  25 mg HS PRN - MR X 1 Tayler Kimball, A.P.N.   25 mg at 04/22/17 2127   • ipratropium-albuterol (DUONEB) nebulizer solution 3 mL  3 mL Q2HRS PRN (RT) Fernandez Hays M.D.   3 mL at 04/23/17 2055     Last reviewed on 4/20/2017  6:03 AM by Hyacinth Keith [facility]    Quality  Measures:  Labs reviewed, Medications reviewed and Radiology images reviewed  Avila catheter: No Avila      DVT Prophylaxis: Warfarin (Coumadin)  DVT prophylaxis - mechanical: SCDs  Ulcer prophylaxis: Not indicated        Lines:   RIJ 4/20    Drips:   Fentanyl off  Levophed off  Prop off    ABX:   None     Cultures:   None     Echo from 4/20 with EF of 65%       Assessment and Plan:  Bilateral pneumothoraces with bronchopleural fistula and increased subcutaneous emphysema   - keep CT to suction   - s/p thoracic surgery consult   - intubated 4/23-25   - left ct with ongoing leak  Status post mitral valve repair, left atrial appendage ligation and maze procedure  Acute exacerbation of severe stage III chronic obstructive pulmonary disease   - cont BDs   - rt protocol   - monitor for need of steroids/abx   - rt/02 protocols   - mobilze  History of atrial flutter - in SR  Systemic arterial hypertension   - cont BP control  Dyslipidemia  Anxiety  Obstructive sleep apnea - CPAP at  night      Critical Care Time:  32 minutes  Date of service:  4/26/17  No time overlap  Time excludes procedures  Discussed with RN, RT, Team      I, Monica Estrella (Sigrid), am scribing for, and in the presence of, Dr. Leda M.D.  Electronically signed by: Monica Estrella (Sigrid), 4/26/2017  I, Dr. Leda M.D. personally performed the services described in this documentation, as scribed by Monica Estrella in my presence, and it is both accurate and complete.

## 2017-04-26 NOTE — PROGRESS NOTES
Cardiovascular Surgery Progress Note    [facility]  [id number]  : 1945  Admit Date: 2017  5:20 AM  Procedure:  Procedure(s) and Anesthesia Type:     * MITRAL VALVE REPAIR  - General     * MAZE PROCEDURE, Left atrial appendage ligation - General     * PAGE - General  5 Day Post-Op    Vitals:  Patient Vitals for the past 8 hrs:   Temp Monitored Temp SpO2 O2 (LPM) Pulse Heart Rate (Monitored) Resp Weight   17 0822 - - 97 % 2 80 81 16 -   17 0600 - 37.3 °C (99.1 °F) - - 80 80 (!) 21 -   17 0500 - 37.2 °C (99 °F) - - 82 79 (!) 9 -   17 0400 37.2 °C (99 °F) 37.2 °C (99 °F) - - 85 79 12 59.9 kg (132 lb 0.9 oz)   17 0300 - 37.3 °C (99.1 °F) - - 75 74 (!) 11 -   17 0244 - 37.2 °C (99 °F) 97 % - 77 78 16 -     Temp (24hrs), Av.4 °C (99.4 °F), Min:37.2 °C (99 °F), Max:37.7 °C (99.9 °F)      Respiratory:    Respiration: 16, Pulse Oximetry: 97 %, O2 Daily Delivery Respiratory : Silicone Nasal Cannula  Chest Tube Group Right-Tube Status / Drainage: Sutured in Place;Patent;Draining;Small;Serosanguinous, Chest Tube Group Left-Tube Status / Drainage: Patent;Sutured in Place;Draining, Chest Tube Group Right;Left;Mediastinal Straight, Angled 32-Tube Status / Drainage: Small;Sanguinous;Subcutaneous Air;Patent;Sutured in Place, Chest Tube Group Right-Device: Suction 20 cm Water;Dry Closed Drainage System, Chest Tube Group Left-Device: Suction 20 cm Water;Closed Drainage System;Air Leak Present, Chest Tube Group Right;Left;Mediastinal Straight, Angled 32-Device: Dry Closed Drainage System;Suction 20 cm Water;Air Leak Present  Chest Tube Drains:   Left Chest Tube 1: 20 mlRight Chest Tube 1: 30 mlMediastinal Chest Tube 1: 15 ml    Fluids:    Intake/Output Summary (Last 24 hours) at 17 1038  Last data filed at 17 0600   Gross per 24 hour   Intake 1036.4 ml   Output   2045 ml   Net -1008.6 ml     Admit weight: Weight: 54.7 kg (120 lb 9.5 oz)  Current weight:  "Weight: 59.9 kg (132 lb 0.9 oz) (04/26/17 0400)    Labs:  Recent Labs      04/24/17   0530  04/25/17   0450  04/26/17   0520   WBC  11.4*  9.6  10.2   RBC  2.60*  2.58*  2.62*   HEMOGLOBIN  8.2*  8.3*  8.3*   HEMATOCRIT  25.4*  25.5*  26.4*   MCV  97.7  98.8*  100.8*   MCH  31.5  32.2  31.7   MCHC  32.3*  32.5*  31.4*   RDW  45.9  47.0  48.3   PLATELETCT  138*  171  180   MPV  10.3  9.9  9.9     Recent Labs      04/24/17   0530  04/25/17   0450  04/26/17   0520   NEUTSPOLYS  76.60*  64.60  72.80*   LYMPHOCYTES  12.40*  19.10*  10.60*   MONOCYTES  9.70  13.20  13.50*   EOSINOPHILS  0.60  2.40  2.10   BASOPHILS  0.10  0.10  0.20     Recent Labs      04/24/17   0530  04/25/17   0450  04/26/17   0520   SODIUM  135  140  142   POTASSIUM  3.7  3.4*  4.2   CHLORIDE  101  101  101   CO2  30  34*  38*   GLUCOSE  169*  123*  137*   BUN  17  18  23*   CREATININE  0.59  0.53  0.53   CALCIUM  7.9*  7.7*  8.5     Recent Labs      04/24/17   0530  04/25/17   0450  04/26/17   0608   INR  0.99  1.04  0.95       Medications:  • albumin human 25%  25 g     • potassium chloride ER  20 mEq      Or   • potassium chloride  20 mEq     • MD ALERT...Adult ICU Electrolyte Replacement per Pharmacy Protocol       • famotidine  20 mg     • tiotropium  1 Cap     • amiodarone  400 mg     • furosemide  20 mg     • atorvastatin  10 mg     • budesonide-formoterol  2 Puff     • sertraline  50 mg     • docusate sodium  100 mg      And   • senna-docusate  1 Tab     • aspirin EC  81 mg         Exam:   Review of Systems   Unable to perform ROS: intubated   HENT: Negative.         Right cheek \"puffed up\"   Eyes: Negative.    Respiratory: Negative.    Cardiovascular: Negative.    Gastrointestinal: Negative.    Genitourinary: Negative.    Musculoskeletal: Negative.    Skin: Negative.    Neurological: Negative.    Endo/Heme/Allergies: Negative.    Psychiatric/Behavioral: Negative.        Physical Exam   Constitutional: She is oriented to person, place, and " time. She appears well-developed and well-nourished.   HENT:   Head: Normocephalic.   Eyes: Pupils are equal, round, and reactive to light.   Neck: Normal range of motion. No JVD present.   Cardiovascular: Normal rate, regular rhythm and normal heart sounds.    Pulmonary/Chest: Effort normal and breath sounds normal.   Bases diminished  R chest crepitus  SUB q air extends up to R face   Abdominal: Soft. Bowel sounds are normal. She exhibits no distension. There is no guarding.   Musculoskeletal: Normal range of motion.   Neurological: She is alert and oriented to person, place, and time.   Skin: Skin is warm and dry.   Surgical incisions CDI   Psychiatric: She has a normal mood and affect. Her behavior is normal.       Quality Measures:   EKG reviewed, Medications reviewed, Labs reviewed and Radiology images reviewed  Larsen catheter: No Larsen  Central line in place: Concentrated IV drugs and Need for access    DVT Prophylaxis: Contraindicated - High bleeding risk  DVT prophylaxis - mechanical: SCDs  Ulcer prophylaxis: Yes    Assessed for rehab: Patient was assess for and/or received rehabilitation services during this hospitalization      Assessment/Plan:  POD 1 HDS, NSR--some runs VT ?afib with aberrancy? Last night, started on amio gtt.  Extubated, neuro grossly intact.  CT output min, mod airleak.  CXR no pneumothorax.  Adequate UOP, wts up, +3L. labs noted.  PLAN:  Keep CTs.  DC larsen.  STart gentle diuresis.  Change to PO amio.  Stress cough/deepbreath/IS. AMB.    POD 2 HDS, NSR.  Crepitus Right upper chest, mild. CXR without pnuemothorax. On 2 l nc. CT output min, mod airleak remains--connections recheck, and redressed. Good bowel sounds, not passing gas, no distention/n/v. W/w. INR trending up.  DC temp wires. Add xanax anxiety. Urine retention overnight, straight cath--to reinsert larsen if unable to void again.  AMB/IS.  CXR in AM.   POD 3 HDS NSR.  Airleak remains.  Sub q air has now extended up neck, Right  facial swelling.  No resp distress.  CXR with bilateral chest wall air and pneumomediastinum.  Dr. Estrada consulted, will see patient Monday AM.  CT chest today.  CTs to be placed to 2 separate pleural vacs. DC temp wires.  CPM.  POD 4 re-intubated last night.  Chest tubes inserted pleural.  Per CT scan moderate bilateral pneumothorax.  Sedated.  + air leaks  Both chest tubes.  Sean to see patient today.    POD 5 Sedated/levo---wakes and follows appropriately, weaning off levo as wakes.  CXR small R apical pneumothorax.  Right face swelling improved.  SBTs this AM< likely to extubate today.  Ailreak on 1 CT remains.  Keep all CTs today.  CPM.   POD 6  HDS, NSR.. Extubated, neuro intact.  CXR improved.  Airleak remains visible on the Right Angle Mediastinal CT. welling face improving.  PLAN: Keep CTs today.  DC arlting and NG tube, increase diet as tolerated.  AMB/IS.  Will keep larsen today until more ambulatory with multiple CTs. Increase to full dose asa until able to start coumadin      Active Hospital Problems    Diagnosis   • Bronchopleural fistula (CMS-HCC) [J86.0]   • Severe mitral regurgitation [I34.0]

## 2017-04-27 ENCOUNTER — APPOINTMENT (OUTPATIENT)
Dept: RADIOLOGY | Facility: MEDICAL CENTER | Age: 72
DRG: 219 | End: 2017-04-27
Attending: INTERNAL MEDICINE
Payer: MEDICARE

## 2017-04-27 LAB
ABO GROUP BLD: NORMAL
ANION GAP SERPL CALC-SCNC: 2 MMOL/L (ref 0–11.9)
BARCODED ABORH UBTYP: 6200
BARCODED PRD CODE UBPRD: NORMAL
BARCODED UNIT NUM UBUNT: NORMAL
BASOPHILS # BLD AUTO: 0.2 % (ref 0–1.8)
BASOPHILS # BLD: 0.02 K/UL (ref 0–0.12)
BLD GP AB SCN SERPL QL: NORMAL
BUN SERPL-MCNC: 14 MG/DL (ref 8–22)
CALCIUM SERPL-MCNC: 8.3 MG/DL (ref 8.5–10.5)
CHLORIDE SERPL-SCNC: 96 MMOL/L (ref 96–112)
CO2 SERPL-SCNC: 38 MMOL/L (ref 20–33)
COMPONENT R 8504R: NORMAL
CREAT SERPL-MCNC: 0.45 MG/DL (ref 0.5–1.4)
EOSINOPHIL # BLD AUTO: 0.2 K/UL (ref 0–0.51)
EOSINOPHIL NFR BLD: 2.4 % (ref 0–6.9)
ERYTHROCYTE [DISTWIDTH] IN BLOOD BY AUTOMATED COUNT: 47.3 FL (ref 35.9–50)
GFR SERPL CREATININE-BSD FRML MDRD: >60 ML/MIN/1.73 M 2
GLUCOSE SERPL-MCNC: 94 MG/DL (ref 65–99)
HCT VFR BLD AUTO: 24.9 % (ref 37–47)
HGB BLD-MCNC: 7.8 G/DL (ref 12–16)
IMM GRANULOCYTES # BLD AUTO: 0.1 K/UL (ref 0–0.11)
IMM GRANULOCYTES NFR BLD AUTO: 1.2 % (ref 0–0.9)
INR PPP: 0.99 (ref 0.87–1.13)
LYMPHOCYTES # BLD AUTO: 1.24 K/UL (ref 1–4.8)
LYMPHOCYTES NFR BLD: 14.9 % (ref 22–41)
MAGNESIUM SERPL-MCNC: 1.8 MG/DL (ref 1.5–2.5)
MCH RBC QN AUTO: 31.1 PG (ref 27–33)
MCHC RBC AUTO-ENTMCNC: 31.3 G/DL (ref 33.6–35)
MCV RBC AUTO: 99.2 FL (ref 81.4–97.8)
MONOCYTES # BLD AUTO: 1.13 K/UL (ref 0–0.85)
MONOCYTES NFR BLD AUTO: 13.5 % (ref 0–13.4)
NEUTROPHILS # BLD AUTO: 5.65 K/UL (ref 2–7.15)
NEUTROPHILS NFR BLD: 67.8 % (ref 44–72)
NRBC # BLD AUTO: 0 K/UL
NRBC BLD AUTO-RTO: 0 /100 WBC
PLATELET # BLD AUTO: 205 K/UL (ref 164–446)
PMV BLD AUTO: 9.5 FL (ref 9–12.9)
POTASSIUM SERPL-SCNC: 4.3 MMOL/L (ref 3.6–5.5)
PRODUCT TYPE UPROD: NORMAL
PROTHROMBIN TIME: 13.4 SEC (ref 12–14.6)
RBC # BLD AUTO: 2.51 M/UL (ref 4.2–5.4)
RH BLD: NORMAL
SODIUM SERPL-SCNC: 136 MMOL/L (ref 135–145)
UNIT STATUS USTAT: NORMAL
WBC # BLD AUTO: 8.3 K/UL (ref 4.8–10.8)

## 2017-04-27 PROCEDURE — 80048 BASIC METABOLIC PNL TOTAL CA: CPT

## 2017-04-27 PROCEDURE — 86850 RBC ANTIBODY SCREEN: CPT

## 2017-04-27 PROCEDURE — 700102 HCHG RX REV CODE 250 W/ 637 OVERRIDE(OP): Performed by: NURSE PRACTITIONER

## 2017-04-27 PROCEDURE — 700112 HCHG RX REV CODE 229: Performed by: NURSE PRACTITIONER

## 2017-04-27 PROCEDURE — 94668 MNPJ CHEST WALL SBSQ: CPT

## 2017-04-27 PROCEDURE — P9016 RBC LEUKOCYTES REDUCED: HCPCS

## 2017-04-27 PROCEDURE — 700102 HCHG RX REV CODE 250 W/ 637 OVERRIDE(OP): Performed by: INTERNAL MEDICINE

## 2017-04-27 PROCEDURE — 36430 TRANSFUSION BLD/BLD COMPNT: CPT

## 2017-04-27 PROCEDURE — 30233N1 TRANSFUSION OF NONAUTOLOGOUS RED BLOOD CELLS INTO PERIPHERAL VEIN, PERCUTANEOUS APPROACH: ICD-10-PCS | Performed by: THORACIC SURGERY (CARDIOTHORACIC VASCULAR SURGERY)

## 2017-04-27 PROCEDURE — 700105 HCHG RX REV CODE 258

## 2017-04-27 PROCEDURE — 86923 COMPATIBILITY TEST ELECTRIC: CPT

## 2017-04-27 PROCEDURE — A9270 NON-COVERED ITEM OR SERVICE: HCPCS | Performed by: NURSE PRACTITIONER

## 2017-04-27 PROCEDURE — 83735 ASSAY OF MAGNESIUM: CPT

## 2017-04-27 PROCEDURE — 71010 DX-CHEST-PORTABLE (1 VIEW): CPT

## 2017-04-27 PROCEDURE — 770022 HCHG ROOM/CARE - ICU (200)

## 2017-04-27 PROCEDURE — A9270 NON-COVERED ITEM OR SERVICE: HCPCS | Performed by: INTERNAL MEDICINE

## 2017-04-27 PROCEDURE — 86901 BLOOD TYPING SEROLOGIC RH(D): CPT

## 2017-04-27 PROCEDURE — 99233 SBSQ HOSP IP/OBS HIGH 50: CPT | Performed by: INTERNAL MEDICINE

## 2017-04-27 PROCEDURE — 700111 HCHG RX REV CODE 636 W/ 250 OVERRIDE (IP): Performed by: NURSE PRACTITIONER

## 2017-04-27 PROCEDURE — 94669 MECHANICAL CHEST WALL OSCILL: CPT

## 2017-04-27 PROCEDURE — 86900 BLOOD TYPING SEROLOGIC ABO: CPT

## 2017-04-27 PROCEDURE — 85025 COMPLETE CBC W/AUTO DIFF WBC: CPT

## 2017-04-27 PROCEDURE — 85610 PROTHROMBIN TIME: CPT

## 2017-04-27 PROCEDURE — 700111 HCHG RX REV CODE 636 W/ 250 OVERRIDE (IP): Performed by: PHARMACIST

## 2017-04-27 RX ORDER — MAGNESIUM SULFATE HEPTAHYDRATE 40 MG/ML
2 INJECTION, SOLUTION INTRAVENOUS ONCE
Status: COMPLETED | OUTPATIENT
Start: 2017-04-27 | End: 2017-04-27

## 2017-04-27 RX ORDER — SODIUM CHLORIDE 9 MG/ML
INJECTION, SOLUTION INTRAVENOUS
Status: COMPLETED
Start: 2017-04-27 | End: 2017-04-27

## 2017-04-27 RX ADMIN — FUROSEMIDE 20 MG: 10 INJECTION, SOLUTION INTRAVENOUS at 09:16

## 2017-04-27 RX ADMIN — BUDESONIDE AND FORMOTEROL FUMARATE DIHYDRATE 2 PUFF: 160; 4.5 AEROSOL RESPIRATORY (INHALATION) at 09:41

## 2017-04-27 RX ADMIN — TRAMADOL HYDROCHLORIDE 50 MG: 50 TABLET, COATED ORAL at 10:25

## 2017-04-27 RX ADMIN — AMIODARONE HYDROCHLORIDE 400 MG: 200 TABLET ORAL at 09:15

## 2017-04-27 RX ADMIN — AMIODARONE HYDROCHLORIDE 400 MG: 200 TABLET ORAL at 19:54

## 2017-04-27 RX ADMIN — POTASSIUM CHLORIDE 20 MEQ: 750 TABLET, FILM COATED, EXTENDED RELEASE ORAL at 19:54

## 2017-04-27 RX ADMIN — BUDESONIDE AND FORMOTEROL FUMARATE DIHYDRATE 2 PUFF: 160; 4.5 AEROSOL RESPIRATORY (INHALATION) at 19:53

## 2017-04-27 RX ADMIN — POTASSIUM CHLORIDE 20 MEQ: 1.5 POWDER, FOR SOLUTION ORAL at 09:15

## 2017-04-27 RX ADMIN — DOCUSATE SODIUM 100 MG: 100 CAPSULE ORAL at 09:15

## 2017-04-27 RX ADMIN — TRAMADOL HYDROCHLORIDE 50 MG: 50 TABLET, COATED ORAL at 19:54

## 2017-04-27 RX ADMIN — TIOTROPIUM BROMIDE 1 CAPSULE: 18 CAPSULE ORAL; RESPIRATORY (INHALATION) at 09:18

## 2017-04-27 RX ADMIN — TRAMADOL HYDROCHLORIDE 50 MG: 50 TABLET, COATED ORAL at 14:46

## 2017-04-27 RX ADMIN — STANDARDIZED SENNA CONCENTRATE AND DOCUSATE SODIUM 1 TABLET: 8.6; 5 TABLET, FILM COATED ORAL at 19:54

## 2017-04-27 RX ADMIN — MAGNESIUM SULFATE IN WATER 2 G: 40 INJECTION, SOLUTION INTRAVENOUS at 09:16

## 2017-04-27 RX ADMIN — DIPHENHYDRAMINE HCL 25 MG: 25 TABLET ORAL at 21:07

## 2017-04-27 RX ADMIN — SERTRALINE 50 MG: 50 TABLET, FILM COATED ORAL at 09:14

## 2017-04-27 RX ADMIN — TRAMADOL HYDROCHLORIDE 50 MG: 50 TABLET, COATED ORAL at 06:22

## 2017-04-27 RX ADMIN — SODIUM CHLORIDE 500 ML: 9 INJECTION, SOLUTION INTRAVENOUS at 12:34

## 2017-04-27 RX ADMIN — FAMOTIDINE 20 MG: 20 TABLET, FILM COATED ORAL at 09:15

## 2017-04-27 RX ADMIN — ATORVASTATIN CALCIUM 10 MG: 10 TABLET, FILM COATED ORAL at 09:14

## 2017-04-27 ASSESSMENT — ENCOUNTER SYMPTOMS
GASTROINTESTINAL NEGATIVE: 1
EYES NEGATIVE: 1
NEUROLOGICAL NEGATIVE: 1
PSYCHIATRIC NEGATIVE: 1
CARDIOVASCULAR NEGATIVE: 1
MUSCULOSKELETAL NEGATIVE: 1
RESPIRATORY NEGATIVE: 1

## 2017-04-27 ASSESSMENT — PAIN SCALES - GENERAL
PAINLEVEL_OUTOF10: 4
PAINLEVEL_OUTOF10: 4
PAINLEVEL_OUTOF10: 5
PAINLEVEL_OUTOF10: 0
PAINLEVEL_OUTOF10: 4
PAINLEVEL_OUTOF10: ASSUMED PAIN PRESENT
PAINLEVEL_OUTOF10: 4
PAINLEVEL_OUTOF10: 2
PAINLEVEL_OUTOF10: 6
PAINLEVEL_OUTOF10: 5
PAINLEVEL_OUTOF10: 4
PAINLEVEL_OUTOF10: 5

## 2017-04-27 NOTE — PROGRESS NOTES
"  Trauma/Surgical Progress Note    Author: Cash Estrada Date & Time created: 4/26/2017   10:30 AM     Interval Events:    persisent air leak, mediastinal tube  Lateral tubes to water seal  Reduce suction on mediastinal tube  CXR:  Lung expanded.     Pt and family counseled.      ROS  Hemodynamics:  Pulse 85, temperature 37.3 °C (99.1 °F), resp. rate 20, height 1.638 m (5' 4.49\"), weight 60.5 kg (133 lb 6.1 oz), SpO2 97 %, not currently breastfeeding.     Respiratory:    Respiration: 20, Pulse Oximetry: 97 %, O2 Daily Delivery Respiratory : Silicone Nasal Cannula  Chest Tube Group Right-Tube Status / Drainage: Sutured in Place;Patent;Draining;Small;Serosanguinous, Chest Tube Group Left-Tube Status / Drainage: Patent;Sutured in Place;Draining, Chest Tube Group Right;Left;Mediastinal Straight, Angled 32-Tube Status / Drainage: Small;Sanguinous;Subcutaneous Air;Patent;Sutured in Place, Chest Tube Group Right-Device: Suction 20 cm Water;Dry Closed Drainage System, Chest Tube Group Left-Device: Suction 20 cm Water;Closed Drainage System;Air Leak Present, Chest Tube Group Right;Left;Mediastinal Straight, Angled 32-Device: Dry Closed Drainage System;Suction 20 cm Water;Air Leak Present  PEP/CPT Method: Positive Airway Pressure Device, Work Of Breathing / Effort: Mild  RUL Breath Sounds: Diminished, RML Breath Sounds: Diminished, RLL Breath Sounds: Diminished, DELLA Breath Sounds: Diminished, LLL Breath Sounds: Diminished  Fluids:    Intake/Output Summary (Last 24 hours) at 04/27/17 1055  Last data filed at 04/27/17 1000   Gross per 24 hour   Intake   1620 ml   Output   2485 ml   Net   -865 ml     Admit Weight: 54.7 kg (120 lb 9.5 oz)  Current Weight: 60.5 kg (133 lb 6.1 oz)    Physical Exam    Medical Decision Making/Problem List:    Active Hospital Problems    Diagnosis   • Bronchopleural fistula (CMS-HCC) [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

## 2017-04-27 NOTE — PROGRESS NOTES
Cardiovascular Surgery Progress Note    [facility]  MRN: [id number]  : 1945  Admit Date: 2017  5:20 AM  Procedure:  Procedure(s) and Anesthesia Type:     * MITRAL VALVE REPAIR  - General     * MAZE PROCEDURE, Left atrial appendage ligation - General     * PAGE - General  6 Day Post-Op    Vitals:  Patient Vitals for the past 8 hrs:   Temp Monitored Temp SpO2 O2 Delivery O2 (LPM) Pulse Resp BP NIBP   17 1345 37.6 °C (99.7 °F) - 99 % - - 88 18 (!) 93/64 mmHg -   17 1330 37.6 °C (99.7 °F) - 99 % - - 89 17 (!) 84/50 mmHg -   17 1315 37.6 °C (99.7 °F) - 98 % - - 90 18 (!) 82/45 mmHg -   17 1300 37.5 °C (99.5 °F) - 98 % - - 91 19 (!) 84/52 mmHg -   17 1245 37.5 °C (99.5 °F) - 99 % - - 91 18 (!) 88/51 mmHg -   17 1104 - - 97 % - 2 91 20 - -   17 1000 - 37.2 °C (99 °F) 97 % Silicone Nasal Cannula - 85 - - 102/60 mmHg   17 0900 - - 98 % - - 96 - - -   17 0800 37.3 °C (99.1 °F) - 100 % Silicone Nasal Cannula 2 97 20 - (!) 96/66 mmHg   17 0700 - - 94 % - - 93 - - -   17 0622 - 37 °C (98.6 °F) 99 % - 2 84 (!) 24 - 118/68 mmHg     Temp (24hrs), Av.4 °C (99.4 °F), Min:37.1 °C (98.8 °F), Max:37.6 °C (99.7 °F)      Respiratory:    Respiration: 18, Pulse Oximetry: 99 %, O2 Daily Delivery Respiratory : Silicone Nasal Cannula  Chest Tube Group Right-Tube Status / Drainage: Sutured in Place;Patent;Draining;Small;Serosanguinous, Chest Tube Group Left-Tube Status / Drainage: Patent;Sutured in Place;Draining, Chest Tube Group Right;Left;Mediastinal Straight, Angled 32-Tube Status / Drainage: Small;Sanguinous;Subcutaneous Air;Patent;Sutured in Place, Chest Tube Group Right-Device: Suction 20 cm Water;Dry Closed Drainage System, Chest Tube Group Left-Device: Suction 20 cm Water;Closed Drainage System;Air Leak Present, Chest Tube Group Right;Left;Mediastinal Straight, Angled 32-Device: Dry Closed Drainage System;Suction 20 cm Water;Air Leak  "Present  Chest Tube Drains:   Left Chest Tube 1:  (will record overall shift total)Right Chest Tube 1: 40 mlMediastinal Chest Tube 1: 20 ml    Fluids:    Intake/Output Summary (Last 24 hours) at 04/27/17 1421  Last data filed at 04/27/17 1245   Gross per 24 hour   Intake   1400 ml   Output   1835 ml   Net   -435 ml     Admit weight: Weight: 54.7 kg (120 lb 9.5 oz)  Current weight: Weight: 60.5 kg (133 lb 6.1 oz) (04/27/17 0400)    Labs:  Recent Labs      04/25/17   0450 04/26/17   0520  04/27/17   0425   WBC  9.6  10.2  8.3   RBC  2.58*  2.62*  2.51*   HEMOGLOBIN  8.3*  8.3*  7.8*   HEMATOCRIT  25.5*  26.4*  24.9*   MCV  98.8*  100.8*  99.2*   MCH  32.2  31.7  31.1   MCHC  32.5*  31.4*  31.3*   RDW  47.0  48.3  47.3   PLATELETCT  171  180  205   MPV  9.9  9.9  9.5     Recent Labs      04/25/17 0450 04/26/17   0520  04/27/17   0425   NEUTSPOLYS  64.60  72.80*  67.80   LYMPHOCYTES  19.10*  10.60*  14.90*   MONOCYTES  13.20  13.50*  13.50*   EOSINOPHILS  2.40  2.10  2.40   BASOPHILS  0.10  0.20  0.20     Recent Labs      04/25/17 0450 04/26/17   0520  04/27/17   0425   SODIUM  140  142  136   POTASSIUM  3.4*  4.2  4.3   CHLORIDE  101  101  96   CO2  34*  38*  38*   GLUCOSE  123*  137*  94   BUN  18  23*  14   CREATININE  0.53  0.53  0.45*   CALCIUM  7.7*  8.5  8.3*     Recent Labs      04/25/17 0450 04/26/17   0608  04/27/17   0425   INR  1.04  0.95  0.99       Medications:  • aspirin EC  325 mg     • potassium chloride ER  20 mEq      Or   • potassium chloride  20 mEq     • MD ALERT...Adult ICU Electrolyte Replacement per Pharmacy Protocol       • tiotropium  1 Cap     • amiodarone  400 mg     • furosemide  20 mg     • atorvastatin  10 mg     • budesonide-formoterol  2 Puff     • sertraline  50 mg     • docusate sodium  100 mg      And   • senna-docusate  1 Tab         Exam:   Review of Systems   Unable to perform ROS: intubated   HENT: Negative.         Right cheek \"puffed up\"   Eyes: Negative.  "   Respiratory: Negative.    Cardiovascular: Negative.    Gastrointestinal: Negative.    Genitourinary: Negative.    Musculoskeletal: Negative.    Skin: Negative.    Neurological: Negative.    Endo/Heme/Allergies: Negative.    Psychiatric/Behavioral: Negative.        Physical Exam   Constitutional: She is oriented to person, place, and time. She appears well-developed and well-nourished.   HENT:   Head: Normocephalic.   Eyes: Pupils are equal, round, and reactive to light.   Neck: Normal range of motion. No JVD present.   Cardiovascular: Normal rate, regular rhythm and normal heart sounds.    Pulmonary/Chest: Effort normal and breath sounds normal.   Bases diminished  R chest crepitus  SUB q air extends up to R face   Abdominal: Soft. Bowel sounds are normal. She exhibits no distension. There is no guarding.   Musculoskeletal: Normal range of motion.   Neurological: She is alert and oriented to person, place, and time.   Skin: Skin is warm and dry.   Surgical incisions CDI   Psychiatric: She has a normal mood and affect. Her behavior is normal.       Quality Measures:   EKG reviewed, Medications reviewed, Labs reviewed and Radiology images reviewed  Larsen catheter: No Larsen  Central line in place: Concentrated IV drugs and Need for access    DVT Prophylaxis: Contraindicated - High bleeding risk  DVT prophylaxis - mechanical: SCDs  Ulcer prophylaxis: Yes    Assessed for rehab: Patient was assess for and/or received rehabilitation services during this hospitalization      Assessment/Plan:  POD 1 HDS, NSR--some runs VT ?afib with aberrancy? Last night, started on amio gtt.  Extubated, neuro grossly intact.  CT output min, mod airleak.  CXR no pneumothorax.  Adequate UOP, wts up, +3L. labs noted.  PLAN:  Keep CTs.  DC larsen.  STart gentle diuresis.  Change to PO amio.  Stress cough/deepbreath/IS. AMB.    POD 2 HDS, NSR.  Crepitus Right upper chest, mild. CXR without pnuemothorax. On 2 l nc. CT output min, mod airleak  remains--connections recheck, and redressed. Good bowel sounds, not passing gas, no distention/n/v. W/w. INR trending up.  DC temp wires. Add xanax anxiety. Urine retention overnight, straight cath--to reinsert larsen if unable to void again.  AMB/IS.  CXR in AM.   POD 3 HDS NSR.  Airleak remains.  Sub q air has now extended up neck, Right facial swelling.  No resp distress.  CXR with bilateral chest wall air and pneumomediastinum.  Dr. Estrada consulted, will see patient Monday AM.  CT chest today.  CTs to be placed to 2 separate pleural vacs. DC temp wires.  CPM.  POD 4 re-intubated last night.  Chest tubes inserted pleural.  Per CT scan moderate bilateral pneumothorax.  Sedated.  + air leaks  Both chest tubes.  Sean to see patient today.    POD 5 Sedated/levo---wakes and follows appropriately, weaning off levo as wakes.  CXR small R apical pneumothorax.  Right face swelling improved.  SBTs this AM< likely to extubate today.  Ailreak on 1 CT remains.  Keep all CTs today.  CPM.   POD 6  HDS, NSR.. Extubated, neuro intact.  CXR improved.  Airleak remains visible on the Right Angle Mediastinal CT. welling face improving.  PLAN: Keep CTs today.  DC arlting and NG tube, increase diet as tolerated.  AMB/IS.  Will keep larsen today until more ambulatory with multiple CTs. Increase to full dose asa until able to start coumadin  POD 7 HDS, SR, neuro intact.  Wounds CDI.  Chest tubes still with airleaks.  Up in chair today.  Amb/IS.  CPM.      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

## 2017-04-27 NOTE — PROGRESS NOTES
Bedside report received. Assumed Patient care. Patient A&Ox4. Moderate complaints of pain at this time. Initial assessment completed. Lines and drips verified. Patient's plan of care discussed with Patient and family; Patient's questions answered at this time.      Bed is in lowest position, siderails raised and call light is within reach.

## 2017-04-27 NOTE — CARE PLAN
Problem: Post Op Day 4 CABG/Heart Valve Replacement  Goal: Optimal care of the Post Op CABG/Heart Valve replacement Post Op Day 4  Intervention: Daily Weights  Completed at 0400 daily  Intervention: Shower daily and clean incisions twice daily with soap and water  Patient given bed bath and cleaned incisions twice daily with soap and water  Intervention: Up in chair for all meals  Completed  Intervention: Ambulate, increasing the distance each time x 3 and before bed  Patient ambulated as much as tolerated during day shift  Intervention: IS q 1 hour while awake and record best IS volume  Completed  Intervention: Consider removal of larsen, chest tube and pacer wire if not already done  Larsen and chest tubes to be kept in place at this time.   Intervention: Discharge Education  To be completed prior to discharge

## 2017-04-27 NOTE — FACE TO FACE
Face to Face Supporting Documentation - Home Health    The encounter with this patient was in whole or in part the primary reason for home health admission.    Date of encounter:   Patient:                    MRN:                       YOB: 2018  Kelly Lovett  [id number]  1945     Home health to see patient for:  Skilled Nursing care for assessment, interventions & education    Skilled need for:  Surgical Aftercare wound care, vital signs, medication management, disease education.     Skilled nursing interventions to include:  Wound Care    Homebound status evidenced by:  Have a condition such that leaving his or her home is medically contraindicated. Leaving home requires a considerable and taxing effort. There is a normal inability to leave the home.    Community Physician to provide follow up care: Ritika Jurado M.D.     Optional Interventions? No      I certify the face to face encounter for this home health care referral meets the CMS requirements and the encounter/clinical assessment with the patient was, in whole, or in part, for the medical condition(s) listed above, which is the primary reason for home health care. Based on my clinical findings: the service(s) are medically necessary, support the need for home health care, and the homebound criteria are met.  I certify that this patient has had a face to face encounter by myself.  JULIO CÉSAR Ruiz. - NPI: [id number]

## 2017-04-27 NOTE — CARE PLAN
Problem: Post Op Day 4 CABG/Heart Valve Replacement  Goal: Optimal care of the Post Op CABG/Heart Valve replacement Post Op Day 4  Intervention: Daily Weights  Done  Intervention: Shower daily and clean incisions twice daily with soap and water  Done  Intervention: Up in chair for all meals  Done  Intervention: Ambulate, increasing the distance each time x 3 and before bed  Done  Intervention: IS q 1 hour while awake and record best IS volume  Best IS 1000 mL  Intervention: Consider removal of larsen, chest tube and pacer wire if not already done  To be reevaluated

## 2017-04-27 NOTE — PROGRESS NOTES
Pulmonary Critical Care Progress Note    Date of service: 4/27/2017     Interval Events:  24 hour interval history reviewed  Reason for visit:  Atelectasis, COPD, MARY, pneumothoraces  ROS with negative nausea, vomiting and abdominal pain. Positive chest pain. Positive shortness of breath. All other systems are negative.     Tmax 99.7   -670 cc over last 24 hours.  -2.1 L since admit.  99% on 2 L NC  CXR shows improved right basilar atelectasis. Otherwise grossly unchanged   Alert and oriented X4  SR, BP   Tramadol for pain  750-1000 mL on IS      PFSH:  No change.      Respiratory:     Pulse Oximetry: 99 % on 2L NC  Clear with decreasing crepitations remaining.  Air leak improved to left chest tube only and now minimal    Recent Labs      04/25/17   0438   ISTATAPH  7.512*   ISTATAPCO2  42.8*   ISTATAPO2  72   ISTATATCO2  36*   [id number]  96   ISTATARTHCO3  34.4*   ISTATARTBE  10*   ISTATTEMP  37.5 C   ISTATFIO2  30   ISTATSPEC  Arterial   ISTATAPHTC  7.504*   [id number]  75       HemoDynamics:  Pulse: 84, Heart Rate (Monitored): 80  Arterial BP: 106/55 mmHg, NIBP: 118/68 mmHg  CVP (mm Hg): (!) 289 MM HG  SR. 1+ lower extremity edema       Neuro:  Alert and oriented X4. Follows commands.       Fluids:  Intake/Output       04/25/17 0700 - 04/26/17 0659 04/26/17 0700 - 04/27/17 0659 04/27/17 0700 - 04/28/17 0659      [id number] 3193-1371 Total 7886-9657 6689-6371 Total 2053-5831 0025-8009 Total       Intake    P.O.  --  -- --  1700  250 1950  --  -- --    P.O. -- -- -- 1037 176 3481 -- -- --    I.V.  50.8  6.4 57.2  --  -- --  --  -- --    PCA End of Shift Total Volume (ml) -- 6.4 6.4 -- -- -- -- -- --    Propofol Volume 30.3 -- 30.3 -- -- -- -- -- --    Norepinephrine Volume 20.6 -- 20.6 -- -- -- -- -- --    Other  180  60 240  --  -- --  --  -- --    Medications (P.O./ Enteral Liquids) 180 60 240 -- -- -- -- -- --    Enteral  600  510 1110  70  -- 70  --  -- --    Enteral Volume 420 420 840 70 -- 70 -- -- --     Free Water / Tube Flush 180 90 270 -- -- -- -- -- --    Total Intake 830.8 576.4 1407.2 2617 436 7687 -- -- --       Output    Urine  1800  565 2365  1550  965 2515  --  -- --    Indwelling Cathether 7541 625 2646 6002 191 2507 -- -- --    Drains  85  95 180  95  85 180  --  -- --    Mediastinal Chest Tube 2 20 30 50 40 20 60 -- -- --    Residual Amount (ml) (Discarded) -- 0 0 -- -- -- -- -- --    Right Chest Tube 1 10 30 40 40 40 80 -- -- --    Mediastinal Chest Tube 1 10 15 25 10 20 30 -- -- --    Left Chest Tube 1 45 20 65 5 5 10 -- -- --    Total Output 1153 937 6979 1645 1050 2695 -- -- --       Net I/O     -1054.2 -83.6 -1137.8 125 -800 -145 -- -- --        Weight: 60.5 kg (133 lb 6.1 oz)  Recent Labs      17   0450  17   0520  17   0425   SODIUM  140  142  136   POTASSIUM  3.4*  4.2  4.3   CHLORIDE  101  101  96   CO2  34*  38*  38*   BUN  18  23*  14   CREATININE  0.53  0.53  0.45*   MAGNESIUM  2.0  1.9  1.8   PHOSPHORUS  2.7  3.6   --    CALCIUM  7.7*  8.5  8.3*       GI/Nutrition:  Abdomen is soft. Bowel sounds are distant and hypoactive   PO      Liver Function  Recent Labs      17   0450  17   0520  17   0425   GLUCOSE  123*  137*  94       Heme:  Recent Labs      17   04517   0520  17   0608  17   0425   RBC  2.58*  2.62*   --   2.51*   HEMOGLOBIN  8.3*  8.3*   --   7.8*   HEMATOCRIT  25.5*  26.4*   --   24.9*   PLATELETCT  171  180   --   205   PROTHROMBTM  13.9   --   13.0  13.4   INR  1.04   --   0.95  0.99       Infectious Disease:  Temp  Av.3 °C (99.1 °F)  Min: 37.1 °C (98.8 °F)  Max: 37.6 °C (99.7 °F)  Monitored Temp  Av.2 °C (99 °F)  Min: 37 °C (98.6 °F)  Max: 37.6 °C (99.7 °F)    Recent Labs      17   0450  17   0520  17   0425   WBC  9.6  10.2  8.3   NEUTSPOLYS  64.60  72.80*  67.80   LYMPHOCYTES  19.10*  10.60*  14.90*   MONOCYTES  13.20  13.50*  13.50*   EOSINOPHILS  2.40  2.10  2.40   BASOPHILS   0.10  0.20  0.20     Current Facility-Administered Medications   Medication Dose Frequency Provider Last Rate Last Dose   • aspirin EC (ECOTRIN) tablet 325 mg  325 mg DAILY Donna Mann, A.P.N.       • potassium chloride ER (KLOR-CON) tablet 20 mEq  20 mEq BID Lew Tompkins M.D.   20 mEq at 04/26/17 2022    Or   • potassium chloride (KLOR-CON) 20 MEQ packet 20 mEq  20 mEq BID Lew Tompkins M.D.   20 mEq at 04/26/17 0747   • propofol (DIPRIVAN) injection  5-80 mcg/kg/min Continuous Jeremy M Gonda, M.D.   Stopped at 04/25/17 0807   • norepinephrine (LEVOPHED) 8 mg in  mL Infusion  0.5-30 mcg/min Continuous Jeremy M Gonda, M.D.   Stopped at 04/25/17 1000   • MD ALERT...Adult ICU Electrolyte Replacement per Pharmacy Protocol   pharmacy to dose Jeremy M Gonda, M.D.       • fentaNYL (SUBLIMAZE) injection 25 mcg  25 mcg Q HOUR PRN Jeremy M Gonda, M.D.   25 mcg at 04/24/17 1026    Or   • fentaNYL (SUBLIMAZE) injection 50 mcg  50 mcg Q HOUR PRN Jeremy M Gonda, M.D.   50 mcg at 04/24/17 0105    Or   • fentaNYL (SUBLIMAZE) injection 100 mcg  100 mcg Q HOUR PRN Jeremy M Gonda, M.D.       • fentaNYL (SUBLIMAZE) 50 mcg/mL in 50mL   Continuous Jeremy M Gonda, M.D.   Stopped at 04/26/17 0800   • famotidine (PEPCID) tablet 20 mg  20 mg Q12HRS Jeremy M Gonda, M.D.   20 mg at 04/26/17 2024   • alprazolam (XANAX) tablet 0.25 mg  0.25 mg TID PRN Donna Mann, A.P.N.   0.25 mg at 04/22/17 1315   • tiotropium (SPIRIVA) 18 MCG inhalation capsule 1 Cap  1 Cap DAILY Fernandez Hays M.D.   1 Cap at 04/26/17 0801   • amiodarone (CORDARONE) tablet 400 mg  400 mg TWICE DAILY Donna Mann, A.P.N.   400 mg at 04/26/17 2023   • furosemide (LASIX) injection 20 mg  20 mg Q DAY Donna Mann, A.P.N.   20 mg at 04/26/17 0747   • atorvastatin (LIPITOR) tablet 10 mg  10 mg DAILY Tayler Kimball, A.P.N.   10 mg at 04/26/17 0746   • budesonide-formoterol (SYMBICORT) 160-4.5 MCG/ACT inhaler 2 Puff  2 Puff BID Tayler Kimball,  A.P.N.   2 Puff at 04/26/17 2025   • sertraline (ZOLOFT) tablet 50 mg  50 mg DAILY Tayler Kimball A.P.N.   50 mg at 04/26/17 0746   • albuterol inhaler 2 Puff  2 Puff Q4HRS PRN Tayler Kimball A.P.N.   2 Puff at 04/22/17 1516   • Respiratory Care per Protocol   Continuous RT Tayler Kimball A.P.N.       • NS infusion   Continuous Tayler Kimball A.P.N. 10 mL/hr at 04/20/17 1207 500 mL at 04/20/17 1207   • docusate sodium (COLACE) capsule 100 mg  100 mg QAM Tayler Kimblal, A.P.N.   100 mg at 04/26/17 0806    And   • senna-docusate (PERICOLACE or SENOKOT S) 8.6-50 MG per tablet 1 Tab  1 Tab Nightly Tayler Kimball, A.P.N.   1 Tab at 04/26/17 2021    And   • senna-docusate (PERICOLACE or SENOKOT S) 8.6-50 MG per tablet 1 Tab  1 Tab Q24HRS PRN Tayler Kimball, A.P.N.        And   • lactulose 20 GM/30ML solution 30 mL  30 mL Q24HRS PRN Tayler Kimball, A.P.N.        And   • bisacodyl (DULCOLAX) suppository 10 mg  10 mg Q24HRS PRN Tayler Kimball, A.P.N.        And   • fleet enema 133 mL  1 Each Once PRN Tayler Kimball, A.P.N.       • oxycodone immediate-release (ROXICODONE) tablet 5 mg  5 mg Q3HRS PRN Tayler Kimball, A.P.N.   5 mg at 04/20/17 2029   • oxycodone immediate release (ROXICODONE) tablet 10 mg  10 mg Q3HRS PRN Tayler Kimball, A.P.N.   10 mg at 04/24/17 1021   • tramadol (ULTRAM) 50 MG tablet 50 mg  50 mg Q4HRS PRN Tayler Kimball, A.P.N.   50 mg at 04/27/17 0622   • ondansetron (ZOFRAN) syringe/vial injection 4 mg  4 mg Q6HRS PRN Tayler Kimball, A.P.N.   4 mg at 04/25/17 1555    Or   • prochlorperazine (COMPAZINE) injection 10 mg  10 mg Q6HRS PRN Tayler Kimball, A.P.N.   10 mg at 04/21/17 1014    Or   • promethazine (PHENERGAN) suppository 25 mg  25 mg Q6HRS PRN Tayler Kimball, A.P.N.       • acetaminophen (TYLENOL) tablet 650 mg  650 mg Q4HRS PRN Tayler Kimball, A.P.N.        Or   • acetaminophen (TYLENOL) suppository 650 mg  650 mg Q4HRS PRN Tayler Kimball, A.P.N.       • mag hydrox-al hydrox-simeth (MAALOX PLUS  ES or MYLANTA DS) suspension 30 mL  30 mL Q4HRS PRN Tayler Kimball A.P.N.       • diphenhydrAMINE (BENADRYL) tablet/capsule 25 mg  25 mg HS PRN - MR X 1 Tayler Kimball A.P.N.   25 mg at 04/26/17 2207   • ipratropium-albuterol (DUONEB) nebulizer solution 3 mL  3 mL Q2HRS PRN (RT) Fernandez Hays M.D.   3 mL at 04/23/17 2055     Last reviewed on 4/20/2017  6:03 AM by Randy Licona    Quality  Measures:  Labs reviewed, Medications reviewed and Radiology images reviewed                    Lines:   ALEX 4/20    Drips:   none    ABX:   None     Cultures:   None     Echo from 4/20 with EF of 65%     Lasix 20 Qday      Assessment and Plan:  Bilateral pneumothoraces with bronchopleural fistula and increased subcutaneous emphysema   - keep Left CT to suction   - s/p thoracic surgery consult   - intubated 4/23-25   - left ct with improving leak  Status post mitral valve repair, left atrial appendage ligation and maze procedure  Acute exacerbation of severe stage III chronic obstructive pulmonary disease   - cont BDs   - rt protocol   - monitor for need of steroids/abx   - rt/02 protocols   - mobilze  History of atrial flutter - in SR  Systemic arterial hypertension   - cont BP control  Dyslipidemia  Anxiety  Obstructive sleep apnea       Date of service:  4/27/17  No time overlap  Time excludes procedures  Discussed with RN, RT, Team      IMonica), am scribing for, and in the presence of, Dr. Leda M.D.  Electronically signed by: Monica Blair), 4/27/2017  IDr. Ldea M.D. personally performed the services described in this documentation, as scribed by Monica Estrella in my presence, and it is both accurate and complete.

## 2017-04-28 ENCOUNTER — APPOINTMENT (OUTPATIENT)
Dept: RADIOLOGY | Facility: MEDICAL CENTER | Age: 72
DRG: 219 | End: 2017-04-28
Attending: INTERNAL MEDICINE
Payer: MEDICARE

## 2017-04-28 ENCOUNTER — PATIENT OUTREACH (OUTPATIENT)
Dept: HEALTH INFORMATION MANAGEMENT | Facility: OTHER | Age: 72
End: 2017-04-28

## 2017-04-28 ENCOUNTER — HOME HEALTH ADMISSION (OUTPATIENT)
Dept: HOME HEALTH SERVICES | Facility: HOME HEALTHCARE | Age: 72
End: 2017-04-28
Payer: MEDICARE

## 2017-04-28 LAB
ANION GAP SERPL CALC-SCNC: 4 MMOL/L (ref 0–11.9)
BASOPHILS # BLD AUTO: 0.4 % (ref 0–1.8)
BASOPHILS # BLD: 0.03 K/UL (ref 0–0.12)
BUN SERPL-MCNC: 16 MG/DL (ref 8–22)
CALCIUM SERPL-MCNC: 8.4 MG/DL (ref 8.5–10.5)
CHLORIDE SERPL-SCNC: 94 MMOL/L (ref 96–112)
CO2 SERPL-SCNC: 36 MMOL/L (ref 20–33)
CREAT SERPL-MCNC: 0.54 MG/DL (ref 0.5–1.4)
EOSINOPHIL # BLD AUTO: 0.13 K/UL (ref 0–0.51)
EOSINOPHIL NFR BLD: 1.6 % (ref 0–6.9)
ERYTHROCYTE [DISTWIDTH] IN BLOOD BY AUTOMATED COUNT: 56.3 FL (ref 35.9–50)
GFR SERPL CREATININE-BSD FRML MDRD: >60 ML/MIN/1.73 M 2
GLUCOSE SERPL-MCNC: 92 MG/DL (ref 65–99)
HCT VFR BLD AUTO: 33.7 % (ref 37–47)
HGB BLD-MCNC: 11 G/DL (ref 12–16)
IMM GRANULOCYTES # BLD AUTO: 0.09 K/UL (ref 0–0.11)
IMM GRANULOCYTES NFR BLD AUTO: 1.1 % (ref 0–0.9)
INR PPP: 1.03 (ref 0.87–1.13)
LYMPHOCYTES # BLD AUTO: 1.43 K/UL (ref 1–4.8)
LYMPHOCYTES NFR BLD: 17.5 % (ref 22–41)
MAGNESIUM SERPL-MCNC: 1.9 MG/DL (ref 1.5–2.5)
MCH RBC QN AUTO: 30.6 PG (ref 27–33)
MCHC RBC AUTO-ENTMCNC: 32.6 G/DL (ref 33.6–35)
MCV RBC AUTO: 93.6 FL (ref 81.4–97.8)
MONOCYTES # BLD AUTO: 1.3 K/UL (ref 0–0.85)
MONOCYTES NFR BLD AUTO: 15.9 % (ref 0–13.4)
NEUTROPHILS # BLD AUTO: 5.19 K/UL (ref 2–7.15)
NEUTROPHILS NFR BLD: 63.5 % (ref 44–72)
NRBC # BLD AUTO: 0 K/UL
NRBC BLD AUTO-RTO: 0 /100 WBC
PLATELET # BLD AUTO: 259 K/UL (ref 164–446)
PMV BLD AUTO: 9 FL (ref 9–12.9)
POTASSIUM SERPL-SCNC: 4.4 MMOL/L (ref 3.6–5.5)
PROTHROMBIN TIME: 13.8 SEC (ref 12–14.6)
RBC # BLD AUTO: 3.6 M/UL (ref 4.2–5.4)
SODIUM SERPL-SCNC: 134 MMOL/L (ref 135–145)
WBC # BLD AUTO: 8.2 K/UL (ref 4.8–10.8)

## 2017-04-28 PROCEDURE — 700105 HCHG RX REV CODE 258

## 2017-04-28 PROCEDURE — 700102 HCHG RX REV CODE 250 W/ 637 OVERRIDE(OP): Performed by: INTERNAL MEDICINE

## 2017-04-28 PROCEDURE — 700102 HCHG RX REV CODE 250 W/ 637 OVERRIDE(OP): Performed by: NURSE PRACTITIONER

## 2017-04-28 PROCEDURE — A9270 NON-COVERED ITEM OR SERVICE: HCPCS | Performed by: NURSE PRACTITIONER

## 2017-04-28 PROCEDURE — 80048 BASIC METABOLIC PNL TOTAL CA: CPT

## 2017-04-28 PROCEDURE — 85025 COMPLETE CBC W/AUTO DIFF WBC: CPT

## 2017-04-28 PROCEDURE — 700111 HCHG RX REV CODE 636 W/ 250 OVERRIDE (IP): Performed by: PHARMACIST

## 2017-04-28 PROCEDURE — 94668 MNPJ CHEST WALL SBSQ: CPT

## 2017-04-28 PROCEDURE — 83735 ASSAY OF MAGNESIUM: CPT

## 2017-04-28 PROCEDURE — 85610 PROTHROMBIN TIME: CPT

## 2017-04-28 PROCEDURE — 99233 SBSQ HOSP IP/OBS HIGH 50: CPT | Performed by: INTERNAL MEDICINE

## 2017-04-28 PROCEDURE — 71010 DX-CHEST-PORTABLE (1 VIEW): CPT

## 2017-04-28 PROCEDURE — 94669 MECHANICAL CHEST WALL OSCILL: CPT

## 2017-04-28 PROCEDURE — 700111 HCHG RX REV CODE 636 W/ 250 OVERRIDE (IP): Performed by: NURSE PRACTITIONER

## 2017-04-28 PROCEDURE — A9270 NON-COVERED ITEM OR SERVICE: HCPCS | Performed by: INTERNAL MEDICINE

## 2017-04-28 PROCEDURE — 770022 HCHG ROOM/CARE - ICU (200)

## 2017-04-28 RX ORDER — MAGNESIUM SULFATE HEPTAHYDRATE 40 MG/ML
2 INJECTION, SOLUTION INTRAVENOUS ONCE
Status: COMPLETED | OUTPATIENT
Start: 2017-04-28 | End: 2017-04-28

## 2017-04-28 RX ORDER — SODIUM CHLORIDE 9 MG/ML
INJECTION, SOLUTION INTRAVENOUS
Status: COMPLETED
Start: 2017-04-28 | End: 2017-04-28

## 2017-04-28 RX ADMIN — POTASSIUM CHLORIDE 20 MEQ: 750 TABLET, FILM COATED, EXTENDED RELEASE ORAL at 20:42

## 2017-04-28 RX ADMIN — DIPHENHYDRAMINE HCL 25 MG: 25 TABLET ORAL at 21:47

## 2017-04-28 RX ADMIN — FUROSEMIDE 20 MG: 10 INJECTION, SOLUTION INTRAVENOUS at 08:34

## 2017-04-28 RX ADMIN — ATORVASTATIN CALCIUM 10 MG: 10 TABLET, FILM COATED ORAL at 08:35

## 2017-04-28 RX ADMIN — SERTRALINE 50 MG: 50 TABLET, FILM COATED ORAL at 08:34

## 2017-04-28 RX ADMIN — TRAMADOL HYDROCHLORIDE 50 MG: 50 TABLET, COATED ORAL at 20:42

## 2017-04-28 RX ADMIN — ASPIRIN 325 MG: 325 TABLET, DELAYED RELEASE ORAL at 08:34

## 2017-04-28 RX ADMIN — TIOTROPIUM BROMIDE 1 CAPSULE: 18 CAPSULE ORAL; RESPIRATORY (INHALATION) at 08:34

## 2017-04-28 RX ADMIN — TRAMADOL HYDROCHLORIDE 50 MG: 50 TABLET, COATED ORAL at 10:28

## 2017-04-28 RX ADMIN — POTASSIUM CHLORIDE 20 MEQ: 750 TABLET, FILM COATED, EXTENDED RELEASE ORAL at 08:34

## 2017-04-28 RX ADMIN — BUDESONIDE AND FORMOTEROL FUMARATE DIHYDRATE 2 PUFF: 160; 4.5 AEROSOL RESPIRATORY (INHALATION) at 08:34

## 2017-04-28 RX ADMIN — BUDESONIDE AND FORMOTEROL FUMARATE DIHYDRATE 2 PUFF: 160; 4.5 AEROSOL RESPIRATORY (INHALATION) at 20:42

## 2017-04-28 RX ADMIN — AMIODARONE HYDROCHLORIDE 400 MG: 200 TABLET ORAL at 08:34

## 2017-04-28 RX ADMIN — SODIUM CHLORIDE 250 ML: 9 INJECTION, SOLUTION INTRAVENOUS at 08:47

## 2017-04-28 RX ADMIN — AMIODARONE HYDROCHLORIDE 400 MG: 200 TABLET ORAL at 20:42

## 2017-04-28 RX ADMIN — MAGNESIUM SULFATE IN WATER 2 G: 40 INJECTION, SOLUTION INTRAVENOUS at 08:35

## 2017-04-28 ASSESSMENT — ENCOUNTER SYMPTOMS
RESPIRATORY NEGATIVE: 1
GASTROINTESTINAL NEGATIVE: 1
MUSCULOSKELETAL NEGATIVE: 1
NEUROLOGICAL NEGATIVE: 1
EYES NEGATIVE: 1
PSYCHIATRIC NEGATIVE: 1
CARDIOVASCULAR NEGATIVE: 1

## 2017-04-28 ASSESSMENT — PAIN SCALES - GENERAL
PAINLEVEL_OUTOF10: 0
PAINLEVEL_OUTOF10: 5
PAINLEVEL_OUTOF10: 0
PAINLEVEL_OUTOF10: 4
PAINLEVEL_OUTOF10: 5
PAINLEVEL_OUTOF10: 6

## 2017-04-28 NOTE — CARE PLAN
Problem: Nutritional:  Goal: Achieve adequate nutritional intake  Patient will consume ~50% of meals.   Outcome: MET Date Met:  04/28/17

## 2017-04-28 NOTE — CARE PLAN
Problem: Knowledge Deficit  Goal: Knowledge of disease process/condition, treatment plan, diagnostic tests, and medications will improve  Outcome: PROGRESSING AS EXPECTED  Discussed POC with pt and family. Answered all questions appropriately. White board updated. All medications and interventions explained before performed. Pt verbalized understanding.         Problem: Mobility  Goal: Risk for activity intolerance will decrease  Outcome: PROGRESSING AS EXPECTED  Pt sitting up in chair at beginning of shift. Pt ambulated with steady gait from chair to bed. No complaints of any discomfort or SOB at this time.

## 2017-04-28 NOTE — PROGRESS NOTES
"  Trauma/Surgical Progress Note    Author: Cash Estrada Date & Time created: 4/27/2017   10:33 PM     Interval Events:  Air leak improved  CXR lungs expanded , atelectasis worse  OOB, ambulating  Suction reduced on mediastinal tubes  Others to water seal       ROS  Hemodynamics:  Blood pressure 93/64, pulse 74, temperature 37.6 °C (99.7 °F), resp. rate 13, height 1.638 m (5' 4.49\"), weight 60.5 kg (133 lb 6.1 oz), SpO2 100 %, not currently breastfeeding.     Respiratory:    Respiration: 13, Pulse Oximetry: 100 %, O2 Daily Delivery Respiratory : Silicone Nasal Cannula  Chest Tube Group Right-Tube Status / Drainage: Sutured in Place;Patent;Draining;Small;Serosanguinous, Chest Tube Group Left-Tube Status / Drainage: Patent;Sutured in Place;Draining, Chest Tube Group Right;Left;Mediastinal Straight, Angled 32-Tube Status / Drainage: Small;Sanguinous;Subcutaneous Air;Patent;Sutured in Place, Chest Tube Group Right-Device: Suction 20 cm Water;Dry Closed Drainage System, Chest Tube Group Left-Device: Suction 20 cm Water;Closed Drainage System;Air Leak Present, Chest Tube Group Right;Left;Mediastinal Straight, Angled 32-Device: Dry Closed Drainage System;Suction 20 cm Water;Air Leak Present  PEP/CPT Method: Positive Airway Pressure Device, Work Of Breathing / Effort: Mild  RUL Breath Sounds: Diminished, RML Breath Sounds: Diminished, RLL Breath Sounds: Diminished, DELLA Breath Sounds: Diminished, LLL Breath Sounds: Diminished  Fluids:    Intake/Output Summary (Last 24 hours) at 04/27/17 2233  Last data filed at 04/27/17 1800   Gross per 24 hour   Intake   1290 ml   Output   2713 ml   Net  -1423 ml     Admit Weight: 54.7 kg (120 lb 9.5 oz)  Current Weight: 60.5 kg (133 lb 6.1 oz)    Physical Exam   Cardiovascular: Normal rate.    Pulmonary/Chest: No respiratory distress. She has no wheezes.   Abdominal: Soft. There is no tenderness.   Skin: She is not diaphoretic.       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

## 2017-04-28 NOTE — PROGRESS NOTES
Assumed care report received. Assessment completed. AOx4. Pt sitting in chair complains of pain 5/10, medicated see MAR. No other complaints at this time. Plan of care discussed, verbalized understanding. Fall precautions in place. Call light within reach. Family at bedside. White board updated. Pt assisted back into bed. Heart pillow at bedside. Chest tube output marked with day SOY Bean.

## 2017-04-28 NOTE — PROGRESS NOTES
Cardiovascular Surgery Progress Note    Name: Kelly Tejeda [facility]  MRN: [id number]  : 1945  Admit Date: 2017  5:20 AM  Procedure:  Procedure(s) and Anesthesia Type:     * MITRAL VALVE REPAIR  - General     * MAZE PROCEDURE, Left atrial appendage ligation - General     * PAGE - General  7 Day Post-Op    Vitals:  Patient Vitals for the past 8 hrs:   Temp Monitored Temp SpO2 O2 Delivery O2 (LPM) Pulse Heart Rate (Monitored) Resp NIBP   17 1157 - - 95 % - 2 88 89 20 -   17 1130 - 37.5 °C (99.5 °F) - - - 86 88 (!) 22 101/69 mmHg   17 1003 - 37.3 °C (99.1 °F) - - - 89 90 14 102/70 mmHg   17 0900 - 37.3 °C (99.1 °F) 92 % Silicone Nasal Cannula 2 92 92 20 122/75 mmHg   17 0827 35.8 °C (96.5 °F) - 95 % - 2 90 90 19 -   17 0811 - - 97 % - 2 - - 20 -   17 0800 - 37.4 °C (99.3 °F) - - - 88 88 19 133/76 mmHg   17 0700 - 37.2 °C (99 °F) 98 % Silicone Nasal Cannula 2 86 87 (!) 27 123/84 mmHg   17 0600 - 37.1 °C (98.8 °F) 96 % - - 79 86 18 -   17 0500 - 37 °C (98.6 °F) 99 % - - 80 80 16 (!) 94/66 mmHg     Temp (24hrs), Av.3 °C (99.2 °F), Min:35.8 °C (96.5 °F), Max:37.6 °C (99.7 °F)      Respiratory:    Respiration: 20, Pulse Oximetry: 95 %, O2 Daily Delivery Respiratory : Silicone Nasal Cannula  Chest Tube Group Right-Tube Status / Drainage: Sutured in Place;Patent;Draining;Small;Serosanguinous, Chest Tube Group Left-Tube Status / Drainage: Patent;Sutured in Place;Draining, Chest Tube Group Right;Left;Mediastinal Straight, Angled 32-Tube Status / Drainage: Small;Sanguinous;Subcutaneous Air;Patent;Sutured in Place, Chest Tube Group Right-Device: Dry Closed Drainage System;Suction 20 cm Water, Chest Tube Group Left-Device: Suction 20 cm Water;Closed Drainage System;Air Leak Present, Chest Tube Group Right;Left;Mediastinal Straight, Angled 32-Device: Dry Closed Drainage System;Suction 20 cm Water;Air Leak Present  Chest Tube Drains:   Left Chest Tube 1: 10  "mlRight Chest Tube 1: 0 mlMediastinal Chest Tube 1: 8 ml    Fluids:    Intake/Output Summary (Last 24 hours) at 04/28/17 1243  Last data filed at 04/28/17 1200   Gross per 24 hour   Intake   1530 ml   Output   3081 ml   Net  -1551 ml     Admit weight: Weight: 54.7 kg (120 lb 9.5 oz)  Current weight: Weight: 59.1 kg (130 lb 4.7 oz) (04/28/17 0400)    Labs:  Recent Labs      04/26/17   0520  04/27/17   0425  04/28/17   0434   WBC  10.2  8.3  8.2   RBC  2.62*  2.51*  3.60*   HEMOGLOBIN  8.3*  7.8*  11.0*   HEMATOCRIT  26.4*  24.9*  33.7*   MCV  100.8*  99.2*  93.6   MCH  31.7  31.1  30.6   MCHC  31.4*  31.3*  32.6*   RDW  48.3  47.3  56.3*   PLATELETCT  180  205  259   MPV  9.9  9.5  9.0     Recent Labs      04/26/17   0520  04/27/17   0425  04/28/17   0434   NEUTSPOLYS  72.80*  67.80  63.50   LYMPHOCYTES  10.60*  14.90*  17.50*   MONOCYTES  13.50*  13.50*  15.90*   EOSINOPHILS  2.10  2.40  1.60   BASOPHILS  0.20  0.20  0.40     Recent Labs      04/26/17   0520  04/27/17   0425  04/28/17   0434   SODIUM  142  136  134*   POTASSIUM  4.2  4.3  4.4   CHLORIDE  101  96  94*   CO2  38*  38*  36*   GLUCOSE  137*  94  92   BUN  23*  14  16   CREATININE  0.53  0.45*  0.54   CALCIUM  8.5  8.3*  8.4*     Recent Labs      04/26/17   0608  04/27/17   0425  04/28/17   0508   INR  0.95  0.99  1.03       Medications:  • aspirin EC  325 mg     • potassium chloride ER  20 mEq      Or   • potassium chloride  20 mEq     • MD ALERT...Adult ICU Electrolyte Replacement per Pharmacy Protocol       • tiotropium  1 Cap     • amiodarone  400 mg     • furosemide  20 mg     • atorvastatin  10 mg     • budesonide-formoterol  2 Puff     • sertraline  50 mg     • docusate sodium  100 mg      And   • senna-docusate  1 Tab         Exam:   Review of Systems   Unable to perform ROS: intubated   HENT: Negative.         Right cheek \"puffed up\"   Eyes: Negative.    Respiratory: Negative.    Cardiovascular: Negative.    Gastrointestinal: Negative.  "   Genitourinary: Negative.    Musculoskeletal: Negative.    Skin: Negative.    Neurological: Negative.    Endo/Heme/Allergies: Negative.    Psychiatric/Behavioral: Negative.        Physical Exam   Constitutional: She is oriented to person, place, and time. She appears well-developed and well-nourished.   HENT:   Head: Normocephalic.   Eyes: Pupils are equal, round, and reactive to light.   Neck: Normal range of motion. No JVD present.   Cardiovascular: Normal rate, regular rhythm and normal heart sounds.    Pulmonary/Chest: Effort normal and breath sounds normal.   Bases diminished  R chest crepitus  SUB q air extends up to R face   Abdominal: Soft. Bowel sounds are normal. She exhibits no distension. There is no guarding.   Musculoskeletal: Normal range of motion.   Neurological: She is alert and oriented to person, place, and time.   Skin: Skin is warm and dry.   Surgical incisions CDI   Psychiatric: She has a normal mood and affect. Her behavior is normal.       Quality Measures:   EKG reviewed, Medications reviewed, Labs reviewed and Radiology images reviewed  Larsen catheter: No Larsen  Central line in place: Concentrated IV drugs and Need for access    DVT Prophylaxis: Contraindicated - High bleeding risk  DVT prophylaxis - mechanical: SCDs  Ulcer prophylaxis: Yes    Assessed for rehab: Patient was assess for and/or received rehabilitation services during this hospitalization      Assessment/Plan:  POD 1 HDS, NSR--some runs VT ?afib with aberrancy? Last night, started on amio gtt.  Extubated, neuro grossly intact.  CT output min, mod airleak.  CXR no pneumothorax.  Adequate UOP, wts up, +3L. labs noted.  PLAN:  Keep CTs.  DC larsen.  STart gentle diuresis.  Change to PO amio.  Stress cough/deepbreath/IS. AMB.    POD 2 HDS, NSR.  Crepitus Right upper chest, mild. CXR without pnuemothorax. On 2 l nc. CT output min, mod airleak remains--connections recheck, and redressed. Good bowel sounds, not passing gas, no  distention/n/v. W/w. INR trending up.  DC temp wires. Add xanax anxiety. Urine retention overnight, straight cath--to reinsert larsen if unable to void again.  AMB/IS.  CXR in AM.   POD 3 HDS NSR.  Airleak remains.  Sub q air has now extended up neck, Right facial swelling.  No resp distress.  CXR with bilateral chest wall air and pneumomediastinum.  Dr. Estrada consulted, will see patient Monday AM.  CT chest today.  CTs to be placed to 2 separate pleural vacs. DC temp wires.  CPM.  POD 4 re-intubated last night.  Chest tubes inserted pleural.  Per CT scan moderate bilateral pneumothorax.  Sedated.  + air leaks  Both chest tubes.  Sean to see patient today.    POD 5 Sedated/levo---wakes and follows appropriately, weaning off levo as wakes.  CXR small R apical pneumothorax.  Right face swelling improved.  SBTs this AM< likely to extubate today.  Ailreak on 1 CT remains.  Keep all CTs today.  CPM.   POD 6  HDS, NSR.. Extubated, neuro intact.  CXR improved.  Airleak remains visible on the Right Angle Mediastinal CT. welling face improving.  PLAN: Keep CTs today.  DC arlting and NG tube, increase diet as tolerated.  AMB/IS.  Will keep larsen today until more ambulatory with multiple CTs. Increase to full dose asa until able to start coumadin  POD 7 HDS, SR, neuro intact.  Wounds CDI.  Chest tubes still with airleaks.  Up in chair today.  Amb/IS.  CPM.  POD 8 HDS, SR, neuro intact.  Wounds CDI.  Chest tubes without airleaks today.  DC mediastinal tubes. CPM.       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

## 2017-04-28 NOTE — FLOWSHEET NOTE
04/28/17 1622   Events/Summary/Plan   Events/Summary/Plan IS/PEP   Interdisciplinary Plan of Care-Goals (Indications)   Hyperinflation Protocol Indications Pre or Post-op Abdominal, Thoracic or Orthopedic Surgery   Interdisciplinary Plan of Care-Outcomes    Hyperinflation Protocol Goals/Outcome Stable Vital Capacity x24 hrs and Patient Understands / uses I.S.   Education   Education Yes - Pt. / Family has been Instructed in use of Respiratory Equipment   PEP/CPT Group   PEP/CPT/Airway Clearance Therapy Yes   #CPT (Mechanical) Yes   PEP/CPT Method Positive Airway Pressure Device   CPT Settings Yes   Pressure 15   Incentive Spirometry Group   Incentive Spirometry Instruction Yes   Breathing Exercises Yes   Incentive Spirometer Volume 1000 mL   Respiratory WDL   Respiratory (WDL) X   Chest Exam   Work Of Breathing / Effort Mild   Respiration 19   Pulse 85   Heart Rate (Monitored) 86   Breath Sounds   RUL Breath Sounds Clear   RML Breath Sounds Diminished   RLL Breath Sounds Diminished   DELLA Breath Sounds Clear   LLL Breath Sounds Diminished   Secretions   How Sputum Obtained Spontaneous   Sputum Amount Unable to Evaluate   Sputum Color Unable to Evaluate   Sputum Consistency Unable to Evaluate   Oximetry   Continuous Oximetry Yes   Oxygen   Pulse Oximetry 99 %   O2 (LPM) 2   O2 Daily Delivery Respiratory  Silicone Nasal Cannula

## 2017-04-28 NOTE — DISCHARGE PLANNING
Received choice form for HH services, referral has been sent to [facility] per patient and choice form request.

## 2017-04-28 NOTE — DISCHARGE PLANNING
Home health order received.  Discussed with patient.  Choice form completed for [facility].     DC home with HHC when cleared by CTS.

## 2017-04-28 NOTE — CARE PLAN
Problem: Post Op Day 4 CABG/Heart Valve Replacement  Goal: Optimal care of the Post Op CABG/Heart Valve replacement Post Op Day 4  Intervention: Daily Weights  Done      Intervention: Shower daily and clean incisions twice daily with soap and water  Incision cleaned with soap and water  Intervention: Up in chair for all meals  Done. For all meals  Intervention: IS q 1 hour while awake and record best IS volume  1000  Intervention: Consider removal of larsen, chest tube and pacer wire if not already done  Mediastinal tubes d/c'ed. Larsen is still in place.

## 2017-04-28 NOTE — PROGRESS NOTES
Pulmonary Critical Care Progress Note    Date of service: 4/28/2017     Interval Events:  24 hour interval history reviewed  Reason for visit:  Atelectasis, COPD, MARY, pneumothoraces  ROS with negative nausea, vomiting and abdominal pain. Positive chest pain. Positive shortness of breath. All other systems are negative.     Tmax 99.9 °F   -1.3 L over last 24 hours.  -3.4 L since admit.  99% on 2 L NC  CXR relatively unchanged compared to 4/27  Mobilized yesterday   Transfusion yesterday   2 liters NC   1000 IS   No abx   Lasix 20 per day     PFSH:  No change.      Respiratory:     Pulse Oximetry: 96 % on 2L NC  Lungs are clear to auscultation bilaterally.           Invalid input(s): [id number]    HemoDynamics:  Pulse: 79, Heart Rate (Monitored): 86  Blood Pressure : (!) 93/64 mmHg, NIBP: (!) 94/66 mmHg    HR regular SR. Lower extremities without edema.     Neuro:  Alert and oriented X4.     Fluids:  Intake/Output       04/26/17 0700 - 04/27/17 0659 04/27/17 0700 - 04/28/17 0659 04/28/17 0700 - 04/29/17 0659      9143-4262 2269-0561 Total 7332-5068 5202-1098 Total 3906-2134 4950-6618 Total       Intake    P.O.  1700  250 1950  960  120 1080  --  -- --    P.O. 7718 350 7292  -- -- --    I.V.  --  -- --  50  -- 50  --  -- --    Magnesium Sulfate Volume -- -- -- 50 -- 50 -- -- --    Blood  --  -- --  280  -- 280  --  -- --    Volume (RELEASE RED BLOOD CELLS) -- -- -- 280 -- 280 -- -- --    Enteral  70  -- 70  --  -- --  --  -- --    Enteral Volume 70 -- 70 -- -- -- -- -- --    Total Intake 7218 038 3691 8623 693 3699 -- -- --       Output    Urine  1550  965 2515  1950  813 2763  --  -- --    Indwelling Cathether 3624 243 8519 4766 121 4801 -- -- --    Drains  95  85 180  18  -- 18  --  -- --    Mediastinal Chest Tube 2 40 20 60 0 -- 0 -- -- --    Right Chest Tube 1 40 40 80 0 -- 0 -- -- --    Mediastinal Chest Tube 1 10 20 30 8 -- 8 -- -- --    Left Chest Tube 1 5 5 10 10 -- 10 -- -- --    Stool  --   -- --  --  -- --  --  -- --    Number of Times Stooled -- -- -- -- 1 x 1 x -- -- --    Total Output 1645 1050 2695 7689 851 9809 -- -- --       Net I/O     125 -800 -675 -678 -693 -1371 -- -- --        Weight: 59.1 kg (130 lb 4.7 oz)  Recent Labs      17   043   SODIUM  142  136  134*   POTASSIUM  4.2  4.3  4.4   CHLORIDE  101  96  94*   CO2  38*  38*  36*   BUN  23*  14  16   CREATININE  0.53  0.45*  0.54   MAGNESIUM  1.9  1.8  1.9   PHOSPHORUS  3.6   --    --    CALCIUM  8.5  8.3*  8.4*       GI/Nutrition:  Abdomen is soft and non tender. Positive bowel sounds.     Liver Function  Recent Labs      17   043   GLUCOSE  137*  94  92       Heme:  Recent Labs      17   0617   0434  17   0508   RBC  2.62*   --   2.51*  3.60*   --    HEMOGLOBIN  8.3*   --   7.8*  11.0*   --    HEMATOCRIT  26.4*   --   24.9*  33.7*   --    PLATELETCT  180   --   205  259   --    PROTHROMBTM   --   13.0  13.4   --   13.8   INR   --   0.95  0.99   --   1.03       Infectious Disease:  Temp  Av.6 °C (99.6 °F)  Min: 37.3 °C (99.1 °F)  Max: 37.6 °C (99.7 °F)  Monitored Temp  Av.3 °C (99.2 °F)  Min: 37 °C (98.6 °F)  Max: 37.7 °C (99.9 °F)    Recent Labs      17   0434   WBC  10.2  8.3  8.2   NEUTSPOLYS  72.80*  67.80  63.50   LYMPHOCYTES  10.60*  14.90*  17.50*   MONOCYTES  13.50*  13.50*  15.90*   EOSINOPHILS  2.10  2.40  1.60   BASOPHILS  0.20  0.20  0.40     Current Facility-Administered Medications   Medication Dose Frequency Provider Last Rate Last Dose   • aspirin EC (ECOTRIN) tablet 325 mg  325 mg DAILY IGOR Braun   Stopped at 17 0900   • potassium chloride ER (KLOR-CON) tablet 20 mEq  20 mEq BID Lew Tompkins M.D.   20 mEq at 17 1954    Or   • potassium chloride (KLOR-CON) 20 MEQ packet 20 mEq  20 mEq BID Lew Tompkins  M.D.   20 mEq at 04/27/17 0915   • MD ALERT...Adult ICU Electrolyte Replacement per Pharmacy Protocol   pharmacy to dose Jeremy M Gonda, M.D.       • alprazolam (XANAX) tablet 0.25 mg  0.25 mg TID PRN Donna Mann, A.P.N.   0.25 mg at 04/22/17 1315   • tiotropium (SPIRIVA) 18 MCG inhalation capsule 1 Cap  1 Cap DAILY Fernandez Hays M.D.   1 Cap at 04/27/17 0918   • amiodarone (CORDARONE) tablet 400 mg  400 mg TWICE DAILY Donna Mann, A.P.N.   400 mg at 04/27/17 1954   • furosemide (LASIX) injection 20 mg  20 mg Q DAY Donna Mann, A.P.N.   20 mg at 04/27/17 0916   • atorvastatin (LIPITOR) tablet 10 mg  10 mg DAILY Tayler Kimball, A.P.N.   10 mg at 04/27/17 0914   • budesonide-formoterol (SYMBICORT) 160-4.5 MCG/ACT inhaler 2 Puff  2 Puff BID Tayler Kimball, A.P.N.   2 Puff at 04/27/17 1953   • sertraline (ZOLOFT) tablet 50 mg  50 mg DAILY Tayler Kimball, A.P.N.   50 mg at 04/27/17 0914   • albuterol inhaler 2 Puff  2 Puff Q4HRS PRN Tayler Kimball A.P.N.   2 Puff at 04/22/17 1516   • Respiratory Care per Protocol   Continuous RT Tayler Kimball, A.P.N.       • NS infusion   Continuous Tayler Kimball, A.P.N. 10 mL/hr at 04/27/17 0700     • docusate sodium (COLACE) capsule 100 mg  100 mg QAM Tayler Kimball, A.P.N.   100 mg at 04/27/17 0915    And   • senna-docusate (PERICOLACE or SENOKOT S) 8.6-50 MG per tablet 1 Tab  1 Tab Nightly Tayler Kimball, A.P.N.   1 Tab at 04/27/17 1954    And   • senna-docusate (PERICOLACE or SENOKOT S) 8.6-50 MG per tablet 1 Tab  1 Tab Q24HRS PRN Tayler Kimball, A.P.N.        And   • lactulose 20 GM/30ML solution 30 mL  30 mL Q24HRS PRN Tayler Kimball, A.P.N.        And   • bisacodyl (DULCOLAX) suppository 10 mg  10 mg Q24HRS PRN Tayler Kimball, A.P.N.        And   • fleet enema 133 mL  1 Each Once PRN Tayler Kimball, A.P.N.       • oxycodone immediate-release (ROXICODONE) tablet 5 mg  5 mg Q3HRS PRN Tayler Kimball, A.P.N.   5 mg at 04/20/17 2029   • oxycodone immediate  release (ROXICODONE) tablet 10 mg  10 mg Q3HRS PRN Tayler Kimball, A.P.N.   10 mg at 04/24/17 1021   • tramadol (ULTRAM) 50 MG tablet 50 mg  50 mg Q4HRS PRN Tayler Kimball, A.P.N.   50 mg at 04/27/17 1954   • ondansetron (ZOFRAN) syringe/vial injection 4 mg  4 mg Q6HRS PRN Tayler Kimball, A.P.N.   4 mg at 04/25/17 1555    Or   • prochlorperazine (COMPAZINE) injection 10 mg  10 mg Q6HRS PRN Tayler Kimball, A.P.N.   10 mg at 04/21/17 1014    Or   • promethazine (PHENERGAN) suppository 25 mg  25 mg Q6HRS PRN Tayler Kimball, A.P.N.       • acetaminophen (TYLENOL) tablet 650 mg  650 mg Q4HRS PRN Tayler Kimball, A.P.N.        Or   • acetaminophen (TYLENOL) suppository 650 mg  650 mg Q4HRS PRN Tayler Kimball, A.P.N.       • mag hydrox-al hydrox-simeth (MAALOX PLUS ES or MYLANTA DS) suspension 30 mL  30 mL Q4HRS PRN Tayler Kimball, A.P.N.       • diphenhydrAMINE (BENADRYL) tablet/capsule 25 mg  25 mg HS PRN - MR X 1 Tayler Kimball, A.P.N.   25 mg at 04/27/17 2107   • ipratropium-albuterol (DUONEB) nebulizer solution 3 mL  3 mL Q2HRS PRN (RT) Fernandez Hays M.D.   3 mL at 04/23/17 2055     Last reviewed on 4/20/2017  6:03 AM by Randy Licona    Quality  Measures:  Labs reviewed, Medications reviewed and Radiology images reviewed                    Lines:   ALEX 4/20    Drips:   none    ABX:   None     Cultures:   None     Echo from 4/20 with EF of 65%     Lasix 20 Qday      Assessment and Plan:  Bilateral pneumothoraces with bronchopleural fistula and increased subcutaneous emphysema   - keep Left CT to suction   - s/p thoracic surgery consult   - intubated 4/23-25   - left ct with continually improving leak  Status post mitral valve repair, left atrial appendage ligation and maze procedure  Acute exacerbation of severe stage III chronic obstructive pulmonary disease   - cont BDs   - rt protocol   - monitor for need of steroids/abx   - rt/02 protocols   - mobilze  History of atrial flutter - in SR  Systemic  arterial hypertension   - cont BP control  Dyslipidemia  Anxiety  Obstructive sleep apnea       Date of service:  4/28/17  No time overlap  Time excludes procedures  Discussed with RN, RT, Team       I, Shanda Roger (Scribe), am scribing for, and in the presence of, Dr. Lew Tompkins M.D.  Electronically signed by: Shanda Roger (Scribe), 4/28/2017  I, Dr. Lew Tompkins M.D. personally performed the services described in this documentation, as scribed by Shanda Roger in my presence, and it is both accurate and complete.

## 2017-04-28 NOTE — DISCHARGE INSTRUCTIONS
Discharge Instructions    Discharged to home by car with relative. Discharged via wheelchair, hospital escort: Yes.  Special equipment needed: Not Applicable    Be sure to schedule a follow-up appointment with your primary care doctor or any specialists as instructed.     Discharge Plan:   Influenza Vaccine Indication: Patient Refuses    I understand that a diet low in cholesterol, fat, and sodium is recommended for good health. Unless I have been given specific instructions below for another diet, I accept this instruction as my diet prescription.   Other diet: cardiac    Special Instructions: heart surgery and coumadin    Cardiac Surgery Discharge Instructions/[facility]    Activity:  1. NO driving for 4 weeks after surgery. You may ride as a passenger.  2. NO lifting of any item over 10 lbs (e.g. gallon of milk) for 6 weeks after surgery.  Do not raise both arms above head, only one at a time.  Do not push or pull with your arms.  3. Walk as much as possible! Walk a minimum of 4 times per day. Depending on your fatigue and comfort level, you may walk as much as you wish. There is no maximum.  4. Other physical activities (sex, housework, gardening, etc.) are OK after 4 weeks or after 8 weeks if you want to golf (start by putting, then advance to chipping, then to driving).  5. Continue using incentive spirometer for 2 weeks.  If you are going home on oxygen and you were not on oxygen prior to surgery, keep using until you are oxygen free.  6. Weigh daily and write it down starting  the next morning after you arrive home. Call your doctor for a weight gain of 2-4 lbs in 1-2 days.    Incision Care:  1. SHOWER EVERYDAY-no baths. Make sure to clean your incision(s) twice daily with plain, perfume and dye-free soap (if you shower in the morning, stand at the sink at bedtime and clean incision with soap and water). Then pat incision(s) dry with clean towel. Avoid creams or lotions on the incision(s).    2.  If there is any increase in redness or swelling, or separation of the incision line, or thick drainage* from any of the incisions, call [facility] ([phone]). * Clear, thin drainage is not abnormal especially from the leg incision and/or chest tube sites.                    3. Continue to wear your AJITH Stockings for 4 weeks on the leg(s) with the incision(s) or swelling. You may take off the stocking(s) when in bed or when the leg(s) are elevated.    General Instructions:  1. If you are on the blood thinner Coumadin (warfarin), you will need your coumadin levels checked periodically.  2. You have been referred to Cardiac Rehab which is highly recommended for you after heart surgery. You can start Cardiac Rehab 30 days after surgery.  If you do not have an appointment at the time of discharge call [phone] to schedule an appointment.  3. Your Primary Care Doctor typically handles Home Oxygen. The Home Oxygen service that drops off your tanks should be checking your oxygen saturation levels. Oxygen may be stopped when you are > 90 % saturated on room air. Check with your Primary Care Doctor if you are unsure.    Prescription Refills/Questions:   Call your usual Pharmacy for ALL refills   1. Pain medication questions only: Call [facility] at [phone].  (Remember that refills may require additional physician approval and will need at least 24 hours’ notice. Please call for refills on Mondays through Fridays from 9 am to 4 pm).  2. For all other medications (except pain medication): Call your Cardiology Group or Primary Care Doctor (not [facility]) for refills or questions.    [facility] IS ALWAYS AVAILABLE TO ANSWER YOUR QUESTIONS. DO NOT HESITATE TO CALL!    · Is patient discharged on Warfarin / Coumadin?   Yes    You are on the blood thinner Coumadin (warfarin) and you will need your coumadin levels checked periodically. For any questions call the [facility] @ [phone]. The home health nurse will draw your blood and the coumadin clinic will call with any change to your dose.      IMPORTANT: HOW TO USE THIS INFORMATION:  This is a summary and does NOT have all possible information about this product. This information does not assure that this product is safe, effective, or appropriate for you. This information is not individual medical advice and does not substitute for the advice of your health care professional. Always ask your health care professional for complete information about this product and your specific health needs.      WARFARIN - ORAL (WARF-uh-rin)      COMMON BRAND NAME(S): Coumadin      WARNING:  Warfarin can cause very serious (possibly fatal) bleeding. This is more likely to occur when you first start taking this medication or if you take too much warfarin. To decrease your risk for bleeding, your doctor or other health care provider will monitor you closely and check your lab results (INR test) to make sure you are not taking too much warfarin. Keep all medical and laboratory appointments. Tell your doctor right away if you notice any signs of serious bleeding. See also Side Effects section.      USES:  This medication is used to treat blood clots (such as in deep vein thrombosis-DVT or pulmonary embolus-PE) and/or to prevent new clots from forming in your body. Preventing harmful blood clots helps to reduce the risk of a stroke or heart attack. Conditions that increase your risk of developing blood clots include a certain type of irregular heart rhythm (atrial fibrillation), heart valve replacement, recent heart attack, and certain surgeries (such as hip/knee replacement). Warfarin is commonly called a \"blood thinner,\" but the more correct term is \"anticoagulant.\" It helps to keep blood flowing smoothly in your body by decreasing the amount of certain substances (clotting proteins) in your blood.      HOW TO USE:  Read the Medication Guide " provided by your pharmacist before you start taking warfarin and each time you get a refill. If you have any questions, ask your doctor or pharmacist. Take this medication by mouth with or without food as directed by your doctor or other health care professional, usually once a day. It is very important to take it exactly as directed. Do not increase the dose, take it more frequently, or stop using it unless directed by your doctor. Dosage is based on your medical condition, laboratory tests (such as INR), and response to treatment. Your doctor or other health care provider will monitor you closely while you are taking this medication to determine the right dose for you. Use this medication regularly to get the most benefit from it. To help you remember, take it at the same time each day. It is important to eat a balanced, consistent diet while taking warfarin. Some foods can affect how warfarin works in your body and may affect your treatment and dose. Avoid sudden large increases or decreases in your intake of foods high in vitamin K (such as broccoli, cauliflower, cabbage, brussels sprouts, kale, spinach, and other green leafy vegetables, liver, green tea, certain vitamin supplements). If you are trying to lose weight, check with your doctor before you try to go on a diet. Cranberry products may also affect how your warfarin works. Limit the amount of cranberry juice (16 ounces/480 milliliters a day) or other cranberry products you may drink or eat.      SIDE EFFECTS:  Nausea, loss of appetite, or stomach/abdominal pain may occur. If any of these effects persist or worsen, tell your doctor or pharmacist promptly. Remember that your doctor has prescribed this medication because he or she has judged that the benefit to you is greater than the risk of side effects. Many people using this medication do not have serious side effects. This medication can cause serious bleeding if it affects your blood clotting proteins  too much (shown by unusually high INR lab results). Even if your doctor stops your medication, this risk of bleeding can continue for up to a week. Tell your doctor right away if you have any signs of serious bleeding, including: unusual pain/swelling/discomfort, unusual/easy bruising, prolonged bleeding from cuts or gums, persistent/frequent nosebleeds, unusually heavy/prolonged menstrual flow, pink/dark urine, coughing up blood, vomit that is bloody or looks like coffee grounds, severe headache, dizziness/fainting, unusual or persistent tiredness/weakness, bloody/black/tarry stools, chest pain, shortness of breath, difficulty swallowing. Tell your doctor right away if any of these unlikely but serious side effects occur: persistent nausea/vomiting, severe stomach/abdominal pain, yellowing eyes/skin. This drug rarely has caused very serious (possibly fatal) problems if its effects lead to small blood clots (usually at the beginning of treatment). This can lead to severe skin/tissue damage that may require surgery or amputation if left untreated. Patients with certain blood conditions (protein C or S deficiency) may be at greater risk. Get medical help right away if any of these rare but serious side effects occur: painful/red/purplish patches on the skin (such as on the toe, breast, abdomen), change in the amount of urine, vision changes, confusion, slurred speech, weakness on one side of the body. A very serious allergic reaction to this drug is rare. However, get medical help right away if you notice any symptoms of a serious allergic reaction, including: rash, itching/swelling (especially of the face/tongue/throat), severe dizziness, trouble breathing. This is not a complete list of possible side effects. If you notice other effects not listed above, contact your doctor or pharmacist. In the [place] - Call your doctor for medical advice about side effects. You may report side effects to FDA at 9-166-FDA-8255. In  [place] - Call your doctor for medical advice about side effects. You may report side effects to [facility] [place] at 1-262.425.6032.      PRECAUTIONS:  Before taking warfarin, tell your doctor or pharmacist if you are allergic to it; or if you have any other allergies. This product may contain inactive ingredients, which can cause allergic reactions or other problems. Talk to your pharmacist for more details. Before using this medication, tell your doctor or pharmacist your medical history, especially of: blood disorders (such as anemia, hemophilia), bleeding problems (such as bleeding of the stomach/intestines, bleeding in the brain), blood vessel disorders (such as aneurysms), recent major injury/surgery, liver disease, alcohol use, mental/mood disorders (including memory problems), frequent falls/injuries. It is important that all your doctors and dentists know that you take warfarin. Before having surgery or any medical/dental procedures, tell your doctor or dentist that you are taking this medication and about all the products you use (including prescription drugs, nonprescription drugs, and herbal products). Avoid getting injections into the muscles. If you must have an injection into a muscle (for example, a flu shot), it should be given in the arm. This way, it will be easier to check for bleeding and/or apply pressure bandages. This medication may cause stomach bleeding. Daily use of alcohol while using this medicine will increase your risk for stomach bleeding and may also affect how this medication works. Limit or avoid alcoholic beverages. If you have not been eating well, if you have an illness or infection that causes fever, vomiting, or diarrhea for more than 2 days, or if you start using any antibiotic medications, contact your doctor or pharmacist immediately because these conditions can affect how warfarin works. This medication can cause heavy bleeding. To lower the chance of getting cut, bruised, or  injured, use great caution with sharp objects like safety razors and nail cutters. Use an electric razor when shaving and a soft toothbrush when brushing your teeth. Avoid activities such as contact sports. If you fall or injure yourself, especially if you hit your head, call your doctor immediately. Your doctor may need to check you. The Food & Drug Administration has stated that generic warfarin products are interchangeable. However, consult your doctor or pharmacist before switching warfarin products. Be careful not to take more than one medication that contains warfarin unless specifically directed by the doctor or health care provider who is monitoring your warfarin treatment. Older adults may be at greater risk for bleeding while using this drug. This medication is not recommended for use during pregnancy because of serious (possibly fatal) harm to an unborn baby. Discuss the use of reliable forms of birth control with your doctor. If you become pregnant or think you may be pregnant, tell your doctor immediately. If you are planning pregnancy, discuss a plan for managing your condition with your doctor before you become pregnant. Your doctor may switch the type of medication you use during pregnancy. Very small amounts of this medication may pass into breast milk but is unlikely to harm a nursing infant. Consult your doctor before breast-feeding.      DRUG INTERACTIONS:  Drug interactions may change how your medications work or increase your risk for serious side effects. This document does not contain all possible drug interactions. Keep a list of all the products you use (including prescription/nonprescription drugs and herbal products) and share it with your doctor and pharmacist. Do not start, stop, or change the dosage of any medicines without your doctor's approval. Warfarin interacts with many prescription, nonprescription, vitamin, and herbal products. This includes medications that are applied to the  "skin or inside the vagina or rectum. The interactions with warfarin usually result in an increase or decrease in the \"blood-thinning\" (anticoagulant) effect. Your doctor or other health care professional should closely monitor you to prevent serious bleeding or clotting problems. While taking warfarin, it is very important to tell your doctor or pharmacist of any changes in medications, vitamins, or herbal products that you are taking. Some products that may interact with this drug include: capecitabine, imatinib, mifepristone. Aspirin, aspirin-like drugs (salicylates), and nonsteroidal anti-inflammatory drugs (NSAIDs such as ibuprofen, naproxen, celecoxib) may have effects similar to warfarin. These drugs may increase the risk of bleeding problems if taken during treatment with warfarin. Carefully check all prescription/nonprescription product labels (including drugs applied to the skin such as pain-relieving creams) since the products may contain NSAIDs or salicylates. Talk to your doctor about using a different medication (such as acetaminophen) to treat pain/fever. Low-dose aspirin and related drugs (such as clopidogrel, ticlopidine) should be continued if prescribed by your doctor for specific medical reasons such as heart attack or stroke prevention. Consult your doctor or pharmacist for more details. Many herbal products interact with warfarin. Tell your doctor before taking any herbal products, especially bromelains, coenzyme Q10, cranberry, danshen, dong quai, fenugreek, garlic, ginkgo biloba, ginseng, and Marion's wort, among others. This medication may interfere with a certain laboratory test to measure theophylline levels, possibly causing false test results. Make sure laboratory personnel and all your doctors know you use this drug.      OVERDOSE:  If overdose is suspected, contact a poison control center or emergency room immediately. [place] residents can call the [place] National Poison Hotline at " 1-521.994.6064. [place] residents can call a provincial poison control center. Symptoms of overdose may include: bloody/black/tarry stools, pink/dark urine, unusual/prolonged bleeding.      NOTES:  Do not share this medication with others. Laboratory and/or medical tests (such as INR, complete blood count) must be performed periodically to monitor your progress or check for side effects. Consult your doctor for more details.      MISSED DOSE:  For the best possible benefit, do not miss any doses. If you do miss a dose and remember on the same day, take it as soon as you remember. If you remember on the next day, skip the missed dose and resume your usual dosing schedule. Do not double the dose to catch up because this could increase your risk for bleeding. Keep a record of missed doses to give to your doctor or pharmacist. Contact your doctor or pharmacist if you miss 2 or more doses in a row.      STORAGE:  Store at room temperature away from light and moisture. Do not store in the bathroom. Keep all medications away from children and pets. Do not flush medications down the toilet or pour them into a drain unless instructed to do so. Properly discard this product when it is  or no longer needed. Consult your pharmacist or local waste disposal company for more details about how to safely discard your product.      MEDICAL ALERT:  Your condition and medication can cause complications in a medical emergency. For information about enrolling in MedicAlert, call 1-311.420.1581 ([place]) or 1-612.462.7836 ([place]).      Information last revised 2010 Copyright(c) 2010 [employer]      · Is patient Post Blood Transfusion?  No    Depression / Suicide Risk    As you are discharged from this [facility] facility, it is important to learn how to keep safe from harming yourself.    Recognize the warning signs:  · Abrupt changes in personality, positive or negative- including increase in energy   · Giving away  possessions  · Change in eating patterns- significant weight changes-  positive or negative  · Change in sleeping patterns- unable to sleep or sleeping all the time   · Unwillingness or inability to communicate  · Depression  · Unusual sadness, discouragement and loneliness  · Talk of wanting to die  · Neglect of personal appearance   · Rebelliousness- reckless behavior  · Withdrawal from people/activities they love  · Confusion- inability to concentrate     If you or a loved one observes any of these behaviors or has concerns about self-harm, here's what you can do:  · Talk about it- your feelings and reasons for harming yourself  · Remove any means that you might use to hurt yourself (examples: pills, rope, extension cords, firearm)  · Get professional help from the community (Mental Health, Substance Abuse, psychological counseling)  · Do not be alone:Call your Safe Contact- someone whom you trust who will be there for you.  · Call your local CRISIS HOTLINE 776-6803 or 643-835-2325  · Call your local Children's Mobile Crisis Response Team [place] (970) 233-4015 or www.XATA  · Call the toll free National Suicide Prevention Hotlines   · National Suicide Prevention Lifeline 176-076-VYPK (0578)  · National Hope Line Network 800-SUICIDE (469-3820)

## 2017-04-28 NOTE — PROGRESS NOTES
Received bedside report from PM nurse. Assumed patient care. Chart reviewed. Pt was resting in chair. Two mediastinal and two pleural chest tubes in place. Avila in place. Will ask for an order for it. 2L of O2.   A&O x 4. No concerns, complaints or distress. Patient denies pain. POC updated with pt and on the patient communication board. Bed locked and in the lowest position. Call light within reach. Will continue to monitor. Family present.

## 2017-04-29 ENCOUNTER — APPOINTMENT (OUTPATIENT)
Dept: RADIOLOGY | Facility: MEDICAL CENTER | Age: 72
DRG: 219 | End: 2017-04-29
Attending: INTERNAL MEDICINE
Payer: MEDICARE

## 2017-04-29 LAB
ANION GAP SERPL CALC-SCNC: 5 MMOL/L (ref 0–11.9)
BASOPHILS # BLD AUTO: 0.3 % (ref 0–1.8)
BASOPHILS # BLD: 0.03 K/UL (ref 0–0.12)
BUN SERPL-MCNC: 16 MG/DL (ref 8–22)
CALCIUM SERPL-MCNC: 8.3 MG/DL (ref 8.5–10.5)
CHLORIDE SERPL-SCNC: 96 MMOL/L (ref 96–112)
CO2 SERPL-SCNC: 35 MMOL/L (ref 20–33)
CREAT SERPL-MCNC: 0.6 MG/DL (ref 0.5–1.4)
EOSINOPHIL # BLD AUTO: 0.14 K/UL (ref 0–0.51)
EOSINOPHIL NFR BLD: 1.2 % (ref 0–6.9)
ERYTHROCYTE [DISTWIDTH] IN BLOOD BY AUTOMATED COUNT: 52.2 FL (ref 35.9–50)
GFR SERPL CREATININE-BSD FRML MDRD: >60 ML/MIN/1.73 M 2
GLUCOSE SERPL-MCNC: 94 MG/DL (ref 65–99)
HCT VFR BLD AUTO: 35.1 % (ref 37–47)
HGB BLD-MCNC: 11.4 G/DL (ref 12–16)
IMM GRANULOCYTES # BLD AUTO: 0.15 K/UL (ref 0–0.11)
IMM GRANULOCYTES NFR BLD AUTO: 1.3 % (ref 0–0.9)
INR PPP: 1 (ref 0.87–1.13)
LYMPHOCYTES # BLD AUTO: 1.38 K/UL (ref 1–4.8)
LYMPHOCYTES NFR BLD: 12.1 % (ref 22–41)
MAGNESIUM SERPL-MCNC: 1.9 MG/DL (ref 1.5–2.5)
MCH RBC QN AUTO: 30.6 PG (ref 27–33)
MCHC RBC AUTO-ENTMCNC: 32.5 G/DL (ref 33.6–35)
MCV RBC AUTO: 94.1 FL (ref 81.4–97.8)
MONOCYTES # BLD AUTO: 1.51 K/UL (ref 0–0.85)
MONOCYTES NFR BLD AUTO: 13.2 % (ref 0–13.4)
NEUTROPHILS # BLD AUTO: 8.2 K/UL (ref 2–7.15)
NEUTROPHILS NFR BLD: 71.9 % (ref 44–72)
NRBC # BLD AUTO: 0 K/UL
NRBC BLD AUTO-RTO: 0 /100 WBC
PLATELET # BLD AUTO: 289 K/UL (ref 164–446)
PMV BLD AUTO: 8.7 FL (ref 9–12.9)
POTASSIUM SERPL-SCNC: 4.4 MMOL/L (ref 3.6–5.5)
PROTHROMBIN TIME: 13.5 SEC (ref 12–14.6)
RBC # BLD AUTO: 3.73 M/UL (ref 4.2–5.4)
SODIUM SERPL-SCNC: 136 MMOL/L (ref 135–145)
WBC # BLD AUTO: 11.4 K/UL (ref 4.8–10.8)

## 2017-04-29 PROCEDURE — 770022 HCHG ROOM/CARE - ICU (200)

## 2017-04-29 PROCEDURE — A9270 NON-COVERED ITEM OR SERVICE: HCPCS | Performed by: NURSE PRACTITIONER

## 2017-04-29 PROCEDURE — 80048 BASIC METABOLIC PNL TOTAL CA: CPT

## 2017-04-29 PROCEDURE — 94668 MNPJ CHEST WALL SBSQ: CPT

## 2017-04-29 PROCEDURE — 700112 HCHG RX REV CODE 229: Performed by: NURSE PRACTITIONER

## 2017-04-29 PROCEDURE — 700102 HCHG RX REV CODE 250 W/ 637 OVERRIDE(OP): Performed by: INTERNAL MEDICINE

## 2017-04-29 PROCEDURE — 700102 HCHG RX REV CODE 250 W/ 637 OVERRIDE(OP): Performed by: NURSE PRACTITIONER

## 2017-04-29 PROCEDURE — A9270 NON-COVERED ITEM OR SERVICE: HCPCS | Performed by: INTERNAL MEDICINE

## 2017-04-29 PROCEDURE — 700111 HCHG RX REV CODE 636 W/ 250 OVERRIDE (IP): Performed by: NURSE PRACTITIONER

## 2017-04-29 PROCEDURE — 700111 HCHG RX REV CODE 636 W/ 250 OVERRIDE (IP): Performed by: PHARMACIST

## 2017-04-29 PROCEDURE — 83735 ASSAY OF MAGNESIUM: CPT

## 2017-04-29 PROCEDURE — 85025 COMPLETE CBC W/AUTO DIFF WBC: CPT

## 2017-04-29 PROCEDURE — 94669 MECHANICAL CHEST WALL OSCILL: CPT

## 2017-04-29 PROCEDURE — 71010 DX-CHEST-PORTABLE (1 VIEW): CPT

## 2017-04-29 PROCEDURE — 99233 SBSQ HOSP IP/OBS HIGH 50: CPT | Performed by: INTERNAL MEDICINE

## 2017-04-29 PROCEDURE — 85610 PROTHROMBIN TIME: CPT

## 2017-04-29 RX ORDER — MAGNESIUM SULFATE HEPTAHYDRATE 40 MG/ML
2 INJECTION, SOLUTION INTRAVENOUS ONCE
Status: COMPLETED | OUTPATIENT
Start: 2017-04-29 | End: 2017-04-29

## 2017-04-29 RX ORDER — OXYCODONE HYDROCHLORIDE 5 MG/1
2.5 TABLET ORAL
Status: DISCONTINUED | OUTPATIENT
Start: 2017-04-29 | End: 2017-05-05 | Stop reason: HOSPADM

## 2017-04-29 RX ADMIN — BUDESONIDE AND FORMOTEROL FUMARATE DIHYDRATE 2 PUFF: 160; 4.5 AEROSOL RESPIRATORY (INHALATION) at 08:39

## 2017-04-29 RX ADMIN — MAGNESIUM SULFATE IN WATER 2 G: 40 INJECTION, SOLUTION INTRAVENOUS at 08:39

## 2017-04-29 RX ADMIN — SERTRALINE 50 MG: 50 TABLET, FILM COATED ORAL at 08:39

## 2017-04-29 RX ADMIN — TIOTROPIUM BROMIDE 1 CAPSULE: 18 CAPSULE ORAL; RESPIRATORY (INHALATION) at 11:40

## 2017-04-29 RX ADMIN — TRAMADOL HYDROCHLORIDE 50 MG: 50 TABLET, COATED ORAL at 17:03

## 2017-04-29 RX ADMIN — POTASSIUM CHLORIDE 20 MEQ: 750 TABLET, FILM COATED, EXTENDED RELEASE ORAL at 08:52

## 2017-04-29 RX ADMIN — BUDESONIDE AND FORMOTEROL FUMARATE DIHYDRATE 2 PUFF: 160; 4.5 AEROSOL RESPIRATORY (INHALATION) at 20:57

## 2017-04-29 RX ADMIN — POTASSIUM CHLORIDE 20 MEQ: 750 TABLET, FILM COATED, EXTENDED RELEASE ORAL at 20:56

## 2017-04-29 RX ADMIN — OXYCODONE HYDROCHLORIDE 2.5 MG: 5 TABLET ORAL at 11:38

## 2017-04-29 RX ADMIN — DOCUSATE SODIUM 100 MG: 100 CAPSULE ORAL at 08:38

## 2017-04-29 RX ADMIN — TRAMADOL HYDROCHLORIDE 50 MG: 50 TABLET, COATED ORAL at 05:00

## 2017-04-29 RX ADMIN — DIPHENHYDRAMINE HCL 25 MG: 25 TABLET ORAL at 22:17

## 2017-04-29 RX ADMIN — ATORVASTATIN CALCIUM 10 MG: 10 TABLET, FILM COATED ORAL at 08:38

## 2017-04-29 RX ADMIN — AMIODARONE HYDROCHLORIDE 400 MG: 200 TABLET ORAL at 08:39

## 2017-04-29 RX ADMIN — OXYCODONE HYDROCHLORIDE 2.5 MG: 5 TABLET ORAL at 08:31

## 2017-04-29 RX ADMIN — AMIODARONE HYDROCHLORIDE 400 MG: 200 TABLET ORAL at 20:55

## 2017-04-29 RX ADMIN — TRAMADOL HYDROCHLORIDE 50 MG: 50 TABLET, COATED ORAL at 04:02

## 2017-04-29 RX ADMIN — TRAMADOL HYDROCHLORIDE 50 MG: 50 TABLET, COATED ORAL at 21:11

## 2017-04-29 RX ADMIN — FUROSEMIDE 20 MG: 10 INJECTION, SOLUTION INTRAVENOUS at 08:39

## 2017-04-29 RX ADMIN — ASPIRIN 325 MG: 325 TABLET, DELAYED RELEASE ORAL at 08:39

## 2017-04-29 RX ADMIN — TRAMADOL HYDROCHLORIDE 50 MG: 50 TABLET, COATED ORAL at 12:51

## 2017-04-29 ASSESSMENT — ENCOUNTER SYMPTOMS
CARDIOVASCULAR NEGATIVE: 1
EYES NEGATIVE: 1
GASTROINTESTINAL NEGATIVE: 1
RESPIRATORY NEGATIVE: 1
NEUROLOGICAL NEGATIVE: 1
PSYCHIATRIC NEGATIVE: 1
MUSCULOSKELETAL NEGATIVE: 1

## 2017-04-29 ASSESSMENT — PAIN SCALES - GENERAL
PAINLEVEL_OUTOF10: 7
PAINLEVEL_OUTOF10: 6
PAINLEVEL_OUTOF10: 2
PAINLEVEL_OUTOF10: 2
PAINLEVEL_OUTOF10: 3
PAINLEVEL_OUTOF10: 3
PAINLEVEL_OUTOF10: 4
PAINLEVEL_OUTOF10: 2
PAINLEVEL_OUTOF10: 4
PAINLEVEL_OUTOF10: 6
PAINLEVEL_OUTOF10: 6

## 2017-04-29 NOTE — PROGRESS NOTES
Cardiovascular Surgery Progress Note    Name: Kelly Lovett  MRN: [id number]  : 1945  Admit Date: 2017  5:20 AM  Procedure:  Procedure(s) and Anesthesia Type:     * MITRAL VALVE REPAIR  - General     * MAZE PROCEDURE, Left atrial appendage ligation - General     * PAGE - General  7 Day Post-Op    Vitals:  Patient Vitals for the past 8 hrs:   Temp SpO2 O2 Delivery O2 (LPM) Pulse Heart Rate (Monitored) Resp Weight   17 0828 - 96 % - 2 91 91 (!) 26 -   17 0400 36 °C (96.8 °F) - CPAP 2 - - - 58.6 kg (129 lb 3 oz)     Temp (24hrs), Av.1 °C (96.9 °F), Min:36 °C (96.8 °F), Max:36.1 °C (97 °F)      Respiratory:    Respiration: (!) 26, Pulse Oximetry: 96 %, O2 Daily Delivery Respiratory : Silicone Nasal Cannula  Chest Tube Group Right-Tube Status / Drainage: Sutured in Place;Patent;Draining;Small;Serosanguinous, Chest Tube Group Left-Tube Status / Drainage: Patent;Sutured in Place;Draining, Chest Tube Group Right-Device: Dry Closed Drainage System;Suction 20 cm Water, Chest Tube Group Left-Device: Suction 20 cm Water;Closed Drainage System;Air Leak Present  Chest Tube Drains:   Left Chest Tube 1: 25 mlRight Chest Tube 1: 8 mlMediastinal Chest Tube 1:  (removed )    Fluids:    Intake/Output Summary (Last 24 hours) at 17 0911  Last data filed at 17 0600   Gross per 24 hour   Intake   1010 ml   Output   2553 ml   Net  -1543 ml     Admit weight: Weight: 54.7 kg (120 lb 9.5 oz)  Current weight: Weight: 58.6 kg (129 lb 3 oz) (17 0400)    Labs:  Recent Labs      17   0425  17   0434  17   0400   WBC  8.3  8.2  11.4*   RBC  2.51*  3.60*  3.73*   HEMOGLOBIN  7.8*  11.0*  11.4*   HEMATOCRIT  24.9*  33.7*  35.1*   MCV  99.2*  93.6  94.1   MCH  31.1  30.6  30.6   MCHC  31.3*  32.6*  32.5*   RDW  47.3  56.3*  52.2*   PLATELETCT  205  259  289   MPV  9.5  9.0  8.7*     Recent Labs      17   0425  17   0434  17   0400   NEUTSPOLYS  67.80  63.50   "71.90   LYMPHOCYTES  14.90*  17.50*  12.10*   MONOCYTES  13.50*  15.90*  13.20   EOSINOPHILS  2.40  1.60  1.20   BASOPHILS  0.20  0.40  0.30     Recent Labs      04/27/17   0425  04/28/17   0434  04/29/17   0400   SODIUM  136  134*  136   POTASSIUM  4.3  4.4  4.4   CHLORIDE  96  94*  96   CO2  38*  36*  35*   GLUCOSE  94  92  94   BUN  14  16  16   CREATININE  0.45*  0.54  0.60   CALCIUM  8.3*  8.4*  8.3*     Recent Labs      04/27/17   0425  04/28/17   0508  04/29/17   0400   INR  0.99  1.03  1.00       Medications:  • magnesium sulfate  2 g 2 g (04/29/17 0839)   • aspirin EC  325 mg     • potassium chloride ER  20 mEq      Or   • potassium chloride  20 mEq     • MD ALERT...Adult ICU Electrolyte Replacement per Pharmacy Protocol       • tiotropium  1 Cap     • amiodarone  400 mg     • furosemide  20 mg     • atorvastatin  10 mg     • budesonide-formoterol  2 Puff     • sertraline  50 mg     • docusate sodium  100 mg      And   • senna-docusate  1 Tab         Exam:   Review of Systems   Unable to perform ROS: intubated   HENT: Negative.         Right cheek \"puffed up\"   Eyes: Negative.    Respiratory: Negative.    Cardiovascular: Negative.    Gastrointestinal: Negative.    Genitourinary: Negative.    Musculoskeletal: Negative.    Skin: Negative.    Neurological: Negative.    Endo/Heme/Allergies: Negative.    Psychiatric/Behavioral: Negative.        Physical Exam   Constitutional: She is oriented to person, place, and time. She appears well-developed and well-nourished.   HENT:   Head: Normocephalic.   Eyes: Pupils are equal, round, and reactive to light.   Neck: Normal range of motion. No JVD present.   Cardiovascular: Normal rate, regular rhythm and normal heart sounds.    Pulmonary/Chest: Effort normal and breath sounds normal.   Bases diminished  R chest crepitus  SUB q air extends up to R face   Abdominal: Soft. Bowel sounds are normal. She exhibits no distension. There is no guarding.   Musculoskeletal: Normal " range of motion.   Neurological: She is alert and oriented to person, place, and time.   Skin: Skin is warm and dry.   Surgical incisions CDI   Psychiatric: She has a normal mood and affect. Her behavior is normal.       Quality Measures:   EKG reviewed, Medications reviewed, Labs reviewed and Radiology images reviewed  Larsen catheter: No Larsen  Central line in place: Concentrated IV drugs and Need for access    DVT Prophylaxis: Contraindicated - High bleeding risk  DVT prophylaxis - mechanical: SCDs  Ulcer prophylaxis: Yes    Assessed for rehab: Patient was assess for and/or received rehabilitation services during this hospitalization      Assessment/Plan:  POD 1 HDS, NSR--some runs VT ?afib with aberrancy? Last night, started on amio gtt.  Extubated, neuro grossly intact.  CT output min, mod airleak.  CXR no pneumothorax.  Adequate UOP, wts up, +3L. labs noted.  PLAN:  Keep CTs.  DC larsen.  STart gentle diuresis.  Change to PO amio.  Stress cough/deepbreath/IS. AMB.    POD 2 HDS, NSR.  Crepitus Right upper chest, mild. CXR without pnuemothorax. On 2 l nc. CT output min, mod airleak remains--connections recheck, and redressed. Good bowel sounds, not passing gas, no distention/n/v. W/w. INR trending up.  DC temp wires. Add xanax anxiety. Urine retention overnight, straight cath--to reinsert larsen if unable to void again.  AMB/IS.  CXR in AM.   POD 3 HDS NSR.  Airleak remains.  Sub q air has now extended up neck, Right facial swelling.  No resp distress.  CXR with bilateral chest wall air and pneumomediastinum.  Dr. Estrada consulted, will see patient Monday AM.  CT chest today.  CTs to be placed to 2 separate pleural vacs. DC temp wires.  CPM.  POD 4 re-intubated last night.  Chest tubes inserted pleural.  Per CT scan moderate bilateral pneumothorax.  Sedated.  + air leaks  Both chest tubes.  Sean to see patient today.    POD 5 Sedated/levo---wakes and follows appropriately, weaning off levo as wakes.  CXR small R  apical pneumothorax.  Right face swelling improved.  SBTs this AM< likely to extubate today.  Ailreak on 1 CT remains.  Keep all CTs today.  CPM.   POD 6  HDS, NSR.. Extubated, neuro intact.  CXR improved.  Airleak remains visible on the Right Angle Mediastinal CT. welling face improving.  PLAN: Keep CTs today.  DC arlting and NG tube, increase diet as tolerated.  AMB/IS.  Will keep larsen today until more ambulatory with multiple CTs. Increase to full dose asa until able to start coumadin  POD 7 HDS, SR, neuro intact.  Wounds CDI.  Chest tubes still with airleaks.  Up in chair today.  Amb/IS.  CPM.  POD 8 HDS, CXR with small right apical pneumo.  Neuro intact.  Wounds CDI.  Labs stable.  Plan:  Dc larsen and ambulate.     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

## 2017-04-29 NOTE — CARE PLAN
Problem: Safety  Goal: Will remain free from injury  Outcome: PROGRESSING AS EXPECTED  Bed locked and in lowest position. Bed alarm on. Treaded socks. Call light and belongings within reach. Patient educated to call for assistance. Pt verbalized understanding. Hourly rounding in place.        Problem: Infection  Goal: Will remain free from infection  Outcome: PROGRESSING AS EXPECTED  Pt educated on importance of good hand hygiene and verbalized understanding.

## 2017-04-29 NOTE — PROGRESS NOTES
Avila catheter discontinued as ordered, pt tolerated well.  Pt educated on expectations post discontinuation and she expressed understanding.  Pt walked 60 feet with RN and tolerated without shortness of breath, slow steady gait with wheelchair.  Family at bedside, updated on pt condition and plan of care.

## 2017-04-29 NOTE — PROGRESS NOTES
Pulmonary Critical Care Progress Note    Date of service: 4/29/2017     Interval Events:  24 hour interval history reviewed  Reason for visit:  Atelectasis, COPD, MARY, pneumothoraces  ROS with negative nausea, vomiting and abdominal pain. Positive chest pain. Positive shortness of breath. All other systems are negative.     Tmax 99.5  -1.5 L over the last 24 hours, -5 L since admit   CXR shows: possible trace apical pneumothorax on right otherwise unchanged.   No overnight events.   SR,  systolic  Mobile to chair.   -750 ml.  2L NC      PFSH:  No change.      Respiratory:     Pulse Oximetry: 99 % on 2L NC  Lungs are diminished but clear.           Invalid input(s): [id number]    HemoDynamics:  Pulse: 83, Heart Rate (Monitored): 88  NIBP: (!) 90/65 mmHg    HR regular SR. Lower extremities without edema.     Neuro:  Alert and oriented X4.     Fluids:  Intake/Output       04/27/17 0700 - 04/28/17 0659 04/28/17 0700 - 04/29/17 0659 04/29/17 0700 - 04/30/17 0659      [id number] 4451-4415 Total 3438-1414 9689-7625 Total 9048-6583 4377-9212 Total       Intake    P.O.  960  120 1080  960  -- 960  --  -- --    P.O.  960 -- 960 -- -- --    I.V.  50  -- 50  50  -- 50  --  -- --    Magnesium Sulfate Volume 50 -- 50 50 -- 50 -- -- --    Blood  280  -- 280  --  -- --  --  -- --    Volume (RELEASE RED BLOOD CELLS) 280 -- 280 -- -- -- -- -- --    Total Intake 8673 163 5199 1010 -- 1010 -- -- --       Output    Urine  1950  813 2763  2195  325 2520  --  -- --    Indwelling Cathether 0796 618 3638 2195 325 2520 -- -- --    Drains  18  -- 18  33  -- 33  --  -- --    Mediastinal Chest Tube 2 0 -- 0 -- -- -- -- -- --    Right Chest Tube 1 0 -- 0 8 -- 8 -- -- --    Mediastinal Chest Tube 1 8 -- 8 -- -- -- -- -- --    Left Chest Tube 1 10 -- 10 25 -- 25 -- -- --    Stool  --  -- --  --  -- --  --  -- --    Number of Times Stooled -- 1 x 1 x 0 x -- 0 x -- -- --    Total Output 1668 434 [id number] 2781 [phone] -- --  --       Net I/O     -678 -693 -1371 -1218 -325 -1543 -- -- --        Weight: 58.6 kg (129 lb 3 oz)  Recent Labs      17   0400   SODIUM  136  134*  136   POTASSIUM  4.3  4.4  4.4   CHLORIDE  96  94*  96   CO2  38*  36*  35*   BUN  14  16  16   CREATININE  0.45*  0.54  0.60   MAGNESIUM  1.8  1.9  1.9   CALCIUM  8.3*  8.4*  8.3*       GI/Nutrition:  Abdomen is soft and non tender. Positive bowel sounds.       Liver Function  Recent Labs      17   0400   GLUCOSE  94  92  94       Heme:  Recent Labs      17   0508  17   0400   RBC  2.51*  3.60*   --   3.73*   HEMOGLOBIN  7.8*  11.0*   --   11.4*   HEMATOCRIT  24.9*  33.7*   --   35.1*   PLATELETCT  205  259   --   289   PROTHROMBTM  13.4   --   13.8  13.5   INR  0.99   --   1.03  1.00       Infectious Disease:  Temp  Av °C (96.8 °F)  Min: 35.8 °C (96.5 °F)  Max: 36.1 °C (97 °F)  Monitored Temp  Av.4 °C (99.4 °F)  Min: 37.3 °C (99.1 °F)  Max: 37.5 °C (99.5 °F)    Recent Labs      17   0400   WBC  8.3  8.2  11.4*   NEUTSPOLYS  67.80  63.50  71.90   LYMPHOCYTES  14.90*  17.50*  12.10*   MONOCYTES  13.50*  15.90*  13.20   EOSINOPHILS  2.40  1.60  1.20   BASOPHILS  0.20  0.40  0.30     Current Facility-Administered Medications   Medication Dose Frequency Provider Last Rate Last Dose   • aspirin EC (ECOTRIN) tablet 325 mg  325 mg DAILY Donna Mann A.P.N.   325 mg at 17 0834   • potassium chloride ER (KLOR-CON) tablet 20 mEq  20 mEq BID Lew Tompkins M.D.   20 mEq at 17    Or   • potassium chloride (KLOR-CON) 20 MEQ packet 20 mEq  20 mEq BID Lew Tompkins M.D.   20 mEq at 1715   • MD ALERT...Adult ICU Electrolyte Replacement per Pharmacy Protocol   pharmacy to dose Jeremy M Gonda, M.D.       • alprazolam (XANAX) tablet 0.25 mg  0.25 mg TID PRN Donna Mann A.P.N.    0.25 mg at 04/22/17 1315   • tiotropium (SPIRIVA) 18 MCG inhalation capsule 1 Cap  1 Cap DAILY Fernandez Hays M.D.   1 Cap at 04/28/17 0834   • amiodarone (CORDARONE) tablet 400 mg  400 mg TWICE DAILY Donna Mann, A.P.N.   400 mg at 04/28/17 2042   • furosemide (LASIX) injection 20 mg  20 mg Q DAY Donna Mann, A.P.N.   20 mg at 04/28/17 0834   • atorvastatin (LIPITOR) tablet 10 mg  10 mg DAILY Tayler Kimball A.P.N.   10 mg at 04/28/17 0835   • budesonide-formoterol (SYMBICORT) 160-4.5 MCG/ACT inhaler 2 Puff  2 Puff BID Tayler Kimball A.P.N.   2 Puff at 04/28/17 2042   • sertraline (ZOLOFT) tablet 50 mg  50 mg DAILY Tayler Kimball A.P.N.   50 mg at 04/28/17 0834   • albuterol inhaler 2 Puff  2 Puff Q4HRS PRN Tayler Kimball A.P.N.   2 Puff at 04/22/17 1516   • Respiratory Care per Protocol   Continuous RT Tayler Kimball, A.P.N.       • NS infusion   Continuous Tayler Kimball A.P.N. 10 mL/hr at 04/27/17 0700     • docusate sodium (COLACE) capsule 100 mg  100 mg QAM Tayler Kimball, A.P.N.   100 mg at 04/27/17 0915    And   • senna-docusate (PERICOLACE or SENOKOT S) 8.6-50 MG per tablet 1 Tab  1 Tab Nightly Tayler Kimball, A.P.N.   1 Tab at 04/27/17 1954    And   • senna-docusate (PERICOLACE or SENOKOT S) 8.6-50 MG per tablet 1 Tab  1 Tab Q24HRS PRN Tayler Kimball, A.P.N.        And   • lactulose 20 GM/30ML solution 30 mL  30 mL Q24HRS PRN Tayler Kimball A.P.N.        And   • bisacodyl (DULCOLAX) suppository 10 mg  10 mg Q24HRS PRN Tayler Kimball, A.P.N.        And   • fleet enema 133 mL  1 Each Once PRN Tayler Kimball, A.P.N.       • oxycodone immediate-release (ROXICODONE) tablet 5 mg  5 mg Q3HRS PRN Tayler Kimball, A.P.N.   5 mg at 04/20/17 2029   • oxycodone immediate release (ROXICODONE) tablet 10 mg  10 mg Q3HRS PRN Tayler Kimball, A.P.N.   10 mg at 04/24/17 1021   • tramadol (ULTRAM) 50 MG tablet 50 mg  50 mg Q4HRS PRN Tayler Kimball, A.P.N.   50 mg at 04/29/17 0402   • ondansetron (ZOFRAN)  syringe/vial injection 4 mg  4 mg Q6HRS PRN Tayler Kimball, A.P.N.   4 mg at 04/25/17 1555    Or   • prochlorperazine (COMPAZINE) injection 10 mg  10 mg Q6HRS PRN Tayler Kimball, A.P.N.   10 mg at 04/21/17 1014    Or   • promethazine (PHENERGAN) suppository 25 mg  25 mg Q6HRS PRN Tayler Kimball, A.P.N.       • acetaminophen (TYLENOL) tablet 650 mg  650 mg Q4HRS PRN Tayler Kimball, A.P.N.        Or   • acetaminophen (TYLENOL) suppository 650 mg  650 mg Q4HRS PRN Tayler Kimball, A.P.N.       • mag hydrox-al hydrox-simeth (MAALOX PLUS ES or MYLANTA DS) suspension 30 mL  30 mL Q4HRS PRN Tayler Kimball, A.P.N.       • diphenhydrAMINE (BENADRYL) tablet/capsule 25 mg  25 mg HS PRN - MR X 1 Tayler Kimball, A.P.N.   25 mg at 04/28/17 2147   • ipratropium-albuterol (DUONEB) nebulizer solution 3 mL  3 mL Q2HRS PRN (RT) Fernandez Hays M.D.   3 mL at 04/23/17 2055     Last reviewed on 4/20/2017  6:03 AM by Randy Licona      Quality  Measures:  Labs reviewed, Medications reviewed and Radiology images reviewed                    Lines:   [facility] 4/20    Drips:   none    ABX:   None     Cultures:   None     Echo from 4/20 with EF of 65%     Lasix 20 Qday      Assessment and Plan:  Bilateral pneumothoraces with bronchopleural fistula and increased subcutaneous emphysema   - chest tubes per surgery   - s/p thoracic surgery consult   - intubated 4/23-25  Status post mitral valve repair, left atrial appendage ligation and maze procedure  Acute exacerbation of severe stage III chronic obstructive pulmonary disease   - cont BDs   - rt protocol   - monitor for need of steroids/abx   - rt/02 protocols   - mobilze  History of atrial flutter - in SR  Systemic arterial hypertension   - cont BP control  Dyslipidemia  Anxiety  Obstructive sleep apnea       Date of service:  4/29/17  No time overlap  Time excludes procedures  Discussed with RN, RT, Team      I, Monica Estrella (Scribe)duy scribing for, and in the presence of,   SIRISHA Tompkins  Electronically signed by: Monica Estrella (Scribe), 4/29/2017  I, Dr. Leda M.D. personally performed the services described in this documentation, as scribed by Monica Estrella in my presence, and it is both accurate and complete.

## 2017-04-29 NOTE — PROGRESS NOTES
Bedside report received by gilberto Victor. Patient sitting up in bed watching TV at this time, family present. POC discussed, verbalized understanding. No immediate concerns for patient at this time. All safety measures in place.

## 2017-04-30 ENCOUNTER — PATIENT OUTREACH (OUTPATIENT)
Dept: HEALTH INFORMATION MANAGEMENT | Facility: OTHER | Age: 72
End: 2017-04-30

## 2017-04-30 ENCOUNTER — APPOINTMENT (OUTPATIENT)
Dept: RADIOLOGY | Facility: MEDICAL CENTER | Age: 72
DRG: 219 | End: 2017-04-30
Attending: INTERNAL MEDICINE
Payer: MEDICARE

## 2017-04-30 ENCOUNTER — APPOINTMENT (OUTPATIENT)
Dept: RADIOLOGY | Facility: MEDICAL CENTER | Age: 72
DRG: 219 | End: 2017-04-30
Attending: CLINICAL NURSE SPECIALIST
Payer: MEDICARE

## 2017-04-30 LAB
ANION GAP SERPL CALC-SCNC: 7 MMOL/L (ref 0–11.9)
BASOPHILS # BLD AUTO: 0.4 % (ref 0–1.8)
BASOPHILS # BLD: 0.06 K/UL (ref 0–0.12)
BUN SERPL-MCNC: 18 MG/DL (ref 8–22)
CALCIUM SERPL-MCNC: 8.4 MG/DL (ref 8.5–10.5)
CHLORIDE SERPL-SCNC: 95 MMOL/L (ref 96–112)
CO2 SERPL-SCNC: 34 MMOL/L (ref 20–33)
CREAT SERPL-MCNC: 0.62 MG/DL (ref 0.5–1.4)
EOSINOPHIL # BLD AUTO: 0.2 K/UL (ref 0–0.51)
EOSINOPHIL NFR BLD: 1.4 % (ref 0–6.9)
ERYTHROCYTE [DISTWIDTH] IN BLOOD BY AUTOMATED COUNT: 51.6 FL (ref 35.9–50)
GFR SERPL CREATININE-BSD FRML MDRD: >60 ML/MIN/1.73 M 2
GLUCOSE SERPL-MCNC: 93 MG/DL (ref 65–99)
HCT VFR BLD AUTO: 33.6 % (ref 37–47)
HGB BLD-MCNC: 10.7 G/DL (ref 12–16)
IMM GRANULOCYTES # BLD AUTO: 0.12 K/UL (ref 0–0.11)
IMM GRANULOCYTES NFR BLD AUTO: 0.9 % (ref 0–0.9)
INR PPP: 1.05 (ref 0.87–1.13)
LYMPHOCYTES # BLD AUTO: 1.51 K/UL (ref 1–4.8)
LYMPHOCYTES NFR BLD: 10.9 % (ref 22–41)
MCH RBC QN AUTO: 30 PG (ref 27–33)
MCHC RBC AUTO-ENTMCNC: 31.8 G/DL (ref 33.6–35)
MCV RBC AUTO: 94.1 FL (ref 81.4–97.8)
MONOCYTES # BLD AUTO: 1.16 K/UL (ref 0–0.85)
MONOCYTES NFR BLD AUTO: 8.3 % (ref 0–13.4)
NEUTROPHILS # BLD AUTO: 10.86 K/UL (ref 2–7.15)
NEUTROPHILS NFR BLD: 78.1 % (ref 44–72)
NRBC # BLD AUTO: 0 K/UL
NRBC BLD AUTO-RTO: 0 /100 WBC
PLATELET # BLD AUTO: 292 K/UL (ref 164–446)
PMV BLD AUTO: 8.6 FL (ref 9–12.9)
POTASSIUM SERPL-SCNC: 4.3 MMOL/L (ref 3.6–5.5)
PROTHROMBIN TIME: 14 SEC (ref 12–14.6)
RBC # BLD AUTO: 3.57 M/UL (ref 4.2–5.4)
SODIUM SERPL-SCNC: 136 MMOL/L (ref 135–145)
WBC # BLD AUTO: 13.9 K/UL (ref 4.8–10.8)

## 2017-04-30 PROCEDURE — 85610 PROTHROMBIN TIME: CPT

## 2017-04-30 PROCEDURE — 700102 HCHG RX REV CODE 250 W/ 637 OVERRIDE(OP): Performed by: INTERNAL MEDICINE

## 2017-04-30 PROCEDURE — 770022 HCHG ROOM/CARE - ICU (200)

## 2017-04-30 PROCEDURE — 700101 HCHG RX REV CODE 250: Performed by: INTERNAL MEDICINE

## 2017-04-30 PROCEDURE — 94640 AIRWAY INHALATION TREATMENT: CPT

## 2017-04-30 PROCEDURE — A9270 NON-COVERED ITEM OR SERVICE: HCPCS | Performed by: NURSE PRACTITIONER

## 2017-04-30 PROCEDURE — 700111 HCHG RX REV CODE 636 W/ 250 OVERRIDE (IP): Performed by: NURSE PRACTITIONER

## 2017-04-30 PROCEDURE — 700102 HCHG RX REV CODE 250 W/ 637 OVERRIDE(OP): Performed by: NURSE PRACTITIONER

## 2017-04-30 PROCEDURE — 80048 BASIC METABOLIC PNL TOTAL CA: CPT

## 2017-04-30 PROCEDURE — 94668 MNPJ CHEST WALL SBSQ: CPT

## 2017-04-30 PROCEDURE — 700112 HCHG RX REV CODE 229: Performed by: NURSE PRACTITIONER

## 2017-04-30 PROCEDURE — A9270 NON-COVERED ITEM OR SERVICE: HCPCS | Performed by: INTERNAL MEDICINE

## 2017-04-30 PROCEDURE — 94669 MECHANICAL CHEST WALL OSCILL: CPT

## 2017-04-30 PROCEDURE — 71010 DX-CHEST-LIMITED (1 VIEW): CPT

## 2017-04-30 PROCEDURE — 99233 SBSQ HOSP IP/OBS HIGH 50: CPT | Performed by: INTERNAL MEDICINE

## 2017-04-30 PROCEDURE — 85025 COMPLETE CBC W/AUTO DIFF WBC: CPT

## 2017-04-30 PROCEDURE — 94660 CPAP INITIATION&MGMT: CPT

## 2017-04-30 PROCEDURE — 94667 MNPJ CHEST WALL 1ST: CPT

## 2017-04-30 RX ORDER — OXYCODONE HYDROCHLORIDE 5 MG/1
5 TABLET ORAL EVERY 4 HOURS PRN
Status: DISCONTINUED | OUTPATIENT
Start: 2017-04-30 | End: 2017-05-05 | Stop reason: HOSPADM

## 2017-04-30 RX ADMIN — TRAMADOL HYDROCHLORIDE 50 MG: 50 TABLET, COATED ORAL at 06:58

## 2017-04-30 RX ADMIN — TRAMADOL HYDROCHLORIDE 50 MG: 50 TABLET, COATED ORAL at 19:06

## 2017-04-30 RX ADMIN — AMIODARONE HYDROCHLORIDE 400 MG: 200 TABLET ORAL at 21:59

## 2017-04-30 RX ADMIN — FUROSEMIDE 20 MG: 10 INJECTION, SOLUTION INTRAVENOUS at 09:30

## 2017-04-30 RX ADMIN — DIPHENHYDRAMINE HCL 25 MG: 25 TABLET ORAL at 23:08

## 2017-04-30 RX ADMIN — BENZOCAINE AND MENTHOL 1 LOZENGE: 15; 3.6 LOZENGE ORAL at 13:09

## 2017-04-30 RX ADMIN — BUDESONIDE AND FORMOTEROL FUMARATE DIHYDRATE 2 PUFF: 160; 4.5 AEROSOL RESPIRATORY (INHALATION) at 08:26

## 2017-04-30 RX ADMIN — SERTRALINE 50 MG: 50 TABLET, FILM COATED ORAL at 08:27

## 2017-04-30 RX ADMIN — OXYCODONE HYDROCHLORIDE 2.5 MG: 5 TABLET ORAL at 09:56

## 2017-04-30 RX ADMIN — OXYCODONE HYDROCHLORIDE 2.5 MG: 5 TABLET ORAL at 07:47

## 2017-04-30 RX ADMIN — DOCUSATE SODIUM 100 MG: 100 CAPSULE ORAL at 08:27

## 2017-04-30 RX ADMIN — POTASSIUM CHLORIDE 20 MEQ: 750 TABLET, FILM COATED, EXTENDED RELEASE ORAL at 21:59

## 2017-04-30 RX ADMIN — TRAMADOL HYDROCHLORIDE 50 MG: 50 TABLET, COATED ORAL at 23:08

## 2017-04-30 RX ADMIN — BUDESONIDE AND FORMOTEROL FUMARATE DIHYDRATE 2 PUFF: 160; 4.5 AEROSOL RESPIRATORY (INHALATION) at 22:00

## 2017-04-30 RX ADMIN — TRAMADOL HYDROCHLORIDE 50 MG: 50 TABLET, COATED ORAL at 11:20

## 2017-04-30 RX ADMIN — ATORVASTATIN CALCIUM 10 MG: 10 TABLET, FILM COATED ORAL at 08:27

## 2017-04-30 RX ADMIN — IPRATROPIUM BROMIDE AND ALBUTEROL SULFATE 3 ML: .5; 3 SOLUTION RESPIRATORY (INHALATION) at 23:12

## 2017-04-30 RX ADMIN — ASPIRIN 325 MG: 325 TABLET, DELAYED RELEASE ORAL at 08:27

## 2017-04-30 RX ADMIN — TRAMADOL HYDROCHLORIDE 50 MG: 50 TABLET, COATED ORAL at 15:17

## 2017-04-30 RX ADMIN — TIOTROPIUM BROMIDE 1 CAPSULE: 18 CAPSULE ORAL; RESPIRATORY (INHALATION) at 08:26

## 2017-04-30 RX ADMIN — AMIODARONE HYDROCHLORIDE 400 MG: 200 TABLET ORAL at 08:27

## 2017-04-30 RX ADMIN — POTASSIUM CHLORIDE 20 MEQ: 750 TABLET, FILM COATED, EXTENDED RELEASE ORAL at 08:27

## 2017-04-30 ASSESSMENT — PAIN SCALES - GENERAL
PAINLEVEL_OUTOF10: 5
PAINLEVEL_OUTOF10: 1
PAINLEVEL_OUTOF10: 1
PAINLEVEL_OUTOF10: 2
PAINLEVEL_OUTOF10: 5
PAINLEVEL_OUTOF10: 2
PAINLEVEL_OUTOF10: 4
PAINLEVEL_OUTOF10: 5
PAINLEVEL_OUTOF10: 2
PAINLEVEL_OUTOF10: 2
PAINLEVEL_OUTOF10: 4
PAINLEVEL_OUTOF10: 5

## 2017-04-30 ASSESSMENT — ENCOUNTER SYMPTOMS
GASTROINTESTINAL NEGATIVE: 1
NEUROLOGICAL NEGATIVE: 1
EYES NEGATIVE: 1
PSYCHIATRIC NEGATIVE: 1
MUSCULOSKELETAL NEGATIVE: 1
CARDIOVASCULAR NEGATIVE: 1
RESPIRATORY NEGATIVE: 1

## 2017-04-30 NOTE — CARE PLAN
Problem: Post Op Day 4 CABG/Heart Valve Replacement  Goal: Optimal care of the Post Op CABG/Heart Valve replacement Post Op Day 4  Intervention: Shower daily and clean incisions twice daily with soap and water  Unable to shower, pt with bilateral pleural chest tubes, sponge bath only.  Intervention: Up in chair for all meals  Pt up in chair for meals today and tolerating well.  Intervention: Ambulate, increasing the distance each time x 3 and before bed  Pt walked x 2 today and has increased distance by 10 feet with each walk.  Plan for 2 more walks today.

## 2017-04-30 NOTE — CARE PLAN
Problem: Safety  Goal: Will remain free from injury  Outcome: PROGRESSING AS EXPECTED  Pt remains free from falls or injuries. Bed alarm set. Pt calls for assistance appropriately.     Problem: Pain Management  Goal: Pain level will decrease to patient’s comfort goal  Outcome: PROGRESSING AS EXPECTED  Pain managed by PRN pain medications. Pt calls for pain management, as needed.

## 2017-04-30 NOTE — CARE PLAN
"Problem: Post Op Day 4 CABG/Heart Valve Replacement  Goal: Optimal care of the Post Op CABG/Heart Valve replacement Post Op Day 4  Intervention: Shower daily and clean incisions twice daily with soap and water  Pt unable to shower due to bilateral pleural chest tubes, incision cleaned.  Intervention: Up in chair for all meals  Pt up to recliner chair for meals, tolerating well.  Intervention: Ambulate, increasing the distance each time x 3 and before bed  Pt walking on schedule and encouraged to increase distances.  Pt states she \"feels a little more tired today\" has been able to go at least as far as yesterday.  Intervention: IS q 1 hour while awake and record best IS volume  Pt requires encouragement to do IS especially with increased coughing noted today.    Intervention: Consider removal of larsen, chest tube and pacer wire if not already done  All tubes removed except pleural chest tubes, awaiting surgery decision for removal.  Chest tubes remain on 20 cm suction at this time.          "

## 2017-04-30 NOTE — PROGRESS NOTES
Pt care assumed. Pt sitting comfortably in chair at bedside watching television. A&O x 4. No distress present; no pain. Call light, phone, and bedside table within reach. White board updated and plan of care discussed with patient. No concerns present at this time. Will continue to monitor.

## 2017-04-30 NOTE — PROGRESS NOTES
Cardiovascular Surgery Progress Note    Name: Kelly Lovett  MRN: [id number]  : 1945  Admit Date: 2017  5:20 AM  Procedure:  Procedure(s) and Anesthesia Type:     * MITRAL VALVE REPAIR  - General     * MAZE PROCEDURE, Left atrial appendage ligation - General     * PAGE - General  9 Day Post-Op    Vitals:  Patient Vitals for the past 8 hrs:   Temp O2 Delivery O2 (LPM) NIBP Weight   17 0706 - - 2 - -   17 0600 - - - - 56.9 kg (125 lb 7.1 oz)   17 0400 35.8 °C (96.5 °F) CPAP 2 100/65 mmHg -     Temp (24hrs), Av.2 °C (97.1 °F), Min:35.8 °C (96.4 °F), Max:36.8 °C (98.3 °F)      Respiratory:    Respiration: 19, Pulse Oximetry: 99 %, O2 Daily Delivery Respiratory : Silicone Nasal Cannula  Chest Tube Group Right-Tube Status / Drainage: Sutured in Place;Patent;Draining;Small;Serosanguinous, Chest Tube Group Left-Tube Status / Drainage: Patent;Sutured in Place;Draining, Chest Tube Group Right-Device: Dry Closed Drainage System;Suction 20 cm Water, Chest Tube Group Left-Device: Suction 20 cm Water;Closed Drainage System  Chest Tube Drains:   Left Chest Tube 1: 30 mlRight Chest Tube 1: 0 ml     Fluids:    Intake/Output Summary (Last 24 hours) at 17 1036  Last data filed at 17 0700   Gross per 24 hour   Intake   1120 ml   Output   3190 ml   Net  -2070 ml     Admit weight: Weight: 54.7 kg (120 lb 9.5 oz)  Current weight: Weight: 56.9 kg (125 lb 7.1 oz) (17 0600)    Labs:  Recent Labs      17   0434  17   0400  17   0313   WBC  8.2  11.4*  13.9*   RBC  3.60*  3.73*  3.57*   HEMOGLOBIN  11.0*  11.4*  10.7*   HEMATOCRIT  33.7*  35.1*  33.6*   MCV  93.6  94.1  94.1   MCH  30.6  30.6  30.0   MCHC  32.6*  32.5*  31.8*   RDW  56.3*  52.2*  51.6*   PLATELETCT  259  289  292   MPV  9.0  8.7*  8.6*     Recent Labs      17   0434  17   0400  17   0313   NEUTSPOLYS  63.50  71.90  78.10*   LYMPHOCYTES  17.50*  12.10*  10.90*   MONOCYTES  15.90*   "13.20  8.30   EOSINOPHILS  1.60  1.20  1.40   BASOPHILS  0.40  0.30  0.40     Recent Labs      04/28/17   0434  04/29/17   0400  04/30/17   0313   SODIUM  134*  136  136   POTASSIUM  4.4  4.4  4.3   CHLORIDE  94*  96  95*   CO2  36*  35*  34*   GLUCOSE  92  94  93   BUN  16  16  18   CREATININE  0.54  0.60  0.62   CALCIUM  8.4*  8.3*  8.4*     Recent Labs      04/28/17   0508  04/29/17   0400  04/30/17   0313   INR  1.03  1.00  1.05       Medications:  • aspirin EC  325 mg     • potassium chloride ER  20 mEq      Or   • potassium chloride  20 mEq     • MD ALERT...Adult ICU Electrolyte Replacement per Pharmacy Protocol       • tiotropium  1 Cap     • amiodarone  400 mg     • furosemide  20 mg     • atorvastatin  10 mg     • budesonide-formoterol  2 Puff     • sertraline  50 mg     • docusate sodium  100 mg      And   • senna-docusate  1 Tab         Exam:   Review of Systems   Unable to perform ROS: intubated   HENT: Negative.         Right cheek \"puffed up\"   Eyes: Negative.    Respiratory: Negative.    Cardiovascular: Negative.    Gastrointestinal: Negative.    Genitourinary: Negative.    Musculoskeletal: Negative.    Skin: Negative.    Neurological: Negative.    Endo/Heme/Allergies: Negative.    Psychiatric/Behavioral: Negative.        Physical Exam   Constitutional: She is oriented to person, place, and time. She appears well-developed and well-nourished.   HENT:   Head: Normocephalic.   Eyes: Pupils are equal, round, and reactive to light.   Neck: Normal range of motion. No JVD present.   Cardiovascular: Normal rate, regular rhythm and normal heart sounds.    Pulmonary/Chest: Effort normal and breath sounds normal.   Bases diminished  R chest crepitus  SUB q air extends up to R face   Abdominal: Soft. Bowel sounds are normal. She exhibits no distension. There is no guarding.   Musculoskeletal: Normal range of motion.   Neurological: She is alert and oriented to person, place, and time.   Skin: Skin is warm and " dry.   Surgical incisions CDI   Psychiatric: She has a normal mood and affect. Her behavior is normal.       Quality Measures:   EKG reviewed, Medications reviewed, Labs reviewed and Radiology images reviewed  Larsen catheter: No Larsen  Central line in place: Concentrated IV drugs and Need for access    DVT Prophylaxis: Contraindicated - High bleeding risk  DVT prophylaxis - mechanical: SCDs  Ulcer prophylaxis: Yes    Assessed for rehab: Patient was assess for and/or received rehabilitation services during this hospitalization      Assessment/Plan:  POD 1 HDS, NSR--some runs VT ?afib with aberrancy? Last night, started on amio gtt.  Extubated, neuro grossly intact.  CT output min, mod airleak.  CXR no pneumothorax.  Adequate UOP, wts up, +3L. labs noted.  PLAN:  Keep CTs.  DC larsen.  STart gentle diuresis.  Change to PO amio.  Stress cough/deepbreath/IS. AMB.    POD 2 HDS, NSR.  Crepitus Right upper chest, mild. CXR without pnuemothorax. On 2 l nc. CT output min, mod airleak remains--connections recheck, and redressed. Good bowel sounds, not passing gas, no distention/n/v. W/w. INR trending up.  DC temp wires. Add xanax anxiety. Urine retention overnight, straight cath--to reinsert larsen if unable to void again.  AMB/IS.  CXR in AM.   POD 3 HDS NSR.  Airleak remains.  Sub q air has now extended up neck, Right facial swelling.  No resp distress.  CXR with bilateral chest wall air and pneumomediastinum.  Dr. Estrada consulted, will see patient Monday AM.  CT chest today.  CTs to be placed to 2 separate pleural vacs. DC temp wires.  CPM.  POD 4 re-intubated last night.  Chest tubes inserted pleural.  Per CT scan moderate bilateral pneumothorax.  Sedated.  + air leaks  Both chest tubes.  Sean to see patient today.    POD 5 Sedated/levo---wakes and follows appropriately, weaning off levo as wakes.  CXR small R apical pneumothorax.  Right face swelling improved.  SBTs this AM< likely to extubate today.  Ailreak on 1 CT  remains.  Keep all CTs today.  CPM.   POD 6  HDS, NSR.. Extubated, neuro intact.  CXR improved.  Airleak remains visible on the Right Angle Mediastinal CT. welling face improving.  PLAN: Keep CTs today.  DC arlting and NG tube, increase diet as tolerated.  AMB/IS.  Will keep larsen today until more ambulatory with multiple CTs. Increase to full dose asa until able to start coumadin  POD 7 HDS, SR, neuro intact.  Wounds CDI.  Chest tubes still with airleaks.  Up in chair today.  Amb/IS.  CPM.  POD 8 HDS, CXR with small right apical pneumo.  Neuro intact.  Wounds CDI.  Labs stable.  Plan:  Dc larsen and ambulate.   POD 9 HDS, CXR unchanged.  No air leak to chest tubes.  Patient ambulating.  HDS, Neuro intact.  Wounds CDI.  Doing well.     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

## 2017-04-30 NOTE — PROGRESS NOTES
Pulmonary Critical Care Progress Note    Date of service: 4/30/2017     HPI: Reason for visit:  Atelectasis, COPD, MARY, pneumothoraces     ROS: with negative nausea, vomiting and abdominal pain. Positive chest pain. Positive shortness of breath. All other systems are negative.       Interval Events:  24 hour interval history reviewed  TM 99.3  -2.1L over 24 hours   -7.1L since admit  Left chest drain with 30cc out  99% on 2L  CXR shows: Atelectasis on right side, relatively unchanged.  Alert and oriented x4  SR, systolic 110-120  Mobilized, walked without assistance  IS 1000  Anticoagulation on hold    PFSH:  No change.    Respiratory:     Pulse Oximetry: 99 % on 2L NC  Lungs are mildly diminished.         Invalid input(s): [id number]    HemoDynamics:  Pulse: 81, Heart Rate (Monitored): 87  NIBP: 100/65 mmHg    HR regular. Extremities without edema.     Neuro:  Alert and oriented X4.     Fluids:  Intake/Output       04/28/17 0700 - 04/29/17 0659 04/29/17 0700 - 04/30/17 0659 04/30/17 0700 - 05/01/17 0659      5619-6561 5792-2934 Total 4178-2726 9732-4725 Total 7569-0121 0160-8874 Total       Intake    P.O.  960  -- 960  1000  -- 1000  --  -- --    P.O. 960 -- 960 1000 -- 1000 -- -- --    I.V.  50  -- 50  --  -- --  --  -- --    Magnesium Sulfate Volume 50 -- 50 -- -- -- -- -- --    Total Intake 1010 -- 1010 1000 -- 1000 -- -- --       Output    Urine  2195  325 2520  2500  635 3135  --  -- --    Number of Times Voided -- -- -- -- 2 x 2 x -- -- --    Indwelling Cathether 2195 325 2520 2500 -- 2500 -- -- --    Void (ml) -- -- -- -- 635 635 -- -- --    Drains  33  -- 33  30  -- 30  --  -- --    Right Chest Tube 1 8 -- 8 0 -- 0 -- -- --    Left Chest Tube 1 25 -- 25 30 -- 30 -- -- --    Stool  --  -- --  --  -- --  --  -- --    Number of Times Stooled 0 x -- 0 x 0 x 1 x 1 x -- -- --    Total Output [phone] -- -- --       Net I/O     -1218 -325 -1543 -1530 -635 -2935 -- -- --        Weight: 56.9  kg (125 lb 7.1 oz)  Recent Labs      17   0434  17   0400  17   0313   SODIUM  134*  136  136   POTASSIUM  4.4  4.4  4.3   CHLORIDE  94*  96  95*   CO2  36*  35*  34*   BUN  16  16  18   CREATININE  0.54  0.60  0.62   MAGNESIUM  1.9  1.9   --    CALCIUM  8.4*  8.3*  8.4*       GI/Nutrition:  Abdomen is soft and non tender. Positive bowel sounds.       Liver Function  Recent Labs      17   0400  17   0313   GLUCOSE  92  94  93       Heme:  Recent Labs      17   04317   0508  170  17   0313   RBC  3.60*   --   3.73*  3.57*   HEMOGLOBIN  11.0*   --   11.4*  10.7*   HEMATOCRIT  33.7*   --   35.1*  33.6*   PLATELETCT  259   --   289  292   PROTHROMBTM   --   13.8  13.5  14.0   INR   --   1.03  1.00  1.05       Infectious Disease:  Temp  Av.2 °C (97.1 °F)  Min: 35.8 °C (96.4 °F)  Max: 36.8 °C (98.3 °F)  Monitored Temp  Av.2 °C (98.9 °F)  Min: 36.9 °C (98.4 °F)  Max: 37.4 °C (99.3 °F)    Recent Labs      170  17   WBC  8.2  11.4*  13.9*   NEUTSPOLYS  63.50  71.90  78.10*   LYMPHOCYTES  17.50*  12.10*  10.90*   MONOCYTES  15.90*  13.20  8.30   EOSINOPHILS  1.60  1.20  1.40   BASOPHILS  0.40  0.30  0.40     Current Facility-Administered Medications   Medication Dose Frequency Provider Last Rate Last Dose   • oxycodone immediate-release (ROXICODONE) tablet 2.5 mg  2.5 mg Q3HRS PRN Lew Tompkins M.D.   2.5 mg at 17 1138   • aspirin EC (ECOTRIN) tablet 325 mg  325 mg DAILY Donna L Johnathan, A.P.NRadha   325 mg at 17 0839   • potassium chloride ER (KLOR-CON) tablet 20 mEq  20 mEq BID Lew Tompkins M.D.   20 mEq at 17    Or   • potassium chloride (KLOR-CON) 20 MEQ packet 20 mEq  20 mEq BID Lew Tompkins M.D.   20 mEq at 17 0915   • MD ALERT...Adult ICU Electrolyte Replacement per Pharmacy Protocol   pharmacy to dose Jeremy M Gonda, M.D.       • alprazolam (XANAX)  tablet 0.25 mg  0.25 mg TID PRN Donna Mann, A.P.N.   0.25 mg at 04/22/17 1315   • tiotropium (SPIRIVA) 18 MCG inhalation capsule 1 Cap  1 Cap DAILY Fernandez Hays M.D.   1 Cap at 04/29/17 1140   • amiodarone (CORDARONE) tablet 400 mg  400 mg TWICE DAILY Donna Mann, A.P.N.   400 mg at 04/29/17 2055   • furosemide (LASIX) injection 20 mg  20 mg Q DAY Donna Mann, A.P.N.   20 mg at 04/29/17 0839   • atorvastatin (LIPITOR) tablet 10 mg  10 mg DAILY Tayler Kimball A.P.N.   10 mg at 04/29/17 0838   • budesonide-formoterol (SYMBICORT) 160-4.5 MCG/ACT inhaler 2 Puff  2 Puff BID Tayler Kimball A.P.N.   2 Puff at 04/29/17 2057   • sertraline (ZOLOFT) tablet 50 mg  50 mg DAILY Tayler Kimball A.P.N.   50 mg at 04/29/17 0839   • albuterol inhaler 2 Puff  2 Puff Q4HRS PRN Tayler Kimball A.P.N.   2 Puff at 04/22/17 1516   • Respiratory Care per Protocol   Continuous RT Tayler Kimball A.P.N.       • NS infusion   Continuous Tayler Kimball A.P.N. 10 mL/hr at 04/27/17 0700     • docusate sodium (COLACE) capsule 100 mg  100 mg QAM Tayler Kimball, A.P.N.   100 mg at 04/29/17 0838    And   • senna-docusate (PERICOLACE or SENOKOT S) 8.6-50 MG per tablet 1 Tab  1 Tab Nightly Tayler Kimball A.P.N.   1 Tab at 04/27/17 1954    And   • senna-docusate (PERICOLACE or SENOKOT S) 8.6-50 MG per tablet 1 Tab  1 Tab Q24HRS PRN Tayler Kimball A.P.N.        And   • lactulose 20 GM/30ML solution 30 mL  30 mL Q24HRS PRN Tayler Kimball, A.P.N.        And   • bisacodyl (DULCOLAX) suppository 10 mg  10 mg Q24HRS PRN Tayler Kimball, A.P.N.        And   • fleet enema 133 mL  1 Each Once PRN Tayler Kimball, A.P.N.       • tramadol (ULTRAM) 50 MG tablet 50 mg  50 mg Q4HRS PRN Tayler Kimball, A.P.N.   50 mg at 04/30/17 0658   • ondansetron (ZOFRAN) syringe/vial injection 4 mg  4 mg Q6HRS PRN Tayler Kimball, A.P.N.   4 mg at 04/25/17 1555    Or   • prochlorperazine (COMPAZINE) injection 10 mg  10 mg Q6HRS PRN Tayler Kimball, A.P.N.    10 mg at 04/21/17 1014    Or   • promethazine (PHENERGAN) suppository 25 mg  25 mg Q6HRS PRN Tayler Kimball, A.P.N.       • acetaminophen (TYLENOL) tablet 650 mg  650 mg Q4HRS PRN Tayler Kimball, A.P.N.        Or   • acetaminophen (TYLENOL) suppository 650 mg  650 mg Q4HRS PRN Tayler Kimball, A.P.N.       • mag hydrox-al hydrox-simeth (MAALOX PLUS ES or MYLANTA DS) suspension 30 mL  30 mL Q4HRS PRN Tayler Kimball, A.P.N.       • diphenhydrAMINE (BENADRYL) tablet/capsule 25 mg  25 mg HS PRN - MR X 1 Tayler Kimball, A.P.N.   25 mg at 04/29/17 2217   • ipratropium-albuterol (DUONEB) nebulizer solution 3 mL  3 mL Q2HRS PRN (RT) Fernandez Hays M.D.   3 mL at 04/23/17 2055     Last reviewed on 4/20/2017  6:03 AM by Hyacinth Keith [facility]      Quality  Measures:  Labs reviewed, Medications reviewed and Radiology images reviewed  Avila catheter: No Avila                  Lines:   RIJ 4/20    Drips:   none    ABX:   None     Cultures:   None     Echo from 4/20 with EF of 65%     Lasix 20 Qday    Assessment and Plan:  Bilateral pneumothoraces with bronchopleural fistula and increased subcutaneous emphysema   - chest tubes per surgery   - s/p thoracic surgery consult   - intubated 4/23-25  Status post mitral valve repair, left atrial appendage ligation and maze procedure  Acute exacerbation of severe stage III chronic obstructive pulmonary disease   - cont BDs   - rt protocol   - monitor for need of steroids/abx   - rt/02 protocols   - mobilze  History of atrial flutter - in SR  Systemic arterial hypertension   - cont BP control  Dyslipidemia  Anxiety  Obstructive sleep apnea       Date of service:  4/30/17  No time overlap  Time excludes procedures  Discussed with RN, RT, Team      I, Damaris Branch ([email or web address]), am scribing for, and in the presence of, Dr. Leda M.D.  Electronically signed by: Damaris Branch (Sigrid), 4/30/2017  IDr. Leda M.D. personally performed the services described in this  documentation, as scribed by Damaris Branch in my presence, and it is both accurate and complete.

## 2017-04-30 NOTE — CARE PLAN
Problem: Post Op Day 4 CABG/Heart Valve Replacement  Goal: Optimal care of the Post Op CABG/Heart Valve replacement Post Op Day 4  Intervention: Daily Weights  Done     Intervention: Shower daily and clean incisions twice daily with soap and water  Done     Intervention: Up in chair for all meals  Done  Intervention: Ambulate, increasing the distance each time x 3 and before bed  Done  Intervention: IS q 1 hour while awake and record best IS volume  Done

## 2017-04-30 NOTE — PROGRESS NOTES
Orders for pain medication clarified, pt able to request 2.5 or 5 mg oxycodone for breakthrough pain, prefers to stay on schedule with Tramadol.  Out of bed walking x 2 today thus far.  Chest x-ray completed as ordered, reviewed by Dr. Tompkins during rounds, awaiting trauma to determine when chest tubes go to water seal and d/c.  No air leak noted in either chest tube today.

## 2017-04-30 NOTE — PROGRESS NOTES
Pt with c/o incision pain this morning, medicated with Tramadol and oxycodone, pt asking for 5 mg instead of 2.5.  Discussed that order will be clarified.  Pt did not get chest x-ray as ordered earlier, re-ordered STAT.  Pt has c/o increased cough and sputum production, asking for cough lozenges to decrease discomfort.

## 2017-04-30 NOTE — CARE PLAN
Problem: Pain Management  Goal: Pain level will decrease to patient’s comfort goal  Intervention: Follow pain managment plan developed in collaboration with patient and Interdisciplinary Team  Pt states good pain control with tramadol around the clock and oxycodone for breakthrough.

## 2017-05-01 ENCOUNTER — APPOINTMENT (OUTPATIENT)
Dept: RADIOLOGY | Facility: MEDICAL CENTER | Age: 72
DRG: 219 | End: 2017-05-01
Attending: INTERNAL MEDICINE
Payer: MEDICARE

## 2017-05-01 LAB
ALBUMIN SERPL BCP-MCNC: 3.1 G/DL (ref 3.2–4.9)
ALBUMIN/GLOB SERPL: 1.6 G/DL
ALP SERPL-CCNC: 61 U/L (ref 30–99)
ALT SERPL-CCNC: 11 U/L (ref 2–50)
ANION GAP SERPL CALC-SCNC: 5 MMOL/L (ref 0–11.9)
AST SERPL-CCNC: 14 U/L (ref 12–45)
BILIRUB SERPL-MCNC: 0.7 MG/DL (ref 0.1–1.5)
BUN SERPL-MCNC: 17 MG/DL (ref 8–22)
CALCIUM SERPL-MCNC: 8.4 MG/DL (ref 8.5–10.5)
CHLORIDE SERPL-SCNC: 98 MMOL/L (ref 96–112)
CO2 SERPL-SCNC: 34 MMOL/L (ref 20–33)
CREAT SERPL-MCNC: 0.54 MG/DL (ref 0.5–1.4)
CRP SERPL HS-MCNC: 3.7 MG/DL (ref 0–0.75)
GFR SERPL CREATININE-BSD FRML MDRD: >60 ML/MIN/1.73 M 2
GLOBULIN SER CALC-MCNC: 2 G/DL (ref 1.9–3.5)
GLUCOSE SERPL-MCNC: 93 MG/DL (ref 65–99)
INR PPP: 1.04 (ref 0.87–1.13)
POTASSIUM SERPL-SCNC: 4.1 MMOL/L (ref 3.6–5.5)
PREALB SERPL-MCNC: 13 MG/DL (ref 18–38)
PROT SERPL-MCNC: 5.1 G/DL (ref 6–8.2)
PROTHROMBIN TIME: 13.9 SEC (ref 12–14.6)
SODIUM SERPL-SCNC: 137 MMOL/L (ref 135–145)

## 2017-05-01 PROCEDURE — 80053 COMPREHEN METABOLIC PANEL: CPT

## 2017-05-01 PROCEDURE — 700111 HCHG RX REV CODE 636 W/ 250 OVERRIDE (IP): Performed by: NURSE PRACTITIONER

## 2017-05-01 PROCEDURE — 99233 SBSQ HOSP IP/OBS HIGH 50: CPT | Performed by: INTERNAL MEDICINE

## 2017-05-01 PROCEDURE — 700102 HCHG RX REV CODE 250 W/ 637 OVERRIDE(OP): Performed by: INTERNAL MEDICINE

## 2017-05-01 PROCEDURE — 97530 THERAPEUTIC ACTIVITIES: CPT

## 2017-05-01 PROCEDURE — 770020 HCHG ROOM/CARE - TELE (206)

## 2017-05-01 PROCEDURE — 94668 MNPJ CHEST WALL SBSQ: CPT

## 2017-05-01 PROCEDURE — A9270 NON-COVERED ITEM OR SERVICE: HCPCS | Performed by: NURSE PRACTITIONER

## 2017-05-01 PROCEDURE — 86140 C-REACTIVE PROTEIN: CPT

## 2017-05-01 PROCEDURE — A9270 NON-COVERED ITEM OR SERVICE: HCPCS | Performed by: INTERNAL MEDICINE

## 2017-05-01 PROCEDURE — 84134 ASSAY OF PREALBUMIN: CPT

## 2017-05-01 PROCEDURE — 71010 DX-CHEST-PORTABLE (1 VIEW): CPT

## 2017-05-01 PROCEDURE — 85610 PROTHROMBIN TIME: CPT

## 2017-05-01 PROCEDURE — 700102 HCHG RX REV CODE 250 W/ 637 OVERRIDE(OP): Performed by: NURSE PRACTITIONER

## 2017-05-01 PROCEDURE — 97116 GAIT TRAINING THERAPY: CPT

## 2017-05-01 RX ORDER — AMIODARONE HYDROCHLORIDE 200 MG/1
400 TABLET ORAL
Status: DISCONTINUED | OUTPATIENT
Start: 2017-05-02 | End: 2017-05-03

## 2017-05-01 RX ADMIN — STANDARDIZED SENNA CONCENTRATE AND DOCUSATE SODIUM 1 TABLET: 8.6; 5 TABLET, FILM COATED ORAL at 21:33

## 2017-05-01 RX ADMIN — POTASSIUM CHLORIDE 20 MEQ: 1.5 POWDER, FOR SOLUTION ORAL at 08:12

## 2017-05-01 RX ADMIN — TRAMADOL HYDROCHLORIDE 50 MG: 50 TABLET, COATED ORAL at 12:47

## 2017-05-01 RX ADMIN — TRAMADOL HYDROCHLORIDE 50 MG: 50 TABLET, COATED ORAL at 21:33

## 2017-05-01 RX ADMIN — TRAMADOL HYDROCHLORIDE 50 MG: 50 TABLET, COATED ORAL at 17:22

## 2017-05-01 RX ADMIN — TIOTROPIUM BROMIDE 1 CAPSULE: 18 CAPSULE ORAL; RESPIRATORY (INHALATION) at 08:25

## 2017-05-01 RX ADMIN — BUDESONIDE AND FORMOTEROL FUMARATE DIHYDRATE 2 PUFF: 160; 4.5 AEROSOL RESPIRATORY (INHALATION) at 21:32

## 2017-05-01 RX ADMIN — DIPHENHYDRAMINE HCL 25 MG: 25 TABLET ORAL at 22:09

## 2017-05-01 RX ADMIN — FUROSEMIDE 20 MG: 10 INJECTION, SOLUTION INTRAVENOUS at 08:12

## 2017-05-01 RX ADMIN — ATORVASTATIN CALCIUM 10 MG: 10 TABLET, FILM COATED ORAL at 08:12

## 2017-05-01 RX ADMIN — ASPIRIN 325 MG: 325 TABLET, DELAYED RELEASE ORAL at 08:11

## 2017-05-01 RX ADMIN — TRAMADOL HYDROCHLORIDE 50 MG: 50 TABLET, COATED ORAL at 08:12

## 2017-05-01 RX ADMIN — POTASSIUM CHLORIDE 20 MEQ: 750 TABLET, FILM COATED, EXTENDED RELEASE ORAL at 21:33

## 2017-05-01 RX ADMIN — TRAMADOL HYDROCHLORIDE 50 MG: 50 TABLET, COATED ORAL at 03:32

## 2017-05-01 RX ADMIN — SERTRALINE 50 MG: 50 TABLET, FILM COATED ORAL at 08:11

## 2017-05-01 RX ADMIN — AMIODARONE HYDROCHLORIDE 400 MG: 200 TABLET ORAL at 08:12

## 2017-05-01 RX ADMIN — BUDESONIDE AND FORMOTEROL FUMARATE DIHYDRATE 2 PUFF: 160; 4.5 AEROSOL RESPIRATORY (INHALATION) at 08:25

## 2017-05-01 ASSESSMENT — PAIN SCALES - GENERAL
PAINLEVEL_OUTOF10: 1
PAINLEVEL_OUTOF10: 2
PAINLEVEL_OUTOF10: 4
PAINLEVEL_OUTOF10: 2
PAINLEVEL_OUTOF10: 2
PAINLEVEL_OUTOF10: 4
PAINLEVEL_OUTOF10: 4
PAINLEVEL_OUTOF10: 2
PAINLEVEL_OUTOF10: 2

## 2017-05-01 ASSESSMENT — GAIT ASSESSMENTS
DISTANCE (FEET): 125
DEVIATION: BRADYKINETIC
GAIT LEVEL OF ASSIST: CONTACT GUARD ASSIST
ASSISTIVE DEVICE: WHEELCHAIR PUSH

## 2017-05-01 ASSESSMENT — ENCOUNTER SYMPTOMS
EYES NEGATIVE: 1
GASTROINTESTINAL NEGATIVE: 1
MUSCULOSKELETAL NEGATIVE: 1
PSYCHIATRIC NEGATIVE: 1
CARDIOVASCULAR NEGATIVE: 1
RESPIRATORY NEGATIVE: 1
NEUROLOGICAL NEGATIVE: 1

## 2017-05-01 NOTE — CARE PLAN
Problem: Post Op Day 4 CABG/Heart Valve Replacement  Goal: Optimal care of the Post Op CABG/Heart Valve replacement Post Op Day 4  Intervention: Up in chair for all meals  Up to chair for all meals.  Intervention: Ambulate, increasing the distance each time x 3 and before bed  Pt ambulating in mays  Intervention: IS q 1 hour while awake and record best IS volume  750-1000 on IS

## 2017-05-01 NOTE — PROGRESS NOTES
Report received. Pt care assumed. Assessment performed. Pt AOx4. Pt laying supine in bed. Pt c/o sternal incision pain 4/10 and no signs of distress. Medicating per MAR. Family at bedside. Bed in low, locked position. Bed alarm on. Call light within reach. Treaded socks on pt.  Hourly rounding in place.

## 2017-05-01 NOTE — THERAPY
"Physical Therapy Treatment completed.   Bed Mobility:  Supine to Sit: Stand by Assist  Transfers: Sit to Stand: Contact Guard Assist  Gait: Level Of Assist: Contact Guard Assist with Wheelchair       Plan of Care: Will benefit from Physical Therapy 4 times per week  Discharge Recommendations: Equipment: Will Continue to Assess for Equipment Needs. Post-acute therapy Discharge to home with outpatient or home health for additional skilled therapy services.     See \"Rehab Therapy-Acute\" Patient Summary Report for complete documentation.       "

## 2017-05-01 NOTE — PROGRESS NOTES
Bedside report completed. Assumed pt care. Pt is sitting up in chair A&O x4. Pt shows no signs of labored breathing or distress. Pt is on 2L NC. Call light within reach, tele monitor in place and cardiac rhythms being monitored. Bed in lowest position, safety precautions in place, bed alarm on. Room near nurses station.

## 2017-05-01 NOTE — PROGRESS NOTES
Cardiovascular Surgery Progress Note    [facility]  [facility] [id number]  : 1945  Admit Date: 2017  5:20 AM  Procedure:  Procedure(s) and Anesthesia Type:     * MITRAL VALVE REPAIR  - General     * MAZE PROCEDURE, Left atrial appendage ligation - General     * PAGE - General  10 Day Post-Op    Vitals:  Patient Vitals for the past 8 hrs:   Temp SpO2 O2 (LPM) Pulse Heart Rate (Monitored) Resp BP Weight   17 0833 - 96 % 2 87 85 (!) 23 - -   17 0600 - 100 % - 80 91 19 - -   17 0500 - 100 % - 82 83 (!) 11 - -   17 0400 35.9 °C (96.6 °F) 100 % - 85 85 14 114/70 mmHg 55.7 kg (122 lb 12.7 oz)   17 0300 - 98 % - 83 83 15 - -   17 0239 - 99 % - - 83 - - -     Temp (24hrs), Av °C (96.8 °F), Min:35.9 °C (96.6 °F), Max:36.2 °C (97.2 °F)      Respiratory:    Respiration: (!) 23, Pulse Oximetry: 96 %, O2 Daily Delivery Respiratory : Silicone Nasal Cannula  Chest Tube Group Right-Tube Status / Drainage: Sutured in Place;Patent;Draining;Small;Serosanguinous, Chest Tube Group Left-Tube Status / Drainage: Patent;Sutured in Place;Draining, Chest Tube Group Right-Device: Dry Closed Drainage System;Suction 20 cm Water, Chest Tube Group Left-Device: Suction 20 cm Water;Closed Drainage System  Chest Tube Drains:          Fluids:    Intake/Output Summary (Last 24 hours) at 17 1012  Last data filed at 17 0600   Gross per 24 hour   Intake    600 ml   Output   1450 ml   Net   -850 ml     Admit weight: Weight: 54.7 kg (120 lb 9.5 oz)  Current weight: Weight: 55.7 kg (122 lb 12.7 oz) (17 0400)    Labs:  Recent Labs      17   0400  17   0313   WBC  11.4*  13.9*   RBC  3.73*  3.57*   HEMOGLOBIN  11.4*  10.7*   HEMATOCRIT  35.1*  33.6*   MCV  94.1  94.1   MCH  30.6  30.0   MCHC  32.5*  31.8*   RDW  52.2*  51.6*   PLATELETCT  289  292   MPV  8.7*  8.6*     Recent Labs      17   0400  17   0313   NEUTSPOLYS  71.90  78.10*   LYMPHOCYTES  12.10*   "10.90*   MONOCYTES  13.20  8.30   EOSINOPHILS  1.20  1.40   BASOPHILS  0.30  0.40     Recent Labs      04/29/17   0400  04/30/17   0313  05/01/17   0350   SODIUM  136  136  137   POTASSIUM  4.4  4.3  4.1   CHLORIDE  96  95*  98   CO2  35*  34*  34*   GLUCOSE  94  93  93   BUN  16  18  17   CREATININE  0.60  0.62  0.54   CALCIUM  8.3*  8.4*  8.4*     Recent Labs      04/29/17   0400  04/30/17   0313  05/01/17   0350   INR  1.00  1.05  1.04       Medications:  • aspirin EC  325 mg     • potassium chloride ER  20 mEq      Or   • potassium chloride  20 mEq     • MD ALERT...Adult ICU Electrolyte Replacement per Pharmacy Protocol       • tiotropium  1 Cap     • amiodarone  400 mg     • furosemide  20 mg     • atorvastatin  10 mg     • budesonide-formoterol  2 Puff     • sertraline  50 mg     • docusate sodium  100 mg      And   • senna-docusate  1 Tab         Exam:   Review of Systems   Unable to perform ROS: intubated   HENT: Negative.         Right cheek \"puffed up\"   Eyes: Negative.    Respiratory: Negative.    Cardiovascular: Negative.    Gastrointestinal: Negative.    Genitourinary: Negative.    Musculoskeletal: Negative.    Skin: Negative.    Neurological: Negative.    Endo/Heme/Allergies: Negative.    Psychiatric/Behavioral: Negative.        Physical Exam   Constitutional: She is oriented to person, place, and time. She appears well-developed and well-nourished.   HENT:   Head: Normocephalic.   Eyes: Pupils are equal, round, and reactive to light.   Neck: Normal range of motion. No JVD present.   Cardiovascular: Normal rate, regular rhythm and normal heart sounds.    Pulmonary/Chest: Effort normal and breath sounds normal.   Bases diminished  R chest crepitus  SUB q air extends up to R face   Abdominal: Soft. Bowel sounds are normal. She exhibits no distension. There is no guarding.   Musculoskeletal: Normal range of motion.   Neurological: She is alert and oriented to person, place, and time.   Skin: Skin is warm " and dry.   Surgical incisions CDI   Psychiatric: She has a normal mood and affect. Her behavior is normal.       Quality Measures:   EKG reviewed, Medications reviewed, Labs reviewed and Radiology images reviewed  Larsen catheter: No Larsen  Central line in place: Concentrated IV drugs and Need for access    DVT Prophylaxis: Contraindicated - High bleeding risk  DVT prophylaxis - mechanical: SCDs  Ulcer prophylaxis: Yes    Assessed for rehab: Patient was assess for and/or received rehabilitation services during this hospitalization      Assessment/Plan:  POD 1 HDS, NSR--some runs VT ?afib with aberrancy? Last night, started on amio gtt.  Extubated, neuro grossly intact.  CT output min, mod airleak.  CXR no pneumothorax.  Adequate UOP, wts up, +3L. labs noted.  PLAN:  Keep CTs.  DC larsen.  STart gentle diuresis.  Change to PO amio.  Stress cough/deepbreath/IS. AMB.    POD 2 HDS, NSR.  Crepitus Right upper chest, mild. CXR without pnuemothorax. On 2 l nc. CT output min, mod airleak remains--connections recheck, and redressed. Good bowel sounds, not passing gas, no distention/n/v. W/w. INR trending up.  DC temp wires. Add xanax anxiety. Urine retention overnight, straight cath--to reinsert larsen if unable to void again.  AMB/IS.  CXR in AM.   POD 3 HDS NSR.  Airleak remains.  Sub q air has now extended up neck, Right facial swelling.  No resp distress.  CXR with bilateral chest wall air and pneumomediastinum.  Dr. Estrada consulted, will see patient Monday AM.  CT chest today.  CTs to be placed to 2 separate pleural vacs. DC temp wires.  CPM.  POD 4 re-intubated last night.  Chest tubes inserted pleural.  Per CT scan moderate bilateral pneumothorax.  Sedated.  + air leaks  Both chest tubes.  Sean to see patient today.    POD 5 Sedated/levo---wakes and follows appropriately, weaning off levo as wakes.  CXR small R apical pneumothorax.  Right face swelling improved.  SBTs this AM< likely to extubate today.  Ailreak on 1 CT  remains.  Keep all CTs today.  CPM.   POD 6  HDS, NSR.. Extubated, neuro intact.  CXR improved.  Airleak remains visible on the Right Angle Mediastinal CT. welling face improving.  PLAN: Keep CTs today.  DC arlting and NG tube, increase diet as tolerated.  AMB/IS.  Will keep larsen today until more ambulatory with multiple CTs. Increase to full dose asa until able to start coumadin  POD 7 HDS, SR, neuro intact.  Wounds CDI.  Chest tubes still with airleaks.  Up in chair today.  Amb/IS.  CPM.  POD 8 HDS, CXR with small right apical pneumo.  Neuro intact.  Wounds CDI.  Labs stable.  Plan:  Dc larsen and ambulate.   POD 9 HDS, CXR unchanged.  No air leak to chest tubes.  Patient ambulating.  HDS, Neuro intact.  Wounds CDI.  Doing well.   POD 10 HDS, NSR. SmalL right apical pneumothorax remains--no airleaks note don CTs.  Otherwise doing well.  CTs per Dr. Estrada.  CPM.    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

## 2017-05-01 NOTE — PROGRESS NOTES
"  Trauma/Surgical Progress Note    Author: Cash Estrada Date & Time created: 4/30/2017   9:05 PM     Interval Events:  Small apical pneumothorax  Chest tubes to suction overnight  Counseled.    ROS  Hemodynamics:  Blood pressure 93/64, pulse 89, temperature 36 °C (96.8 °F), resp. rate 11, height 1.638 m (5' 4.49\"), weight 56.9 kg (125 lb 7.1 oz), SpO2 99 %, not currently breastfeeding.     Respiratory:    Respiration: (!) 11, Pulse Oximetry: 99 %, O2 Daily Delivery Respiratory : Silicone Nasal Cannula  Chest Tube Group Right-Tube Status / Drainage: Sutured in Place;Patent;Draining;Small;Serosanguinous, Chest Tube Group Left-Tube Status / Drainage: Patent;Sutured in Place;Draining, Chest Tube Group Right-Device: Dry Closed Drainage System;Suction 20 cm Water, Chest Tube Group Left-Device: Suction 20 cm Water;Closed Drainage System  Given By:: Mouthpiece, PEP/CPT Method: Positive Airway Pressure Device, Work Of Breathing / Effort: Mild  RUL Breath Sounds: Clear, RML Breath Sounds: Clear, RLL Breath Sounds: Diminished, DELLA Breath Sounds: Rhonchi, LLL Breath Sounds: Diminished  Fluids:    Intake/Output Summary (Last 24 hours) at 04/30/17 2105  Last data filed at 04/30/17 1700   Gross per 24 hour   Intake    620 ml   Output   1585 ml   Net   -965 ml     Admit Weight: 54.7 kg (120 lb 9.5 oz)  Current Weight: 56.9 kg (125 lb 7.1 oz)    Physical Exam   Pulmonary/Chest: No respiratory distress.       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

## 2017-05-02 ENCOUNTER — APPOINTMENT (OUTPATIENT)
Dept: RADIOLOGY | Facility: MEDICAL CENTER | Age: 72
DRG: 219 | End: 2017-05-02
Attending: INTERNAL MEDICINE
Payer: MEDICARE

## 2017-05-02 LAB
ANION GAP SERPL CALC-SCNC: 6 MMOL/L (ref 0–11.9)
APPEARANCE UR: ABNORMAL
BACTERIA #/AREA URNS HPF: ABNORMAL /HPF
BILIRUB UR QL STRIP.AUTO: NEGATIVE
BUN SERPL-MCNC: 15 MG/DL (ref 8–22)
CALCIUM SERPL-MCNC: 8.7 MG/DL (ref 8.5–10.5)
CHLORIDE SERPL-SCNC: 97 MMOL/L (ref 96–112)
CO2 SERPL-SCNC: 33 MMOL/L (ref 20–33)
COLOR UR: COLORLESS
CREAT SERPL-MCNC: 0.49 MG/DL (ref 0.5–1.4)
EPI CELLS #/AREA URNS HPF: ABNORMAL /HPF
ERYTHROCYTE [DISTWIDTH] IN BLOOD BY AUTOMATED COUNT: 51.2 FL (ref 35.9–50)
GFR SERPL CREATININE-BSD FRML MDRD: >60 ML/MIN/1.73 M 2
GLUCOSE SERPL-MCNC: 89 MG/DL (ref 65–99)
GLUCOSE UR STRIP.AUTO-MCNC: NEGATIVE MG/DL
HCT VFR BLD AUTO: 32.7 % (ref 37–47)
HGB BLD-MCNC: 10.4 G/DL (ref 12–16)
HYALINE CASTS #/AREA URNS LPF: ABNORMAL /LPF
INR PPP: 1.08 (ref 0.87–1.13)
KETONES UR STRIP.AUTO-MCNC: NEGATIVE MG/DL
LEUKOCYTE ESTERASE UR QL STRIP.AUTO: ABNORMAL
MCH RBC QN AUTO: 30.6 PG (ref 27–33)
MCHC RBC AUTO-ENTMCNC: 31.8 G/DL (ref 33.6–35)
MCV RBC AUTO: 96.2 FL (ref 81.4–97.8)
MICRO URNS: ABNORMAL
NITRITE UR QL STRIP.AUTO: POSITIVE
PH UR STRIP.AUTO: 6 [PH]
PLATELET # BLD AUTO: 320 K/UL (ref 164–446)
PMV BLD AUTO: 8.6 FL (ref 9–12.9)
POTASSIUM SERPL-SCNC: 4 MMOL/L (ref 3.6–5.5)
PROT UR QL STRIP: NEGATIVE MG/DL
PROTHROMBIN TIME: 14.3 SEC (ref 12–14.6)
RBC # BLD AUTO: 3.4 M/UL (ref 4.2–5.4)
RBC # URNS HPF: ABNORMAL /HPF
RBC UR QL AUTO: ABNORMAL
SODIUM SERPL-SCNC: 136 MMOL/L (ref 135–145)
SP GR UR STRIP.AUTO: 1
WBC # BLD AUTO: 16.4 K/UL (ref 4.8–10.8)
WBC #/AREA URNS HPF: ABNORMAL /HPF

## 2017-05-02 PROCEDURE — 99233 SBSQ HOSP IP/OBS HIGH 50: CPT | Performed by: INTERNAL MEDICINE

## 2017-05-02 PROCEDURE — A9270 NON-COVERED ITEM OR SERVICE: HCPCS | Performed by: NURSE PRACTITIONER

## 2017-05-02 PROCEDURE — 71010 DX-CHEST-PORTABLE (1 VIEW): CPT

## 2017-05-02 PROCEDURE — 94640 AIRWAY INHALATION TREATMENT: CPT

## 2017-05-02 PROCEDURE — 700112 HCHG RX REV CODE 229: Performed by: NURSE PRACTITIONER

## 2017-05-02 PROCEDURE — 700102 HCHG RX REV CODE 250 W/ 637 OVERRIDE(OP): Performed by: NURSE PRACTITIONER

## 2017-05-02 PROCEDURE — 85610 PROTHROMBIN TIME: CPT

## 2017-05-02 PROCEDURE — A9270 NON-COVERED ITEM OR SERVICE: HCPCS | Performed by: INTERNAL MEDICINE

## 2017-05-02 PROCEDURE — 700105 HCHG RX REV CODE 258: Performed by: NURSE PRACTITIONER

## 2017-05-02 PROCEDURE — 700111 HCHG RX REV CODE 636 W/ 250 OVERRIDE (IP): Performed by: NURSE PRACTITIONER

## 2017-05-02 PROCEDURE — 700102 HCHG RX REV CODE 250 W/ 637 OVERRIDE(OP): Performed by: INTERNAL MEDICINE

## 2017-05-02 PROCEDURE — 81001 URINALYSIS AUTO W/SCOPE: CPT

## 2017-05-02 PROCEDURE — 700101 HCHG RX REV CODE 250: Performed by: INTERNAL MEDICINE

## 2017-05-02 PROCEDURE — 85027 COMPLETE CBC AUTOMATED: CPT

## 2017-05-02 PROCEDURE — 94668 MNPJ CHEST WALL SBSQ: CPT

## 2017-05-02 PROCEDURE — 700111 HCHG RX REV CODE 636 W/ 250 OVERRIDE (IP): Performed by: INTERNAL MEDICINE

## 2017-05-02 PROCEDURE — 700105 HCHG RX REV CODE 258: Performed by: INTERNAL MEDICINE

## 2017-05-02 PROCEDURE — 770020 HCHG ROOM/CARE - TELE (206)

## 2017-05-02 PROCEDURE — 80048 BASIC METABOLIC PNL TOTAL CA: CPT

## 2017-05-02 PROCEDURE — 94660 CPAP INITIATION&MGMT: CPT

## 2017-05-02 RX ADMIN — DOCUSATE SODIUM 100 MG: 100 CAPSULE ORAL at 07:16

## 2017-05-02 RX ADMIN — TRAMADOL HYDROCHLORIDE 50 MG: 50 TABLET, COATED ORAL at 15:14

## 2017-05-02 RX ADMIN — TRAMADOL HYDROCHLORIDE 50 MG: 50 TABLET, COATED ORAL at 19:00

## 2017-05-02 RX ADMIN — TRAMADOL HYDROCHLORIDE 50 MG: 50 TABLET, COATED ORAL at 11:14

## 2017-05-02 RX ADMIN — TIOTROPIUM BROMIDE 1 CAPSULE: 18 CAPSULE ORAL; RESPIRATORY (INHALATION) at 07:17

## 2017-05-02 RX ADMIN — DIPHENHYDRAMINE HCL 25 MG: 25 TABLET ORAL at 22:14

## 2017-05-02 RX ADMIN — TRAMADOL HYDROCHLORIDE 50 MG: 50 TABLET, COATED ORAL at 07:15

## 2017-05-02 RX ADMIN — IPRATROPIUM BROMIDE AND ALBUTEROL SULFATE 3 ML: .5; 3 SOLUTION RESPIRATORY (INHALATION) at 08:32

## 2017-05-02 RX ADMIN — TRAMADOL HYDROCHLORIDE 50 MG: 50 TABLET, COATED ORAL at 03:48

## 2017-05-02 RX ADMIN — POTASSIUM CHLORIDE 20 MEQ: 750 TABLET, FILM COATED, EXTENDED RELEASE ORAL at 07:16

## 2017-05-02 RX ADMIN — ALBUTEROL SULFATE 2 PUFF: 90 AEROSOL, METERED RESPIRATORY (INHALATION) at 12:51

## 2017-05-02 RX ADMIN — BUDESONIDE AND FORMOTEROL FUMARATE DIHYDRATE 2 PUFF: 160; 4.5 AEROSOL RESPIRATORY (INHALATION) at 07:18

## 2017-05-02 RX ADMIN — SERTRALINE 50 MG: 50 TABLET, FILM COATED ORAL at 07:17

## 2017-05-02 RX ADMIN — ASPIRIN 325 MG: 325 TABLET, DELAYED RELEASE ORAL at 07:18

## 2017-05-02 RX ADMIN — AMIODARONE HYDROCHLORIDE 400 MG: 200 TABLET ORAL at 07:17

## 2017-05-02 RX ADMIN — POTASSIUM CHLORIDE 20 MEQ: 750 TABLET, FILM COATED, EXTENDED RELEASE ORAL at 19:57

## 2017-05-02 RX ADMIN — ATORVASTATIN CALCIUM 10 MG: 10 TABLET, FILM COATED ORAL at 07:16

## 2017-05-02 RX ADMIN — FUROSEMIDE 20 MG: 10 INJECTION, SOLUTION INTRAVENOUS at 07:16

## 2017-05-02 RX ADMIN — BUDESONIDE AND FORMOTEROL FUMARATE DIHYDRATE 2 PUFF: 160; 4.5 AEROSOL RESPIRATORY (INHALATION) at 19:57

## 2017-05-02 RX ADMIN — CEFTRIAXONE SODIUM 2 G: 2 INJECTION, POWDER, FOR SOLUTION INTRAMUSCULAR; INTRAVENOUS at 19:57

## 2017-05-02 RX ADMIN — SODIUM CHLORIDE: 9 INJECTION, SOLUTION INTRAVENOUS at 19:57

## 2017-05-02 ASSESSMENT — ENCOUNTER SYMPTOMS
SHORTNESS OF BREATH: 0
CARDIOVASCULAR NEGATIVE: 1
MUSCULOSKELETAL NEGATIVE: 1
GASTROINTESTINAL NEGATIVE: 1
NEUROLOGICAL NEGATIVE: 1
PSYCHIATRIC NEGATIVE: 1
RESPIRATORY NEGATIVE: 1
EYES NEGATIVE: 1
FEVER: 0
CHILLS: 0

## 2017-05-02 ASSESSMENT — PAIN SCALES - GENERAL
PAINLEVEL_OUTOF10: 1
PAINLEVEL_OUTOF10: 1
PAINLEVEL_OUTOF10: 2
PAINLEVEL_OUTOF10: 2
PAINLEVEL_OUTOF10: 1
PAINLEVEL_OUTOF10: 4
PAINLEVEL_OUTOF10: 3

## 2017-05-02 NOTE — CARE PLAN
Problem: Knowledge Deficit  Goal: Knowledge of disease process/condition, treatment plan, diagnostic tests, and medications will improve  Outcome: PROGRESSING AS EXPECTED  Discussed plan of care for today w/ pt this am, she is A+O, she verbalizes understanding of her plan of care, no questions. Emotional support given. Will monitor for educational needs.         Problem: Pain Management  Goal: Pain level will decrease to patient’s comfort goal  Outcome: PROGRESSING AS EXPECTED  Assessing every four hrs for pain per protocol; see doc flowsheets

## 2017-05-02 NOTE — PROGRESS NOTES
Patient assessed. Patient A and 0 X 4.   Patient states that pain is a 2 out of 10.  Patient educated on plan of care for the evening including walk, medications per MAR, vitals, and sleep. Patient educated to call for assistance. Patient verbalizes understanding.

## 2017-05-02 NOTE — PROGRESS NOTES
Pulmonary Critical Care Progress Note    Interval Events:  24 hour interval history reviewed  Reason for visit:  Atelectasis, COPD, MARY, pneumothoraces        SR  No larsen  2 L NC  750-1000 IS  Remove central line  Start Rocephin for pyuria      PFSH:  No change.    Respiratory:     Pulse Oximetry: 97 %  2 L NC  CXR with tiny right apical PTX  No wheezing.  Very few scattered crackles  No increased SOB or cough    HemoDynamics:  Pulse: 91, Heart Rate (Monitored): 92  Blood Pressure : 127/89 mmHg, NIBP: 104/65 mmHg    SR  No angina, palp, syncope    Neuro:  Awake and alert  No focal weakness  No HA, Sz    Fluids:  Intake/Output       04/30/17 0700 - 05/01/17 0659 05/01/17 0700 - 05/02/17 0659 05/02/17 0700 - 05/03/17 0659      4471-4902 3544-3024 Total 3758-3237 4325-6139 Total 2931-1098 4648-6370 Total       Intake    P.O.  500  250 750  600  150 750  --  -- --    P.O. 500 250 750 600 150 750 -- -- --    I.V.  120  -- 120  --  -- --  --  -- --    IV Piggyback Volume (IV Piggyback) 120 -- 120 -- -- -- -- -- --    Total Intake 620 250 870 600 150 750 -- -- --       Output    Urine  1025  450 1475  1450  -- 1450  --  -- --    Number of Times Voided 6 x -- 6 x 2 x 4 x 6 x -- -- --    Void (ml) 5753 431 6569 1450 -- 1450 -- -- --    Drains  --  -- --  --  10 10  --  -- --    Left Chest Tube 1 -- -- -- -- 10 10 -- -- --    Stool  --  -- --  --  -- --  --  -- --    Number of Times Stooled 3 x 1 x 4 x -- 0 x 0 x -- -- --    Total Output 0389 765 5064 1450 10 1460 -- -- --       Net I/O     -405 -200 -605 -850 140 -710 -- -- --        Weight: 54.8 kg (120 lb 13 oz)  Recent Labs      04/30/17   0313  05/01/17   0350  05/02/17   0502   SODIUM  136  137  136   POTASSIUM  4.3  4.1  4.0   CHLORIDE  95*  98  97   CO2  34*  34*  33   BUN  18  17  15   CREATININE  0.62  0.54  0.49*   CALCIUM  8.4*  8.4*  8.7       GI/Nutrition:  Abd soft ND/NT  No N/V/P    Liver Function  Recent Labs      04/30/17   0313  05/01/17   0350  05/02/17    0502   ALTSGPT   --   11   --    ASTSGOT   --   14   --    ALKPHOSPHAT   --   61   --    TBILIRUBIN   --   0.7   --    PREALBUMIN   --   13.0*   --    GLUCOSE  93  93  89       Heme:  Recent Labs      17   03517   0502  17   0549   RBC  3.57*   --   3.40*   --    HEMOGLOBIN  10.7*   --   10.4*   --    HEMATOCRIT  33.6*   --   32.7*   --    PLATELETCT  292   --   320   --    PROTHROMBTM  14.0  13.9   --   14.3   INR  1.05  1.04   --   1.08       Infectious Disease:  Temp  Av.8 °C (96.5 °F)  Min: 35.6 °C (96.1 °F)  Max: 36 °C (96.8 °F)    Recent Labs      17   0350  17   0502   WBC  13.9*   --   16.4*   NEUTSPOLYS  78.10*   --    --    LYMPHOCYTES  10.90*   --    --    MONOCYTES  8.30   --    --    EOSINOPHILS  1.40   --    --    BASOPHILS  0.40   --    --    ASTSGOT   --   14   --    ALTSGPT   --   11   --    ALKPHOSPHAT   --   61   --    TBILIRUBIN   --   0.7   --      Current Facility-Administered Medications   Medication Dose Frequency Provider Last Rate Last Dose   • amiodarone (CORDARONE) tablet 400 mg  400 mg Q DAY Fernandez Hays M.D.   400 mg at 17 0717   • oxycodone immediate-release (ROXICODONE) tablet 5 mg  5 mg Q4HRS PRN MOUNA RuizN.       • benzocaine-menthol (CEPACOL) lozenge 1 Lozenge  1 Lozenge Q2HRS PRN Lew Tompkins M.D.   1 Lozenge at 17 1309   • oxycodone immediate-release (ROXICODONE) tablet 2.5 mg  2.5 mg Q3HRS PRN Lew Tompkins M.D.   2.5 mg at 17 0956   • aspirin EC (ECOTRIN) tablet 325 mg  325 mg DAILY Donna Mann A.PRadhaN.   325 mg at 17 0718   • potassium chloride ER (KLOR-CON) tablet 20 mEq  20 mEq BID Lew Tompkins M.D.   20 mEq at 17 0716    Or   • potassium chloride (KLOR-CON) 20 MEQ packet 20 mEq  20 mEq BID Lew Tompkins M.D.   20 mEq at 17 0812   • MD ALERT...Adult ICU Electrolyte Replacement per Pharmacy Protocol   pharmacy to dose Louie MARTINEZ  Gonda, M.D.       • alprazolam (XANAX) tablet 0.25 mg  0.25 mg TID PRN Donna Mann, A.P.N.   0.25 mg at 04/22/17 1315   • tiotropium (SPIRIVA) 18 MCG inhalation capsule 1 Cap  1 Cap DAILY Fernandez Hays M.D.   1 Cap at 05/02/17 0717   • furosemide (LASIX) injection 20 mg  20 mg Q DAY Donna Mann, A.P.N.   20 mg at 05/02/17 0716   • atorvastatin (LIPITOR) tablet 10 mg  10 mg DAILY Tayler Kimball A.P.N.   10 mg at 05/02/17 0716   • budesonide-formoterol (SYMBICORT) 160-4.5 MCG/ACT inhaler 2 Puff  2 Puff BID Tayler Kimball A.P.N.   2 Puff at 05/02/17 0718   • sertraline (ZOLOFT) tablet 50 mg  50 mg DAILY Tayler Kimball A.P.N.   50 mg at 05/02/17 0717   • albuterol inhaler 2 Puff  2 Puff Q4HRS PRN Tayler Kimball A.P.N.   2 Puff at 04/22/17 1516   • Respiratory Care per Protocol   Continuous RT Tayler Kimball, A.P.N.       • NS infusion   Continuous Tayler Kimball A.P.N. 10 mL/hr at 04/27/17 0700     • docusate sodium (COLACE) capsule 100 mg  100 mg QAM Tayler Kimball, A.P.N.   100 mg at 05/02/17 0716    And   • senna-docusate (PERICOLACE or SENOKOT S) 8.6-50 MG per tablet 1 Tab  1 Tab Nightly Tayler Kimball A.P.N.   1 Tab at 05/01/17 2133    And   • senna-docusate (PERICOLACE or SENOKOT S) 8.6-50 MG per tablet 1 Tab  1 Tab Q24HRS PRN Tayler Kimball A.P.N.        And   • lactulose 20 GM/30ML solution 30 mL  30 mL Q24HRS PRN Tayler Kimball, A.P.N.        And   • bisacodyl (DULCOLAX) suppository 10 mg  10 mg Q24HRS PRN Tayler Kimball, A.P.N.        And   • fleet enema 133 mL  1 Each Once PRN Tayler Kimball, A.P.N.       • tramadol (ULTRAM) 50 MG tablet 50 mg  50 mg Q4HRS PRN Tayler Kimball, A.P.N.   50 mg at 05/02/17 0715   • ondansetron (ZOFRAN) syringe/vial injection 4 mg  4 mg Q6HRS PRN Tayler Kimball, A.P.N.   4 mg at 04/25/17 1555    Or   • prochlorperazine (COMPAZINE) injection 10 mg  10 mg Q6HRS PRN Tayler Kimball, A.P.N.   10 mg at 04/21/17 1014    Or   • promethazine (PHENERGAN) suppository  25 mg  25 mg Q6HRS PRN Tayler Kimball, A.P.N.       • acetaminophen (TYLENOL) tablet 650 mg  650 mg Q4HRS PRN Tayler Kimball, A.P.N.        Or   • acetaminophen (TYLENOL) suppository 650 mg  650 mg Q4HRS PRN Tayler Kimball, A.P.N.       • mag hydrox-al hydrox-simeth (MAALOX PLUS ES or MYLANTA DS) suspension 30 mL  30 mL Q4HRS PRN Tayler Kimball, A.P.N.       • diphenhydrAMINE (BENADRYL) tablet/capsule 25 mg  25 mg HS PRN - MR X 1 Taylerrazia Kimball, A.P.N.   25 mg at 05/01/17 2209   • ipratropium-albuterol (DUONEB) nebulizer solution 3 mL  3 mL Q2HRS PRN (RT) Fernandez Morales M.D.   3 mL at 05/02/17 0832     Last reviewed on 4/20/2017  6:03 AM by Randy Licona      Quality  Measures:  Labs reviewed, Medications reviewed and Radiology images reviewed  Avila catheter: No Avila      DVT Prophylaxis: Enoxaparin (Lovenox)  DVT prophylaxis - mechanical: SCDs  Ulcer prophylaxis: Not indicated            Assessment and Plan:    Bilateral pneumothoraces with bronchopleural fistula and subcutaneous emphysema   - chest tubes per surgery - now to water seal   - s/p thoracic surgery consult   - improved  Status post mitral valve repair, left atrial appendage ligation and maze procedure  Svere stage III chronic obstructive pulmonary disease   - cont BDs  Systemic arterial hypertension   - cont BP control  PAF - in SR   - amiodarone  Dyslipidemia - statin  Anxiety  Obstructive sleep apnea - home CPAP  Leukocytosis   - remove central line   - UA with pyuria - Rocephin for 3 days    Discussed with RN, RT, Team       I, Damaris Branch (Sigrid), am scribing for, and in the presence of, Dr. Fernandez Morales M.D.    Electronically signed by: Damaris Branch (Sigrid), 5/2/2017    I, Dr. Fernandez Morales M.D. personally performed the services described in this documentation, as scribed by Damaris C. Jose Carlos in my presence, and it is both accurate and complete.

## 2017-05-02 NOTE — PROGRESS NOTES
Received report from day shift RN at 1915. Patient updated on plan of care. Call light within reach.

## 2017-05-02 NOTE — CARE PLAN
Problem: Skin Integrity  Goal: Risk for impaired skin integrity will decrease  Intervention: Assess and monitor skin integrity, appearance and/or temperature  Incision site on chest cleaned per order.

## 2017-05-02 NOTE — PROGRESS NOTES
Cardiovascular Surgery Progress Note    [facility]  MRN: [id number]  : 1945  Admit Date: 2017  5:20 AM  Procedure:  Procedure(s) and Anesthesia Type:     * MITRAL VALVE REPAIR  - General     * MAZE PROCEDURE, Left atrial appendage ligation - General     * PAGE - General  11 Day Post-Op    Vitals:  Patient Vitals for the past 8 hrs:   Temp SpO2 O2 Delivery O2 (LPM) Pulse Heart Rate (Monitored) Resp NIBP Weight   17 1200 - 92 % Silicone Nasal Cannula 1 90 90 16 - -   17 1120 35.9 °C (96.7 °F) 95 % Silicone Nasal Cannula 1.5 90 90 19 107/57 mmHg -   17 0834 - 97 % - 2 91 92 (!) 23 - -   17 0730 35.9 °C (96.6 °F) 95 % Silicone Nasal Cannula 2 87 87 15 104/65 mmHg -   17 0640 - 98 % - 2 87 87 15 - -   17 0600 - - - - - - - - 54.8 kg (120 lb 13 oz)     Temp (24hrs), Av.9 °C (96.6 °F), Min:35.6 °C (96.1 °F), Max:36 °C (96.8 °F)      Respiratory:    Respiration: 16, Pulse Oximetry: 92 %, O2 Daily Delivery Respiratory : Silicone Nasal Cannula  Chest Tube Group Right-Tube Status / Drainage: Sutured in Place;Patent;Draining;Small;Serosanguinous, Chest Tube Group Left-Tube Status / Drainage: Patent;Sutured in Place;Draining, Chest Tube Group Right-Device: Dry Closed Drainage System;Suction 20 cm Water, Chest Tube Group Left-Device: Suction 20 cm Water;Closed Drainage System  Chest Tube Drains:   Left Chest Tube 1: 10 ml      Fluids:    Intake/Output Summary (Last 24 hours) at 17 1316  Last data filed at 17 1110   Gross per 24 hour   Intake    450 ml   Output   1360 ml   Net   -910 ml     Admit weight: Weight: 54.7 kg (120 lb 9.5 oz)  Current weight: Weight: 54.8 kg (120 lb 13 oz) (17 0600)    Labs:  Recent Labs      17   0313  17   0502   WBC  13.9*  16.4*   RBC  3.57*  3.40*   HEMOGLOBIN  10.7*  10.4*   HEMATOCRIT  33.6*  32.7*   MCV  94.1  96.2   MCH  30.0  30.6   MCHC  31.8*  31.8*   RDW  51.6*  51.2*   PLATELETCT  292  320  "  MPV  8.6*  8.6*     Recent Labs      04/30/17   0313   NEUTSPOLYS  78.10*   LYMPHOCYTES  10.90*   MONOCYTES  8.30   EOSINOPHILS  1.40   BASOPHILS  0.40     Recent Labs      04/30/17   0313  05/01/17   0350  05/02/17   0502   SODIUM  136  137  136   POTASSIUM  4.3  4.1  4.0   CHLORIDE  95*  98  97   CO2  34*  34*  33   GLUCOSE  93  93  89   BUN  18  17  15   CREATININE  0.62  0.54  0.49*   CALCIUM  8.4*  8.4*  8.7     Recent Labs      04/30/17   0313  05/01/17   0350  05/02/17   0549   INR  1.05  1.04  1.08       Medications:  • amiodarone  400 mg     • aspirin EC  325 mg     • potassium chloride ER  20 mEq      Or   • potassium chloride  20 mEq     • MD ALERT...Adult ICU Electrolyte Replacement per Pharmacy Protocol       • tiotropium  1 Cap     • furosemide  20 mg     • atorvastatin  10 mg     • budesonide-formoterol  2 Puff     • sertraline  50 mg     • docusate sodium  100 mg      And   • senna-docusate  1 Tab         Exam:   Review of Systems   Unable to perform ROS: intubated   HENT: Negative.         Right cheek \"puffed up\"   Eyes: Negative.    Respiratory: Negative.    Cardiovascular: Negative.    Gastrointestinal: Negative.    Genitourinary: Negative.    Musculoskeletal: Negative.    Skin: Negative.    Neurological: Negative.    Endo/Heme/Allergies: Negative.    Psychiatric/Behavioral: Negative.        Physical Exam   Constitutional: She is oriented to person, place, and time. She appears well-developed and well-nourished.   HENT:   Head: Normocephalic.   Eyes: Pupils are equal, round, and reactive to light.   Neck: Normal range of motion. No JVD present.   Cardiovascular: Normal rate, regular rhythm and normal heart sounds.    Pulmonary/Chest: Effort normal and breath sounds normal.   Bases diminished  R chest crepitus  SUB q air extends up to R face   Abdominal: Soft. Bowel sounds are normal. She exhibits no distension. There is no guarding.   Musculoskeletal: Normal range of motion.   Neurological: She " is alert and oriented to person, place, and time.   Skin: Skin is warm and dry.   Surgical incisions CDI   Psychiatric: She has a normal mood and affect. Her behavior is normal.       Quality Measures:   EKG reviewed, Medications reviewed, Labs reviewed and Radiology images reviewed  Larsen catheter: No Larsen  Central line in place: Concentrated IV drugs and Need for access    DVT Prophylaxis: Contraindicated - High bleeding risk  DVT prophylaxis - mechanical: SCDs  Ulcer prophylaxis: Yes    Assessed for rehab: Patient was assess for and/or received rehabilitation services during this hospitalization      Assessment/Plan:  POD 1 HDS, NSR--some runs VT ?afib with aberrancy? Last night, started on amio gtt.  Extubated, neuro grossly intact.  CT output min, mod airleak.  CXR no pneumothorax.  Adequate UOP, wts up, +3L. labs noted.  PLAN:  Keep CTs.  DC larsen.  STart gentle diuresis.  Change to PO amio.  Stress cough/deepbreath/IS. AMB.    POD 2 HDS, NSR.  Crepitus Right upper chest, mild. CXR without pnuemothorax. On 2 l nc. CT output min, mod airleak remains--connections recheck, and redressed. Good bowel sounds, not passing gas, no distention/n/v. W/w. INR trending up.  DC temp wires. Add xanax anxiety. Urine retention overnight, straight cath--to reinsert larsen if unable to void again.  AMB/IS.  CXR in AM.   POD 3 HDS NSR.  Airleak remains.  Sub q air has now extended up neck, Right facial swelling.  No resp distress.  CXR with bilateral chest wall air and pneumomediastinum.  Dr. Estrada consulted, will see patient Monday AM.  CT chest today.  CTs to be placed to 2 separate pleural vacs. DC temp wires.  CPM.  POD 4 re-intubated last night.  Chest tubes inserted pleural.  Per CT scan moderate bilateral pneumothorax.  Sedated.  + air leaks  Both chest tubes.  Sean to see patient today.    POD 5 Sedated/levo---wakes and follows appropriately, weaning off levo as wakes.  CXR small R apical pneumothorax.  Right face  swelling improved.  SBTs this AM< likely to extubate today.  Ailreak on 1 CT remains.  Keep all CTs today.  CPM.   POD 6  HDS, NSR.. Extubated, neuro intact.  CXR improved.  Airleak remains visible on the Right Angle Mediastinal CT. welling face improving.  PLAN: Keep CTs today.  DC arlting and NG tube, increase diet as tolerated.  AMB/IS.  Will keep larsen today until more ambulatory with multiple CTs. Increase to full dose asa until able to start coumadin  POD 7 HDS, SR, neuro intact.  Wounds CDI.  Chest tubes still with airleaks.  Up in chair today.  Amb/IS.  CPM.  POD 8 HDS, CXR with small right apical pneumo.  Neuro intact.  Wounds CDI.  Labs stable.  Plan:  Dc larsen and ambulate.   POD 9 HDS, CXR unchanged.  No air leak to chest tubes.  Patient ambulating.  HDS, Neuro intact.  Wounds CDI.  Doing well.   POD 10 HDS, NSR. SmalL right apical pneumothorax remains--no airleaks note don CTs.  Otherwise doing well.  CTs per Dr. Estrada.  CPM.  POD 11 HDS, SR, apical pneumo's continue to decrease.  Chest tubes to water seal today.  Negative for air leak.     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

## 2017-05-02 NOTE — CARE PLAN
Problem: Safety  Goal: Will remain free from injury  Intervention: Educate patient and significant other/support system about adaptive mobility strategies and safe transfers  Bed in low position.  Treaded socks on patient.  Call light within reach.  Bedrails closest to bathroom down.  Patient educated to call for assistance. Pt. Refusing bed alarm but demonstrating appropriate calling at this time.           Problem: Bowel/Gastric:  Goal: Normal bowel function is maintained or improved  Intervention: Educate patient and significant other/support system about diet, fluid intake, medications and activity to promote bowel function  Completed. Patient educated on need to continue to ambulate, drink fluids, and utilize bowel protocol to promote bowel functioning.       Problem: Pain Management  Goal: Pain level will decrease to patient’s comfort goal  Outcome: PROGRESSING AS EXPECTED  Pt. Receiving pain medications per MAR.

## 2017-05-03 ENCOUNTER — APPOINTMENT (OUTPATIENT)
Dept: RADIOLOGY | Facility: MEDICAL CENTER | Age: 72
DRG: 219 | End: 2017-05-03
Attending: INTERNAL MEDICINE
Payer: MEDICARE

## 2017-05-03 ENCOUNTER — APPOINTMENT (OUTPATIENT)
Dept: RADIOLOGY | Facility: MEDICAL CENTER | Age: 72
DRG: 219 | End: 2017-05-03
Attending: CLINICAL NURSE SPECIALIST
Payer: MEDICARE

## 2017-05-03 LAB
ANION GAP SERPL CALC-SCNC: 8 MMOL/L (ref 0–11.9)
BUN SERPL-MCNC: 16 MG/DL (ref 8–22)
CALCIUM SERPL-MCNC: 8.9 MG/DL (ref 8.5–10.5)
CHLORIDE SERPL-SCNC: 98 MMOL/L (ref 96–112)
CO2 SERPL-SCNC: 30 MMOL/L (ref 20–33)
CREAT SERPL-MCNC: 0.65 MG/DL (ref 0.5–1.4)
ERYTHROCYTE [DISTWIDTH] IN BLOOD BY AUTOMATED COUNT: 50 FL (ref 35.9–50)
GFR SERPL CREATININE-BSD FRML MDRD: >60 ML/MIN/1.73 M 2
GLUCOSE SERPL-MCNC: 94 MG/DL (ref 65–99)
HCT VFR BLD AUTO: 33.7 % (ref 37–47)
HGB BLD-MCNC: 10.7 G/DL (ref 12–16)
INR PPP: 1.03 (ref 0.87–1.13)
MCH RBC QN AUTO: 30.1 PG (ref 27–33)
MCHC RBC AUTO-ENTMCNC: 31.8 G/DL (ref 33.6–35)
MCV RBC AUTO: 94.7 FL (ref 81.4–97.8)
PLATELET # BLD AUTO: 343 K/UL (ref 164–446)
PMV BLD AUTO: 8.6 FL (ref 9–12.9)
POTASSIUM SERPL-SCNC: 4 MMOL/L (ref 3.6–5.5)
PROTHROMBIN TIME: 13.8 SEC (ref 12–14.6)
RBC # BLD AUTO: 3.56 M/UL (ref 4.2–5.4)
SODIUM SERPL-SCNC: 136 MMOL/L (ref 135–145)
WBC # BLD AUTO: 14.4 K/UL (ref 4.8–10.8)

## 2017-05-03 PROCEDURE — 700111 HCHG RX REV CODE 636 W/ 250 OVERRIDE (IP): Performed by: INTERNAL MEDICINE

## 2017-05-03 PROCEDURE — A9270 NON-COVERED ITEM OR SERVICE: HCPCS | Performed by: INTERNAL MEDICINE

## 2017-05-03 PROCEDURE — 700102 HCHG RX REV CODE 250 W/ 637 OVERRIDE(OP): Performed by: INTERNAL MEDICINE

## 2017-05-03 PROCEDURE — 700105 HCHG RX REV CODE 258: Performed by: INTERNAL MEDICINE

## 2017-05-03 PROCEDURE — 94660 CPAP INITIATION&MGMT: CPT

## 2017-05-03 PROCEDURE — 700112 HCHG RX REV CODE 229: Performed by: NURSE PRACTITIONER

## 2017-05-03 PROCEDURE — 700111 HCHG RX REV CODE 636 W/ 250 OVERRIDE (IP): Performed by: NURSE PRACTITIONER

## 2017-05-03 PROCEDURE — 71020 DX-CHEST-2 VIEWS: CPT

## 2017-05-03 PROCEDURE — 770020 HCHG ROOM/CARE - TELE (206)

## 2017-05-03 PROCEDURE — A9270 NON-COVERED ITEM OR SERVICE: HCPCS | Performed by: NURSE PRACTITIONER

## 2017-05-03 PROCEDURE — 700102 HCHG RX REV CODE 250 W/ 637 OVERRIDE(OP): Performed by: NURSE PRACTITIONER

## 2017-05-03 PROCEDURE — 80048 BASIC METABOLIC PNL TOTAL CA: CPT

## 2017-05-03 PROCEDURE — 85027 COMPLETE CBC AUTOMATED: CPT

## 2017-05-03 PROCEDURE — 71010 DX-CHEST-PORTABLE (1 VIEW): CPT

## 2017-05-03 PROCEDURE — 94640 AIRWAY INHALATION TREATMENT: CPT

## 2017-05-03 PROCEDURE — 700101 HCHG RX REV CODE 250: Performed by: INTERNAL MEDICINE

## 2017-05-03 PROCEDURE — 85610 PROTHROMBIN TIME: CPT

## 2017-05-03 PROCEDURE — 99233 SBSQ HOSP IP/OBS HIGH 50: CPT | Performed by: INTERNAL MEDICINE

## 2017-05-03 PROCEDURE — 94668 MNPJ CHEST WALL SBSQ: CPT

## 2017-05-03 RX ORDER — AMIODARONE HYDROCHLORIDE 200 MG/1
200 TABLET ORAL
Status: DISCONTINUED | OUTPATIENT
Start: 2017-05-04 | End: 2017-05-05 | Stop reason: HOSPADM

## 2017-05-03 RX ADMIN — TRAMADOL HYDROCHLORIDE 50 MG: 50 TABLET, COATED ORAL at 00:02

## 2017-05-03 RX ADMIN — LACTULOSE 30 ML: 20 SOLUTION ORAL at 10:41

## 2017-05-03 RX ADMIN — AMIODARONE HYDROCHLORIDE 400 MG: 200 TABLET ORAL at 07:22

## 2017-05-03 RX ADMIN — ALBUTEROL SULFATE 2 PUFF: 90 AEROSOL, METERED RESPIRATORY (INHALATION) at 08:07

## 2017-05-03 RX ADMIN — ASPIRIN 325 MG: 325 TABLET, DELAYED RELEASE ORAL at 07:22

## 2017-05-03 RX ADMIN — FUROSEMIDE 20 MG: 10 INJECTION, SOLUTION INTRAVENOUS at 07:22

## 2017-05-03 RX ADMIN — IPRATROPIUM BROMIDE AND ALBUTEROL SULFATE 3 ML: .5; 3 SOLUTION RESPIRATORY (INHALATION) at 09:09

## 2017-05-03 RX ADMIN — TRAMADOL HYDROCHLORIDE 50 MG: 50 TABLET, COATED ORAL at 16:00

## 2017-05-03 RX ADMIN — IPRATROPIUM BROMIDE AND ALBUTEROL SULFATE 3 ML: .5; 3 SOLUTION RESPIRATORY (INHALATION) at 21:16

## 2017-05-03 RX ADMIN — TRAMADOL HYDROCHLORIDE 50 MG: 50 TABLET, COATED ORAL at 12:04

## 2017-05-03 RX ADMIN — POTASSIUM CHLORIDE 20 MEQ: 750 TABLET, FILM COATED, EXTENDED RELEASE ORAL at 20:07

## 2017-05-03 RX ADMIN — TIOTROPIUM BROMIDE 1 CAPSULE: 18 CAPSULE ORAL; RESPIRATORY (INHALATION) at 07:20

## 2017-05-03 RX ADMIN — TRAMADOL HYDROCHLORIDE 50 MG: 50 TABLET, COATED ORAL at 08:07

## 2017-05-03 RX ADMIN — ATORVASTATIN CALCIUM 10 MG: 10 TABLET, FILM COATED ORAL at 07:22

## 2017-05-03 RX ADMIN — SERTRALINE 50 MG: 50 TABLET, FILM COATED ORAL at 07:22

## 2017-05-03 RX ADMIN — POTASSIUM CHLORIDE 20 MEQ: 750 TABLET, FILM COATED, EXTENDED RELEASE ORAL at 07:22

## 2017-05-03 RX ADMIN — TRAMADOL HYDROCHLORIDE 50 MG: 50 TABLET, COATED ORAL at 04:07

## 2017-05-03 RX ADMIN — DOCUSATE SODIUM 100 MG: 100 CAPSULE ORAL at 07:22

## 2017-05-03 RX ADMIN — TRAMADOL HYDROCHLORIDE 50 MG: 50 TABLET, COATED ORAL at 20:07

## 2017-05-03 RX ADMIN — BUDESONIDE AND FORMOTEROL FUMARATE DIHYDRATE 2 PUFF: 160; 4.5 AEROSOL RESPIRATORY (INHALATION) at 20:07

## 2017-05-03 RX ADMIN — CEFTRIAXONE SODIUM 2 G: 2 INJECTION, POWDER, FOR SOLUTION INTRAMUSCULAR; INTRAVENOUS at 07:21

## 2017-05-03 RX ADMIN — DIPHENHYDRAMINE HCL 25 MG: 25 TABLET ORAL at 22:36

## 2017-05-03 RX ADMIN — BUDESONIDE AND FORMOTEROL FUMARATE DIHYDRATE 2 PUFF: 160; 4.5 AEROSOL RESPIRATORY (INHALATION) at 07:20

## 2017-05-03 ASSESSMENT — PAIN SCALES - GENERAL
PAINLEVEL_OUTOF10: 4
PAINLEVEL_OUTOF10: 3
PAINLEVEL_OUTOF10: 2
PAINLEVEL_OUTOF10: 0
PAINLEVEL_OUTOF10: 1
PAINLEVEL_OUTOF10: 0
PAINLEVEL_OUTOF10: 3
PAINLEVEL_OUTOF10: 0
PAINLEVEL_OUTOF10: 1

## 2017-05-03 ASSESSMENT — ENCOUNTER SYMPTOMS
MUSCULOSKELETAL NEGATIVE: 1
EYES NEGATIVE: 1
PSYCHIATRIC NEGATIVE: 1
NEUROLOGICAL NEGATIVE: 1
GASTROINTESTINAL NEGATIVE: 1
RESPIRATORY NEGATIVE: 1
CARDIOVASCULAR NEGATIVE: 1
CONSTITUTIONAL NEGATIVE: 1

## 2017-05-03 NOTE — PROGRESS NOTES
Bedside report received, assumed care of patient. Assessment performed.  Patient up to commode then back to bed with standby assist, tolerated well.  Discussed plan of care with pt.  Call light within reach, pt educated to call for assistance.

## 2017-05-03 NOTE — PROGRESS NOTES
Pulmonary Critical Care Progress Note    Interval Events:  24 hour interval history reviewed  Reason for visit:  Atelectasis, COPD, MARY, pneumothoraces, pyuria      1000 on IS  Day 2/3 Rocephin       PFSH:  No change.    Respiratory:     Pulse Oximetry: 92 %  2 L NC  CXR without PTX  Clear lungs  No increased SOB or cough    HemoDynamics:  Pulse: 97, Heart Rate (Monitored): 94  NIBP: 129/76 mmHg    SR  No angina, palp, syncope    Neuro:  Awake and alert  No focal weakness  No HA, Sz    Fluids:  Intake/Output       05/01/17 0700 - 05/02/17 0659 05/02/17 0700 - 05/03/17 0659 05/03/17 0700 - 05/04/17 0659      0700-1859 1139-7590 Total 0700-1859 1900-0659 Total 3076-9436 3314-4051 Total       Intake    P.O.  600  150 750  900  300 1200  --  -- --    P.O. 600 150 750  -- -- --    I.V.  --  -- --  --  100 100  --  -- --    IV Piggyback Volume (IV Piggyback) -- -- -- -- 100 100 -- -- --    Total Intake 600 150 750  -- -- --       Output    Urine  1450  -- 1450  1450  975 2425  100  -- 100    Number of Times Voided 2 x 4 x 6 x 2 x 1 x 3 x -- -- --    Void (ml) 1450 -- 1450 4595 858 1285 100 -- 100    Drains  --  10 10  20  0 20  --  -- --    Right Chest Tube 1 -- -- -- 0 0 0 -- -- --    Left Chest Tube 1 -- 10 10 20 0 20 -- -- --    Stool  --  -- --  --  -- --  --  -- --    Number of Times Stooled -- 0 x 0 x -- -- -- -- -- --    Total Output 1450 10 1460 4352 033 8500 100 -- 100       Net I/O     -850 140 -710 -570 -575 -1145 -100 -- -100        Weight: 54.5 kg (120 lb 2.4 oz)  Recent Labs      05/01/17 0350 05/02/17   0502  05/03/17   0420   SODIUM  137  136  136   POTASSIUM  4.1  4.0  4.0   CHLORIDE  98  97  98   CO2  34*  33  30   BUN  17  15  16   CREATININE  0.54  0.49*  0.65   CALCIUM  8.4*  8.7  8.9       GI/Nutrition:  Abd soft ND/NT  No N/V/P    Liver Function  Recent Labs      05/01/17 0350 05/02/17   0502  05/03/17   0420   ALTSGPT  11   --    --    ASTSGOT  14   --    --     ALKPHOSPHAT  61   --    --    TBILIRUBIN  0.7   --    --    PREALBUMIN  13.0*   --    --    GLUCOSE  93  89  94       Heme:  Recent Labs      17   03517   0502  17   0549  17   0420   RBC   --   3.40*   --   3.56*   HEMOGLOBIN   --   10.4*   --   10.7*   HEMATOCRIT   --   32.7*   --   33.7*   PLATELETCT   --   320   --   343   PROTHROMBTM  13.9   --   14.3  13.8   INR  1.04   --   1.08  1.03       Infectious Disease:  Temp  Av.1 °C (96.9 °F)  Min: 35.9 °C (96.6 °F)  Max: 36.2 °C (97.1 °F)    Recent Labs      17   0502  17   0420   WBC   --   16.4*  14.4*   ASTSGOT  14   --    --    ALTSGPT  11   --    --    ALKPHOSPHAT  61   --    --    TBILIRUBIN  0.7   --    --      Current Facility-Administered Medications   Medication Dose Frequency Provider Last Rate Last Dose   • cefTRIAXone (ROCEPHIN) 2 g in  mL IVPB  2 g Q24HRS Fernandez aHys M.D.   Stopped at 17   • amiodarone (CORDARONE) tablet 400 mg  400 mg Q DAY Fernandez Hays M.D.   400 mg at 17 0717   • oxycodone immediate-release (ROXICODONE) tablet 5 mg  5 mg Q4HRS PRN May Ragland, A.P.N.       • benzocaine-menthol (CEPACOL) lozenge 1 Lozenge  1 Lozenge Q2HRS PRN Lew Tompkins M.D.   1 Lozenge at 17 1309   • oxycodone immediate-release (ROXICODONE) tablet 2.5 mg  2.5 mg Q3HRS PRN Lew Tompkins M.D.   2.5 mg at 17 0956   • aspirin EC (ECOTRIN) tablet 325 mg  325 mg DAILY Donna Mann, A.P.N.   325 mg at 17 0718   • potassium chloride ER (KLOR-CON) tablet 20 mEq  20 mEq BID Lew Tompkins M.D.   20 mEq at 17    Or   • potassium chloride (KLOR-CON) 20 MEQ packet 20 mEq  20 mEq BID Lew Tompkins M.D.   20 mEq at 17 0812   • MD ALERT...Adult ICU Electrolyte Replacement per Pharmacy Protocol   pharmacy to dose Jeremy M Gonda, M.D.       • alprazolam (XANAX) tablet 0.25 mg  0.25 mg TID PRN Donna Mann A.P.N.   0.25  mg at 04/22/17 1315   • tiotropium (SPIRIVA) 18 MCG inhalation capsule 1 Cap  1 Cap DAILY Fernandez Hays M.D.   1 Cap at 05/02/17 0717   • furosemide (LASIX) injection 20 mg  20 mg Q DAY Donna Mann A.P.N.   20 mg at 05/02/17 0716   • atorvastatin (LIPITOR) tablet 10 mg  10 mg DAILY Tayler Kimball A.P.N.   10 mg at 05/02/17 0716   • budesonide-formoterol (SYMBICORT) 160-4.5 MCG/ACT inhaler 2 Puff  2 Puff BID Tayler Kimball A.P.N.   2 Puff at 05/02/17 1957   • sertraline (ZOLOFT) tablet 50 mg  50 mg DAILY Tayler Kimball, A.P.N.   50 mg at 05/02/17 0717   • albuterol inhaler 2 Puff  2 Puff Q4HRS PRN Tayler Kimball A.P.N.   2 Puff at 05/02/17 1251   • Respiratory Care per Protocol   Continuous RT Tayler Kimball, A.P.N.       • NS infusion   Continuous Tayler Kimball A.P.N. 10 mL/hr at 05/02/17 1957     • docusate sodium (COLACE) capsule 100 mg  100 mg QAM Tayler Kimball, A.P.N.   100 mg at 05/02/17 0716    And   • senna-docusate (PERICOLACE or SENOKOT S) 8.6-50 MG per tablet 1 Tab  1 Tab Nightly Tayler Kimball, A.P.N.   1 Tab at 05/01/17 2133    And   • senna-docusate (PERICOLACE or SENOKOT S) 8.6-50 MG per tablet 1 Tab  1 Tab Q24HRS PRN Tayler Kimball, A.P.N.        And   • lactulose 20 GM/30ML solution 30 mL  30 mL Q24HRS PRN Tayler Kimball, A.P.N.        And   • bisacodyl (DULCOLAX) suppository 10 mg  10 mg Q24HRS PRN Tayler Kimball, A.P.N.        And   • fleet enema 133 mL  1 Each Once PRN Tayler Kimball, A.P.N.       • tramadol (ULTRAM) 50 MG tablet 50 mg  50 mg Q4HRS PRN Tayler Kimball, A.P.N.   50 mg at 05/03/17 0407   • ondansetron (ZOFRAN) syringe/vial injection 4 mg  4 mg Q6HRS PRN Tayler Kimball, A.P.N.   4 mg at 04/25/17 1555    Or   • prochlorperazine (COMPAZINE) injection 10 mg  10 mg Q6HRS PRN Tayler Kimball, A.P.N.   10 mg at 04/21/17 1014    Or   • promethazine (PHENERGAN) suppository 25 mg  25 mg Q6HRS PRN Tayler Kimball, A.P.N.       • acetaminophen (TYLENOL) tablet 650 mg  650 mg  Q4HRS PRN Tayler Kimball, A.P.N.        Or   • acetaminophen (TYLENOL) suppository 650 mg  650 mg Q4HRS PRN Tayler Kimball, A.P.N.       • mag hydrox-al hydrox-simeth (MAALOX PLUS ES or MYLANTA DS) suspension 30 mL  30 mL Q4HRS PRN Tayler Kimball, A.P.N.       • diphenhydrAMINE (BENADRYL) tablet/capsule 25 mg  25 mg HS PRN - MR X 1 Tayler Kimball, A.P.N.   25 mg at 05/02/17 2214   • ipratropium-albuterol (DUONEB) nebulizer solution 3 mL  3 mL Q2HRS PRN (RT) Fernandez Morales M.D.   3 mL at 05/02/17 0832     Last reviewed on 4/20/2017  6:03 AM by Randy Licona      Quality  Measures:  Labs reviewed, Medications reviewed and Radiology images reviewed        DVT Prophylaxis: Enoxaparin (Lovenox)  DVT prophylaxis - mechanical: SCDs  Ulcer prophylaxis: Not indicated  Antibiotics: Treating active infection/contamination beyond 24 hours perioperative coverage          Assessment and Plan:    Bilateral pneumothoraces with bronchopleural fistula and subcutaneous emphysema   - chest tubes removed   - resolved  Status post mitral valve repair, left atrial appendage ligation and maze procedure  Svere stage III chronic obstructive pulmonary disease   - cont BDs  Systemic arterial hypertension   - cont BP control  PAF - in SR   - amiodarone  Dyslipidemia - statin  Anxiety  Obstructive sleep apnea - home CPAP  Leukocytosis - improved   - UA with pyuria - Rocephin for 3 days    Discussed with RN, RT, Team       I, Shanda Roger (Sigrid), am scribing for, and in the presence of, Dr. Fernandez Morales M.D.    Electronically signed by: Shanda Roger (Sigrid), 5/3/2017    IDr. Fernandez M.D. personally performed the services described in this documentation, as scribed by Shanda Roger in my presence, and it is both accurate and complete.

## 2017-05-03 NOTE — CARE PLAN
Problem: Venous Thromboembolism (VTW)/Deep Vein Thrombosis (DVT) Prevention:  Goal: Patient will participate in Venous Thrombosis (VTE)/Deep Vein Thrombosis (DVT)Prevention Measures  Intervention: Ensure patient wears graduated elastic stockings (AJITH hose) and/or SCDs, if ordered, when in bed or chair (Remove at least once per shift for skin check)  AJITH hose in place while patient up to chair.      Problem: Pain Management  Goal: Pain level will decrease to patient’s comfort goal  Intervention: Follow pain managment plan developed in collaboration with patient and Interdisciplinary Team  Pain medication administered per MAR.

## 2017-05-03 NOTE — CARE PLAN
Problem: Oxygenation:  Goal: Maintain adequate oxygenation dependent on patient condition  Outcome: PROGRESSING AS EXPECTED    Problem: Hyperinflation:  Goal: Prevent or improve atelectasis  Outcome: PROGRESSING AS EXPECTED  PT I S is 600-1000

## 2017-05-03 NOTE — PROGRESS NOTES
Discussed pt's care with Physicians during AM rounds, orders received. Discussed pt's care with May SNOW for cardiac surgery today. Pt denies needs, c/o @ this time.

## 2017-05-03 NOTE — PROGRESS NOTES
Cardiovascular Surgery Progress Note    [facility]  MRN: [id number]  : 1945  Admit Date: 2017  5:20 AM  Procedure:  Procedure(s) and Anesthesia Type:     * MITRAL VALVE REPAIR  - General     * MAZE PROCEDURE, Left atrial appendage ligation - General     * PAGE - General  13 Day Post-Op    Vitals:  Patient Vitals for the past 8 hrs:   Temp SpO2 O2 Delivery O2 (LPM) Pulse Heart Rate (Monitored) Resp NIBP Weight   17 0911 - 93 % - 1 93 93 17 - -   17 0700 36.1 °C (97 °F) 92 % Silicone Nasal Cannula 1 97 94 20 129/76 mmHg -   17 0500 - - - - - - - - 54.5 kg (120 lb 2.4 oz)   17 0400 36.2 °C (97.1 °F) 100 % CPAP 2 92 - 16 119/86 mmHg -   17 0258 - 99 % - - - 86 - - -     Temp (24hrs), Av.1 °C (97 °F), Min:35.9 °C (96.7 °F), Max:36.2 °C (97.1 °F)      Respiratory:    Respiration: 17, Pulse Oximetry: 93 %, O2 Daily Delivery Respiratory : Silicone Nasal Cannula  Chest Tube Group Right-Tube Status / Drainage: Sutured in Place;Patent;Draining;Small;Serosanguinous, Chest Tube Group Left-Tube Status / Drainage: Patent;Sutured in Place;Draining, Chest Tube Group Right-Device: Dry Closed Drainage System;Suction 20 cm Water, Chest Tube Group Left-Device: Suction 20 cm Water;Closed Drainage System  Chest Tube Drains:   Left Chest Tube 1: 0 mlRight Chest Tube 1: 0 ml     Fluids:    Intake/Output Summary (Last 24 hours) at 17 1000  Last data filed at 17 0830   Gross per 24 hour   Intake   1410 ml   Output   2395 ml   Net   -985 ml     Admit weight: Weight: 54.7 kg (120 lb 9.5 oz)  Current weight: Weight: 54.5 kg (120 lb 2.4 oz) (17 0500)    Labs:  Recent Labs      17   0502  17   0420   WBC  16.4*  14.4*   RBC  3.40*  3.56*   HEMOGLOBIN  10.4*  10.7*   HEMATOCRIT  32.7*  33.7*   MCV  96.2  94.7   MCH  30.6  30.1   MCHC  31.8*  31.8*   RDW  51.2*  50.0   PLATELETCT  320  343   MPV  8.6*  8.6*         Recent Labs      17   0350  17    0502  05/03/17   0420   SODIUM  137  136  136   POTASSIUM  4.1  4.0  4.0   CHLORIDE  98  97  98   CO2  34*  33  30   GLUCOSE  93  89  94   BUN  17  15  16   CREATININE  0.54  0.49*  0.65   CALCIUM  8.4*  8.7  8.9     Recent Labs      05/01/17   0350  05/02/17   0549  05/03/17   0420   INR  1.04  1.08  1.03       Medications:  • [START ON 5/4/2017] amiodarone  200 mg     • cefTRIAXone (ROCEPHIN) IVPB  2 g Stopped (05/03/17 0751)   • aspirin EC  325 mg     • potassium chloride ER  20 mEq      Or   • potassium chloride  20 mEq     • MD ALERT...Adult ICU Electrolyte Replacement per Pharmacy Protocol       • tiotropium  1 Cap     • furosemide  20 mg     • atorvastatin  10 mg     • budesonide-formoterol  2 Puff     • sertraline  50 mg     • docusate sodium  100 mg      And   • senna-docusate  1 Tab         Exam:   Review of Systems   Constitutional: Negative.    HENT: Negative.    Eyes: Negative.    Respiratory: Negative.    Cardiovascular: Negative.    Gastrointestinal: Negative.    Genitourinary: Negative.    Musculoskeletal: Negative.    Skin: Negative.    Neurological: Negative.    Endo/Heme/Allergies: Negative.    Psychiatric/Behavioral: Negative.        Physical Exam   Constitutional: She is oriented to person, place, and time. She appears well-developed and well-nourished.   HENT:   Head: Normocephalic.   Eyes: Pupils are equal, round, and reactive to light.   Neck: Normal range of motion. No JVD present.   Cardiovascular: Normal rate, regular rhythm and normal heart sounds.    Pulmonary/Chest: Effort normal and breath sounds normal.   Abdominal: Soft. Bowel sounds are normal. She exhibits no distension. There is no guarding.   Musculoskeletal: Normal range of motion.   Neurological: She is alert and oriented to person, place, and time.   Skin: Skin is warm and dry.   Surgical incisions CDI   Psychiatric: She has a normal mood and affect. Her behavior is normal.       Quality Measures:   EKG reviewed, Medications  reviewed, Labs reviewed and Radiology images reviewed  Larsen catheter: No Larsen  Central line in place: Need for access    DVT Prophylaxis: Contraindicated - High bleeding risk  DVT prophylaxis - mechanical: Not indicated at this time, ambulatory  Ulcer prophylaxis: Yes    Assessed for rehab: Patient was assess for and/or received rehabilitation services during this hospitalization      Assessment/Plan:  POD 1 HDS, NSR--some runs VT ?afib with aberrancy? Last night, started on amio gtt.  Extubated, neuro grossly intact.  CT output min, mod airleak.  CXR no pneumothorax.  Adequate UOP, wts up, +3L. labs noted.  PLAN:  Keep CTs.  DC larsen.  STart gentle diuresis.  Change to PO amio.  Stress cough/deepbreath/IS. AMB.    POD 2 HDS, NSR.  Crepitus Right upper chest, mild. CXR without pnuemothorax. On 2 l nc. CT output min, mod airleak remains--connections recheck, and redressed. Good bowel sounds, not passing gas, no distention/n/v. W/w. INR trending up.  DC temp wires. Add xanax anxiety. Urine retention overnight, straight cath--to reinsert larsen if unable to void again.  AMB/IS.  CXR in AM.   POD 3 HDS NSR.  Airleak remains.  Sub q air has now extended up neck, Right facial swelling.  No resp distress.  CXR with bilateral chest wall air and pneumomediastinum.  Dr. Estrada consulted, will see patient Monday AM.  CT chest today.  CTs to be placed to 2 separate pleural vacs. DC temp wires.  CPM.  POD 4 re-intubated last night.  Chest tubes inserted pleural.  Per CT scan moderate bilateral pneumothorax.  Sedated.  + air leaks  Both chest tubes.  Sean to see patient today.    POD 5 Sedated/levo---wakes and follows appropriately, weaning off levo as wakes.  CXR small R apical pneumothorax.  Right face swelling improved.  SBTs this AM< likely to extubate today.  Ailreak on 1 CT remains.  Keep all CTs today.  CPM.   POD 6  HDS, NSR.. Extubated, neuro intact.  CXR improved.  Airleak remains visible on the Right Angle Mediastinal  CT. welling face improving.  PLAN: Keep CTs today.  DC arlting and NG tube, increase diet as tolerated.  AMB/IS.  Will keep larsen today until more ambulatory with multiple CTs. Increase to full dose asa until able to start coumadin  POD 7 HDS, SR, neuro intact.  Wounds CDI.  Chest tubes still with airleaks.  Up in chair today.  Amb/IS.  CPM.  POD 8 HDS, CXR with small right apical pneumo.  Neuro intact.  Wounds CDI.  Labs stable.  Plan:  Dc larsen and ambulate.   POD 9 HDS, CXR unchanged.  No air leak to chest tubes.  Patient ambulating.  HDS, Neuro intact.  Wounds CDI.  Doing well.   POD 10 HDS, NSR. SmalL right apical pneumothorax remains--no airleaks note don CTs.  Otherwise doing well.  CTs per Dr. Estrada.  CPM.  POD 11 HDS, SR, apical pneumo's continue to decrease.  Chest tubes to water seal today.  Negative for air leak.   POD 13 HDS, SR- dec PO amio, CXR no pneumo- Surgery following- may dc CT today, amb, enc IS    Patient seen, examined and plan reviewed with midlevel provider. I agree with the plan.      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

## 2017-05-03 NOTE — PROGRESS NOTES
"  Trauma/Surgical Progress Note    Author: Cash Estrada Date & Time created: 5/2/2017   7:13 PM     Interval Events:  No air leak  Tiny bilateral pneumothorax  To water seal  Remove tubes if no progression am cxr   Review of Systems   Constitutional: Negative for fever and chills.   Respiratory: Negative for shortness of breath.    Cardiovascular: Negative for chest pain.     Hemodynamics:  Blood pressure 127/89, pulse 88, temperature 36.2 °C (97.1 °F), resp. rate 15, height 1.638 m (5' 4.49\"), weight 54.8 kg (120 lb 13 oz), SpO2 93 %, not currently breastfeeding.     Respiratory:    Respiration: 15, Pulse Oximetry: 93 %, O2 Daily Delivery Respiratory : Silicone Nasal Cannula  Chest Tube Group Right-Tube Status / Drainage: Sutured in Place;Patent;Draining;Small;Serosanguinous, Chest Tube Group Left-Tube Status / Drainage: Patent;Sutured in Place;Draining, Chest Tube Group Right-Device: Dry Closed Drainage System;Suction 20 cm Water, Chest Tube Group Left-Device: Suction 20 cm Water;Closed Drainage System  Given By:: Mouthpiece, PEP/CPT Method: Positive Airway Pressure Device, Work Of Breathing / Effort: Mild  RUL Breath Sounds: Clear, RML Breath Sounds: Diminished, RLL Breath Sounds: Diminished, DELLA Breath Sounds: Clear, LLL Breath Sounds: Diminished  Fluids:    Intake/Output Summary (Last 24 hours) at 05/02/17 1913  Last data filed at 05/02/17 1800   Gross per 24 hour   Intake   1050 ml   Output   1480 ml   Net   -430 ml     Admit Weight: 54.7 kg (120 lb 9.5 oz)  Current Weight: 54.8 kg (120 lb 13 oz)    Physical Exam   Pulmonary/Chest: No respiratory distress.       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

## 2017-05-03 NOTE — PROGRESS NOTES
Tele: SR 88 to 93.    Receives Tramadol PO PRN for pain control today. Denies needs @ this time. Will pass care to NOC RN @ [facility].

## 2017-05-04 ENCOUNTER — APPOINTMENT (OUTPATIENT)
Dept: RADIOLOGY | Facility: MEDICAL CENTER | Age: 72
DRG: 219 | End: 2017-05-04
Attending: INTERNAL MEDICINE
Payer: MEDICARE

## 2017-05-04 LAB
ANION GAP SERPL CALC-SCNC: 9 MMOL/L (ref 0–11.9)
BUN SERPL-MCNC: 16 MG/DL (ref 8–22)
CALCIUM SERPL-MCNC: 9.3 MG/DL (ref 8.5–10.5)
CHLORIDE SERPL-SCNC: 98 MMOL/L (ref 96–112)
CO2 SERPL-SCNC: 29 MMOL/L (ref 20–33)
CREAT SERPL-MCNC: 0.55 MG/DL (ref 0.5–1.4)
ERYTHROCYTE [DISTWIDTH] IN BLOOD BY AUTOMATED COUNT: 50 FL (ref 35.9–50)
GFR SERPL CREATININE-BSD FRML MDRD: >60 ML/MIN/1.73 M 2
GLUCOSE SERPL-MCNC: 88 MG/DL (ref 65–99)
HCT VFR BLD AUTO: 34 % (ref 37–47)
HGB BLD-MCNC: 11 G/DL (ref 12–16)
INR PPP: 1.1 (ref 0.87–1.13)
MCH RBC QN AUTO: 30.6 PG (ref 27–33)
MCHC RBC AUTO-ENTMCNC: 32.4 G/DL (ref 33.6–35)
MCV RBC AUTO: 94.7 FL (ref 81.4–97.8)
PLATELET # BLD AUTO: 352 K/UL (ref 164–446)
PMV BLD AUTO: 8.5 FL (ref 9–12.9)
POTASSIUM SERPL-SCNC: 4 MMOL/L (ref 3.6–5.5)
PROTHROMBIN TIME: 14.6 SEC (ref 12–14.6)
RBC # BLD AUTO: 3.59 M/UL (ref 4.2–5.4)
SODIUM SERPL-SCNC: 136 MMOL/L (ref 135–145)
WBC # BLD AUTO: 12.9 K/UL (ref 4.8–10.8)

## 2017-05-04 PROCEDURE — 94640 AIRWAY INHALATION TREATMENT: CPT

## 2017-05-04 PROCEDURE — A9270 NON-COVERED ITEM OR SERVICE: HCPCS | Performed by: CLINICAL NURSE SPECIALIST

## 2017-05-04 PROCEDURE — 700102 HCHG RX REV CODE 250 W/ 637 OVERRIDE(OP)

## 2017-05-04 PROCEDURE — 85610 PROTHROMBIN TIME: CPT

## 2017-05-04 PROCEDURE — 80048 BASIC METABOLIC PNL TOTAL CA: CPT

## 2017-05-04 PROCEDURE — 700102 HCHG RX REV CODE 250 W/ 637 OVERRIDE(OP): Performed by: NURSE PRACTITIONER

## 2017-05-04 PROCEDURE — 700102 HCHG RX REV CODE 250 W/ 637 OVERRIDE(OP): Performed by: INTERNAL MEDICINE

## 2017-05-04 PROCEDURE — 700102 HCHG RX REV CODE 250 W/ 637 OVERRIDE(OP): Performed by: CLINICAL NURSE SPECIALIST

## 2017-05-04 PROCEDURE — A9270 NON-COVERED ITEM OR SERVICE: HCPCS

## 2017-05-04 PROCEDURE — 770020 HCHG ROOM/CARE - TELE (206)

## 2017-05-04 PROCEDURE — 700111 HCHG RX REV CODE 636 W/ 250 OVERRIDE (IP): Performed by: NURSE PRACTITIONER

## 2017-05-04 PROCEDURE — 700101 HCHG RX REV CODE 250: Performed by: INTERNAL MEDICINE

## 2017-05-04 PROCEDURE — 99233 SBSQ HOSP IP/OBS HIGH 50: CPT | Performed by: INTERNAL MEDICINE

## 2017-05-04 PROCEDURE — A9270 NON-COVERED ITEM OR SERVICE: HCPCS | Performed by: INTERNAL MEDICINE

## 2017-05-04 PROCEDURE — 700111 HCHG RX REV CODE 636 W/ 250 OVERRIDE (IP): Performed by: INTERNAL MEDICINE

## 2017-05-04 PROCEDURE — 700105 HCHG RX REV CODE 258: Performed by: INTERNAL MEDICINE

## 2017-05-04 PROCEDURE — 97535 SELF CARE MNGMENT TRAINING: CPT

## 2017-05-04 PROCEDURE — 85027 COMPLETE CBC AUTOMATED: CPT

## 2017-05-04 PROCEDURE — A9270 NON-COVERED ITEM OR SERVICE: HCPCS | Performed by: NURSE PRACTITIONER

## 2017-05-04 PROCEDURE — 94660 CPAP INITIATION&MGMT: CPT

## 2017-05-04 PROCEDURE — 71010 DX-CHEST-PORTABLE (1 VIEW): CPT

## 2017-05-04 PROCEDURE — 97116 GAIT TRAINING THERAPY: CPT

## 2017-05-04 RX ORDER — WARFARIN SODIUM 5 MG/1
5 TABLET ORAL
Status: DISCONTINUED | OUTPATIENT
Start: 2017-05-04 | End: 2017-05-05 | Stop reason: HOSPADM

## 2017-05-04 RX ORDER — FUROSEMIDE 20 MG/1
20 TABLET ORAL
Status: DISCONTINUED | OUTPATIENT
Start: 2017-05-04 | End: 2017-05-05 | Stop reason: HOSPADM

## 2017-05-04 RX ADMIN — BUDESONIDE AND FORMOTEROL FUMARATE DIHYDRATE 2 PUFF: 160; 4.5 AEROSOL RESPIRATORY (INHALATION) at 08:09

## 2017-05-04 RX ADMIN — POTASSIUM CHLORIDE 20 MEQ: 750 TABLET, FILM COATED, EXTENDED RELEASE ORAL at 20:41

## 2017-05-04 RX ADMIN — POTASSIUM CHLORIDE 20 MEQ: 750 TABLET, FILM COATED, EXTENDED RELEASE ORAL at 08:09

## 2017-05-04 RX ADMIN — FUROSEMIDE 20 MG: 20 TABLET ORAL at 09:28

## 2017-05-04 RX ADMIN — TIOTROPIUM BROMIDE 1 CAPSULE: 18 CAPSULE ORAL; RESPIRATORY (INHALATION) at 10:37

## 2017-05-04 RX ADMIN — BUDESONIDE AND FORMOTEROL FUMARATE DIHYDRATE 2 PUFF: 160; 4.5 AEROSOL RESPIRATORY (INHALATION) at 18:48

## 2017-05-04 RX ADMIN — AMIODARONE HYDROCHLORIDE 200 MG: 200 TABLET ORAL at 08:10

## 2017-05-04 RX ADMIN — ASPIRIN 325 MG: 325 TABLET, DELAYED RELEASE ORAL at 08:10

## 2017-05-04 RX ADMIN — TRAMADOL HYDROCHLORIDE 50 MG: 50 TABLET, COATED ORAL at 09:28

## 2017-05-04 RX ADMIN — OXYCODONE HYDROCHLORIDE 5 MG: 5 TABLET ORAL at 22:50

## 2017-05-04 RX ADMIN — TRAMADOL HYDROCHLORIDE 50 MG: 50 TABLET, COATED ORAL at 18:41

## 2017-05-04 RX ADMIN — IPRATROPIUM BROMIDE AND ALBUTEROL SULFATE 3 ML: .5; 3 SOLUTION RESPIRATORY (INHALATION) at 18:48

## 2017-05-04 RX ADMIN — BUDESONIDE AND FORMOTEROL FUMARATE DIHYDRATE 2 PUFF: 160; 4.5 AEROSOL RESPIRATORY (INHALATION) at 20:40

## 2017-05-04 RX ADMIN — CEFTRIAXONE SODIUM 2 G: 2 INJECTION, POWDER, FOR SOLUTION INTRAMUSCULAR; INTRAVENOUS at 09:28

## 2017-05-04 RX ADMIN — ATORVASTATIN CALCIUM 10 MG: 10 TABLET, FILM COATED ORAL at 08:10

## 2017-05-04 RX ADMIN — SERTRALINE 50 MG: 50 TABLET, FILM COATED ORAL at 08:10

## 2017-05-04 RX ADMIN — TRAMADOL HYDROCHLORIDE 50 MG: 50 TABLET, COATED ORAL at 00:30

## 2017-05-04 RX ADMIN — DIPHENHYDRAMINE HCL 25 MG: 25 TABLET ORAL at 22:11

## 2017-05-04 RX ADMIN — WARFARIN SODIUM 5 MG: 5 TABLET ORAL at 17:19

## 2017-05-04 ASSESSMENT — PAIN SCALES - GENERAL
PAINLEVEL_OUTOF10: 0
PAINLEVEL_OUTOF10: 4
PAINLEVEL_OUTOF10: 0
PAINLEVEL_OUTOF10: 0
PAINLEVEL_OUTOF10: 3
PAINLEVEL_OUTOF10: 3
PAINLEVEL_OUTOF10: 1
PAINLEVEL_OUTOF10: 0
PAINLEVEL_OUTOF10: 0

## 2017-05-04 ASSESSMENT — ENCOUNTER SYMPTOMS
PSYCHIATRIC NEGATIVE: 1
CONSTITUTIONAL NEGATIVE: 1
RESPIRATORY NEGATIVE: 1
GASTROINTESTINAL NEGATIVE: 1
EYES NEGATIVE: 1
CARDIOVASCULAR NEGATIVE: 1
NEUROLOGICAL NEGATIVE: 1
MUSCULOSKELETAL NEGATIVE: 1

## 2017-05-04 ASSESSMENT — GAIT ASSESSMENTS
DISTANCE (FEET): 125
ASSISTIVE DEVICE: FRONT WHEEL WALKER
DEVIATION: BRADYKINETIC
GAIT LEVEL OF ASSIST: STAND BY ASSIST

## 2017-05-04 NOTE — PROGRESS NOTES
Cardiovascular Surgery Progress Note    [facility]  [facility]: [id number]  : 1945  Admit Date: 2017  5:20 AM  Procedure:  Procedure(s) and Anesthesia Type:     * MITRAL VALVE REPAIR  - General     * MAZE PROCEDURE, Left atrial appendage ligation - General     * PAGE - General  14 Day Post-Op    Vitals:  Patient Vitals for the past 8 hrs:   Temp SpO2 O2 Delivery O2 (LPM) Pulse Heart Rate (Monitored) Resp NIBP   17 0600 - 99 % Silicone Nasal Cannula 2 89 90 17 107/71 mmHg   17 0400 36.2 °C (97.2 °F) 99 % Silicone Nasal Cannula 2 94 92 18 112/75 mmHg   17 0304 - 96 % - - - 89 - -   17 0300 - 97 % - - 89 89 (!) 27 103/71 mmHg   17 0200 - 97 % - - 91 91 20 -   17 0100 - - - - 92 - 19 119/83 mmHg     Temp (24hrs), Av.1 °C (96.9 °F), Min:35.9 °C (96.6 °F), Max:36.2 °C (97.2 °F)      Respiratory:    Respiration: 17, Pulse Oximetry: 99 %, O2 Daily Delivery Respiratory : Silicone Nasal Cannula     Chest Tube Drains:   Left Chest Tube 1: 0 mlRight Chest Tube 1: 0 ml     Fluids:    Intake/Output Summary (Last 24 hours) at 17 0848  Last data filed at 17 0700   Gross per 24 hour   Intake    600 ml   Output    550 ml   Net     50 ml     Admit weight: Weight: 54.7 kg (120 lb 9.5 oz)  Current weight: Weight: 54.5 kg (120 lb 2.4 oz) (17 0500)    Labs:  Recent Labs      17   0502  17   0420  17   0515   WBC  16.4*  14.4*  12.9*   RBC  3.40*  3.56*  3.59*   HEMOGLOBIN  10.4*  10.7*  11.0*   HEMATOCRIT  32.7*  33.7*  34.0*   MCV  96.2  94.7  94.7   MCH  30.6  30.1  30.6   MCHC  31.8*  31.8*  32.4*   RDW  51.2*  50.0  50.0   PLATELETCT  320  343  352   MPV  8.6*  8.6*  8.5*         Recent Labs      17   0502  17   0420  17   0515   SODIUM  136  136  136   POTASSIUM  4.0  4.0  4.0   CHLORIDE  97  98  98   CO2  33  30  29   GLUCOSE  89  94  88   BUN  15  16  16   CREATININE  0.49*  0.65  0.55   CALCIUM  8.7  8.9  9.3      Recent Labs      05/02/17   0549  05/03/17   0420  05/04/17   0515   INR  1.08  1.03  1.10       Medications:  • furosemide  20 mg     • amiodarone  200 mg     • aspirin EC  325 mg     • potassium chloride ER  20 mEq      Or   • potassium chloride  20 mEq     • MD ALERT...Adult ICU Electrolyte Replacement per Pharmacy Protocol       • tiotropium  1 Cap     • atorvastatin  10 mg     • budesonide-formoterol  2 Puff     • sertraline  50 mg     • docusate sodium  100 mg      And   • senna-docusate  1 Tab         Exam:   Review of Systems   Constitutional: Negative.    HENT: Negative.    Eyes: Negative.    Respiratory: Negative.    Cardiovascular: Negative.    Gastrointestinal: Negative.    Genitourinary: Negative.    Musculoskeletal: Negative.    Skin: Negative.    Neurological: Negative.    Endo/Heme/Allergies: Negative.    Psychiatric/Behavioral: Negative.        Physical Exam   Constitutional: She is oriented to person, place, and time. She appears well-developed and well-nourished.   HENT:   Head: Normocephalic.   Eyes: Pupils are equal, round, and reactive to light.   Neck: Normal range of motion. No JVD present.   Cardiovascular: Normal rate, regular rhythm and normal heart sounds.    Pulmonary/Chest: Effort normal and breath sounds normal.   Abdominal: Soft. Bowel sounds are normal. She exhibits no distension. There is no guarding.   Musculoskeletal: Normal range of motion.   Neurological: She is alert and oriented to person, place, and time.   Skin: Skin is warm and dry.   Surgical incisions CDI   Psychiatric: She has a normal mood and affect. Her behavior is normal.       Quality Measures:   EKG reviewed, Medications reviewed, Labs reviewed and Radiology images reviewed  Avila catheter: No Avila  Central line in place: Need for access    DVT Prophylaxis: Contraindicated - High bleeding risk  DVT prophylaxis - mechanical: Not indicated at this time, ambulatory  Ulcer prophylaxis: Yes    Assessed for rehab:  Patient was assess for and/or received rehabilitation services during this hospitalization      Assessment/Plan:  POD 1 HDS, NSR--some runs VT ?afib with aberrancy? Last night, started on amio gtt.  Extubated, neuro grossly intact.  CT output min, mod airleak.  CXR no pneumothorax.  Adequate UOP, wts up, +3L. labs noted.  PLAN:  Keep CTs.  DC larsen.  STart gentle diuresis.  Change to PO amio.  Stress cough/deepbreath/IS. AMB.    POD 2 HDS, NSR.  Crepitus Right upper chest, mild. CXR without pnuemothorax. On 2 l nc. CT output min, mod airleak remains--connections recheck, and redressed. Good bowel sounds, not passing gas, no distention/n/v. W/w. INR trending up.  DC temp wires. Add xanax anxiety. Urine retention overnight, straight cath--to reinsert larsen if unable to void again.  AMB/IS.  CXR in AM.   POD 3 HDS NSR.  Airleak remains.  Sub q air has now extended up neck, Right facial swelling.  No resp distress.  CXR with bilateral chest wall air and pneumomediastinum.  Dr. Estrada consulted, will see patient Monday AM.  CT chest today.  CTs to be placed to 2 separate pleural vacs. DC temp wires.  CPM.  POD 4 re-intubated last night.  Chest tubes inserted pleural.  Per CT scan moderate bilateral pneumothorax.  Sedated.  + air leaks  Both chest tubes.  Sean to see patient today.    POD 5 Sedated/levo---wakes and follows appropriately, weaning off levo as wakes.  CXR small R apical pneumothorax.  Right face swelling improved.  SBTs this AM< likely to extubate today.  Ailreak on 1 CT remains.  Keep all CTs today.  CPM.   POD 6  HDS, NSR.. Extubated, neuro intact.  CXR improved.  Airleak remains visible on the Right Angle Mediastinal CT. welling face improving.  PLAN: Keep CTs today.  DC arlting and NG tube, increase diet as tolerated.  AMB/IS.  Will keep larsen today until more ambulatory with multiple CTs. Increase to full dose asa until able to start coumadin  POD 7 HDS, SR, neuro intact.  Wounds CDI.  Chest tubes still  with airleaks.  Up in chair today.  Amb/IS.  CPM.  POD 8 HDS, CXR with small right apical pneumo.  Neuro intact.  Wounds CDI.  Labs stable.  Plan:  Dc larsen and ambulate.   POD 9 HDS, CXR unchanged.  No air leak to chest tubes.  Patient ambulating.  HDS, Neuro intact.  Wounds CDI.  Doing well.   POD 10 HDS, NSR. SmalL right apical pneumothorax remains--no airleaks note don CTs.  Otherwise doing well.  CTs per Dr. Estrada.  CPM.  POD 11 HDS, SR, apical pneumo's continue to decrease.  Chest tubes to water seal today.  Negative for air leak.   POD 13 HDS, SR- dec PO amio, CXR no pneumo- Surgery following- may dc CT today, amb, enc IS  POD 14 HDS, SR on PO amio, CT removed yesterday - CXR this am tiny left apical pneumo, PT to see today, wean O2, CXR in am- if stable will d/c home.    Patient seen, examined and plan reviewed with midlevel provider. I agree with the plan.      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

## 2017-05-04 NOTE — PROGRESS NOTES
Pulmonary Critical Care Progress Note    Interval Events:  24 hour interval history reviewed  Reason for visit:  Atelectasis, COPD, MARY, pneumothoraces, pyuria        SR  Chest tube out  Day 3/3 Rocephin      PFSH:  No change.    Respiratory:     Pulse Oximetry: 99 %  1 L NC  CXR with tiny left apical PTX  Clear lungs  No increased SOB or cough    HemoDynamics:  Pulse: 89, Heart Rate (Monitored): 90  NIBP: 107/71 mmHg    SR  No angina, palp, syncope  No increased edema     Neuro:  Awake and alert  No focal weakness  No HA, Sz    Fluids:  Intake/Output       05/02/17 0700 - 05/03/17 0659 05/03/17 0700 - 05/04/17 0659 05/04/17 0700 - 05/05/17 0659      [id number] 7320-0524 Total 0700-1859 5990-3349 Total 7081-8880 2329-1194 Total       Intake    P.O.  900  300 1200  900  -- 900  --  -- --    P.O.  900 -- 900 -- -- --    I.V.  --  100 100  110  -- 110  --  -- --    IV Volume (NS) -- -- -- 10 -- 10 -- -- --    IV Piggyback Volume (IV Piggyback) -- 100 100 100 -- 100 -- -- --    Total Intake  1010 -- 1010 -- -- --       Output    Urine  1450  975 2425  1150  -- 1150  --  -- --    Number of Times Voided 2 x 1 x 3 x 5 x 2 x 7 x -- -- --    Void (ml) 2513 696 8642 1150 -- 1150 -- -- --    Drains  20  0 20  0  -- 0  --  -- --    Right Chest Tube 1 0 0 0 0 -- 0 -- -- --    Left Chest Tube 1 20 0 20 0 -- 0 -- -- --    Stool  --  -- --  --  -- --  --  -- --    Number of Times Stooled -- -- -- 6 x 7 x 13 x -- -- --    Total Output 2978 569 5673 1150 -- 1150 -- -- --       Net I/O     -570 -575 -1145 -140 -- -140 -- -- --           Recent Labs      05/02/17   0502  05/03/17   0420  05/04/17   0515   SODIUM  136  136  136   POTASSIUM  4.0  4.0  4.0   CHLORIDE  97  98  98   CO2  33  30  29   BUN  15  16  16   CREATININE  0.49*  0.65  0.55   CALCIUM  8.7  8.9  9.3       GI/Nutrition:  Abd soft ND/NT  No N/V/P    Liver Function  Recent Labs      05/02/17   0502  05/03/17   0420  05/04/17   0515   GLUCOSE  89   94  88       Heme:  Recent Labs      17   0502  17   0549  17   0420  17   0515   RBC  3.40*   --   3.56*  3.59*   HEMOGLOBIN  10.4*   --   10.7*  11.0*   HEMATOCRIT  32.7*   --   33.7*  34.0*   PLATELETCT  320   --   343  352   PROTHROMBTM   --   14.3  13.8  14.6   INR   --   1.08  1.03  1.10       Infectious Disease:  Temp  Av.1 °C (96.9 °F)  Min: 35.9 °C (96.6 °F)  Max: 36.2 °C (97.2 °F)    Recent Labs      17   0502  17   0420  17   0515   WBC  16.4*  14.4*  12.9*     Current Facility-Administered Medications   Medication Dose Frequency Provider Last Rate Last Dose   • amiodarone (CORDARONE) tablet 200 mg  200 mg Q DAY Tayler Kimball, A.P.N.       • cefTRIAXone (ROCEPHIN) 2 g in  mL IVPB  2 g Q24HRS Fernandez Hays M.D.   Stopped at 17 0751   • oxycodone immediate-release (ROXICODONE) tablet 5 mg  5 mg Q4HRS PRN May Ragland, A.P.N.       • benzocaine-menthol (CEPACOL) lozenge 1 Lozenge  1 Lozenge Q2HRS PRN Lew Tompkins M.D.   1 Lozenge at 17 1309   • oxycodone immediate-release (ROXICODONE) tablet 2.5 mg  2.5 mg Q3HRS PRN Lew Tompkins M.D.   2.5 mg at 17 0956   • aspirin EC (ECOTRIN) tablet 325 mg  325 mg DAILY Donna Mann, A.P.N.   325 mg at 17 0722   • potassium chloride ER (KLOR-CON) tablet 20 mEq  20 mEq BID Lew Tompkins M.D.   20 mEq at 17 2007    Or   • potassium chloride (KLOR-CON) 20 MEQ packet 20 mEq  20 mEq BID Lew Tompkins M.D.   20 mEq at 17 0812   • MD ALERT...Adult ICU Electrolyte Replacement per Pharmacy Protocol   pharmacy to dose Jeremy M Gonda, M.D.       • alprazolam (XANAX) tablet 0.25 mg  0.25 mg TID PRN Donna Mann, A.P.N.   0.25 mg at 17 1315   • tiotropium (SPIRIVA) 18 MCG inhalation capsule 1 Cap  1 Cap DAILY Fernandez Hays M.D.   1 Cap at 17 0720   • furosemide (LASIX) injection 20 mg  20 mg Q DAY Donna Tuckert, A.P.N.   20 mg at 17 0706    • atorvastatin (LIPITOR) tablet 10 mg  10 mg DAILY Tayler Kimball, A.P.N.   10 mg at 05/03/17 0722   • budesonide-formoterol (SYMBICORT) 160-4.5 MCG/ACT inhaler 2 Puff  2 Puff BID Tayler Kimball, A.P.N.   2 Puff at 05/03/17 2007   • sertraline (ZOLOFT) tablet 50 mg  50 mg DAILY Tayler Kimball, A.P.N.   50 mg at 05/03/17 0722   • albuterol inhaler 2 Puff  2 Puff Q4HRS PRN Tayler Kimball, A.P.N.   2 Puff at 05/03/17 0807   • Respiratory Care per Protocol   Continuous RT Tayler Kimball, A.P.N.       • NS infusion   Continuous Tayler Kimball, A.P.N. 10 mL/hr at 05/02/17 1957     • docusate sodium (COLACE) capsule 100 mg  100 mg QAM Tayler Kimball, A.P.N.   100 mg at 05/03/17 0722    And   • senna-docusate (PERICOLACE or SENOKOT S) 8.6-50 MG per tablet 1 Tab  1 Tab Nightly Tayler Kimball, A.P.N.   1 Tab at 05/01/17 2133    And   • senna-docusate (PERICOLACE or SENOKOT S) 8.6-50 MG per tablet 1 Tab  1 Tab Q24HRS PRN Tayler Kimball, A.P.N.        And   • lactulose 20 GM/30ML solution 30 mL  30 mL Q24HRS PRN Tayler Kimball, A.P.N.   30 mL at 05/03/17 1041    And   • bisacodyl (DULCOLAX) suppository 10 mg  10 mg Q24HRS PRN Tayler Kimball, A.P.N.        And   • fleet enema 133 mL  1 Each Once PRN Tayler Kimball, A.P.N.       • tramadol (ULTRAM) 50 MG tablet 50 mg  50 mg Q4HRS PRN Tayler Kimball, A.P.N.   50 mg at 05/04/17 0030   • ondansetron (ZOFRAN) syringe/vial injection 4 mg  4 mg Q6HRS PRN Tayler Kimball, A.P.N.   4 mg at 04/25/17 1555    Or   • prochlorperazine (COMPAZINE) injection 10 mg  10 mg Q6HRS PRN Tayler Kimball, A.P.N.   10 mg at 04/21/17 1014    Or   • promethazine (PHENERGAN) suppository 25 mg  25 mg Q6HRS PRN Tayler Kimball, A.P.N.       • acetaminophen (TYLENOL) tablet 650 mg  650 mg Q4HRS PRN Tayler Kimball, A.P.N.        Or   • acetaminophen (TYLENOL) suppository 650 mg  650 mg Q4HRS PRN Tayler Kimball, A.P.N.       • mag hydrox-al hydrox-simeth (MAALOX PLUS ES or MYLANTA DS) suspension 30 mL  30 mL  Q4HRS PRN ALVARO WanP.N.       • diphenhydrAMINE (BENADRYL) tablet/capsule 25 mg  25 mg HS PRN - MR X 1 YUMIKO Wan.P.N.   25 mg at 05/03/17 2236   • ipratropium-albuterol (DUONEB) nebulizer solution 3 mL  3 mL Q2HRS PRN (RT) Fernandez Morales M.D.   3 mL at 05/03/17 2116     Last reviewed on 4/20/2017  6:03 AM by Randy Licona      Quality  Measures:  Labs reviewed, Medications reviewed and Radiology images reviewed        DVT Prophylaxis: Enoxaparin (Lovenox)  DVT prophylaxis - mechanical: SCDs  Ulcer prophylaxis: Not indicated  Antibiotics: Treating active infection/contamination beyond 24 hours perioperative coverage          Assessment and Plan:    Bilateral pneumothoraces with bronchopleural fistula and subcutaneous emphysema   - chest tubes removed   - resolved  Status post mitral valve repair, left atrial appendage ligation and maze procedure  Svere stage III chronic obstructive pulmonary disease   - cont BDs  Systemic arterial hypertension   - cont BP control  PAF - in SR   - amiodarone  Dyslipidemia - statin  Anxiety  Obstructive sleep apnea - home CPAP  Pyuria - Rocephin for 3 days    Discussed with RN, RT, Team    Date of Service: 5/4/17       Damaris NJ (Sigrid), am scribing for, and in the presence of, Dr. Fernandez Morales M.D.    Electronically signed by: Damaris Branch (Sigrid), 5/4/2017    Dr. Fernandez NJ M.D. personally performed the services described in this documentation, as scribed by Damaris Branch in my presence, and it is both accurate and complete.

## 2017-05-04 NOTE — CARE PLAN
Problem: Safety  Goal: Will remain free from injury  Outcome: PROGRESSING AS EXPECTED  Pt educated on the importance fall prevention methods, such as treaded sock and the bed alarm. Pt stated they will use the call light prior to any attempts of ambulation. Ambulatory ability assessed, treaded socks in place, bed locked and in low position, frequent trips to bathroom offered, and call light and phone within reach.    Problem: Skin Integrity  Goal: Risk for impaired skin integrity will decrease  Outcome: PROGRESSING AS EXPECTED  Assess risk factors for impaired skin integrity and/or pressure ulcers  Pt educated on the importance of repositioning to prevent skin breakdown. Verbalized understanding. Frequent monitoring of skin integrity.

## 2017-05-04 NOTE — PROGRESS NOTES
Assumed care at 1900. Bedside report received from Marilyn. Patient's chart and MAR reviewed. Pt complains of mild pain at previous chest tube incision sites this time. Pt is A & O 4. Patient was updated on plan of care for the day. Questions answered and concerns addressed.  Pt denies any additional needs at this time. White board updated. Call light, phone and personal belongings within reach.

## 2017-05-04 NOTE — DISCHARGE PLANNING
Call to [facility].  Anticipate DC tomorrow to home.  Discussed additional Support at home as family support is sporadic and Spouse is dealing with his own health issues.  Provided Contact information for In home PCA and hmkr services.  Patient understands she would pay out of pocket.     Wait for final DC orders.  Patient owns a home concentrator.  02 for home?

## 2017-05-04 NOTE — PROGRESS NOTES
Tele: SR 85 to 95.     .18 / .08 / .36    Pt given lactulose PO PRN today. Loose BM's this evening. Pt medicated for pain control. Will pass care to NOC RN @ [facility].

## 2017-05-04 NOTE — THERAPY
"Physical Therapy Treatment completed.   Bed Mobility:  Supine to Sit:  (up in chair)  Transfers: Sit to Stand: Supervised  Gait: Level Of Assist: Stand by Assist with Front-Wheel Walker       Plan of Care: Will benefit from Physical Therapy 4 times per week  Discharge Recommendations: Equipment: No Equipment Needed. Post-acute therapy Discharge to home with outpatient or home health for additional skilled therapy services.     See \"Rehab Therapy-Acute\" Patient Summary Report for complete documentation.       "

## 2017-05-04 NOTE — PROGRESS NOTES
CT's removed by May Ragland. Pt tolerated procedure well. Pt medicated for pain control. Denies further needs. Discussed pt's care with May SNOW for CV surgery. Discussed pt's care with physicians in AM rounds today.

## 2017-05-04 NOTE — PROGRESS NOTES
Inpatient Anticoagulation Service Note    Date: 5/4/2017  Reason for Anticoagulation: Mitral Valve Repair        Hemoglobin Value: 11  Hematocrit Value: 34  Lab Platelet Value: 352  Target INR: 2.0 to 3.0    INR from last 7 days     Date/Time INR Value    05/04/17 0515 1.1    05/03/17 0420 1.03    05/02/17 0549 1.08    05/01/17 0350 1.04    04/30/17 0313 1.05    04/29/17 0400 1    04/28/17 0508 1.03        Dose from last 7 days     Date/Time Dose (mg)    05/04/17 1400 5        Average Dose (mg):  (new start)  Significant Interactions: Amiodarone, Aspirin, Statin, Other (Comments) (Zoloft)  Bridge Therapy: No     Comments: Chest tube were removed yesterday, OK to start warfarin, per BETTY See APN. Will start warfarin 5 mg daily and trend INR. Pt to go home tomorrow.       Pharmacist suggested discharge dosing: warfarin 5 mg daily     Steven Sin, PharmD

## 2017-05-04 NOTE — PROGRESS NOTES
Pt up to bathroom or commode hourly or more with loose, light brown stools.  Took lactulose earlier today after 3 days no BM.  Barrier wipes provided.  Skin integrity intact.

## 2017-05-05 ENCOUNTER — APPOINTMENT (OUTPATIENT)
Dept: RADIOLOGY | Facility: MEDICAL CENTER | Age: 72
DRG: 219 | End: 2017-05-05
Attending: NURSE PRACTITIONER
Payer: MEDICARE

## 2017-05-05 ENCOUNTER — HOME CARE VISIT (OUTPATIENT)
Dept: HOME HEALTH SERVICES | Facility: HOME HEALTHCARE | Age: 72
End: 2017-05-05

## 2017-05-05 VITALS
HEIGHT: 64 IN | WEIGHT: 120.15 LBS | HEART RATE: 94 BPM | SYSTOLIC BLOOD PRESSURE: 127 MMHG | TEMPERATURE: 97.7 F | RESPIRATION RATE: 18 BRPM | OXYGEN SATURATION: 96 % | DIASTOLIC BLOOD PRESSURE: 89 MMHG | BODY MASS INDEX: 20.51 KG/M2

## 2017-05-05 LAB
ANION GAP SERPL CALC-SCNC: 9 MMOL/L (ref 0–11.9)
BUN SERPL-MCNC: 19 MG/DL (ref 8–22)
CALCIUM SERPL-MCNC: 9.2 MG/DL (ref 8.5–10.5)
CHLORIDE SERPL-SCNC: 99 MMOL/L (ref 96–112)
CO2 SERPL-SCNC: 29 MMOL/L (ref 20–33)
CREAT SERPL-MCNC: 0.61 MG/DL (ref 0.5–1.4)
ERYTHROCYTE [DISTWIDTH] IN BLOOD BY AUTOMATED COUNT: 50.4 FL (ref 35.9–50)
GFR SERPL CREATININE-BSD FRML MDRD: >60 ML/MIN/1.73 M 2
GLUCOSE SERPL-MCNC: 95 MG/DL (ref 65–99)
HCT VFR BLD AUTO: 34.9 % (ref 37–47)
HGB BLD-MCNC: 11.2 G/DL (ref 12–16)
INR PPP: 1.1 (ref 0.87–1.13)
MCH RBC QN AUTO: 30.6 PG (ref 27–33)
MCHC RBC AUTO-ENTMCNC: 32.1 G/DL (ref 33.6–35)
MCV RBC AUTO: 95.4 FL (ref 81.4–97.8)
PLATELET # BLD AUTO: 375 K/UL (ref 164–446)
PMV BLD AUTO: 9.2 FL (ref 9–12.9)
POTASSIUM SERPL-SCNC: 3.9 MMOL/L (ref 3.6–5.5)
PROTHROMBIN TIME: 14.6 SEC (ref 12–14.6)
RBC # BLD AUTO: 3.66 M/UL (ref 4.2–5.4)
SODIUM SERPL-SCNC: 137 MMOL/L (ref 135–145)
WBC # BLD AUTO: 10.6 K/UL (ref 4.8–10.8)

## 2017-05-05 PROCEDURE — A9270 NON-COVERED ITEM OR SERVICE: HCPCS | Performed by: NURSE PRACTITIONER

## 2017-05-05 PROCEDURE — 85610 PROTHROMBIN TIME: CPT

## 2017-05-05 PROCEDURE — 97535 SELF CARE MNGMENT TRAINING: CPT

## 2017-05-05 PROCEDURE — 80048 BASIC METABOLIC PNL TOTAL CA: CPT

## 2017-05-05 PROCEDURE — 700102 HCHG RX REV CODE 250 W/ 637 OVERRIDE(OP): Performed by: NURSE PRACTITIONER

## 2017-05-05 PROCEDURE — 94640 AIRWAY INHALATION TREATMENT: CPT

## 2017-05-05 PROCEDURE — G8989 SELF CARE D/C STATUS: HCPCS | Mod: CI

## 2017-05-05 PROCEDURE — G8987 SELF CARE CURRENT STATUS: HCPCS | Mod: CJ

## 2017-05-05 PROCEDURE — 85027 COMPLETE CBC AUTOMATED: CPT

## 2017-05-05 PROCEDURE — 71010 DX-CHEST-PORTABLE (1 VIEW): CPT

## 2017-05-05 PROCEDURE — 700102 HCHG RX REV CODE 250 W/ 637 OVERRIDE(OP): Performed by: CLINICAL NURSE SPECIALIST

## 2017-05-05 PROCEDURE — G8988 SELF CARE GOAL STATUS: HCPCS | Mod: CI

## 2017-05-05 PROCEDURE — 99233 SBSQ HOSP IP/OBS HIGH 50: CPT | Performed by: INTERNAL MEDICINE

## 2017-05-05 PROCEDURE — 700101 HCHG RX REV CODE 250: Performed by: INTERNAL MEDICINE

## 2017-05-05 PROCEDURE — A9270 NON-COVERED ITEM OR SERVICE: HCPCS | Performed by: CLINICAL NURSE SPECIALIST

## 2017-05-05 PROCEDURE — 700102 HCHG RX REV CODE 250 W/ 637 OVERRIDE(OP): Performed by: INTERNAL MEDICINE

## 2017-05-05 PROCEDURE — A9270 NON-COVERED ITEM OR SERVICE: HCPCS | Performed by: INTERNAL MEDICINE

## 2017-05-05 RX ORDER — AMIODARONE HYDROCHLORIDE 200 MG/1
200 TABLET ORAL DAILY
Qty: 30 TAB | Refills: 3 | Status: SHIPPED | OUTPATIENT
Start: 2017-05-05 | End: 2017-09-05 | Stop reason: SDUPTHER

## 2017-05-05 RX ORDER — ASPIRIN 81 MG/1
81 TABLET, CHEWABLE ORAL DAILY
Qty: 100 TAB | COMMUNITY
Start: 2017-05-05 | End: 2017-08-23

## 2017-05-05 RX ORDER — WARFARIN SODIUM 5 MG/1
5 TABLET ORAL
Qty: 30 TAB | Refills: 3 | Status: SHIPPED | OUTPATIENT
Start: 2017-05-05 | End: 2018-07-18

## 2017-05-05 RX ORDER — TRAMADOL HYDROCHLORIDE 50 MG/1
50 TABLET ORAL EVERY 6 HOURS PRN
Qty: 75 TAB | Refills: 0 | Status: SHIPPED | OUTPATIENT
Start: 2017-05-05 | End: 2017-05-22 | Stop reason: SDUPTHER

## 2017-05-05 RX ADMIN — FUROSEMIDE 20 MG: 20 TABLET ORAL at 08:50

## 2017-05-05 RX ADMIN — TRAMADOL HYDROCHLORIDE 50 MG: 50 TABLET, COATED ORAL at 12:52

## 2017-05-05 RX ADMIN — TRAMADOL HYDROCHLORIDE 50 MG: 50 TABLET, COATED ORAL at 08:50

## 2017-05-05 RX ADMIN — ASPIRIN 325 MG: 325 TABLET, DELAYED RELEASE ORAL at 08:50

## 2017-05-05 RX ADMIN — ATORVASTATIN CALCIUM 10 MG: 10 TABLET, FILM COATED ORAL at 08:50

## 2017-05-05 RX ADMIN — TIOTROPIUM BROMIDE 1 CAPSULE: 18 CAPSULE ORAL; RESPIRATORY (INHALATION) at 08:50

## 2017-05-05 RX ADMIN — AMIODARONE HYDROCHLORIDE 200 MG: 200 TABLET ORAL at 08:50

## 2017-05-05 RX ADMIN — IPRATROPIUM BROMIDE AND ALBUTEROL SULFATE 3 ML: .5; 3 SOLUTION RESPIRATORY (INHALATION) at 11:03

## 2017-05-05 RX ADMIN — OXYCODONE HYDROCHLORIDE 5 MG: 5 TABLET ORAL at 04:23

## 2017-05-05 RX ADMIN — SERTRALINE 50 MG: 50 TABLET, FILM COATED ORAL at 08:49

## 2017-05-05 RX ADMIN — POTASSIUM CHLORIDE 20 MEQ: 750 TABLET, FILM COATED, EXTENDED RELEASE ORAL at 08:49

## 2017-05-05 RX ADMIN — BUDESONIDE AND FORMOTEROL FUMARATE DIHYDRATE 2 PUFF: 160; 4.5 AEROSOL RESPIRATORY (INHALATION) at 08:50

## 2017-05-05 ASSESSMENT — PAIN SCALES - GENERAL
PAINLEVEL_OUTOF10: 0
PAINLEVEL_OUTOF10: 2
PAINLEVEL_OUTOF10: 1
PAINLEVEL_OUTOF10: 0
PAINLEVEL_OUTOF10: 5
PAINLEVEL_OUTOF10: 0
PAINLEVEL_OUTOF10: 2
PAINLEVEL_OUTOF10: 5
PAINLEVEL_OUTOF10: 0

## 2017-05-05 NOTE — PROGRESS NOTES
Pt discharged home per order.  Reviewed all follow up appointments, medications, coumadin follow up with pt and family.  Encouraged to enroll in cardiac rehab and continue to use IS.  Pt will use own home 02 concentrator at this time.  Sutures removed from bilateral chest tube sites and steri-strips applied.  Home health arranged and in contact with pt.  All personal belongings packed and removed by daughter.  Pt discharged via wheelchair with CCT, daughter will drive pt home.

## 2017-05-05 NOTE — PROGRESS NOTES
Inpatient Anticoagulation Service Note    Date: 5/5/2017  Reason for Anticoagulation: Mitral Valve Repair        Hemoglobin Value: 11.2  Hematocrit Value: 34.9  Lab Platelet Value: 375  Target INR: 2.0 to 3.0    INR from last 7 days     Date/Time INR Value    05/05/17 0439 1.1    05/04/17 0515 1.1    05/03/17 0420 1.03    05/02/17 0549 1.08    05/01/17 0350 1.04    04/30/17 0313 1.05    04/29/17 0400 1        Dose from last 7 days     Date/Time Dose (mg)    05/05/17 1300 5    05/04/17 1400 5        Average Dose (mg):  (new start)  Significant Interactions: Amiodarone, Aspirin, Statin, Other (Comments) (Zoloft)  Bridge Therapy: No     Comments: Warfarin dosing started yesterday with 5 mg daily. Will trend the INR while pt remains in house.       Pharmacist suggested discharge dosing: warfarin 5 mg daily with f/u INR within 48-72 hrs of discharge     Steven Sin, PharmD

## 2017-05-05 NOTE — DISCHARGE PLANNING
DME referral has been sent to [facility] for [facility] is [facility].   SW Don has been notified via voicemail.

## 2017-05-05 NOTE — PROGRESS NOTES
Tele: SR 89 to 91,   .16 / .08 / .32      Discussed pt's care with MD's in am rounds today. Discussed pt's care with Tayler steele/ CV surgery today. Drsg placed to bilat chest tube sites per Tayler's instruction today. Pt medicated for pain control this evening. On 2 L NC, O2 sat 92%. Report given to NOC RN.

## 2017-05-05 NOTE — FACE TO FACE
Face to Face Note  -  Durable Medical Equipment    IGOR Wan - NPI: [id number]  I certify that this patient is under my care and that they have had a durable medical equipment(DME)face to face encounter by myself that meets the physician DME face-to-face encounter requirements with this patient on:    Date of encounter:   Patient:                    MRN:                       YOB: 2018  [id number]  1945     The encounter with the patient was in whole, or in part, for the following medical condition, which is the primary reason for durable medical equipment:  Wound Care    I certify that, based on my findings, the following durable medical equipment is medically necessary:  Oxygen.    HOME O2 Saturation Measurements:(Values must be present for Home Oxygen orders)         ,     ,         My Clinical findings support the need for the above equipment due to:  Hypoxia    Supporting Symptoms: hypoxia     ------------------------------------------------------------------------------------------------------------------    Face to Face Supporting Documentation - Home Health    The encounter with this patient was in whole or in part the primary reason for home health admission.    Date of encounter:   Patient:                    MRN:                       YOB: 2018  Kelly Lovett  [id number]  1945     Home health to see patient for:  Skilled Nursing care for assessment, interventions & education    Skilled need for:  Surgical Aftercare wound care, vital signs    Skilled nursing interventions to include:  Wound Care    Homebound evidenced status by:  Needs the assistance of another person in order to leave the home. Leaving home must require a considerable and taxing effort. There must exist a normal inability to leave the home.    Community Physician to provide follow up care: Ritika Jurado M.D.     Optional  Interventions    Wound information & treatment:    Home Infusion Therapy orders:    Line/Drain/Airway:    I certify the face to face encounter for this home care referral meets the CMS requirements and the encounter/clinical assessment with the patient was, in whole, or in part, for the medical condition(s) listed above, which is the primary reason for home health care. Based on my clinical findings: the service(s) are medically necessary, support the need for home health care, and the homebound criteria are met.  I certify that this patient has had a face to face encounter by myself.  MOUNA WanN. - NPI: [id number]    *Debility, frailty and advanced age in the absence of an acute deterioration or exacerbation of a condition do not qualify a patient for home health.

## 2017-05-05 NOTE — THERAPY
"Occupational Therapy Treatment completed with focus on home safety and ADLs.   Functional Status: PT is supervised for all ADLS, dressing, standing grooming, xfers, sit to stand, and functional mob with FWW. Pt will have 24 hour family assist and has all needed AE.  NO further acute OT needs.   Plan of Care: Patient with no further skilled OT needs in the acute care setting at this time  Discharge Recommendations:  Equipment No Equipment Needed. Post-acute therapy Currently anticipate no further skilled therapy needs once patient is discharged from the inpatient setting.    See \"Rehab Therapy-Acute\" Patient Summary Report for complete documentation.   "

## 2017-05-05 NOTE — PROGRESS NOTES
Pulmonary Critical Care Progress Note    Interval Events:  24 hour interval history reviewed  Reason for visit:  Atelectasis, COPD, MARY, pneumothoraces, pyuria      SR        PFSH:  No change.    Respiratory:     Pulse Oximetry: 98 %  1 L NC  Clear lungs  No increased SOB or cough    HemoDynamics:  Pulse: 84, Heart Rate (Monitored): 85  NIBP: 112/75 mmHg    SR  No angina, palp, syncope  No increased edema     Neuro:  Awake and alert  No focal weakness  No HA, Sz    Fluids:  Intake/Output       05/03/17 0700 - 05/04/17 0659 05/04/17 0700 - 05/05/17 0659 05/05/17 0700 - 05/06/17 0659      5215-3045 4946-8098 Total 8602-7013 4158-0717 Total 3928-5894 2601-4421 Total       Intake    P.O.  900  -- 900  900  120 1020  --  -- --    P.O. 900 -- 900  -- -- --    I.V.  110  -- 110  110  -- 110  --  -- --    IV Volume (NS) 10 -- 10 10 -- 10 -- -- --    IV Piggyback Volume (IV Piggyback) 100 -- 100 100 -- 100 -- -- --    Total Intake 1010 -- 1010 8522 212 1536 -- -- --       Output    Urine  1150  -- 1150  100  -- 100  --  -- --    Number of Times Voided 5 x 2 x 7 x 4 x 1 x 5 x -- -- --    Void (ml) 1150 -- 1150 100 -- 100 -- -- --    Drains  0  -- 0  --  -- --  --  -- --    Right Chest Tube 1 0 -- 0 -- -- -- -- -- --    Left Chest Tube 1 0 -- 0 -- -- -- -- -- --    Stool  --  -- --  --  -- --  --  -- --    Number of Times Stooled 6 x 7 x 13 x 3 x 1 x 4 x -- -- --    Total Output 1150 -- 1150 100 -- 100 -- -- --       Net I/O     -140 -- -140  -- -- --           Recent Labs      05/03/17   0420  05/04/17   0515  05/05/17   0439   SODIUM  136  136  137   POTASSIUM  4.0  4.0  3.9   CHLORIDE  98  98  99   CO2  30  29  29   BUN  16  16  19   CREATININE  0.65  0.55  0.61   CALCIUM  8.9  9.3  9.2       GI/Nutrition:  Abd soft ND/NT  No N/V/P  Eating    Liver Function  Recent Labs      05/03/17   0420  05/04/17   0515  05/05/17   0439   GLUCOSE  94  88  95       Heme:  Recent Labs      05/03/17   0420   17   RBC  3.56*  3.59*  3.66*   HEMOGLOBIN  10.7*  11.0*  11.2*   HEMATOCRIT  33.7*  34.0*  34.9*   PLATELETCT  343  352  375   PROTHROMBTM  13.8  14.6  14.6   INR  1.03  1.10  1.10       Infectious Disease:  Temp  Av.4 °C (97.5 °F)  Min: 36.2 °C (97.2 °F)  Max: 36.5 °C (97.7 °F)    Recent Labs      17   04217   WBC  14.4*  12.9*  10.6     Current Facility-Administered Medications   Medication Dose Frequency Provider Last Rate Last Dose   • furosemide (LASIX) tablet 20 mg  20 mg Q DAY Tayler Kimball, A.P.N.   20 mg at 17 0928   • MD ALERT... warfarin (COUMADIN) per pharmacy protocol   pharmacy to dose Tayler Kimball, A.P.N.       • warfarin (COUMADIN) tablet 5 mg  5 mg COUMADIN-DAILY Steven Sin, PHARMD   5 mg at 17 1719   • amiodarone (CORDARONE) tablet 200 mg  200 mg Q DAY Tayler Kimball, A.P.N.   200 mg at 17 0810   • oxycodone immediate-release (ROXICODONE) tablet 5 mg  5 mg Q4HRS PRN May Ragland A.P.N.   5 mg at 17 0423   • benzocaine-menthol (CEPACOL) lozenge 1 Lozenge  1 Lozenge Q2HRS PRN Lew Tompkins M.D.   1 Lozenge at 17 1309   • oxycodone immediate-release (ROXICODONE) tablet 2.5 mg  2.5 mg Q3HRS PRN Lew Tompkins M.D.   2.5 mg at 17 0956   • aspirin EC (ECOTRIN) tablet 325 mg  325 mg DAILY Donna Mann, A.P.N.   325 mg at 17 0810   • potassium chloride ER (KLOR-CON) tablet 20 mEq  20 mEq BID Lew Tompkins M.D.   20 mEq at 17    Or   • potassium chloride (KLOR-CON) 20 MEQ packet 20 mEq  20 mEq BID Lew Tompkins M.D.   20 mEq at 17 0812   • MD ALERT...Adult ICU Electrolyte Replacement per Pharmacy Protocol   pharmacy to dose Jeremy M Gonda, M.D.       • alprazolam (XANAX) tablet 0.25 mg  0.25 mg TID PRN Donna Mann, A.P.N.   0.25 mg at 17 1315   • tiotropium (SPIRIVA) 18 MCG inhalation capsule 1 Cap  1 Cap DAILY Fernandez Hays M.D.   1  Cap at 05/04/17 1037   • atorvastatin (LIPITOR) tablet 10 mg  10 mg DAILY Tayler Kimball, A.P.N.   10 mg at 05/04/17 0810   • budesonide-formoterol (SYMBICORT) 160-4.5 MCG/ACT inhaler 2 Puff  2 Puff BID Tayler Kimball A.P.N.   2 Puff at 05/04/17 2040   • sertraline (ZOLOFT) tablet 50 mg  50 mg DAILY Tayler Kimball, A.P.N.   50 mg at 05/04/17 0810   • albuterol inhaler 2 Puff  2 Puff Q4HRS PRN Tayler Kimball A.P.N.   2 Puff at 05/03/17 0807   • Respiratory Care per Protocol   Continuous RT Tayler Kimball, A.P.N.       • NS infusion   Continuous Tayler Kimball A.P.N. 10 mL/hr at 05/02/17 1957     • docusate sodium (COLACE) capsule 100 mg  100 mg QAM Tayler Kimball, A.P.N.   100 mg at 05/03/17 0722    And   • senna-docusate (PERICOLACE or SENOKOT S) 8.6-50 MG per tablet 1 Tab  1 Tab Nightly Tayler Kimball, A.P.N.   1 Tab at 05/01/17 2133    And   • senna-docusate (PERICOLACE or SENOKOT S) 8.6-50 MG per tablet 1 Tab  1 Tab Q24HRS PRN Tayler Kimball, A.P.N.        And   • lactulose 20 GM/30ML solution 30 mL  30 mL Q24HRS PRN Tayler Kimball, A.P.N.   30 mL at 05/03/17 1041    And   • bisacodyl (DULCOLAX) suppository 10 mg  10 mg Q24HRS PRN Tayler Kimball, A.P.N.        And   • fleet enema 133 mL  1 Each Once PRN Tayler Kimball, A.P.N.       • tramadol (ULTRAM) 50 MG tablet 50 mg  50 mg Q4HRS PRN Tayler Kimball, A.P.N.   50 mg at 05/04/17 1841   • ondansetron (ZOFRAN) syringe/vial injection 4 mg  4 mg Q6HRS PRN Tayler Kimball, A.P.N.   4 mg at 04/25/17 1555    Or   • prochlorperazine (COMPAZINE) injection 10 mg  10 mg Q6HRS PRN Tayler Kimball, A.P.N.   10 mg at 04/21/17 1014    Or   • promethazine (PHENERGAN) suppository 25 mg  25 mg Q6HRS PRN Tayler Kimball, A.P.N.       • acetaminophen (TYLENOL) tablet 650 mg  650 mg Q4HRS PRN Tayler Kimball, A.P.N.        Or   • acetaminophen (TYLENOL) suppository 650 mg  650 mg Q4HRS PRN Tayler Kimball, A.P.N.       • mag hydrox-al hydrox-simeth (MAALOX PLUS ES or MYLANTA DS)  suspension 30 mL  30 mL Q4HRS PRN Tayler Kimball A.P.N.       • diphenhydrAMINE (BENADRYL) tablet/capsule 25 mg  25 mg HS PRN - MR X 1 YUMIKO Wan.P.N.   25 mg at 05/04/17 2211   • ipratropium-albuterol (DUONEB) nebulizer solution 3 mL  3 mL Q2HRS PRN (RT) Fernandez Morales M.D.   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    Discussed with RN, RT, Team    Date of Service: 5/5/17    Lourdes NJ (Scribe), am scribing for, and in the presence of, Fernandez Morales M.D.    Electronically signed by: Lourdes Miller (Sigrid), 5/5/2017    Fernandez NJ M.D. personally performed the services described in this documentation, as scribed by Lourdes Miller in my presence, and it is both accurate and complete.

## 2017-05-05 NOTE — DISCHARGE PLANNING
Home 02 order received.  Discussed with patient.  Choice form for [facility].  [employer] insurance.  Faxed to [facility].     [facility] accepting. Received final DC orders.  Draw INR 5/8.  Faxed to [facility] and [facility]

## 2017-05-05 NOTE — PROGRESS NOTES
Received care of pt from NOC RN this am. Pt is A+O. Tele - SR. Denies need for pain medication this am.

## 2017-05-05 NOTE — DISCHARGE PLANNING
Received choice form for DME services, referral has been sent to [facility] per patient and choice form request.

## 2017-05-06 ENCOUNTER — HOME CARE VISIT (OUTPATIENT)
Dept: HOME HEALTH SERVICES | Facility: HOME HEALTHCARE | Age: 72
End: 2017-05-06
Payer: MEDICARE

## 2017-05-06 PROCEDURE — G0162 HHC RN E&M PLAN SVS, 15 MIN: HCPCS

## 2017-05-06 PROCEDURE — 665001 SOC-HOME HEALTH

## 2017-05-06 SDOH — ECONOMIC STABILITY: HOUSING INSECURITY: UNSAFE COOKING RANGE AREA: 0

## 2017-05-06 SDOH — ECONOMIC STABILITY: HOUSING INSECURITY
HOME SAFETY: VE A FIRE ESCAPE PLAN DEVELOPED. PATIENT DOES NOT HAVE FLAMMABLE MATERIALS PRESENT IN THE HOME PRESENTING A FIRE HAZARD. NO EVIDENCE FOUND OF SMOKING MATERIALS PRESENT IN THE HOME.

## 2017-05-06 SDOH — ECONOMIC STABILITY: HOUSING INSECURITY: UNSAFE APPLIANCES: 0

## 2017-05-06 SDOH — ECONOMIC STABILITY: HOUSING INSECURITY
HOME SAFETY: OXYGEN SAFETY RISK ASSESSMENT PERFORMED. PATIENT DOES NOT HAVE A NO SMOKING SIGN POSTED IN THE HOME. PATIENT DOES HAVE A WORKING FIRE EXTINGUISHER PRESENT IN THE HOME. SMOKE ALARMS ARE PRESENT AND FUNCTIONAL ON EACH LEVEL OF THE HOME. PATIENT DOES HA

## 2017-05-06 ASSESSMENT — ACTIVITIES OF DAILY LIVING (ADL)
OASIS_M1830: 03
HOME_HEALTH_OASIS: 01

## 2017-05-06 ASSESSMENT — PATIENT HEALTH QUESTIONNAIRE - PHQ9
1. LITTLE INTEREST OR PLEASURE IN DOING THINGS: 00
2. FEELING DOWN, DEPRESSED, IRRITABLE, OR HOPELESS: 00

## 2017-05-06 ASSESSMENT — ENCOUNTER SYMPTOMS: SHORTNESS OF BREATH: T

## 2017-05-06 NOTE — DISCHARGE SUMMARY
DATE OF ADMISSION:  04/20/2017    DATE OF DISCHARGE:  05/05/2017    ADMITTING DIAGNOSES:  1.  Severe mitral regurgitation.  2.  Bileaflet mitral valve prolapse.  3.  Paroxysmal atrial fibrillation.  4.  COPD, on home O2.  5.  Hypertension.  6.  Dyslipidemia.    DISCHARGE DIAGNOSES:  1.  Bilateral pneumothoraces.  2.  Subcutaneous emphysema.    PROCEDURES PERFORMED ON THIS ADMISSION:  On 4/20/2017 was a radical mitral   valve repair with a left-sided Maze procedure, left atrial appendage ligation   with intraoperative transesophageal echocardiogram.  On 4/22/2017 was a   bilateral chest tube placement.    HISTORY OF PRESENT ILLNESS:  The patient is a very pleasant 71-year-old female   who had increasing shortness of breath.  Her echocardiogram showed severe   mitral regurgitation, ejection fraction of 75% and she was set up for elective   mitral valve repair.    POSTOPERATIVE COURSE:  Postoperative day #1, she was extubated and in sinus   rhythm, hemodynamically stable.  Her chest tubes had moderate air leak and   were kept in.  Her Avila was discontinued.  She was being diuresed.    Postoperative day #2, she was hemodynamically stable and in sinus rhythm.  She   had air leak in her chest tube.  She had mild subcutaneous emphysema of right   chest.  Chest x-ray was without pneumothorax.  Postoperative day #3, she was   hemodynamically stable.  She had chest tube that had air leak.  She had more   accumulation of subacute emphysema on chest x-ray, no pneumothorax.  CT of the   chest showed pneumomediastinum.  Thoracic surgery was consulted.  Her pacer   wires were discontinued.  That evening, she became hypoxic and unresponsive.    She was reintubated, bilateral chest tubes were placed by the intensivist.    Postoperative day #4, she was hemodynamically stable and in sinus rhythm.  She   had a head CT that was negative.  She remained intubated.  Postoperative day   #5, she was hemodynamically stable and extubated.   She had a small right   apical pneumo.  She had bilateral chest tubes.  Postoperative day #6, she was   hemodynamically stable and improving.  Postoperative day #8, her mediastinal   chest tubes were discontinued.  Postoperative day #9, she remained with a   small apical right pneumothorax.  Postoperative day #10, #11, her chest x-rays   remained stable.  Her subQ emphysema was improved.  Postoperative day #12,   her chest tubes were placed to waterseal.  Postoperative day #13, her chest   x-ray showed no pneumothoraces and her chest tubes were discontinued.      Postoperative day #14, she had a tiny left apical pneumothorax.  She was seen   by physical therapy and cleared for discharge home. Postoperative day #15,   she was hemodynamically stable and in sinus rhythm.  She was ambulating with   minimal assistance.  She was still requiring 2 liters of oxygen during the day   intermittently and continuously at night.  She was awaiting a chest x-ray.    If her chest x-ray is stable, she will be discharged home with home health   services.    DISCHARGE INSTRUCTIONS:  As follows:  She is to observe sternal precautions.    She cannot lift heavier than 10 pounds, push, or pull with her arms.  She is   to call with any increased shortness of breath, increased weight gain greater   than 2 pounds in one day or redness, swelling or drainage of her incisions.    She is to follow up with Dr. Porras on 6/5/2017 at 11:30 a.m. and cardiology   in 2 weeks.  She will be followed by the [facility] for Coumadin   management for 3 months.    DISCHARGE MEDICATIONS:  As follows:  She will discontinue her home dose of   Pradaxa and diltiazem.  She will continue her home dose of albuterol inhaler 2   puffs q. 4 hours p.r.n. shortness of breath, aspirin 81 mg p.o. daily,   atorvastatin 10 mg p.o. daily, Advair 250/50 one puff b.i.d., Lasix 20 mg p.o.   daily, potassium 10 mEq p.o. daily, magnesium 400 mg p.o. daily, sertraline    50 mg 1/2 tablet 1 time a week and 1 tablet daily, Spiriva 2 inhalations   daily.    NEW MEDICATIONS:  Amiodarone 200 mg p.o. daily, tramadol 50 mg p.o. q. 6 hours   p.r.n. pain, Coumadin 5 mg p.o. daily, to be titrated to an INR of 2-3.       ____________________________________     EDY PELAEZ / WILL    DD:  05/05/2017 07:52:50  DT:  05/06/2017 04:02:45    D#:  [id number]  Job#:  [id number]

## 2017-05-07 ENCOUNTER — PATIENT OUTREACH (OUTPATIENT)
Dept: HEALTH INFORMATION MANAGEMENT | Facility: OTHER | Age: 72
End: 2017-05-07

## 2017-05-07 NOTE — PROGRESS NOTES
· 5/7/17 at 9:25 AM--Received phone call from patient and her daughter Manuela.  Manuela states that pt was discharged from [facility] 5/5/17 and [facility] established with pt on 5/6/17.  Manuela states that pt was weighed yesterday and was 110, and her weight today on a different scale is 114.  Pt denies any SOB, CP, or pedal edema.  Denies any untoward symptoms.  Pt is taking all meds as prescribed.  Pt will have a nurse visit from [facility] tomorrow 5/8/17.  ER precautions reviewed with pt and daughter.  Patient and daughter verbalize understanding.  Provided pt's daughter with contact phone number of pt's surgeon Dr. Porras per request.

## 2017-05-08 ENCOUNTER — HOME CARE VISIT (OUTPATIENT)
Dept: HOME HEALTH SERVICES | Facility: HOME HEALTHCARE | Age: 72
End: 2017-05-08
Payer: MEDICARE

## 2017-05-08 ENCOUNTER — ANTICOAGULATION MONITORING (OUTPATIENT)
Dept: VASCULAR LAB | Facility: MEDICAL CENTER | Age: 72
End: 2017-05-08

## 2017-05-08 ENCOUNTER — TELEPHONE (OUTPATIENT)
Dept: VASCULAR LAB | Facility: MEDICAL CENTER | Age: 72
End: 2017-05-08

## 2017-05-08 VITALS
RESPIRATION RATE: 20 BRPM | HEART RATE: 93 BPM | SYSTOLIC BLOOD PRESSURE: 108 MMHG | TEMPERATURE: 97.9 F | DIASTOLIC BLOOD PRESSURE: 70 MMHG

## 2017-05-08 DIAGNOSIS — I48.92 ATRIAL FLUTTER, UNSPECIFIED TYPE (HCC): ICD-10-CM

## 2017-05-08 DIAGNOSIS — I34.0 MITRAL VALVE INSUFFICIENCY, UNSPECIFIED ETIOLOGY: ICD-10-CM

## 2017-05-08 LAB — INR PPP: 2.9 (ref 2–3.5)

## 2017-05-08 PROCEDURE — G0151 HHCP-SERV OF PT,EA 15 MIN: HCPCS

## 2017-05-08 PROCEDURE — 6650331 HCR  COAGCHECK STRIPS

## 2017-05-08 PROCEDURE — G0152 HHCP-SERV OF OT,EA 15 MIN: HCPCS

## 2017-05-08 PROCEDURE — G0299 HHS/HOSPICE OF RN EA 15 MIN: HCPCS

## 2017-05-08 ASSESSMENT — ACTIVITIES OF DAILY LIVING (ADL)
LAUNDRY_ASSISTANCE: 6
EATING_ASSISTANCE: 1
ORAL_CARE_ASSISTANCE: 0
SHOPPING_ASSISTANCE: 6
BATHING_ASSISTANCE: 1
TOILETING_ASSISTANCE: 1
MEAL_PREP_ASSISTANCE: 6
DRESSING_UB_ASSISTANCE: 1
TRANSPORTATION_ASSISTANCE: 6
BATHING_ASSISTIVE_EQUIPMENT_USED: SHOWER CHAIR, GRAB BAR, HANDHELD SHOWER
DRESSING_LB_ASSISTANCE: 4
HOUSEKEEPING_ASSISTANCE: 6
TELEPHONE_ASSISTANCE: 0
GROOMING_ASSISTANCE: 0

## 2017-05-08 NOTE — PROGRESS NOTES
OP Anticoagulation Service Note    Date: 5/8/2017     Anticoagulation Summary as of 5/8/2017     INR goal 2.0-3.0   Selected INR 2.9 (5/8/2017)   Maintenance plan 5 mg (5 mg x 1) every day   Weekly total 35 mg   Plan last modified Amanda Perera PHARMD (5/8/2017)   Next INR check 5/10/2017   Target end date     Indications   Atrial flutter (CMS-HCC) [I48.92]  Mitral valve insufficiency [I34.0]         Anticoagulation Episode Summary     INR check location     Preferred lab     Send INR reminders to     Comments       Anticoagulation Care Providers     Provider Role Specialty Phone number    Lacey Porras M.D.  Cardiac Surgery [phone]    Amanda Perera PHARMD             Plan:   Patient is new to our services. Spoke with pt on the phone.  Recently started on warfarin for mitral valve repair and atrial flutter. She received warfarin education while hospitalized, provided brief education over phone. CHADS-VASC = 4. Educated pt on s/s of bleeding/thrombosis. Provided pt information on our clinic.  Instructed pt to call our clinic with s/s of bleeding or bruising or changes in medication or diet. Pt is to take 2.5 mg tonight then 5 mg tomorrow. Follow up 2 days. Emailed pt dosing instructions as requested.       Amanda Perera, Pharm D

## 2017-05-09 ENCOUNTER — PATIENT OUTREACH (OUTPATIENT)
Dept: HEALTH INFORMATION MANAGEMENT | Facility: OTHER | Age: 72
End: 2017-05-09

## 2017-05-09 VITALS
HEART RATE: 89 BPM | DIASTOLIC BLOOD PRESSURE: 75 MMHG | SYSTOLIC BLOOD PRESSURE: 112 MMHG | RESPIRATION RATE: 20 BRPM | TEMPERATURE: 97.5 F

## 2017-05-09 VITALS
TEMPERATURE: 97.4 F | BODY MASS INDEX: 18.67 KG/M2 | DIASTOLIC BLOOD PRESSURE: 50 MMHG | SYSTOLIC BLOOD PRESSURE: 100 MMHG | RESPIRATION RATE: 20 BRPM | WEIGHT: 115.6 LBS | HEART RATE: 93 BPM

## 2017-05-09 SDOH — ECONOMIC STABILITY: HOUSING INSECURITY
HOME SAFETY: FIRE ESCAPE PLAN DEVELOPED. PATIENT DOES NOT HAVE FLAMMABLE MATERIALS PRESENT IN THE HOME PRESENTING A FIRE HAZARD. NO EVIDENCE FOUND OF SMOKING MATERIALS PRESENT IN THE HOME.

## 2017-05-09 SDOH — ECONOMIC STABILITY: HOUSING INSECURITY: UNSAFE APPLIANCES: 0

## 2017-05-09 SDOH — ECONOMIC STABILITY: HOUSING INSECURITY
HOME SAFETY: OXYGEN SAFETY RISK ASSESSMENT PERFORMED. PATIENT DOES HAVE A NO SMOKING SIGN POSTED IN THE HOME. PATIENT DOES HAVE A WORKING FIRE EXTINGUISHER PRESENT IN THE HOME. SMOKE ALARMS ARE PRESENT AND FUNCTIONAL ON EACH LEVEL OF THE HOME. PATIENT DOES HAVE A

## 2017-05-09 SDOH — ECONOMIC STABILITY: HOUSING INSECURITY: UNSAFE COOKING RANGE AREA: 0

## 2017-05-09 ASSESSMENT — ACTIVITIES OF DAILY LIVING (ADL)
ADLS_COMMENTS: <!--EPICS-->PLEASE REFER TO OT EVALUATION.<!--EPICE-->
IADLS_COMMENTS: <!--EPICS-->PLEASE REFER TO OT EVALUATION.<BR><!--EPICE-->

## 2017-05-09 ASSESSMENT — ENCOUNTER SYMPTOMS: SEVERE DYSPNEA: 1

## 2017-05-09 NOTE — TELEPHONE ENCOUNTER
Initial anticoagulation clinic note and discharge summary reviewed.  Patient previously on pradaxa for atrial fib/flutter. Now started on warfarin status post mitral valve repair    Pending any further recommendations from cardiology, we'll continue with indefinite anticoagulation as well as low-dose aspirin.    We'll defer all other cardiovascular care aside from anticoagulation management to cardiology    Michael J. Bloch, MD  Anticoagulation Center    CC: Ritika Jurado

## 2017-05-10 ENCOUNTER — HOME CARE VISIT (OUTPATIENT)
Dept: HOME HEALTH SERVICES | Facility: HOME HEALTHCARE | Age: 72
End: 2017-05-10
Payer: MEDICARE

## 2017-05-10 ENCOUNTER — ANTICOAGULATION MONITORING (OUTPATIENT)
Dept: VASCULAR LAB | Facility: MEDICAL CENTER | Age: 72
End: 2017-05-10

## 2017-05-10 VITALS
DIASTOLIC BLOOD PRESSURE: 60 MMHG | RESPIRATION RATE: 16 BRPM | BODY MASS INDEX: 17.92 KG/M2 | TEMPERATURE: 97.6 F | SYSTOLIC BLOOD PRESSURE: 100 MMHG | HEART RATE: 103 BPM | WEIGHT: 111 LBS

## 2017-05-10 DIAGNOSIS — I34.0 MITRAL VALVE INSUFFICIENCY, UNSPECIFIED ETIOLOGY: ICD-10-CM

## 2017-05-10 DIAGNOSIS — I48.92 ATRIAL FLUTTER, UNSPECIFIED TYPE (HCC): ICD-10-CM

## 2017-05-10 LAB — INR PPP: 3.6 (ref 2–3.5)

## 2017-05-10 PROCEDURE — G0495 RN CARE TRAIN/EDU IN HH: HCPCS

## 2017-05-10 PROCEDURE — 6650331 HCR  COAGCHECK STRIPS

## 2017-05-10 SDOH — ECONOMIC STABILITY: HOUSING INSECURITY
HOME SAFETY: OXYGEN SAFETY RISK ASSESSMENT PERFORMED. PATIENT DOES NOT HAVE A NO SMOKING SIGN POSTED IN THE HOME., PT WAS GIVEN A NO SMOKING SIGN AND PROVIDED EDUCATION ABOUT WHY IT IS IMPORTANT TO PLACE ONE. PATIENT DOES HAVE A WORKING FIRE EXTINGUISHER PRESENT

## 2017-05-10 SDOH — ECONOMIC STABILITY: HOUSING INSECURITY
HOME SAFETY: IN THE HOME. SMOKE ALARMS ARE PRESENT AND FUNCTIONAL ON EACH LEVEL OF THE HOME. PATIENT DOES HAVE A FIRE ESCAPE PLAN DEVELOPED. PATIENT DOES NOT HAVE FLAMMABLE MATERIALS PRESENT IN THE HOME PRESENTING A FIRE HAZARD. NO EVIDENCE FOUND OF SMOKING MATER

## 2017-05-10 SDOH — ECONOMIC STABILITY: HOUSING INSECURITY: HOME SAFETY: IALS PRESENT IN THE HOME.

## 2017-05-11 NOTE — PROGRESS NOTES
Anticoagulation Summary as of 5/10/2017     INR goal 2.0-3.0   Selected INR 3.6! (5/10/2017)   Maintenance plan 2.5 mg (5 mg x 0.5) on Wed, Fri; 5 mg (5 mg x 1) all other days   Weekly total 30 mg   Plan last modified Kelly Dean PHARMD (5/10/2017)   Next INR check 5/12/2017   Target end date     Indications   Atrial flutter (CMS-HCC) [I48.92]  Mitral valve insufficiency [I34.0]         Anticoagulation Episode Summary     INR check location     Preferred lab     Send INR reminders to     [facility]      Anticoagulation Care Providers     Provider Role Specialty Phone number    Lacey Porras M.D.  Cardiac Surgery [phone]    ELZA MorganD           Spoke with Kelly to report a supra therapeutic INR of 3.6.  Will reduce weekly dose by 15%. Pt denies any unusual s/s of bleeding, bruising, clotting or any changes to diet or medications.  Follow up in 2 days.    .ELZA BraggD

## 2017-05-12 ENCOUNTER — HOME CARE VISIT (OUTPATIENT)
Dept: HOME HEALTH SERVICES | Facility: HOME HEALTHCARE | Age: 72
End: 2017-05-12
Payer: MEDICARE

## 2017-05-12 ENCOUNTER — ANTICOAGULATION MONITORING (OUTPATIENT)
Dept: VASCULAR LAB | Facility: MEDICAL CENTER | Age: 72
End: 2017-05-12

## 2017-05-12 VITALS
TEMPERATURE: 97.6 F | BODY MASS INDEX: 18.47 KG/M2 | HEART RATE: 95 BPM | SYSTOLIC BLOOD PRESSURE: 118 MMHG | WEIGHT: 114.4 LBS | RESPIRATION RATE: 18 BRPM | DIASTOLIC BLOOD PRESSURE: 76 MMHG

## 2017-05-12 DIAGNOSIS — I48.92 ATRIAL FLUTTER, UNSPECIFIED TYPE (HCC): ICD-10-CM

## 2017-05-12 DIAGNOSIS — I34.0 MITRAL VALVE INSUFFICIENCY, UNSPECIFIED ETIOLOGY: ICD-10-CM

## 2017-05-12 LAB — INR PPP: 4.1 (ref 2–3.5)

## 2017-05-12 PROCEDURE — G0495 RN CARE TRAIN/EDU IN HH: HCPCS

## 2017-05-12 PROCEDURE — 6650331 HCR  COAGCHECK STRIPS

## 2017-05-12 NOTE — PROGRESS NOTES
Anticoagulation Summary as of 5/12/2017     INR goal 2.0-3.0   Selected INR 4.1! (5/12/2017)   Maintenance plan 2.5 mg (5 mg x 0.5) every day   Weekly total 17.5 mg   Plan last modified Kelly Dean, PHARMD (5/12/2017)   Next INR check 5/15/2017   Target end date     Indications   Atrial flutter (CMS-HCC) [I48.92]  Mitral valve insufficiency [I34.0]         Anticoagulation Episode Summary     INR check location     Preferred lab     Send INR reminders to     [facility]      Anticoagulation Care Providers     Provider Role Specialty Phone number    Lacey Porras M.D.  Cardiac Surgery [phone]    Amanda Perera, ELZAD           Left voicemail message to report a supra therapeutic INR of 4.1.  Pt to HOLD DOSE TONIGHT, WILL REDUCE WEEKLY DOSE BY 40%. Requested pt contact the clinic for any s/s of unusual bleeding, bruising, clotting or any changes to diet or medication. FU 3 DAYS.  .SGI

## 2017-05-15 ENCOUNTER — HOME CARE VISIT (OUTPATIENT)
Dept: HOME HEALTH SERVICES | Facility: HOME HEALTHCARE | Age: 72
End: 2017-05-15
Payer: MEDICARE

## 2017-05-15 ENCOUNTER — ANTICOAGULATION MONITORING (OUTPATIENT)
Dept: VASCULAR LAB | Facility: MEDICAL CENTER | Age: 72
End: 2017-05-15

## 2017-05-15 VITALS
HEIGHT: 66 IN | SYSTOLIC BLOOD PRESSURE: 126 MMHG | TEMPERATURE: 98.1 F | BODY MASS INDEX: 17.71 KG/M2 | DIASTOLIC BLOOD PRESSURE: 77 MMHG | HEART RATE: 90 BPM | RESPIRATION RATE: 20 BRPM | WEIGHT: 110.2 LBS

## 2017-05-15 DIAGNOSIS — I48.92 ATRIAL FLUTTER, UNSPECIFIED TYPE (HCC): ICD-10-CM

## 2017-05-15 LAB — INR PPP: 3.2 (ref 2–3.5)

## 2017-05-15 PROCEDURE — 6650331 HCR  COAGCHECK STRIPS

## 2017-05-15 PROCEDURE — G0299 HHS/HOSPICE OF RN EA 15 MIN: HCPCS

## 2017-05-15 NOTE — PROGRESS NOTES
Anticoagulation Summary as of 5/15/2017     INR goal 2.0-3.0   Selected INR 3.2! (5/15/2017)   Maintenance plan 2.5 mg (5 mg x 0.5) every day   Weekly total 17.5 mg   Plan last modified Kelly Dean, PHARMD (5/12/2017)   Next INR check 5/17/2017   Target end date     Indications   Atrial flutter (CMS-HCC) [I48.92]  Mitral valve insufficiency [I34.0]         Anticoagulation Episode Summary     INR check location     Preferred lab     Send INR reminders to     [facility]      Anticoagulation Care Providers     Provider Role Specialty Phone number    Lacey Porras M.D.  Cardiac Surgery [phone]    Amanda Perera, ELZAD           Anticoagulation Patient Findings    Patient's INR was SUPRA therapeutic.   Denies any unusual s/s of bleeding, bruising, clotting.  Denies any changes to:   Diet   Medications  Confirmed dosing regimen.    Pt is to continue with recently reduced warfarin dosing regimen.    Follow up in 2 days per pt request.    Severiano Rosas, PHARMD

## 2017-05-16 VITALS
TEMPERATURE: 98.5 F | SYSTOLIC BLOOD PRESSURE: 112 MMHG | BODY MASS INDEX: 18.13 KG/M2 | RESPIRATION RATE: 16 BRPM | DIASTOLIC BLOOD PRESSURE: 68 MMHG | WEIGHT: 112.25 LBS | HEART RATE: 89 BPM

## 2017-05-16 SDOH — ECONOMIC STABILITY: HOUSING INSECURITY: UNSAFE COOKING RANGE AREA: 0

## 2017-05-16 SDOH — ECONOMIC STABILITY: HOUSING INSECURITY: UNSAFE APPLIANCES: 0

## 2017-05-16 SDOH — ECONOMIC STABILITY: HOUSING INSECURITY
HOME SAFETY: FIRE ESCAPE PLAN DEVELOPED. PATIENT DOES HAVE FLAMMABLE MATERIALS PRESENT IN THE HOME PRESENTING A FIRE HAZARD. NO EVIDENCE FOUND OF SMOKING MATERIALS PRESENT IN THE HOME.

## 2017-05-16 ASSESSMENT — ACTIVITIES OF DAILY LIVING (ADL)
LAUNDRY_ASSISTANCE: 6
TOILETING_ASSISTANCE: 0
HOUSEKEEPING_ASSISTANCE: 6
DRESSING_LB_ASSISTANCE: 0
TRANSPORTATION_ASSISTANCE: 6
EATING_ASSISTANCE: 0
SHOPPING_ASSISTANCE: 6
MEAL_PREP_ASSISTANCE: 6
DRESSING_UB_ASSISTANCE: 0
GROOMING_ASSISTANCE: 0
ORAL_CARE_ASSISTANCE: 0
TELEPHONE_ASSISTANCE: 0
BATHING_ASSISTANCE: 3

## 2017-05-16 ASSESSMENT — ENCOUNTER SYMPTOMS
RESPIRATORY SYMPTOMS COMMENTS: RESPIRATIONS ARE EVEN AND UNLABORED WITH NO SOB NOTED
NAUSEA: DENIES
VOMITING: DENIES

## 2017-05-17 ENCOUNTER — HOME CARE VISIT (OUTPATIENT)
Dept: HOME HEALTH SERVICES | Facility: HOME HEALTHCARE | Age: 72
End: 2017-05-17
Payer: MEDICARE

## 2017-05-17 ENCOUNTER — ANTICOAGULATION MONITORING (OUTPATIENT)
Dept: VASCULAR LAB | Facility: MEDICAL CENTER | Age: 72
End: 2017-05-17

## 2017-05-17 VITALS
TEMPERATURE: 97.7 F | RESPIRATION RATE: 20 BRPM | DIASTOLIC BLOOD PRESSURE: 60 MMHG | WEIGHT: 112.5 LBS | SYSTOLIC BLOOD PRESSURE: 106 MMHG | BODY MASS INDEX: 18.17 KG/M2 | HEART RATE: 92 BPM

## 2017-05-17 DIAGNOSIS — I48.92 ATRIAL FLUTTER, UNSPECIFIED TYPE (HCC): ICD-10-CM

## 2017-05-17 LAB — INR PPP: 2.6 (ref 2–3.5)

## 2017-05-17 PROCEDURE — G0495 RN CARE TRAIN/EDU IN HH: HCPCS

## 2017-05-17 NOTE — PROGRESS NOTES
Anticoagulation Summary as of 5/17/2017     INR goal 2.0-3.0   Selected INR 2.6 (5/17/2017)   Maintenance plan 2.5 mg (5 mg x 0.5) every day   Weekly total 17.5 mg   Plan last modified Kelly Dean, PHARMD (5/12/2017)   Next INR check 5/22/2017   Target end date     Indications   Atrial flutter (CMS-HCC) [I48.92]  Mitral valve insufficiency [I34.0]         Anticoagulation Episode Summary     INR check location     Preferred lab     Send INR reminders to     [facility]      Anticoagulation Care Providers     Provider Role Specialty Phone number    Lacey Porras M.D.  Cardiac Surgery [phone]    Amanda Perera, ELZAD           Anticoagulation Patient Findings    Left voicemail message to report a therapeutic INR of 2.6.  Pt to continue with current warfarin dosing regimen. Requested pt contact the clinic for any s/s of unusual bleeding, bruising, clotting or any changes to diet or medication. FU 1 week.    Severiano Rosas, PHARMD

## 2017-05-19 ENCOUNTER — OFFICE VISIT (OUTPATIENT)
Dept: MEDICAL GROUP | Facility: LAB | Age: 72
End: 2017-05-19
Payer: MEDICARE

## 2017-05-19 ENCOUNTER — HOME CARE VISIT (OUTPATIENT)
Dept: HOME HEALTH SERVICES | Facility: HOME HEALTHCARE | Age: 72
End: 2017-05-19
Payer: MEDICARE

## 2017-05-19 ENCOUNTER — ANTICOAGULATION MONITORING (OUTPATIENT)
Dept: VASCULAR LAB | Facility: MEDICAL CENTER | Age: 72
End: 2017-05-19

## 2017-05-19 VITALS
OXYGEN SATURATION: 97 % | HEART RATE: 104 BPM | BODY MASS INDEX: 19.38 KG/M2 | HEIGHT: 64 IN | DIASTOLIC BLOOD PRESSURE: 64 MMHG | WEIGHT: 113.54 LBS | RESPIRATION RATE: 16 BRPM | TEMPERATURE: 97.4 F | SYSTOLIC BLOOD PRESSURE: 100 MMHG

## 2017-05-19 VITALS
DIASTOLIC BLOOD PRESSURE: 64 MMHG | HEART RATE: 93 BPM | TEMPERATURE: 97.6 F | SYSTOLIC BLOOD PRESSURE: 104 MMHG | WEIGHT: 113 LBS | RESPIRATION RATE: 20 BRPM | BODY MASS INDEX: 19.1 KG/M2

## 2017-05-19 DIAGNOSIS — I48.92 ATRIAL FLUTTER, UNSPECIFIED TYPE (HCC): ICD-10-CM

## 2017-05-19 DIAGNOSIS — J96.11 CHRONIC RESPIRATORY FAILURE WITH HYPOXIA (HCC): ICD-10-CM

## 2017-05-19 DIAGNOSIS — Z09 HOSPITAL DISCHARGE FOLLOW-UP: ICD-10-CM

## 2017-05-19 DIAGNOSIS — F41.9 ANXIETY: ICD-10-CM

## 2017-05-19 DIAGNOSIS — Z95.2 STATUS POST MITRAL VALVE REPLACEMENT: ICD-10-CM

## 2017-05-19 PROBLEM — I34.0 SEVERE MITRAL REGURGITATION: Status: RESOLVED | Noted: 2017-03-08 | Resolved: 2017-05-19

## 2017-05-19 LAB — INR PPP: 2.2 (ref 2–3.5)

## 2017-05-19 PROCEDURE — G0180 MD CERTIFICATION HHA PATIENT: HCPCS | Performed by: FAMILY MEDICINE

## 2017-05-19 PROCEDURE — 1101F PT FALLS ASSESS-DOCD LE1/YR: CPT | Performed by: FAMILY MEDICINE

## 2017-05-19 PROCEDURE — 3014F SCREEN MAMMO DOC REV: CPT | Performed by: FAMILY MEDICINE

## 2017-05-19 PROCEDURE — 1111F DSCHRG MED/CURRENT MED MERGE: CPT | Performed by: FAMILY MEDICINE

## 2017-05-19 PROCEDURE — G0299 HHS/HOSPICE OF RN EA 15 MIN: HCPCS

## 2017-05-19 PROCEDURE — G8432 DEP SCR NOT DOC, RNG: HCPCS | Performed by: FAMILY MEDICINE

## 2017-05-19 PROCEDURE — 99215 OFFICE O/P EST HI 40 MIN: CPT | Performed by: FAMILY MEDICINE

## 2017-05-19 PROCEDURE — G8420 CALC BMI NORM PARAMETERS: HCPCS | Performed by: FAMILY MEDICINE

## 2017-05-19 PROCEDURE — 1036F TOBACCO NON-USER: CPT | Performed by: FAMILY MEDICINE

## 2017-05-19 PROCEDURE — 4040F PNEUMOC VAC/ADMIN/RCVD: CPT | Performed by: FAMILY MEDICINE

## 2017-05-19 ASSESSMENT — ENCOUNTER SYMPTOMS: SHORTNESS OF BREATH: T

## 2017-05-19 NOTE — PROGRESS NOTES
Subjective:   Kelly Lovett is a 71 y.o. female here today for   Chief Complaint   Patient presents with   • Follow-Up     surgery        1. Hospital discharge follow-up  Patient was admitted to the hospital 4/20/17 through 5/5/17 for mitral valve replacement. Patient had several complications relating to her lungs. She did have pulmonary collapse requiring reintubation and chest tube placement. She was in the ICU for most of her hospital stay. She was discharged to home with home health nursing who comes in 2-3 times per week. They are also checking her INR. She was instructed to walk 10 minutes per day which she is doing. She is not using her spirometry regularly. She is taking all of the proper discharge medications I did review her discharge summary with her. She has a follow-up with cardiology on 5/25/17, and with Dr. Dominguez on 6/5/17. She does not have pulmonary follow-up.    2. Status post mitral valve replacement  This is any problem. Patient had a mitral valve replacement on 4/20/17. Notes and records from Hospital were reviewed. I also did visit the patient in the hospital she was there. She had a complicated course due to pulmonary complications secondary to her COPD. Now, she is on amiodarone and Coumadin for her heart as well as a daily aspirin. She is being compliant with the use and her INR is now within range. She is also using compression stockings. She is not using her incentive spirometer regularly. She was instructed to walk 10 minutes per day and she is doing this most days. She is overall feeling well and her pain is well-controlled. She has tramadol as needed for the pain. She is not feeling any significant shortness of breath although she is on oxygen 24 hours a day. She also wears a CPAP at night.    3. Chronic respiratory failure with hypoxia (CMS-HCC)  This is a new problem. Patient is on continuous oxygen 2 L throughout the day while awake. She is doing well on this oxygen. She  did buy a continuous portable machine that her insurance would not cover. Her oxygen levels are normal today. She does not have follow-up with pulmonology. She does have severe COPD. For her COPD, she is on Spiriva, albuterol as needed, and Advair.    4. Anxiety  This is chronic. Prior to her surgery, we started Zoloft 50 mg daily. Her mood is stable and she is overall doing well.    Current medicines (including changes today)  Current Outpatient Prescriptions   Medication Sig Dispense Refill   • diphenhydrAMINE (BENADRYL) 25 MG capsule Take 25 mg by mouth at bedtime as needed for Sleep.     • amiodarone (CORDARONE) 200 MG Tab Take 1 Tab by mouth every day. 30 Tab 3   • warfarin (COUMADIN) 5 MG Tab Take 1 Tab by mouth COUMADIN-DAILY. Titrate to INR 2-3. 30 Tab 3   • tramadol (ULTRAM) 50 MG Tab Take 1 Tab by mouth every 6 hours as needed (Moderate Pain (NRS Pain Scale 4-6; CPOT Pain Scale 3-5) if opiates not ordered or tolerated). 75 Tab 0   • aspirin (ASA) 81 MG Chew Tab chewable tablet Take 1 Tab by mouth every day. 100 Tab    • furosemide (LASIX) 20 MG Tab Take 20 mg by mouth every day.     • sertraline (ZOLOFT) 50 MG Tab Take 1 Tab by mouth every day. 30 Tab 3   • potassium chloride ER (K-TAB) 10 MEQ tablet Take 1 Tab by mouth every day. 90 Tab 3   • atorvastatin (LIPITOR) 10 MG Tab Take 1 Tab by mouth every day. 90 Tab 3   • albuterol (VENTOLIN HFA) 108 (90 BASE) MCG/ACT Aero Soln inhalation aerosol Inhale 2 Puffs by mouth every four hours as needed for Shortness of Breath. 3 Inhaler 3   • Tiotropium Bromide Monohydrate (SPIRIVA RESPIMAT) 2.5 MCG/ACT Aero Soln Inhale 2 Inhalation by mouth every day. 3 Inhaler 3   • fluticasone-salmeterol (ADVAIR) 250-50 MCG/DOSE AEROSOL POWDER, BREATH ACTIVATED Inhale 1 Puff by mouth 2 times a day. 3 Inhaler 3   • magnesium oxide (MAG-OX) 400 MG Tab Take 400 mg by mouth 2 times a day. takes 500 mg tab       No current facility-administered medications for this visit.     She   "has a past medical history of COPD (chronic obstructive pulmonary disease) (CMS-HCC); Hypertension; Hyperlipidemia; Heart valve disease; Emphysema of lung (CMS-[facility]); High cholesterol; Sleep apnea; and Breath shortness.    ROS   No fevers  No bowel changes  No LE edema       Objective:     Blood pressure 100/64, pulse 104, temperature 36.3 °C (97.4 °F), resp. rate 16, height 1.638 m (5' 4.49\"), weight 51.5 kg (113 lb 8.6 oz), SpO2 97 %. Body mass index is 19.19 kg/(m^2).   Physical Exam:  Constitutional: Alert, no distress.  Skin: Warm, dry, good turgor, no rashes in visible areas. Sternotomy scar well healing   Eye: Equal, round and reactive, conjunctiva clear, lids normal.  ENMT: Lips without lesions, good dentition, oropharynx clear.  Neck: Trachea midline, no masses, no thyromegaly. No cervical or supraclavicular lymphadenopathy  Respiratory: Unlabored respiratory effort, lungs clear to auscultation, no wheezes, no ronchi.  Cardiovascular: Normal S1, S2, RRR, no murmur, no edema.  Abdomen: Soft, non-tender, no masses, no hepatosplenomegaly.  Psych: Alert and oriented x3, normal affect and mood.      Assessment and Plan:   The following treatment plan was discussed    1. Hospital discharge follow-up  Discharge summary and hospital records reviewed with the patient today  Cardiology follow-up on 5/25/17, Dr. Dominguez follow-up on 6/5/17, we will call to schedule her for a pulmonary follow-up   Patient is on all of the right medications    2. Status post mitral valve replacement  New, stable  Patient is doing great postoperatively and pain is well controlled  Increase activity level to 10 minutes of walking daily  Start using spirometry every 10 minutes, not currently doing this    3. Chronic respiratory failure with hypoxia (CMS-[facility])  New, stable  Continue oxygen throughout the day as prescribed  We will get her a follow-up with pulmonology  Increase spirometry use    4. Anxiety  Chronic, stable  Continue Zoloft 50 " mg      Followup: Return in about 4 weeks (around 6/16/2017) for lungs, heart .       This note was created using voice recognition software. I have made every reasonable attempt to correct errors, however, I do anticipate some grammatical errors.     Total 40 minutes face-to-face time spent with patient not including any procedure, with greater than 50% of the total time discussing patient's issues and symptoms as listed above in assessment and plan, as well as managing coordination of care for future evaluation and treatment.

## 2017-05-19 NOTE — MR AVS SNAPSHOT
"        Kelly [facility]   2017 11:00 AM   Office Visit   MRN: [id number]    Department:  [facility]   Dept Phone:  [phone]    Description:  Female : 1945   Provider:  Ritika Jurado M.D.           Reason for Visit     Follow-Up surgery       Allergies as of 2017     Allergen Noted Reactions    Sulfa Drugs 10/05/2016   Rash    Rxn - years ago in her 's      You were diagnosed with     Hospital discharge follow-up   [134606]       Status post mitral valve replacement   [[id number]]       Chronic respiratory failure with hypoxia (CMS-[facility])   [028538]         Vital Signs     Blood Pressure Pulse Temperature Respirations Height Weight    100/64 mmHg 104 36.3 °C (97.4 °F) 16 1.638 m (5' 4.49\") 51.5 kg (113 lb 8.6 oz)    Body Mass Index Oxygen Saturation Smoking Status             19.19 kg/m2 97% Former Smoker         Basic Information     Date Of Birth Sex Race Ethnicity Preferred Language    1945 Female White Non- English      Your appointments     May 19, 2017  1:30 PM   SN-HH-HOME VISIT with [facility] (--)    [address]  [place] [address]   [phone]            May 22, 2017 To Be Determined   SN-HH-HOME VISIT with [facility] (--)    [address]  [place] [address]   [phone]            May 24, 2017 To Be Determined   SN-HH-HOME VISIT with [facility] (--)    [address]  [place] [address]   [phone]            May 25, 2017 12:40 PM   PREVIOUS PATIENT with [facility] (--)    [address]  [place] [id number] [phone]            May 26, 2017 To Be Determined   SN-HH-HOME VISIT with [facility] (--)    [address]  [place] [address]   [phone]            May 29, 2017 To Be Determined   [facility]HH-HOME VISIT with [facility] (--)    [address]  [place] [address]   [phone]            Jun 01, 2017 To Be Determined   SN-HH-HOME VISIT with Prashanth Marsh L.P.N.   [facility] (--)    [address]  [place] [address]   [phone]            Jun 05, 2017 To Be Determined   SN-HH-HOME VISIT with Prashanth Marsh L.P.N.   [facility] (--)    [address]  [place] [address]   [phone]            Jun 08, 2017 To Be Determined   SN-HH-HOME VISIT with Prashanth Marsh L.P.N.   [facility] (--)    [address]  [place] [address]   [phone]            Jun 12, 2017 To Be Determined   SN-HH-HOME VISIT with Prashanth Marsh L.P.N.   [facility] (--)    [address] [facility]  [place] [address]   [phone]            Jun 14, 2017 10:40 AM   Established Patient with Ritika Jurado M.D.   [facility] (--)    [address]  [facility] [address]   [phone]           You will be receiving a confirmation call a few days before your appointment from our automated call confirmation system.            Armando 15, 2017 To Be Determined   SN-HH-HOME VISIT with Prashanth Marsh L.P.N.   [facility] (--)    [address]  [place] [address]   [phone]            Jun 22, 2017 To Be Determined   SN-HH-HOME VISIT + LPN SUP with Allyson Hensley R.N.   [facility] (--)    [address]  [place] [address]   [phone]            Jun 29, 2017 To Be Determined   SN-HH-OASIS D/C+LPN/HHA SUP with Allyson Hensley R.N.   [facility] (--)    [address] [place] [address]   [phone]              Problem List              ICD-10-CM Priority Class Noted - Resolved    COPD (chronic obstructive pulmonary disease) (CMS-HCC) J44.9   10/11/2016 - Present    Osteopenia M85.80   10/11/2016 - Present    Obstructive sleep apnea syndrome G47.33   10/11/2016 - Present    Abnormal liver function K76.89   12/9/2015 - Present    Aortic atherosclerosis  (CMS-HCC) I70.0   9/10/2015 - Present    Atrial flutter (CMS-HCC) I48.92   12/15/2015 - Present    Congestive heart failure (CMS-HCC) I50.9   7/7/2016 - Present    Chronic respiratory failure with hypoxia (CMS-HCC) J96.11   11/23/2015 - Present    History of colonic polyps Z86.010   3/3/2008 - Present    Hyperlipidemia E78.5   9/10/2015 - Present    Hypertension I10   3/10/2008 - Present    Mitral valve insufficiency I34.0   9/17/2012 - Present    Prediabetes R73.03   8/15/2013 - Present    Severe mitral regurgitation I34.0   3/8/2017 - Present    Anxiety F41.9   3/15/2017 - Present    Essential (primary) hypertension I10   3/17/2017 - Present    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   4/24/2017 - Present    Status post mitral valve replacement Z95.2   5/19/2017 - Present      Health Maintenance        Date Due Completion Dates    BONE DENSITY 10/10/2009 ---    MAMMOGRAM 11/29/2017 11/29/2016, 9/11/2015, 9/11/2015    COLONOSCOPY 5/6/2019 5/6/2009    IMM DTaP/Tdap/Td Vaccine (2 - Td) 9/17/2022 9/17/2012            Current Immunizations     13-VALENT PCV PREVNAR 9/10/2015    Influenza TIV (IM) 1/1/2014    Pneumococcal polysaccharide vaccine (PPSV-23) 10/27/2016    SHINGLES VACCINE 8/15/2013    Tdap Vaccine 9/17/2012      Below and/or attached are the medications your provider expects you to take. Review all of your home medications and newly ordered medications with your provider and/or pharmacist. Follow medication instructions as directed by your provider and/or pharmacist. Please keep your medication list with you and share with your provider. Update the information when medications are discontinued, doses are changed, or new medications (including over-the-counter products) are added; and carry medication information at all times in the event of emergency situations     Allergies:  SULFA DRUGS - Rash               Medications  Valid as of: May 19, 2017 - 11:36 AM    Generic Name Brand Name Tablet Size  Instructions for use    Albuterol Sulfate (Aero Soln) albuterol 108 (90 BASE) MCG/ACT Inhale 2 Puffs by mouth every four hours as needed for Shortness of Breath.        Amiodarone HCl (Tab) CORDARONE 200 MG Take 1 Tab by mouth every day.        Aspirin (Chew Tab) ASA 81 MG Take 1 Tab by mouth every day.        Atorvastatin Calcium (Tab) LIPITOR 10 MG Take 1 Tab by mouth every day.        DiphenhydrAMINE HCl (Cap) BENADRYL 25 MG Take 25 mg by mouth at bedtime as needed for Sleep.        Fluticasone-Salmeterol (AEROSOL POWDER, BREATH ACTIVATED) ADVAIR 250-50 MCG/DOSE Inhale 1 Puff by mouth 2 times a day.        Furosemide (Tab) LASIX 20 MG Take 20 mg by mouth every day.        Magnesium Oxide (Tab) MAG- MG Take 400 mg by mouth 2 times a day. takes 500 mg tab        Potassium Chloride (Tab CR) KLOR-CON 10 MEQ Take 1 Tab by mouth every day.        Sertraline HCl (Tab) ZOLOFT 50 MG Take 1 Tab by mouth every day.        Tiotropium Bromide Monohydrate (Aero Soln) Tiotropium Bromide Monohydrate 2.5 MCG/ACT Inhale 2 Inhalation by mouth every day.        TraMADol HCl (Tab) ULTRAM 50 MG Take 1 Tab by mouth every 6 hours as needed (Moderate Pain (NRS Pain Scale 4-6; CPOT Pain Scale 3-5) if opiates not ordered or tolerated).        Warfarin Sodium (Tab) COUMADIN 5 MG Take 1 Tab by mouth COUMADIN-DAILY. Titrate to INR 2-3.        .                 Medicines prescribed today were sent to:     [id number] - [place], [place] - [address] [address] [facility] [place] [address]    Phone: [phone] Fax: [phone]    Open 24 Hours?: No      Medication refill instructions:       If your prescription bottle indicates you have medication refills left, it is not necessary to call your provider’s office. Please contact your pharmacy and they will refill your medication.    If your prescription bottle indicates you do not have any refills left, you may request refills at any time through one of the following ways: The  online [facility] [employer] system (except Urgent Care), by calling your provider’s office, or by asking your pharmacy to contact your provider’s office with a refill request. Medication refills are processed only during regular business hours and may not be available until the next business day. Your provider may request additional information or to have a follow-up visit with you prior to refilling your medication.   *Please Note: Medication refills are assigned a new Rx number when refilled electronically. Your pharmacy may indicate that no refills were authorized even though a new prescription for the same medication is available at the pharmacy. Please request the medicine by name with the pharmacy before contacting your provider for a refill.           [facility] Access Code: Activation code not generated  Current [facility] Status: Active

## 2017-05-19 NOTE — PROGRESS NOTES
Anticoagulation Summary as of 5/19/2017     INR goal 2.0-3.0   Selected INR 2.2 (5/19/2017)   Maintenance plan 2.5 mg (5 mg x 0.5) every day   Weekly total 17.5 mg   Plan last modified Kelly Dean PHARMD (5/12/2017)   Next INR check 5/22/2017   Target end date     Indications   Atrial flutter (CMS-HCC) [I48.92]  Mitral valve insufficiency (Resolved) [I34.0]         Anticoagulation Episode Summary     INR check location     Preferred lab     Send INR reminders to     [facility]      Anticoagulation Care Providers     Provider Role Specialty Phone number    Lacey Porras M.D.  Cardiac Surgery [phone]    Amanda Perera PHARMD           Left voicemail message to report a therapeutic INR of 2.2.  Pt to continue with current warfarin dosing regimen. Requested pt contact the clinic for any s/s of unusual bleeding, bruising, clotting or any changes to diet or medication. FU 3. days.  Kelly Dean PHARMD

## 2017-05-22 ENCOUNTER — HOME CARE VISIT (OUTPATIENT)
Dept: HOME HEALTH SERVICES | Facility: HOME HEALTHCARE | Age: 72
End: 2017-05-22
Payer: MEDICARE

## 2017-05-22 ENCOUNTER — PATIENT MESSAGE (OUTPATIENT)
Dept: MEDICAL GROUP | Facility: LAB | Age: 72
End: 2017-05-22

## 2017-05-22 ENCOUNTER — ANTICOAGULATION MONITORING (OUTPATIENT)
Dept: VASCULAR LAB | Facility: MEDICAL CENTER | Age: 72
End: 2017-05-22

## 2017-05-22 DIAGNOSIS — I48.92 ATRIAL FLUTTER, UNSPECIFIED TYPE (HCC): ICD-10-CM

## 2017-05-22 LAB — INR PPP: 1.9 (ref 2–3.5)

## 2017-05-22 PROCEDURE — G0299 HHS/HOSPICE OF RN EA 15 MIN: HCPCS

## 2017-05-22 PROCEDURE — 6650331 HCR  COAGCHECK STRIPS

## 2017-05-22 NOTE — PROGRESS NOTES
Anticoagulation Summary as of 5/22/2017     INR goal 2.0-3.0   Selected INR 1.9! (5/22/2017)   Maintenance plan 5 mg (5 mg x 1) on Mon; 2.5 mg (5 mg x 0.5) all other days   Weekly total 20 mg   Plan last modified Severiano Rosas, PHARMD (5/22/2017)   Next INR check 5/29/2017   Target end date     Indications   Atrial flutter (CMS-HCC) [I48.92]  Mitral valve insufficiency (Resolved) [I34.0]         Anticoagulation Episode Summary     INR check location     Preferred lab     Send INR reminders to     [facility]      Anticoagulation Care Providers     Provider Role Specialty Phone number    Lacey Porras M.D.  Cardiac Surgery [phone]    ELZA MorganD           Anticoagulation Patient Findings    Patient's INR was SUB therapeutic.   Denies any unusual s/s of bleeding, bruising, clotting.  Denies any changes to:   Diet   Medications  Confirmed dosing regimen. Denies missed doses.   Pt is to begin increased warfarin dosing regimen.    Follow up in 1 week(s)    Severiano Rosas, ELZAD

## 2017-05-22 NOTE — TELEPHONE ENCOUNTER
Was the patient seen in the last year in this department? Yes     Does patient have an active prescription for medications requested? No     Received Request Via: Patient     last visit:5/19/17    Last  fill:5/5/17

## 2017-05-23 VITALS
RESPIRATION RATE: 14 BRPM | SYSTOLIC BLOOD PRESSURE: 90 MMHG | WEIGHT: 111 LBS | TEMPERATURE: 97.3 F | BODY MASS INDEX: 18.77 KG/M2 | HEART RATE: 95 BPM | DIASTOLIC BLOOD PRESSURE: 56 MMHG

## 2017-05-23 RX ORDER — TRAMADOL HYDROCHLORIDE 50 MG/1
50 TABLET ORAL EVERY 6 HOURS PRN
Qty: 50 TAB | Refills: 0 | Status: SHIPPED
Start: 2017-05-23 | End: 2017-06-19

## 2017-05-23 ASSESSMENT — ENCOUNTER SYMPTOMS: SEVERE DYSPNEA: 1

## 2017-05-24 ENCOUNTER — HOME CARE VISIT (OUTPATIENT)
Dept: HOME HEALTH SERVICES | Facility: HOME HEALTHCARE | Age: 72
End: 2017-05-24
Payer: MEDICARE

## 2017-05-24 VITALS
DIASTOLIC BLOOD PRESSURE: 50 MMHG | RESPIRATION RATE: 20 BRPM | BODY MASS INDEX: 18.6 KG/M2 | WEIGHT: 110 LBS | TEMPERATURE: 98.3 F | HEART RATE: 102 BPM | SYSTOLIC BLOOD PRESSURE: 92 MMHG

## 2017-05-24 PROCEDURE — G0493 RN CARE EA 15 MIN HH/HOSPICE: HCPCS

## 2017-05-24 ASSESSMENT — ENCOUNTER SYMPTOMS: SEVERE DYSPNEA: 1

## 2017-05-25 ENCOUNTER — OFFICE VISIT (OUTPATIENT)
Dept: CARDIOLOGY | Facility: MEDICAL CENTER | Age: 72
End: 2017-05-25
Payer: MEDICARE

## 2017-05-25 VITALS
OXYGEN SATURATION: 93 % | SYSTOLIC BLOOD PRESSURE: 102 MMHG | BODY MASS INDEX: 19.63 KG/M2 | HEIGHT: 64 IN | HEART RATE: 110 BPM | DIASTOLIC BLOOD PRESSURE: 60 MMHG | WEIGHT: 115 LBS

## 2017-05-25 DIAGNOSIS — G47.33 OBSTRUCTIVE SLEEP APNEA SYNDROME: ICD-10-CM

## 2017-05-25 DIAGNOSIS — I50.42 CHRONIC COMBINED SYSTOLIC AND DIASTOLIC CONGESTIVE HEART FAILURE (HCC): ICD-10-CM

## 2017-05-25 DIAGNOSIS — J44.9 CHRONIC OBSTRUCTIVE PULMONARY DISEASE, UNSPECIFIED COPD TYPE (HCC): ICD-10-CM

## 2017-05-25 DIAGNOSIS — I48.92 ATRIAL FLUTTER, UNSPECIFIED TYPE (HCC): ICD-10-CM

## 2017-05-25 DIAGNOSIS — I10 ESSENTIAL HYPERTENSION: ICD-10-CM

## 2017-05-25 DIAGNOSIS — Z95.2 STATUS POST MITRAL VALVE REPLACEMENT: ICD-10-CM

## 2017-05-25 DIAGNOSIS — Z98.890 H/O MAZE PROCEDURE: ICD-10-CM

## 2017-05-25 DIAGNOSIS — I27.0 PRIMARY PULMONARY HTN (HCC): ICD-10-CM

## 2017-05-25 LAB — EKG IMPRESSION: NORMAL

## 2017-05-25 PROCEDURE — 1036F TOBACCO NON-USER: CPT | Performed by: NURSE PRACTITIONER

## 2017-05-25 PROCEDURE — 1101F PT FALLS ASSESS-DOCD LE1/YR: CPT | Performed by: NURSE PRACTITIONER

## 2017-05-25 PROCEDURE — 3014F SCREEN MAMMO DOC REV: CPT | Performed by: NURSE PRACTITIONER

## 2017-05-25 PROCEDURE — 1111F DSCHRG MED/CURRENT MED MERGE: CPT | Performed by: NURSE PRACTITIONER

## 2017-05-25 PROCEDURE — G8432 DEP SCR NOT DOC, RNG: HCPCS | Performed by: NURSE PRACTITIONER

## 2017-05-25 PROCEDURE — 4040F PNEUMOC VAC/ADMIN/RCVD: CPT | Performed by: NURSE PRACTITIONER

## 2017-05-25 PROCEDURE — 99214 OFFICE O/P EST MOD 30 MIN: CPT | Performed by: NURSE PRACTITIONER

## 2017-05-25 PROCEDURE — 93000 ELECTROCARDIOGRAM COMPLETE: CPT | Performed by: NURSE PRACTITIONER

## 2017-05-25 PROCEDURE — G8420 CALC BMI NORM PARAMETERS: HCPCS | Performed by: NURSE PRACTITIONER

## 2017-05-25 ASSESSMENT — ENCOUNTER SYMPTOMS
BLURRED VISION: 0
LOSS OF CONSCIOUSNESS: 0
DOUBLE VISION: 0
CHILLS: 0
NAUSEA: 0
HEARTBURN: 0
WEIGHT LOSS: 0
HEADACHES: 0
COUGH: 0
MUSCULOSKELETAL NEGATIVE: 1
ORTHOPNEA: 0
HEMOPTYSIS: 0
DIARRHEA: 0
WHEEZING: 0
TINGLING: 0
FEVER: 0
PALPITATIONS: 0
SHORTNESS OF BREATH: 1
VOMITING: 0
ABDOMINAL PAIN: 0
PND: 0
SORE THROAT: 0
TREMORS: 0
SPEECH CHANGE: 0
FOCAL WEAKNESS: 0
SENSORY CHANGE: 0
WEAKNESS: 0
DIZZINESS: 0
SPUTUM PRODUCTION: 0

## 2017-05-25 NOTE — MR AVS SNAPSHOT
"        [facility]   2017 12:40 PM   Office Visit   MRN: [id number]    Department:  [facility]   Dept Phone:  [phone]    Description:  Female : 1945   Provider:  ELIOT Roy           Reason for Visit     Follow-Up Previous patient       Allergies as of 2017     Allergen Noted Reactions    Sulfa Drugs 10/05/2016   Rash    Rxn - years ago in her 20's      You were diagnosed with     Atrial flutter, unspecified type (CMS-HCC)   [3093089]       Primary pulmonary HTN (CMS-[facility])   [173669]       Essential hypertension   [1022261]         Vital Signs     Blood Pressure Pulse Height Weight Body Mass Index Oxygen Saturation    102/60 mmHg 110 1.638 m (5' 4.49\") 52.164 kg (115 lb) 19.44 kg/m2 93%    Smoking Status                   Former Smoker           Basic Information     Date Of Birth Sex Race Ethnicity Preferred Language    1945 Female White Non- English      Your appointments     May 26, 2017 To Be Determined   SN-HH-HOME VISIT with Allyson Hensley R.N.   [facility] (--)    [address]  [facility] [address]   [phone]            May 29, 2017 To Be Determined   SN-HH-HOME VISIT with Prashanth Marsh L.P.N.   [facility] (--)    [address]  [facility] [address]   [phone]            2017 To Be Determined   SN-HH-HOME VISIT with Prashanth Marsh L.P.N.   [facility] (--)    [address]  [facility] [address]   [phone]            2017 12:40 PM   Established Patient Pul with A Rotation   [facility] (--)    [address]  [address]  [place] [id number] [phone]            2017 To Be Determined   SN-HH-HOME VISIT with Prashanth Marsh L.P.N.   [facility] (--)    [address]  [facility] [address]   [phone]            2017 To Be Determined   [facility]    [address]  [place] [address]   "   [phone]            Jun 12, 2017 To Be Determined   SN-HH-HOME VISIT with Prashanth Marsh L.P.N.   [facility] (--)    [address]  [place] [address]   [phone]            Jun 14, 2017 10:40 AM   Established Patient with Ritika Jurado M.D.   [facility] (--)    [address]  [address]  [place] [id number] [phone]           You will be receiving a confirmation call a few days before your appointment from our automated call confirmation system.            Armando 15, 2017 To Be Determined   SN-HH-HOME VISIT with Prashanth Marsh L.P.N.   [facility] (--)    [address] [facility]  [place] [address]   [phone]            Jun 19, 2017 12:40 PM   [facility] (--[facility]    [address], [address]  [place] [id number] [phone]            Jun 22, 2017 To Be Determined   SN-HH-HOME VISIT + LPN SUP with Allyson Hensley R.N.   [facility] (--)    [address] [facility]  [place] [address]   [phone]            Jun 29, 2017 To Be Determined   SN-HH-OASIS D/C+LPN/HHA SUP with Allyson Hensley R.N.   [facility] (--)    [address]  [place] [address]   [phone]              Problem List              ICD-10-CM Priority Class Noted - Resolved    COPD (chronic obstructive pulmonary disease) (CMS-HCC) J44.9   10/11/2016 - Present    Osteopenia M85.80   10/11/2016 - Present    Obstructive sleep apnea syndrome G47.33   10/11/2016 - Present    Abnormal liver function K76.89   12/9/2015 - Present    Aortic atherosclerosis (CMS-HCC) I70.0   9/10/2015 - Present    Atrial flutter (CMS-HCC) I48.92   12/15/2015 - Present    Congestive heart failure (CMS-HCC) I50.9   7/7/2016 - Present    Chronic respiratory failure with hypoxia (CMS-HCC) J96.11   11/23/2015 - Present    History of colonic polyps Z86.010   3/3/2008 - Present    Hyperlipidemia E78.5   9/10/2015 - Present    Hypertension  I10   3/10/2008 - Present    Prediabetes R73.03   8/15/2013 - Present    Anxiety F41.9   3/15/2017 - Present    Essential (primary) hypertension I10   3/17/2017 - Present    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   4/24/2017 - Present    Status post mitral valve replacement Z95.2   5/19/2017 - Present      Health Maintenance        Date Due Completion Dates    BONE DENSITY 10/10/2009 ---    MAMMOGRAM 11/29/2017 11/29/2016, 9/11/2015, 9/11/2015    COLONOSCOPY 5/6/2019 5/6/2009    IMM DTaP/Tdap/Td Vaccine (2 - Td) 9/17/2022 9/17/2012            Results       Current Immunizations     13-VALENT PCV PREVNAR 9/10/2015    Influenza TIV (IM) 1/1/2014    Pneumococcal polysaccharide vaccine (PPSV-23) 10/27/2016    SHINGLES VACCINE 8/15/2013    Tdap Vaccine 9/17/2012      Below and/or attached are the medications your provider expects you to take. Review all of your home medications and newly ordered medications with your provider and/or pharmacist. Follow medication instructions as directed by your provider and/or pharmacist. Please keep your medication list with you and share with your provider. Update the information when medications are discontinued, doses are changed, or new medications (including over-the-counter products) are added; and carry medication information at all times in the event of emergency situations     Allergies:  SULFA DRUGS - Rash               Medications  Valid as of: May 25, 2017 -  1:29 PM    Generic Name Brand Name Tablet Size Instructions for use    Albuterol Sulfate (Aero Soln) albuterol 108 (90 BASE) MCG/ACT Inhale 2 Puffs by mouth every four hours as needed for Shortness of Breath.        Amiodarone HCl (Tab) CORDARONE 200 MG Take 1 Tab by mouth every day.        Aspirin (Chew Tab) ASA 81 MG Take 1 Tab by mouth every day.        Atorvastatin Calcium (Tab) LIPITOR 10 MG Take 1 Tab by mouth every day.        DiphenhydrAMINE HCl (Cap) BENADRYL 25 MG Take 25 mg by mouth at bedtime as needed for  Sleep.        Fluticasone-Salmeterol (AEROSOL POWDER, BREATH ACTIVATED) ADVAIR 250-50 MCG/DOSE Inhale 1 Puff by mouth 2 times a day.        Furosemide (Tab) LASIX 20 MG Take 20 mg by mouth every day.        Magnesium Oxide (Tab) MAG- MG Take 400 mg by mouth 2 times a day. takes 500 mg tab        non-formulary med non-formulary med  Inhale 2 L/min by mouth Continuous. Oxygen 2L x NC continuous  Indications: COPD        Potassium Chloride (Tab CR) KLOR-CON 10 MEQ Take 1 Tab by mouth every day.        Sertraline HCl (Tab) ZOLOFT 50 MG Take 1 Tab by mouth every day.        Tiotropium Bromide Monohydrate (Aero Soln) Tiotropium Bromide Monohydrate 2.5 MCG/ACT Inhale 2 Inhalation by mouth every day.        TraMADol HCl (Tab) ULTRAM 50 MG Take 1 Tab by mouth every 6 hours as needed for Moderate Pain.        Warfarin Sodium (Tab) COUMADIN 5 MG Take 1 Tab by mouth COUMADIN-DAILY. Titrate to INR 2-3.        .                 Medicines prescribed today were sent to:     [id number] - [place], [place] - [address]    [address] [facility] [place] [address]    Phone: [phone] Fax: [phone]    Open 24 Hours?: No      Medication refill instructions:       If your prescription bottle indicates you have medication refills left, it is not necessary to call your provider’s office. Please contact your pharmacy and they will refill your medication.    If your prescription bottle indicates you do not have any refills left, you may request refills at any time through one of the following ways: The online [employer] system (except Urgent Care), by calling your provider’s office, or by asking your pharmacy to contact your provider’s office with a refill request. Medication refills are processed only during regular business hours and may not be available until the next business day. Your provider may request additional information or to have a follow-up visit with you prior to refilling your medication.   *Please Note:  Medication refills are assigned a new Rx number when refilled electronically. Your pharmacy may indicate that no refills were authorized even though a new prescription for the same medication is available at the pharmacy. Please request the medicine by name with the pharmacy before contacting your provider for a refill.           [employer] Access Code: Activation code not generated  Current [employer] Status: Active

## 2017-05-25 NOTE — PROGRESS NOTES
Subjective:   Kelly Lovett is a 71 y.o. female who presents today for hospital follow up after undergoing a Mitral value repair, left sided MAZE procedure, and Left Atrial Ligation procedure by Dr. Porras 4/20/17.  Patient ended up with respiratory distress post operatively, eventually developing bilateral pneumothoracies, requiring intubation and placement of bilateral chest tubes.  Patient was eventually discharged home with home health.    Today in follow up she states that she believes that she has been doing well from a cardiac standpoint, but expresses frustration over her lung function and still requiring oxygen 24 hours a day at 2 L/min.    She has not had any chest pain, dizziness, presyncope, syncope, or peripheral edema.  She states that her blood pressure and pulse rate have been under good control at home her hear and home health's measurement.     She would like to participate in cardiac rehab.       Past Medical History   Diagnosis Date   • COPD (chronic obstructive pulmonary disease) (CMS-HCC)    • Hypertension    • Hyperlipidemia    • Heart valve disease    • Emphysema of lung (CMS-HCC)    • High cholesterol    • Sleep apnea      uses cpap   • Breath shortness      pt reports using o2 at night 2L, no problems at this time     Past Surgical History   Procedure Laterality Date   • Oophorectomy     • Appendectomy       1967   • Mitral valve repair  4/20/2017     Procedure: MITRAL VALVE REPAIR ;  Surgeon: Lacey Porras M.D.;  Location: [facility];  Service:    • Maze procedure Left 4/20/2017     Procedure: MAZE PROCEDURE, Left atrial appendage ligation;  Surgeon: Lacey Porras M.D.;  Location: [facility];  Service:    • Lino  4/20/2017     Procedure: LINO;  Surgeon: Lacey Porras M.D.;  Location: [facility];  Service:      Family History   Problem Relation Age of Onset   • Cancer Father      colon cancer    • Hypertension Mother    • Cancer Sister  68     ovarian cancer     History   Smoking status   • Former Smoker -- 0.50 packs/day for 38 years   • Types: Cigarettes   • Quit date: 10/11/2001   Smokeless tobacco   • Never Used     Allergies   Allergen Reactions   • Sulfa Drugs Rash     Rxn - years ago in her 20's     Outpatient Encounter Prescriptions as of 5/25/2017   Medication Sig Dispense Refill   • diphenhydrAMINE (BENADRYL) 25 MG capsule Take 25 mg by mouth at bedtime as needed for Sleep.     • amiodarone (CORDARONE) 200 MG Tab Take 1 Tab by mouth every day. 30 Tab 3   • warfarin (COUMADIN) 5 MG Tab Take 1 Tab by mouth COUMADIN-DAILY. Titrate to INR 2-3. 30 Tab 3   • aspirin (ASA) 81 MG Chew Tab chewable tablet Take 1 Tab by mouth every day. 100 Tab    • furosemide (LASIX) 20 MG Tab Take 20 mg by mouth every day.     • fluticasone-salmeterol (ADVAIR) 250-50 MCG/DOSE AEROSOL POWDER, BREATH ACTIVATED Inhale 1 Puff by mouth 2 times a day. 3 Inhaler 3   • sertraline (ZOLOFT) 50 MG Tab Take 1 Tab by mouth every day. 30 Tab 3   • potassium chloride ER (K-TAB) 10 MEQ tablet Take 1 Tab by mouth every day. 90 Tab 3   • atorvastatin (LIPITOR) 10 MG Tab Take 1 Tab by mouth every day. 90 Tab 3   • magnesium oxide (MAG-OX) 400 MG Tab Take 400 mg by mouth 2 times a day. takes 500 mg tab     • albuterol (VENTOLIN HFA) 108 (90 BASE) MCG/ACT Aero Soln inhalation aerosol Inhale 2 Puffs by mouth every four hours as needed for Shortness of Breath. 3 Inhaler 3   • Tiotropium Bromide Monohydrate (SPIRIVA RESPIMAT) 2.5 MCG/ACT Aero Soln Inhale 2 Inhalation by mouth every day. 3 Inhaler 3   • tramadol (ULTRAM) 50 MG Tab Take 1 Tab by mouth every 6 hours as needed for Moderate Pain. 50 Tab 0   • non-formulary med Inhale 2 L/min by mouth Continuous. Oxygen 2L x NC continuous  Indications: COPD       No facility-administered encounter medications on file as of 5/25/2017.     Review of Systems   Constitutional: Negative for fever, chills, weight loss and malaise/fatigue.   HENT:  "Negative for congestion, sore throat and tinnitus.    Eyes: Negative for blurred vision and double vision.   Respiratory: Positive for shortness of breath (hypoxia- on supplemental 02). Negative for cough, hemoptysis, sputum production and wheezing.    Cardiovascular: Negative for chest pain, palpitations, orthopnea, leg swelling and PND.   Gastrointestinal: Negative for heartburn, nausea, vomiting, abdominal pain and diarrhea.   Genitourinary: Negative.    Musculoskeletal: Negative.    Skin: Negative for rash.   Neurological: Negative for dizziness, tingling, tremors, sensory change, speech change, focal weakness, loss of consciousness, weakness and headaches.        Objective:   /60 mmHg  Pulse 110  Ht 1.638 m (5' 4.49\")  Wt 52.164 kg (115 lb)  BMI 19.44 kg/m2  SpO2 93%    Physical Exam   Constitutional: She is oriented to person, place, and time. She appears well-developed and well-nourished.   HENT:   Head: Normocephalic and atraumatic.   Eyes: Conjunctivae are normal.   Neck: Normal range of motion. Neck supple.   Cardiovascular: Normal rate, regular rhythm, normal heart sounds and intact distal pulses.  Exam reveals no gallop and no friction rub.    No murmur heard.  No peripheral edema    Pulmonary/Chest: Effort normal. No respiratory distress. She has decreased breath sounds. She has no wheezes. She has no rales.   Midline sternum healing well, no erythema, edema, drainage.  Bilat CT sites C/D/I, no erythema, drainage.    Abdominal: Soft. Bowel sounds are normal. There is no tenderness.   Neurological: She is alert and oriented to person, place, and time.   Skin: Skin is warm and dry.   Psychiatric: She has a normal mood and affect. Her behavior is normal.       Assessment:     1. Status post mitral valve replacement     2. Atrial flutter, unspecified type (CMS-HCC)  EKG   3. Essential hypertension  EKG   4. Primary pulmonary HTN (CMS-HCC)     5. Chronic combined systolic and diastolic congestive " heart failure (CMS-[facility])     6. H/O maze procedure     7. Obstructive sleep apnea syndrome     8. Chronic obstructive pulmonary disease, unspecified COPD type (CMS-HCC)         Medical Decision Making:  Today's Assessment / Status / Plan:   1. MV Repair (36 mm  Anna flexible annuloplasty band) MAZE/LA ligation:  - Patient's cardiovascular status is stable today.  She is euvolemic.    - 12 lead EKG today ST, rate 109.  Patient reports normal heart rate at home.  Continue home health for monitoring.   - Her incisions are well approximated without erythema, edema, drainage.  Bilat CT sites are healing well.   - Will get her enrolled to cardiac rehab.   - Continue Amiodarone for now (hx of Aflutter/ Afib post operatively).  Discussed that will plan to potentially wean off in near future if remains arrhythmia free.     2. HTN:  - Blood pressure well controled today.  Appropriate blood pressures at home.     3. COPD/hypoxia:  - Previously reported to be severe.  She is now on oxygen 24 hours a day post operatively.  She has a follow up scheduled with pulmonary next week.  Continue home health assessments for potential to wean.     RTC in one month for review, sooner if needed.  Collaborating MD: Rochelle.

## 2017-05-25 NOTE — Clinical Note
[facility]   [address] [place] [address]  [place] [address]  Phone: [phone]  Fax: [phone]              Kelly Lovett  1945    Encounter Date: 5/25/2017    ELIOT Roy          PROGRESS NOTE:  Subjective:   Kelly Lovett is a 71 y.o. female who presents today for hospital follow up after undergoing a Mitral value repair, left sided MAZE procedure, and Left Atrial Ligation procedure by Dr. Porras 4/20/17.  Patient ended up with respiratory distress post operatively, eventually developing bilateral pneumothoracies, requiring intubation and placement of bilateral chest tubes.  Patient was eventually discharged home with home health.    Today in follow up she states that she believes that she has been doing well from a cardiac standpoint, but expresses frustration over her lung function and still requiring oxygen 24 hours a day at 2 L/min.    She has not had any chest pain, dizziness, presyncope, syncope, or peripheral edema.  She states that her blood pressure and pulse rate have been under good control at home her hear and home health's measurement.     She would like to participate in cardiac rehab.       Past Medical History   Diagnosis Date   • COPD (chronic obstructive pulmonary disease) (CMS-HCC)    • Hypertension    • Hyperlipidemia    • Heart valve disease    • Emphysema of lung (CMS-[facility])    • High cholesterol    • Sleep apnea      uses cpap   • Breath shortness      pt reports using o2 at night 2L, no problems at this time     Past Surgical History   Procedure Laterality Date   • Oophorectomy     • Appendectomy       1967   • Mitral valve repair  4/20/2017     Procedure: MITRAL VALVE REPAIR ;  Surgeon: Lacey Porras M.D.;  Location: SURGERY [facility];  Service:    • Maze procedure Left 4/20/2017     Procedure: MAZE PROCEDURE, Left atrial appendage ligation;  Surgeon: Lacey Porras M.D.;  Location: SURGERY  [facility];  Service:    • Lino  4/20/2017     Procedure: LINO;  Surgeon: Lacey Porras M.D.;  Location: SURGERY [facility];  Service:      Family History   Problem Relation Age of Onset   • Cancer Father      colon cancer    • Hypertension Mother    • Cancer Sister 68     ovarian cancer     History   Smoking status   • Former Smoker -- 0.50 packs/day for 38 years   • Types: Cigarettes   • Quit date: 10/11/2001   Smokeless tobacco   • Never Used     Allergies   Allergen Reactions   • Sulfa Drugs Rash     Rxn - years ago in her 20's     Outpatient Encounter Prescriptions as of 5/25/2017   Medication Sig Dispense Refill   • diphenhydrAMINE (BENADRYL) 25 MG capsule Take 25 mg by mouth at bedtime as needed for Sleep.     • amiodarone (CORDARONE) 200 MG Tab Take 1 Tab by mouth every day. 30 Tab 3   • warfarin (COUMADIN) 5 MG Tab Take 1 Tab by mouth COUMADIN-DAILY. Titrate to INR 2-3. 30 Tab 3   • aspirin (ASA) 81 MG Chew Tab chewable tablet Take 1 Tab by mouth every day. 100 Tab    • furosemide (LASIX) 20 MG Tab Take 20 mg by mouth every day.     • fluticasone-salmeterol (ADVAIR) 250-50 MCG/DOSE AEROSOL POWDER, BREATH ACTIVATED Inhale 1 Puff by mouth 2 times a day. 3 Inhaler 3   • sertraline (ZOLOFT) 50 MG Tab Take 1 Tab by mouth every day. 30 Tab 3   • potassium chloride ER (K-TAB) 10 MEQ tablet Take 1 Tab by mouth every day. 90 Tab 3   • atorvastatin (LIPITOR) 10 MG Tab Take 1 Tab by mouth every day. 90 Tab 3   • magnesium oxide (MAG-OX) 400 MG Tab Take 400 mg by mouth 2 times a day. takes 500 mg tab     • albuterol (VENTOLIN HFA) 108 (90 BASE) MCG/ACT Aero Soln inhalation aerosol Inhale 2 Puffs by mouth every four hours as needed for Shortness of Breath. 3 Inhaler 3   • Tiotropium Bromide Monohydrate (SPIRIVA RESPIMAT) 2.5 MCG/ACT Aero Soln Inhale 2 Inhalation by mouth every day. 3 Inhaler 3   • tramadol (ULTRAM) 50 MG Tab Take 1 Tab by mouth every 6 hours as needed for Moderate Pain. 50 Tab 0   •  "non-formulary med Inhale 2 L/min by mouth Continuous. Oxygen 2L x NC continuous  Indications: COPD       No facility-administered encounter medications on file as of 5/25/2017.     Review of Systems   Constitutional: Negative for fever, chills, weight loss and malaise/fatigue.   HENT: Negative for congestion, sore throat and tinnitus.    Eyes: Negative for blurred vision and double vision.   Respiratory: Positive for shortness of breath (hypoxia- on supplemental 02). Negative for cough, hemoptysis, sputum production and wheezing.    Cardiovascular: Negative for chest pain, palpitations, orthopnea, leg swelling and PND.   Gastrointestinal: Negative for heartburn, nausea, vomiting, abdominal pain and diarrhea.   Genitourinary: Negative.    Musculoskeletal: Negative.    Skin: Negative for rash.   Neurological: Negative for dizziness, tingling, tremors, sensory change, speech change, focal weakness, loss of consciousness, weakness and headaches.        Objective:   /60 mmHg  Pulse 110  Ht 1.638 m (5' 4.49\")  Wt 52.164 kg (115 lb)  BMI 19.44 kg/m2  SpO2 93%    Physical Exam   Constitutional: She is oriented to person, place, and time. She appears well-developed and well-nourished.   HENT:   Head: Normocephalic and atraumatic.   Eyes: Conjunctivae are normal.   Neck: Normal range of motion. Neck supple.   Cardiovascular: Normal rate, regular rhythm, normal heart sounds and intact distal pulses.  Exam reveals no gallop and no friction rub.    No murmur heard.  No peripheral edema    Pulmonary/Chest: Effort normal. No respiratory distress. She has decreased breath sounds. She has no wheezes. She has no rales.   Midline sternum healing well, no erythema, edema, drainage.  Bilat CT sites C/D/I, no erythema, drainage.    Abdominal: Soft. Bowel sounds are normal. There is no tenderness.   Neurological: She is alert and oriented to person, place, and time.   Skin: Skin is warm and dry.   Psychiatric: She has a normal " mood and affect. Her behavior is normal.       Assessment:     1. Status post mitral valve replacement     2. Atrial flutter, unspecified type (CMS-HCC)  EKG   3. Essential hypertension  EKG   4. Primary pulmonary HTN (CMS-HCC)     5. Chronic combined systolic and diastolic congestive heart failure (CMS-HCC)     6. H/O maze procedure     7. Obstructive sleep apnea syndrome     8. Chronic obstructive pulmonary disease, unspecified COPD type (CMS-HCC)         Medical Decision Making:  Today's Assessment / Status / Plan:   1. MV Repair (36 mm  Anna flexible annuloplasty band) MAZE/LA ligation:  - Patient's cardiovascular status is stable today.  She is euvolemic.    - 12 lead EKG today ST, rate 109.  Patient reports normal heart rate at home.  Continue home health for monitoring.   - Her incisions are well approximated without erythema, edema, drainage.  Bilat CT sites are healing well.   - Will get her enrolled to cardiac rehab.   - Continue Amiodarone for now (hx of Aflutter/ Afib post operatively).  Discussed that will plan to potentially wean off in near future if remains arrhythmia free.     2. HTN:  - Blood pressure well controled today.  Appropriate blood pressures at home.     3. COPD/hypoxia:  - Previously reported to be severe.  She is now on oxygen 24 hours a day post operatively.  She has a follow up scheduled with pulmonary next week.  Continue home health assessments for potential to wean.     RTC in one month for review, sooner if needed.  Collaborating MD: Rochelle.            Ritika Jurado M.D.  [address] [address]  VIA In Basket

## 2017-05-26 ENCOUNTER — HOME CARE VISIT (OUTPATIENT)
Dept: HOME HEALTH SERVICES | Facility: HOME HEALTHCARE | Age: 72
End: 2017-05-26
Payer: MEDICARE

## 2017-05-29 ENCOUNTER — HOME CARE VISIT (OUTPATIENT)
Dept: HOME HEALTH SERVICES | Facility: HOME HEALTHCARE | Age: 72
End: 2017-05-29
Payer: MEDICARE

## 2017-05-29 VITALS
SYSTOLIC BLOOD PRESSURE: 110 MMHG | DIASTOLIC BLOOD PRESSURE: 72 MMHG | RESPIRATION RATE: 16 BRPM | WEIGHT: 110 LBS | TEMPERATURE: 97.5 F | BODY MASS INDEX: 18.6 KG/M2 | HEART RATE: 96 BPM

## 2017-05-29 LAB — INR PPP: 1.9 (ref 2–3.5)

## 2017-05-29 PROCEDURE — 6650331 HCR  COAGCHECK STRIPS

## 2017-05-29 PROCEDURE — G0162 HHC RN E&M PLAN SVS, 15 MIN: HCPCS

## 2017-05-29 SDOH — ECONOMIC STABILITY: HOUSING INSECURITY: UNSAFE COOKING RANGE AREA: 0

## 2017-05-29 SDOH — ECONOMIC STABILITY: HOUSING INSECURITY: UNSAFE APPLIANCES: 0

## 2017-05-29 ASSESSMENT — ACTIVITIES OF DAILY LIVING (ADL)
HOME_HEALTH_OASIS: 00
OASIS_M1830: 00
HOME_HEALTH_OASIS: 01

## 2017-05-30 ENCOUNTER — ANTICOAGULATION MONITORING (OUTPATIENT)
Dept: VASCULAR LAB | Facility: MEDICAL CENTER | Age: 72
End: 2017-05-30

## 2017-05-30 DIAGNOSIS — I48.92 ATRIAL FLUTTER, UNSPECIFIED TYPE (HCC): ICD-10-CM

## 2017-05-30 NOTE — PROGRESS NOTES
OP Anticoagulation Service Note    Date: 5/30/2017    Anticoagulation Summary as of 5/30/2017     INR goal 2.0-3.0   Selected INR 1.9! (5/29/2017)   Maintenance plan 5 mg (5 mg x 1) on Mon, Fri; 2.5 mg (5 mg x 0.5) all other days   Weekly total 22.5 mg   Plan last modified Amanda Perera PHARMD (5/30/2017)   Next INR check 6/5/2017   Target end date     Indications   Atrial flutter (CMS-HCC) [I48.92]  Mitral valve insufficiency (Resolved) [I34.0]         Anticoagulation Episode Summary     INR check location     Preferred lab     Send INR reminders to     [facility]      Anticoagulation Care Providers     Provider Role Specialty Phone number    Lacey Porras M.D.  Cardiac Surgery [phone]    Amanda Perera PHARMD           Anticoagulation Patient Findings      Plan:  INR is low today despite increased weekly regimen. Spoke with pt on the phone. . CHADS2-VASC = 4 with hx of valve replacement. Confirmed dosing regimen. No missed or extra dosing taken. Patient denies sign/symptoms of bleeding/clotting. No recent medication changes and patient has been eating a consistent diet.  Instructed pt to call clinic with any concerns of bleeding or thrombosis. Instructed pt to begin increased weekly regimen. Follow up in 1.5 week(s)        Amanda Perera PHARMD

## 2017-06-01 ENCOUNTER — TELEPHONE (OUTPATIENT)
Dept: PULMONOLOGY | Facility: HOSPICE | Age: 72
End: 2017-06-01

## 2017-06-01 NOTE — TELEPHONE ENCOUNTER
Spoke to Patient and informed her that we would get her in with another provider in the pulmonary clinic next week.

## 2017-06-08 ENCOUNTER — ANTICOAGULATION VISIT (OUTPATIENT)
Dept: MEDICAL GROUP | Facility: MEDICAL CENTER | Age: 72
End: 2017-06-08
Payer: MEDICARE

## 2017-06-08 DIAGNOSIS — Z79.01 CHRONIC ANTICOAGULATION: ICD-10-CM

## 2017-06-08 DIAGNOSIS — I48.92 ATRIAL FLUTTER, UNSPECIFIED TYPE (HCC): ICD-10-CM

## 2017-06-08 LAB — INR PPP: 4.3 (ref 2–3.5)

## 2017-06-08 PROCEDURE — 85610 PROTHROMBIN TIME: CPT | Performed by: FAMILY MEDICINE

## 2017-06-08 NOTE — PROGRESS NOTES
OP Anticoagulation Service Note    Date: 6/8/2017    Anticoagulation Summary as of 6/8/2017     INR goal 2.0-3.0   Selected INR 4.3! (6/8/2017)   Maintenance plan 5 mg (5 mg x 1) on Mon; 2.5 mg (5 mg x 0.5) all other days   Weekly total 20 mg   Plan last modified Amanda Perera PHARMD (6/8/2017)   Next INR check 6/19/2017   Target end date     Indications   Atrial flutter (CMS-HCC) [I48.92]  Mitral valve insufficiency (Resolved) [I34.0]         Anticoagulation Episode Summary     INR check location     Preferred lab     Send INR reminders to     [facility]      Anticoagulation Care Providers     Provider Role Specialty Phone number    Lacey Porras M.D.  Cardiac Surgery [phone]    Amanda Perera PHARMD             Plan:  INR is high today. Confirmed dosing regimen. No missed or extra doses taken. Patient denies sign/symptoms of bleeding/clotting. No recent medication changes and patient has been eating a consistent diet. Instructed pt to call clinic with any concerns of bleeding or thrombosis.  Will have pt hold x 1 dose then will reduce weekly regimen. Follow up in 1.5 week(s)        Dari Montez D

## 2017-06-08 NOTE — MR AVS SNAPSHOT
Kelly Ileana Lovett   2017 11:45 AM   Anticoagulation Visit   MRN: [id number]    Department:  [facility]   Dept Phone:  [phone]    Description:  Female : 1945   Provider:  [facility]           Allergies as of 2017     Allergen Noted Reactions    Sulfa Drugs 10/05/2016   Rash    Rxn - years ago in her 20's      You were diagnosed with     Atrial flutter, unspecified type (CMS-HCC)   [9857296]         Vital Signs     Smoking Status                   Former Smoker           Basic Information     Date Of Birth Sex Race Ethnicity Preferred Language    1945 Female White Non- English      Your appointments     2017  2:40 PM   Established Patient Pul with A Rotation   [facility] (--)    [address]  [address]  [place] [address] [phone]            2017  1:00 PM   CARE MANAGEMENT OUTREACH with Ileana Calix R.N.   [facility] (--)    [address] [place] [address] [phone]           ***IMPORTANT**** This is a phone visit only. Do not come into the office. The Care Manager will call you at the scheduled time for Care Manager Telephone Visit.            2017 10:40 AM   Established Patient with Ritika Jurado M.D.   [facility] (--)    [address]  [address]  [place] [address] [phone]           You will be receiving a confirmation call a few days before your appointment from our automated call confirmation system.            2017 12:40 PM   PREVIOUS PATIENT with ELIOT Arevalo   [facility] (--)    [address], [address]  [place] [address] [phone]            2017  1:30 PM   Established Patient with [facility]   [facility]  (--)    [address]  [place] [address] [phone]            2017 10:00  AM   ORIENTATION with [facility] RN   [facility]    [address]  [address]  [place] [address] [phone]              Problem List              ICD-10-CM Priority Class Noted - Resolved    COPD (chronic obstructive pulmonary disease) (CMS-HCC) J44.9   10/11/2016 - Present    Osteopenia M85.80   10/11/2016 - Present    Obstructive sleep apnea syndrome G47.33   10/11/2016 - Present    Abnormal liver function K76.89   12/9/2015 - Present    Aortic atherosclerosis (CMS-HCC) I70.0   9/10/2015 - Present    Atrial flutter (CMS-HCC) I48.92   12/15/2015 - Present    Congestive heart failure (CMS-HCC) I50.9   7/7/2016 - Present    Chronic respiratory failure with hypoxia (CMS-HCC) J96.11   11/23/2015 - Present    History of colonic polyps Z86.010   3/3/2008 - Present    Hyperlipidemia E78.5   9/10/2015 - Present    Hypertension I10   3/10/2008 - Present    Prediabetes R73.03   8/15/2013 - Present    Anxiety F41.9   3/15/2017 - Present    Essential (primary) hypertension I10   3/17/2017 - Present    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   4/24/2017 - Present    Status post mitral valve replacement Z95.2   5/19/2017 - Present      Health Maintenance        Date Due Completion Dates    BONE DENSITY 10/10/2009 ---    MAMMOGRAM 11/29/2017 11/29/2016, 9/11/2015, 9/11/2015    COLONOSCOPY 5/6/2019 5/6/2009    IMM DTaP/Tdap/Td Vaccine (2 - Td) 9/17/2022 9/17/2012            Results     POCT Protime      Component    INR    4.3                        Current Immunizations     13-VALENT PCV PREVNAR 9/10/2015    Influenza TIV (IM) 1/1/2014    Pneumococcal polysaccharide vaccine (PPSV-23) 10/27/2016    SHINGLES VACCINE 8/15/2013    Tdap Vaccine 9/17/2012      Below and/or attached are the medications your provider expects you to take. Review all of your home medications and newly ordered medications with your provider and/or pharmacist. Follow medication instructions as directed by your provider  and/or pharmacist. Please keep your medication list with you and share with your provider. Update the information when medications are discontinued, doses are changed, or new medications (including over-the-counter products) are added; and carry medication information at all times in the event of emergency situations     Allergies:  SULFA DRUGS - Rash               Medications  Valid as of: June 08, 2017 - 11:53 AM    Generic Name Brand Name Tablet Size Instructions for use    Albuterol Sulfate (Aero Soln) albuterol 108 (90 BASE) MCG/ACT Inhale 2 Puffs by mouth every four hours as needed for Shortness of Breath.        Amiodarone HCl (Tab) CORDARONE 200 MG Take 1 Tab by mouth every day.        Aspirin (Chew Tab) ASA 81 MG Take 1 Tab by mouth every day.        Atorvastatin Calcium (Tab) LIPITOR 10 MG Take 1 Tab by mouth every day.        DiphenhydrAMINE HCl (Cap) BENADRYL 25 MG Take 25 mg by mouth at bedtime as needed for Sleep.        Fluticasone-Salmeterol (AEROSOL POWDER, BREATH ACTIVATED) ADVAIR 250-50 MCG/DOSE Inhale 1 Puff by mouth 2 times a day.        Furosemide (Tab) LASIX 20 MG Take 20 mg by mouth every day.        Magnesium Oxide (Tab) MAG- MG Take 400 mg by mouth 2 times a day. takes 500 mg tab        non-formulary med non-formulary med  Inhale 2 L/min by mouth Continuous. Oxygen 2L x NC continuous  Indications: COPD        non-formulary med non-formulary med  Spray 2 L/min in nose Continuous. for shortness of breath        Potassium Chloride (Tab CR) KLOR-CON 10 MEQ Take 1 Tab by mouth every day.        Sertraline HCl (Tab) ZOLOFT 50 MG Take 1 Tab by mouth every day.        Tiotropium Bromide Monohydrate (Aero Soln) Tiotropium Bromide Monohydrate 2.5 MCG/ACT Inhale 2 Inhalation by mouth every day.        TraMADol HCl (Tab) ULTRAM 50 MG Take 1 Tab by mouth every 6 hours as needed for Moderate Pain.        Warfarin Sodium (Tab) COUMADIN 5 MG Take 1 Tab by mouth COUMADIN-DAILY. Titrate to INR 2-3.         .                 Medicines prescribed today were sent to:     [id number] - [place], [place] - [address] [place] [address]    Phone: [phone] Fax: [phone]    Open 24 Hours?: No      Medication refill instructions:       If your prescription bottle indicates you have medication refills left, it is not necessary to call your provider’s office. Please contact your pharmacy and they will refill your medication.    If your prescription bottle indicates you do not have any refills left, you may request refills at any time through one of the following ways: The online [employer] system (except Urgent Care), by calling your provider’s office, or by asking your pharmacy to contact your provider’s office with a refill request. Medication refills are processed only during regular business hours and may not be available until the next business day. Your provider may request additional information or to have a follow-up visit with you prior to refilling your medication.   *Please Note: Medication refills are assigned a new Rx number when refilled electronically. Your pharmacy may indicate that no refills were authorized even though a new prescription for the same medication is available at the pharmacy. Please request the medicine by name with the pharmacy before contacting your provider for a refill.        Warfarin Dosing Calendar   June 2017 Details    Sun Mon Tue Wed Thu Fri Sat         1               2               3                 4               5               6               7               8   4.3   Hold   See details      9      2.5 mg         10      2.5 mg           11      2.5 mg         12      5 mg         13      2.5 mg         14      2.5 mg         15      2.5 mg         16      2.5 mg         17      2.5 mg           18      2.5 mg         19      5 mg         20               21               22               23               24                 25               26                27               28               29               30                 Date Details   06/08 This INR check   INR: 4.3       Date of next INR:  6/19/2017         How to take your warfarin dose     To take:  2.5 mg Take 0.5 of a 5 mg tablet.    To take:  5 mg Take 1 of the 5 mg tablets.    Hold Do not take your warfarin dose. See the Details table to the right for additional instructions.                   [employer] Access Code: Activation code not generated  Current [employer] Status: Active

## 2017-06-09 ENCOUNTER — OFFICE VISIT (OUTPATIENT)
Dept: PULMONOLOGY | Facility: HOSPICE | Age: 72
End: 2017-06-09
Payer: MEDICARE

## 2017-06-09 ENCOUNTER — TELEPHONE (OUTPATIENT)
Dept: PULMONOLOGY | Facility: HOSPICE | Age: 72
End: 2017-06-09

## 2017-06-09 VITALS
HEART RATE: 102 BPM | DIASTOLIC BLOOD PRESSURE: 70 MMHG | WEIGHT: 114 LBS | BODY MASS INDEX: 19.46 KG/M2 | HEIGHT: 64 IN | TEMPERATURE: 97.9 F | RESPIRATION RATE: 16 BRPM | SYSTOLIC BLOOD PRESSURE: 110 MMHG | OXYGEN SATURATION: 98 %

## 2017-06-09 DIAGNOSIS — Z79.899 ON AMIODARONE THERAPY: ICD-10-CM

## 2017-06-09 DIAGNOSIS — Z95.2 STATUS POST MITRAL VALVE REPLACEMENT: ICD-10-CM

## 2017-06-09 DIAGNOSIS — R09.02 HYPOXEMIA: ICD-10-CM

## 2017-06-09 DIAGNOSIS — J44.9 CHRONIC OBSTRUCTIVE PULMONARY DISEASE, UNSPECIFIED COPD TYPE (HCC): ICD-10-CM

## 2017-06-09 DIAGNOSIS — Z87.09 H/O PNEUMOTHORAX: ICD-10-CM

## 2017-06-09 PROCEDURE — 99214 OFFICE O/P EST MOD 30 MIN: CPT | Performed by: INTERNAL MEDICINE

## 2017-06-09 NOTE — PROGRESS NOTES
Chief Complaint   Patient presents with   • Follow-Up     COPD       HPI:  The patient is a 71-year-old woman with advanced chronic obstructive pulmonary disease. She recently underwent cardiac surgery.  On 4/20/2017 she had a radical mitral  valve repair with a left-sided Maze procedure, left atrial appendage ligation  with intraoperative transesophageal echocardiogram.  On 4/22/2017   She required bilateral chest tube placement for bilateral pneumothoraces. She required ventilatory support for several days at that time. Preoperatively she was on supplemental oxygen at night. Now she is requiring her oxygen 24 7. She does have a portable oxygen concentrator. At rest on room air her saturation is 90%. However she does desaturate quickly with any activity. Pulsed oxygen was not adequate to maintain her oxygenation. At this time she will require continuous oxygen at 3 L/m. From a cardiac standpoint she is doing well. She obviously has significant chronic dyspnea. Pulmonary function testing shows an FEV1 of 0.68 L which is 30% of predicted. Her FEV1/FVC ratio is reduced at 42%. There is mild improvement with bronchodilator. Lung volumes demonstrate hyperinflation. DLCO was 29% of predicted. This study was done in October 2016.    Past Medical History   Diagnosis Date   • COPD (chronic obstructive pulmonary disease) (CMS-HCC)    • Hypertension    • Hyperlipidemia    • Heart valve disease    • Emphysema of lung (CMS-HCC)    • High cholesterol    • Sleep apnea      uses cpap   • Breath shortness      pt reports using o2 at night 2L, no problems at this time   • History of heart surgery        ROS:     Constitutional: Denies fevers, chills, night sweats, fatigue or weight loss  Eyes: Denies vision loss, pain, drainage, double vision  Ears, Nose, Throat: Denies earache, tinnitus, hoarseness  Cardiovascular: Denies chest pain, tightness, palpitations at this time  Respiratory: Denies  sputum production, cough, hemoptysis.  Has chronic shortness of breath.  Sleep: Denies, snoring, apnea. She is on supplemental oxygen at night  GI: Denies abdominal pain, nausea, vomiting, diarrhea  : Denies frequent urination, hematuria, painful urination  Musculoskeletal: Denies back pain, painful joints, sore muscles  Neurological: Denies headaches, seizures  Skin: Denies rashes, color changes  Psychiatric: Denies depression or thoughts of suicide  Hematologic: Denies bleeding tendency or clotting tendency  Allergic/Immunologic: Denies rhinitis, skin sensitivity    Social History     Social History   • Marital Status:      Spouse Name: N/A   • Number of Children: N/A   • Years of Education: N/A     Occupational History   • Not on file.     Social History Main Topics   • Smoking status: Former Smoker -- 0.50 packs/day for 38 years     Types: Cigarettes     Quit date: 10/11/2001   • Smokeless tobacco: Never Used   • Alcohol Use: 8.4 oz/week     14 Shots of liquor per week      Comment: 3 per day   • Drug Use: No   • Sexual Activity:     Partners: Male     Other Topics Concern   • Not on file     Social History Narrative     Sulfa drugs  Current Outpatient Prescriptions on File Prior to Visit   Medication Sig Dispense Refill   • diphenhydrAMINE (BENADRYL) 25 MG capsule Take 25 mg by mouth at bedtime as needed for Sleep.     • amiodarone (CORDARONE) 200 MG Tab Take 1 Tab by mouth every day. 30 Tab 3   • warfarin (COUMADIN) 5 MG Tab Take 1 Tab by mouth COUMADIN-DAILY. Titrate to INR 2-3. 30 Tab 3   • aspirin (ASA) 81 MG Chew Tab chewable tablet Take 1 Tab by mouth every day. 100 Tab    • furosemide (LASIX) 20 MG Tab Take 20 mg by mouth every day.     • fluticasone-salmeterol (ADVAIR) 250-50 MCG/DOSE AEROSOL POWDER, BREATH ACTIVATED Inhale 1 Puff by mouth 2 times a day. 3 Inhaler 3   • sertraline (ZOLOFT) 50 MG Tab Take 1 Tab by mouth every day. 30 Tab 3   • potassium chloride ER (K-TAB) 10 MEQ tablet Take 1 Tab by mouth every day. 90 Tab 3   •  "atorvastatin (LIPITOR) 10 MG Tab Take 1 Tab by mouth every day. 90 Tab 3   • magnesium oxide (MAG-OX) 400 MG Tab Take 400 mg by mouth 2 times a day. takes 500 mg tab     • albuterol (VENTOLIN HFA) 108 (90 BASE) MCG/ACT Aero Soln inhalation aerosol Inhale 2 Puffs by mouth every four hours as needed for Shortness of Breath. 3 Inhaler 3   • Tiotropium Bromide Monohydrate (SPIRIVA RESPIMAT) 2.5 MCG/ACT Aero Soln Inhale 2 Inhalation by mouth every day. 3 Inhaler 3   • non-formulary med Spray 2 L/min in nose Continuous. for shortness of breath     • tramadol (ULTRAM) 50 MG Tab Take 1 Tab by mouth every 6 hours as needed for Moderate Pain. (Patient not taking: Reported on 6/1/2017) 50 Tab 0   • non-formulary med Inhale 2 L/min by mouth Continuous. Oxygen 2L x NC continuous  Indications: COPD       No current facility-administered medications on file prior to visit.     Blood pressure 110/70, pulse 102, temperature 36.6 °C (97.9 °F), resp. rate 16, height 1.638 m (5' 4.49\"), weight 51.71 kg (114 lb), SpO2 98 %.  Family History   Problem Relation Age of Onset   • Cancer Father      colon cancer    • Hypertension Mother    • Cancer Sister 68     ovarian cancer       Physical Exam:  No distress at rest on supplemental oxygen  HEENT: PERRLA, EOMI, no scleral icterus, no nasal or oral lesions  Neck: No thyromegaly, no adenopathy, no bruits  M[place]ampatti: Grade II  Lungs: Very distant breath sounds. No current wheezes or crackles. No subcutaneous air.  Heart: Regular rate and rhythm, no gallops or murmurs  Abdomen: Soft, benign, no organomegaly  Extremities: No clubbing, cyanosis, or edema  Neurologic: Cranial nerve, motor, and sensory exam are normal    1. Chronic obstructive pulmonary disease, unspecified COPD type (CMS-HCC)    2. Status post mitral valve replacement    3. H/O pneumothorax    4. Hypoxemia    5. On amiodarone therapy        She seems to be recovering from a difficult hospitalization.  She is a bit frustrated that " her oxygen requirements are now increased and that she has to use supplemental oxygen during the day and with activity.  We will continue to check her oxygen saturations with each visit.  She will also pay attention to her saturations at altitude and when she is traveling.  She had her  do spend time at [facility]. They'll go on a cruise in the fall.  She will continue her current medication.  The have written a referral for pulmonary rehabilitation. She may not be able to tolerate cardiac rehabilitation because of her respiratory issues.  We will see her back in 3 months or sooner if needed. I will check a chest x-ray at that time.

## 2017-06-09 NOTE — MR AVS SNAPSHOT
"        Kelly Casebernadine   2017 2:40 PM   Office Visit   MRN: [id number]    Department:  [facility]   Dept Phone:  [phone]    Description:  Female : 1945   Provider:  Alexsander Gardner M.D.           Reason for Visit     Follow-Up COPD      Allergies as of 2017     Allergen Noted Reactions    Sulfa Drugs 10/05/2016   Rash    Rxn - years ago in her 20's      You were diagnosed with     Chronic obstructive pulmonary disease, unspecified COPD type (CMS-[facility])   [0880639]       Status post mitral valve replacement   [413713]       H/O pneumothorax   [475597]         Vital Signs     Blood Pressure Pulse Temperature Respirations Height Weight    110/70 mmHg 102 36.6 °C (97.9 °F) 16 1.638 m (5' 4.49\") 51.71 kg (114 lb)    Body Mass Index Oxygen Saturation Smoking Status             19.27 kg/m2 98% Former Smoker         Basic Information     Date Of Birth Sex Race Ethnicity Preferred Language    1945 Female White Non- English      Your appointments     2017  1:00 PM   CARE MANAGEMENT OUTREACH with Ileana Calix R.N.   [facility] (--)    [address]  [place] [address]   [phone]           ***IMPORTANT**** This is a phone visit only. Do not come into the office. The Care Manager will call you at the scheduled time for Care Manager Telephone Visit.            2017 10:40 AM   Established Patient with Ritika Jurado M.D.   [facility] [place] [facility] (--)    [address]  [place] [address] [phone]           You will be receiving a confirmation call a few days before your appointment from our automated call confirmation system.            2017 12:40 PM   PREVIOUS PATIENT with ELIOT Arevalo   [facility] (--)    [address], [address]  [place] [address] [phone]            2017  1:30 PM   Established Patient with [facility] EXAM 4   [facility]" [facility]  (--)    [address]  [place] [address]   [phone]            Jun 20, 2017 10:00 AM   ORIENTATION with ICR RN   [facility] ([facility])    [address]  [address]  [place] [id number] [phone]            Sep 12, 2017 12:40 PM   Established Patient Pul with A Rotation   [facility] (--)    [address]  [address]  [place] [id number] [phone]              Problem List              ICD-10-CM Priority Class Noted - Resolved    COPD (chronic obstructive pulmonary disease) (CMS-HCC) J44.9   10/11/2016 - Present    Osteopenia M85.80   10/11/2016 - Present    Obstructive sleep apnea syndrome G47.33   10/11/2016 - Present    Abnormal liver function K76.89   12/9/2015 - Present    Aortic atherosclerosis (CMS-HCC) I70.0   9/10/2015 - Present    Atrial flutter (CMS-HCC) I48.92   12/15/2015 - Present    Congestive heart failure (CMS-HCC) I50.9   7/7/2016 - Present    Chronic respiratory failure with hypoxia (CMS-HCC) J96.11   11/23/2015 - Present    History of colonic polyps Z86.010   3/3/2008 - Present    Hyperlipidemia E78.5   9/10/2015 - Present    Hypertension I10   3/10/2008 - Present    Prediabetes R73.03   8/15/2013 - Present    Anxiety F41.9   3/15/2017 - Present    Essential (primary) hypertension I10   3/17/2017 - Present    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   4/24/2017 - Present    Status post mitral valve replacement Z95.2   5/19/2017 - Present      Health Maintenance        Date Due Completion Dates    BONE DENSITY 10/10/2009 ---    MAMMOGRAM 11/29/2017 11/29/2016, 9/11/2015, 9/11/2015    COLONOSCOPY 5/6/2019 5/6/2009    IMM DTaP/Tdap/Td Vaccine (2 - Td) 9/17/2022 9/17/2012            Current Immunizations     13-VALENT PCV PREVNAR 9/10/2015    Influenza TIV (IM) 1/1/2014    Pneumococcal polysaccharide vaccine (PPSV-23) 10/27/2016    SHINGLES VACCINE 8/15/2013    Tdap Vaccine 9/17/2012         Below and/or attached are the medications your provider expects you to take. Review all of your home medications and newly ordered medications with your provider and/or pharmacist. Follow medication instructions as directed by your provider and/or pharmacist. Please keep your medication list with you and share with your provider. Update the information when medications are discontinued, doses are changed, or new medications (including over-the-counter products) are added; and carry medication information at all times in the event of emergency situations     Allergies:  SULFA DRUGS - Rash               Medications  Valid as of: June 09, 2017 -  3:24 PM    Generic Name Brand Name Tablet Size Instructions for use    Albuterol Sulfate (Aero Soln) albuterol 108 (90 BASE) MCG/ACT Inhale 2 Puffs by mouth every four hours as needed for Shortness of Breath.        Amiodarone HCl (Tab) CORDARONE 200 MG Take 1 Tab by mouth every day.        Aspirin (Chew Tab) ASA 81 MG Take 1 Tab by mouth every day.        Atorvastatin Calcium (Tab) LIPITOR 10 MG Take 1 Tab by mouth every day.        DiphenhydrAMINE HCl (Cap) BENADRYL 25 MG Take 25 mg by mouth at bedtime as needed for Sleep.        Fluticasone-Salmeterol (AEROSOL POWDER, BREATH ACTIVATED) ADVAIR 250-50 MCG/DOSE Inhale 1 Puff by mouth 2 times a day.        Furosemide (Tab) LASIX 20 MG Take 20 mg by mouth every day.        Magnesium Oxide (Tab) MAG- MG Take 400 mg by mouth 2 times a day. takes 500 mg tab        non-formulary med non-formulary med  Inhale 2 L/min by mouth Continuous. Oxygen 2L x NC continuous  Indications: COPD        non-formulary med non-formulary med  Spray 2 L/min in nose Continuous. for shortness of breath        Potassium Chloride (Tab CR) KLOR-CON 10 MEQ Take 1 Tab by mouth every day.        Sertraline HCl (Tab) ZOLOFT 50 MG Take 1 Tab by mouth every day.        Tiotropium Bromide Monohydrate (Aero Soln) Tiotropium Bromide Monohydrate 2.5  MCG/ACT Inhale 2 Inhalation by mouth every day.        TraMADol HCl (Tab) ULTRAM 50 MG Take 1 Tab by mouth every 6 hours as needed for Moderate Pain.        Warfarin Sodium (Tab) COUMADIN 5 MG Take 1 Tab by mouth COUMADIN-DAILY. Titrate to INR 2-3.        .                 Medicines prescribed today were sent to:     [id number] - [place], [place] - [address]    [address] [place] [address]    Phone: [phone] Fax: [phone]    Open 24 Hours?: No      Medication refill instructions:       If your prescription bottle indicates you have medication refills left, it is not necessary to call your provider’s office. Please contact your pharmacy and they will refill your medication.    If your prescription bottle indicates you do not have any refills left, you may request refills at any time through one of the following ways: The online [email or web address] system (except Urgent Care), by calling your provider’s office, or by asking your pharmacy to contact your provider’s office with a refill request. Medication refills are processed only during regular business hours and may not be available until the next business day. Your provider may request additional information or to have a follow-up visit with you prior to refilling your medication.   *Please Note: Medication refills are assigned a new Rx number when refilled electronically. Your pharmacy may indicate that no refills were authorized even though a new prescription for the same medication is available at the pharmacy. Please request the medicine by name with the pharmacy before contacting your provider for a refill.        Referral     A referral request has been sent to our patient care coordination department. Please allow 3-5 business days for us to process this request and contact you either by phone or mail. If you do not hear from us by the 5th business day, please call us at [phone].           [email or web address] Access Code: Activation code not  generated  Current MyChart Status: Active

## 2017-06-09 NOTE — TELEPHONE ENCOUNTER
Referral and copy of PFT  from 10/19/16 and demos were faxed to pulmonary rehab [phone]. Confirmation received. Patient was also given copy of PFT and referral with phone number to make inquiry for scheduling.

## 2017-06-13 ENCOUNTER — TELEPHONE (OUTPATIENT)
Dept: MEDICAL GROUP | Facility: LAB | Age: 72
End: 2017-06-13

## 2017-06-13 NOTE — TELEPHONE ENCOUNTER
ESTABLISHED PATIENT PRE-VISIT PLANNING     Note: Patient will not be contacted if there is no indication to call.     1.  Reviewed notes from the last few office visits within the medical group: Yes    2.  If any orders were placed at last visit or intended to be done for this visit (i.e. 6 mos follow-up), do we have Results/Consult Notes?        •  Labs - Last labs completed by Dr. Bloch on 5/30/17 and Tayler Kimball-APRN on 5/5/17         •  Imaging - Imaging ordered, completed and results are in chart.  CXR done on 5/5/17.  Colonoscopy done on 5/6/09 by Dr. Schaefer at [facility]       •  Referrals - Referral ordered, patient was seen and consult notes are in chart. Care Teams updated  YES.  Referral to Health Improvement Program by Dr. Bloch.  Referral to cardiology, seen by Sera Cordero on 5/19/17, who is collaborating with Dr. Bydr    3. Is this appointment scheduled as a Hospital Follow-Up? No    4.  Immunizations were updated in [employer] using WebIZ?: Yes       •  Web Iz Recommendations: FLU HEPATITIS A  HEPATITIS B TD    5.  Patient is due for the following Health Maintenance Topics:   Health Maintenance Due   Topic Date Due   • BONE DENSITY  10/10/2009       - Patient has completed PNEUMOVAX (PPSV23), PREVNAR (PCV13) , TDAP and ZOSTAVAX (Shingles) Immunization(s) per WebIZ. Chart has been updated.    6.  Patient was NOT informed to arrive 15 min prior to their scheduled appointment and bring in their medication bottles.

## 2017-06-14 ENCOUNTER — OFFICE VISIT (OUTPATIENT)
Dept: MEDICAL GROUP | Facility: LAB | Age: 72
End: 2017-06-14
Payer: MEDICARE

## 2017-06-14 VITALS
BODY MASS INDEX: 19.68 KG/M2 | HEIGHT: 64 IN | WEIGHT: 115.3 LBS | OXYGEN SATURATION: 95 % | HEART RATE: 107 BPM | DIASTOLIC BLOOD PRESSURE: 80 MMHG | TEMPERATURE: 98.1 F | SYSTOLIC BLOOD PRESSURE: 100 MMHG | RESPIRATION RATE: 16 BRPM

## 2017-06-14 DIAGNOSIS — J44.9 CHRONIC OBSTRUCTIVE PULMONARY DISEASE, UNSPECIFIED COPD TYPE (HCC): ICD-10-CM

## 2017-06-14 DIAGNOSIS — I50.42 CHRONIC COMBINED SYSTOLIC AND DIASTOLIC CONGESTIVE HEART FAILURE (HCC): ICD-10-CM

## 2017-06-14 DIAGNOSIS — J96.11 CHRONIC RESPIRATORY FAILURE WITH HYPOXIA (HCC): ICD-10-CM

## 2017-06-14 PROBLEM — J86.0 BRONCHOPLEURAL FISTULA (HCC): Status: RESOLVED | Noted: 2017-04-24 | Resolved: 2017-06-14

## 2017-06-14 PROCEDURE — 99214 OFFICE O/P EST MOD 30 MIN: CPT | Performed by: FAMILY MEDICINE

## 2017-06-14 NOTE — PROGRESS NOTES
Subjective:   Kelly Lovett is a 71 y.o. female here today for   Chief Complaint   Patient presents with   • Follow-Up     lungs and heart       1. Chronic respiratory failure with hypoxia (CMS-HCC)  This is chronic. Patient has severe COPD as well as CHF. She is currently on 2.5 L of oxygen at rest and 3 L of oxygen with ambulation. She would like a concentrator prescribed today. She has been seen by pulmonology who thinks that she will need this for at least 3 months. When she tries to go off of her oxygen at home it down to 87% based on their home pulse oximeter. Starting pulmonary rehabilitation.    2. Chronic obstructive pulmonary disease, unspecified COPD type (CMS-HCC)  This is chronic. Currently seeing pulmonology. She is compliant with her Spiriva, Advair, and albuterol as needed. She is a former smoker. She is currently on oxygen as above. She has a follow-up with pulmonology in 3 months. She is going to be starting pulmonary rehabilitation.    3. Chronic combined systolic and diastolic congestive heart failure (CMS-HCC)  Chronic. Patient has an appointment with cardiology Sarah Rodriguez on 6/19/17. She is complying and with her Lasix aspirin and amiodarone. She is euvolemic and denies any lower extremity edema. She is short of breath at baseline.        Current medicines (including changes today)  Current Outpatient Prescriptions   Medication Sig Dispense Refill   • NON SPECIFIED Please provide patient with a portable oxygen concentrator 2.5L continuous flow at all times for 3 months    ICD10 Chronic Respiratory Failure with hypoxia J96.11  Pulse ox off of oxygen 87%  O2 off of 1 Each 0   • non-formulary med Spray 2 L/min in nose Continuous. for shortness of breath     • tramadol (ULTRAM) 50 MG Tab Take 1 Tab by mouth every 6 hours as needed for Moderate Pain. (Patient not taking: Reported on 6/1/2017) 50 Tab 0   • non-formulary med Inhale 2 L/min by mouth Continuous. Oxygen 2L x NC continuous   "Indications: COPD     • diphenhydrAMINE (BENADRYL) 25 MG capsule Take 25 mg by mouth at bedtime as needed for Sleep.     • amiodarone (CORDARONE) 200 MG Tab Take 1 Tab by mouth every day. 30 Tab 3   • warfarin (COUMADIN) 5 MG Tab Take 1 Tab by mouth COUMADIN-DAILY. Titrate to INR 2-3. 30 Tab 3   • aspirin (ASA) 81 MG Chew Tab chewable tablet Take 1 Tab by mouth every day. 100 Tab    • furosemide (LASIX) 20 MG Tab Take 20 mg by mouth every day.     • fluticasone-salmeterol (ADVAIR) 250-50 MCG/DOSE AEROSOL POWDER, BREATH ACTIVATED Inhale 1 Puff by mouth 2 times a day. 3 Inhaler 3   • sertraline (ZOLOFT) 50 MG Tab Take 1 Tab by mouth every day. 30 Tab 3   • potassium chloride ER (K-TAB) 10 MEQ tablet Take 1 Tab by mouth every day. 90 Tab 3   • atorvastatin (LIPITOR) 10 MG Tab Take 1 Tab by mouth every day. 90 Tab 3   • magnesium oxide (MAG-OX) 400 MG Tab Take 400 mg by mouth 2 times a day. takes 500 mg tab     • albuterol (VENTOLIN HFA) 108 (90 BASE) MCG/ACT Aero Soln inhalation aerosol Inhale 2 Puffs by mouth every four hours as needed for Shortness of Breath. 3 Inhaler 3   • Tiotropium Bromide Monohydrate (SPIRIVA RESPIMAT) 2.5 MCG/ACT Aero Soln Inhale 2 Inhalation by mouth every day. 3 Inhaler 3     No current facility-administered medications for this visit.     She  has a past medical history of COPD (chronic obstructive pulmonary disease) (CMS-HCC); Hypertension; Hyperlipidemia; Heart valve disease; Emphysema of lung (CMS-HCC); High cholesterol; Sleep apnea; Breath shortness; and History of heart surgery.    ROS   No fevers  No bowel changes  No LE edema       Objective:     Blood pressure 100/80, pulse 107, temperature 36.7 °C (98.1 °F), resp. rate 16, height 1.638 m (5' 4.49\"), weight 52.3 kg (115 lb 4.8 oz), SpO2 95 %. Body mass index is 19.49 kg/(m^2).    Off of oxygen, her pulse oximetry drops to 87%  Physical Exam:  Constitutional: Alert, no distress.  Skin: Warm, dry, good turgor, no rashes in visible " areas.  Eye: Equal, round and reactive, conjunctiva clear, lids normal.  ENMT: Lips without lesions, good dentition, oropharynx clear.  Neck: Trachea midline, no masses, no thyromegaly. No cervical or supraclavicular lymphadenopathy  Respiratory: Unlabored respiratory effort, currently on oxygen nasal cannula, poor air movement throughout  Cardiovascular: Normal S1, S2, RRR, 3/6murmur, no edema.  Abdomen: Soft, non-tender, no masses, no hepatosplenomegaly.  Psych: Alert and oriented x3, normal affect and mood.      Assessment and Plan:   The following treatment plan was discussed    1. Chronic respiratory failure with hypoxia (CMS-HCC)  Chronic, stable  Prescription sent for home oxygen concentrator continuous flow at 3 L. She needs this for outings  Continue pulmonary regimen and follow-up with pulmonology     2. Chronic obstructive pulmonary disease, unspecified COPD type (CMS-HCC)  Chronic, stable, continue inhalers and oxygen as above  No current exacerbations    3. Chronic combined systolic and diastolic congestive heart failure (CMS-HCC)  Chronic, stable, follows with cardiology, currently euvolemic      Followup: Return in about 2 months (around 8/28/2017).       This note was created using voice recognition software. I have made every reasonable attempt to correct errors, however, I do anticipate some grammatical errors.

## 2017-06-14 NOTE — MR AVS SNAPSHOT
"        [facility]   2017 10:40 AM   Office Visit   MRN: [id number]    Department:  [facility]   Dept Phone:  [phone]    Description:  Female : 1945   Provider:  Ritika Jurado M.D.           Reason for Visit     Follow-Up lungs and heart      Allergies as of 2017     Allergen Noted Reactions    Sulfa Drugs 10/05/2016   Rash    Rxn - years ago in her 20's      You were diagnosed with     Chronic respiratory failure with hypoxia (CMS-[facility])   [283905]       Chronic obstructive pulmonary disease, unspecified COPD type (CMS-[facility])   [5546177]       Chronic combined systolic and diastolic congestive heart failure (CMS-[facility])   [416280]         Vital Signs     Blood Pressure Pulse Temperature Respirations Height Weight    100/80 mmHg 107 36.7 °C (98.1 °F) 16 1.638 m (5' 4.49\") 52.3 kg (115 lb 4.8 oz)    Body Mass Index Oxygen Saturation Smoking Status             19.49 kg/m2 95% Former Smoker         Basic Information     Date Of Birth Sex Race Ethnicity Preferred Language    1945 Female White Non- English      Your appointments     2017 12:40 PM   PREVIOUS PATIENT with ELIOT Arevalo   [facility] (--)    [address], [address]  [place] [id number] [phone]            2017  1:30 PM   Established Patient with [facility] EXAM 4   [facility]  (--)    [address]  [place] [address]   [phone]            2017 10:00 AM   ORIENTATION with ICR RN   [facility] ([facility])    [address]  [address]  [place] [address] [phone]            Sep 08, 2017  1:40 PM   Established Patient with Ritika Jurado M.D.   [facility] [place] (--)    [address]  [address]  [place] [address] [phone]           You will be receiving a confirmation call a few days before your " appointment from our automated call confirmation system.            Sep 12, 2017 12:40 PM   Established Patient Pul with A Rotation   [facility] (--)    [address]  [address]  [place] [id number] [phone]              Problem List              ICD-10-CM Priority Class Noted - Resolved    COPD (chronic obstructive pulmonary disease) (CMS-HCC) J44.9   10/11/2016 - Present    Osteopenia M85.80   10/11/2016 - Present    Obstructive sleep apnea syndrome G47.33   10/11/2016 - Present    Abnormal liver function K76.89   12/9/2015 - Present    Aortic atherosclerosis (CMS-HCC) I70.0   9/10/2015 - Present    Atrial flutter (CMS-HCC) I48.92   12/15/2015 - Present    Congestive heart failure (CMS-HCC) I50.9   7/7/2016 - Present    Chronic respiratory failure with hypoxia (CMS-HCC) J96.11   11/23/2015 - Present    History of colonic polyps Z86.010   3/3/2008 - Present    Hyperlipidemia E78.5   9/10/2015 - Present    Hypertension I10   3/10/2008 - Present    Prediabetes R73.03   8/15/2013 - Present    Anxiety F41.9   3/15/2017 - Present    Essential (primary) hypertension I10   3/17/2017 - Present    Status post mitral valve replacement Z95.2   5/19/2017 - Present      Health Maintenance        Date Due Completion Dates    BONE DENSITY 10/10/2009 ---    MAMMOGRAM 11/29/2017 11/29/2016, 9/11/2015, 9/11/2015    COLONOSCOPY 5/6/2019 5/6/2009    IMM DTaP/Tdap/Td Vaccine (2 - Td) 9/17/2022 9/17/2012, 1/25/2012            Current Immunizations     13-VALENT PCV PREVNAR 9/10/2015    Hepatitis B Vaccine Recombivax (Adol/Adult) 1/27/2000    Influenza TIV (IM) 1/1/2014    Pneumococcal polysaccharide vaccine (PPSV-23) 10/27/2016    SHINGLES VACCINE 8/15/2013    Tdap Vaccine 9/17/2012, 1/25/2012      Below and/or attached are the medications your provider expects you to take. Review all of your home medications and newly ordered medications with your provider and/or pharmacist. Follow medication instructions  as directed by your provider and/or pharmacist. Please keep your medication list with you and share with your provider. Update the information when medications are discontinued, doses are changed, or new medications (including over-the-counter products) are added; and carry medication information at all times in the event of emergency situations     Allergies:  SULFA DRUGS - Rash               Medications  Valid as of: June 14, 2017 - 11:07 AM    Generic Name Brand Name Tablet Size Instructions for use    Albuterol Sulfate (Aero Soln) albuterol 108 (90 BASE) MCG/ACT Inhale 2 Puffs by mouth every four hours as needed for Shortness of Breath.        Amiodarone HCl (Tab) CORDARONE 200 MG Take 1 Tab by mouth every day.        Aspirin (Chew Tab) ASA 81 MG Take 1 Tab by mouth every day.        Atorvastatin Calcium (Tab) LIPITOR 10 MG Take 1 Tab by mouth every day.        DiphenhydrAMINE HCl (Cap) BENADRYL 25 MG Take 25 mg by mouth at bedtime as needed for Sleep.        Fluticasone-Salmeterol (AEROSOL POWDER, BREATH ACTIVATED) ADVAIR 250-50 MCG/DOSE Inhale 1 Puff by mouth 2 times a day.        Furosemide (Tab) LASIX 20 MG Take 20 mg by mouth every day.        Magnesium Oxide (Tab) MAG- MG Take 400 mg by mouth 2 times a day. takes 500 mg tab        NON SPECIFIED   Please provide patient with a portable oxygen concentrator 2.5L continuous flow at all times for 3 months    ICD10 Chronic Respiratory Failure with hypoxia J96.11  Pulse ox off of oxygen 87%  O2 off of        non-formulary med non-formulary med  Inhale 2 L/min by mouth Continuous. Oxygen 2L x NC continuous  Indications: COPD        non-formulary med non-formulary med  Spray 2 L/min in nose Continuous. for shortness of breath        Potassium Chloride (Tab CR) KLOR-CON 10 MEQ Take 1 Tab by mouth every day.        Sertraline HCl (Tab) ZOLOFT 50 MG Take 1 Tab by mouth every day.        Tiotropium Bromide Monohydrate (Aero Soln) Tiotropium Bromide Monohydrate  2.5 MCG/ACT Inhale 2 Inhalation by mouth every day.        TraMADol HCl (Tab) ULTRAM 50 MG Take 1 Tab by mouth every 6 hours as needed for Moderate Pain.        Warfarin Sodium (Tab) COUMADIN 5 MG Take 1 Tab by mouth COUMADIN-DAILY. Titrate to INR 2-3.        .                 Medicines prescribed today were sent to:     [id number] - [place], [place] - [address]    [address] [place] [address]    Phone: [phone] Fax: [phone]    Open 24 Hours?: No      Medication refill instructions:       If your prescription bottle indicates you have medication refills left, it is not necessary to call your provider’s office. Please contact your pharmacy and they will refill your medication.    If your prescription bottle indicates you do not have any refills left, you may request refills at any time through one of the following ways: The online TRData system (except Urgent Care), by calling your provider’s office, or by asking your pharmacy to contact your provider’s office with a refill request. Medication refills are processed only during regular business hours and may not be available until the next business day. Your provider may request additional information or to have a follow-up visit with you prior to refilling your medication.   *Please Note: Medication refills are assigned a new Rx number when refilled electronically. Your pharmacy may indicate that no refills were authorized even though a new prescription for the same medication is available at the pharmacy. Please request the medicine by name with the pharmacy before contacting your provider for a refill.           TRData Access Code: Activation code not generated  Current TRData Status: Active

## 2017-06-19 ENCOUNTER — ANTICOAGULATION VISIT (OUTPATIENT)
Dept: VASCULAR LAB | Facility: MEDICAL CENTER | Age: 72
End: 2017-06-19
Attending: INTERNAL MEDICINE
Payer: MEDICARE

## 2017-06-19 ENCOUNTER — OFFICE VISIT (OUTPATIENT)
Dept: CARDIOLOGY | Facility: MEDICAL CENTER | Age: 72
End: 2017-06-19
Payer: MEDICARE

## 2017-06-19 VITALS
SYSTOLIC BLOOD PRESSURE: 98 MMHG | DIASTOLIC BLOOD PRESSURE: 64 MMHG | WEIGHT: 116 LBS | HEART RATE: 106 BPM | BODY MASS INDEX: 19.33 KG/M2 | OXYGEN SATURATION: 97 % | RESPIRATION RATE: 12 BRPM | HEIGHT: 65 IN

## 2017-06-19 DIAGNOSIS — I34.0 SEVERE MITRAL REGURGITATION: ICD-10-CM

## 2017-06-19 DIAGNOSIS — I48.0 PAROXYSMAL ATRIAL FIBRILLATION (HCC): ICD-10-CM

## 2017-06-19 DIAGNOSIS — J44.9 CHRONIC OBSTRUCTIVE PULMONARY DISEASE, UNSPECIFIED COPD TYPE (HCC): ICD-10-CM

## 2017-06-19 DIAGNOSIS — Z98.890 H/O MAZE PROCEDURE: ICD-10-CM

## 2017-06-19 DIAGNOSIS — I48.92 ATRIAL FLUTTER, UNSPECIFIED TYPE (HCC): ICD-10-CM

## 2017-06-19 DIAGNOSIS — Z98.890 S/P MITRAL VALVE REPAIR: ICD-10-CM

## 2017-06-19 DIAGNOSIS — I10 ESSENTIAL HYPERTENSION: ICD-10-CM

## 2017-06-19 LAB
INR BLD: 1.5 (ref 0.9–1.2)
INR PPP: 1.5 (ref 2–3.5)

## 2017-06-19 PROCEDURE — 85610 PROTHROMBIN TIME: CPT

## 2017-06-19 PROCEDURE — 99214 OFFICE O/P EST MOD 30 MIN: CPT | Performed by: NURSE PRACTITIONER

## 2017-06-19 PROCEDURE — 99212 OFFICE O/P EST SF 10 MIN: CPT | Performed by: NURSE PRACTITIONER

## 2017-06-19 RX ORDER — ACETAMINOPHEN 500 MG
500 TABLET ORAL
COMMUNITY

## 2017-06-19 ASSESSMENT — ENCOUNTER SYMPTOMS
PND: 0
ORTHOPNEA: 0
FEVER: 0
COUGH: 0
PALPITATIONS: 0
CLAUDICATION: 0
SHORTNESS OF BREATH: 1
MYALGIAS: 0
DIZZINESS: 0
ABDOMINAL PAIN: 0

## 2017-06-19 NOTE — MR AVS SNAPSHOT
"        Kelly Tejeda [facility]   2017 12:40 PM   Office Visit   MRN: [id number]    Department:  [facility]   Dept Phone:  [phone]    Description:  Female : 1945   Provider:  ELIOT Arevalo           Reason for Visit     Follow-Up PP of TT. On oxygen constant at 3 LPM via NC.      Allergies as of 2017     Allergen Noted Reactions    Sulfa Drugs 10/05/2016   Rash    Rxn - years ago in her 's      Vital Signs     Blood Pressure Pulse Respirations Height Weight Body Mass Index    98/64 mmHg 106 12 1.651 m (5' 5\") 52.617 kg (116 lb) 19.30 kg/m2    Oxygen Saturation Smoking Status                97% Former Smoker          Basic Information     Date Of Birth Sex Race Ethnicity Preferred Language    1945 Female White Non- English      Your appointments     2017  1:30 PM   Established Patient with [facility] EXAM 4   [facility]  (--)    [address] [place] [address]   [phone]            Aug 23, 2017 11:30 AM   FOLLOW UP with Dalia Li M.D.   [facility] (--)    [address] [place] [address] [address]  [place] [address] [phone]            Sep 08, 2017  1:40 PM   Established Patient with Ritika Jurado M.D.   [facility] (--)    [address]  [place] [address] [phone]           You will be receiving a confirmation call a few days before your appointment from our automated call confirmation system.            Sep 12, 2017 12:40 PM   Established Patient Pul with A Rotation   [facility] (--)    [address]  [address]  [place] [address] [phone]              Problem List              ICD-10-CM Priority Class Noted - Resolved    COPD (chronic obstructive pulmonary disease) (CMS-HCC) J44.9   10/11/2016 - Present    Osteopenia M85.80   10/11/2016 - Present    Obstructive sleep apnea " syndrome G47.33   10/11/2016 - Present    Abnormal liver function K76.89   12/9/2015 - Present    Aortic atherosclerosis (CMS-HCC) I70.0   9/10/2015 - Present    Atrial flutter (CMS-HCC) I48.92   12/15/2015 - Present    Congestive heart failure (CMS-HCC) I50.9   7/7/2016 - Present    Chronic respiratory failure with hypoxia (CMS-HCC) J96.11   11/23/2015 - Present    History of colonic polyps Z86.010   3/3/2008 - Present    Hyperlipidemia E78.5   9/10/2015 - Present    Hypertension I10   3/10/2008 - Present    Prediabetes R73.03   8/15/2013 - Present    Anxiety F41.9   3/15/2017 - Present    Essential (primary) hypertension I10   3/17/2017 - Present    Status post mitral valve replacement Z95.2   5/19/2017 - Present      Health Maintenance        Date Due Completion Dates    BONE DENSITY 10/10/2009 ---    MAMMOGRAM 11/29/2017 11/29/2016, 9/11/2015, 9/11/2015    COLONOSCOPY 5/6/2019 5/6/2009    IMM DTaP/Tdap/Td Vaccine (2 - Td) 9/17/2022 9/17/2012, 1/25/2012            Current Immunizations     13-VALENT PCV PREVNAR 9/10/2015    Hepatitis B Vaccine Recombivax (Adol/Adult) 1/27/2000    Influenza TIV (IM) 1/1/2014    Pneumococcal polysaccharide vaccine (PPSV-23) 10/27/2016    SHINGLES VACCINE 8/15/2013    Tdap Vaccine 9/17/2012, 1/25/2012      Below and/or attached are the medications your provider expects you to take. Review all of your home medications and newly ordered medications with your provider and/or pharmacist. Follow medication instructions as directed by your provider and/or pharmacist. Please keep your medication list with you and share with your provider. Update the information when medications are discontinued, doses are changed, or new medications (including over-the-counter products) are added; and carry medication information at all times in the event of emergency situations     Allergies:  SULFA DRUGS - Rash               Medications  Valid as of: June 19, 2017 -  1:29 PM    Generic Name Brand Name  Tablet Size Instructions for use    Acetaminophen   Take 1 Tab by mouth as needed.        Albuterol Sulfate (Aero Soln) albuterol 108 (90 BASE) MCG/ACT Inhale 2 Puffs by mouth every four hours as needed for Shortness of Breath.        Amiodarone HCl (Tab) CORDARONE 200 MG Take 1 Tab by mouth every day.        Aspirin (Chew Tab) ASA 81 MG Take 1 Tab by mouth every day.        Atorvastatin Calcium (Tab) LIPITOR 10 MG Take 1 Tab by mouth every day.        DiphenhydrAMINE HCl (Cap) BENADRYL 25 MG Take 25 mg by mouth at bedtime as needed for Sleep.        Fluticasone-Salmeterol (AEROSOL POWDER, BREATH ACTIVATED) ADVAIR 250-50 MCG/DOSE Inhale 1 Puff by mouth 2 times a day.        Furosemide (Tab) LASIX 20 MG Take 20 mg by mouth every day.        Magnesium Oxide (Tab) MAG- MG Take 400 mg by mouth 2 times a day. takes 500 mg tab        NON SPECIFIED   Please provide patient with a portable oxygen concentrator 2.5L continuous flow at all times for 3 months    ICD10 Chronic Respiratory Failure with hypoxia J96.11  Pulse ox off of oxygen 87%  O2 off of        non-formulary med non-formulary med  Inhale 2 L/min by mouth Continuous. Oxygen 2L x NC continuous  Indications: COPD        Potassium Chloride (Tab CR) KLOR-CON 10 MEQ Take 1 Tab by mouth every day.        Sertraline HCl (Tab) ZOLOFT 50 MG Take 1 Tab by mouth every day.        Tiotropium Bromide Monohydrate (Aero Soln) Tiotropium Bromide Monohydrate 2.5 MCG/ACT Inhale 2 Inhalation by mouth every day.        Warfarin Sodium (Tab) COUMADIN 5 MG Take 1 Tab by mouth COUMADIN-DAILY. Titrate to INR 2-3.        .                 Medicines prescribed today were sent to:     [id number] - [place], [place] - [address] [place] [address]    Phone: [phone] Fax: [phone]    Open 24 Hours?: No      Medication refill instructions:       If your prescription bottle indicates you have medication refills left, it is not necessary to call your  provider’s office. Please contact your pharmacy and they will refill your medication.    If your prescription bottle indicates you do not have any refills left, you may request refills at any time through one of the following ways: The online [employer] system (except Urgent Care), by calling your provider’s office, or by asking your pharmacy to contact your provider’s office with a refill request. Medication refills are processed only during regular business hours and may not be available until the next business day. Your provider may request additional information or to have a follow-up visit with you prior to refilling your medication.   *Please Note: Medication refills are assigned a new Rx number when refilled electronically. Your pharmacy may indicate that no refills were authorized even though a new prescription for the same medication is available at the pharmacy. Please request the medicine by name with the pharmacy before contacting your provider for a refill.           [employer] Access Code: Activation code not generated  Current [employer] Status: Active

## 2017-06-19 NOTE — PROGRESS NOTES
Anticoagulation Summary as of 6/19/2017     INR goal 2.0-3.0   Selected INR 1.5! (6/19/2017)   Maintenance plan 5 mg (5 mg x 1) on Mon; 2.5 mg (5 mg x 0.5) all other days   Weekly total 20 mg   Plan last modified Amanda Perera PHARMD (6/8/2017)   Next INR check 6/26/2017   Target end date     Indications   Atrial flutter (CMS-HCC) [I48.92]  Mitral valve insufficiency (Resolved) [I34.0]         Anticoagulation Episode Summary     INR check location     Preferred lab     Send INR reminders to     [facility]      Anticoagulation Care Providers     Provider Role Specialty Phone number    Lacey Porras M.D.  Cardiac Surgery [phone]    Amanda Perera PHARMD           Patient is subtherapeutic today. She missed Thur and Friday's doses by mistake, then took 5 mg on Saturday to make up for it. Denies any medication or diet changes. No current symptoms of bleeding or thrombosis reported. Will increase one dose then continue current regimen. Follow up in 1 week.    Next Appointment: Monday, June 26, 2017 at 11:15 am at [facility].    Paula XIE

## 2017-06-19 NOTE — MR AVS SNAPSHOT
[facility]   [facility]   Anticoagulation Visit   MRN: [id number]    Department:  Vascular Medicine   Dept Phone:  [phone]    Description:  Female : 1945   Provider:  [facility] EXAM 4           Allergies as of 2017     Allergen Noted Reactions    Sulfa Drugs 10/05/2016   Rash    Rxn - years ago in her 20's      You were diagnosed with     Atrial flutter, unspecified type (CMS-HCC)   [1906605]         Vital Signs     Smoking Status                   Former Smoker           Basic Information     Date Of Birth Sex Race Ethnicity Preferred Language    1945 Female White Non- English      Your appointments     2017 11:15 AM   Anti-Coag Routine with [facility] PHARMACIST   [facility]    [address]  [address]  [place] [address] [phone]            Aug 23, 2017 11:30 AM   FOLLOW UP with Dalia Li M.D.   [facility] (--)    [address], [address]  [place] [address] [phone]            Sep 08, 2017  1:40 PM   Established Patient with Ritika Jurado M.D.   [facility] (--)    [address]  [address]  [place] [address] [phone]           You will be receiving a confirmation call a few days before your appointment from our automated call confirmation system.            Sep 12, 2017 12:40 PM   Established Patient Pul with A Rotation   [facility] (--)    [address]  [address]  [place] [address] [phone]              Problem List              ICD-10-CM Priority Class Noted - Resolved    COPD (chronic obstructive pulmonary disease) (CMS-HCC) J44.9   10/11/2016 - Present    Osteopenia M85.80   10/11/2016 - Present    Obstructive sleep apnea syndrome G47.33   10/11/2016 - Present    Abnormal liver function K76.89   2015 - Present    Aortic atherosclerosis (CMS-[facility]) I70.0    9/10/2015 - Present    Atrial flutter (CMS-HCC) I48.92   12/15/2015 - Present    Congestive heart failure (CMS-HCC) I50.9   7/7/2016 - Present    Chronic respiratory failure with hypoxia (CMS-HCC) J96.11   11/23/2015 - Present    History of colonic polyps Z86.010   3/3/2008 - Present    Hyperlipidemia E78.5   9/10/2015 - Present    Hypertension I10   3/10/2008 - Present    Prediabetes R73.03   8/15/2013 - Present    Anxiety F41.9   3/15/2017 - Present    Essential (primary) hypertension I10   3/17/2017 - Present    Status post mitral valve replacement Z95.2   5/19/2017 - Present    Paroxysmal atrial fibrillation (CMS-HCC) I48.0   6/19/2017 - Present      Health Maintenance        Date Due Completion Dates    BONE DENSITY 10/10/2009 ---    MAMMOGRAM 11/29/2017 11/29/2016, 9/11/2015, 9/11/2015    COLONOSCOPY 5/6/2019 5/6/2009    IMM DTaP/Tdap/Td Vaccine (2 - Td) 9/17/2022 9/17/2012, 1/25/2012            Results     POCT Protime      Component    INR    1.5                        Current Immunizations     13-VALENT PCV PREVNAR 9/10/2015    Hepatitis B Vaccine Recombivax (Adol/Adult) 1/27/2000    Influenza TIV (IM) 1/1/2014    Pneumococcal polysaccharide vaccine (PPSV-23) 10/27/2016    SHINGLES VACCINE 8/15/2013    Tdap Vaccine 9/17/2012, 1/25/2012      Below and/or attached are the medications your provider expects you to take. Review all of your home medications and newly ordered medications with your provider and/or pharmacist. Follow medication instructions as directed by your provider and/or pharmacist. Please keep your medication list with you and share with your provider. Update the information when medications are discontinued, doses are changed, or new medications (including over-the-counter products) are added; and carry medication information at all times in the event of emergency situations     Allergies:  SULFA DRUGS - Rash               Medications  Valid as of: June 19, 2017 -  1:51 PM    Generic Name  Brand Name Tablet Size Instructions for use    Acetaminophen   Take 1 Tab by mouth as needed.        Albuterol Sulfate (Aero Soln) albuterol 108 (90 BASE) MCG/ACT Inhale 2 Puffs by mouth every four hours as needed for Shortness of Breath.        Amiodarone HCl (Tab) CORDARONE 200 MG Take 1 Tab by mouth every day.        Aspirin (Chew Tab) ASA 81 MG Take 1 Tab by mouth every day.        Atorvastatin Calcium (Tab) LIPITOR 10 MG Take 1 Tab by mouth every day.        DiphenhydrAMINE HCl (Cap) BENADRYL 25 MG Take 25 mg by mouth at bedtime as needed for Sleep.        Fluticasone-Salmeterol (AEROSOL POWDER, BREATH ACTIVATED) ADVAIR 250-50 MCG/DOSE Inhale 1 Puff by mouth 2 times a day.        Furosemide (Tab) LASIX 20 MG Take 20 mg by mouth every day.        Magnesium Oxide (Tab) MAG- MG Take 400 mg by mouth 2 times a day. takes 500 mg tab        NON SPECIFIED   Please provide patient with a portable oxygen concentrator 2.5L continuous flow at all times for 3 months    ICD10 Chronic Respiratory Failure with hypoxia J96.11  Pulse ox off of oxygen 87%  O2 off of        non-formulary med non-formulary med  Inhale 2 L/min by mouth Continuous. Oxygen 2L x NC continuous  Indications: COPD        Potassium Chloride (Tab CR) KLOR-CON 10 MEQ Take 1 Tab by mouth every day.        Sertraline HCl (Tab) ZOLOFT 50 MG Take 1 Tab by mouth every day.        Tiotropium Bromide Monohydrate (Aero Soln) Tiotropium Bromide Monohydrate 2.5 MCG/ACT Inhale 2 Inhalation by mouth every day.        Warfarin Sodium (Tab) COUMADIN 5 MG Take 1 Tab by mouth COUMADIN-DAILY. Titrate to INR 2-3.        .                 Medicines prescribed today were sent to:     [facility] - [place] - [address] [place] [address]    Phone: [phone] Fax: [phone]    Open 24 Hours?: No      Medication refill instructions:       If your prescription bottle indicates you have medication refills left, it is not necessary to  call your provider’s office. Please contact your pharmacy and they will refill your medication.    If your prescription bottle indicates you do not have any refills left, you may request refills at any time through one of the following ways: The online [employer] system (except Urgent Care), by calling your provider’s office, or by asking your pharmacy to contact your provider’s office with a refill request. Medication refills are processed only during regular business hours and may not be available until the next business day. Your provider may request additional information or to have a follow-up visit with you prior to refilling your medication.   *Please Note: Medication refills are assigned a new Rx number when refilled electronically. Your pharmacy may indicate that no refills were authorized even though a new prescription for the same medication is available at the pharmacy. Please request the medicine by name with the pharmacy before contacting your provider for a refill.        Warfarin Dosing Calendar   June 2017 Details    Sun Mon Tue Wed Thu Fri Sat         1               2               3                 4               5               6               7               8               9               10                 11               12               13               14               15               16               17                 18               19   1.5   5 mg   See details      20      5 mg         21      2.5 mg         22      2.5 mg         23      2.5 mg         24      2.5 mg           25      2.5 mg         26      5 mg         27               28               29               30                 Date Details   06/19 This INR check   INR: 1.5       Date of next INR:  6/26/2017         How to take your warfarin dose     To take:  2.5 mg Take 0.5 of a 5 mg tablet.    To take:  5 mg Take 1 of the 5 mg tablets.              [employer]edia Access Code: Activation code not generated  Current [employer]opedia  Status: Active

## 2017-06-19 NOTE — PROGRESS NOTES
Subjective:   Kelly Lovett is a 71 y.o. female who presents today for follow up with her .    Patient of Dr. Li. Pt was last seen 5/25/17 by ANNE-MARIE Zamora after her discharge from the hospital. Patient was sent for a Mitral Valve Repair for Severe Mitral Regurgitation and Left sided MAZE and left atrial appendage ligation with intraoperative transesophageal echocardiogram on 4/20/17 by Dr. Porras. Patient then had bilateral pneumothoraces with chest tube placement on 4/22/17 following a respiratory arrest. Patient was discharged home with home health on 5/5/17.    Pt has been feeling well over the month. Pt continues to use continuous oxygen at 2.5-3 L. Patient has followed up with her pulmonologist and is being set up for pulmonary rehabilitation. Patient is going to do pulmonary rehabilitation instead of cardiac rehabilitation at this time. Patient denies chest pain, shortness of breath, palpitations, dizziness/lightheadedness, orthopnea, PND or Edema.       Past Medical History   Diagnosis Date   • COPD (chronic obstructive pulmonary disease) (CMS-HCC)    • Hypertension    • Hyperlipidemia    • Heart valve disease    • Emphysema of lung (CMS-HCC)    • High cholesterol    • Sleep apnea      uses cpap   • Breath shortness      pt reports using o2 at night 2L, no problems at this time   • History of heart surgery      Past Surgical History   Procedure Laterality Date   • Oophorectomy     • Appendectomy       1967   • Mitral valve repair  4/20/2017     Procedure: MITRAL VALVE REPAIR ;  Surgeon: Lacey Porras M.D.;  Location: SURGERY [facility];  Service:    • Maze procedure Left 4/20/2017     Procedure: MAZE PROCEDURE, Left atrial appendage ligation;  Surgeon: Lacey Porras M.D.;  Location: SURGERY [facility];  Service:    • Lino  4/20/2017     Procedure: LINO;  Surgeon: Lacey Porras M.D.;  Location: SURGERY [facility];  Service:      Family History   Problem Relation Age of Onset    • Cancer Father      colon cancer    • Hypertension Mother    • Cancer Sister 68     ovarian cancer     History   Smoking status   • Former Smoker -- 0.50 packs/day for 38 years   • Types: Cigarettes   • Quit date: 10/11/2001   Smokeless tobacco   • Never Used     Allergies   Allergen Reactions   • Sulfa Drugs Rash     Rxn - years ago in her 20's     Outpatient Encounter Prescriptions as of 6/19/2017   Medication Sig Dispense Refill   • Acetaminophen (TYLENOL EXTRA STRENGTH PO) Take 1 Tab by mouth as needed.     • NON SPECIFIED Please provide patient with a portable oxygen concentrator 2.5L continuous flow at all times for 3 months    ICD10 Chronic Respiratory Failure with hypoxia J96.11  Pulse ox off of oxygen 87%  O2 off of 1 Each 0   • non-formulary med Inhale 2 L/min by mouth Continuous. Oxygen 2L x NC continuous  Indications: COPD     • diphenhydrAMINE (BENADRYL) 25 MG capsule Take 25 mg by mouth at bedtime as needed for Sleep.     • amiodarone (CORDARONE) 200 MG Tab Take 1 Tab by mouth every day. 30 Tab 3   • warfarin (COUMADIN) 5 MG Tab Take 1 Tab by mouth COUMADIN-DAILY. Titrate to INR 2-3. 30 Tab 3   • aspirin (ASA) 81 MG Chew Tab chewable tablet Take 1 Tab by mouth every day. 100 Tab    • furosemide (LASIX) 20 MG Tab Take 20 mg by mouth every day.     • fluticasone-salmeterol (ADVAIR) 250-50 MCG/DOSE AEROSOL POWDER, BREATH ACTIVATED Inhale 1 Puff by mouth 2 times a day. 3 Inhaler 3   • sertraline (ZOLOFT) 50 MG Tab Take 1 Tab by mouth every day. 30 Tab 3   • potassium chloride ER (K-TAB) 10 MEQ tablet Take 1 Tab by mouth every day. 90 Tab 3   • atorvastatin (LIPITOR) 10 MG Tab Take 1 Tab by mouth every day. 90 Tab 3   • magnesium oxide (MAG-OX) 400 MG Tab Take 400 mg by mouth 2 times a day. takes 500 mg tab     • albuterol (VENTOLIN HFA) 108 (90 BASE) MCG/ACT Aero Soln inhalation aerosol Inhale 2 Puffs by mouth every four hours as needed for Shortness of Breath. 3 Inhaler 3   • Tiotropium Bromide  "Monohydrate (SPIRIVA RESPIMAT) 2.5 MCG/ACT Aero Soln Inhale 2 Inhalation by mouth every day. 3 Inhaler 3   • [DISCONTINUED] non-formulary med Spray 2 L/min in nose Continuous. for shortness of breath     • [DISCONTINUED] tramadol (ULTRAM) 50 MG Tab Take 1 Tab by mouth every 6 hours as needed for Moderate Pain. (Patient not taking: Reported on 6/1/2017) 50 Tab 0     No facility-administered encounter medications on file as of 6/19/2017.     Review of Systems   Constitutional: Negative for fever and malaise/fatigue.   Respiratory: Positive for shortness of breath (Continuous oxygen use 2.5-3L). Negative for cough.    Cardiovascular: Negative for chest pain, palpitations, orthopnea, claudication, leg swelling and PND.   Gastrointestinal: Negative for abdominal pain.   Musculoskeletal: Negative for myalgias.   Neurological: Negative for dizziness.   All other systems reviewed and are negative.       Objective:   BP 98/64 mmHg  Pulse 106  Resp 12  Ht 1.651 m (5' 5\")  Wt 52.617 kg (116 lb)  BMI 19.30 kg/m2  SpO2 97%    Physical Exam   Constitutional: She is oriented to person, place, and time. She appears well-developed and well-nourished.   HENT:   Head: Normocephalic and atraumatic.   Eyes: EOM are normal.   Neck: Normal range of motion. Neck supple. No JVD present.   Cardiovascular: Regular rhythm, normal heart sounds and intact distal pulses.  Tachycardia present.    No murmur heard.  Pulmonary/Chest: Effort normal and breath sounds normal. No respiratory distress. She has no wheezes. She has no rales.   Continuous oxygen use at 2.5-3 L   Abdominal: Soft. Bowel sounds are normal.   Musculoskeletal: Normal range of motion. She exhibits no edema.   Neurological: She is alert and oriented to person, place, and time.   Skin: Skin is warm and dry.   Sternal and Chest tube incision approximated, no drainage, erythema.    Psychiatric: She has a normal mood and affect.   Nursing note and vitals reviewed.    Lab Results " "  Component Value Date/Time    TRIGLYCERIDES 109 04/25/2017 04:50 AM       Lab Results   Component Value Date/Time    SODIUM 137 05/05/2017 04:39 AM    POTASSIUM 3.9 05/05/2017 04:39 AM    CHLORIDE 99 05/05/2017 04:39 AM    CO2 29 05/05/2017 04:39 AM    GLUCOSE 95 05/05/2017 04:39 AM    BUN 19 05/05/2017 04:39 AM    CREATININE 0.61 05/05/2017 04:39 AM     Lab Results   Component Value Date/Time    ALKALINE PHOSPHATASE 61 05/01/2017 03:50 AM    AST(SGOT) 14 05/01/2017 03:50 AM    ALT(SGPT) 11 05/01/2017 03:50 AM    TOTAL BILIRUBIN 0.7 05/01/2017 03:50 AM      Heart Cath 3/17/17  POSTOPERATIVE DIAGNOSES:  1.  Severe mitral regurgitation.  2.  Normal left ventricular systolic function, ejection fraction greater than    75%.  3.  No significant coronary artery disease.      Transthoracic Echo Report 3/7/17  Left ventricular ejection fraction is visually estimated to be 60%,   although based on the mitral regurgitation, the \"effective\" LVEF is   much lower.  Severe, explosive mitral regurgitation. Prolapse of the posterior   mitral leaflet was present.  Right ventricular systolic pressure is estimated to be at least 45   mmHg.  Compared with the outside study of 2015 - the regurgitation is still   significant.  If clinically indicated, a transesophageal study would be   useful.   Ordering provider notified at time of reading.       Transesophageal Echo Report 3/13/17  No mitral stenosis. Severe mitral regurgitation with eccentric jets.   Prolapse of the anterior mitral leaflet was present. Prolapse of the   posterior mitral leaflet was present. There are redundant tissues seen   on the mitral valve leaflets.   No aortic stenosis. Trace aortic insufficiency.   The aortic valve was not thoroughly evaluated due to desaturation and   focus was on the mitral valve.    Transesophageal Echo Report 4/20/17  Intraoperative PAGE during mitral valve repair, left atrial appendage   ligation.  Pre-CPB images show normal biventricular " systolic function.   P2 flail with ruptured chordae and severe mitral regurgitation.   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  Post   CPB images show preserved biventricular function.  The mitral valve has   been repaired with p2 resection and 36 mm annuloplasty band.  There is   no residual mitral regurgitation and mean gradient across the valve is   3 mm Hg.  Findings communicated at the time of exam.      Assessment:     1. Severe mitral regurgitation     2. S/P mitral valve repair     3. H/O maze procedure     4. Essential hypertension     5. Paroxysmal atrial fibrillation (CMS-HCC)     6. Chronic obstructive pulmonary disease, unspecified COPD type (CMS-[facility])         Medical Decision Making:  Today's Assessment / Status / Plan:   1. S/P MV Repair (36 mm  Anna flexible annuloplasty band) MAZE/LA ligation: Pt is doing well.  -Follow up with CT surgery per recommendations  -Pt to start Pulmonary rehab instead of Cardiac rehab    2. HTN: Stable  -Continue to monitor BP at home    3. Post Op Afib:  -Continue Amiodarone 200 mg daily  -Continue Warfarin, followed by anticoagulation, appt today.    4. COPD/hypoxia:  -Continue Continuous Oxygen use  -Followed by Dr. Gardner  -Set up with pulmonary rehabilitation    FU in clinic in 2 months with Dr. Ahmadi Sooner if needed.    Patient verbalizes understanding and agrees with the plan of care.     Collaborating MD: Travis Harvey MD

## 2017-06-21 ENCOUNTER — PATIENT MESSAGE (OUTPATIENT)
Dept: MEDICAL GROUP | Facility: LAB | Age: 72
End: 2017-06-21

## 2017-06-26 ENCOUNTER — ANTICOAGULATION VISIT (OUTPATIENT)
Dept: MEDICAL GROUP | Facility: MEDICAL CENTER | Age: 72
End: 2017-06-26
Payer: MEDICARE

## 2017-06-26 VITALS — HEART RATE: 63 BPM | DIASTOLIC BLOOD PRESSURE: 71 MMHG | SYSTOLIC BLOOD PRESSURE: 122 MMHG

## 2017-06-26 DIAGNOSIS — I48.92 ATRIAL FLUTTER, UNSPECIFIED TYPE (HCC): ICD-10-CM

## 2017-06-26 LAB — INR PPP: 2.5 (ref 2–3.5)

## 2017-06-26 PROCEDURE — 85610 PROTHROMBIN TIME: CPT | Performed by: PHYSICIAN ASSISTANT

## 2017-06-26 NOTE — PROGRESS NOTES
Anticoagulation Summary as of 6/26/2017     INR goal 2.0-3.0   Selected INR 2.5 (6/26/2017)   Maintenance plan 5 mg (5 mg x 1) on Mon; 2.5 mg (5 mg x 0.5) all other days   Weekly total 20 mg   Plan last modified Amanda Perera PHARMD (6/8/2017)   Next INR check 7/10/2017   Target end date     Indications   Atrial flutter (CMS-HCC) [I48.92]  Mitral valve insufficiency (Resolved) [I34.0]         Anticoagulation Episode Summary     INR check location     Preferred lab     Send INR reminders to     [facility]      Anticoagulation Care Providers     Provider Role Specialty Phone number    Lacey Porras M.D.  Cardiac Surgery [phone]    Amanda Perera PHARMD           Anticoagulation Patient Findings      Current Outpatient Prescriptions on File Prior to Visit   Medication Sig Dispense Refill   • Acetaminophen (TYLENOL EXTRA STRENGTH PO) Take 1 Tab by mouth as needed.     • NON SPECIFIED Please provide patient with a portable oxygen concentrator 2.5L continuous flow at all times for 3 months    ICD10 Chronic Respiratory Failure with hypoxia J96.11  Pulse ox off of oxygen 87%  O2 off of 1 Each 0   • non-formulary med Inhale 2 L/min by mouth Continuous. Oxygen 2L x NC continuous  Indications: COPD     • diphenhydrAMINE (BENADRYL) 25 MG capsule Take 25 mg by mouth at bedtime as needed for Sleep.     • amiodarone (CORDARONE) 200 MG Tab Take 1 Tab by mouth every day. 30 Tab 3   • warfarin (COUMADIN) 5 MG Tab Take 1 Tab by mouth COUMADIN-DAILY. Titrate to INR 2-3. 30 Tab 3   • aspirin (ASA) 81 MG Chew Tab chewable tablet Take 1 Tab by mouth every day. 100 Tab    • furosemide (LASIX) 20 MG Tab Take 20 mg by mouth every day.     • fluticasone-salmeterol (ADVAIR) 250-50 MCG/DOSE AEROSOL POWDER, BREATH ACTIVATED Inhale 1 Puff by mouth 2 times a day. 3 Inhaler 3   • sertraline (ZOLOFT) 50 MG Tab Take 1 Tab by mouth every day. 30 Tab 3   • potassium chloride ER (K-TAB) 10 MEQ tablet Take 1 Tab by mouth every day.  90 Tab 3   • atorvastatin (LIPITOR) 10 MG Tab Take 1 Tab by mouth every day. 90 Tab 3   • magnesium oxide (MAG-OX) 400 MG Tab Take 400 mg by mouth 2 times a day. takes 500 mg tab     • albuterol (VENTOLIN HFA) 108 (90 BASE) MCG/ACT Aero Soln inhalation aerosol Inhale 2 Puffs by mouth every four hours as needed for Shortness of Breath. 3 Inhaler 3   • Tiotropium Bromide Monohydrate (SPIRIVA RESPIMAT) 2.5 MCG/ACT Aero Soln Inhale 2 Inhalation by mouth every day. 3 Inhaler 3     No current facility-administered medications on file prior to visit.       Lab Results   Component Value Date/Time    SODIUM 137 05/05/2017 04:39 AM    POTASSIUM 3.9 05/05/2017 04:39 AM    CHLORIDE 99 05/05/2017 04:39 AM    CO2 29 05/05/2017 04:39 AM    GLUCOSE 95 05/05/2017 04:39 AM    BUN 19 05/05/2017 04:39 AM    CREATININE 0.61 05/05/2017 04:39 AM          Kelly Lovett seen in clinic today  INR  therapeutic.    Denies signs/symptoms of bleeding and/or thrombosis.    Denies changes to diet or medications.   Follow up appointment in 2 week(s).    Continue weekly warfarin dose as noted     Estiven Balderas, PHARMD

## 2017-06-26 NOTE — MR AVS SNAPSHOT
Kelly Tejeda [facility]   2017 11:15 AM   Anticoagulation Visit   MRN: [id number]    Department:  [facility]   Dept Phone:  [phone]    Description:  Female : 1945   Provider:  [facility]           Allergies as of 2017     Allergen Noted Reactions    Sulfa Drugs 10/05/2016   Rash    Rxn - years ago in her 20's      You were diagnosed with     Atrial flutter, unspecified type (CMS-HCC)   [[id number]]         Vital Signs     Blood Pressure Pulse Smoking Status             122/71 mmHg 62 Former Smoker         Basic Information     Date Of Birth Sex Race Ethnicity Preferred Language    1945 Female White Non- English      Your appointments     2017 11:15 AM   Anti-Coag Routine with [facility] PHARMACIST   [facility]    [address]  [address]  [place] [id number] [phone]            2017  9:45 AM   Anti-Coag Routine with [facility] JOEL PHARMACIST   [facility]    [address]  [address]  [place] [id number] [phone]            Aug 23, 2017 11:30 AM   FOLLOW UP with Dalia Li M.D.   [facility] (--)    [address], [address]  [place] [id number] [phone]            Sep 08, 2017  1:40 PM   Established Patient with Ritika Jurado M.D.   [facility] (--)    [address]  [address]  [place] [id number] [phone]           You will be receiving a confirmation call a few days before your appointment from our automated call confirmation system.            Sep 12, 2017 12:40 PM   Established Patient Pul with A Rotation   [facility] (--)    [address]  [address]  [place] [id number] [phone]              Problem List              ICD-10-CM Priority Class Noted - Resolved    COPD (chronic obstructive  pulmonary disease) (CMS-HCC) J44.9   10/11/2016 - Present    Osteopenia M85.80   10/11/2016 - Present    Obstructive sleep apnea syndrome G47.33   10/11/2016 - Present    Abnormal liver function K76.89   12/9/2015 - Present    Aortic atherosclerosis (CMS-HCC) I70.0   9/10/2015 - Present    Atrial flutter (CMS-HCC) I48.92   12/15/2015 - Present    Congestive heart failure (CMS-HCC) I50.9   7/7/2016 - Present    Chronic respiratory failure with hypoxia (CMS-HCC) J96.11   11/23/2015 - Present    History of colonic polyps Z86.010   3/3/2008 - Present    Hyperlipidemia E78.5   9/10/2015 - Present    Hypertension I10   3/10/2008 - Present    Prediabetes R73.03   8/15/2013 - Present    Anxiety F41.9   3/15/2017 - Present    Essential (primary) hypertension I10   3/17/2017 - Present    Status post mitral valve replacement Z95.2   5/19/2017 - Present    Paroxysmal atrial fibrillation (CMS-HCC) I48.0   6/19/2017 - Present      Health Maintenance        Date Due Completion Dates    BONE DENSITY 10/10/2009 ---    MAMMOGRAM 11/29/2017 11/29/2016, 9/11/2015, 9/11/2015    COLONOSCOPY 5/6/2019 5/6/2009    IMM DTaP/Tdap/Td Vaccine (2 - Td) 9/17/2022 9/17/2012, 1/25/2012            Results     POCT Protime      Component    INR    2.5    Comment:     ic valid [id number] 160 exp 03/2018                        Current Immunizations     13-VALENT PCV PREVNAR 9/10/2015    Hepatitis B Vaccine Recombivax (Adol/Adult) 1/27/2000    Influenza TIV (IM) 1/1/2014    Pneumococcal polysaccharide vaccine (PPSV-23) 10/27/2016    SHINGLES VACCINE 8/15/2013    Tdap Vaccine 9/17/2012, 1/25/2012      Below and/or attached are the medications your provider expects you to take. Review all of your home medications and newly ordered medications with your provider and/or pharmacist. Follow medication instructions as directed by your provider and/or pharmacist. Please keep your medication list with you and share with your provider. Update the information when  medications are discontinued, doses are changed, or new medications (including over-the-counter products) are added; and carry medication information at all times in the event of emergency situations     Allergies:  SULFA DRUGS - Rash               Medications  Valid as of: June 26, 2017 - 10:03 AM    Generic Name Brand Name Tablet Size Instructions for use    Acetaminophen   Take 1 Tab by mouth as needed.        Albuterol Sulfate (Aero Soln) albuterol 108 (90 BASE) MCG/ACT Inhale 2 Puffs by mouth every four hours as needed for Shortness of Breath.        Amiodarone HCl (Tab) CORDARONE 200 MG Take 1 Tab by mouth every day.        Aspirin (Chew Tab) ASA 81 MG Take 1 Tab by mouth every day.        Atorvastatin Calcium (Tab) LIPITOR 10 MG Take 1 Tab by mouth every day.        DiphenhydrAMINE HCl (Cap) BENADRYL 25 MG Take 25 mg by mouth at bedtime as needed for Sleep.        Fluticasone-Salmeterol (AEROSOL POWDER, BREATH ACTIVATED) ADVAIR 250-50 MCG/DOSE Inhale 1 Puff by mouth 2 times a day.        Furosemide (Tab) LASIX 20 MG Take 20 mg by mouth every day.        Magnesium Oxide (Tab) MAG- MG Take 400 mg by mouth 2 times a day. takes 500 mg tab        NON SPECIFIED   Please provide patient with a portable oxygen concentrator 2.5L continuous flow at all times for 3 months    ICD10 Chronic Respiratory Failure with hypoxia J96.11  Pulse ox off of oxygen 87%  O2 off of        non-formulary med non-formulary med  Inhale 2 L/min by mouth Continuous. Oxygen 2L x NC continuous  Indications: COPD        Potassium Chloride (Tab CR) KLOR-CON 10 MEQ Take 1 Tab by mouth every day.        Sertraline HCl (Tab) ZOLOFT 50 MG Take 1 Tab by mouth every day.        Tiotropium Bromide Monohydrate (Aero Soln) Tiotropium Bromide Monohydrate 2.5 MCG/ACT Inhale 2 Inhalation by mouth every day.        Warfarin Sodium (Tab) COUMADIN 5 MG Take 1 Tab by mouth COUMADIN-DAILY. Titrate to INR 2-3.        .                 Medicines prescribed  today were sent to:     [id number] - [place], [place] - [address] [place] [address]    Phone: [phone] Fax: [phone]    Open 24 Hours?: No      Medication refill instructions:       If your prescription bottle indicates you have medication refills left, it is not necessary to call your provider’s office. Please contact your pharmacy and they will refill your medication.    If your prescription bottle indicates you do not have any refills left, you may request refills at any time through one of the following ways: The online [facility] system (except Urgent Care), by calling your provider’s office, or by asking your pharmacy to contact your provider’s office with a refill request. Medication refills are processed only during regular business hours and may not be available until the next business day. Your provider may request additional information or to have a follow-up visit with you prior to refilling your medication.   *Please Note: Medication refills are assigned a new Rx number when refilled electronically. Your pharmacy may indicate that no refills were authorized even though a new prescription for the same medication is available at the pharmacy. Please request the medicine by name with the pharmacy before contacting your provider for a refill.        Warfarin Dosing Calendar   June 2017 Details    Sun Mon Tue Wed Thu Fri Sat         1               2               3                 4               5               6               7               8               9               10                 11               12               13               14               15               16               17                 18               19               20               21               22               23               24                 25               26   2.5   5 mg   See details      27      2.5 mg         28      2.5 mg         29      2.5 mg         30      2.5 mg              Date Details   06/26 This INR check   INR: 2.5   ic valid [id number] 160 exp 03/2018               How to take your warfarin dose     To take:  2.5 mg Take 0.5 of a 5 mg tablet.    To take:  5 mg Take 1 of the 5 mg tablets.           Warfarin Dosing Calendar   July 2017 Details    Sun Mon Tue Wed Thu Fri Sat           1      2.5 mg           2      2.5 mg         3      5 mg         4      2.5 mg         5      2.5 mg         6      2.5 mg         7      2.5 mg         8      2.5 mg           9      2.5 mg         10      5 mg         11      2.5 mg         12      2.5 mg         13      2.5 mg         14               15                 16               17               18               19               20               21               22                 23               24               25               26               27               28               29                 30               31                     Date Details   No additional details    Date of next INR:  7/13/2017         How to take your warfarin dose     To take:  2.5 mg Take 0.5 of a 5 mg tablet.    To take:  5 mg Take 1 of the 5 mg tablets.              TxtFeedback Access Code: Activation code not generated  Current TxtFeedback Status: Active

## 2017-07-13 ENCOUNTER — ANTICOAGULATION VISIT (OUTPATIENT)
Dept: MEDICAL GROUP | Facility: MEDICAL CENTER | Age: 72
End: 2017-07-13
Payer: MEDICARE

## 2017-07-13 DIAGNOSIS — I48.92 ATRIAL FLUTTER, UNSPECIFIED TYPE (HCC): ICD-10-CM

## 2017-07-13 DIAGNOSIS — Z79.01 CHRONIC ANTICOAGULATION: ICD-10-CM

## 2017-07-13 LAB — INR PPP: 4.7 (ref 2–3.5)

## 2017-07-13 PROCEDURE — 85610 PROTHROMBIN TIME: CPT | Performed by: PHYSICIAN ASSISTANT

## 2017-07-13 NOTE — MR AVS SNAPSHOT
[facility]   2017 9:45 AM   Anticoagulation Visit   MRN: [id number]    Department:  [facility]   Dept Phone:  [phone]    Description:  Female : 1945   Provider:  [facility]AC[facility]           Allergies as of 2017     Allergen Noted Reactions    Sulfa Drugs 10/05/2016   Rash    Rxn - years ago in her 20's      You were diagnosed with     Atrial flutter, unspecified type (CMS-[facility])   [8309585]         Vital Signs     Smoking Status                   Former Smoker           Basic Information     Date Of Birth Sex Race Ethnicity Preferred Language    1945 Female White Non- English      Your appointments     2017  2:45 PM   Anti-Coag Routine with [facility] PHARMACIST   [facility]    [address]  [address]  [place] [address] [phone]            Aug 23, 2017 11:30 AM   FOLLOW UP with Dalia Li M.D.   [facility] (--)    [address], [address]  [place] [address] [phone]            Sep 08, 2017  1:40 PM   Established Patient with Ritika Jurado M.D.   [facility] (--)    [address]  [address]  [place] [address] [phone]           You will be receiving a confirmation call a few days before your appointment from our automated call confirmation system.            Sep 12, 2017 12:40 PM   Established Patient Pul with A Rotation   [facility] (--)    [address]  [address]  [place] [address] [phone]              Problem List              ICD-10-CM Priority Class Noted - Resolved    COPD (chronic obstructive pulmonary disease) (CMS-[facility]) J44.9   10/11/2016 - Present    Osteopenia M85.80   10/11/2016 - Present    Obstructive sleep apnea syndrome G47.33   10/11/2016 - Present    Abnormal liver function K76.89   2015 - Present    Aortic atherosclerosis  (CMS-HCC) I70.0   9/10/2015 - Present    Atrial flutter (CMS-HCC) I48.92   12/15/2015 - Present    Congestive heart failure (CMS-HCC) I50.9   7/7/2016 - Present    Chronic respiratory failure with hypoxia (CMS-HCC) J96.11   11/23/2015 - Present    History of colonic polyps Z86.010   3/3/2008 - Present    Hyperlipidemia E78.5   9/10/2015 - Present    Hypertension I10   3/10/2008 - Present    Prediabetes R73.03   8/15/2013 - Present    Anxiety F41.9   3/15/2017 - Present    Essential (primary) hypertension I10   3/17/2017 - Present    Status post mitral valve replacement Z95.2   5/19/2017 - Present    Paroxysmal atrial fibrillation (CMS-HCC) I48.0   6/19/2017 - Present      Health Maintenance        Date Due Completion Dates    BONE DENSITY 10/10/2009 ---    IMM INFLUENZA (1) 9/1/2017 1/1/2014    MAMMOGRAM 11/29/2017 11/29/2016, 9/11/2015, 9/11/2015    COLONOSCOPY 5/6/2019 5/6/2009    IMM DTaP/Tdap/Td Vaccine (2 - Td) 9/17/2022 9/17/2012, 1/25/2012            Results     POCT Protime      Component    INR    4.7                        Current Immunizations     13-VALENT PCV PREVNAR 9/10/2015    Hepatitis B Vaccine Recombivax (Adol/Adult) 1/27/2000    Influenza TIV (IM) 1/1/2014    Pneumococcal polysaccharide vaccine (PPSV-23) 10/27/2016    SHINGLES VACCINE 8/15/2013    Tdap Vaccine 9/17/2012, 1/25/2012      Below and/or attached are the medications your provider expects you to take. Review all of your home medications and newly ordered medications with your provider and/or pharmacist. Follow medication instructions as directed by your provider and/or pharmacist. Please keep your medication list with you and share with your provider. Update the information when medications are discontinued, doses are changed, or new medications (including over-the-counter products) are added; and carry medication information at all times in the event of emergency situations     Allergies:  SULFA DRUGS - Rash               Medications   Valid as of: July 13, 2017 -  9:48 AM    Generic Name Brand Name Tablet Size Instructions for use    Acetaminophen   Take 1 Tab by mouth as needed.        Albuterol Sulfate (Aero Soln) albuterol 108 (90 BASE) MCG/ACT Inhale 2 Puffs by mouth every four hours as needed for Shortness of Breath.        Amiodarone HCl (Tab) CORDARONE 200 MG Take 1 Tab by mouth every day.        Aspirin (Chew Tab) ASA 81 MG Take 1 Tab by mouth every day.        Atorvastatin Calcium (Tab) LIPITOR 10 MG Take 1 Tab by mouth every day.        DiphenhydrAMINE HCl (Cap) BENADRYL 25 MG Take 25 mg by mouth at bedtime as needed for Sleep.        Fluticasone-Salmeterol (AEROSOL POWDER, BREATH ACTIVATED) ADVAIR 250-50 MCG/DOSE Inhale 1 Puff by mouth 2 times a day.        Furosemide (Tab) LASIX 20 MG Take 20 mg by mouth every day.        Magnesium Oxide (Tab) MAG- MG Take 400 mg by mouth 2 times a day. takes 500 mg tab        NON SPECIFIED   Please provide patient with a portable oxygen concentrator 2.5L continuous flow at all times for 3 months    ICD10 Chronic Respiratory Failure with hypoxia J96.11  Pulse ox off of oxygen 87%  O2 off of        non-formulary med non-formulary med  Inhale 2 L/min by mouth Continuous. Oxygen 2L x NC continuous  Indications: COPD        Potassium Chloride (Tab CR) KLOR-CON 10 MEQ Take 1 Tab by mouth every day.        Sertraline HCl (Tab) ZOLOFT 50 MG Take 1 Tab by mouth every day.        Tiotropium Bromide Monohydrate (Aero Soln) Tiotropium Bromide Monohydrate 2.5 MCG/ACT Inhale 2 Inhalation by mouth every day.        Warfarin Sodium (Tab) COUMADIN 5 MG Take 1 Tab by mouth COUMADIN-DAILY. Titrate to INR 2-3.        .                 Medicines prescribed today were sent to:     [id number] - [place] - [address] [place] [address]    Phone: [phone] Fax: [phone]    Open 24 Hours?: No      Medication refill instructions:       If your prescription bottle indicates you  have medication refills left, it is not necessary to call your provider’s office. Please contact your pharmacy and they will refill your medication.    If your prescription bottle indicates you do not have any refills left, you may request refills at any time through one of the following ways: The online [employer] system (except Urgent Care), by calling your provider’s office, or by asking your pharmacy to contact your provider’s office with a refill request. Medication refills are processed only during regular business hours and may not be available until the next business day. Your provider may request additional information or to have a follow-up visit with you prior to refilling your medication.   *Please Note: Medication refills are assigned a new Rx number when refilled electronically. Your pharmacy may indicate that no refills were authorized even though a new prescription for the same medication is available at the pharmacy. Please request the medicine by name with the pharmacy before contacting your provider for a refill.        Warfarin Dosing Calendar   July 2017 Details    Sun Mon Tue Wed Thu Fri Sat           1                 2               3               4               5               6               7               8                 9               10               11               12               13   4.7   Hold   See details      14      Hold         15      2.5 mg           16      2.5 mg         17      5 mg         18      2.5 mg         19      2.5 mg         20      2.5 mg         21               22                 23               24               25               26               27               28               29                 30               31                     Date Details   07/13 This INR check   INR: 4.7       Date of next INR:  7/20/2017         How to take your warfarin dose     To take:  2.5 mg Take 0.5 of a 5 mg tablet.    To take:  5 mg Take 1 of the 5 mg tablets.     Hold Do not take your warfarin dose. See the Details table to the right for additional instructions.                   [employer] Access Code: Activation code not generated  Current [employer] Status: Active

## 2017-07-13 NOTE — PROGRESS NOTES
OP Anticoagulation Service Note    Date: 7/13/2017    Anticoagulation Summary as of 7/13/2017     INR goal 2.0-3.0   Selected INR 4.7! (7/13/2017)   Maintenance plan 5 mg (5 mg x 1) on Mon; 2.5 mg (5 mg x 0.5) all other days   Weekly total 20 mg   Plan last modified Amanda Perera PHARMD (6/8/2017)   Next INR check 7/20/2017   Target end date     Indications   Atrial flutter (CMS-HCC) [I48.92]  Mitral valve insufficiency (Resolved) [I34.0]         Anticoagulation Episode Summary     INR check location     Preferred lab     Send INR reminders to     [facility]      Anticoagulation Care Providers     Provider Role Specialty Phone number    Lacey Porras M.D.  Cardiac Surgery [phone]    Amanda Perera PHARMD           Plan:  INR is high today. Confirmed dosing regimen. No missed or extra doses taken. Patient reports bruising very easily. No recent medication changes and patient has been eating a consistent diet. Pt denies increased ETOH or cranberries. Instructed pt to call clinic with any concerns of bleeding or thrombosis. Instructed pt to HOLD x 2 doses then resume usual regimen. Follow up in 1 week(s)      Dari Montez D

## 2017-07-19 ENCOUNTER — ANTICOAGULATION VISIT (OUTPATIENT)
Dept: VASCULAR LAB | Facility: MEDICAL CENTER | Age: 72
End: 2017-07-19
Attending: INTERNAL MEDICINE
Payer: MEDICARE

## 2017-07-19 ENCOUNTER — PATIENT OUTREACH (OUTPATIENT)
Dept: HEALTH INFORMATION MANAGEMENT | Facility: OTHER | Age: 72
End: 2017-07-19

## 2017-07-19 VITALS — HEART RATE: 101 BPM | SYSTOLIC BLOOD PRESSURE: 114 MMHG | DIASTOLIC BLOOD PRESSURE: 68 MMHG

## 2017-07-19 DIAGNOSIS — I48.92 ATRIAL FLUTTER, UNSPECIFIED TYPE (HCC): ICD-10-CM

## 2017-07-19 LAB — INR PPP: 2.4 (ref 2–3.5)

## 2017-07-19 PROCEDURE — 99212 OFFICE O/P EST SF 10 MIN: CPT | Performed by: PHARMACIST

## 2017-07-19 PROCEDURE — 85610 PROTHROMBIN TIME: CPT

## 2017-07-19 NOTE — PROGRESS NOTES
Patient Kelly Lovett was discharged from [facility] on 5/5/17 with a LACE+ score of 14. The patient followed up with primary physician Dr. Jurado on 5/19/17 and cardiologist Sera Cordero on 5/25/17. The patient was scheduled to see a pulmonologist on 6/1/17 but stated she did not like the physician assigned to her. [facility] pulmonology is currently working on rescheduling the patient with a different pulomonologist sometime the following week of 6/4/17. The patient also has an upcoming appointment with Dr. Porras on 6/5/17 that the patient reports she will keep. The patient reported having no barriers to picking up her medications after discharge. The patient discharged with home health services that began on 5/10/17 and ended on 5/29/17. The patient received skilled nursing services through [facility]. The patient did not discharge with any durable medical equipment needs at this time. The patient reported having no barriers to transportation after her discharge.

## 2017-07-19 NOTE — MR AVS SNAPSHOT
Kelly Lovett   2017 11:45 AM   Anticoagulation Visit   MRN: [id number]    Department:  Vascular Medicine   Dept Phone:  [phone]    Description:  Female : 1945   Provider:  [facility] EXAM 4           Allergies as of 2017     Allergen Noted Reactions    Sulfa Drugs 10/05/2016   Rash    Rxn - years ago in her 20's      You were diagnosed with     Atrial flutter, unspecified type (CMS-[facility])   [2231394]         Vital Signs     Blood Pressure Pulse Smoking Status             114/68 mmHg 101 Former Smoker         Basic Information     Date Of Birth Sex Race Ethnicity Preferred Language    1945 Female White Non- English      Your appointments     2017 11:45 AM   Established Patient with [facility] EXAM 4   [facility]  (--)    [address]  [place] [address]   [phone]            Aug 03, 2017 11:45 AM   Anti-Coag Routine with [facility] PAV PHARMACIST   [facility]    [address]  [address]  [place] [address] [phone]            Aug 23, 2017 11:30 AM   FOLLOW UP with Dalia Li M.D.   [facility] (--)    [address]  [place] [address] [phone]            Sep 08, 2017  1:40 PM   Established Patient with Ritika Jurado M.D.   [facility] (--)    [address]  [address]  [place] [address] [phone]           You will be receiving a confirmation call a few days before your appointment from our automated call confirmation system.            Sep 12, 2017 12:40 PM   Established Patient Pul with A Rotation   [facility] (--)    [address]  [address]  [place] [address] [phone]              Problem List              ICD-10-CM Priority Class Noted - Resolved    COPD (chronic obstructive pulmonary disease) (CMS-HCC) J44.9    10/11/2016 - Present    Osteopenia M85.80   10/11/2016 - Present    Obstructive sleep apnea syndrome G47.33   10/11/2016 - Present    Abnormal liver function K76.89   12/9/2015 - Present    Aortic atherosclerosis (CMS-HCC) I70.0   9/10/2015 - Present    Atrial flutter (CMS-HCC) I48.92   12/15/2015 - Present    Congestive heart failure (CMS-HCC) I50.9   7/7/2016 - Present    Chronic respiratory failure with hypoxia (CMS-HCC) J96.11   11/23/2015 - Present    History of colonic polyps Z86.010   3/3/2008 - Present    Hyperlipidemia E78.5   9/10/2015 - Present    Hypertension I10   3/10/2008 - Present    Prediabetes R73.03   8/15/2013 - Present    Anxiety F41.9   3/15/2017 - Present    Essential (primary) hypertension I10   3/17/2017 - Present    Status post mitral valve replacement Z95.2   5/19/2017 - Present    Paroxysmal atrial fibrillation (CMS-HCC) I48.0   6/19/2017 - Present      Health Maintenance        Date Due Completion Dates    BONE DENSITY 10/10/2009 ---    IMM INFLUENZA (1) 9/1/2017 1/1/2014    MAMMOGRAM 11/29/2017 11/29/2016, 9/11/2015, 9/11/2015    COLONOSCOPY 5/6/2019 5/6/2009    IMM DTaP/Tdap/Td Vaccine (2 - Td) 9/17/2022 9/17/2012, 1/25/2012            Results     POCT Protime      Component    INR    2.4                        Current Immunizations     13-VALENT PCV PREVNAR 9/10/2015    Hepatitis B Vaccine Recombivax (Adol/Adult) 1/27/2000    Influenza TIV (IM) 1/1/2014    Pneumococcal polysaccharide vaccine (PPSV-23) 10/27/2016    SHINGLES VACCINE 8/15/2013    Tdap Vaccine 9/17/2012, 1/25/2012      Below and/or attached are the medications your provider expects you to take. Review all of your home medications and newly ordered medications with your provider and/or pharmacist. Follow medication instructions as directed by your provider and/or pharmacist. Please keep your medication list with you and share with your provider. Update the information when medications are discontinued, doses are  changed, or new medications (including over-the-counter products) are added; and carry medication information at all times in the event of emergency situations     Allergies:  SULFA DRUGS - Rash               Medications  Valid as of: July 19, 2017 - 11:43 AM    Generic Name Brand Name Tablet Size Instructions for use    Acetaminophen   Take 1 Tab by mouth as needed.        Albuterol Sulfate (Aero Soln) albuterol 108 (90 BASE) MCG/ACT Inhale 2 Puffs by mouth every four hours as needed for Shortness of Breath.        Amiodarone HCl (Tab) CORDARONE 200 MG Take 1 Tab by mouth every day.        Aspirin (Chew Tab) ASA 81 MG Take 1 Tab by mouth every day.        Atorvastatin Calcium (Tab) LIPITOR 10 MG Take 1 Tab by mouth every day.        DiphenhydrAMINE HCl (Cap) BENADRYL 25 MG Take 25 mg by mouth at bedtime as needed for Sleep.        Fluticasone-Salmeterol (AEROSOL POWDER, BREATH ACTIVATED) ADVAIR 250-50 MCG/DOSE Inhale 1 Puff by mouth 2 times a day.        Furosemide (Tab) LASIX 20 MG Take 20 mg by mouth every day.        Magnesium Oxide (Tab) MAG- MG Take 400 mg by mouth 2 times a day. takes 500 mg tab        NON SPECIFIED   Please provide patient with a portable oxygen concentrator 2.5L continuous flow at all times for 3 months    ICD10 Chronic Respiratory Failure with hypoxia J96.11  Pulse ox off of oxygen 87%  O2 off of        non-formulary med non-formulary med  Inhale 2 L/min by mouth Continuous. Oxygen 2L x NC continuous  Indications: COPD        Potassium Chloride (Tab CR) KLOR-CON 10 MEQ Take 1 Tab by mouth every day.        Sertraline HCl (Tab) ZOLOFT 50 MG Take 1 Tab by mouth every day.        Tiotropium Bromide Monohydrate (Aero Soln) Tiotropium Bromide Monohydrate 2.5 MCG/ACT Inhale 2 Inhalation by mouth every day.        Warfarin Sodium (Tab) COUMADIN 5 MG Take 1 Tab by mouth COUMADIN-DAILY. Titrate to INR 2-3.        .                 Medicines prescribed today were sent to:     [id number] - [place], [place] - [address] [place] [address]    Phone: [phone] Fax: [phone]    Open 24 Hours?: No      Medication refill instructions:       If your prescription bottle indicates you have medication refills left, it is not necessary to call your provider’s office. Please contact your pharmacy and they will refill your medication.    If your prescription bottle indicates you do not have any refills left, you may request refills at any time through one of the following ways: The online [employer] system (except Urgent Care), by calling your provider’s office, or by asking your pharmacy to contact your provider’s office with a refill request. Medication refills are processed only during regular business hours and may not be available until the next business day. Your provider may request additional information or to have a follow-up visit with you prior to refilling your medication.   *Please Note: Medication refills are assigned a new Rx number when refilled electronically. Your pharmacy may indicate that no refills were authorized even though a new prescription for the same medication is available at the pharmacy. Please request the medicine by name with the pharmacy before contacting your provider for a refill.        Warfarin Dosing Calendar   July 2017 Details    Sun Mon Tue Wed Thu Fri Sat           1                 2               3               4               5               6               7               8                 9               10               11               12               13               14               15                 16               17               18               19   2.4   2.5 mg   See details      20      2.5 mg         21      2.5 mg         22      2.5 mg           23      2.5 mg         24      2.5 mg         25      2.5 mg         26      2.5 mg         27      2.5 mg         28      2.5 mg         29      2.5 mg           30         2.5 mg         31      2.5 mg               Date Details   07/19 This INR check   INR: 2.4      Date of next INR: No date specified         How to take your warfarin dose     To take:  2.5 mg Take 0.5 of a 5 mg tablet.              [employer] Access Code: Activation code not generated  Current [employer] Status: Active

## 2017-07-19 NOTE — PROGRESS NOTES
Anticoagulation Summary as of 7/19/2017     INR goal 2.0-3.0   Selected INR 2.4 (7/19/2017)   Maintenance plan 2.5 mg (5 mg x 0.5) every day   Weekly total 17.5 mg   Plan last modified Bhavya Connelly, PHARMD (7/19/2017)   Next INR check    Target end date     Indications   Atrial flutter (CMS-HCC) [I48.92]  Mitral valve insufficiency (Resolved) [I34.0]         Anticoagulation Episode Summary     INR check location     Preferred lab     Send INR reminders to     [facility]      Anticoagulation Care Providers     Provider Role Specialty Phone number    Lacey Porras M.D.  Cardiac Surgery [phone]    Amanda Perera, ELZAD           Anticoagulation Patient Findings      Current Outpatient Prescriptions on File Prior to Visit   Medication Sig Dispense Refill   • Acetaminophen (TYLENOL EXTRA STRENGTH PO) Take 1 Tab by mouth as needed.     • NON SPECIFIED Please provide patient with a portable oxygen concentrator 2.5L continuous flow at all times for 3 months    ICD10 Chronic Respiratory Failure with hypoxia J96.11  Pulse ox off of oxygen 87%  O2 off of 1 Each 0   • non-formulary med Inhale 2 L/min by mouth Continuous. Oxygen 2L x NC continuous  Indications: COPD     • diphenhydrAMINE (BENADRYL) 25 MG capsule Take 25 mg by mouth at bedtime as needed for Sleep.     • amiodarone (CORDARONE) 200 MG Tab Take 1 Tab by mouth every day. 30 Tab 3   • warfarin (COUMADIN) 5 MG Tab Take 1 Tab by mouth COUMADIN-DAILY. Titrate to INR 2-3. 30 Tab 3   • aspirin (ASA) 81 MG Chew Tab chewable tablet Take 1 Tab by mouth every day. 100 Tab    • furosemide (LASIX) 20 MG Tab Take 20 mg by mouth every day.     • fluticasone-salmeterol (ADVAIR) 250-50 MCG/DOSE AEROSOL POWDER, BREATH ACTIVATED Inhale 1 Puff by mouth 2 times a day. 3 Inhaler 3   • sertraline (ZOLOFT) 50 MG Tab Take 1 Tab by mouth every day. 30 Tab 3   • potassium chloride ER (K-TAB) 10 MEQ tablet Take 1 Tab by mouth every day. 90 Tab 3   • atorvastatin (LIPITOR)  10 MG Tab Take 1 Tab by mouth every day. 90 Tab 3   • magnesium oxide (MAG-OX) 400 MG Tab Take 400 mg by mouth 2 times a day. takes 500 mg tab     • albuterol (VENTOLIN HFA) 108 (90 BASE) MCG/ACT Aero Soln inhalation aerosol Inhale 2 Puffs by mouth every four hours as needed for Shortness of Breath. 3 Inhaler 3   • Tiotropium Bromide Monohydrate (SPIRIVA RESPIMAT) 2.5 MCG/ACT Aero Soln Inhale 2 Inhalation by mouth every day. 3 Inhaler 3     No current facility-administered medications on file prior to visit.       Lab Results   Component Value Date/Time    SODIUM 137 05/05/2017 04:39 AM    POTASSIUM 3.9 05/05/2017 04:39 AM    CHLORIDE 99 05/05/2017 04:39 AM    CO2 29 05/05/2017 04:39 AM    GLUCOSE 95 05/05/2017 04:39 AM    BUN 19 05/05/2017 04:39 AM    CREATININE 0.61 05/05/2017 04:39 AM          Kelly Lovett seen in clinic today  INR  Is therapeutic.    Denies signs/symptoms of bleeding and/or thrombosis.    Denies changes to diet or medications.   Follow up appointment in 2 week(s).      Will have pt start taking a reduced weekly dose of 2.5mg daily, as therapeutic post holding 2 doses.      Bhavya Connelly, PHARMD

## 2017-07-20 ENCOUNTER — APPOINTMENT (OUTPATIENT)
Dept: MEDICAL GROUP | Facility: MEDICAL CENTER | Age: 72
End: 2017-07-20
Payer: MEDICARE

## 2017-07-20 LAB — INR BLD: 2.4 (ref 0.9–1.2)

## 2017-07-21 ENCOUNTER — PATIENT MESSAGE (OUTPATIENT)
Dept: PULMONOLOGY | Facility: HOSPICE | Age: 72
End: 2017-07-21

## 2017-07-21 NOTE — TELEPHONE ENCOUNTER
From: Kelly Lovett  To: Alexsander Gardner M.D.  Sent: 7/21/2017 1:59 PM PDT  Subject: Procedure Question    Dr Gardner  I talked to [facility] about pulmonary rehab. They need an authorization from you. Could you fax them @ [phone]  Thank you  Kelly Lovett

## 2017-07-25 NOTE — TELEPHONE ENCOUNTER
Your referral has been sent to Pulmonary Rehab. If you have any other questions let me know.     Tayler

## 2017-07-25 NOTE — PROGRESS NOTES
Subjective:      Kelly Lovett is a 71 y.o. female who presents with No chief complaint on file.            HPI    Kelly  has a past medical history of COPD (chronic obstructive pulmonary disease) (CMS-HCC); Hypertension; Hyperlipidemia; Heart valve disease; Emphysema of lung (CMS-HCC); High cholesterol; Sleep apnea; Breath shortness; and History of heart surgery.    Residential Hx-                                Pet Exposures Hx  Pet Exposures   • Cats Yes        Occupational Hx-                                      Review of Systems       Objective:     There were no vitals filed for this visit.    Physical Exam           Assessment/Plan:     There are no diagnoses linked to this encounter.

## 2017-08-03 ENCOUNTER — ANTICOAGULATION VISIT (OUTPATIENT)
Dept: MEDICAL GROUP | Facility: MEDICAL CENTER | Age: 72
End: 2017-08-03
Payer: MEDICARE

## 2017-08-03 DIAGNOSIS — I48.92 ATRIAL FLUTTER, UNSPECIFIED TYPE (HCC): ICD-10-CM

## 2017-08-03 LAB — INR PPP: 3.8 (ref 2–3.5)

## 2017-08-03 PROCEDURE — 85610 PROTHROMBIN TIME: CPT | Performed by: PHYSICIAN ASSISTANT

## 2017-08-03 NOTE — PROGRESS NOTES
Anticoagulation Summary as of 8/3/2017     INR goal 2.0-3.0   Selected INR 3.8! (8/3/2017)   Maintenance plan 2.5 mg (5 mg x 0.5) every day   Weekly total 17.5 mg   Plan last modified Bhavya Connelly, PHARMD (7/19/2017)   Next INR check 8/17/2017   Target end date     Indications   Atrial flutter (CMS-HCC) [I48.92]  Mitral valve insufficiency (Resolved) [I34.0]         Anticoagulation Episode Summary     INR check location     Preferred lab     Send INR reminders to     [facility]      Anticoagulation Care Providers     Provider Role Specialty Phone number    Lacey Porras M.D.  Cardiac Surgery [phone]    ELZA [place]           Anticoagulation Patient Findings      Current Outpatient Prescriptions on File Prior to Visit   Medication Sig Dispense Refill   • Acetaminophen (TYLENOL EXTRA STRENGTH PO) Take 1 Tab by mouth as needed.     • NON SPECIFIED Please provide patient with a portable oxygen concentrator 2.5L continuous flow at all times for 3 months    ICD10 Chronic Respiratory Failure with hypoxia J96.11  Pulse ox off of oxygen 87%  O2 off of 1 Each 0   • non-formulary med Inhale 2 L/min by mouth Continuous. Oxygen 2L x NC continuous  Indications: COPD     • diphenhydrAMINE (BENADRYL) 25 MG capsule Take 25 mg by mouth at bedtime as needed for Sleep.     • amiodarone (CORDARONE) 200 MG Tab Take 1 Tab by mouth every day. 30 Tab 3   • warfarin (COUMADIN) 5 MG Tab Take 1 Tab by mouth COUMADIN-DAILY. Titrate to INR 2-3. 30 Tab 3   • aspirin (ASA) 81 MG Chew Tab chewable tablet Take 1 Tab by mouth every day. 100 Tab    • furosemide (LASIX) 20 MG Tab Take 20 mg by mouth every day.     • fluticasone-salmeterol (ADVAIR) 250-50 MCG/DOSE AEROSOL POWDER, BREATH ACTIVATED Inhale 1 Puff by mouth 2 times a day. 3 Inhaler 3   • sertraline (ZOLOFT) 50 MG Tab Take 1 Tab by mouth every day. 30 Tab 3   • potassium chloride ER (K-TAB) 10 MEQ tablet Take 1 Tab by mouth every day. 90 Tab 3   • atorvastatin  (LIPITOR) 10 MG Tab Take 1 Tab by mouth every day. 90 Tab 3   • magnesium oxide (MAG-OX) 400 MG Tab Take 400 mg by mouth 2 times a day. takes 500 mg tab     • albuterol (VENTOLIN HFA) 108 (90 BASE) MCG/ACT Aero Soln inhalation aerosol Inhale 2 Puffs by mouth every four hours as needed for Shortness of Breath. 3 Inhaler 3   • Tiotropium Bromide Monohydrate (SPIRIVA RESPIMAT) 2.5 MCG/ACT Aero Soln Inhale 2 Inhalation by mouth every day. 3 Inhaler 3     No current facility-administered medications on file prior to visit.       Lab Results   Component Value Date/Time    SODIUM 137 05/05/2017 04:39 AM    POTASSIUM 3.9 05/05/2017 04:39 AM    CHLORIDE 99 05/05/2017 04:39 AM    CO2 29 05/05/2017 04:39 AM    GLUCOSE 95 05/05/2017 04:39 AM    BUN 19 05/05/2017 04:39 AM    CREATININE 0.61 05/05/2017 04:39 AM          Kelly Ileana Lovett seen in clinic today  INR  supra-therapeutic.    She did not want vitals today   Denies signs/symptoms of bleeding and/or thrombosis.    Denies changes to diet or medications.   Follow up appointment in 2 week(s).      Hold tonight then continue weekly warfarin dose as noted       Estiven Balderas, ELZAD

## 2017-08-03 NOTE — MR AVS SNAPSHOT
Kelly Tejeda [facility]   8/3/2017 11:45 AM   Anticoagulation Visit   MRN: [id number]    Department:  [facility]   Dept Phone:  [phone]    Description:  Female : 1945   Provider:  [facility]           Allergies as of 8/3/2017     Allergen Noted Reactions    Sulfa Drugs 10/05/2016   Rash    Rxn - years ago in her 20's      You were diagnosed with     Atrial flutter, unspecified type (CMS-[facility])   [[id number]]         Vital Signs     Smoking Status                   Former Smoker           Basic Information     Date Of Birth Sex Race Ethnicity Preferred Language    1945 Female White Non- English      Your appointments     Aug 14, 2017 11:45 AM   Anti-Coag Routine with [facility] JANEY PHARMACIST   [facility]    [address]  [address]  [place] [address] [phone]            Aug 23, 2017 11:30 AM   FOLLOW UP with Dalia Li M.D.   [facility] (--)    [address], [address]  [place] [address] [phone]            Sep 08, 2017  1:40 PM   Established Patient with Ritika Jurado M.D.   [facility] (--)    [address]  [address]  [place] [id number] [phone]           You will be receiving a confirmation call a few days before your appointment from our automated call confirmation system.            Sep 12, 2017 12:40 PM   Established Patient Pul with A Rotation   [facility] (--)    [address]  [address]  [place] [address] [phone]              Problem List              ICD-10-CM Priority Class Noted - Resolved    COPD (chronic obstructive pulmonary disease) (CMS-[facility]) J44.9   10/11/2016 - Present    Osteopenia M85.80   10/11/2016 - Present    Obstructive sleep apnea syndrome G47.33   10/11/2016 - Present    Abnormal liver function K76.89   2015 - Present    Aortic atherosclerosis  (CMS-HCC) I70.0   9/10/2015 - Present    Atrial flutter (CMS-HCC) I48.92   12/15/2015 - Present    Congestive heart failure (CMS-HCC) I50.9   7/7/2016 - Present    Chronic respiratory failure with hypoxia (CMS-HCC) J96.11   11/23/2015 - Present    History of colonic polyps Z86.010   3/3/2008 - Present    Hyperlipidemia E78.5   9/10/2015 - Present    Hypertension I10   3/10/2008 - Present    Prediabetes R73.03   8/15/2013 - Present    Anxiety F41.9   3/15/2017 - Present    Essential (primary) hypertension I10   3/17/2017 - Present    Status post mitral valve replacement Z95.2   5/19/2017 - Present    Paroxysmal atrial fibrillation (CMS-HCC) I48.0   6/19/2017 - Present      Health Maintenance        Date Due Completion Dates    BONE DENSITY 10/10/2009 ---    IMM INFLUENZA (1) 9/1/2017 1/1/2014    MAMMOGRAM 11/29/2017 11/29/2016, 9/11/2015, 9/11/2015    COLONOSCOPY 5/6/2019 5/6/2009    IMM DTaP/Tdap/Td Vaccine (2 - Td) 9/17/2022 9/17/2012, 1/25/2012            Results     POCT Protime      Component    INR    3.8    Comment:      valid [id number] exp 06/2018                        Current Immunizations     13-VALENT PCV PREVNAR 9/10/2015    Hepatitis B Vaccine Recombivax (Adol/Adult) 1/27/2000    Influenza TIV (IM) 1/1/2014    Pneumococcal polysaccharide vaccine (PPSV-23) 10/27/2016    SHINGLES VACCINE 8/15/2013    Tdap Vaccine 9/17/2012, 1/25/2012      Below and/or attached are the medications your provider expects you to take. Review all of your home medications and newly ordered medications with your provider and/or pharmacist. Follow medication instructions as directed by your provider and/or pharmacist. Please keep your medication list with you and share with your provider. Update the information when medications are discontinued, doses are changed, or new medications (including over-the-counter products) are added; and carry medication information at all times in the event of emergency situations      Allergies:  SULFA DRUGS - Rash               Medications  Valid as of: August 03, 2017 - 11:48 AM    Generic Name Brand Name Tablet Size Instructions for use    Acetaminophen   Take 1 Tab by mouth as needed.        Albuterol Sulfate (Aero Soln) albuterol 108 (90 BASE) MCG/ACT Inhale 2 Puffs by mouth every four hours as needed for Shortness of Breath.        Amiodarone HCl (Tab) CORDARONE 200 MG Take 1 Tab by mouth every day.        Aspirin (Chew Tab) ASA 81 MG Take 1 Tab by mouth every day.        Atorvastatin Calcium (Tab) LIPITOR 10 MG Take 1 Tab by mouth every day.        DiphenhydrAMINE HCl (Cap) BENADRYL 25 MG Take 25 mg by mouth at bedtime as needed for Sleep.        Fluticasone-Salmeterol (AEROSOL POWDER, BREATH ACTIVATED) ADVAIR 250-50 MCG/DOSE Inhale 1 Puff by mouth 2 times a day.        Furosemide (Tab) LASIX 20 MG Take 20 mg by mouth every day.        Magnesium Oxide (Tab) MAG- MG Take 400 mg by mouth 2 times a day. takes 500 mg tab        NON SPECIFIED   Please provide patient with a portable oxygen concentrator 2.5L continuous flow at all times for 3 months    ICD10 Chronic Respiratory Failure with hypoxia J96.11  Pulse ox off of oxygen 87%  O2 off of        non-formulary med non-formulary med  Inhale 2 L/min by mouth Continuous. Oxygen 2L x NC continuous  Indications: COPD        Potassium Chloride (Tab CR) KLOR-CON 10 MEQ Take 1 Tab by mouth every day.        Sertraline HCl (Tab) ZOLOFT 50 MG Take 1 Tab by mouth every day.        Tiotropium Bromide Monohydrate (Aero Soln) Tiotropium Bromide Monohydrate 2.5 MCG/ACT Inhale 2 Inhalation by mouth every day.        Warfarin Sodium (Tab) COUMADIN 5 MG Take 1 Tab by mouth COUMADIN-DAILY. Titrate to INR 2-3.        .                 Medicines prescribed today were sent to:     [id number] - [place] - [address] [address]    Phone: [phone] Fax: [phone]    Open 24 Hours?: No      Medication refill  instructions:       If your prescription bottle indicates you have medication refills left, it is not necessary to call your provider’s office. Please contact your pharmacy and they will refill your medication.    If your prescription bottle indicates you do not have any refills left, you may request refills at any time through one of the following ways: The online [employer] system (except Urgent Care), by calling your provider’s office, or by asking your pharmacy to contact your provider’s office with a refill request. Medication refills are processed only during regular business hours and may not be available until the next business day. Your provider may request additional information or to have a follow-up visit with you prior to refilling your medication.   *Please Note: Medication refills are assigned a new Rx number when refilled electronically. Your pharmacy may indicate that no refills were authorized even though a new prescription for the same medication is available at the pharmacy. Please request the medicine by name with the pharmacy before contacting your provider for a refill.        Warfarin Dosing Calendar   August 2017 Details    Sun Mon Tue Wed Thu Fri Sat       1               2               3   3.8   Hold   See details      4      2.5 mg         5      2.5 mg           6      2.5 mg         7      2.5 mg         8      2.5 mg         9      2.5 mg         10      2.5 mg         11      2.5 mg         12      2.5 mg           13      2.5 mg         14      2.5 mg         15      2.5 mg         16      2.5 mg         17      2.5 mg         18               19                 20               21               22               23               24               25               26                 27               28               29               30               31                  Date Details   08/03 This INR check   INR: 3.8   ic valid [id number] 160 exp 06/2018       Date of next INR:  8/17/2017          How to take your warfarin dose     To take:  2.5 mg Take 0.5 of a 5 mg tablet.    Hold Do not take your warfarin dose. See the Details table to the right for additional instructions.                   [employer] Access Code: Activation code not generated  Current [employer] Status: Active

## 2017-08-08 DIAGNOSIS — F41.9 ANXIETY: ICD-10-CM

## 2017-08-08 NOTE — TELEPHONE ENCOUNTER
Was the patient seen in the last year in this department? Yes     Does patient have an active prescription for medications requested? No     Received Request Via: Patient     Last visit:6/14/17

## 2017-08-14 ENCOUNTER — ANTICOAGULATION VISIT (OUTPATIENT)
Dept: MEDICAL GROUP | Facility: MEDICAL CENTER | Age: 72
End: 2017-08-14
Payer: MEDICARE

## 2017-08-14 VITALS — DIASTOLIC BLOOD PRESSURE: 72 MMHG | SYSTOLIC BLOOD PRESSURE: 120 MMHG | HEART RATE: 103 BPM

## 2017-08-14 DIAGNOSIS — I48.92 ATRIAL FLUTTER, UNSPECIFIED TYPE (HCC): ICD-10-CM

## 2017-08-14 LAB — INR PPP: 2.7 (ref 2–3.5)

## 2017-08-14 PROCEDURE — 85610 PROTHROMBIN TIME: CPT | Performed by: FAMILY MEDICINE

## 2017-08-14 NOTE — MR AVS SNAPSHOT
Kelly Tejeda [facility]   2017 11:45 AM   Anticoagulation Visit   MRN: [id number]    Department:  [facility]   Dept Phone:  [phone]    Description:  Female : 1945   Provider:  [facility]           Allergies as of 2017     Allergen Noted Reactions    Sulfa Drugs 10/05/2016   Rash    Rxn - years ago in her 20's      You were diagnosed with     Atrial flutter, unspecified type (CMS-HCC)   [5333271]         Vital Signs     Blood Pressure Pulse Smoking Status             120/72 mmHg 103 Former Smoker         Basic Information     Date Of Birth Sex Race Ethnicity Preferred Language    1945 Female White Non- English      Your appointments     Aug 14, 2017 11:45 AM   Anti-Coag Routine with [facility] JOEL PHARMACIST   [facility]    [address]  [address]  [place] [address] [phone]            Aug 23, 2017 11:30 AM   FOLLOW UP with Dalia Li M.D.   [facility] (--)    [address], [address]  [place] [id number] [phone]            Sep 08, 2017  1:40 PM   Established Patient with Ritika Jurado M.D.   [facility] (--)    [address]  [address]  [place] [address] [phone]           You will be receiving a confirmation call a few days before your appointment from our automated call confirmation system.            Sep 11, 2017 11:15 AM   Anti-Coag Routine with [facility]   [facility]    [address]  [address]  [place] [address] [phone]            Sep 12, 2017 12:40 PM   Established Patient Pul with A Rotation   [facility] (--)    [address]  [address]  [place] [address] [phone]              Problem List              ICD-10-CM Priority Class Noted - Resolved    COPD (chronic obstructive  pulmonary disease) (CMS-HCC) J44.9   10/11/2016 - Present    Osteopenia M85.80   10/11/2016 - Present    Obstructive sleep apnea syndrome G47.33   10/11/2016 - Present    Abnormal liver function K76.89   12/9/2015 - Present    Aortic atherosclerosis (CMS-HCC) I70.0   9/10/2015 - Present    Atrial flutter (CMS-HCC) I48.92   12/15/2015 - Present    Congestive heart failure (CMS-HCC) I50.9   7/7/2016 - Present    Chronic respiratory failure with hypoxia (CMS-HCC) J96.11   11/23/2015 - Present    History of colonic polyps Z86.010   3/3/2008 - Present    Hyperlipidemia E78.5   9/10/2015 - Present    Hypertension I10   3/10/2008 - Present    Prediabetes R73.03   8/15/2013 - Present    Anxiety F41.9   3/15/2017 - Present    Essential (primary) hypertension I10   3/17/2017 - Present    Status post mitral valve replacement Z95.2   5/19/2017 - Present    Paroxysmal atrial fibrillation (CMS-HCC) I48.0   6/19/2017 - Present      Health Maintenance        Date Due Completion Dates    BONE DENSITY 10/10/2009 ---    IMM INFLUENZA (1) 9/1/2017 1/1/2014    MAMMOGRAM 11/29/2017 11/29/2016, 9/11/2015, 9/11/2015    COLONOSCOPY 5/6/2019 5/6/2009    IMM DTaP/Tdap/Td Vaccine (2 - Td) 9/17/2022 9/17/2012, 1/25/2012            Results     POCT Protime      Component    INR    2.7    Comment:     ic valid [id number] 160 exp 06/2018                        Current Immunizations     13-VALENT PCV PREVNAR 9/10/2015    Hepatitis B Vaccine Recombivax (Adol/Adult) 1/27/2000    Influenza TIV (IM) 1/1/2014    Pneumococcal polysaccharide vaccine (PPSV-23) 10/27/2016    SHINGLES VACCINE 8/15/2013    Tdap Vaccine 9/17/2012, 1/25/2012      Below and/or attached are the medications your provider expects you to take. Review all of your home medications and newly ordered medications with your provider and/or pharmacist. Follow medication instructions as directed by your provider and/or pharmacist. Please keep your medication list with you and share with your  provider. Update the information when medications are discontinued, doses are changed, or new medications (including over-the-counter products) are added; and carry medication information at all times in the event of emergency situations     Allergies:  SULFA DRUGS - Rash               Medications  Valid as of: August 14, 2017 - 11:41 AM    Generic Name Brand Name Tablet Size Instructions for use    Acetaminophen   Take 1 Tab by mouth as needed.        Albuterol Sulfate (Aero Soln) albuterol 108 (90 BASE) MCG/ACT Inhale 2 Puffs by mouth every four hours as needed for Shortness of Breath.        Amiodarone HCl (Tab) CORDARONE 200 MG Take 1 Tab by mouth every day.        Aspirin (Chew Tab) ASA 81 MG Take 1 Tab by mouth every day.        Atorvastatin Calcium (Tab) LIPITOR 10 MG Take 1 Tab by mouth every day.        DiphenhydrAMINE HCl (Cap) BENADRYL 25 MG Take 25 mg by mouth at bedtime as needed for Sleep.        Fluticasone-Salmeterol (AEROSOL POWDER, BREATH ACTIVATED) ADVAIR 250-50 MCG/DOSE Inhale 1 Puff by mouth 2 times a day.        Furosemide (Tab) LASIX 20 MG Take 20 mg by mouth every day.        Magnesium Oxide (Tab) MAG- MG Take 400 mg by mouth 2 times a day. takes 500 mg tab        NON SPECIFIED   Please provide patient with a portable oxygen concentrator 2.5L continuous flow at all times for 3 months    ICD10 Chronic Respiratory Failure with hypoxia J96.11  Pulse ox off of oxygen 87%  O2 off of        non-formulary med non-formulary med  Inhale 2 L/min by mouth Continuous. Oxygen 2L x NC continuous  Indications: COPD        Potassium Chloride (Tab CR) KLOR-CON 10 MEQ Take 1 Tab by mouth every day.        Sertraline HCl (Tab) ZOLOFT 50 MG Take 1 Tab by mouth every day.        Tiotropium Bromide Monohydrate (Aero Soln) Tiotropium Bromide Monohydrate 2.5 MCG/ACT Inhale 2 Inhalation by mouth every day.        Warfarin Sodium (Tab) COUMADIN 5 MG Take 1 Tab by mouth COUMADIN-DAILY. Titrate to INR 2-3.         .                 Medicines prescribed today were sent to:     [id number] - [place], [place] - [address] [place] [address]    Phone: [phone] Fax: [phone]    Open 24 Hours?: No      Medication refill instructions:       If your prescription bottle indicates you have medication refills left, it is not necessary to call your provider’s office. Please contact your pharmacy and they will refill your medication.    If your prescription bottle indicates you do not have any refills left, you may request refills at any time through one of the following ways: The online [employer] system (except Urgent Care), by calling your provider’s office, or by asking your pharmacy to contact your provider’s office with a refill request. Medication refills are processed only during regular business hours and may not be available until the next business day. Your provider may request additional information or to have a follow-up visit with you prior to refilling your medication.   *Please Note: Medication refills are assigned a new Rx number when refilled electronically. Your pharmacy may indicate that no refills were authorized even though a new prescription for the same medication is available at the pharmacy. Please request the medicine by name with the pharmacy before contacting your provider for a refill.        Warfarin Dosing Calendar   August 2017 Details    Sun Mon Tue Wed Thu Fri Sat       1               2               3               4               5                 6               7               8               9               10               11               12                 13               14   2.7   2.5 mg   See details      15      2.5 mg         16      2.5 mg         17      2.5 mg         18      2.5 mg         19      2.5 mg           20      2.5 mg         21      2.5 mg         22      2.5 mg         23      2.5 mg         24      2.5 mg         25      2.5 mg         26         2.5 mg           27      2.5 mg         28      2.5 mg         29      2.5 mg         30      2.5 mg         31      2.5 mg            Date Details   08/14 This INR check   INR: 2.7   ic valid [id number] 160 exp 06/2018               How to take your warfarin dose     To take:  2.5 mg Take 0.5 of a 5 mg tablet.           Warfarin Dosing Calendar   September 2017 Details    Sun Mon Tue Wed Thu Fri Sat          1      2.5 mg         2      2.5 mg           3      2.5 mg         4      2.5 mg         5      2.5 mg         6      2.5 mg         7      2.5 mg         8      2.5 mg         9      2.5 mg           10      2.5 mg         11      2.5 mg         12               13               14               15               16                 17               18               19               20               21               22               23                 24               25               26               27               28               29               30                Date Details   No additional details    Date of next INR:  9/11/2017         How to take your warfarin dose     To take:  2.5 mg Take 0.5 of a 5 mg tablet.              [employer] Access Code: Activation code not generated  Current [employer] Status: Active

## 2017-08-14 NOTE — PROGRESS NOTES
Anticoagulation Summary as of 8/14/2017     INR goal 2.0-3.0   Selected INR 2.7 (8/14/2017)   Maintenance plan 2.5 mg (5 mg x 0.5) every day   Weekly total 17.5 mg   Plan last modified Bhavya Connelly, PHARMD (7/19/2017)   Next INR check 9/11/2017   Target end date     Indications   Atrial flutter (CMS-HCC) [I48.92]  Mitral valve insufficiency (Resolved) [I34.0]         Anticoagulation Episode Summary     INR check location     Preferred lab     Send INR reminders to     [facility]      Anticoagulation Care Providers     Provider Role Specialty Phone number    Lacey Porras M.D.  Cardiac Surgery [phone]    ELZA [place]           Anticoagulation Patient Findings      Current Outpatient Prescriptions on File Prior to Visit   Medication Sig Dispense Refill   • sertraline (ZOLOFT) 50 MG Tab Take 1 Tab by mouth every day. 30 Tab 3   • Acetaminophen (TYLENOL EXTRA STRENGTH PO) Take 1 Tab by mouth as needed.     • NON SPECIFIED Please provide patient with a portable oxygen concentrator 2.5L continuous flow at all times for 3 months    ICD10 Chronic Respiratory Failure with hypoxia J96.11  Pulse ox off of oxygen 87%  O2 off of 1 Each 0   • non-formulary med Inhale 2 L/min by mouth Continuous. Oxygen 2L x NC continuous  Indications: COPD     • diphenhydrAMINE (BENADRYL) 25 MG capsule Take 25 mg by mouth at bedtime as needed for Sleep.     • amiodarone (CORDARONE) 200 MG Tab Take 1 Tab by mouth every day. 30 Tab 3   • warfarin (COUMADIN) 5 MG Tab Take 1 Tab by mouth COUMADIN-DAILY. Titrate to INR 2-3. 30 Tab 3   • aspirin (ASA) 81 MG Chew Tab chewable tablet Take 1 Tab by mouth every day. 100 Tab    • furosemide (LASIX) 20 MG Tab Take 20 mg by mouth every day.     • fluticasone-salmeterol (ADVAIR) 250-50 MCG/DOSE AEROSOL POWDER, BREATH ACTIVATED Inhale 1 Puff by mouth 2 times a day. 3 Inhaler 3   • potassium chloride ER (K-TAB) 10 MEQ tablet Take 1 Tab by mouth every day. 90 Tab 3   • atorvastatin  (LIPITOR) 10 MG Tab Take 1 Tab by mouth every day. 90 Tab 3   • magnesium oxide (MAG-OX) 400 MG Tab Take 400 mg by mouth 2 times a day. takes 500 mg tab     • albuterol (VENTOLIN HFA) 108 (90 BASE) MCG/ACT Aero Soln inhalation aerosol Inhale 2 Puffs by mouth every four hours as needed for Shortness of Breath. 3 Inhaler 3   • Tiotropium Bromide Monohydrate (SPIRIVA RESPIMAT) 2.5 MCG/ACT Aero Soln Inhale 2 Inhalation by mouth every day. 3 Inhaler 3     No current facility-administered medications on file prior to visit.       Lab Results   Component Value Date/Time    SODIUM 137 05/05/2017 04:39 AM    POTASSIUM 3.9 05/05/2017 04:39 AM    CHLORIDE 99 05/05/2017 04:39 AM    CO2 29 05/05/2017 04:39 AM    GLUCOSE 95 05/05/2017 04:39 AM    BUN 19 05/05/2017 04:39 AM    CREATININE 0.61 05/05/2017 04:39 AM          Kelly Lovett seen in clinic today  INR  therapeutic.    Denies signs/symptoms of bleeding and/or thrombosis.    Denies changes to diet or medications.   Follow up appointment in 4 week(s).      Continue weekly warfarin dose as noted       Estiven Balderas, PHARMD

## 2017-08-23 ENCOUNTER — OFFICE VISIT (OUTPATIENT)
Dept: CARDIOLOGY | Facility: MEDICAL CENTER | Age: 72
End: 2017-08-23
Payer: MEDICARE

## 2017-08-23 VITALS
OXYGEN SATURATION: 97 % | SYSTOLIC BLOOD PRESSURE: 122 MMHG | DIASTOLIC BLOOD PRESSURE: 86 MMHG | HEART RATE: 99 BPM | HEIGHT: 60 IN | BODY MASS INDEX: 23.52 KG/M2 | WEIGHT: 119.8 LBS

## 2017-08-23 DIAGNOSIS — I10 ESSENTIAL HYPERTENSION: ICD-10-CM

## 2017-08-23 DIAGNOSIS — Z86.79 S/P MAZE OPERATION FOR ATRIAL FIBRILLATION: ICD-10-CM

## 2017-08-23 DIAGNOSIS — E78.2 MIXED HYPERLIPIDEMIA: ICD-10-CM

## 2017-08-23 DIAGNOSIS — Z98.890 S/P MAZE OPERATION FOR ATRIAL FIBRILLATION: ICD-10-CM

## 2017-08-23 DIAGNOSIS — Z98.890 S/P MITRAL VALVE REPAIR: ICD-10-CM

## 2017-08-23 DIAGNOSIS — G47.33 OBSTRUCTIVE SLEEP APNEA SYNDROME: ICD-10-CM

## 2017-08-23 DIAGNOSIS — J44.9 CHRONIC OBSTRUCTIVE PULMONARY DISEASE, UNSPECIFIED COPD TYPE (HCC): ICD-10-CM

## 2017-08-23 PROCEDURE — 99214 OFFICE O/P EST MOD 30 MIN: CPT | Performed by: INTERNAL MEDICINE

## 2017-08-23 ASSESSMENT — ENCOUNTER SYMPTOMS
DIZZINESS: 0
EYE DISCHARGE: 0
CLAUDICATION: 0
BRUISES/BLEEDS EASILY: 0
DEPRESSION: 0
SENSORY CHANGE: 0
SPEECH CHANGE: 0
CHILLS: 0
COUGH: 0
WEIGHT LOSS: 0
SHORTNESS OF BREATH: 0
FALLS: 0
LOSS OF CONSCIOUSNESS: 0
VOMITING: 0
ORTHOPNEA: 0
HEADACHES: 0
EYE PAIN: 0
DOUBLE VISION: 0
FEVER: 0
MYALGIAS: 0
PALPITATIONS: 0
NAUSEA: 0
BLURRED VISION: 0
HALLUCINATIONS: 0
PND: 0
ABDOMINAL PAIN: 0
BLOOD IN STOOL: 0

## 2017-08-23 NOTE — MR AVS SNAPSHOT
[facility]   2017 11:30 AM   Office Visit   MRN: [id number]    Department:  [facility]   Dept Phone:  [phone]    Description:  Female : 1945   Provider:  Dalia Li M.D.           Reason for Visit     Follow-Up pt has questions on warfin      Allergies as of 2017     Allergen Noted Reactions    Sulfa Drugs 10/05/2016   Rash    Rxn - years ago in her 20's      You were diagnosed with     S/P mitral valve repair   [426689]       S/P Maze operation for atrial fibrillation   [455030]       Chronic obstructive pulmonary disease, unspecified COPD type (CMS-HCC)   [7371004]       Essential hypertension   [6021122]       Mixed hyperlipidemia   [272.2.ICD-9-CM]       Obstructive sleep apnea syndrome   [449945]         Vital Signs     Blood Pressure Pulse Height Weight Body Mass Index Oxygen Saturation    122/86 mmHg 99 1.524 m (5') 54.341 kg (119 lb 12.8 oz) 23.40 kg/m2 97%    Smoking Status                   Former Smoker           Basic Information     Date Of Birth Sex Race Ethnicity Preferred Language    1945 Female White Non- English      Your appointments     Sep 08, 2017  1:40 PM   Established Patient with Ritika Jurado M.D.   [facility] (--)    [address]  [facility] [address] [phone]           You will be receiving a confirmation call a few days before your appointment from our automated call confirmation system.            Sep 11, 2017 11:15 AM   Anti-Coag Routine with [facility] PAV PHARMACIST   [facility]    [address]  [address]  [place] [address] [phone]            Sep 19, 2017  1:00 PM   Established Patient Pul with A Rotation   [facility] (--)    [address]   [address]  [place] [address] [phone]              Problem List              ICD-10-CM Priority Class Noted - Resolved    COPD  (chronic obstructive pulmonary disease) (CMS-HCC) J44.9   10/11/2016 - Present    Osteopenia M85.80   10/11/2016 - Present    Obstructive sleep apnea syndrome G47.33   10/11/2016 - Present    Abnormal liver function K76.89   12/9/2015 - Present    Aortic atherosclerosis (CMS-HCC) I70.0   9/10/2015 - Present    Atrial flutter (CMS-HCC) I48.92   12/15/2015 - Present    Congestive heart failure (CMS-HCC) I50.9   7/7/2016 - Present    Chronic respiratory failure with hypoxia (CMS-HCC) J96.11   11/23/2015 - Present    History of colonic polyps Z86.010   3/3/2008 - Present    Hyperlipidemia E78.5   9/10/2015 - Present    Hypertension I10   3/10/2008 - Present    Prediabetes R73.03   8/15/2013 - Present    Anxiety F41.9   3/15/2017 - Present    Essential (primary) hypertension I10   3/17/2017 - Present    Paroxysmal atrial fibrillation (CMS-HCC) I48.0   6/19/2017 - Present      Health Maintenance        Date Due Completion Dates    BONE DENSITY 10/10/2009 ---    IMM INFLUENZA (1) 9/1/2017 1/1/2014    MAMMOGRAM 11/29/2017 11/29/2016, 9/11/2015, 9/11/2015    COLONOSCOPY 5/6/2019 5/6/2009    IMM DTaP/Tdap/Td Vaccine (2 - Td) 9/17/2022 9/17/2012, 1/25/2012            Current Immunizations     13-VALENT PCV PREVNAR 9/10/2015    Hepatitis B Vaccine Recombivax (Adol/Adult) 1/27/2000    Influenza TIV (IM) 1/1/2014    Pneumococcal polysaccharide vaccine (PPSV-23) 10/27/2016    SHINGLES VACCINE 8/15/2013    Tdap Vaccine 9/17/2012, 1/25/2012      Below and/or attached are the medications your provider expects you to take. Review all of your home medications and newly ordered medications with your provider and/or pharmacist. Follow medication instructions as directed by your provider and/or pharmacist. Please keep your medication list with you and share with your provider. Update the information when medications are discontinued, doses are changed, or new medications (including over-the-counter products) are added; and carry  medication information at all times in the event of emergency situations     Allergies:  SULFA DRUGS - Rash               Medications  Valid as of: August 23, 2017 - 12:01 PM    Generic Name Brand Name Tablet Size Instructions for use    Acetaminophen   Take 1 Tab by mouth as needed.        Albuterol Sulfate (Aero Soln) albuterol 108 (90 Base) MCG/ACT Inhale 2 Puffs by mouth every four hours as needed for Shortness of Breath.        Amiodarone HCl (Tab) CORDARONE 200 MG Take 1 Tab by mouth every day.        Atorvastatin Calcium (Tab) LIPITOR 10 MG Take 1 Tab by mouth every day.        DiphenhydrAMINE HCl (Cap) BENADRYL 25 MG Take 25 mg by mouth at bedtime as needed for Sleep.        Fluticasone-Salmeterol (AEROSOL POWDER, BREATH ACTIVATED) ADVAIR 250-50 MCG/DOSE Inhale 1 Puff by mouth 2 times a day.        Furosemide (Tab) LASIX 20 MG Take 20 mg by mouth every day.        Magnesium Oxide (Tab) MAG- MG Take 400 mg by mouth 2 times a day. takes 500 mg tab        NON SPECIFIED   Please provide patient with a portable oxygen concentrator 2.5L continuous flow at all times for 3 months    ICD10 Chronic Respiratory Failure with hypoxia J96.11  Pulse ox off of oxygen 87%  O2 off of        non-formulary med non-formulary med  Inhale 2 L/min by mouth Continuous. Oxygen 2L x NC continuous  Indications: COPD        Potassium Chloride (Tab CR) KLOR-CON 10 MEQ Take 1 Tab by mouth every day.        Sertraline HCl (Tab) ZOLOFT 50 MG Take 1 Tab by mouth every day.        Tiotropium Bromide Monohydrate (Aero Soln) Tiotropium Bromide Monohydrate 2.5 MCG/ACT Inhale 2 Inhalation by mouth every day.        Warfarin Sodium (Tab) COUMADIN 5 MG Take 1 Tab by mouth COUMADIN-DAILY. Titrate to INR 2-3.        .                 Medicines prescribed today were sent to:     [id number] - [place] - [address] [place] [address]    Phone: [phone] Fax: [phone]    Open 24 Hours?: No      Medication  refill instructions:       If your prescription bottle indicates you have medication refills left, it is not necessary to call your provider’s office. Please contact your pharmacy and they will refill your medication.    If your prescription bottle indicates you do not have any refills left, you may request refills at any time through one of the following ways: The online [employer] system (except Urgent Care), by calling your provider’s office, or by asking your pharmacy to contact your provider’s office with a refill request. Medication refills are processed only during regular business hours and may not be available until the next business day. Your provider may request additional information or to have a follow-up visit with you prior to refilling your medication.   *Please Note: Medication refills are assigned a new Rx number when refilled electronically. Your pharmacy may indicate that no refills were authorized even though a new prescription for the same medication is available at the pharmacy. Please request the medicine by name with the pharmacy before contacting your provider for a refill.        Instructions    Stop Aspirin    Will change INR target to 1.5 to 2.5.          [employer] Access Code: Activation code not generated  Current [employer] Status: Active

## 2017-08-23 NOTE — Clinical Note
[facility]   [address] [place] [address]  [place] [address]  Phone: [phone]  Fax: [phone]              Kelly Lovett  1945    Encounter Date: 8/23/2017    Dalia Li M.D.          PROGRESS NOTE:  Subjective:   Kelly Lovett is a 71 y.o. female who presents today for cardiac care and evaluation after her recent mitral valve repair, maze. Patient has been doing okay. She still has significant pulmonary disease. She will be followed by pulmonologist. She will be participating in pulmonary rehab. She has not done cardiac rehab because her pulmonologist recommended pulmonary rehab before cardiac rehab.    Her EKG shows evidence of sinus rhythm today with PVC.    Patient is still on oxygen.    Past Medical History   Diagnosis Date   • COPD (chronic obstructive pulmonary disease) (CMS-HCC)    • Hypertension    • Hyperlipidemia    • Heart valve disease    • Emphysema of lung (CMS-HCC)    • High cholesterol    • Sleep apnea      uses cpap   • Breath shortness      pt reports using o2 at night 2L, no problems at this time   • History of heart surgery      Past Surgical History   Procedure Laterality Date   • Oophorectomy     • Appendectomy       1967   • Mitral valve repair  4/20/2017     Procedure: MITRAL VALVE REPAIR ;  Surgeon: Lacey Porras M.D.;  Location: SURGERY [facility];  Service:    • Maze procedure Left 4/20/2017     Procedure: MAZE PROCEDURE, Left atrial appendage ligation;  Surgeon: Lacey Porras M.D.;  Location: SURGERY [facility];  Service:    • Lino  4/20/2017     Procedure: LINO;  Surgeon: Lacey Porras M.D.;  Location: SURGERY [facility];  Service:      Family History   Problem Relation Age of Onset   • Cancer Father      colon cancer    • Hypertension Mother    • Cancer Sister 68     ovarian cancer     History   Smoking status   • Former Smoker -- 0.50 packs/day for 38 years   • Types: Cigarettes   •  Quit date: 10/11/2001   Smokeless tobacco   • Never Used     Allergies   Allergen Reactions   • Sulfa Drugs Rash     Rxn - years ago in her 20's     Outpatient Encounter Prescriptions as of 8/23/2017   Medication Sig Dispense Refill   • sertraline (ZOLOFT) 50 MG Tab Take 1 Tab by mouth every day. 30 Tab 3   • Acetaminophen (TYLENOL EXTRA STRENGTH PO) Take 1 Tab by mouth as needed.     • diphenhydrAMINE (BENADRYL) 25 MG capsule Take 25 mg by mouth at bedtime as needed for Sleep.     • warfarin (COUMADIN) 5 MG Tab Take 1 Tab by mouth COUMADIN-DAILY. Titrate to INR 2-3. 30 Tab 3   • furosemide (LASIX) 20 MG Tab Take 20 mg by mouth every day.     • fluticasone-salmeterol (ADVAIR) 250-50 MCG/DOSE AEROSOL POWDER, BREATH ACTIVATED Inhale 1 Puff by mouth 2 times a day. 3 Inhaler 3   • potassium chloride ER (K-TAB) 10 MEQ tablet Take 1 Tab by mouth every day. 90 Tab 3   • atorvastatin (LIPITOR) 10 MG Tab Take 1 Tab by mouth every day. 90 Tab 3   • magnesium oxide (MAG-OX) 400 MG Tab Take 400 mg by mouth 2 times a day. takes 500 mg tab     • Tiotropium Bromide Monohydrate (SPIRIVA RESPIMAT) 2.5 MCG/ACT Aero Soln Inhale 2 Inhalation by mouth every day. 3 Inhaler 3   • NON SPECIFIED Please provide patient with a portable oxygen concentrator 2.5L continuous flow at all times for 3 months    ICD10 Chronic Respiratory Failure with hypoxia J96.11  Pulse ox off of oxygen 87%  O2 off of 1 Each 0   • non-formulary med Inhale 2 L/min by mouth Continuous. Oxygen 2L x NC continuous  Indications: COPD     • amiodarone (CORDARONE) 200 MG Tab Take 1 Tab by mouth every day. 30 Tab 3   • [DISCONTINUED] aspirin (ASA) 81 MG Chew Tab chewable tablet Take 1 Tab by mouth every day. 100 Tab    • albuterol (VENTOLIN HFA) 108 (90 BASE) MCG/ACT Aero Soln inhalation aerosol Inhale 2 Puffs by mouth every four hours as needed for Shortness of Breath. 3 Inhaler 3     No facility-administered encounter medications on file as of 8/23/2017.     Review of  Systems   Constitutional: Negative for fever, chills, weight loss and malaise/fatigue.   HENT: Negative for ear discharge, ear pain, hearing loss and nosebleeds.    Eyes: Negative for blurred vision, double vision, pain and discharge.   Respiratory: Negative for cough and shortness of breath.    Cardiovascular: Negative for chest pain, palpitations, orthopnea, claudication, leg swelling and PND.   Gastrointestinal: Negative for nausea, vomiting, abdominal pain, blood in stool and melena.   Genitourinary: Negative for dysuria and hematuria.   Musculoskeletal: Negative for myalgias, joint pain and falls.   Skin: Negative for itching and rash.   Neurological: Negative for dizziness, sensory change, speech change, loss of consciousness and headaches.   Endo/Heme/Allergies: Negative for environmental allergies. Does not bruise/bleed easily.   Psychiatric/Behavioral: Negative for depression, suicidal ideas and hallucinations.        Objective:   /86 mmHg  Pulse 99  Ht 1.524 m (5')  Wt 54.341 kg (119 lb 12.8 oz)  BMI 23.40 kg/m2  SpO2 97%    Physical Exam   Constitutional: She is oriented to person, place, and time. She appears well-developed and well-nourished.   HENT:   Head: Normocephalic and atraumatic.   Eyes: EOM are normal.   Neck: No JVD present.   Cardiovascular: Normal rate, regular rhythm, normal heart sounds and intact distal pulses.  Exam reveals no gallop and no friction rub.    No murmur heard.  Pulmonary/Chest: No respiratory distress. She has no wheezes. She has no rales. She exhibits no tenderness.   Abdominal: She exhibits no distension. There is no tenderness. There is no rebound and no guarding.   Musculoskeletal: She exhibits no edema or tenderness.   Lymphadenopathy:     She has no cervical adenopathy.   Neurological: She is alert and oriented to person, place, and time.   Skin: Skin is dry.   Psychiatric: She has a normal mood and affect.   Nursing note and vitals  reviewed.      Assessment:     1. S/P mitral valve repair     2. S/P Maze operation for atrial fibrillation     3. Chronic obstructive pulmonary disease, unspecified COPD type (CMS-HCC)     4. Essential hypertension     5. Mixed hyperlipidemia     6. Obstructive sleep apnea syndrome         Medical Decision Making:  Today's Assessment / Status / Plan:     Stop Aspirin.    Will change INR target to 1.5 to 2.5 due to easy bruising and request of patient.    At this time patient is clinically stable in terms of her cardiac standpoint.  Blood pressure is well controlled.    I will see patient back in clinic with lab tests and studies results in 6 months.    I thank you Dr. Jurado for referring patient to our Cardiology Clinic today.        Ritika Jurado M.D.  [address] [address]  VIA In Basket

## 2017-08-23 NOTE — PROGRESS NOTES
Subjective:   Kelly Lovett is a 71 y.o. female who presents today for cardiac care and evaluation after her recent mitral valve repair, maze. Patient has been doing okay. She still has significant pulmonary disease. She will be followed by pulmonologist. She will be participating in pulmonary rehab. She has not done cardiac rehab because her pulmonologist recommended pulmonary rehab before cardiac rehab.    Her EKG shows evidence of sinus rhythm today with PVC.    Patient is still on oxygen.    Past Medical History   Diagnosis Date   • COPD (chronic obstructive pulmonary disease) (CMS-HCC)    • Hypertension    • Hyperlipidemia    • Heart valve disease    • Emphysema of lung (CMS-HCC)    • High cholesterol    • Sleep apnea      uses cpap   • Breath shortness      pt reports using o2 at night 2L, no problems at this time   • History of heart surgery      Past Surgical History   Procedure Laterality Date   • Oophorectomy     • Appendectomy       1967   • Mitral valve repair  4/20/2017     Procedure: MITRAL VALVE REPAIR ;  Surgeon: Lacey oPrras M.D.;  Location: SURGERY [facility];  Service:    • Maze procedure Left 4/20/2017     Procedure: MAZE PROCEDURE, Left atrial appendage ligation;  Surgeon: Lacey Porras M.D.;  Location: SURGERY [facility];  Service:    • Lino  4/20/2017     Procedure: LINO;  Surgeon: Lacey Porras M.D.;  Location: SURGERY [facility];  Service:      Family History   Problem Relation Age of Onset   • Cancer Father      colon cancer    • Hypertension Mother    • Cancer Sister 68     ovarian cancer     History   Smoking status   • Former Smoker -- 0.50 packs/day for 38 years   • Types: Cigarettes   • Quit date: 10/11/2001   Smokeless tobacco   • Never Used     Allergies   Allergen Reactions   • Sulfa Drugs Rash     Rxn - years ago in her 20's     Outpatient Encounter Prescriptions as of 8/23/2017   Medication Sig Dispense Refill   • sertraline (ZOLOFT) 50 MG Tab Take 1  Tab by mouth every day. 30 Tab 3   • Acetaminophen (TYLENOL EXTRA STRENGTH PO) Take 1 Tab by mouth as needed.     • diphenhydrAMINE (BENADRYL) 25 MG capsule Take 25 mg by mouth at bedtime as needed for Sleep.     • warfarin (COUMADIN) 5 MG Tab Take 1 Tab by mouth COUMADIN-DAILY. Titrate to INR 2-3. 30 Tab 3   • furosemide (LASIX) 20 MG Tab Take 20 mg by mouth every day.     • fluticasone-salmeterol (ADVAIR) 250-50 MCG/DOSE AEROSOL POWDER, BREATH ACTIVATED Inhale 1 Puff by mouth 2 times a day. 3 Inhaler 3   • potassium chloride ER (K-TAB) 10 MEQ tablet Take 1 Tab by mouth every day. 90 Tab 3   • atorvastatin (LIPITOR) 10 MG Tab Take 1 Tab by mouth every day. 90 Tab 3   • magnesium oxide (MAG-OX) 400 MG Tab Take 400 mg by mouth 2 times a day. takes 500 mg tab     • Tiotropium Bromide Monohydrate (SPIRIVA RESPIMAT) 2.5 MCG/ACT Aero Soln Inhale 2 Inhalation by mouth every day. 3 Inhaler 3   • NON SPECIFIED Please provide patient with a portable oxygen concentrator 2.5L continuous flow at all times for 3 months    ICD10 Chronic Respiratory Failure with hypoxia J96.11  Pulse ox off of oxygen 87%  O2 off of 1 Each 0   • non-formulary med Inhale 2 L/min by mouth Continuous. Oxygen 2L x NC continuous  Indications: COPD     • amiodarone (CORDARONE) 200 MG Tab Take 1 Tab by mouth every day. 30 Tab 3   • [DISCONTINUED] aspirin (ASA) 81 MG Chew Tab chewable tablet Take 1 Tab by mouth every day. 100 Tab    • albuterol (VENTOLIN HFA) 108 (90 BASE) MCG/ACT Aero Soln inhalation aerosol Inhale 2 Puffs by mouth every four hours as needed for Shortness of Breath. 3 Inhaler 3     No facility-administered encounter medications on file as of 8/23/2017.     Review of Systems   Constitutional: Negative for fever, chills, weight loss and malaise/fatigue.   HENT: Negative for ear discharge, ear pain, hearing loss and nosebleeds.    Eyes: Negative for blurred vision, double vision, pain and discharge.   Respiratory: Negative for cough and  shortness of breath.    Cardiovascular: Negative for chest pain, palpitations, orthopnea, claudication, leg swelling and PND.   Gastrointestinal: Negative for nausea, vomiting, abdominal pain, blood in stool and melena.   Genitourinary: Negative for dysuria and hematuria.   Musculoskeletal: Negative for myalgias, joint pain and falls.   Skin: Negative for itching and rash.   Neurological: Negative for dizziness, sensory change, speech change, loss of consciousness and headaches.   Endo/Heme/Allergies: Negative for environmental allergies. Does not bruise/bleed easily.   Psychiatric/Behavioral: Negative for depression, suicidal ideas and hallucinations.        Objective:   /86 mmHg  Pulse 99  Ht 1.524 m (5')  Wt 54.341 kg (119 lb 12.8 oz)  BMI 23.40 kg/m2  SpO2 97%    Physical Exam   Constitutional: She is oriented to person, place, and time. She appears well-developed and well-nourished.   HENT:   Head: Normocephalic and atraumatic.   Eyes: EOM are normal.   Neck: No JVD present.   Cardiovascular: Normal rate, regular rhythm, normal heart sounds and intact distal pulses.  Exam reveals no gallop and no friction rub.    No murmur heard.  Pulmonary/Chest: No respiratory distress. She has no wheezes. She has no rales. She exhibits no tenderness.   Abdominal: She exhibits no distension. There is no tenderness. There is no rebound and no guarding.   Musculoskeletal: She exhibits no edema or tenderness.   Lymphadenopathy:     She has no cervical adenopathy.   Neurological: She is alert and oriented to person, place, and time.   Skin: Skin is dry.   Psychiatric: She has a normal mood and affect.   Nursing note and vitals reviewed.      Assessment:     1. S/P mitral valve repair     2. S/P Maze operation for atrial fibrillation     3. Chronic obstructive pulmonary disease, unspecified COPD type (CMS-HCC)     4. Essential hypertension     5. Mixed hyperlipidemia     6. Obstructive sleep apnea syndrome          Medical Decision Making:  Today's Assessment / Status / Plan:     Stop Aspirin.    Will change INR target to 1.5 to 2.5 due to easy bruising and request of patient.    At this time patient is clinically stable in terms of her cardiac standpoint.  Blood pressure is well controlled.    I will see patient back in clinic with lab tests and studies results in 6 months.    I thank you Dr. Jurado for referring patient to our Cardiology Clinic today.

## 2017-08-28 ENCOUNTER — ANTICOAGULATION VISIT (OUTPATIENT)
Dept: MEDICAL GROUP | Facility: MEDICAL CENTER | Age: 72
End: 2017-08-28
Payer: MEDICARE

## 2017-08-28 VITALS — HEART RATE: 103 BPM | DIASTOLIC BLOOD PRESSURE: 62 MMHG | SYSTOLIC BLOOD PRESSURE: 126 MMHG

## 2017-08-28 DIAGNOSIS — I48.92 ATRIAL FLUTTER, UNSPECIFIED TYPE (HCC): ICD-10-CM

## 2017-08-28 LAB — INR PPP: 2.4 (ref 2–3.5)

## 2017-08-28 PROCEDURE — 85610 PROTHROMBIN TIME: CPT | Performed by: FAMILY MEDICINE

## 2017-08-28 PROCEDURE — 99211 OFF/OP EST MAY X REQ PHY/QHP: CPT | Performed by: FAMILY MEDICINE

## 2017-08-28 RX ORDER — WARFARIN SODIUM 2.5 MG/1
2.5 TABLET ORAL DAILY
Qty: 90 TAB | Refills: 1 | Status: SHIPPED | OUTPATIENT
Start: 2017-08-28 | End: 2018-03-20 | Stop reason: SDUPTHER

## 2017-08-28 NOTE — PROGRESS NOTES
Anticoagulation Summary  As of 8/28/2017    INR goal:   1.5-2.5   TTR:   43.0 % (3.4 mo)   Prior goal:   2.0-3.0   Today's INR:   2.4   Maintenance plan:   2.5 mg (2.5 mg x 1) every day   Weekly total:   17.5 mg   Plan last modified:   Estiven Balderas PharmD (8/28/2017)   Next INR check:   9/18/2017   Target end date:       Indications    Atrial flutter (CMS-HCC) [I48.92]  Mitral valve insufficiency (Resolved) [I34.0]             Anticoagulation Episode Summary     INR check location:       Preferred lab:       Send INR reminders to:       Comments:   Home Health      Anticoagulation Care Providers     Provider Role Specialty Phone number    Lacey Porras M.D.  Cardiac Surgery [phone]    Amanda Perera PharmD           Anticoagulation Patient Findings      Current Outpatient Prescriptions on File Prior to Visit   Medication Sig Dispense Refill   • sertraline (ZOLOFT) 50 MG Tab Take 1 Tab by mouth every day. 30 Tab 3   • Acetaminophen (TYLENOL EXTRA STRENGTH PO) Take 1 Tab by mouth as needed.     • NON SPECIFIED Please provide patient with a portable oxygen concentrator 2.5L continuous flow at all times for 3 months    ICD10 Chronic Respiratory Failure with hypoxia J96.11  Pulse ox off of oxygen 87%  O2 off of 1 Each 0   • non-formulary med Inhale 2 L/min by mouth Continuous. Oxygen 2L x NC continuous  Indications: COPD     • diphenhydrAMINE (BENADRYL) 25 MG capsule Take 25 mg by mouth at bedtime as needed for Sleep.     • amiodarone (CORDARONE) 200 MG Tab Take 1 Tab by mouth every day. 30 Tab 3   • warfarin (COUMADIN) 5 MG Tab Take 1 Tab by mouth COUMADIN-DAILY. Titrate to INR 2-3. 30 Tab 3   • furosemide (LASIX) 20 MG Tab Take 20 mg by mouth every day.     • fluticasone-salmeterol (ADVAIR) 250-50 MCG/DOSE AEROSOL POWDER, BREATH ACTIVATED Inhale 1 Puff by mouth 2 times a day. 3 Inhaler 3   • potassium chloride ER (K-TAB) 10 MEQ tablet Take 1 Tab by mouth every day. 90 Tab 3   • atorvastatin (LIPITOR) 10  MG Tab Take 1 Tab by mouth every day. 90 Tab 3   • magnesium oxide (MAG-OX) 400 MG Tab Take 400 mg by mouth 2 times a day. takes 500 mg tab     • albuterol (VENTOLIN HFA) 108 (90 BASE) MCG/ACT Aero Soln inhalation aerosol Inhale 2 Puffs by mouth every four hours as needed for Shortness of Breath. 3 Inhaler 3   • Tiotropium Bromide Monohydrate (SPIRIVA RESPIMAT) 2.5 MCG/ACT Aero Soln Inhale 2 Inhalation by mouth every day. 3 Inhaler 3     No current facility-administered medications on file prior to visit.        Lab Results   Component Value Date/Time    SODIUM 137 05/05/2017 04:39 AM    POTASSIUM 3.9 05/05/2017 04:39 AM    CHLORIDE 99 05/05/2017 04:39 AM    CO2 29 05/05/2017 04:39 AM    GLUCOSE 95 05/05/2017 04:39 AM    BUN 19 05/05/2017 04:39 AM    CREATININE 0.61 05/05/2017 04:39 AM          Kelly Lovett seen in clinic today  INR  therapeutic.    Denies signs/symptoms of bleeding and/or thrombosis.    Denies changes to diet or medications.   Follow up appointment in 2 week(s).      Continue weekly warfarin dose as noted       Estiven Balderas, PharmD

## 2017-09-05 DIAGNOSIS — Z79.899 HIGH RISK MEDICATION USE: ICD-10-CM

## 2017-09-05 DIAGNOSIS — I48.0 PAROXYSMAL ATRIAL FIBRILLATION (HCC): ICD-10-CM

## 2017-09-05 RX ORDER — AMIODARONE HYDROCHLORIDE 200 MG/1
200 TABLET ORAL DAILY
Qty: 30 TAB | Refills: 3 | Status: SHIPPED | OUTPATIENT
Start: 2017-09-05 | End: 2018-01-05 | Stop reason: SDUPTHER

## 2017-09-11 ENCOUNTER — ANTICOAGULATION VISIT (OUTPATIENT)
Dept: MEDICAL GROUP | Facility: MEDICAL CENTER | Age: 72
End: 2017-09-11
Payer: MEDICARE

## 2017-09-11 VITALS — HEART RATE: 100 BPM | DIASTOLIC BLOOD PRESSURE: 50 MMHG | SYSTOLIC BLOOD PRESSURE: 117 MMHG

## 2017-09-11 DIAGNOSIS — I48.92 ATRIAL FLUTTER, UNSPECIFIED TYPE (HCC): ICD-10-CM

## 2017-09-11 LAB — INR PPP: 2.8 (ref 2–3.5)

## 2017-09-11 PROCEDURE — 85610 PROTHROMBIN TIME: CPT | Performed by: FAMILY MEDICINE

## 2017-09-11 NOTE — PROGRESS NOTES
Anticoagulation Summary  As of 9/11/2017    INR goal:   1.5-2.5   TTR:   40.9 % (3.9 mo)   Today's INR:   2.8!   Maintenance plan:   1.25 mg (2.5 mg x 0.5) on Mon; 2.5 mg (2.5 mg x 1) all other days   Weekly total:   16.25 mg   Plan last modified:   Estiven Balderas PharmD (9/11/2017)   Next INR check:   10/2/2017   Target end date:       Indications    Atrial flutter (CMS-HCC) [I48.92]  Mitral valve insufficiency (Resolved) [I34.0]             Anticoagulation Episode Summary     INR check location:       Preferred lab:       Send INR reminders to:       Comments:   Home Health      Anticoagulation Care Providers     Provider Role Specialty Phone number    Lacey Porras M.D.  Cardiac Surgery [phone]    Amanda Perera PharmD           Anticoagulation Patient Findings  Patient Findings     Negatives:   Signs/symptoms of thrombosis, Signs/symptoms of bleeding, Laboratory test error suspected, Change in health, Change in alcohol use, Change in activity, Upcoming invasive procedure, Emergency department visit, Upcoming dental procedure, Missed doses, Extra doses, Change in medications, Change in diet/appetite, Hospital admission, Bruising, Other complaints    Comments:   Nose bleed for 10-20min             HPI:  Kelly Lovett seen in clinic today, on anticoagulation therapy with Warfarin for A. flutter  Changes to current medical/health status since last appt: none  Nose bleed for 10-20min as seen above   Denies any interval changes to diet  Denies any interval changes to medications since last appt.   Denies any complications or cost restrictions with current therapy.   BP recorded in vitals.    A/P   INR  supra-therapeutic.   Decrease weekly warfarin dose as noted    Follow up appointment in 3 week(s).     Estiven Balderas, DariD

## 2017-09-19 ENCOUNTER — OFFICE VISIT (OUTPATIENT)
Dept: PULMONOLOGY | Facility: HOSPICE | Age: 72
End: 2017-09-19
Payer: MEDICARE

## 2017-09-19 ENCOUNTER — HOME STUDY (OUTPATIENT)
Dept: PULMONOLOGY | Facility: HOSPICE | Age: 72
End: 2017-09-19
Attending: INTERNAL MEDICINE
Payer: MEDICARE

## 2017-09-19 VITALS
BODY MASS INDEX: 23.56 KG/M2 | SYSTOLIC BLOOD PRESSURE: 112 MMHG | OXYGEN SATURATION: 95 % | WEIGHT: 120 LBS | HEIGHT: 60 IN | RESPIRATION RATE: 16 BRPM | HEART RATE: 90 BPM | TEMPERATURE: 98 F | DIASTOLIC BLOOD PRESSURE: 72 MMHG

## 2017-09-19 DIAGNOSIS — J96.11 CHRONIC RESPIRATORY FAILURE WITH HYPOXIA (HCC): ICD-10-CM

## 2017-09-19 DIAGNOSIS — J44.9 CHRONIC OBSTRUCTIVE PULMONARY DISEASE, UNSPECIFIED COPD TYPE (HCC): ICD-10-CM

## 2017-09-19 DIAGNOSIS — Z87.09 H/O PNEUMOTHORAX: ICD-10-CM

## 2017-09-19 DIAGNOSIS — G47.33 OBSTRUCTIVE SLEEP APNEA SYNDROME: ICD-10-CM

## 2017-09-19 DIAGNOSIS — Z79.899 ON AMIODARONE THERAPY: ICD-10-CM

## 2017-09-19 DIAGNOSIS — R09.02 HYPOXEMIA: ICD-10-CM

## 2017-09-19 DIAGNOSIS — Z95.2 STATUS POST MITRAL VALVE REPLACEMENT: ICD-10-CM

## 2017-09-19 PROCEDURE — 99214 OFFICE O/P EST MOD 30 MIN: CPT | Performed by: INTERNAL MEDICINE

## 2017-09-19 PROCEDURE — 94762 N-INVAS EAR/PLS OXIMTRY CONT: CPT | Performed by: INTERNAL MEDICINE

## 2017-09-19 NOTE — PROGRESS NOTES
Chief Complaint   Patient presents with   • Follow-Up     COPD       HPI:  The patient is a 71-year-old woman with advanced chronic obstructive pulmonary disease. She recently underwent cardiac surgery.  On 4/20/2017 she had a radical mitral  valve repair with a left-sided Maze procedure, left atrial appendage ligation  with intraoperative transesophageal echocardiogram.  On 4/22/2017   She required bilateral chest tube placement for bilateral pneumothoraces. She required ventilatory support for several days at that time. Preoperatively she was on supplemental oxygen at night. Now she is requiring her oxygen 24/ 7. She does have a portable oxygen concentrator. At rest on room air her saturation is 90%. However she does desaturate quickly with any activity. Pulsed oxygen was not adequate to maintain her oxygenation. At this time she will require continuous oxygen at 3 L/m. From a cardiac standpoint she is doing well. She obviously has significant chronic dyspnea. Pulmonary function testing shows an FEV1 of 0.68 L which is 30% of predicted. Her FEV1/FVC ratio is reduced at 42%. There is mild improvement with bronchodilator. Lung volumes demonstrate hyperinflation. DLCO was 29% of predicted. This study was done in October 2016.  She is now using a portable oxygen concentrator that supplies continuous supplemental oxygen at 2-3 L/m. She has not had any recent exacerbations. She has been on auto CPAP in the range of 5-8 cm of water pressure. Her mean pressure was 5.8 cm of water with an average device pressure of 7 cm of water pressure. When she uses her machine her average AHI is 2.9 events per hour. She is a machine about 70.5% of the time for an average of about 5-1/2 hours. She has not had a new mask or supplies in some time. We will request mask and supplies as well as a mask fit at our sleep center.    After some difficulty she was able to establish contact with a pulmonary rehabilitation program and is scheduled  for that program in the near future. She also wants to travel to the [place] via cruise ship. She does have a portable oxygen concentrator with multiple batteries that will enable her to complete that journey. She will fly direct from [place] to [place].    She presented with her  and her sister. They had multiple questions about oxygenation, portable oxygen concentrators, her need for supplemental oxygen on a continuing basis, her difficulties with CPAP, the need for CPAP, and needs for her upcoming travel. Hopefully we answered all their questions.    Past Medical History:   Diagnosis Date   • Breath shortness     pt reports using o2 at night 2L, no problems at this time   • COPD (chronic obstructive pulmonary disease) (CMS-HCC)    • Emphysema of lung (CMS-HCC)    • Heart valve disease    • High cholesterol    • History of heart surgery    • Hyperlipidemia    • Hypertension    • Sleep apnea     uses cpap       ROS:   Constitutional: Denies fevers, chills, night sweats, fatigue or weight loss  Eyes: Denies vision loss, pain, drainage, double vision  Ears, Nose, Throat: Denies earache, tinnitus, hoarseness  Cardiovascular: Denies Angina, tightness, palpitations at this time. Has some occasional. Chest wall discomfort  Respiratory: Denies  sputum production, cough, hemoptysis. Positive for chronic dyspnea  Sleep: She is on CPAP  GI: Denies abdominal pain, nausea, vomiting, diarrhea  : Denies frequent urination, hematuria, painful urination  Musculoskeletal: Denies back pain, painful joints, sore muscles  Neurological: Denies headaches, seizures  Skin: Denies rashes, color changes  Psychiatric: Denies depression or thoughts of suicide  Hematologic: She is on anticoagulation  Allergic/Immunologic: Denies rhinitis, skin sensitivity, positive for easy bruisability    Social History     Social History   • Marital status:      Spouse name: N/A   • Number of children: N/A   • Years of education:  N/A     Occupational History   • Not on file.     Social History Main Topics   • Smoking status: Former Smoker     Packs/day: 0.50     Years: 38.00     Types: Cigarettes     Quit date: 10/11/2001   • Smokeless tobacco: Never Used   • Alcohol use 8.4 oz/week     14 Shots of liquor per week      Comment: 3 per day   • Drug use: No   • Sexual activity: Yes     Partners: Male     Other Topics Concern   • Not on file     Social History Narrative   • No narrative on file     Sulfa drugs  Current Outpatient Prescriptions on File Prior to Visit   Medication Sig Dispense Refill   • warfarin (COUMADIN) 2.5 MG Tab Take 1 Tab by mouth every day. 90 Tab 1   • sertraline (ZOLOFT) 50 MG Tab Take 1 Tab by mouth every day. 30 Tab 3   • Acetaminophen (TYLENOL EXTRA STRENGTH PO) Take 1 Tab by mouth as needed.     • diphenhydrAMINE (BENADRYL) 25 MG capsule Take 25 mg by mouth at bedtime as needed for Sleep.     • warfarin (COUMADIN) 5 MG Tab Take 1 Tab by mouth COUMADIN-DAILY. Titrate to INR 2-3. 30 Tab 3   • furosemide (LASIX) 20 MG Tab Take 20 mg by mouth every day.     • fluticasone-salmeterol (ADVAIR) 250-50 MCG/DOSE AEROSOL POWDER, BREATH ACTIVATED Inhale 1 Puff by mouth 2 times a day. 3 Inhaler 3   • potassium chloride ER (K-TAB) 10 MEQ tablet Take 1 Tab by mouth every day. 90 Tab 3   • atorvastatin (LIPITOR) 10 MG Tab Take 1 Tab by mouth every day. 90 Tab 3   • magnesium oxide (MAG-OX) 400 MG Tab Take 400 mg by mouth 2 times a day. takes 500 mg tab     • albuterol (VENTOLIN HFA) 108 (90 BASE) MCG/ACT Aero Soln inhalation aerosol Inhale 2 Puffs by mouth every four hours as needed for Shortness of Breath. 3 Inhaler 3   • Tiotropium Bromide Monohydrate (SPIRIVA RESPIMAT) 2.5 MCG/ACT Aero Soln Inhale 2 Inhalation by mouth every day. 3 Inhaler 3   • amiodarone (CORDARONE) 200 MG Tab Take 1 Tab by mouth every day. 30 Tab 3   • NON SPECIFIED Please provide patient with a portable oxygen concentrator 2.5L continuous flow at all times  for 3 months    ICD10 Chronic Respiratory Failure with hypoxia J96.11  Pulse ox off of oxygen 87%  O2 off of 1 Each 0   • non-formulary med Inhale 2 L/min by mouth Continuous. Oxygen 2L x NC continuous  Indications: COPD       No current facility-administered medications on file prior to visit.      Blood pressure 112/72, pulse 90, temperature 36.7 °C (98 °F), resp. rate 16, height 1.524 m (5'), weight 54.4 kg (120 lb), SpO2 95 %.  Family History   Problem Relation Age of Onset   • Cancer Father      colon cancer    • Hypertension Mother    • Cancer Sister 68     ovarian cancer       Physical Exam:    HEENT: PERRLA, EOMI, no scleral icterus, no nasal or oral lesions  Neck: No thyromegaly, no adenopathy, no bruits  Mallampatti: Grade II  Lungs:Distant breath sounds, no wheezes or crackles  Heart: Regular rate and rhythm, no gallops or murmurs  Abdomen: Soft, benign, no organomegaly  Extremities: No clubbing, cyanosis, or edema  Neurologic: Cranial nerve, motor, and sensory exam are normal    1. Chronic obstructive pulmonary disease, unspecified COPD type (CMS-HCC)    2. Hypoxemia    3. Status post mitral valve replacement    4. H/O pneumothorax    5. On amiodarone therapy    6. Chronic respiratory failure with hypoxia (CMS-HCC)    7. Obstructive sleep apnea syndrome        She will continue supplemental oxygen as discussed above.  Pulmonary rehabilitation program is pending.  She will continue bronchodilators.  We will perform overnight oximetry to make sure her oxygen needs are met on supplemental oxygen at 3 L/m  We have urged her to use her CPAP as frequently as possible  We will do a mask fit and have ordered new mask and supplies  She should be okay to travel to [place]  We will see her in follow-up in 3 months or sooner if needed.

## 2017-09-20 ENCOUNTER — PATIENT MESSAGE (OUTPATIENT)
Dept: HEALTH INFORMATION MANAGEMENT | Facility: OTHER | Age: 72
End: 2017-09-20

## 2017-09-20 NOTE — PROCEDURES
Overnight oximetry was performed with the patient using supplemental oxygen at 3 L/m. Baseline oxygen saturation was 97%. There is no evidence of significant oxygen desaturation.

## 2017-09-29 ENCOUNTER — PATIENT OUTREACH (OUTPATIENT)
Dept: HEALTH INFORMATION MANAGEMENT | Facility: OTHER | Age: 72
End: 2017-09-29

## 2017-10-02 ENCOUNTER — ANTICOAGULATION VISIT (OUTPATIENT)
Dept: MEDICAL GROUP | Facility: MEDICAL CENTER | Age: 72
End: 2017-10-02
Payer: MEDICARE

## 2017-10-02 VITALS — SYSTOLIC BLOOD PRESSURE: 92 MMHG | HEART RATE: 96 BPM | DIASTOLIC BLOOD PRESSURE: 67 MMHG

## 2017-10-02 DIAGNOSIS — I48.92 ATRIAL FLUTTER, UNSPECIFIED TYPE (HCC): ICD-10-CM

## 2017-10-02 LAB — INR PPP: 2.8 (ref 2–3.5)

## 2017-10-02 PROCEDURE — 99211 OFF/OP EST MAY X REQ PHY/QHP: CPT | Performed by: FAMILY MEDICINE

## 2017-10-02 PROCEDURE — 85610 PROTHROMBIN TIME: CPT | Performed by: FAMILY MEDICINE

## 2017-10-02 NOTE — PROGRESS NOTES
Anticoagulation Summary  As of 10/2/2017    INR goal:   1.5-2.5   TTR:   34.6 % (4.6 mo)   Today's INR:   2.8!   Maintenance plan:   1.25 mg (2.5 mg x 0.5) on Mon, Fri; 2.5 mg (2.5 mg x 1) all other days   Weekly total:   15 mg   Plan last modified:   Estiven Balderas PharmD (10/2/2017)   Next INR check:   10/23/2017   Target end date:       Indications    Atrial flutter (CMS-HCC) [I48.92]  Mitral valve insufficiency (Resolved) [I34.0]             Anticoagulation Episode Summary     INR check location:       Preferred lab:       Send INR reminders to:       Comments:   Home Health      Anticoagulation Care Providers     Provider Role Specialty Phone number    Lacey Porras M.D.  Cardiac Surgery [phone]    Amanda Perera PharmD           Anticoagulation Patient Findings      HPI:  Kelly Lovett seen in clinic today, on anticoagulation therapy with warfarin for aflutter  Changes to current medical/health status since last appt:   Denies signs/symptoms of bleeding and/or thrombosis since the last appt.    Denies any interval changes to diet  Denies any interval changes to medications since last appt.   Denies any complications or cost restrictions with current therapy.   BP recorded in vitals.    A/P   INR  supra-therapeutic.   Decrease weekly warfarin dose as noted      Follow up appointment in 3 week(s).     Estiven Balderas, Mojgan

## 2017-10-18 DIAGNOSIS — J44.9 CHRONIC OBSTRUCTIVE PULMONARY DISEASE, UNSPECIFIED COPD TYPE (HCC): ICD-10-CM

## 2017-10-19 ENCOUNTER — PATIENT MESSAGE (OUTPATIENT)
Dept: HEALTH INFORMATION MANAGEMENT | Facility: OTHER | Age: 72
End: 2017-10-19

## 2017-10-23 ENCOUNTER — ANTICOAGULATION VISIT (OUTPATIENT)
Dept: MEDICAL GROUP | Facility: MEDICAL CENTER | Age: 72
End: 2017-10-23
Payer: MEDICARE

## 2017-10-23 VITALS — SYSTOLIC BLOOD PRESSURE: 122 MMHG | HEART RATE: 92 BPM | DIASTOLIC BLOOD PRESSURE: 77 MMHG

## 2017-10-23 DIAGNOSIS — I48.92 ATRIAL FLUTTER, UNSPECIFIED TYPE (HCC): ICD-10-CM

## 2017-10-23 LAB — INR PPP: 1.7 (ref 2–3.5)

## 2017-10-23 PROCEDURE — 85610 PROTHROMBIN TIME: CPT | Performed by: FAMILY MEDICINE

## 2017-10-23 NOTE — PROGRESS NOTES
Anticoagulation Summary  As of 10/23/2017    INR goal:   1.5-2.5   TTR:   39.6 % (5.3 mo)   Today's INR:   1.7   Maintenance plan:   1.25 mg (2.5 mg x 0.5) on Mon, Fri; 2.5 mg (2.5 mg x 1) all other days   Weekly total:   15 mg   Plan last modified:   Estiven Balderas PharmD (10/2/2017)   Next INR check:   12/4/2017   Target end date:       Indications    Atrial flutter (CMS-HCC) [I48.92]  Mitral valve insufficiency (Resolved) [I34.0]             Anticoagulation Episode Summary     INR check location:       Preferred lab:       Send INR reminders to:       Comments:   Home Health      Anticoagulation Care Providers     Provider Role Specialty Phone number    Lacey Porras M.D.  Cardiac Surgery [phone]    Amanda Perera PharmD           Anticoagulation Patient Findings      HPI:  Kelly Ileana Lovett seen in clinic today, on anticoagulation therapy with warfarin for afib  Changes to current medical/health status since last appt:   Denies signs/symptoms of bleeding and/or thrombosis since the last appt.    Denies any interval changes to diet  Denies any interval changes to medications since last appt.   Denies any complications or cost restrictions with current therapy.   BP recorded in vitals.    A/P   INR  therapeutic.   Continue weekly warfarin dose as noted      Follow up appointment in 3 week(s) via the lab     Estiven Balderas PharmD

## 2017-11-01 ENCOUNTER — APPOINTMENT (OUTPATIENT)
Dept: OTHER | Facility: MEDICAL CENTER | Age: 72
End: 2017-11-01
Payer: MEDICARE

## 2017-11-01 ENCOUNTER — HOSPITAL ENCOUNTER (OUTPATIENT)
Dept: OTHER | Facility: MEDICAL CENTER | Age: 72
End: 2017-11-01
Attending: FAMILY MEDICINE
Payer: MEDICARE

## 2017-11-01 PROCEDURE — G0424 PULMONARY REHAB W EXER: HCPCS

## 2017-11-21 ENCOUNTER — TELEPHONE (OUTPATIENT)
Dept: MEDICAL GROUP | Facility: LAB | Age: 72
End: 2017-11-21

## 2017-11-21 NOTE — TELEPHONE ENCOUNTER
ANNUAL WELLNESS VISIT PRE-VISIT PLANNING     1.  Reviewed note from last office visit with PCP: YES    2.  If any orders were placed at last visit, do we have Results/Consult Notes?        •  Labs - Labs were not ordered at last office visit.   Note: If patient appointment is for lab review and patient did not complete labs, check with provider if OK to reschedule patient until labs completed.       •  Imaging - Imaging was not ordered at last office visit.       •  Referrals - No referrals were ordered at last office visit.    3.  Immunizations were updated in [facility] using WebIZ?: Yes       •  WebIZ Recommendations: FLU       •  Is patient due for Tdap? NO       •  Is patient due for Shingles? NO     4.  Patient is due for the following Health Maintenance Topics:   Health Maintenance Due   Topic Date Due   • BONE DENSITY  10/10/2009   • IMM INFLUENZA (1) 09/01/2017   • PFT SCREENING-FEV1 AND FEV/FVC RATIO / SPIROMETRY SHOULD BE PERFORMED ANNUALLY  10/19/2017   • Annual Wellness Visit  10/28/2017       - Patient has completed PNEUMOVAX (PPSV23), PREVNAR (PCV13) , TDAP and ZOSTAVAX (Shingles) Immunization(s) per WebIZ. Chart has been updated.      5.  Reviewed/Updated the following with patient:       •   Preferred Pharmacy? YES       •   Preferred Lab? YES       •   Preferred Communication? YES       •   Allergies? YES       •   Medications? YES. Was Abstract Encounter opened and chart updated? YES       •   Social History? YES. Was Abstract Encounter opened and chart updated? YES       •   Family History (document living status of immediate family members and if + hx of cancer, diabetes, hypertension, hyperlipidemia, heart attack, stroke) YES. Was Abstract Encounter opened and chart updated? YES    6.  Care Team Updated:       •   DME Company (gait device, O2, CPAP, etc.): YES       •   Other Specialists (eye doctor, derm, GYN, cardiology, endo, etc): YES    7.  Patient has the following Care Path diagnoses on  Problem List:  CHF    1.  Date of last echo: 3/7/17  2.  Has patient ever had education regarding CHF? Yes, and is NOT interested in more at this time.  3.  Is patient under the care of a cardiologist? Yes, CareTeam was updated.    COPD    1.  Is patient under the care of a pulmonologist? Yes, CareTeam was updated.  2.  Has patient ever completed a PFT or spirometry? Yes, on 10/19/16.  3.  Is patient on oxygen or CPAP? Yes, CareTeam was updated.  4.  Has patient ever had instruction on inhaler technique by health care professional? Yes  5.  Is patient interested in a referral to respiratory therapy for more information on COPD, inhaler technique, and/or information on establishing an action plan?  No, patient is NOT interested.          8.  Specialty Comments was updated with diagnosis information provided by [facility]: NO    9.  Patient was advised: “This is a free wellness visit. The provider will screen for medical conditions to help you stay healthy. If you have other concerns to address you may be asked to discuss these at a separate visit or there may be an additional fee.”     6.  Patient was informed to arrive 15 min prior to their scheduled appointment and bring in their medication bottles.

## 2017-11-30 ENCOUNTER — HOSPITAL ENCOUNTER (OUTPATIENT)
Dept: OTHER | Facility: MEDICAL CENTER | Age: 72
End: 2017-11-30
Attending: FAMILY MEDICINE
Payer: MEDICARE

## 2017-11-30 PROCEDURE — G0424 PULMONARY REHAB W EXER: HCPCS

## 2017-12-04 ENCOUNTER — ANTICOAGULATION VISIT (OUTPATIENT)
Dept: MEDICAL GROUP | Facility: MEDICAL CENTER | Age: 72
End: 2017-12-04
Payer: MEDICARE

## 2017-12-04 DIAGNOSIS — I48.92 ATRIAL FLUTTER, UNSPECIFIED TYPE (HCC): ICD-10-CM

## 2017-12-04 LAB — INR PPP: 1.6 (ref 2–3.5)

## 2017-12-04 PROCEDURE — 85610 PROTHROMBIN TIME: CPT | Performed by: FAMILY MEDICINE

## 2017-12-04 NOTE — PROGRESS NOTES
OP Anticoagulation Service Note    Date: 12/4/2017  There were no vitals filed for this visit.   pt declined vitals    Anticoagulation Summary  As of 12/4/2017    INR goal:   1.5-2.5   TTR:   52.3 % (6.7 mo)   Today's INR:   1.6   Maintenance plan:   1.25 mg (2.5 mg x 0.5) on Mon, Fri; 2.5 mg (2.5 mg x 1) all other days   Weekly total:   15 mg   Plan last modified:   Estiven Balderas, PharmD (10/2/2017)   Next INR check:   1/8/2018   Target end date:       Indications    Atrial flutter (CMS-HCC) [I48.92]  Mitral valve insufficiency (Resolved) [I34.0]             Anticoagulation Episode Summary     INR check location:       Preferred lab:       Send INR reminders to:       Comments:   Home Health  INR range per Dr. Sams and pts request, see note from 8-23-17 from Dr. sams      Anticoagulation Care Providers     Provider Role Specialty Phone number    Lacey Porras M.D.  Cardiac Surgery [phone]    Dari GaliciaD           Anticoagulation Patient Findings  Patient Findings     Negatives:   Signs/symptoms of thrombosis, Signs/symptoms of bleeding, Laboratory test error suspected, Change in health, Change in alcohol use, Change in activity, Upcoming invasive procedure, Emergency department visit, Upcoming dental procedure, Missed doses, Extra doses, Change in medications, Change in diet/appetite, Hospital admission, Bruising, Other complaints          HPI:   Kelly Tejeda Rachell seen in clinic today, on anticoagulation therapy with warfarin (a high risk medication) for atrial flutter    Pt is here today to evaluate anticoagulation therapy    Previous INR was 1.7 on 10-23-17,     Confirmed warfarin dosing regimen  Diet has been consistent with foods rich in vitamin K: Yes  Changes in ETOH:  No  Changes in smoking status: No  Changes in medication: No   Cost restriction: No  S/s of bleeding:  No  Signs/symptoms  thrombosis since the last appt: No    A/P   INR  therapeutic today  Continue current regimen      Pt educated to contact our clinic with any changes in medications or s/s of bleeding or thrombosis    Follow up appointment in 5 week(s) to reduce risk of adverse events from warfarin  Amanda Perera, PharmD

## 2017-12-05 ENCOUNTER — OFFICE VISIT (OUTPATIENT)
Dept: MEDICAL GROUP | Facility: LAB | Age: 72
End: 2017-12-05
Payer: MEDICARE

## 2017-12-05 ENCOUNTER — HOSPITAL ENCOUNTER (OUTPATIENT)
Dept: OTHER | Facility: MEDICAL CENTER | Age: 72
End: 2017-12-05
Attending: FAMILY MEDICINE
Payer: MEDICARE

## 2017-12-05 VITALS
HEART RATE: 96 BPM | RESPIRATION RATE: 16 BRPM | DIASTOLIC BLOOD PRESSURE: 82 MMHG | HEIGHT: 65 IN | OXYGEN SATURATION: 97 % | TEMPERATURE: 97.9 F | WEIGHT: 124 LBS | SYSTOLIC BLOOD PRESSURE: 130 MMHG | BODY MASS INDEX: 20.66 KG/M2

## 2017-12-05 DIAGNOSIS — R49.9 CHANGE IN VOICE: ICD-10-CM

## 2017-12-05 DIAGNOSIS — Z12.31 SCREENING MAMMOGRAM, ENCOUNTER FOR: ICD-10-CM

## 2017-12-05 DIAGNOSIS — F41.9 ANXIETY: ICD-10-CM

## 2017-12-05 DIAGNOSIS — J96.11 CHRONIC RESPIRATORY FAILURE WITH HYPOXIA (HCC): ICD-10-CM

## 2017-12-05 DIAGNOSIS — F32.1 MODERATE SINGLE CURRENT EPISODE OF MAJOR DEPRESSIVE DISORDER (HCC): ICD-10-CM

## 2017-12-05 DIAGNOSIS — R25.1 TREMOR: ICD-10-CM

## 2017-12-05 PROCEDURE — G0424 PULMONARY REHAB W EXER: HCPCS

## 2017-12-05 PROCEDURE — 99214 OFFICE O/P EST MOD 30 MIN: CPT | Performed by: FAMILY MEDICINE

## 2017-12-05 RX ORDER — SERTRALINE HYDROCHLORIDE 100 MG/1
100 TABLET, FILM COATED ORAL DAILY
Qty: 90 TAB | Refills: 3 | Status: SHIPPED | OUTPATIENT
Start: 2017-12-05 | End: 2018-01-10 | Stop reason: SDUPTHER

## 2017-12-05 ASSESSMENT — PATIENT HEALTH QUESTIONNAIRE - PHQ9: CLINICAL INTERPRETATION OF PHQ2 SCORE: 0

## 2017-12-05 NOTE — LETTER
December 5, 2017        Kelly Lovett  [address] [place] [address]        To whom it may concern:    Kelly Lovett is under my care. Due to medical condition requiring oxygen therapy and further treatment, Kelly Lovett can not travel currently. Please allow her to cancel her air travel.     If you have any questions or concerns, please don't hesitate to call.        Sincerely,        Ritika Jurado M.D.    Electronically Signed

## 2017-12-05 NOTE — PROGRESS NOTES
Subjective:   Kelly Lovett is a 72 y.o. female here today for   Chief Complaint   Patient presents with   • COPD   depression/anxiety    1. Chronic respiratory failure with hypoxia (CMS-HCC)  Chronic  Severe now on 2.5Liters at rest and 3-3.5 L with ambulation or with illness. This does need to be continuous flow She has been seeing pulmonology regularly. She has started pulmonary rehabilitation. The thought is that she will likely need to be on oxygen lifelong. She needs a new oxygen concentrator as hers did break while on vacation. She did get a replacement but would like a backup. She does become quite hypoxic when off of oxygen. At her previous pulmonology appointment on 9/18/17, her oxygen saturation at room air is 90% but desaturated with any movement. Today her O2 saturation on 3 L is 97%. She does have severe COPD with a long smoking history. She is currently using Spiriva and Advair regularly. She uses her albuterol 1-2 times per day    2. Tremor  This is a new problem to discuss. She noticed this a few months ago. It is worse with intention. She notices that when she is pointing her stay her hands out straight. She also notices that when she is drinking. Not noticed at rest. She is using her albuterol 1-2 times per day. No other new medications. She does have about 2 glasses of wine per night. She does not noticed any improvement or worsening with alcohol intake. No head bobbing, no gait disturbances     3. Anxiety  4. Moderate single current episode of major depressive disorder (CMS-[facility])  This is chronic. She did notice that she has had increase in anxiety and depression since her heart surgery. She is currently taking Zoloft 50 mg daily. This was working well for her but she knows noticed a worsening in her anxiety recently. She is not able to exercise significantly due to her respiratory failure    5. Change in voice  This is a new problem to discuss. Patient reports 6 months of raspy voice.  She notices more throat clearing. This started after her hospitalization. She was intubated for a long period of time. She is not having any dysphasia or pain in the throat. No recurrent illnesses. She does have a smoking history.      Current medicines (including changes today)  Current Outpatient Prescriptions   Medication Sig Dispense Refill   • sertraline (ZOLOFT) 100 MG Tab Take 1 Tab by mouth every day. 90 Tab 3   • Tiotropium Bromide Monohydrate (SPIRIVA RESPIMAT) 2.5 MCG/ACT Aero Soln Inhale 2 Inhalation by mouth every day. 3 Inhaler 3   • amiodarone (CORDARONE) 200 MG Tab Take 1 Tab by mouth every day. 30 Tab 3   • furosemide (LASIX) 20 MG Tab Take 20 mg by mouth every day.     • fluticasone-salmeterol (ADVAIR) 250-50 MCG/DOSE AEROSOL POWDER, BREATH ACTIVATED Inhale 1 Puff by mouth 2 times a day. 3 Inhaler 3   • potassium chloride ER (K-TAB) 10 MEQ tablet Take 1 Tab by mouth every day. 90 Tab 3   • atorvastatin (LIPITOR) 10 MG Tab Take 1 Tab by mouth every day. 90 Tab 3   • magnesium oxide (MAG-OX) 400 MG Tab Take 400 mg by mouth 2 times a day. takes 500 mg tab     • albuterol (VENTOLIN HFA) 108 (90 BASE) MCG/ACT Aero Soln inhalation aerosol Inhale 2 Puffs by mouth every four hours as needed for Shortness of Breath. 3 Inhaler 3   • warfarin (COUMADIN) 2.5 MG Tab Take 1 Tab by mouth every day. 90 Tab 1   • Acetaminophen (TYLENOL EXTRA STRENGTH PO) Take 1 Tab by mouth as needed.     • NON SPECIFIED Please provide patient with a portable oxygen concentrator 2.5L continuous flow at all times for 3 months    ICD10 Chronic Respiratory Failure with hypoxia J96.11  Pulse ox off of oxygen 87%  O2 off of 1 Each 0   • non-formulary med Inhale 2 L/min by mouth Continuous. Oxygen 2L x NC continuous  Indications: COPD     • warfarin (COUMADIN) 5 MG Tab Take 1 Tab by mouth COUMADIN-DAILY. Titrate to INR 2-3. 30 Tab 3     No current facility-administered medications for this visit.      She  has a past medical history of  "Breath shortness; COPD (chronic obstructive pulmonary disease) (CMS-HCC); Emphysema of lung (CMS-[facility]); Heart valve disease; High cholesterol; History of heart surgery; Hyperlipidemia; Hypertension; and Sleep apnea.    ROS   No fevers  No bowel changes  No LE edema       Objective:     Blood pressure 130/82, pulse 96, temperature 36.6 °C (97.9 °F), resp. rate 16, height 1.638 m (5' 4.5\"), weight 56.2 kg (124 lb), SpO2 97 %. Body mass index is 20.96 kg/m².   Physical Exam:  Constitutional: Alert, no distress.  Skin: Warm, dry, good turgor, no rashes in visible areas.  Eye: Equal, round and reactive, conjunctiva clear, lids normal.  ENMT: Lips without lesions, good dentition, oropharynx clear.No lymphadenopathy  Neck: Trachea midline, no masses, no thyromegaly. No cervical or supraclavicular lymphadenopathy  Respiratory: Unlabored respiratory effort, lungs clear to auscultation, no wheezes, no ronchi.Nasal cannula in place with 3 L oxygen  Cardiovascular: Normal S1, S2, RRR, no murmur, no edema.  Abdomen: Soft, non-tender, no masses, no hepatosplenomegaly.  Psych: Alert and oriented x3, normal affect and mood.  Neuro: Intention fine tremor. No cogwheel rigidity    Assessment and Plan:   The following treatment plan was discussed     1. Chronic respiratory failure with hypoxia (CMS-[facility])  This is chronic and is followed closely by pulmonology. She is currently requiring continuous flow 2.5-3.5 L at all times. She does need a backup oxygen concentrator which I have ordered for her.  Continue COPD treatment    2. Tremor  This is a new problem to discuss. I do suspect that she likely has a benign essential tremor given her exam today. We did discuss possible other etiologies such as early Parkinson's versus medication effect secondary to albuterol. She will limit at the albuterol as needed. We will monitor this week to discuss medication management for benign essential tremor. If this does not work, she may need neurology " evaluation to rule out Parkinson's    3. Anxiety  4. Moderate single current episode of major depressive disorder (CMS-HCC)  Chronic, not currently controlled. I will increase her Zoloft to 100 mg daily. She is not currently in counseling and I have encouraged this  - sertraline (ZOLOFT) 100 MG Tab; Take 1 Tab by mouth every day.  Dispense: 90 Tab; Refill: 3    5. Change in voice  New, as this may be trauma from her intubation. Given her smoking history, we will be to watch this very closely. If she is not having any improvement over the next 1-2 months, we will get her into ENT for laryngoscopy    6. Screening mammogram, encounter for  - MA-MAMMO SCREENING BILAT W/KELTON W/CAD; Future      Followup: Return for voice hoarseness, tremor, depression .       This note was created using voice recognition software. I have made every reasonable attempt to correct errors, however, I do anticipate some grammatical errors.

## 2017-12-07 ENCOUNTER — APPOINTMENT (OUTPATIENT)
Dept: OTHER | Facility: MEDICAL CENTER | Age: 72
End: 2017-12-07
Attending: FAMILY MEDICINE
Payer: MEDICARE

## 2017-12-12 ENCOUNTER — HOSPITAL ENCOUNTER (OUTPATIENT)
Dept: OTHER | Facility: MEDICAL CENTER | Age: 72
End: 2017-12-12
Attending: FAMILY MEDICINE
Payer: MEDICARE

## 2017-12-12 PROCEDURE — G0424 PULMONARY REHAB W EXER: HCPCS

## 2017-12-13 RX ORDER — ATORVASTATIN CALCIUM 10 MG/1
10 TABLET, FILM COATED ORAL DAILY
Qty: 90 TAB | Refills: 3 | Status: SHIPPED | OUTPATIENT
Start: 2017-12-13 | End: 2018-08-16

## 2017-12-14 ENCOUNTER — HOSPITAL ENCOUNTER (OUTPATIENT)
Dept: OTHER | Facility: MEDICAL CENTER | Age: 72
End: 2017-12-14
Attending: FAMILY MEDICINE
Payer: MEDICARE

## 2017-12-14 PROCEDURE — G0424 PULMONARY REHAB W EXER: HCPCS

## 2017-12-19 ENCOUNTER — HOSPITAL ENCOUNTER (OUTPATIENT)
Dept: OTHER | Facility: MEDICAL CENTER | Age: 72
End: 2017-12-19
Attending: FAMILY MEDICINE
Payer: MEDICARE

## 2017-12-19 PROCEDURE — G0424 PULMONARY REHAB W EXER: HCPCS

## 2017-12-20 ENCOUNTER — OFFICE VISIT (OUTPATIENT)
Dept: PULMONOLOGY | Facility: HOSPICE | Age: 72
End: 2017-12-20
Payer: MEDICARE

## 2017-12-20 VITALS
OXYGEN SATURATION: 96 % | TEMPERATURE: 97.6 F | WEIGHT: 125 LBS | HEART RATE: 68 BPM | HEIGHT: 65 IN | DIASTOLIC BLOOD PRESSURE: 78 MMHG | RESPIRATION RATE: 16 BRPM | BODY MASS INDEX: 20.83 KG/M2 | SYSTOLIC BLOOD PRESSURE: 124 MMHG

## 2017-12-20 DIAGNOSIS — J96.11 CHRONIC RESPIRATORY FAILURE WITH HYPOXIA (HCC): ICD-10-CM

## 2017-12-20 DIAGNOSIS — J44.9 CHRONIC OBSTRUCTIVE PULMONARY DISEASE, UNSPECIFIED COPD TYPE (HCC): ICD-10-CM

## 2017-12-20 DIAGNOSIS — Z79.899 ON AMIODARONE THERAPY: ICD-10-CM

## 2017-12-20 DIAGNOSIS — G47.33 OBSTRUCTIVE SLEEP APNEA SYNDROME: ICD-10-CM

## 2017-12-20 DIAGNOSIS — Z95.2 STATUS POST MITRAL VALVE REPLACEMENT: ICD-10-CM

## 2017-12-20 DIAGNOSIS — Z87.09 H/O PNEUMOTHORAX: ICD-10-CM

## 2017-12-20 DIAGNOSIS — R09.02 HYPOXEMIA: ICD-10-CM

## 2017-12-20 PROCEDURE — 99214 OFFICE O/P EST MOD 30 MIN: CPT | Performed by: INTERNAL MEDICINE

## 2017-12-20 RX ORDER — AZITHROMYCIN 250 MG/1
TABLET, FILM COATED ORAL
Qty: 6 TAB | Refills: 0 | Status: SHIPPED | OUTPATIENT
Start: 2017-12-20 | End: 2018-01-10

## 2017-12-20 RX ORDER — PREDNISONE 10 MG/1
TABLET ORAL
Qty: 30 TAB | Refills: 2 | Status: SHIPPED | OUTPATIENT
Start: 2017-12-20 | End: 2018-01-10

## 2017-12-20 RX ORDER — ALBUTEROL SULFATE 90 UG/1
2 AEROSOL, METERED RESPIRATORY (INHALATION) EVERY 4 HOURS PRN
Qty: 3 INHALER | Refills: 3 | Status: SHIPPED | OUTPATIENT
Start: 2017-12-20 | End: 2019-01-30 | Stop reason: SDUPTHER

## 2017-12-21 ENCOUNTER — APPOINTMENT (OUTPATIENT)
Dept: OTHER | Facility: MEDICAL CENTER | Age: 72
End: 2017-12-21
Attending: FAMILY MEDICINE
Payer: MEDICARE

## 2017-12-21 ENCOUNTER — PATIENT OUTREACH (OUTPATIENT)
Dept: HEALTH INFORMATION MANAGEMENT | Facility: OTHER | Age: 72
End: 2017-12-21

## 2017-12-21 NOTE — PROGRESS NOTES
Chief Complaint   Patient presents with   • Follow-Up     COPD       HPI:  Patient is 72-year-old woman with significant chronic obstructive pulmonary disease. She had cardiac surgery with a mitral valve repair and Maze procedure. She had postoperative complications including pneumothoraces. She continues on supplemental oxygen. Her FEV1 is 0.68 L..    Please see my prior notes.    The patient did travel on a [place]. She did say several challenges with her portable oxygen concentrator. She had hoped the POC could be plugged into the [employer] power system. That could not happen. However she was able to reach charge the batteries. Unfortunately her POC came inoperable when she was at sea. The [employer] was able to provide an oxygen concentrator but it was not portable. She was not able to really partake in the ship activities.  She does not have a concentrator at home or backup cylinders. The cruise ship experienced did teach her the necessity for the backup cylinders.  She remains reasonably stable on her current medications and oxygen. She has started the pulmonary rehabilitation program.    Past Medical History:   Diagnosis Date   • Breath shortness     pt reports using o2 at night 2L, no problems at this time   • COPD (chronic obstructive pulmonary disease) (CMS-[facility])    • Emphysema of lung (CMS-[facility])    • Heart valve disease    • High cholesterol    • History of heart surgery    • Hyperlipidemia    • Hypertension    • Sleep apnea     uses cpap       ROS:   Constitutional: Denies fevers, chills, night sweats, fatigue or weight loss  Eyes: Denies vision loss, pain, drainage, double vision  Ears, Nose, Throat: Denies earache, tinnitus, hoarseness  Cardiovascular: Denies chest pain, tightness, palpitations  Respiratory:See history of present illness  Sleep: Denies, snoring, apnea  GI: Denies abdominal pain, nausea, vomiting, diarrhea  : Denies frequent urination, hematuria, painful  urination  Musculoskeletal: Denies back pain, painful joints, sore muscles  Neurological: Denies headaches, seizures  Skin: Denies rashes, color changes  Psychiatric: Denies depression or thoughts of suicide  Hematologic: Denies bleeding tendency or clotting tendency  Allergic/Immunologic: Denies rhinitis, skin sensitivity    Social History     Social History   • Marital status:      Spouse name: N/A   • Number of children: N/A   • Years of education: N/A     Occupational History   • Not on file.     Social History Main Topics   • Smoking status: Former Smoker     Packs/day: 0.50     Years: 38.00     Types: Cigarettes     Quit date: 10/11/2001   • Smokeless tobacco: Never Used   • Alcohol use 8.4 oz/week     14 Shots of liquor per week      Comment: 3 per day   • Drug use: No   • Sexual activity: Yes     Partners: Male     Other Topics Concern   • Not on file     Social History Narrative   • No narrative on file     Sulfa drugs  Current Outpatient Prescriptions on File Prior to Visit   Medication Sig Dispense Refill   • atorvastatin (LIPITOR) 10 MG Tab Take 1 Tab by mouth every day. 90 Tab 3   • sertraline (ZOLOFT) 100 MG Tab Take 1 Tab by mouth every day. 90 Tab 3   • Tiotropium Bromide Monohydrate (SPIRIVA RESPIMAT) 2.5 MCG/ACT Aero Soln Inhale 2 Inhalation by mouth every day. 3 Inhaler 3   • amiodarone (CORDARONE) 200 MG Tab Take 1 Tab by mouth every day. 30 Tab 3   • warfarin (COUMADIN) 2.5 MG Tab Take 1 Tab by mouth every day. 90 Tab 1   • Acetaminophen (TYLENOL EXTRA STRENGTH PO) Take 1 Tab by mouth as needed.     • warfarin (COUMADIN) 5 MG Tab Take 1 Tab by mouth COUMADIN-DAILY. Titrate to INR 2-3. 30 Tab 3   • furosemide (LASIX) 20 MG Tab Take 20 mg by mouth every day.     • fluticasone-salmeterol (ADVAIR) 250-50 MCG/DOSE AEROSOL POWDER, BREATH ACTIVATED Inhale 1 Puff by mouth 2 times a day. 3 Inhaler 3   • potassium chloride ER (K-TAB) 10 MEQ tablet Take 1 Tab by mouth every day. 90 Tab 3   •  "magnesium oxide (MAG-OX) 400 MG Tab Take 400 mg by mouth 2 times a day. takes 500 mg tab     • NON SPECIFIED Please provide patient with a portable oxygen concentrator 2.5L continuous flow at all times for 3 months    ICD10 Chronic Respiratory Failure with hypoxia J96.11  Pulse ox off of oxygen 87%  O2 off of 1 Each 0   • non-formulary med Inhale 2 L/min by mouth Continuous. Oxygen 2L x NC continuous  Indications: COPD       No current facility-administered medications on file prior to visit.      Blood pressure 124/78, pulse 68, temperature 36.4 °C (97.6 °F), resp. rate 16, height 1.638 m (5' 4.5\"), weight 56.7 kg (125 lb), SpO2 96 %.  Family History   Problem Relation Age of Onset   • Cancer Father      colon cancer    • Hypertension Mother    • Cancer Sister 68     ovarian cancer       Physical Exam:  No distress at rest on supplemental oxygen  HEENT: PERRLA, EOMI, no scleral icterus, no nasal or oral lesions  Neck: No thyromegaly, no adenopathy, no bruits  Mallampatti: Grade II  Lungs: ( breath sounds, no wheezes or crackles  Heart: Regular rate and rhythm, no gallops or murmurs  Abdomen: Soft, benign, no organomegaly  Extremities: No clubbing, cyanosis, or edema  Neurologic: Cranial nerve, motor, and sensory exam are normal    1. Hypoxemia    2. Obstructive sleep apnea syndrome    3. Chronic obstructive pulmonary disease, unspecified COPD type (CMS-HCC)    4. Status post mitral valve replacement    5. H/O pneumothorax    6. On amiodarone therapy    7. Chronic respiratory failure with hypoxia (CMS-HCC)        She will continue Spiriva, Advair, and albuterol  She requires supplemental oxygen 24/7  We will request home oxygen concentrator and backup tanks.  We will see her in 6 months or sooner if there are other issues.  "

## 2017-12-21 NOTE — PROGRESS NOTES
Outbound call to Kelly for medication review. Left a voice message requesting patient to call [phone]. Sent letter to patient via imageloop describing our service. Will follow-up when patient makes contact.     Denisse Solis, DariD

## 2017-12-21 NOTE — LETTER
December 21, 2017        Kellyjanet Tejeda [facility]  [address]  [place] [address]        Dear Kelly:    I am sorry I have been unable to reach you via phone. As a clinical pharmacist with [facility], I am working with your health care providers to ensure optimal medication therapy. This service is available to you at no cost.    By participating in this review, I will:     • Review your medications, vitamins and other over-the-counter items.    • Assure there are no harmful interactions with your medications.  • Lower your out-of-pocket prescription costs, if possible.   • Communicate medication concerns between your healthcare specialists.  • Provide you with wellness, healthy lifestyle, and disease prevention strategies.  • Refer you to a nurse or  if needed.  • Consider remote health monitoring if you have high blood pressure.  • Answer any questions that you may have about your medications.    Please contact me at [phone] to discuss your medications. I am available Monday through Friday from 8am to 5pm. I look forward to hearing from you.         Sincerely,        Denisse Solis, DariD    Electronically Signed

## 2017-12-26 ENCOUNTER — APPOINTMENT (OUTPATIENT)
Dept: OTHER | Facility: MEDICAL CENTER | Age: 72
End: 2017-12-26
Attending: FAMILY MEDICINE
Payer: MEDICARE

## 2017-12-28 ENCOUNTER — APPOINTMENT (OUTPATIENT)
Dept: OTHER | Facility: MEDICAL CENTER | Age: 72
End: 2017-12-28
Attending: FAMILY MEDICINE
Payer: MEDICARE

## 2018-01-03 ENCOUNTER — HOSPITAL ENCOUNTER (OUTPATIENT)
Dept: RADIOLOGY | Facility: MEDICAL CENTER | Age: 73
End: 2018-01-03
Attending: NURSE PRACTITIONER
Payer: MEDICARE

## 2018-01-03 ENCOUNTER — HOSPITAL ENCOUNTER (OUTPATIENT)
Dept: RADIOLOGY | Facility: MEDICAL CENTER | Age: 73
End: 2018-01-03
Attending: FAMILY MEDICINE
Payer: MEDICARE

## 2018-01-03 DIAGNOSIS — Z12.31 SCREENING MAMMOGRAM, ENCOUNTER FOR: ICD-10-CM

## 2018-01-03 DIAGNOSIS — Z79.899 HIGH RISK MEDICATION USE: ICD-10-CM

## 2018-01-03 PROCEDURE — 71046 X-RAY EXAM CHEST 2 VIEWS: CPT

## 2018-01-03 PROCEDURE — 77067 SCR MAMMO BI INCL CAD: CPT

## 2018-01-04 ENCOUNTER — APPOINTMENT (OUTPATIENT)
Dept: OTHER | Facility: MEDICAL CENTER | Age: 73
End: 2018-01-04
Attending: FAMILY MEDICINE
Payer: MEDICARE

## 2018-01-05 ENCOUNTER — PATIENT MESSAGE (OUTPATIENT)
Dept: PULMONOLOGY | Facility: HOSPICE | Age: 73
End: 2018-01-05

## 2018-01-05 DIAGNOSIS — I48.0 PAROXYSMAL ATRIAL FIBRILLATION (HCC): ICD-10-CM

## 2018-01-05 RX ORDER — AMIODARONE HYDROCHLORIDE 200 MG/1
200 TABLET ORAL DAILY
Qty: 30 TAB | Refills: 3 | Status: SHIPPED | OUTPATIENT
Start: 2018-01-05 | End: 2018-01-05 | Stop reason: SDUPTHER

## 2018-01-05 RX ORDER — AMIODARONE HYDROCHLORIDE 200 MG/1
200 TABLET ORAL DAILY
Qty: 30 TAB | Refills: 6 | Status: SHIPPED | OUTPATIENT
Start: 2018-01-05 | End: 2018-02-06

## 2018-01-05 NOTE — TELEPHONE ENCOUNTER
From: Kelly Lovett  To: Alexsander aGrdner M.D.  Sent: 1/5/2018 9:36 AM PST  Subject: Non-Urgent Medical Question    Dr Gardner  I have not heard from anyone regarding a POC, could someone in your office follow up please  Thank you, Kelly

## 2018-01-08 ENCOUNTER — ANTICOAGULATION VISIT (OUTPATIENT)
Dept: MEDICAL GROUP | Facility: MEDICAL CENTER | Age: 73
End: 2018-01-08
Payer: MEDICARE

## 2018-01-08 DIAGNOSIS — I48.92 ATRIAL FLUTTER, UNSPECIFIED TYPE (HCC): ICD-10-CM

## 2018-01-08 LAB — INR PPP: 1.4 (ref 2–3.5)

## 2018-01-08 PROCEDURE — 85610 PROTHROMBIN TIME: CPT | Performed by: FAMILY MEDICINE

## 2018-01-08 PROCEDURE — 99211 OFF/OP EST MAY X REQ PHY/QHP: CPT | Performed by: FAMILY MEDICINE

## 2018-01-08 NOTE — PROGRESS NOTES
OP Anticoagulation Service Note    Date: 1/8/2018  There were no vitals filed for this visit.   pt declined vitals    Anticoagulation Summary  As of 1/8/2018    INR goal:   1.5-2.5   TTR:   51.9 % (7.8 mo)   Today's INR:   1.4!   Maintenance plan:   1.25 mg (2.5 mg x 0.5) on Mon, Fri; 2.5 mg (2.5 mg x 1) all other days   Weekly total:   15 mg   Plan last modified:   Estiven Balderas, PharmD (10/2/2017)   Next INR check:   1/22/2018   Target end date:       Indications    Atrial flutter (CMS-HCC) [I48.92]  Mitral valve insufficiency (Resolved) [I34.0]             Anticoagulation Episode Summary     INR check location:       Preferred lab:       Send INR reminders to:       Comments:   Home Health  INR range per Dr. Sams and pts request, see note from 8-23-17 from Dr. sams      Anticoagulation Care Providers     Provider Role Specialty Phone number    Lacey Porras M.D.  Cardiac Surgery [phone]    Dari GaliciaD           Anticoagulation Patient Findings      HPI:   Kelly Lovett seen in clinic today, on anticoagulation therapy with warfarin (a high risk medication) for atrial flutter, CHADS-VASC = 4      Pt is here today to evaluate anticoagulation therapy    Previous INR was 1.6 on 12-4-17, pt was instructed to continue current regimen    Confirmed warfarin dosing regimen, she missed a dose of her wararin last week.   Diet has been consistent with foods rich in vitamin K: Yes  Changes in ETOH:  No  Changes in smoking status: No  Changes in medication: No   Cost restriction: No  S/s of bleeding:  No  Signs/symptoms  thrombosis since the last appt: No    A/P   INR  sub-therapeutic today, will require dose adjust ment today to prevent  stroke and closer follow up.   Will have pt increase todays dose to 2.5 mg then resume usual regimen     Pt educated to contact our clinic with any changes in medications or s/s of bleeding or thrombosis    Follow up appointment in 2 week(s) to reduce risk of  adverse events from warfarin   Amanda Perera, Pharm D

## 2018-01-09 ENCOUNTER — HOSPITAL ENCOUNTER (OUTPATIENT)
Dept: PULMONOLOGY | Facility: MEDICAL CENTER | Age: 73
End: 2018-01-09
Attending: FAMILY MEDICINE
Payer: MEDICARE

## 2018-01-09 PROCEDURE — G0424 PULMONARY REHAB W EXER: HCPCS

## 2018-01-10 ENCOUNTER — OFFICE VISIT (OUTPATIENT)
Dept: MEDICAL GROUP | Facility: LAB | Age: 73
End: 2018-01-10
Payer: MEDICARE

## 2018-01-10 VITALS
OXYGEN SATURATION: 93 % | DIASTOLIC BLOOD PRESSURE: 74 MMHG | WEIGHT: 126.1 LBS | HEIGHT: 64 IN | RESPIRATION RATE: 20 BRPM | SYSTOLIC BLOOD PRESSURE: 118 MMHG | TEMPERATURE: 97.3 F | HEART RATE: 104 BPM | BODY MASS INDEX: 21.53 KG/M2

## 2018-01-10 DIAGNOSIS — M85.89 OSTEOPENIA OF MULTIPLE SITES: ICD-10-CM

## 2018-01-10 DIAGNOSIS — I10 ESSENTIAL (PRIMARY) HYPERTENSION: ICD-10-CM

## 2018-01-10 DIAGNOSIS — I48.0 PAROXYSMAL ATRIAL FIBRILLATION (HCC): ICD-10-CM

## 2018-01-10 DIAGNOSIS — I48.92 ATRIAL FLUTTER, UNSPECIFIED TYPE (HCC): ICD-10-CM

## 2018-01-10 DIAGNOSIS — E78.2 MIXED HYPERLIPIDEMIA: ICD-10-CM

## 2018-01-10 DIAGNOSIS — Z00.00 MEDICARE ANNUAL WELLNESS VISIT, SUBSEQUENT: ICD-10-CM

## 2018-01-10 DIAGNOSIS — F32.1 MODERATE SINGLE CURRENT EPISODE OF MAJOR DEPRESSIVE DISORDER (HCC): ICD-10-CM

## 2018-01-10 DIAGNOSIS — J44.9 CHRONIC OBSTRUCTIVE PULMONARY DISEASE, UNSPECIFIED COPD TYPE (HCC): ICD-10-CM

## 2018-01-10 DIAGNOSIS — J96.11 CHRONIC RESPIRATORY FAILURE WITH HYPOXIA (HCC): ICD-10-CM

## 2018-01-10 DIAGNOSIS — R73.03 PREDIABETES: ICD-10-CM

## 2018-01-10 DIAGNOSIS — Z86.010 HISTORY OF COLONIC POLYPS: ICD-10-CM

## 2018-01-10 DIAGNOSIS — Z91.81 RISK FOR FALLS: ICD-10-CM

## 2018-01-10 DIAGNOSIS — I50.42 CHRONIC COMBINED SYSTOLIC AND DIASTOLIC CONGESTIVE HEART FAILURE (HCC): ICD-10-CM

## 2018-01-10 DIAGNOSIS — F41.9 ANXIETY: ICD-10-CM

## 2018-01-10 DIAGNOSIS — G47.33 OBSTRUCTIVE SLEEP APNEA SYNDROME: ICD-10-CM

## 2018-01-10 DIAGNOSIS — I70.0 AORTIC ATHEROSCLEROSIS (HCC): ICD-10-CM

## 2018-01-10 PROCEDURE — G0439 PPPS, SUBSEQ VISIT: HCPCS | Performed by: FAMILY MEDICINE

## 2018-01-10 RX ORDER — SERTRALINE HYDROCHLORIDE 100 MG/1
150 TABLET, FILM COATED ORAL DAILY
Qty: 135 TAB | Refills: 1 | Status: SHIPPED | OUTPATIENT
Start: 2018-01-10 | End: 2018-07-18 | Stop reason: SDUPTHER

## 2018-01-10 ASSESSMENT — PATIENT HEALTH QUESTIONNAIRE - PHQ9
5. POOR APPETITE OR OVEREATING: 0 - NOT AT ALL
CLINICAL INTERPRETATION OF PHQ2 SCORE: 4
SUM OF ALL RESPONSES TO PHQ QUESTIONS 1-9: 8

## 2018-01-10 NOTE — PROGRESS NOTES
Chief Complaint   Patient presents with   • Annual Exam     AWV         HPI:  Kelly is a 72 y.o. here for Medicare Annual Wellness Visit     Patient Active Problem List    Diagnosis Date Noted   • Risk for falls 01/10/2018   • Moderate single current episode of major depressive disorder (CMS-HCC) 12/05/2017   • Paroxysmal atrial fibrillation (CMS-HCC) 06/19/2017   • Essential (primary) hypertension 03/17/2017   • Anxiety 03/15/2017   • COPD (chronic obstructive pulmonary disease) (CMS-HCC) 10/11/2016   • Osteopenia 10/11/2016   • Obstructive sleep apnea syndrome 10/11/2016   • Congestive heart failure (CMS-HCC) 07/07/2016   • Atrial flutter (CMS-HCC) 12/15/2015   • Chronic respiratory failure with hypoxia (CMS-HCC) 11/23/2015   • Aortic atherosclerosis (CMS-HCC) 09/10/2015   • Hyperlipidemia 09/10/2015   • Prediabetes 08/15/2013   • History of colonic polyps 03/03/2008       Current medicines including changes today:   Current Outpatient Prescriptions   Medication Sig Dispense Refill   • sertraline (ZOLOFT) 100 MG Tab Take 1.5 Tabs by mouth every day. 135 Tab 1   • amiodarone (CORDARONE) 200 MG Tab Take 1 Tab by mouth every day. 30 Tab 6   • albuterol (VENTOLIN HFA) 108 (90 Base) MCG/ACT Aero Soln inhalation aerosol Inhale 2 Puffs by mouth every four hours as needed for Shortness of Breath. 3 Inhaler 3   • atorvastatin (LIPITOR) 10 MG Tab Take 1 Tab by mouth every day. 90 Tab 3   • Tiotropium Bromide Monohydrate (SPIRIVA RESPIMAT) 2.5 MCG/ACT Aero Soln Inhale 2 Inhalation by mouth every day. 3 Inhaler 3   • warfarin (COUMADIN) 2.5 MG Tab Take 1 Tab by mouth every day. 90 Tab 1   • Acetaminophen (TYLENOL EXTRA STRENGTH PO) Take 1 Tab by mouth as needed.     • NON SPECIFIED Please provide patient with a portable oxygen concentrator 2.5L continuous flow at all times for 3 months    ICD10 Chronic Respiratory Failure with hypoxia J96.11  Pulse ox off of oxygen 87%  O2 off of 1 Each 0   • non-formulary med Inhale 2 L/min  by mouth Continuous. Oxygen 2L x NC continuous  Indications: COPD     • warfarin (COUMADIN) 5 MG Tab Take 1 Tab by mouth COUMADIN-DAILY. Titrate to INR 2-3. 30 Tab 3   • furosemide (LASIX) 20 MG Tab Take 20 mg by mouth every day.     • fluticasone-salmeterol (ADVAIR) 250-50 MCG/DOSE AEROSOL POWDER, BREATH ACTIVATED Inhale 1 Puff by mouth 2 times a day. 3 Inhaler 3   • potassium chloride ER (K-TAB) 10 MEQ tablet Take 1 Tab by mouth every day. 90 Tab 3   • magnesium oxide (MAG-OX) 400 MG Tab Take 400 mg by mouth 2 times a day. takes 500 mg tab       No current facility-administered medications for this visit.         The patient reports adherence to this regimen   Current supplements as per medication list.   Chronic narcotic pain medicines: no     Allergies: Sulfa drugs    Current social contact/activities: , family and friends in town, social events   Exercise: walking    She  reports that she quit smoking about 16 years ago. Her smoking use included Cigarettes. She has a 19.00 pack-year smoking history. She has never used smokeless tobacco. She reports that she drinks about 8.4 oz of alcohol per week . She reports that she does not use drugs.  Counseling given: Not Answered        DPA/Advanced directive: Yes    ROS:    Gait: Uses no assistive device   Ostomy: no   Other tubes: no   Amputations: no   Chronic oxygen use yes   Last eye exam unknown   : Denies incontinence.     Screening:  Depression Screening    Little interest or pleasure in doing things?  1 - several days  Feeling down, depressed , or hopeless? 3 - nearly every day  Trouble falling or staying asleep, or sleeping too much?  0 - not at all  Feeling tired or having little energy?  1 - several days  Poor appetite or overeating?  0 - not at all  Feeling bad about yourself - or that you are a failure or have let yourself or your family down? 1 - several days  Trouble concentrating on things, such as reading the newspaper or watching television? 1  - several days  Moving or speaking so slowly that other people could have noticed.  Or the opposite - being so fidgety or restless that you have been moving around a lot more than usual?  0 - not at all  Thoughts that you would be better off dead, or of hurting yourself?  1 - several days  Patient Health Questionnaire Score: 8    If depressive symptoms identified deferred to follow up visit unless specifically addressed in assessment and plan.    Interpretation of PHQ-9 Total Score   Score Severity   1-4 No Depression   5-9 Mild Depression   10-14 Moderate Depression   15-19 Moderately Severe Depression   20-27 Severe Depression      Screening for Cognitive Impairment    Three Minute Recall (apple, watch, denise)  3/3    Draw clock face with all 12 numbers set to the hand to show 10 minutes past 11 o'clock  1    Cognitive concerns identified deferred for follow up unless specifically addressed in assessment and plan.    Fall Risk Assessment    Has the patient had two or more falls in the last year or any fall with injury in the last year?  Yes    Safety Assessment    Throw rugs on floor.  No  Handrails on all stairs.  Yes  Good lighting in all hallways.  Yes  Difficulty hearing.  No  Patient counseled about all safety risks that were identified.    Functional Assessment ADLs    Are there any barriers preventing you from cooking for yourself or meeting nutritional needs?  No.    Are there any barriers preventing you from driving safely or obtaining transportation?  Yes.    Are there any barriers preventing you from using a telephone or calling for help?  No.    Are there any barriers preventing you from shopping?  No.    Are there any barriers preventing you from taking care of your own finances?  No.    Are there any barriers preventing you from managing your medications?  No.    Are currently engaging any exercise or physical activity?  Yes.       Health Maintenance Summary                BONE DENSITY Overdue  10/10/2009     IMM INFLUENZA Overdue 9/1/2017      Done 1/1/2014 Imm Admin: Influenza TIV (IM)    PFT SCREENING-FEV1 AND FEV/FVC RATIO / SPIROMETRY SHOULD BE PERFORMED ANNUALLY Overdue 10/19/2017      Done 10/19/2016 PFT DICTATED RESULTS    Annual Wellness Visit Overdue 10/28/2017      Done 10/27/2016 Visit Dx: Medicare annual wellness visit, initial    MAMMOGRAM Next Due 1/3/2019      Done 1/3/2018 MA-MAMMO SCREENING BILAT W/TOMOSYNTHESIS W/CAD     Patient has more history with this topic...    COLONOSCOPY Next Due 5/6/2019      Done 5/6/2009 REFERRAL TO GI FOR COLONOSCOPY    IMM DTaP/Tdap/Td Vaccine Next Due 9/17/2022      Done 9/17/2012 Imm Admin: Tdap Vaccine     Patient has more history with this topic...          Patient Care Team:  Ritika Jurado M.D. as PCP - General (Family Medicine)  Dalia Li M.D. as Consulting Physician (Cardiology)  [facility] as Home Health Provider  Alexsander Gardner M.D. as Consulting Physician (Pulmonary Medicine)  Lacye Porras M.D. as Consulting Physician (Cardiac Surgery)  Preferred Home Care      Social History   Substance Use Topics   • Smoking status: Former Smoker     Packs/day: 0.50     Years: 38.00     Types: Cigarettes     Quit date: 10/11/2001   • Smokeless tobacco: Never Used   • Alcohol use 8.4 oz/week     14 Shots of liquor per week      Comment: 3 per day     Family History   Problem Relation Age of Onset   • Cancer Father      colon cancer    • Hypertension Mother    • Cancer Sister 68     ovarian cancer     She  has a past medical history of Breath shortness; COPD (chronic obstructive pulmonary disease) (CMS-HCC); Emphysema of lung (CMS-HCC); Heart valve disease; High cholesterol; History of heart surgery; Hyperlipidemia; Hypertension; and Sleep apnea.   Past Surgical History:   Procedure Laterality Date   • MITRAL VALVE REPAIR  4/20/2017    Procedure: MITRAL VALVE REPAIR ;  Surgeon: Lacey Porras M.D.;  Location: SURGERY [facility];   "Service:    • MAZE PROCEDURE Left 4/20/2017    Procedure: MAZE PROCEDURE, Left atrial appendage ligation;  Surgeon: Lacey Porras M.D.;  Location: SURGERY [facility];  Service:    • PAGE  4/20/2017    Procedure: PAGE;  Surgeon: Lacey Porras M.D.;  Location: SURGERY [facility];  Service:    • APPENDECTOMY      1967   • OOPHORECTOMY             Exam:     Blood pressure 118/74, pulse (!) 104, temperature 36.3 °C (97.3 °F), resp. rate 20, height 1.638 m (5' 4.49\"), weight 57.2 kg (126 lb 1.7 oz), SpO2 93 %, not currently breastfeeding. Body mass index is 21.32 kg/m².    Hearing good.    Dentition good  Alert, oriented in no acute distress.  Eye contact is good, speech goal directed, affect calm  Skin: there are two open lesions on the L arm and one on the L leg that bleed with removal of dressing. These were dressed today  Pulm: On O2    Assessment and Plan. The following treatment and monitoring plan is recommended:      1. Medicare annual wellness visit, subsequent  HRA reviewed  Due for DEXA, patient declines right now    2. Anxiety  3. Moderate single current episode of major depressive disorder (CMS-HCC)  Chronic, not stable. Patient is having severe depression.   Some fleeting feelings of suicide - no plans, no access, patient feels as though she has things to live for (children)  Increase zoloft to 150mg. Discuss adding adjunct if needed. Bupropion would increase INR  Depression Screening    Little interest or pleasure in doing things?  1 - several days  Feeling down, depressed , or hopeless? 3 - nearly every day  Trouble falling or staying asleep, or sleeping too much?  0 - not at all  Feeling tired or having little energy?  1 - several days  Poor appetite or overeating?  0 - not at all  Feeling bad about yourself - or that you are a failure or have let yourself or your family down? 1 - several days  Trouble concentrating on things, such as reading the newspaper or watching television? 1 - several " days  Moving or speaking so slowly that other people could have noticed.  Or the opposite - being so fidgety or restless that you have been moving around a lot more than usual?  0 - not at all  Thoughts that you would be better off dead, or of hurting yourself?  1 - several days  Patient Health Questionnaire Score: 8      If depressive symptoms identified deferred to follow up visit unless specifically addressed in assesment and plan.    Interpretation of PHQ-9 Total Score   Score Severity   1-4 No Depression   5-9 Mild Depression   10-14 Moderate Depression   15-19 Moderately Severe Depression   20-27 Severe Depression    - sertraline (ZOLOFT) 100 MG Tab; Take 1.5 Tabs by mouth every day.  Dispense: 135 Tab; Refill: 1    4. Risk for falls  New, likely 2/2 ETOH. Will cut back. Not needing DME  - Patient identified as fall risk.  Appropriate orders and counseling given.    5. Atrial flutter, unspecified type (CMS-HCC)  6. Aortic atherosclerosis (CMS-HCC)  7. Chronic combined systolic and diastolic congestive heart failure (CMS-HCC)  8. Essential (primary) hypertension  9. Paroxysmal atrial fibrillation (CMS-HCC)  Chronic, stable  Following closely with cardiology   Continue lasix, amiodarone, warfarin, statin    10. Prediabetes  Chronic, stable, last labs 4/2017  Will order labs next appointment for reevaluation    11. Osteopenia of multiple sites  Chronic, will need repeat DEXA. Patient declines  Exercise, Ca, and vitamin D     12. Obstructive sleep apnea syndrome  Chronic, stable on CPAP  Follows with pulm    13. Chronic respiratory failure with hypoxia (CMS-HCC)  14. Chronic obstructive pulmonary disease, unspecified COPD type (CMS-HCC)  Chronic, currently on advair, spiriva, 2L O2  Following with pulmonology    15. History of colonic polyps  Chronic, stable, colonoscopy UTD    16. Mixed hyperlipidemia  Chronic, stable, will need labs ordered at next visit.        Services needed: None  Health Care Screening  recommendations as per orders if indicated.  Referrals offered: PT/OT/Nutrition counseling/Behavioral Health/Smoking cessation as per orders if indicated.    Discussion today about general wellness and lifestyle habits:    · Prevent falls and reduce trip hazards; Cautioned about securing or removing rugs.  · Have a working fire alarm and carbon monoxide detector;   · Engage in regular physical activity and social activities        Follow-up: Return in about 2 months (around 3/10/2018) for depression, voice hoarseness . - need to order labs and DEXA this next visit  5 YEAR PLAN:  Flu vaccine advised every year.  Colon cancer screening per GI recommendation .

## 2018-01-11 ENCOUNTER — APPOINTMENT (OUTPATIENT)
Dept: OTHER | Facility: MEDICAL CENTER | Age: 73
End: 2018-01-11
Attending: FAMILY MEDICINE
Payer: MEDICARE

## 2018-01-11 ENCOUNTER — APPOINTMENT (OUTPATIENT)
Dept: PULMONOLOGY | Facility: MEDICAL CENTER | Age: 73
End: 2018-01-11
Attending: FAMILY MEDICINE
Payer: MEDICARE

## 2018-01-12 ENCOUNTER — PATIENT MESSAGE (OUTPATIENT)
Dept: PULMONOLOGY | Facility: HOSPICE | Age: 73
End: 2018-01-12

## 2018-01-16 ENCOUNTER — HOSPITAL ENCOUNTER (OUTPATIENT)
Dept: PULMONOLOGY | Facility: MEDICAL CENTER | Age: 73
End: 2018-01-16
Attending: FAMILY MEDICINE
Payer: MEDICARE

## 2018-01-16 PROCEDURE — G0424 PULMONARY REHAB W EXER: HCPCS

## 2018-01-18 ENCOUNTER — APPOINTMENT (OUTPATIENT)
Dept: OTHER | Facility: MEDICAL CENTER | Age: 73
End: 2018-01-18
Attending: FAMILY MEDICINE
Payer: MEDICARE

## 2018-01-18 ENCOUNTER — HOSPITAL ENCOUNTER (OUTPATIENT)
Dept: PULMONOLOGY | Facility: MEDICAL CENTER | Age: 73
End: 2018-01-18
Attending: FAMILY MEDICINE
Payer: MEDICARE

## 2018-01-18 PROCEDURE — G0424 PULMONARY REHAB W EXER: HCPCS

## 2018-01-23 ENCOUNTER — HOSPITAL ENCOUNTER (OUTPATIENT)
Dept: PULMONOLOGY | Facility: MEDICAL CENTER | Age: 73
End: 2018-01-23
Attending: FAMILY MEDICINE
Payer: MEDICARE

## 2018-01-23 PROCEDURE — G0424 PULMONARY REHAB W EXER: HCPCS

## 2018-01-25 ENCOUNTER — HOSPITAL ENCOUNTER (OUTPATIENT)
Dept: PULMONOLOGY | Facility: MEDICAL CENTER | Age: 73
End: 2018-01-25
Attending: FAMILY MEDICINE
Payer: MEDICARE

## 2018-01-25 ENCOUNTER — ANTICOAGULATION VISIT (OUTPATIENT)
Dept: MEDICAL GROUP | Facility: MEDICAL CENTER | Age: 73
End: 2018-01-25
Payer: MEDICARE

## 2018-01-25 ENCOUNTER — APPOINTMENT (OUTPATIENT)
Dept: OTHER | Facility: MEDICAL CENTER | Age: 73
End: 2018-01-25
Attending: FAMILY MEDICINE
Payer: MEDICARE

## 2018-01-25 DIAGNOSIS — I48.92 ATRIAL FLUTTER, UNSPECIFIED TYPE (HCC): ICD-10-CM

## 2018-01-25 LAB — INR PPP: 1.8 (ref 2–3.5)

## 2018-01-25 PROCEDURE — 85610 PROTHROMBIN TIME: CPT | Performed by: INTERNAL MEDICINE

## 2018-01-25 PROCEDURE — G0424 PULMONARY REHAB W EXER: HCPCS

## 2018-01-25 NOTE — PROGRESS NOTES
OP Anticoagulation Service Note    Date: 1/25/2018  There were no vitals filed for this visit.   pt declined vitals, she had BP checked a pulmonary rehab today    Anticoagulation Summary  As of 1/25/2018    INR goal:   1.5-2.5   TTR:   53.5 % (8.4 mo)   Today's INR:   1.8   Maintenance plan:   1.25 mg (2.5 mg x 0.5) on Mon, Fri; 2.5 mg (2.5 mg x 1) all other days   Weekly total:   15 mg   Plan last modified:   Estiven Balderas, PharmD (10/2/2017)   Next INR check:   2/22/2018   Target end date:       Indications    Atrial flutter (CMS-HCC) [I48.92]  Mitral valve insufficiency (Resolved) [I34.0]             Anticoagulation Episode Summary     INR check location:       Preferred lab:       Send INR reminders to:       Comments:   Home Health  INR range per Dr. Sams and pts request, see note from 8-23-17 from Dr. sams      Anticoagulation Care Providers     Provider Role Specialty Phone number    Lacey Porras M.D.  Cardiac Surgery [phone]    Amanda Perera, PharmD           Anticoagulation Patient Findings      HPI:   Kelly Lovett seen in clinic today, on anticoagulation therapy with warfarin (a high risk medication) for a flutter      Pt is here today to evaluate anticoagulation therapy    Previous INR was 1.4 on 1/8/18, pt was instructed to increase x 1 dose then resume usual regimen    Confirmed warfarin dosing regimen, pt reports missing her dose yesterday  Diet has been consistent with foods rich in vitamin K: Yes  Changes in ETOH:  No  Changes in smoking status: No  Changes in medication: No  Cost restriction: No  S/s of bleeding:  No  Signs/symptoms  thrombosis since the last appt: No    A/P   INR  therapeutic today, will require close follow up as previous INR was sub-therapeutic   Tonight take 3.75 mg d/t missed dose yesterday then continue current regimen     Pt educated to contact our clinic with any changes in medications or s/s of bleeding or thrombosis    Follow up appointment in 4 week(s)  to reduce risk of adverse events from warfarin  Amanda Perera, Pharm D

## 2018-01-30 ENCOUNTER — APPOINTMENT (OUTPATIENT)
Dept: PULMONOLOGY | Facility: MEDICAL CENTER | Age: 73
End: 2018-01-30
Attending: FAMILY MEDICINE
Payer: MEDICARE

## 2018-02-01 ENCOUNTER — APPOINTMENT (OUTPATIENT)
Dept: PULMONOLOGY | Facility: MEDICAL CENTER | Age: 73
End: 2018-02-01
Attending: FAMILY MEDICINE
Payer: MEDICARE

## 2018-02-01 ENCOUNTER — APPOINTMENT (OUTPATIENT)
Dept: OTHER | Facility: MEDICAL CENTER | Age: 73
End: 2018-02-01
Attending: FAMILY MEDICINE
Payer: MEDICARE

## 2018-02-06 ENCOUNTER — APPOINTMENT (OUTPATIENT)
Dept: PULMONOLOGY | Facility: MEDICAL CENTER | Age: 73
End: 2018-02-06
Attending: FAMILY MEDICINE
Payer: MEDICARE

## 2018-02-06 ENCOUNTER — OFFICE VISIT (OUTPATIENT)
Dept: CARDIOLOGY | Facility: MEDICAL CENTER | Age: 73
End: 2018-02-06
Payer: MEDICARE

## 2018-02-06 VITALS
SYSTOLIC BLOOD PRESSURE: 116 MMHG | HEART RATE: 50 BPM | OXYGEN SATURATION: 97 % | BODY MASS INDEX: 20.99 KG/M2 | DIASTOLIC BLOOD PRESSURE: 76 MMHG | WEIGHT: 126 LBS | HEIGHT: 65 IN

## 2018-02-06 DIAGNOSIS — Z79.899 HIGH RISK MEDICATION USE: ICD-10-CM

## 2018-02-06 DIAGNOSIS — Z98.890 S/P MITRAL VALVE REPAIR: ICD-10-CM

## 2018-02-06 DIAGNOSIS — Z86.79 S/P MAZE OPERATION FOR ATRIAL FIBRILLATION: ICD-10-CM

## 2018-02-06 DIAGNOSIS — Z98.890 S/P MAZE OPERATION FOR ATRIAL FIBRILLATION: ICD-10-CM

## 2018-02-06 DIAGNOSIS — E78.2 MIXED HYPERLIPIDEMIA: ICD-10-CM

## 2018-02-06 DIAGNOSIS — J44.9 CHRONIC OBSTRUCTIVE PULMONARY DISEASE, UNSPECIFIED COPD TYPE (HCC): ICD-10-CM

## 2018-02-06 DIAGNOSIS — I48.0 PAROXYSMAL ATRIAL FIBRILLATION (HCC): ICD-10-CM

## 2018-02-06 LAB — EKG IMPRESSION: NORMAL

## 2018-02-06 PROCEDURE — 93000 ELECTROCARDIOGRAM COMPLETE: CPT | Performed by: INTERNAL MEDICINE

## 2018-02-06 PROCEDURE — 99215 OFFICE O/P EST HI 40 MIN: CPT | Mod: 25 | Performed by: INTERNAL MEDICINE

## 2018-02-06 ASSESSMENT — ENCOUNTER SYMPTOMS
ORTHOPNEA: 0
NAUSEA: 0
DIZZINESS: 0
CHILLS: 0
PALPITATIONS: 0
DOUBLE VISION: 0
BRUISES/BLEEDS EASILY: 0
VOMITING: 0
BLOOD IN STOOL: 0
DEPRESSION: 0
LOSS OF CONSCIOUSNESS: 0
COUGH: 0
SPEECH CHANGE: 0
PND: 0
HALLUCINATIONS: 0
SHORTNESS OF BREATH: 1
FEVER: 0
CLAUDICATION: 0
EYE DISCHARGE: 0
MYALGIAS: 0
ABDOMINAL PAIN: 0
SENSORY CHANGE: 0
HEADACHES: 0
BLURRED VISION: 0
EYE PAIN: 0
WEIGHT LOSS: 0
FALLS: 0

## 2018-02-06 NOTE — LETTER
[facility]   [address] [place] [address]  [place] [address]  Phone: [phone]  Fax: [phone]              Kelly Lovett  1945    Encounter Date: 2/6/2018    Dalia Li M.D.          PROGRESS NOTE:  Subjective:   Kelly Lovett is a 72 y.o. female who presents today for cardiac care and evaluation after her recent mitral valve repair, maze. Patient has been doing okay. She still has significant pulmonary disease. She will be followed by pulmonologist. She will be participating in pulmonary rehab. She has not done cardiac rehab because her pulmonologist recommended pulmonary rehab before cardiac rehab.     Her EKG shows evidence of sinus rhythm today with    Chief Complaint: dyspnea.    Past Medical History:   Diagnosis Date   • Breath shortness     pt reports using o2 at night 2L, no problems at this time   • COPD (chronic obstructive pulmonary disease) (CMS-[facility])    • Emphysema of lung (CMS-[facility])    • Heart valve disease    • High cholesterol    • History of heart surgery    • Hyperlipidemia    • Hypertension    • Sleep apnea     uses cpap     Past Surgical History:   Procedure Laterality Date   • MITRAL VALVE REPAIR  4/20/2017    Procedure: MITRAL VALVE REPAIR ;  Surgeon: Lacey Porras M.D.;  Location: SURGERY [facility];  Service:    • MAZE PROCEDURE Left 4/20/2017    Procedure: MAZE PROCEDURE, Left atrial appendage ligation;  Surgeon: Lacey Porras M.D.;  Location: SURGERY [facility];  Service:    • PAGE  4/20/2017    Procedure: PAGE;  Surgeon: Lacey Porras M.D.;  Location: SURGERY [facility];  Service:    • APPENDECTOMY      1967   • OOPHORECTOMY       Family History   Problem Relation Age of Onset   • Cancer Father      colon cancer    • Hypertension Mother    • Cancer Sister 68     ovarian cancer     History   Smoking Status   • Former Smoker   • Packs/day: 0.50   • Years: 38.00   • Types: Cigarettes   • Quit  date: 10/11/2001   Smokeless Tobacco   • Never Used     Allergies   Allergen Reactions   • Sulfa Drugs Rash     Rxn - years ago in her 20's     Outpatient Encounter Prescriptions as of 2/6/2018   Medication Sig Dispense Refill   • sertraline (ZOLOFT) 100 MG Tab Take 1.5 Tabs by mouth every day. 135 Tab 1   • albuterol (VENTOLIN HFA) 108 (90 Base) MCG/ACT Aero Soln inhalation aerosol Inhale 2 Puffs by mouth every four hours as needed for Shortness of Breath. 3 Inhaler 3   • atorvastatin (LIPITOR) 10 MG Tab Take 1 Tab by mouth every day. 90 Tab 3   • Tiotropium Bromide Monohydrate (SPIRIVA RESPIMAT) 2.5 MCG/ACT Aero Soln Inhale 2 Inhalation by mouth every day. 3 Inhaler 3   • warfarin (COUMADIN) 2.5 MG Tab Take 1 Tab by mouth every day. 90 Tab 1   • Acetaminophen (TYLENOL EXTRA STRENGTH PO) Take 1 Tab by mouth as needed.     • NON SPECIFIED Please provide patient with a portable oxygen concentrator 2.5L continuous flow at all times for 3 months    ICD10 Chronic Respiratory Failure with hypoxia J96.11  Pulse ox off of oxygen 87%  O2 off of 1 Each 0   • non-formulary med Inhale 2 L/min by mouth Continuous. Oxygen 2L x NC continuous  Indications: COPD     • warfarin (COUMADIN) 5 MG Tab Take 1 Tab by mouth COUMADIN-DAILY. Titrate to INR 2-3. 30 Tab 3   • furosemide (LASIX) 20 MG Tab Take 20 mg by mouth every day.     • fluticasone-salmeterol (ADVAIR) 250-50 MCG/DOSE AEROSOL POWDER, BREATH ACTIVATED Inhale 1 Puff by mouth 2 times a day. 3 Inhaler 3   • potassium chloride ER (K-TAB) 10 MEQ tablet Take 1 Tab by mouth every day. 90 Tab 3   • magnesium oxide (MAG-OX) 400 MG Tab Take 400 mg by mouth 2 times a day. takes 500 mg tab     • [DISCONTINUED] amiodarone (CORDARONE) 200 MG Tab Take 1 Tab by mouth every day. 30 Tab 6     No facility-administered encounter medications on file as of 2/6/2018.      Review of Systems   Constitutional: Negative for chills, fever, malaise/fatigue and weight loss.   HENT: Negative for ear  "discharge, ear pain, hearing loss and nosebleeds.    Eyes: Negative for blurred vision, double vision, pain and discharge.   Respiratory: Positive for shortness of breath. Negative for cough.    Cardiovascular: Negative for chest pain, palpitations, orthopnea, claudication, leg swelling and PND.   Gastrointestinal: Negative for abdominal pain, blood in stool, melena, nausea and vomiting.   Genitourinary: Negative for dysuria and hematuria.   Musculoskeletal: Negative for falls, joint pain and myalgias.   Skin: Negative for itching and rash.   Neurological: Negative for dizziness, sensory change, speech change, loss of consciousness and headaches.   Endo/Heme/Allergies: Negative for environmental allergies. Does not bruise/bleed easily.   Psychiatric/Behavioral: Negative for depression, hallucinations and suicidal ideas.        Objective:   /76   Pulse (!) 50   Ht 1.638 m (5' 4.5\")   Wt 57.2 kg (126 lb)   SpO2 97%   BMI 21.29 kg/m²      Physical Exam   Constitutional: She is oriented to person, place, and time. No distress.   Patient is dependent on supplemental oxygen.     HENT:   Head: Normocephalic and atraumatic.   Eyes: EOM are normal.   Neck: No JVD present.   Cardiovascular: Normal rate, regular rhythm, normal heart sounds and intact distal pulses.  Exam reveals no gallop and no friction rub.    No murmur heard.  Pulmonary/Chest: No respiratory distress.   Abdominal: She exhibits no distension. There is no tenderness. There is no rebound and no guarding.   Musculoskeletal: She exhibits no edema or tenderness.   Lymphadenopathy:     She has no cervical adenopathy.   Neurological: She is alert and oriented to person, place, and time.   Skin: Skin is dry.   Psychiatric: She has a normal mood and affect.   Nursing note and vitals reviewed.      Assessment:     1. Paroxysmal atrial fibrillation (CMS-HCC)  [facility] EPIPHANY EKG (Clinic Performed)   2. High risk medication use     3. S/P mitral valve repair     "   4. S/P Maze operation for atrial fibrillation     5. Mixed hyperlipidemia     6. Chronic obstructive pulmonary disease, unspecified COPD type (CMS-HCC)         Medical Decision Making:  Today's Assessment / Status / Plan:   Will stop Amiodarone today.  Current symptomatology is more consistent with pulmonary process than cardiac.  Okay to resume pulmonary rehab.  Okay for heart rate to be more than 120.  After stopping amiodarone, patient might be at risk for going back to atrial fibrillation. I think that should be okay for now. Her chest x-ray is abnormal. We don't want to aggravate her pulmonary status with amiodarone. We could consider other medical therapy in the future. However, the most important thing for her to do at this point is to rehab her lungs.    I will see patient back in clinic with lab tests and studies results in 6 months.    I thank you Dr. Jurado for referring patient to our Cardiology Clinic today.          Ritika Jurado M.D.  [id number] [address]  VIA In Basket

## 2018-02-06 NOTE — PROGRESS NOTES
Subjective:   Kelly Lovett is a 72 y.o. female who presents today for cardiac care and evaluation after her recent mitral valve repair, maze. Patient has been doing okay. She still has significant pulmonary disease. She will be followed by pulmonologist. She will be participating in pulmonary rehab. She has not done cardiac rehab because her pulmonologist recommended pulmonary rehab before cardiac rehab.     Her EKG shows evidence of sinus rhythm today with    Chief Complaint: dyspnea.    Past Medical History:   Diagnosis Date   • Breath shortness     pt reports using o2 at night 2L, no problems at this time   • COPD (chronic obstructive pulmonary disease) (CMS-[facility])    • Emphysema of lung (CMS-HCC)    • Heart valve disease    • High cholesterol    • History of heart surgery    • Hyperlipidemia    • Hypertension    • Sleep apnea     uses cpap     Past Surgical History:   Procedure Laterality Date   • MITRAL VALVE REPAIR  4/20/2017    Procedure: MITRAL VALVE REPAIR ;  Surgeon: Lacey Porras M.D.;  Location: SURGERY [facility];  Service:    • MAZE PROCEDURE Left 4/20/2017    Procedure: MAZE PROCEDURE, Left atrial appendage ligation;  Surgeon: Lacey Porras M.D.;  Location: SURGERY [facility];  Service:    • PAGE  4/20/2017    Procedure: PAGE;  Surgeon: Lacey Porras M.D.;  Location: SURGERY [facility];  Service:    • APPENDECTOMY      1967   • OOPHORECTOMY       Family History   Problem Relation Age of Onset   • Cancer Father      colon cancer    • Hypertension Mother    • Cancer Sister 68     ovarian cancer     History   Smoking Status   • Former Smoker   • Packs/day: 0.50   • Years: 38.00   • Types: Cigarettes   • Quit date: 10/11/2001   Smokeless Tobacco   • Never Used     Allergies   Allergen Reactions   • Sulfa Drugs Rash     Rxn - years ago in her 20's     Outpatient Encounter Prescriptions as of 2/6/2018   Medication Sig Dispense Refill   • sertraline (ZOLOFT) 100 MG Tab Take 1.5  Tabs by mouth every day. 135 Tab 1   • albuterol (VENTOLIN HFA) 108 (90 Base) MCG/ACT Aero Soln inhalation aerosol Inhale 2 Puffs by mouth every four hours as needed for Shortness of Breath. 3 Inhaler 3   • atorvastatin (LIPITOR) 10 MG Tab Take 1 Tab by mouth every day. 90 Tab 3   • Tiotropium Bromide Monohydrate (SPIRIVA RESPIMAT) 2.5 MCG/ACT Aero Soln Inhale 2 Inhalation by mouth every day. 3 Inhaler 3   • warfarin (COUMADIN) 2.5 MG Tab Take 1 Tab by mouth every day. 90 Tab 1   • Acetaminophen (TYLENOL EXTRA STRENGTH PO) Take 1 Tab by mouth as needed.     • NON SPECIFIED Please provide patient with a portable oxygen concentrator 2.5L continuous flow at all times for 3 months    ICD10 Chronic Respiratory Failure with hypoxia J96.11  Pulse ox off of oxygen 87%  O2 off of 1 Each 0   • non-formulary med Inhale 2 L/min by mouth Continuous. Oxygen 2L x NC continuous  Indications: COPD     • warfarin (COUMADIN) 5 MG Tab Take 1 Tab by mouth COUMADIN-DAILY. Titrate to INR 2-3. 30 Tab 3   • furosemide (LASIX) 20 MG Tab Take 20 mg by mouth every day.     • fluticasone-salmeterol (ADVAIR) 250-50 MCG/DOSE AEROSOL POWDER, BREATH ACTIVATED Inhale 1 Puff by mouth 2 times a day. 3 Inhaler 3   • potassium chloride ER (K-TAB) 10 MEQ tablet Take 1 Tab by mouth every day. 90 Tab 3   • magnesium oxide (MAG-OX) 400 MG Tab Take 400 mg by mouth 2 times a day. takes 500 mg tab     • [DISCONTINUED] amiodarone (CORDARONE) 200 MG Tab Take 1 Tab by mouth every day. 30 Tab 6     No facility-administered encounter medications on file as of 2/6/2018.      Review of Systems   Constitutional: Negative for chills, fever, malaise/fatigue and weight loss.   HENT: Negative for ear discharge, ear pain, hearing loss and nosebleeds.    Eyes: Negative for blurred vision, double vision, pain and discharge.   Respiratory: Positive for shortness of breath. Negative for cough.    Cardiovascular: Negative for chest pain, palpitations, orthopnea, claudication,  "leg swelling and PND.   Gastrointestinal: Negative for abdominal pain, blood in stool, melena, nausea and vomiting.   Genitourinary: Negative for dysuria and hematuria.   Musculoskeletal: Negative for falls, joint pain and myalgias.   Skin: Negative for itching and rash.   Neurological: Negative for dizziness, sensory change, speech change, loss of consciousness and headaches.   Endo/Heme/Allergies: Negative for environmental allergies. Does not bruise/bleed easily.   Psychiatric/Behavioral: Negative for depression, hallucinations and suicidal ideas.        Objective:   /76   Pulse (!) 50   Ht 1.638 m (5' 4.5\")   Wt 57.2 kg (126 lb)   SpO2 97%   BMI 21.29 kg/m²     Physical Exam   Constitutional: She is oriented to person, place, and time. No distress.   Patient is dependent on supplemental oxygen.     HENT:   Head: Normocephalic and atraumatic.   Eyes: EOM are normal.   Neck: No JVD present.   Cardiovascular: Normal rate, regular rhythm, normal heart sounds and intact distal pulses.  Exam reveals no gallop and no friction rub.    No murmur heard.  Pulmonary/Chest: No respiratory distress.   Abdominal: She exhibits no distension. There is no tenderness. There is no rebound and no guarding.   Musculoskeletal: She exhibits no edema or tenderness.   Lymphadenopathy:     She has no cervical adenopathy.   Neurological: She is alert and oriented to person, place, and time.   Skin: Skin is dry.   Psychiatric: She has a normal mood and affect.   Nursing note and vitals reviewed.      Assessment:     1. Paroxysmal atrial fibrillation (CMS-HCC)  [facility] EPIPHANY EKG (Clinic Performed)   2. High risk medication use     3. S/P mitral valve repair     4. S/P Maze operation for atrial fibrillation     5. Mixed hyperlipidemia     6. Chronic obstructive pulmonary disease, unspecified COPD type (CMS-HCC)         Medical Decision Making:  Today's Assessment / Status / Plan:   Will stop Amiodarone today.  Current symptomatology " is more consistent with pulmonary process than cardiac.  Okay to resume pulmonary rehab.  Okay for heart rate to be more than 120.  After stopping amiodarone, patient might be at risk for going back to atrial fibrillation. I think that should be okay for now. Her chest x-ray is abnormal. We don't want to aggravate her pulmonary status with amiodarone. We could consider other medical therapy in the future. However, the most important thing for her to do at this point is to rehab her lungs.    I will see patient back in clinic with lab tests and studies results in 6 months.    I thank you Dr. Jurado for referring patient to our Cardiology Clinic today.

## 2018-02-07 DIAGNOSIS — I50.42 CHRONIC COMBINED SYSTOLIC AND DIASTOLIC CONGESTIVE HEART FAILURE (HCC): ICD-10-CM

## 2018-02-07 RX ORDER — POTASSIUM CHLORIDE 750 MG/1
10 TABLET, FILM COATED, EXTENDED RELEASE ORAL DAILY
Qty: 90 TAB | Refills: 3 | Status: SHIPPED | OUTPATIENT
Start: 2018-02-07 | End: 2018-08-16

## 2018-02-08 ENCOUNTER — APPOINTMENT (OUTPATIENT)
Dept: OTHER | Facility: MEDICAL CENTER | Age: 73
End: 2018-02-08
Attending: FAMILY MEDICINE
Payer: MEDICARE

## 2018-02-08 ENCOUNTER — APPOINTMENT (OUTPATIENT)
Dept: PULMONOLOGY | Facility: MEDICAL CENTER | Age: 73
End: 2018-02-08
Attending: FAMILY MEDICINE
Payer: MEDICARE

## 2018-02-13 ENCOUNTER — APPOINTMENT (OUTPATIENT)
Dept: PULMONOLOGY | Facility: MEDICAL CENTER | Age: 73
End: 2018-02-13
Attending: FAMILY MEDICINE
Payer: MEDICARE

## 2018-02-15 ENCOUNTER — APPOINTMENT (OUTPATIENT)
Dept: OTHER | Facility: MEDICAL CENTER | Age: 73
End: 2018-02-15
Attending: FAMILY MEDICINE
Payer: MEDICARE

## 2018-02-15 ENCOUNTER — APPOINTMENT (OUTPATIENT)
Dept: PULMONOLOGY | Facility: MEDICAL CENTER | Age: 73
End: 2018-02-15
Attending: FAMILY MEDICINE
Payer: MEDICARE

## 2018-02-20 ENCOUNTER — APPOINTMENT (OUTPATIENT)
Dept: PULMONOLOGY | Facility: MEDICAL CENTER | Age: 73
End: 2018-02-20
Attending: FAMILY MEDICINE
Payer: MEDICARE

## 2018-02-22 ENCOUNTER — APPOINTMENT (OUTPATIENT)
Dept: MEDICAL GROUP | Facility: MEDICAL CENTER | Age: 73
End: 2018-02-22
Payer: MEDICARE

## 2018-02-22 ENCOUNTER — APPOINTMENT (OUTPATIENT)
Dept: PULMONOLOGY | Facility: MEDICAL CENTER | Age: 73
End: 2018-02-22
Attending: FAMILY MEDICINE
Payer: MEDICARE

## 2018-02-26 ENCOUNTER — ANTICOAGULATION VISIT (OUTPATIENT)
Dept: MEDICAL GROUP | Facility: MEDICAL CENTER | Age: 73
End: 2018-02-26
Payer: MEDICARE

## 2018-02-26 ENCOUNTER — OFFICE VISIT (OUTPATIENT)
Dept: PULMONOLOGY | Facility: HOSPICE | Age: 73
End: 2018-02-26
Payer: MEDICARE

## 2018-02-26 VITALS
RESPIRATION RATE: 16 BRPM | OXYGEN SATURATION: 98 % | TEMPERATURE: 98.2 F | HEIGHT: 65 IN | HEART RATE: 72 BPM | BODY MASS INDEX: 20.66 KG/M2 | SYSTOLIC BLOOD PRESSURE: 128 MMHG | DIASTOLIC BLOOD PRESSURE: 80 MMHG | WEIGHT: 124 LBS

## 2018-02-26 DIAGNOSIS — Z95.2 STATUS POST MITRAL VALVE REPLACEMENT: ICD-10-CM

## 2018-02-26 DIAGNOSIS — R09.02 HYPOXEMIA: ICD-10-CM

## 2018-02-26 DIAGNOSIS — G47.33 OBSTRUCTIVE SLEEP APNEA SYNDROME: ICD-10-CM

## 2018-02-26 DIAGNOSIS — J44.9 CHRONIC OBSTRUCTIVE PULMONARY DISEASE, UNSPECIFIED COPD TYPE (HCC): ICD-10-CM

## 2018-02-26 DIAGNOSIS — I48.92 ATRIAL FLUTTER, UNSPECIFIED TYPE (HCC): ICD-10-CM

## 2018-02-26 LAB — INR PPP: 1.9 (ref 2–3.5)

## 2018-02-26 PROCEDURE — 99999 PR NO CHARGE: CPT | Performed by: FAMILY MEDICINE

## 2018-02-26 PROCEDURE — 85610 PROTHROMBIN TIME: CPT | Performed by: FAMILY MEDICINE

## 2018-02-26 PROCEDURE — 99214 OFFICE O/P EST MOD 30 MIN: CPT | Performed by: INTERNAL MEDICINE

## 2018-02-26 RX ORDER — TIOTROPIUM BROMIDE INHALATION SPRAY 1.56 UG/1
SPRAY, METERED RESPIRATORY (INHALATION)
COMMUNITY
Start: 2018-01-14 | End: 2018-03-14

## 2018-02-26 NOTE — PROGRESS NOTES
OP Anticoagulation Service Note    Date: 2/26/2018  There were no vitals filed for this visit.   pt declined vitals    Anticoagulation Summary  As of 2/26/2018    INR goal:   1.5-2.5   TTR:   58.7 % (9.5 mo)   Today's INR:   1.9   Maintenance plan:   1.25 mg (2.5 mg x 0.5) on Mon, Fri; 2.5 mg (2.5 mg x 1) all other days   Weekly total:   15 mg   Plan last modified:   Dari StaffordD (10/2/2017)   Next INR check:   3/26/2018   Target end date:       Indications    Atrial flutter (CMS-HCC) [I48.92]  Mitral valve insufficiency (Resolved) [I34.0]             Anticoagulation Episode Summary     INR check location:       Preferred lab:       Send INR reminders to:       Comments:   Home Health  INR range per Dr. Sams and pts request, see note from 8-23-17 from Dr. sams      Anticoagulation Care Providers     Provider Role Specialty Phone number    Lacey Porras M.D.  Cardiac Surgery [phone]    Amanda Perera PharmD           Anticoagulation Patient Findings      HPI:   Kelly Tejeda [facility] seen in clinic today, on anticoagulation therapy with warfarin (a high risk medication) for atrial flutter      Pt is here today to evaluate anticoagulation therapy    Previous INR was  1.8 on 1-25-18    Pt was instructed to Continue current warfarin regimen     Confirmed warfarin dosing regimen, denies missed or extra doses of coumadin.   Diet has been consistent with foods rich in vitamin K: Yes  Changes in ETOH:  No  Changes in smoking status: No  Changes in medication: No   Cost restriction: No  S/s of bleeding:  No  Signs/symptoms  thrombosis since the last appt: No    A/P   INR is in range today  Continue current warfarin regimen        Pt educated to contact our clinic with any changes in medications or s/s of bleeding or thrombosis    Follow up appointment in 4 week(s) to reduce risk of adverse events from warfarin  Dari Montez D

## 2018-02-27 ENCOUNTER — APPOINTMENT (OUTPATIENT)
Dept: PULMONOLOGY | Facility: MEDICAL CENTER | Age: 73
End: 2018-02-27
Attending: FAMILY MEDICINE
Payer: MEDICARE

## 2018-02-27 NOTE — PROGRESS NOTES
Chief Complaint   Patient presents with   • Follow-Up     Hypoxemia       HPI:  She continues on supplemental oxygen at 2.5 L/m 24/7. She has obtained a home oxygen concentrator with backup oxygen cylinders. She continues to use her portable oxygen concentrator. She has not had any exacerbations. She continues on Advair, Spiriva, and albuterol.  At pulmonary rehabilitation she felt she was making some progress. However, it was felt that she was not making significant progress with exercise because she was limited by resting tachycardia. She has seen cardiology, Dr. Li, who has cleared her to continue with the exercise program. I also believe she is able to continue to exercise as tolerated. Otherwise she seems to be slowly improving over the past 6 months or so    Past Medical History:   Diagnosis Date   • Breath shortness     pt reports using o2 at night 2L, no problems at this time   • COPD (chronic obstructive pulmonary disease) (CMS-HCC)    • Emphysema of lung (CMS-HCC)    • Heart valve disease    • High cholesterol    • History of heart surgery    • Hyperlipidemia    • Hypertension    • Sleep apnea     uses cpap       ROS:  Constitutional: Denies fevers, chills, night sweats, fatigue or weight loss  Eyes: Denies vision loss, pain, drainage, double vision  Ears, Nose, Throat: Denies earache, tinnitus, hoarseness  Cardiovascular: Denies chest pain, tightness, palpitations  Respiratory: Denies  sputum production, cough, hemoptysis. She does have chronic dyspnea  Sleep: Denies, snoring, apnea. She is on oxygen at night  GI: Denies abdominal pain, nausea, vomiting, diarrhea  : Denies frequent urination, hematuria, painful urination  Musculoskeletal: Denies back pain, painful joints, sore muscles  Neurological: Denies headaches, seizures  Skin: Denies rashes, color changes  Psychiatric: Denies depression or thoughts of suicide  Hematologic: Denies bleeding tendency or clotting tendency  Allergic/Immunologic: Denies  rhinitis, skin sensitivity    Social History     Social History   • Marital status:      Spouse name: N/A   • Number of children: N/A   • Years of education: N/A     Occupational History   • Not on file.     Social History Main Topics   • Smoking status: Former Smoker     Packs/day: 0.50     Years: 38.00     Types: Cigarettes     Quit date: 10/11/2001   • Smokeless tobacco: Never Used   • Alcohol use 8.4 oz/week     14 Shots of liquor per week      Comment: 3 per day   • Drug use: No   • Sexual activity: Yes     Partners: Male     Other Topics Concern   • Not on file     Social History Narrative   • No narrative on file     Sulfa drugs  Current Outpatient Prescriptions on File Prior to Visit   Medication Sig Dispense Refill   • potassium chloride ER (K-TAB) 10 MEQ tablet Take 1 Tab by mouth every day. 90 Tab 3   • sertraline (ZOLOFT) 100 MG Tab Take 1.5 Tabs by mouth every day. 135 Tab 1   • albuterol (VENTOLIN HFA) 108 (90 Base) MCG/ACT Aero Soln inhalation aerosol Inhale 2 Puffs by mouth every four hours as needed for Shortness of Breath. 3 Inhaler 3   • atorvastatin (LIPITOR) 10 MG Tab Take 1 Tab by mouth every day. 90 Tab 3   • Tiotropium Bromide Monohydrate (SPIRIVA RESPIMAT) 2.5 MCG/ACT Aero Soln Inhale 2 Inhalation by mouth every day. 3 Inhaler 3   • warfarin (COUMADIN) 2.5 MG Tab Take 1 Tab by mouth every day. 90 Tab 1   • Acetaminophen (TYLENOL EXTRA STRENGTH PO) Take 1 Tab by mouth as needed.     • warfarin (COUMADIN) 5 MG Tab Take 1 Tab by mouth COUMADIN-DAILY. Titrate to INR 2-3. 30 Tab 3   • furosemide (LASIX) 20 MG Tab Take 20 mg by mouth every day.     • fluticasone-salmeterol (ADVAIR) 250-50 MCG/DOSE AEROSOL POWDER, BREATH ACTIVATED Inhale 1 Puff by mouth 2 times a day. 3 Inhaler 3   • magnesium oxide (MAG-OX) 400 MG Tab Take 400 mg by mouth 2 times a day. takes 500 mg tab     • NON SPECIFIED Please provide patient with a portable oxygen concentrator 2.5L continuous flow at all times for 3  "months    ICD10 Chronic Respiratory Failure with hypoxia J96.11  Pulse ox off of oxygen 87%  O2 off of 1 Each 0   • non-formulary med Inhale 2 L/min by mouth Continuous. Oxygen 2L x NC continuous  Indications: COPD       No current facility-administered medications on file prior to visit.      Blood pressure 128/80, pulse 72, temperature 36.8 °C (98.2 °F), resp. rate 16, height 1.638 m (5' 4.5\"), weight 56.2 kg (124 lb), SpO2 98 %.  Family History   Problem Relation Age of Onset   • Cancer Father      colon cancer    • Hypertension Mother    • Cancer Sister 68     ovarian cancer       Physical Exam:  No distress at rest on supplemental oxygen  HEENT: PERRLA, EOMI, no scleral icterus, no nasal or oral lesions  Neck: No thyromegaly, no adenopathy, no bruits  Mallampatti: Grade II  Lungs: Equal breath sounds, no wheezes or crackles  Heart: Regular rate and rhythm, no gallops or murmurs  Abdomen: Soft, benign, no organomegaly  Extremities: No clubbing, cyanosis, or edema  Neurologic: Cranial nerve, motor, and sensory exam are normal    1. Chronic obstructive pulmonary disease, unspecified COPD type (CMS-HCC)    2. Hypoxemia    3. Obstructive sleep apnea syndrome    4. Status post mitral valve replacement        Continue current medications and supplemental oxygen  Resume participation in pulmonary rehabilitation program  Follow-up in 6 months or sooner if there are issues  "

## 2018-03-01 ENCOUNTER — APPOINTMENT (OUTPATIENT)
Dept: PULMONOLOGY | Facility: MEDICAL CENTER | Age: 73
End: 2018-03-01
Attending: FAMILY MEDICINE
Payer: MEDICARE

## 2018-03-06 ENCOUNTER — HOSPITAL ENCOUNTER (OUTPATIENT)
Dept: PULMONOLOGY | Facility: MEDICAL CENTER | Age: 73
End: 2018-03-06
Attending: FAMILY MEDICINE
Payer: MEDICARE

## 2018-03-06 PROCEDURE — G0424 PULMONARY REHAB W EXER: HCPCS

## 2018-03-08 ENCOUNTER — HOSPITAL ENCOUNTER (OUTPATIENT)
Dept: PULMONOLOGY | Facility: MEDICAL CENTER | Age: 73
End: 2018-03-08
Attending: FAMILY MEDICINE
Payer: MEDICARE

## 2018-03-08 PROCEDURE — G0424 PULMONARY REHAB W EXER: HCPCS

## 2018-03-13 ENCOUNTER — APPOINTMENT (OUTPATIENT)
Dept: PULMONOLOGY | Facility: MEDICAL CENTER | Age: 73
End: 2018-03-13
Attending: FAMILY MEDICINE
Payer: MEDICARE

## 2018-03-14 ENCOUNTER — OFFICE VISIT (OUTPATIENT)
Dept: MEDICAL GROUP | Facility: LAB | Age: 73
End: 2018-03-14
Payer: MEDICARE

## 2018-03-14 VITALS
BODY MASS INDEX: 20.83 KG/M2 | SYSTOLIC BLOOD PRESSURE: 118 MMHG | WEIGHT: 125 LBS | DIASTOLIC BLOOD PRESSURE: 80 MMHG | TEMPERATURE: 97.3 F | OXYGEN SATURATION: 97 % | HEART RATE: 96 BPM | HEIGHT: 65 IN | RESPIRATION RATE: 16 BRPM

## 2018-03-14 DIAGNOSIS — J44.9 CHRONIC OBSTRUCTIVE PULMONARY DISEASE, UNSPECIFIED COPD TYPE (HCC): ICD-10-CM

## 2018-03-14 DIAGNOSIS — M85.89 OSTEOPENIA OF MULTIPLE SITES: ICD-10-CM

## 2018-03-14 DIAGNOSIS — F32.1 MODERATE SINGLE CURRENT EPISODE OF MAJOR DEPRESSIVE DISORDER (HCC): ICD-10-CM

## 2018-03-14 DIAGNOSIS — R49.0 VOICE HOARSENESS: ICD-10-CM

## 2018-03-14 DIAGNOSIS — E78.2 MIXED HYPERLIPIDEMIA: ICD-10-CM

## 2018-03-14 PROCEDURE — 99214 OFFICE O/P EST MOD 30 MIN: CPT | Performed by: FAMILY MEDICINE

## 2018-03-14 NOTE — PROGRESS NOTES
Subjective:   Kelly Lovett is a 72 y.o. female here today for   Chief Complaint   Patient presents with   • Depression       1. Moderate single current episode of major depressive disorder (CMS-HCC)  This is chronic. Patient has been on Zoloft 150 mg daily. This is helping her significantly. She states that her motivation and energy is better. She is sleeping okay. She is still drinking 2 alcoholic beverages every night. No suicidal thoughts. No feelings as though she would be better off dead.    2. Voice hoarseness  Chronic  Patient reports 9 months of raspy voice. She notices more throat clearing. This started after her hospitalization. She was intubated for a long period of time. She is not having any dysphasia or pain in the throat. No recurrent illnesses. She does have a smoking history  Not on a PPI but no known heartburn. Not on intranasal steroids. Not complaining of any postnasal drip.    3. Mixed hyperlipidemia  This is chronic. Doing well.  Taking atorvastatin 10 mg daily as prescribed. No myalgias, GI upset, or other side effects from medication. Denies CP or stroke symptoms. Also taking a daily ASA. Last lipid panel over a year ago.    4. Chronic obstructive pulmonary disease, unspecified COPD type (CMS-HCC)  Chronic. Stopped pulmonary rehabilitation for a little over a month due to cardiac workup. She was told that she could continue with pulmonary workup by her cardiologist. She is on 2 L oxygen at all times. She is on Spiriva, Advair. She is doing well with this. She will restart pulmonary rehabilitation this next week    5. Osteopenia of multiple sites  Chronic. Patient is due for repeat bone density scan.      Current medicines (including changes today)  Current Outpatient Prescriptions   Medication Sig Dispense Refill   • potassium chloride ER (K-TAB) 10 MEQ tablet Take 1 Tab by mouth every day. 90 Tab 3   • sertraline (ZOLOFT) 100 MG Tab Take 1.5 Tabs by mouth every day. 135 Tab 1   •  "albuterol (VENTOLIN HFA) 108 (90 Base) MCG/ACT Aero Soln inhalation aerosol Inhale 2 Puffs by mouth every four hours as needed for Shortness of Breath. 3 Inhaler 3   • atorvastatin (LIPITOR) 10 MG Tab Take 1 Tab by mouth every day. 90 Tab 3   • Tiotropium Bromide Monohydrate (SPIRIVA RESPIMAT) 2.5 MCG/ACT Aero Soln Inhale 2 Inhalation by mouth every day. 3 Inhaler 3   • furosemide (LASIX) 20 MG Tab Take 20 mg by mouth every day.     • fluticasone-salmeterol (ADVAIR) 250-50 MCG/DOSE AEROSOL POWDER, BREATH ACTIVATED Inhale 1 Puff by mouth 2 times a day. 3 Inhaler 3   • magnesium oxide (MAG-OX) 400 MG Tab Take 400 mg by mouth 2 times a day. takes 500 mg tab     • warfarin (COUMADIN) 2.5 MG Tab Take 1 Tab by mouth every day. 90 Tab 1   • Acetaminophen (TYLENOL EXTRA STRENGTH PO) Take 1 Tab by mouth as needed.     • NON SPECIFIED Please provide patient with a portable oxygen concentrator 2.5L continuous flow at all times for 3 months    ICD10 Chronic Respiratory Failure with hypoxia J96.11  Pulse ox off of oxygen 87%  O2 off of 1 Each 0   • non-formulary med Inhale 2 L/min by mouth Continuous. Oxygen 2L x NC continuous  Indications: COPD     • warfarin (COUMADIN) 5 MG Tab Take 1 Tab by mouth COUMADIN-DAILY. Titrate to INR 2-3. 30 Tab 3     No current facility-administered medications for this visit.      She  has a past medical history of Breath shortness; COPD (chronic obstructive pulmonary disease) (CMS-[facility]); Emphysema of lung (CMS-[facility]); Heart valve disease; High cholesterol; History of heart surgery; Hyperlipidemia; Hypertension; and Sleep apnea.    ROS   No fevers  No bowel changes  No LE edema       Objective:     Blood pressure 118/80, pulse 96, temperature 36.3 °C (97.3 °F), resp. rate 16, height 1.638 m (5' 4.5\"), weight 56.7 kg (125 lb), SpO2 97 %. Body mass index is 21.12 kg/m².   Physical Exam:  Constitutional: Alert, no distress.  Skin: Warm, dry, good turgor, no rashes in visible areas.  Eye: Equal, round " and reactive, conjunctiva clear, lids normal.  ENMT: Lips without lesions, good dentition, oropharynx clear. Nasal cannula in place, 2 L oxygen running  Neck: Trachea midline, no masses, no thyromegaly. No cervical or supraclavicular lymphadenopathy  Respiratory: Unlabored respiratory effort, lungs clear to auscultation, no wheezes, no ronchi.  Cardiovascular: Normal S1, S2, RRR, no murmur, no edema.  Abdomen: Soft, non-tender, no masses, no hepatosplenomegaly.  Psych: Alert and oriented x3, normal affect and mood.      Assessment and Plan:   The following treatment plan was discussed    1. Moderate single current episode of major depressive disorder (CMS-HCC)  Chronic, improved on Zoloft 150 mg daily. This is working well for her. No change in dose at this time. We will continue to monitor closely.  - CBC WITH DIFFERENTIAL; Future  - COMP METABOLIC PANEL; Future    2. Voice hoarseness  This is a chronic problem. No thyromegaly. Discussed differential diagnoses with the patient. Although this may be secondary to trauma from her intubation, she is a year out from her surgery. Given her long smoking history I do think that this warrants further investigation with ENT for laryngoscopy. She declines this currently because she is so busy with pulmonary rehabilitation but she will consider my chart messages she is ready  - CBC WITH DIFFERENTIAL; Future  - COMP METABOLIC PANEL; Future  - TSH WITH REFLEX TO FT4; Future    3. Mixed hyperlipidemia  Chronic, stable on atorvastatin 10 mg daily. Repeat labs ordered  - LIPID PROFILE; Future  - TSH WITH REFLEX TO FT4; Future    4. Chronic obstructive pulmonary disease, unspecified COPD type (CMS-HCC)  Chronic, stable on 2 L oxygen, Advair, Spiriva. Follows with pulmonology. In pulmonary rehabilitation.  - CBC WITH DIFFERENTIAL; Future  - COMP METABOLIC PANEL; Future    5. Osteopenia of multiple sites  Chronic, stable. We will recheck her bone density scan.  - DS-BONE DENSITY STUDY  (DEXA); Future      Followup: Return in about 4 months (around 7/14/2018).       This note was created using voice recognition software. I have made every reasonable attempt to correct errors, however, I do anticipate some grammatical errors.

## 2018-03-15 ENCOUNTER — HOSPITAL ENCOUNTER (OUTPATIENT)
Dept: PULMONOLOGY | Facility: MEDICAL CENTER | Age: 73
End: 2018-03-15
Attending: INTERNAL MEDICINE
Payer: MEDICARE

## 2018-03-15 PROCEDURE — G0424 PULMONARY REHAB W EXER: HCPCS

## 2018-03-20 ENCOUNTER — HOSPITAL ENCOUNTER (OUTPATIENT)
Dept: PULMONOLOGY | Facility: MEDICAL CENTER | Age: 73
End: 2018-03-20
Attending: INTERNAL MEDICINE
Payer: MEDICARE

## 2018-03-20 PROCEDURE — G0424 PULMONARY REHAB W EXER: HCPCS

## 2018-03-20 RX ORDER — WARFARIN SODIUM 2.5 MG/1
2.5 TABLET ORAL DAILY
Qty: 90 TAB | Refills: 1 | Status: SHIPPED | OUTPATIENT
Start: 2018-03-20 | End: 2018-09-10 | Stop reason: SDUPTHER

## 2018-03-22 ENCOUNTER — HOSPITAL ENCOUNTER (OUTPATIENT)
Dept: PULMONOLOGY | Facility: MEDICAL CENTER | Age: 73
End: 2018-03-22
Attending: INTERNAL MEDICINE
Payer: MEDICARE

## 2018-03-22 PROCEDURE — G0424 PULMONARY REHAB W EXER: HCPCS

## 2018-03-26 ENCOUNTER — APPOINTMENT (OUTPATIENT)
Dept: MEDICAL GROUP | Facility: MEDICAL CENTER | Age: 73
End: 2018-03-26
Payer: MEDICARE

## 2018-03-27 ENCOUNTER — HOSPITAL ENCOUNTER (OUTPATIENT)
Dept: PULMONOLOGY | Facility: MEDICAL CENTER | Age: 73
End: 2018-03-27
Attending: INTERNAL MEDICINE
Payer: MEDICARE

## 2018-03-27 PROCEDURE — G0424 PULMONARY REHAB W EXER: HCPCS

## 2018-03-28 ENCOUNTER — HOSPITAL ENCOUNTER (OUTPATIENT)
Dept: RADIOLOGY | Facility: MEDICAL CENTER | Age: 73
End: 2018-03-28
Attending: FAMILY MEDICINE
Payer: MEDICARE

## 2018-03-28 DIAGNOSIS — M85.89 OSTEOPENIA OF MULTIPLE SITES: ICD-10-CM

## 2018-03-28 PROCEDURE — 77080 DXA BONE DENSITY AXIAL: CPT

## 2018-04-03 ENCOUNTER — ANTICOAGULATION VISIT (OUTPATIENT)
Dept: VASCULAR LAB | Facility: MEDICAL CENTER | Age: 73
End: 2018-04-03
Attending: INTERNAL MEDICINE
Payer: MEDICARE

## 2018-04-03 DIAGNOSIS — I48.92 ATRIAL FLUTTER, UNSPECIFIED TYPE (HCC): ICD-10-CM

## 2018-04-03 LAB
INR BLD: 1.7 (ref 0.9–1.2)
INR PPP: 1.7 (ref 2–3.5)

## 2018-04-03 PROCEDURE — 99211 OFF/OP EST MAY X REQ PHY/QHP: CPT

## 2018-04-03 PROCEDURE — 85610 PROTHROMBIN TIME: CPT

## 2018-04-03 NOTE — PROGRESS NOTES
OP Anticoagulation Service Note    Date: 4/3/2018  There were no vitals filed for this visit.    Anticoagulation Summary  As of 4/3/2018    INR goal:   1.5-2.5   TTR:   63.3 % (10.7 mo)   Today's INR:   1.7   Maintenance plan:   1.25 mg (2.5 mg x 0.5) on Mon, Fri; 2.5 mg (2.5 mg x 1) all other days   Weekly total:   15 mg   Plan last modified:   Estiven Balderas, PharmD (10/2/2017)   Next INR check:   5/15/2018   Target end date:       Indications    Atrial flutter (CMS-HCC) [I48.92]  Mitral valve insufficiency (Resolved) [I34.0]             Anticoagulation Episode Summary     INR check location:       Preferred lab:       Send INR reminders to:       Comments:   Home Health  INR range per Dr. Sams and pts request, see note from 8-23-17 from Dr. sams      Anticoagulation Care Providers     Provider Role Specialty Phone number    Lacey Porras M.D.  Cardiac Surgery [phone]    Amanda Perera PharmD           Anticoagulation Patient Findings      HPI:   Kelly Lovett seen in clinic today, they are here today for a INR check on anticoagulation therapy with warfarin because they have atrial flutter and mitral valve insufficiency     The reason for today's visit is to prevent morbidity and mortality from a stroke  and to reduce the risk of bleeding while on a anticoagulant.     Reason for today's visit (per our collaborative practice policy) is because their last INR was 1.9  on  2/26/2018.   Intervention at the last visit:  None     Additional education provided today regarding reducing bleed risk and dietary constraints:  About diet    Any upcoming procedures:   none    Confirmed warfarin dosing regimen  Interval Changes with foods rich in vitamin K: No  Interval Changes in ETOH:   No  Interval Changes in smoking status:  No  Interval Changes in medication:  No   Cost restriction:  No    S/S of bleeding or bruising:  No  Signs/symptoms  thrombosis since the last appt:  No  Bleed risk is:  moderate,     3  vitals included with today's appt :No  (BP, HR, weight, ht, RR)     Assessment:   INR  therapeutic.     No change in dose needed today, they will need to continue with the same dose and diet to prevent adverse events while on a anticoagulant.     They have a TTR of 63.3  which is not at target (TTR target/goal is 100%) and requires close follow up to prevent a adverse event (the lower the TTR the higher risk of clots, strokes, or bleeding).       Plan:  Continue weekly warfarin dose as noted      Follow up:  Follow up appointment in 6 week(s)       Other info:  Pt educated to contact our clinic with any changes in medications or s/s of bleeding or thrombosis    CHEST guidelines recommend frequent INR monitoring at regular intervals (a few days up to a max of 12 weeks) to ensure they are on the proper dose of warfarin and not having any complications from therapy.  INRs can dramatically change over a short time period due to diet, medications, and medical conditions.

## 2018-04-10 ENCOUNTER — HOSPITAL ENCOUNTER (OUTPATIENT)
Dept: LAB | Facility: MEDICAL CENTER | Age: 73
End: 2018-04-10
Attending: FAMILY MEDICINE
Payer: MEDICARE

## 2018-04-10 DIAGNOSIS — E78.2 MIXED HYPERLIPIDEMIA: ICD-10-CM

## 2018-04-10 DIAGNOSIS — R49.0 VOICE HOARSENESS: ICD-10-CM

## 2018-04-10 DIAGNOSIS — J44.9 CHRONIC OBSTRUCTIVE PULMONARY DISEASE, UNSPECIFIED COPD TYPE (HCC): ICD-10-CM

## 2018-04-10 DIAGNOSIS — F32.1 MODERATE SINGLE CURRENT EPISODE OF MAJOR DEPRESSIVE DISORDER (HCC): ICD-10-CM

## 2018-04-10 LAB
ALBUMIN SERPL BCP-MCNC: 4.7 G/DL (ref 3.2–4.9)
ALBUMIN/GLOB SERPL: 2.5 G/DL
ALP SERPL-CCNC: 68 U/L (ref 30–99)
ALT SERPL-CCNC: 20 U/L (ref 2–50)
ANION GAP SERPL CALC-SCNC: 8 MMOL/L (ref 0–11.9)
AST SERPL-CCNC: 24 U/L (ref 12–45)
BASOPHILS # BLD AUTO: 1.4 % (ref 0–1.8)
BASOPHILS # BLD: 0.13 K/UL (ref 0–0.12)
BILIRUB SERPL-MCNC: 0.4 MG/DL (ref 0.1–1.5)
BUN SERPL-MCNC: 25 MG/DL (ref 8–22)
CALCIUM SERPL-MCNC: 9.6 MG/DL (ref 8.5–10.5)
CHLORIDE SERPL-SCNC: 102 MMOL/L (ref 96–112)
CHOLEST SERPL-MCNC: 262 MG/DL (ref 100–199)
CO2 SERPL-SCNC: 32 MMOL/L (ref 20–33)
CREAT SERPL-MCNC: 0.98 MG/DL (ref 0.5–1.4)
EOSINOPHIL # BLD AUTO: 0.2 K/UL (ref 0–0.51)
EOSINOPHIL NFR BLD: 2.2 % (ref 0–6.9)
ERYTHROCYTE [DISTWIDTH] IN BLOOD BY AUTOMATED COUNT: 48.8 FL (ref 35.9–50)
GLOBULIN SER CALC-MCNC: 1.9 G/DL (ref 1.9–3.5)
GLUCOSE SERPL-MCNC: 86 MG/DL (ref 65–99)
HCT VFR BLD AUTO: 41.3 % (ref 37–47)
HDLC SERPL-MCNC: 138 MG/DL
HGB BLD-MCNC: 12.8 G/DL (ref 12–16)
IMM GRANULOCYTES # BLD AUTO: 0.04 K/UL (ref 0–0.11)
IMM GRANULOCYTES NFR BLD AUTO: 0.4 % (ref 0–0.9)
LDLC SERPL CALC-MCNC: 109 MG/DL
LYMPHOCYTES # BLD AUTO: 1.47 K/UL (ref 1–4.8)
LYMPHOCYTES NFR BLD: 16.4 % (ref 22–41)
MCH RBC QN AUTO: 29.6 PG (ref 27–33)
MCHC RBC AUTO-ENTMCNC: 31 G/DL (ref 33.6–35)
MCV RBC AUTO: 95.6 FL (ref 81.4–97.8)
MONOCYTES # BLD AUTO: 0.71 K/UL (ref 0–0.85)
MONOCYTES NFR BLD AUTO: 7.9 % (ref 0–13.4)
NEUTROPHILS # BLD AUTO: 6.43 K/UL (ref 2–7.15)
NEUTROPHILS NFR BLD: 71.7 % (ref 44–72)
NRBC # BLD AUTO: 0 K/UL
NRBC BLD-RTO: 0 /100 WBC
PLATELET # BLD AUTO: 254 K/UL (ref 164–446)
PMV BLD AUTO: 9.8 FL (ref 9–12.9)
POTASSIUM SERPL-SCNC: 4.2 MMOL/L (ref 3.6–5.5)
PROT SERPL-MCNC: 6.6 G/DL (ref 6–8.2)
RBC # BLD AUTO: 4.32 M/UL (ref 4.2–5.4)
SODIUM SERPL-SCNC: 142 MMOL/L (ref 135–145)
TRIGL SERPL-MCNC: 73 MG/DL (ref 0–149)
TSH SERPL DL<=0.005 MIU/L-ACNC: 1.16 UIU/ML (ref 0.38–5.33)
WBC # BLD AUTO: 9 K/UL (ref 4.8–10.8)

## 2018-04-10 PROCEDURE — 85025 COMPLETE CBC W/AUTO DIFF WBC: CPT

## 2018-04-10 PROCEDURE — 84443 ASSAY THYROID STIM HORMONE: CPT

## 2018-04-10 PROCEDURE — 80061 LIPID PANEL: CPT

## 2018-04-10 PROCEDURE — 36415 COLL VENOUS BLD VENIPUNCTURE: CPT

## 2018-04-10 PROCEDURE — 80053 COMPREHEN METABOLIC PANEL: CPT

## 2018-04-30 DIAGNOSIS — J44.9 CHRONIC OBSTRUCTIVE PULMONARY DISEASE, UNSPECIFIED COPD TYPE (HCC): ICD-10-CM

## 2018-05-15 ENCOUNTER — ANTICOAGULATION VISIT (OUTPATIENT)
Dept: VASCULAR LAB | Facility: MEDICAL CENTER | Age: 73
End: 2018-05-15
Attending: INTERNAL MEDICINE
Payer: MEDICARE

## 2018-05-15 DIAGNOSIS — I48.91 ATRIAL FIBRILLATION, UNSPECIFIED TYPE (HCC): ICD-10-CM

## 2018-05-15 DIAGNOSIS — I48.3 TYPICAL ATRIAL FLUTTER (HCC): ICD-10-CM

## 2018-05-15 LAB
INR BLD: 1.3 (ref 0.9–1.2)
INR PPP: 1.3 (ref 2–3.5)

## 2018-05-15 PROCEDURE — 99212 OFFICE O/P EST SF 10 MIN: CPT

## 2018-05-15 PROCEDURE — 85610 PROTHROMBIN TIME: CPT

## 2018-05-15 NOTE — PROGRESS NOTES
Anticoagulation Summary  As of 5/15/2018    INR goal:   1.5-2.5   TTR:   61.7 % (1 y)   Today's INR:   1.3!   Warfarin maintenance plan:   1.25 mg (2.5 mg x 0.5) on Mon, Fri; 2.5 mg (2.5 mg x 1) all other days   Weekly warfarin total:   15 mg   Plan last modified:   Estiven Balderas PharmD (10/2/2017)   Next INR check:   5/22/2018   Target end date:       Indications    Atrial flutter (HCC) [I48.92]  Mitral valve insufficiency (Resolved) [I34.0]             Anticoagulation Episode Summary     INR check location:       Preferred lab:       Send INR reminders to:       Comments:   Home Health  INR range per Dr. Sams and pts request, see note from 8-23-17 from Dr. sams      Anticoagulation Care Providers     Provider Role Specialty Phone number    Lacey Porras M.D.  Cardiac Surgery [phone]    Amanda Perera PharmD           Anticoagulation Patient Findings  Patient Findings     Negatives:   Signs/symptoms of thrombosis, Signs/symptoms of bleeding, Laboratory test error suspected, Change in health, Change in alcohol use, Change in activity, Upcoming invasive procedure, Emergency department visit, Upcoming dental procedure, Missed doses, Extra doses, Change in medications, Change in diet/appetite, Hospital admission, Bruising, Other complaints      History of Present Illness: follow up appointment for chronic anticoagulation with the high risk medication, warfarin for atrial flutter.    Last INR was at goal, now critically sub therapeutic today.  Will bolus dose with 5mg today and tomorrow, then resume current dosing regimen. Follow up in 1 weeks, to reduce risk of adverse events related to this high risk medication,  Warfarin.    Kelly Dean, PharmD

## 2018-05-22 ENCOUNTER — ANTICOAGULATION VISIT (OUTPATIENT)
Dept: VASCULAR LAB | Facility: MEDICAL CENTER | Age: 73
End: 2018-05-22
Attending: INTERNAL MEDICINE
Payer: MEDICARE

## 2018-05-22 DIAGNOSIS — I48.92 ATRIAL FLUTTER, UNSPECIFIED TYPE (HCC): ICD-10-CM

## 2018-05-22 LAB
INR BLD: 1.6 (ref 0.9–1.2)
INR PPP: 1.6 (ref 2–3.5)

## 2018-05-22 PROCEDURE — 99211 OFF/OP EST MAY X REQ PHY/QHP: CPT

## 2018-05-22 PROCEDURE — 85610 PROTHROMBIN TIME: CPT

## 2018-05-22 NOTE — PROGRESS NOTES
Anticoagulation Summary  As of 5/22/2018    INR goal:   1.5-2.5   TTR:   61.2 % (1 y)   Today's INR:   1.6   Warfarin maintenance plan:   1.25 mg (2.5 mg x 0.5) on Mon, Fri; 2.5 mg (2.5 mg x 1) all other days   Weekly warfarin total:   15 mg   Plan last modified:   Estiven Balderas PharmD (10/2/2017)   Next INR check:   6/12/2018   Target end date:       Indications    Atrial flutter (HCC) [I48.92]  Mitral valve insufficiency (Resolved) [I34.0]             Anticoagulation Episode Summary     INR check location:       Preferred lab:       Send INR reminders to:       Comments:   Home Health  INR range per Dr. Sams and pts request, see note from 8-23-17 from Dr. sams      Anticoagulation Care Providers     Provider Role Specialty Phone number    Lacey Porras M.D.  Cardiac Surgery [phone]    Amanda Perera PharmD           Anticoagulation Patient Findings  Patient Findings     Negatives:   Signs/symptoms of thrombosis, Signs/symptoms of bleeding, Laboratory test error suspected, Change in health, Change in alcohol use, Change in activity, Upcoming invasive procedure, Emergency department visit, Upcoming dental procedure, Missed doses, Extra doses, Change in medications, Change in diet/appetite, Hospital admission, Bruising, Other complaints        History of Present Illness: follow up appointment for chronic anticoagulation with the high risk medication, warfarin for avr.    Last INR was out of range, dosage adjusted: pt is now at goal. Pt is to continue with current warfarin dosing regimen.  Follow up in 3 weeks, to reduce risk of adverse events related to this high risk medication,  Warfarin.    Kelly Dean, PharmD      Pt declines vitals today

## 2018-06-12 ENCOUNTER — ANTICOAGULATION VISIT (OUTPATIENT)
Dept: VASCULAR LAB | Facility: MEDICAL CENTER | Age: 73
End: 2018-06-12
Attending: INTERNAL MEDICINE
Payer: MEDICARE

## 2018-06-12 DIAGNOSIS — I48.92 ATRIAL FLUTTER, UNSPECIFIED TYPE (HCC): ICD-10-CM

## 2018-06-12 LAB
INR BLD: 1.3 (ref 0.9–1.2)
INR PPP: 1.3 (ref 2–3.5)

## 2018-06-12 PROCEDURE — 99212 OFFICE O/P EST SF 10 MIN: CPT

## 2018-06-12 PROCEDURE — 85610 PROTHROMBIN TIME: CPT

## 2018-06-12 NOTE — PROGRESS NOTES
Anticoagulation Summary  As of 6/12/2018    INR goal:   1.5-2.5   TTR:   59.7 % (1.1 y)   Today's INR:   1.3!   Warfarin maintenance plan:   1.25 mg (2.5 mg x 0.5) on Mon, Fri; 2.5 mg (2.5 mg x 1) all other days   Weekly warfarin total:   15 mg   Plan last modified:   Dari StaffordD (10/2/2017)   Next INR check:   6/26/2018   Target end date:       Indications    Atrial flutter (HCC) [I48.92]  Mitral valve insufficiency (Resolved) [I34.0]             Anticoagulation Episode Summary     INR check location:       Preferred lab:       Send INR reminders to:       Comments:   Home Health  INR range per Dr. Sams and pts request, see note from 8-23-17 from Dr. sams      Anticoagulation Care Providers     Provider Role Specialty Phone number    Lacey Porras M.D.  Cardiac Surgery [phone]    Dari GaliciaD           Anticoagulation Patient Findings  Patient Findings     Negatives:   Signs/symptoms of thrombosis, Signs/symptoms of bleeding, Laboratory test error suspected, Change in health, Change in alcohol use, Change in activity, Upcoming invasive procedure, Emergency department visit, Upcoming dental procedure, Missed doses, Extra doses, Change in medications, Change in diet/appetite, Hospital admission, Bruising, Other complaints        History of Present Illness: follow up appointment for chronic anticoagulation with the high risk medication, warfarin for valve repair.    Last INR was at goal (low range 1.5-2).  Will bolus dose with 5mg tonight, then resume current dosing regimen. Follow up in 2 weeks, to reduce risk of adverse events related to this high risk medication,  Warfarin.    Kelly Dean, PharmD

## 2018-06-18 RX ORDER — FUROSEMIDE 20 MG/1
20 TABLET ORAL DAILY
Qty: 90 TAB | Refills: 3 | Status: SHIPPED | OUTPATIENT
Start: 2018-06-18 | End: 2018-08-16

## 2018-06-26 ENCOUNTER — OFFICE VISIT (OUTPATIENT)
Dept: PULMONOLOGY | Facility: HOSPICE | Age: 73
End: 2018-06-26
Payer: MEDICARE

## 2018-06-26 ENCOUNTER — ANTICOAGULATION VISIT (OUTPATIENT)
Dept: VASCULAR LAB | Facility: MEDICAL CENTER | Age: 73
End: 2018-06-26
Attending: INTERNAL MEDICINE
Payer: MEDICARE

## 2018-06-26 VITALS
WEIGHT: 126 LBS | DIASTOLIC BLOOD PRESSURE: 80 MMHG | HEIGHT: 66 IN | HEART RATE: 90 BPM | RESPIRATION RATE: 16 BRPM | OXYGEN SATURATION: 94 % | SYSTOLIC BLOOD PRESSURE: 126 MMHG | TEMPERATURE: 98.2 F | BODY MASS INDEX: 20.25 KG/M2

## 2018-06-26 VITALS — HEART RATE: 104 BPM | SYSTOLIC BLOOD PRESSURE: 120 MMHG | DIASTOLIC BLOOD PRESSURE: 73 MMHG

## 2018-06-26 DIAGNOSIS — R09.02 HYPOXEMIA: ICD-10-CM

## 2018-06-26 DIAGNOSIS — I48.92 ATRIAL FLUTTER, UNSPECIFIED TYPE (HCC): ICD-10-CM

## 2018-06-26 DIAGNOSIS — J44.9 CHRONIC OBSTRUCTIVE PULMONARY DISEASE, UNSPECIFIED COPD TYPE (HCC): ICD-10-CM

## 2018-06-26 DIAGNOSIS — Z87.09 H/O PNEUMOTHORAX: ICD-10-CM

## 2018-06-26 DIAGNOSIS — Z95.2 STATUS POST MITRAL VALVE REPLACEMENT: ICD-10-CM

## 2018-06-26 DIAGNOSIS — G47.33 OBSTRUCTIVE SLEEP APNEA SYNDROME: ICD-10-CM

## 2018-06-26 LAB
INR BLD: 1.4 (ref 0.9–1.2)
INR PPP: 1.4 (ref 2–3.5)

## 2018-06-26 PROCEDURE — 99214 OFFICE O/P EST MOD 30 MIN: CPT | Performed by: INTERNAL MEDICINE

## 2018-06-26 PROCEDURE — 99212 OFFICE O/P EST SF 10 MIN: CPT

## 2018-06-26 PROCEDURE — 85610 PROTHROMBIN TIME: CPT

## 2018-06-26 RX ORDER — TIOTROPIUM BROMIDE INHALATION SPRAY 1.56 UG/1
SPRAY, METERED RESPIRATORY (INHALATION)
COMMUNITY
Start: 2018-06-10 | End: 2018-08-09

## 2018-06-26 NOTE — PROGRESS NOTES
Anticoagulation Summary  As of 6/26/2018    INR goal:   1.5-2.5   TTR:   57.6 % (1.1 y)   Today's INR:   1.4!   Warfarin maintenance plan:   1.25 mg (2.5 mg x 0.5) on Mon; 2.5 mg (2.5 mg x 1) all other days   Weekly warfarin total:   16.25 mg   Plan last modified:   Dari BraggD (6/26/2018)   Next INR check:   7/10/2018   Target end date:       Indications    Atrial flutter (HCC) [I48.92]  Mitral valve insufficiency (Resolved) [I34.0]             Anticoagulation Episode Summary     INR check location:       Preferred lab:       Send INR reminders to:       Comments:   Home Health  INR range per Dr. Sams and pts request, see note from 8-23-17 from Dr. sams      Anticoagulation Care Providers     Provider Role Specialty Phone number    Lacey Porras M.D.  Cardiac Surgery [phone]    Dari GaliciaD           Anticoagulation Patient Findings  Patient Findings     Negatives:   Signs/symptoms of thrombosis, Signs/symptoms of bleeding, Laboratory test error suspected, Change in health, Change in alcohol use, Change in activity, Upcoming invasive procedure, Emergency department visit, Upcoming dental procedure, Missed doses, Extra doses, Change in medications, Change in diet/appetite, Hospital admission, Bruising, Other complaints        History of Present Illness: follow up appointment for chronic anticoagulation with the high risk medication, warfarin for atrial flutter.    Last INR was out of range, dosage adjusted: pt remains sub therapeutic today, although improving.  Will increase weekly dose by 8%. Pt denies any unusual s/s of bleeding, bruising, clotting or any changes to diet or medications.  Follow up in 2 weeks, to reduce risk of adverse events related to this high risk medication,  Warfarin.    Kelly Dean, PharmD

## 2018-06-26 NOTE — PROGRESS NOTES
Chief Complaint   Patient presents with   • Follow-Up     Hypoxemia       HPI:  She has completed pulmonary rehabilitation.  She is trying to stay active.  She is walking daily.  She continues on Spiriva, Advair, Ventolin, and supplemental oxygen at 2.5 L/min 24/7.  She uses a portable oxygen concentrator during the day.  She does say that her voice is somewhat hoarse since her surgery.  She has not had any exacerbations since her last visit.  She has not had any cardiac issues.    Past Medical History:   Diagnosis Date   • Breath shortness     pt reports using o2 at night 2L, no problems at this time   • COPD (chronic obstructive pulmonary disease) ([facility])    • Emphysema of lung ([facility])    • Heart valve disease    • High cholesterol    • History of heart surgery    • Hyperlipidemia    • Hypertension    • Sleep apnea     uses cpap       ROS:  Constitutional: Denies fevers, chills, night sweats, fatigue or weight loss  Eyes: Denies vision loss, pain, drainage, double vision  Ears, Nose, Throat: Denies earache, tinnitus  Cardiovascular: Denies chest pain, tightness, palpitations  Respiratory: Denies  sputum production, cough, hemoptysis  Sleep: Denies, snoring, apnea  GI: Denies abdominal pain, nausea, vomiting, diarrhea  : Denies frequent urination, hematuria, painful urination  Musculoskeletal: Denies back pain, painful joints, sore muscles  Neurological: Denies headaches, seizures  Skin: Denies rashes, color changes  Psychiatric: Denies depression or thoughts of suicide  Hematologic: Denies bleeding tendency or clotting tendency  Allergic/Immunologic: Denies rhinitis, skin sensitivity    Social History     Social History   • Marital status:      Spouse name: N/A   • Number of children: N/A   • Years of education: N/A     Occupational History   • Not on file.     Social History Main Topics   • Smoking status: Former Smoker     Packs/day: 0.50     Years: 38.00     Types: Cigarettes     Quit date: 10/11/2001   •  Smokeless tobacco: Never Used   • Alcohol use 8.4 oz/week     14 Shots of liquor per week      Comment: 3 per day   • Drug use: No   • Sexual activity: Yes     Partners: Male     Other Topics Concern   • Not on file     Social History Narrative   • No narrative on file     Sulfa drugs  Current Outpatient Prescriptions on File Prior to Visit   Medication Sig Dispense Refill   • furosemide (LASIX) 20 MG Tab Take 1 Tab by mouth every day. 90 Tab 3   • fluticasone-salmeterol (ADVAIR) 250-50 MCG/DOSE AEROSOL POWDER, BREATH ACTIVATED Inhale 1 Puff by mouth 2 times a day. 3 Inhaler 3   • warfarin (COUMADIN) 2.5 MG Tab Take 1 Tab by mouth every day. 90 Tab 1   • potassium chloride ER (K-TAB) 10 MEQ tablet Take 1 Tab by mouth every day. 90 Tab 3   • sertraline (ZOLOFT) 100 MG Tab Take 1.5 Tabs by mouth every day. 135 Tab 1   • albuterol (VENTOLIN HFA) 108 (90 Base) MCG/ACT Aero Soln inhalation aerosol Inhale 2 Puffs by mouth every four hours as needed for Shortness of Breath. 3 Inhaler 3   • atorvastatin (LIPITOR) 10 MG Tab Take 1 Tab by mouth every day. 90 Tab 3   • Tiotropium Bromide Monohydrate (SPIRIVA RESPIMAT) 2.5 MCG/ACT Aero Soln Inhale 2 Inhalation by mouth every day. 3 Inhaler 3   • Acetaminophen (TYLENOL EXTRA STRENGTH PO) Take 1 Tab by mouth as needed.     • NON SPECIFIED Please provide patient with a portable oxygen concentrator 2.5L continuous flow at all times for 3 months    ICD10 Chronic Respiratory Failure with hypoxia J96.11  Pulse ox off of oxygen 87%  O2 off of 1 Each 0   • non-formulary med Inhale 2 L/min by mouth Continuous. Oxygen 2L x NC continuous  Indications: COPD     • warfarin (COUMADIN) 5 MG Tab Take 1 Tab by mouth COUMADIN-DAILY. Titrate to INR 2-3. 30 Tab 3   • magnesium oxide (MAG-OX) 400 MG Tab Take 400 mg by mouth 2 times a day. takes 500 mg tab       No current facility-administered medications on file prior to visit.      Blood pressure 126/80, pulse 90, temperature 36.8 °C (98.2 °F),  "resp. rate 16, height 1.664 m (5' 5.5\"), weight 57.2 kg (126 lb), SpO2 94 %.  Family History   Problem Relation Age of Onset   • Cancer Father      colon cancer    • Hypertension Mother    • Cancer Sister 68     ovarian cancer       Physical Exam:  No distress at rest on supplemental oxygen.  HEENT: PERRLA, EOMI, no scleral icterus, no nasal or oral lesions  Neck: No thyromegaly, no adenopathy, no bruits  Mallampatti: Grade II  Lungs: Equal breath sounds, no wheezes or crackles  Heart: Regular rate and rhythm, no gallops or murmurs  Abdomen: Soft, benign, no organomegaly  Extremities: No clubbing, cyanosis, or edema  Neurologic: Cranial nerve, motor, and sensory exam are normal    1. Chronic obstructive pulmonary disease, unspecified COPD type (HCC)    2. Hypoxemia    3. Obstructive sleep apnea syndrome    4. Status post mitral valve replacement    5. H/O pneumothorax        She has shown over all improvement over the past 6 months.  She will continue her current medications and oxygen supplementation.  We will see her back in 6 months or sooner if there are issues.  We did discuss her voice issues and we suggested an evaluation by an otolaryngologist to see if her vocal cords were damaged during her hospitalization.  "

## 2018-07-16 ENCOUNTER — ANTICOAGULATION VISIT (OUTPATIENT)
Dept: VASCULAR LAB | Facility: MEDICAL CENTER | Age: 73
End: 2018-07-16
Attending: INTERNAL MEDICINE
Payer: MEDICARE

## 2018-07-16 DIAGNOSIS — I48.92 ATRIAL FLUTTER, UNSPECIFIED TYPE (HCC): ICD-10-CM

## 2018-07-16 LAB
INR BLD: 1.3 (ref 0.9–1.2)
INR PPP: 1.3 (ref 2–3.5)

## 2018-07-16 PROCEDURE — 99212 OFFICE O/P EST SF 10 MIN: CPT | Performed by: PHARMACIST

## 2018-07-16 PROCEDURE — 85610 PROTHROMBIN TIME: CPT

## 2018-07-16 NOTE — PROGRESS NOTES
Anticoagulation Summary  As of 7/16/2018    INR goal:   1.5-2.5   TTR:   54.9 % (1.2 y)   Today's INR:   1.3!   Warfarin maintenance plan:   2.5 mg (2.5 mg x 1) every day   Weekly warfarin total:   17.5 mg   Plan last modified:   Bhavya Connelly PharmD (7/16/2018)   Next INR check:   7/30/2018   Target end date:       Indications    Atrial flutter (HCC) [I48.92]  Mitral valve insufficiency (Resolved) [I34.0]             Anticoagulation Episode Summary     INR check location:       Preferred lab:       Send INR reminders to:       Comments:   Home Health  INR range per Dr. Sams and pts request, see note from 8-23-17 from Dr. sams      Anticoagulation Care Providers     Provider Role Specialty Phone number    Lacey Porras M.D.  Cardiac Surgery [phone]    Amanda Perera PharmD           Anticoagulation Patient Findings      HPI:  Kelly Ileana Lovett seen in clinic today, on anticoagulation therapy with warfarin for Aflutter  Changes to current medical/health status since last appt: denies  Denies signs/symptoms of bleeding and/or thrombosis since the last appt.    Denies any interval changes to diet  Denies any interval changes to medications since last appt.   Denies any complications or cost restrictions with current therapy.   BP declined      A/P   INR  is sub-therapeutic.   Will have pt start an increased weekly dose of 2.5mg daily.    Follow up appointment in 2 week(s).    Bhavya Connelly, PharmD

## 2018-07-17 ENCOUNTER — TELEPHONE (OUTPATIENT)
Dept: MEDICAL GROUP | Facility: LAB | Age: 73
End: 2018-07-17

## 2018-07-17 NOTE — TELEPHONE ENCOUNTER
ESTABLISHED PATIENT PRE-VISIT PLANNING     Note: Patient will not be contacted if there is no indication to call.     1.  Reviewed notes from the last few office visits within the medical group: Yes    2.  If any orders were placed at last visit or intended to be done for this visit (i.e. 6 mos follow-up), do we have Results/Consult Notes?        •  Labs - Labs were not ordered at last office visit.       •  Imaging - Imaging was not ordered at last office visit.       •  Referrals - No referrals were ordered at last office visit.    3. Is this appointment scheduled as a Hospital Follow-Up? No    4.  Immunizations were updated in Epic using WebIZ?: Epic matches WebIZ       •  Web Iz Recommendations: Patient is up to date on all vaccines    5.  Patient is due for the following Health Maintenance Topics:   Health Maintenance Due   Topic Date Due   • PFT SCREENING-FEV1 AND FEV/FVC RATIO / SPIROMETRY SHOULD BE PERFORMED ANNUALLY  10/19/2017       - Patient has completed Patient is up to date on all vaccines Immunization(s) per WebIZ. Chart has been updated.    6.  MDX printed for Provider? NO    7.  Patient was NOT informed to arrive 15 min prior to their scheduled appointment and bring in their medication bottles.

## 2018-07-18 ENCOUNTER — OFFICE VISIT (OUTPATIENT)
Dept: MEDICAL GROUP | Facility: LAB | Age: 73
End: 2018-07-18
Payer: MEDICARE

## 2018-07-18 VITALS
HEART RATE: 100 BPM | TEMPERATURE: 99 F | RESPIRATION RATE: 16 BRPM | SYSTOLIC BLOOD PRESSURE: 122 MMHG | BODY MASS INDEX: 20.09 KG/M2 | DIASTOLIC BLOOD PRESSURE: 78 MMHG | WEIGHT: 125 LBS | OXYGEN SATURATION: 98 % | HEIGHT: 66 IN

## 2018-07-18 DIAGNOSIS — I48.0 PAROXYSMAL ATRIAL FIBRILLATION (HCC): ICD-10-CM

## 2018-07-18 DIAGNOSIS — J96.11 CHRONIC RESPIRATORY FAILURE WITH HYPOXIA (HCC): ICD-10-CM

## 2018-07-18 DIAGNOSIS — F41.9 ANXIETY: ICD-10-CM

## 2018-07-18 DIAGNOSIS — R49.0 VOICE HOARSENESS: ICD-10-CM

## 2018-07-18 DIAGNOSIS — F32.1 MODERATE SINGLE CURRENT EPISODE OF MAJOR DEPRESSIVE DISORDER (HCC): ICD-10-CM

## 2018-07-18 DIAGNOSIS — E55.9 VITAMIN D DEFICIENCY: ICD-10-CM

## 2018-07-18 DIAGNOSIS — I50.9 CONGESTIVE HEART FAILURE, UNSPECIFIED HF CHRONICITY, UNSPECIFIED HEART FAILURE TYPE (HCC): ICD-10-CM

## 2018-07-18 PROCEDURE — 99214 OFFICE O/P EST MOD 30 MIN: CPT | Performed by: FAMILY MEDICINE

## 2018-07-18 RX ORDER — SERTRALINE HYDROCHLORIDE 100 MG/1
100 TABLET, FILM COATED ORAL DAILY
Qty: 90 TAB | Refills: 1
Start: 2018-07-18 | End: 2018-10-15 | Stop reason: SDUPTHER

## 2018-07-18 NOTE — PROGRESS NOTES
Subjective:   Kelly Lovett is a 72 y.o. female here today for voice hoarseness    1. Moderate single current episode of major depressive disorder (HCC)  This is chronic.  Patient is currently taking Zoloft 100 mg daily.  At the last visit, we did increase to 150 mg daily but she states that her mood has improved since that time and she is overall feeling great.  She does get depressed at times due to her oxygen use.  No suicidal thoughts.  Her relationship with her  has improved.    2. Chronic respiratory failure with hypoxia (HCC)  This is chronic.  Patient does have COPD.  She is on 2.5 L at all times, sometimes up to 3 L.  She has a concentrator with her today.  She has undergone pulmonary rehab and sees pulmonology regularly    3. Voice hoarseness  This is chronic.  Patient has had one year of voice hoarseness.  She did have prolonged intubation after her mitral valve repair.  She is not taking Flonase regularly.  She denies any sinus congestion or postnasal drip.  She denies any heartburn or acid reflux.  She does have a long smoking history.  No difficulty swallowing.  Thyroid labs were within normal limits.  No goiter felt on exam    4. Anxiety  Chronic, stable on Zoloft 100 mg daily    5. Vitamin D deficiency  Chronic, has started taking 2000 units of vitamin D daily due to osteopenia    6. Paroxysmal atrial fibrillation (HCC)  7. Congestive heart failure, unspecified HF chronicity, unspecified heart failure type (HCC)  Chronic, currently stable on warfarin and follows with cardiology regularly.  She is also on furosemide 20 mg daily.  Her last echocardiogram was over one year ago with ejection fraction of 65%.  She does not have any lower extremity edema.  She does have shortness of breath but she attributes this to her lungs.    Current medicines (including changes today)  Current Outpatient Prescriptions   Medication Sig Dispense Refill   • sertraline (ZOLOFT) 100 MG Tab Take 1 Tab by  "mouth every day. 90 Tab 1   • furosemide (LASIX) 20 MG Tab Take 1 Tab by mouth every day. 90 Tab 3   • fluticasone-salmeterol (ADVAIR) 250-50 MCG/DOSE AEROSOL POWDER, BREATH ACTIVATED Inhale 1 Puff by mouth 2 times a day. 3 Inhaler 3   • warfarin (COUMADIN) 2.5 MG Tab Take 1 Tab by mouth every day. 90 Tab 1   • potassium chloride ER (K-TAB) 10 MEQ tablet Take 1 Tab by mouth every day. 90 Tab 3   • albuterol (VENTOLIN HFA) 108 (90 Base) MCG/ACT Aero Soln inhalation aerosol Inhale 2 Puffs by mouth every four hours as needed for Shortness of Breath. 3 Inhaler 3   • atorvastatin (LIPITOR) 10 MG Tab Take 1 Tab by mouth every day. 90 Tab 3   • Tiotropium Bromide Monohydrate (SPIRIVA RESPIMAT) 2.5 MCG/ACT Aero Soln Inhale 2 Inhalation by mouth every day. 3 Inhaler 3   • magnesium oxide (MAG-OX) 400 MG Tab Take 400 mg by mouth 2 times a day. takes 500 mg tab     • SPIRIVA RESPIMAT 1.25 MCG/ACT Aero Soln      • Acetaminophen (TYLENOL EXTRA STRENGTH PO) Take 1 Tab by mouth as needed.     • NON SPECIFIED Please provide patient with a portable oxygen concentrator 2.5L continuous flow at all times for 3 months    ICD10 Chronic Respiratory Failure with hypoxia J96.11  Pulse ox off of oxygen 87%  O2 off of 1 Each 0   • non-formulary med Inhale 2 L/min by mouth Continuous. Oxygen 2L x NC continuous  Indications: COPD       No current facility-administered medications for this visit.      She  has a past medical history of Breath shortness; COPD (chronic obstructive pulmonary disease) (HCC); Emphysema of lung (HCC); Heart valve disease; High cholesterol; History of heart surgery; Hyperlipidemia; Hypertension; and Sleep apnea.    ROS   No fevers  No bowel changes  No LE edema       Objective:     Blood pressure 122/78, pulse 100, temperature 37.2 °C (99 °F), resp. rate 16, height 1.664 m (5' 5.5\"), weight 56.7 kg (125 lb), SpO2 98 %. Body mass index is 20.48 kg/m².   Physical Exam:  Constitutional: Alert, no distress.  Skin: Warm, " dry, good turgor, no rashes in visible areas.  Eye: Equal, round and reactive, conjunctiva clear, lids normal.  ENMT: Lips without lesions, good dentition, oropharynx clear.  Neck: Trachea midline, no masses, no thyromegaly. No cervical or supraclavicular lymphadenopathy  Respiratory: Unlabored respiratory effort, lungs clear to auscultation, no wheezes, no ronchi.  Nasal cannula in place with 2.5 L oxygen running  Cardiovascular: Normal S1, S2, RRR, no murmur, no edema.  Abdomen: Soft, non-tender, no masses, no hepatosplenomegaly.  Psych: Alert and oriented x3, normal affect and mood.      Assessment and Plan:   The following treatment plan was discussed    1. Moderate single current episode of major depressive disorder (HCC)  Chronic, stable on Zoloft 100 mg daily  - sertraline (ZOLOFT) 100 MG Tab; Take 1 Tab by mouth every day.  Dispense: 90 Tab; Refill: 1    2. Chronic respiratory failure with hypoxia (HCC)  Chronic, stable, following with pulmonology regularly and has undergone pulmonary rehabilitation.  On oxygen    3. Voice hoarseness  Chronic, unstable.  No improvement over the last year.  Discussed differential diagnoses of nerve irritation due to prolonged intubation, postnasal drip, acid reflux, laryngeal mass.  She is ready to see an ENT and I have placed this referral  - REFERRAL TO ENT  - COMP METABOLIC PANEL; Future  - MAGNESIUM; Future  - VITAMIN D,25 HYDROXY; Future    4. Anxiety  Chronic, stable on Zoloft  - sertraline (ZOLOFT) 100 MG Tab; Take 1 Tab by mouth every day.  Dispense: 90 Tab; Refill: 1  - COMP METABOLIC PANEL; Future  - MAGNESIUM; Future  - VITAMIN D,25 HYDROXY; Future    5. Vitamin D deficiency  Chronic, currently taking 2000 units daily  - COMP METABOLIC PANEL; Future  - MAGNESIUM; Future  - VITAMIN D,25 HYDROXY; Future    6. Paroxysmal atrial fibrillation (HCC)  Chronic, stable and anticoagulated  - COMP METABOLIC PANEL; Future  - MAGNESIUM; Future  - VITAMIN D,25 HYDROXY;  Future    7. Congestive heart failure, unspecified HF chronicity, unspecified heart failure type (HCC)  Chronic, stable, euvolemic.  Last echocardiogram reviewed from April 2017.  We discussed her Lasix and whether she needs to be on this.  She has an appointment with her cardiologist in 1 month and she will address this at that time.  - COMP METABOLIC PANEL; Future  - MAGNESIUM; Future  - VITAMIN D,25 HYDROXY; Future      Followup: Return in about 6 months (around 1/18/2019).       This note was created using voice recognition software. I have made every reasonable attempt to correct errors, however, I do anticipate some grammatical errors.

## 2018-07-23 ENCOUNTER — HOSPITAL ENCOUNTER (OUTPATIENT)
Dept: LAB | Facility: MEDICAL CENTER | Age: 73
End: 2018-07-23
Attending: FAMILY MEDICINE
Payer: MEDICARE

## 2018-07-23 DIAGNOSIS — R49.0 VOICE HOARSENESS: ICD-10-CM

## 2018-07-23 DIAGNOSIS — E55.9 VITAMIN D DEFICIENCY: ICD-10-CM

## 2018-07-23 DIAGNOSIS — I50.9 CONGESTIVE HEART FAILURE, UNSPECIFIED HF CHRONICITY, UNSPECIFIED HEART FAILURE TYPE (HCC): ICD-10-CM

## 2018-07-23 DIAGNOSIS — I48.0 PAROXYSMAL ATRIAL FIBRILLATION (HCC): ICD-10-CM

## 2018-07-23 DIAGNOSIS — F41.9 ANXIETY: ICD-10-CM

## 2018-07-23 LAB
25(OH)D3 SERPL-MCNC: 26 NG/ML (ref 30–100)
ALBUMIN SERPL BCP-MCNC: 4.6 G/DL (ref 3.2–4.9)
ALBUMIN/GLOB SERPL: 2.1 G/DL
ALP SERPL-CCNC: 61 U/L (ref 30–99)
ALT SERPL-CCNC: 13 U/L (ref 2–50)
ANION GAP SERPL CALC-SCNC: 9 MMOL/L (ref 0–11.9)
AST SERPL-CCNC: 21 U/L (ref 12–45)
BILIRUB SERPL-MCNC: 0.5 MG/DL (ref 0.1–1.5)
BUN SERPL-MCNC: 25 MG/DL (ref 8–22)
CALCIUM SERPL-MCNC: 10.3 MG/DL (ref 8.5–10.5)
CHLORIDE SERPL-SCNC: 100 MMOL/L (ref 96–112)
CO2 SERPL-SCNC: 31 MMOL/L (ref 20–33)
CREAT SERPL-MCNC: 0.97 MG/DL (ref 0.5–1.4)
GLOBULIN SER CALC-MCNC: 2.2 G/DL (ref 1.9–3.5)
GLUCOSE SERPL-MCNC: 112 MG/DL (ref 65–99)
MAGNESIUM SERPL-MCNC: 2 MG/DL (ref 1.5–2.5)
POTASSIUM SERPL-SCNC: 3.9 MMOL/L (ref 3.6–5.5)
PROT SERPL-MCNC: 6.8 G/DL (ref 6–8.2)
SODIUM SERPL-SCNC: 140 MMOL/L (ref 135–145)

## 2018-07-23 PROCEDURE — 36415 COLL VENOUS BLD VENIPUNCTURE: CPT

## 2018-07-23 PROCEDURE — 82306 VITAMIN D 25 HYDROXY: CPT

## 2018-07-23 PROCEDURE — 83735 ASSAY OF MAGNESIUM: CPT

## 2018-07-23 PROCEDURE — 80053 COMPREHEN METABOLIC PANEL: CPT

## 2018-07-30 ENCOUNTER — ANTICOAGULATION VISIT (OUTPATIENT)
Dept: VASCULAR LAB | Facility: MEDICAL CENTER | Age: 73
End: 2018-07-30
Attending: INTERNAL MEDICINE
Payer: MEDICARE

## 2018-07-30 DIAGNOSIS — I48.92 ATRIAL FLUTTER, UNSPECIFIED TYPE (HCC): ICD-10-CM

## 2018-07-30 LAB — INR PPP: 1.4 (ref 2–3.5)

## 2018-07-30 PROCEDURE — 85610 PROTHROMBIN TIME: CPT

## 2018-07-30 PROCEDURE — 99212 OFFICE O/P EST SF 10 MIN: CPT

## 2018-07-30 NOTE — PROGRESS NOTES
Anticoagulation Summary  As of 7/30/2018    INR goal:   1.5-2.5   TTR:   53.2 % (1.2 y)   Today's INR:   1.4!   Warfarin maintenance plan:   3.75 mg (2.5 mg x 1.5) on Mon; 2.5 mg (2.5 mg x 1) all other days   Weekly warfarin total:   18.75 mg   Plan last modified:   Cinthia Alatorre, DariD (7/30/2018)   Next INR check:   8/6/2018   Target end date:       Indications    Atrial flutter (HCC) [I48.92]  Mitral valve insufficiency (Resolved) [I34.0]             Anticoagulation Episode Summary     INR check location:       Preferred lab:       Send INR reminders to:       Comments:   Home Health  INR range per Dr. Sams and pts request, see note from 8-23-17 from Dr. sams      Anticoagulation Care Providers     Provider Role Specialty Phone number    Lacey Porras M.D.  Cardiac Surgery [phone]    Amanda Perera PharmD           Anticoagulation Patient Findings      HPI:  Kelly Lovett seen in clinic today, on anticoagulation therapy with warfarin for A flutter  Changes to current medical/health status since last appt: none  Denies signs/symptoms of bleeding and/or thrombosis since the last appt.    Denies any interval changes to diet  Denies any interval changes to medications since last appt.   Denies any complications or cost restrictions with current therapy.   Machine unable to get vitals after 2 attempts.       A/P   INR  sub-therapeutic.   Instructed pt to increase weekly dose to 1.5 tab of 2.5mg on Monday, a 7% increase due to consistent subtherapeutic INR and full 2 weeks since last dose increase would have been sufficient time to realize the full effects. Because pt prefers [facility], changed next apt to [facility].    Follow up appointment in 1 week(s).    Cinthia Alatorre, PharmD

## 2018-08-03 LAB — INR BLD: 1.4 (ref 0.9–1.2)

## 2018-08-06 ENCOUNTER — ANTICOAGULATION VISIT (OUTPATIENT)
Dept: MEDICAL GROUP | Facility: MEDICAL CENTER | Age: 73
End: 2018-08-06
Payer: MEDICARE

## 2018-08-06 DIAGNOSIS — I48.92 ATRIAL FLUTTER, UNSPECIFIED TYPE (HCC): ICD-10-CM

## 2018-08-06 DIAGNOSIS — Z79.01 LONG TERM (CURRENT) USE OF ANTICOAGULANTS: Primary | ICD-10-CM

## 2018-08-06 LAB — INR PPP: 1.3 (ref 2–3.5)

## 2018-08-06 PROCEDURE — 99211 OFF/OP EST MAY X REQ PHY/QHP: CPT | Performed by: FAMILY MEDICINE

## 2018-08-06 PROCEDURE — 85610 PROTHROMBIN TIME: CPT | Performed by: PHYSICIAN ASSISTANT

## 2018-08-06 NOTE — PROGRESS NOTES
OP Anticoagulation Service Note    Date: 8/6/2018  There were no vitals filed for this visit.   pt declined vitals    Anticoagulation Summary  As of 8/6/2018    INR goal:   1.5-2.5   TTR:   52.3 % (1.2 y)   Today's INR:   1.3!   Warfarin maintenance plan:   3.75 mg (2.5 mg x 1.5) on Mon, Thu; 2.5 mg (2.5 mg x 1) all other days   Weekly warfarin total:   20 mg   Plan last modified:   Amanda Perera PharmD (8/6/2018)   Next INR check:   8/13/2018   Target end date:       Indications    Atrial flutter (HCC) [I48.92]  Mitral valve insufficiency (Resolved) [I34.0]             Anticoagulation Episode Summary     INR check location:       Preferred lab:       Send INR reminders to:       Comments:   Home Health  INR range per Dr. Sams and pts request, see note from 8-23-17 from Dr. sams      Anticoagulation Care Providers     Provider Role Specialty Phone number    Lacey Porras M.D.  Cardiac Surgery [phone]    Amanda Perera PharmD           Anticoagulation Patient Findings      HPI:   Kelly Casebernadine seen in clinic today, on anticoagulation therapy with warfarin (a high risk medication) for atrial flutter,       Pt is here today to evaluate anticoagulation therapy    Previous INR was  1.4 on 7-    Pt was instructed to increase weekly regimen    Confirmed warfarin dosing regimen, denies missed or extra doses of coumadin.   Diet has been consistent with foods rich in vitamin K: Yes  Changes in ETOH:  No  Changes in smoking status: No  Changes in medication: No   Cost restriction: No  S/s of bleeding:  No  Signs/symptoms  thrombosis since the last appt: No  Unclear why INR is still low  A/P   INR  subtherapeutic today, will require dose adjust ment today to prevent stroke and closer follow up.   Increase weekly regimen.        8-2019 check referral    Pt educated to contact our clinic with any changes in medications or s/s of bleeding or thrombosis. Pt is aware to seek immediate medical attention for  falls, head injury or deep cuts    Follow up appointment in 1 week(s) to reduce risk of adverse events from warfarin  Amanda Perera, PharmD

## 2018-08-10 ENCOUNTER — PATIENT MESSAGE (OUTPATIENT)
Dept: PULMONOLOGY | Facility: HOSPICE | Age: 73
End: 2018-08-10

## 2018-08-10 NOTE — TELEPHONE ENCOUNTER
From: Kelly Tejeda [facility]  To: Alexsander Gardner M.D.  Sent: 8/10/2018 10:07 AM PDT  Subject: Prescription Question    Dr.   The special needs group did not receive your prescription. Also has to say limit (2.5) & hrs (24/7). Please email to  [email or web address] or refax to [phone]  Thank you.

## 2018-08-10 NOTE — TELEPHONE ENCOUNTER
I called patient and got voice mail, left message that I would call back to SEE what's needed. We did fax airline form to [employer] (confirmation received and scanned in). Also we hand wrote RX, for POC purchase out of pocket. I personally handed RX to patient. I don't know what's needed or needs what. I will await call back from patient.

## 2018-08-13 ENCOUNTER — APPOINTMENT (OUTPATIENT)
Dept: MEDICAL GROUP | Facility: MEDICAL CENTER | Age: 73
End: 2018-08-13
Payer: MEDICARE

## 2018-08-16 ENCOUNTER — TELEPHONE (OUTPATIENT)
Dept: CARDIOLOGY | Facility: MEDICAL CENTER | Age: 73
End: 2018-08-16

## 2018-08-16 ENCOUNTER — OFFICE VISIT (OUTPATIENT)
Dept: CARDIOLOGY | Facility: MEDICAL CENTER | Age: 73
End: 2018-08-16
Payer: MEDICARE

## 2018-08-16 VITALS
WEIGHT: 126 LBS | BODY MASS INDEX: 20.25 KG/M2 | OXYGEN SATURATION: 98 % | HEIGHT: 66 IN | DIASTOLIC BLOOD PRESSURE: 74 MMHG | HEART RATE: 98 BPM | SYSTOLIC BLOOD PRESSURE: 110 MMHG

## 2018-08-16 DIAGNOSIS — I10 HTN (HYPERTENSION), MALIGNANT: ICD-10-CM

## 2018-08-16 DIAGNOSIS — E78.2 MIXED HYPERLIPIDEMIA: ICD-10-CM

## 2018-08-16 DIAGNOSIS — Z99.81 OXYGEN DEPENDENT: ICD-10-CM

## 2018-08-16 DIAGNOSIS — G47.33 OBSTRUCTIVE SLEEP APNEA SYNDROME: ICD-10-CM

## 2018-08-16 DIAGNOSIS — Z86.79 S/P MAZE OPERATION FOR ATRIAL FIBRILLATION: ICD-10-CM

## 2018-08-16 DIAGNOSIS — Z98.890 S/P MAZE OPERATION FOR ATRIAL FIBRILLATION: ICD-10-CM

## 2018-08-16 DIAGNOSIS — Z98.890 S/P MITRAL VALVE REPAIR: ICD-10-CM

## 2018-08-16 DIAGNOSIS — I48.0 PAROXYSMAL ATRIAL FIBRILLATION (HCC): ICD-10-CM

## 2018-08-16 DIAGNOSIS — Z79.899 HIGH RISK MEDICATION USE: ICD-10-CM

## 2018-08-16 LAB — EKG IMPRESSION: NORMAL

## 2018-08-16 PROCEDURE — 93000 ELECTROCARDIOGRAM COMPLETE: CPT | Performed by: INTERNAL MEDICINE

## 2018-08-16 PROCEDURE — 99214 OFFICE O/P EST MOD 30 MIN: CPT | Performed by: INTERNAL MEDICINE

## 2018-08-16 RX ORDER — ATORVASTATIN CALCIUM 40 MG/1
40 TABLET, FILM COATED ORAL DAILY
Qty: 90 TAB | Refills: 3 | Status: SHIPPED | OUTPATIENT
Start: 2018-08-16 | End: 2019-06-06 | Stop reason: SDUPTHER

## 2018-08-16 RX ORDER — ROSUVASTATIN CALCIUM 10 MG/1
10 TABLET, COATED ORAL EVERY EVENING
Qty: 30 TAB | Refills: 11 | Status: SHIPPED | OUTPATIENT
Start: 2018-08-16 | End: 2018-08-16

## 2018-08-16 ASSESSMENT — ENCOUNTER SYMPTOMS
NAUSEA: 0
HALLUCINATIONS: 0
SHORTNESS OF BREATH: 0
VOMITING: 0
DOUBLE VISION: 0
MYALGIAS: 0
COUGH: 0
BLURRED VISION: 0
SPEECH CHANGE: 0
BRUISES/BLEEDS EASILY: 0
FALLS: 0
EYE PAIN: 0
FEVER: 0
ORTHOPNEA: 0
LOSS OF CONSCIOUSNESS: 0
ABDOMINAL PAIN: 0
EYE DISCHARGE: 0
CHILLS: 0
DEPRESSION: 0
HEADACHES: 0
WEIGHT LOSS: 0
PALPITATIONS: 0
CLAUDICATION: 0
SENSORY CHANGE: 0
PND: 0
DIZZINESS: 0
BLOOD IN STOOL: 0

## 2018-08-16 NOTE — LETTER
[facility]   [address] [place] [address]  [place] [address]  Phone: [phone]  Fax: [phone]              Kelly Lovett  1945    Encounter Date: 8/16/2018    Dalia Li M.D.          PROGRESS NOTE:  Chief Complaint   Patient presents with   • HTN (Controlled)     fv       Subjective:   Kelly Lovett is a 72 y.o. female who presents today for cardiac care and evaluation for her prior mitral valve repair, maze. Patient has been doing okay.     She still has significant pulmonary disease. She will be followed by pulmonologist. She will be participating in pulmonary rehab. She has not done cardiac rehab because her pulmonologist recommended pulmonary rehab before cardiac rehab.    I have independently reviewed blood tests results with patient in clinic which showed elevated LDL level, but normal renal and liver function.    I have independently reviewed patient's ECG with patient in clinic today, which shows normal sinus rhythm, normal HI, QT intervals. No evidence of acute coronary syndrome.    Past Medical History:   Diagnosis Date   • Breath shortness     pt reports using o2 at night 2L, no problems at this time   • COPD (chronic obstructive pulmonary disease) (HCC)    • Emphysema of lung (HCC)    • Heart valve disease    • High cholesterol    • History of heart surgery    • Hyperlipidemia    • Hypertension    • Sleep apnea     uses cpap     Past Surgical History:   Procedure Laterality Date   • MITRAL VALVE REPAIR  4/20/2017    Procedure: MITRAL VALVE REPAIR ;  Surgeon: Lacey Porras M.D.;  Location: [facility];  Service:    • MAZE PROCEDURE Left 4/20/2017    Procedure: MAZE PROCEDURE, Left atrial appendage ligation;  Surgeon: Lacey Porras M.D.;  Location: [facility];  Service:    • PAGE  4/20/2017    Procedure: PAGE;  Surgeon: Lacey Porras M.D.;  Location: [facility];  Service:    •  APPENDECTOMY      1967   • OOPHORECTOMY       Family History   Problem Relation Age of Onset   • Cancer Father         colon cancer    • Hypertension Mother    • Cancer Sister 68        ovarian cancer     Social History     Social History   • Marital status:      Spouse name: N/A   • Number of children: N/A   • Years of education: N/A     Occupational History   • Not on file.     Social History Main Topics   • Smoking status: Former Smoker     Packs/day: 0.50     Years: 38.00     Types: Cigarettes     Quit date: 10/11/2001   • Smokeless tobacco: Never Used   • Alcohol use 8.4 oz/week     14 Shots of liquor per week      Comment: 2 per day   • Drug use: No   • Sexual activity: Yes     Partners: Male     Other Topics Concern   • Not on file     Social History Narrative   • No narrative on file     Allergies   Allergen Reactions   • Sulfa Drugs Rash     Rxn - years ago in her 20's     Outpatient Encounter Prescriptions as of 8/16/2018   Medication Sig Dispense Refill   • Tiotropium Bromide Monohydrate (SPIRIVA RESPIMAT INH) Inhale  by mouth.     • atorvastatin (LIPITOR) 40 MG Tab Take 1 Tab by mouth every day. 90 Tab 3   • sertraline (ZOLOFT) 100 MG Tab Take 1 Tab by mouth every day. 90 Tab 1   • fluticasone-salmeterol (ADVAIR) 250-50 MCG/DOSE AEROSOL POWDER, BREATH ACTIVATED Inhale 1 Puff by mouth 2 times a day. 3 Inhaler 3   • warfarin (COUMADIN) 2.5 MG Tab Take 1 Tab by mouth every day. 90 Tab 1   • NON SPECIFIED Please provide patient with a portable oxygen concentrator 2.5L continuous flow at all times for 3 months    ICD10 Chronic Respiratory Failure with hypoxia J96.11  Pulse ox off of oxygen 87%  O2 off of 1 Each 0   • non-formulary med Inhale 2 L/min by mouth Continuous. Oxygen 2L x NC continuous  Indications: COPD     • magnesium oxide (MAG-OX) 400 MG Tab Take 400 mg by mouth 2 times a day. takes 500 mg tab     • [DISCONTINUED] rosuvastatin (CRESTOR) 10 MG Tab Take 1 Tab by mouth every evening. 30 Tab  "11   • Umeclidinium Bromide (INCRUSE ELLIPTA) 62.5 MCG/INH AEROSOL POWDER, BREATH ACTIVATED Inhale 1 Puff by mouth every day. 1 Each 11   • [DISCONTINUED] furosemide (LASIX) 20 MG Tab Take 1 Tab by mouth every day. 90 Tab 3   • [DISCONTINUED] potassium chloride ER (K-TAB) 10 MEQ tablet Take 1 Tab by mouth every day. 90 Tab 3   • albuterol (VENTOLIN HFA) 108 (90 Base) MCG/ACT Aero Soln inhalation aerosol Inhale 2 Puffs by mouth every four hours as needed for Shortness of Breath. 3 Inhaler 3   • [DISCONTINUED] atorvastatin (LIPITOR) 10 MG Tab Take 1 Tab by mouth every day. 90 Tab 3   • Acetaminophen (TYLENOL EXTRA STRENGTH PO) Take 1 Tab by mouth as needed.       No facility-administered encounter medications on file as of 8/16/2018.      Review of Systems   Constitutional: Negative for chills, fever, malaise/fatigue and weight loss.   HENT: Negative for ear discharge, ear pain, hearing loss and nosebleeds.    Eyes: Negative for blurred vision, double vision, pain and discharge.   Respiratory: Negative for cough and shortness of breath.    Cardiovascular: Negative for chest pain, palpitations, orthopnea, claudication, leg swelling and PND.   Gastrointestinal: Negative for abdominal pain, blood in stool, melena, nausea and vomiting.   Genitourinary: Negative for dysuria and hematuria.   Musculoskeletal: Negative for falls, joint pain and myalgias.   Skin: Negative for itching and rash.   Neurological: Negative for dizziness, sensory change, speech change, loss of consciousness and headaches.   Endo/Heme/Allergies: Negative for environmental allergies. Does not bruise/bleed easily.   Psychiatric/Behavioral: Negative for depression, hallucinations and suicidal ideas.        Objective:   /74   Pulse 98   Ht 1.676 m (5' 6\")   Wt 57.2 kg (126 lb)   SpO2 98%   BMI 20.34 kg/m²      Physical Exam   Constitutional: She is oriented to person, place, and time. No distress.   HENT:   Head: Normocephalic and atraumatic.  "   Right Ear: External ear normal.   Left Ear: External ear normal.   Eyes: Right eye exhibits no discharge. Left eye exhibits no discharge.   Neck: No JVD present. No thyromegaly present.   Cardiovascular: Normal rate, regular rhythm, normal heart sounds and intact distal pulses.  Exam reveals no gallop and no friction rub.    No murmur heard.  Pulmonary/Chest: Breath sounds normal. No respiratory distress.   Abdominal: Bowel sounds are normal. She exhibits no distension. There is no tenderness.   Musculoskeletal: She exhibits no edema or tenderness.   Neurological: She is alert and oriented to person, place, and time. No cranial nerve deficit.   Skin: Skin is warm and dry. She is not diaphoretic.   Psychiatric: She has a normal mood and affect. Her behavior is normal.   Nursing note and vitals reviewed.      Assessment:     1. Paroxysmal atrial fibrillation (HCC)  [facility] EKG (Clinic Performed)   2. S/P mitral valve repair     3. S/P Maze operation for atrial fibrillation     4. Mixed hyperlipidemia  atorvastatin (LIPITOR) 40 MG Tab    COMP METABOLIC PANEL    LIPID PANEL    DISCONTINUED: rosuvastatin (CRESTOR) 10 MG Tab   5. HTN (hypertension), malignant     6. Obstructive sleep apnea syndrome     7. Oxygen dependent     8. High risk medication use         Medical Decision Making:  Today's Assessment / Status / Plan:   At this time patient is clinically stable in terms of her cardiac standpoint.  Will increase Atorvastatin to 40 mg po daily for better LDL control.  Continue anticoagulation.  Blood pressure is well controlled.    I will see patient back in clinic with lab tests and studies results in 6 months.    I thank you for referring patient to our Cardiology Clinic today.          Ritika Jurado M.D.  [address] [address]  VIA In Basket

## 2018-08-16 NOTE — PROGRESS NOTES
Chief Complaint   Patient presents with   • HTN (Controlled)     fv       Subjective:   Kelly Lovett is a 72 y.o. female who presents today for cardiac care and evaluation for her prior mitral valve repair, maze. Patient has been doing okay.     She still has significant pulmonary disease. She will be followed by pulmonologist. She will be participating in pulmonary rehab. She has not done cardiac rehab because her pulmonologist recommended pulmonary rehab before cardiac rehab.    I have independently reviewed blood tests results with patient in clinic which showed elevated LDL level, but normal renal and liver function.    I have independently reviewed patient's ECG with patient in clinic today, which shows normal sinus rhythm, normal SC, QT intervals. No evidence of acute coronary syndrome.    Past Medical History:   Diagnosis Date   • Breath shortness     pt reports using o2 at night 2L, no problems at this time   • COPD (chronic obstructive pulmonary disease) (HCC)    • Emphysema of lung (HCC)    • Heart valve disease    • High cholesterol    • History of heart surgery    • Hyperlipidemia    • Hypertension    • Sleep apnea     uses cpap     Past Surgical History:   Procedure Laterality Date   • MITRAL VALVE REPAIR  4/20/2017    Procedure: MITRAL VALVE REPAIR ;  Surgeon: Lacey Porras M.D.;  Location: [facility];  Service:    • MAZE PROCEDURE Left 4/20/2017    Procedure: MAZE PROCEDURE, Left atrial appendage ligation;  Surgeon: Lacey Porras M.D.;  Location: SURGERY [facility];  Service:    • PAGE  4/20/2017    Procedure: PAGE;  Surgeon: Lacey Porras M.D.;  Location: SURGERY [facility];  Service:    • APPENDECTOMY      1967   • OOPHORECTOMY       Family History   Problem Relation Age of Onset   • Cancer Father         colon cancer    • Hypertension Mother    • Cancer Sister 68        ovarian cancer     Social History     Social History   • Marital status:      Spouse  name: N/A   • Number of children: N/A   • Years of education: N/A     Occupational History   • Not on file.     Social History Main Topics   • Smoking status: Former Smoker     Packs/day: 0.50     Years: 38.00     Types: Cigarettes     Quit date: 10/11/2001   • Smokeless tobacco: Never Used   • Alcohol use 8.4 oz/week     14 Shots of liquor per week      Comment: 2 per day   • Drug use: No   • Sexual activity: Yes     Partners: Male     Other Topics Concern   • Not on file     Social History Narrative   • No narrative on file     Allergies   Allergen Reactions   • Sulfa Drugs Rash     Rxn - years ago in her 20's     Outpatient Encounter Prescriptions as of 8/16/2018   Medication Sig Dispense Refill   • Tiotropium Bromide Monohydrate (SPIRIVA RESPIMAT INH) Inhale  by mouth.     • atorvastatin (LIPITOR) 40 MG Tab Take 1 Tab by mouth every day. 90 Tab 3   • sertraline (ZOLOFT) 100 MG Tab Take 1 Tab by mouth every day. 90 Tab 1   • fluticasone-salmeterol (ADVAIR) 250-50 MCG/DOSE AEROSOL POWDER, BREATH ACTIVATED Inhale 1 Puff by mouth 2 times a day. 3 Inhaler 3   • warfarin (COUMADIN) 2.5 MG Tab Take 1 Tab by mouth every day. 90 Tab 1   • NON SPECIFIED Please provide patient with a portable oxygen concentrator 2.5L continuous flow at all times for 3 months    ICD10 Chronic Respiratory Failure with hypoxia J96.11  Pulse ox off of oxygen 87%  O2 off of 1 Each 0   • non-formulary med Inhale 2 L/min by mouth Continuous. Oxygen 2L x NC continuous  Indications: COPD     • magnesium oxide (MAG-OX) 400 MG Tab Take 400 mg by mouth 2 times a day. takes 500 mg tab     • [DISCONTINUED] rosuvastatin (CRESTOR) 10 MG Tab Take 1 Tab by mouth every evening. 30 Tab 11   • Umeclidinium Bromide (INCRUSE ELLIPTA) 62.5 MCG/INH AEROSOL POWDER, BREATH ACTIVATED Inhale 1 Puff by mouth every day. 1 Each 11   • [DISCONTINUED] furosemide (LASIX) 20 MG Tab Take 1 Tab by mouth every day. 90 Tab 3   • [DISCONTINUED] potassium chloride ER (K-TAB) 10  "MEQ tablet Take 1 Tab by mouth every day. 90 Tab 3   • albuterol (VENTOLIN HFA) 108 (90 Base) MCG/ACT Aero Soln inhalation aerosol Inhale 2 Puffs by mouth every four hours as needed for Shortness of Breath. 3 Inhaler 3   • [DISCONTINUED] atorvastatin (LIPITOR) 10 MG Tab Take 1 Tab by mouth every day. 90 Tab 3   • Acetaminophen (TYLENOL EXTRA STRENGTH PO) Take 1 Tab by mouth as needed.       No facility-administered encounter medications on file as of 8/16/2018.      Review of Systems   Constitutional: Negative for chills, fever, malaise/fatigue and weight loss.   HENT: Negative for ear discharge, ear pain, hearing loss and nosebleeds.    Eyes: Negative for blurred vision, double vision, pain and discharge.   Respiratory: Negative for cough and shortness of breath.    Cardiovascular: Negative for chest pain, palpitations, orthopnea, claudication, leg swelling and PND.   Gastrointestinal: Negative for abdominal pain, blood in stool, melena, nausea and vomiting.   Genitourinary: Negative for dysuria and hematuria.   Musculoskeletal: Negative for falls, joint pain and myalgias.   Skin: Negative for itching and rash.   Neurological: Negative for dizziness, sensory change, speech change, loss of consciousness and headaches.   Endo/Heme/Allergies: Negative for environmental allergies. Does not bruise/bleed easily.   Psychiatric/Behavioral: Negative for depression, hallucinations and suicidal ideas.        Objective:   /74   Pulse 98   Ht 1.676 m (5' 6\")   Wt 57.2 kg (126 lb)   SpO2 98%   BMI 20.34 kg/m²     Physical Exam   Constitutional: She is oriented to person, place, and time. No distress.   HENT:   Head: Normocephalic and atraumatic.   Right Ear: External ear normal.   Left Ear: External ear normal.   Eyes: Right eye exhibits no discharge. Left eye exhibits no discharge.   Neck: No JVD present. No thyromegaly present.   Cardiovascular: Normal rate, regular rhythm, normal heart sounds and intact distal " pulses.  Exam reveals no gallop and no friction rub.    No murmur heard.  Pulmonary/Chest: Breath sounds normal. No respiratory distress.   Abdominal: Bowel sounds are normal. She exhibits no distension. There is no tenderness.   Musculoskeletal: She exhibits no edema or tenderness.   Neurological: She is alert and oriented to person, place, and time. No cranial nerve deficit.   Skin: Skin is warm and dry. She is not diaphoretic.   Psychiatric: She has a normal mood and affect. Her behavior is normal.   Nursing note and vitals reviewed.      Assessment:     1. Paroxysmal atrial fibrillation (HCC)  [facility] EKG (Clinic Performed)   2. S/P mitral valve repair     3. S/P Maze operation for atrial fibrillation     4. Mixed hyperlipidemia  atorvastatin (LIPITOR) 40 MG Tab    COMP METABOLIC PANEL    LIPID PANEL    DISCONTINUED: rosuvastatin (CRESTOR) 10 MG Tab   5. HTN (hypertension), malignant     6. Obstructive sleep apnea syndrome     7. Oxygen dependent     8. High risk medication use         Medical Decision Making:  Today's Assessment / Status / Plan:   At this time patient is clinically stable in terms of her cardiac standpoint.  Will increase Atorvastatin to 40 mg po daily for better LDL control.  Continue anticoagulation.  Blood pressure is well controlled.    I will see patient back in clinic with lab tests and studies results in 6 months.    I thank you for referring patient to our Cardiology Clinic today.

## 2018-08-16 NOTE — TELEPHONE ENCOUNTER
Pharmacy wants to clarify e prescriptions   Received: Today   Message Contents   Francia Valentino R.N.   Phone Number: [phone]             TT/Marcia Xiao with [facility] wants to clarify 2 e-prescriptions they received for atorvastatin and rosuvastatin. Please call [phone].      Returned call. Confirmed Atorvastatin is the correct Rx.

## 2018-08-21 ENCOUNTER — ANTICOAGULATION VISIT (OUTPATIENT)
Dept: MEDICAL GROUP | Facility: MEDICAL CENTER | Age: 73
End: 2018-08-21
Payer: MEDICARE

## 2018-08-21 DIAGNOSIS — I48.92 ATRIAL FLUTTER, UNSPECIFIED TYPE (HCC): ICD-10-CM

## 2018-08-21 DIAGNOSIS — Z79.01 LONG TERM (CURRENT) USE OF ANTICOAGULANTS: Primary | ICD-10-CM

## 2018-08-21 LAB — INR PPP: 1.4 (ref 2–3.5)

## 2018-08-21 PROCEDURE — 99211 OFF/OP EST MAY X REQ PHY/QHP: CPT | Performed by: FAMILY MEDICINE

## 2018-08-21 PROCEDURE — 85610 PROTHROMBIN TIME: CPT | Performed by: FAMILY MEDICINE

## 2018-08-21 NOTE — PROGRESS NOTES
OP Anticoagulation Service Note    Date: 8/21/2018  There were no vitals filed for this visit.   pt declined vitals    Anticoagulation Summary  As of 8/21/2018    INR goal:   1.5-2.5   TTR:   50.6 % (1.3 y)   Today's INR:   1.4!   Warfarin maintenance plan:   3.75 mg (2.5 mg x 1.5) on Mon, Wed, Fri; 2.5 mg (2.5 mg x 1) all other days   Weekly warfarin total:   21.25 mg   Plan last modified:   Amanda Perera PharmD (8/21/2018)   Next INR check:   9/10/2018   Target end date:       Indications    Atrial flutter (HCC) [I48.92]  Mitral valve insufficiency (Resolved) [I34.0]  Long term (current) use of anticoagulants [Z79.01] [Z79.01]             Anticoagulation Episode Summary     INR check location:       Preferred lab:       Send INR reminders to:       Comments:   Home Health  INR range per Dr. Sams and pts request, see note from 8-23-17 from Dr. sams      Anticoagulation Care Providers     Provider Role Specialty Phone number    Lacey Porras M.D.  Cardiac Surgery [phone]    Amanda Perera PharmD           Anticoagulation Patient Findings      HPI:   [facility] on anticoagulation therapy with warfarin (a high risk medication) for atrial flutter      Pt is here today to evaluate anticoagulation therapy    Previous INR was  1.3 on 8-6-2018    Pt was instructed to increase weekly regimen    Confirmed warfarin dosing regimen, denies missed or extra doses of coumadin.   Diet has been consistent with foods rich in vitamin K: Yes  Changes in ETOH:  No  Changes in smoking status: No  Changes in medication: No   Cost restriction: No  S/s of bleeding:  No  Signs/symptoms  thrombosis since the last appt: No    A/P   INR  subtherapeutic today, will require dose adjust ment today to prevent stroke and closer follow up.    Increase weekly regimen       8-2019 check referral    Pt educated to contact our clinic with any changes in medications or s/s of bleeding or thrombosis. Pt is aware to  seek immediate medical attention for falls, head injury or deep cuts    Follow up appointment in 3 week(s) to reduce risk of adverse events from warfarin  Amanda Perera, PharmD

## 2018-08-23 ENCOUNTER — PATIENT MESSAGE (OUTPATIENT)
Dept: MEDICAL GROUP | Facility: LAB | Age: 73
End: 2018-08-23

## 2018-08-27 ENCOUNTER — HOSPITAL ENCOUNTER (OUTPATIENT)
Dept: LAB | Facility: MEDICAL CENTER | Age: 73
End: 2018-08-27
Attending: INTERNAL MEDICINE
Payer: MEDICARE

## 2018-08-27 DIAGNOSIS — E78.2 MIXED HYPERLIPIDEMIA: ICD-10-CM

## 2018-08-27 LAB
ALBUMIN SERPL BCP-MCNC: 4.1 G/DL (ref 3.2–4.9)
ALBUMIN/GLOB SERPL: 1.7 G/DL
ALP SERPL-CCNC: 58 U/L (ref 30–99)
ALT SERPL-CCNC: 14 U/L (ref 2–50)
ANION GAP SERPL CALC-SCNC: 8 MMOL/L (ref 0–11.9)
AST SERPL-CCNC: 20 U/L (ref 12–45)
BILIRUB SERPL-MCNC: 0.4 MG/DL (ref 0.1–1.5)
BUN SERPL-MCNC: 22 MG/DL (ref 8–22)
CALCIUM SERPL-MCNC: 9.4 MG/DL (ref 8.5–10.5)
CHLORIDE SERPL-SCNC: 102 MMOL/L (ref 96–112)
CHOLEST SERPL-MCNC: 211 MG/DL (ref 100–199)
CO2 SERPL-SCNC: 30 MMOL/L (ref 20–33)
CREAT SERPL-MCNC: 0.92 MG/DL (ref 0.5–1.4)
GLOBULIN SER CALC-MCNC: 2.4 G/DL (ref 1.9–3.5)
GLUCOSE SERPL-MCNC: 95 MG/DL (ref 65–99)
HDLC SERPL-MCNC: 118 MG/DL
LDLC SERPL CALC-MCNC: 82 MG/DL
POTASSIUM SERPL-SCNC: 3.9 MMOL/L (ref 3.6–5.5)
PROT SERPL-MCNC: 6.5 G/DL (ref 6–8.2)
SODIUM SERPL-SCNC: 140 MMOL/L (ref 135–145)
TRIGL SERPL-MCNC: 53 MG/DL (ref 0–149)

## 2018-08-27 PROCEDURE — 36415 COLL VENOUS BLD VENIPUNCTURE: CPT

## 2018-08-27 PROCEDURE — 80061 LIPID PANEL: CPT

## 2018-08-27 PROCEDURE — 80053 COMPREHEN METABOLIC PANEL: CPT

## 2018-09-10 ENCOUNTER — ANTICOAGULATION VISIT (OUTPATIENT)
Dept: MEDICAL GROUP | Facility: MEDICAL CENTER | Age: 73
End: 2018-09-10
Payer: MEDICARE

## 2018-09-10 DIAGNOSIS — I48.92 ATRIAL FLUTTER, UNSPECIFIED TYPE (HCC): ICD-10-CM

## 2018-09-10 DIAGNOSIS — Z79.01 LONG TERM (CURRENT) USE OF ANTICOAGULANTS: Primary | ICD-10-CM

## 2018-09-10 LAB — INR PPP: 2.1 (ref 2–3.5)

## 2018-09-10 PROCEDURE — 85610 PROTHROMBIN TIME: CPT | Performed by: FAMILY MEDICINE

## 2018-09-10 PROCEDURE — 99999 PR NO CHARGE: CPT | Performed by: FAMILY MEDICINE

## 2018-09-10 RX ORDER — WARFARIN SODIUM 2.5 MG/1
TABLET ORAL
Qty: 135 TAB | Refills: 1 | Status: ON HOLD | OUTPATIENT
Start: 2018-09-10 | End: 2019-02-25 | Stop reason: CLARIF

## 2018-09-10 NOTE — PROGRESS NOTES
OP Anticoagulation Service Note    Date: 9/10/2018  There were no vitals filed for this visit.   pt declined vitals    Anticoagulation Summary  As of 9/10/2018    INR goal:   1.5-2.5   TTR:   52.0 % (1.3 y)   Today's INR:   2.1   Warfarin maintenance plan:   3.75 mg (2.5 mg x 1.5) on Mon, Wed, Fri; 2.5 mg (2.5 mg x 1) all other days   Weekly warfarin total:   21.25 mg   Plan last modified:   Amanda Perera PharmD (8/21/2018)   Next INR check:   10/9/2018   Target end date:       Indications    Atrial flutter (HCC) [I48.92]  Mitral valve insufficiency (Resolved) [I34.0]  Long term (current) use of anticoagulants [Z79.01] [Z79.01]             Anticoagulation Episode Summary     INR check location:       Preferred lab:       Send INR reminders to:       Comments:   INR range per Dr. Sams and pts request, see note from 8-23-17 from Dr. sams      Anticoagulation Care Providers     Provider Role Specialty Phone number    Lacey Porras M.D.  Cardiac Surgery [phone]    Amanda Perera, PharmD           Anticoagulation Patient Findings      HPI:   [facility] on anticoagulation therapy with warfarin (a high risk medication) for atrial flutter    Pt is here today to evaluate anticoagulation therapy    Previous INR was  1.4 on 8/21/2018    Pt was instructed to increase weekly regimen    Confirmed warfarin dosing regimen, denies missed or extra doses of coumadin.   Diet has been consistent with foods rich in vitamin K: Yes  Changes in ETOH:  No  Changes in smoking status: No  Changes in medication: No   Cost restriction: No  S/s of bleeding:  No  Signs/symptoms  thrombosis since the last appt: No    A/P   INR  therapeutic today, will require close follow up as previous INR was sub-therapeutic   Continue current warfarin regimen          8-2019 check referral    Pt educated to contact our clinic with any changes in medications or s/s of bleeding or thrombosis. Pt is aware to seek immediate  medical attention for falls, head injury or deep cuts    Follow up appointment in 4 week(s) to reduce risk of adverse events from warfarin  Amanda Perera, PharmD

## 2018-09-13 ENCOUNTER — TELEPHONE (OUTPATIENT)
Dept: SLEEP MEDICINE | Facility: MEDICAL CENTER | Age: 73
End: 2018-09-13

## 2018-09-13 NOTE — TELEPHONE ENCOUNTER
Spoke to patient, she is wanting to know what liter flow for POC if using pulsed dosing. I called [facility] they stated that the patient has to meet criteria in order to get RT to evaluate this, I explained to patient and pt's  they are still not understanding what has to be done. I will try and communicate this one more time to patient.

## 2018-10-04 ENCOUNTER — TELEPHONE (OUTPATIENT)
Dept: MEDICAL GROUP | Facility: LAB | Age: 73
End: 2018-10-04

## 2018-10-04 NOTE — TELEPHONE ENCOUNTER
----- Message from Kelly Lovett sent at 10/4/2018 10:45 AM PDT -----  Regarding: Prescription Question  Contact: [phone]  Dr Bauer,  I am out of prescriptions for Spiriva could you please send [facility] a new one. Leaving [facility], would like it by tomorrow in the am  Thanks,Kelly

## 2018-10-09 ENCOUNTER — ANTICOAGULATION VISIT (OUTPATIENT)
Dept: MEDICAL GROUP | Facility: MEDICAL CENTER | Age: 73
End: 2018-10-09
Payer: MEDICARE

## 2018-10-09 VITALS — DIASTOLIC BLOOD PRESSURE: 47 MMHG | HEART RATE: 103 BPM | SYSTOLIC BLOOD PRESSURE: 113 MMHG

## 2018-10-09 DIAGNOSIS — Z79.01 LONG TERM (CURRENT) USE OF ANTICOAGULANTS: Primary | ICD-10-CM

## 2018-10-09 DIAGNOSIS — I48.92 ATRIAL FLUTTER, UNSPECIFIED TYPE (HCC): ICD-10-CM

## 2018-10-09 LAB — INR PPP: 2.1 (ref 2–3.5)

## 2018-10-09 PROCEDURE — 85610 PROTHROMBIN TIME: CPT | Performed by: INTERNAL MEDICINE

## 2018-10-09 PROCEDURE — 99999 PR NO CHARGE: CPT | Performed by: INTERNAL MEDICINE

## 2018-10-09 NOTE — PROGRESS NOTES
OP Anticoagulation Service Note    Date: 10/9/2018  Vitals:    10/09/18 1104   BP: 113/47   BP Location: Left arm   Patient Position: Sitting   Pulse: (!) 103       Anticoagulation Summary  As of 10/9/2018    INR goal:   1.5-2.5   TTR:   54.7 % (1.4 y)   Today's INR:   2.1   Warfarin maintenance plan:   3.75 mg (2.5 mg x 1.5) on Mon, Wed, Fri; 2.5 mg (2.5 mg x 1) all other days   Weekly warfarin total:   21.25 mg   No change documented:   Alis Dunn   Plan last modified:   Dari GaliciaD (8/21/2018)   Next INR check:   11/13/2018   Target end date:       Indications    Atrial flutter (HCC) [I48.92]  Mitral valve insufficiency (Resolved) [I34.0]  Long term (current) use of anticoagulants [Z79.01] [Z79.01]             Anticoagulation Episode Summary     INR check location:       Preferred lab:       Send INR reminders to:       Comments:   INR range per Dr. Sams and pts request, see note from 8-23-17 from Dr. sams      Anticoagulation Care Providers     Provider Role Specialty Phone number    Lacey Porras M.D.  Cardiac Surgery [phone]    Amanda Perera, PharmD           Anticoagulation Patient Findings  Patient Findings     Negatives:   Signs/symptoms of thrombosis, Signs/symptoms of bleeding, Laboratory test error suspected, Change in health, Change in alcohol use, Change in activity, Upcoming invasive procedure, Emergency department visit, Upcoming dental procedure, Missed doses, Extra doses, Change in medications, Change in diet/appetite, Hospital admission, Bruising, Other complaints          HPI:   Kelly Lovett seen in clinic today, on anticoagulation therapy with warfarin (a high risk medication) for atrial flutter    Pt is here today to evaluate anticoagulation therapy    Previous INR was  2.1 on 9/10/18    Pt was instructed to continue with the current dosing regimen.    Confirmed warfarin dosing regimen, denies missed or extra doses of coumadin.   Diet has been consistent  with foods rich in vitamin K: Yes  Changes in ETOH:  No  Changes in smoking status: No  Changes in medication: No   Cost restriction: No  S/s of bleeding:  No  Signs/symptoms  thrombosis since the last appt: No    A/P   INR therapeutic today, at 2.1  Patient will continue with the current dosing regimen.    8/2019 check referral    Pt educated to contact our clinic with any changes in medications or s/s of bleeding or thrombosis. Pt is aware to seek immediate medical attention for falls, head injury or deep cuts    Follow up appointment in 5 week(s) to reduce risk of adverse events from warfarin     Jerry Dunn PharmD

## 2018-10-15 DIAGNOSIS — F32.1 MODERATE SINGLE CURRENT EPISODE OF MAJOR DEPRESSIVE DISORDER (HCC): ICD-10-CM

## 2018-10-15 DIAGNOSIS — F41.9 ANXIETY: ICD-10-CM

## 2018-10-15 RX ORDER — SERTRALINE HYDROCHLORIDE 100 MG/1
100 TABLET, FILM COATED ORAL DAILY
Qty: 90 TAB | Refills: 1 | Status: SHIPPED | OUTPATIENT
Start: 2018-10-15 | End: 2019-06-06 | Stop reason: SDUPTHER

## 2018-10-15 NOTE — TELEPHONE ENCOUNTER
----- Message from Kelly Lovett sent at 10/15/2018 11:14 AM PDT -----  Regarding: Prescription Question  Contact: [phone]  Jose Bauer  Could you please send a prescription for Sertraline?  We are going on a cruise next week & I will run out before we return  Hope all is well with you  Kelly

## 2018-11-13 ENCOUNTER — ANTICOAGULATION VISIT (OUTPATIENT)
Dept: MEDICAL GROUP | Facility: MEDICAL CENTER | Age: 73
End: 2018-11-13
Payer: MEDICARE

## 2018-11-13 DIAGNOSIS — Z79.01 LONG TERM (CURRENT) USE OF ANTICOAGULANTS: ICD-10-CM

## 2018-11-13 DIAGNOSIS — I48.92 ATRIAL FLUTTER, UNSPECIFIED TYPE (HCC): ICD-10-CM

## 2018-11-13 LAB — INR PPP: 2.1 (ref 2–3.5)

## 2018-11-13 PROCEDURE — 85610 PROTHROMBIN TIME: CPT | Performed by: PHYSICIAN ASSISTANT

## 2018-11-13 PROCEDURE — 99999 PR NO CHARGE: CPT | Performed by: PHYSICIAN ASSISTANT

## 2018-11-13 NOTE — PROGRESS NOTES
Anticoagulation Summary  As of 11/13/2018    INR goal:   1.5-2.5   TTR:   57.6 % (1.5 y)   Today's INR:   2.1   Warfarin maintenance plan:   3.75 mg (2.5 mg x 1.5) on Mon, Wed, Fri; 2.5 mg (2.5 mg x 1) all other days   Weekly warfarin total:   21.25 mg   Plan last modified:   Amanda Perera PharmD (8/21/2018)   Next INR check:   1/8/2019   Target end date:       Indications    Atrial flutter (HCC) [I48.92]  Mitral valve insufficiency (Resolved) [I34.0]  Long term (current) use of anticoagulants [Z79.01] [Z79.01]             Anticoagulation Episode Summary     INR check location:       Preferred lab:       Send INR reminders to:       Comments:   INR range per Dr. Sams and pts request, see note from 8-23-17 from Dr. sams      Anticoagulation Care Providers     Provider Role Specialty Phone number    Lacey Porras M.D.  Cardiac Surgery [phone]    Amanda Perera PharmD           Anticoagulation Patient Findings  Patient Findings     Negatives:   Signs/symptoms of thrombosis, Signs/symptoms of bleeding, Laboratory test error suspected, Change in health, Change in alcohol use, Change in activity, Upcoming invasive procedure, Emergency department visit, Upcoming dental procedure, Missed doses, Extra doses, Change in medications, Change in diet/appetite, Hospital admission, Bruising, Other complaints        History of Present Illness: follow up appointment for chronic anticoagulation with the high risk medication, warfarin for atrial flutter.      INR remains therapeutic today. Continue current dosing regimen.  Follow up in 8 weeks, to reduce the risk of adverse events related to this high risk medication, warfarin.    Kelly Dean, Clinical Pharmacist

## 2019-01-08 ENCOUNTER — OFFICE VISIT (OUTPATIENT)
Dept: PULMONOLOGY | Facility: HOSPICE | Age: 74
End: 2019-01-08
Payer: MEDICARE

## 2019-01-08 VITALS
HEART RATE: 124 BPM | SYSTOLIC BLOOD PRESSURE: 132 MMHG | WEIGHT: 126 LBS | RESPIRATION RATE: 18 BRPM | BODY MASS INDEX: 20.25 KG/M2 | HEIGHT: 66 IN | OXYGEN SATURATION: 96 % | TEMPERATURE: 97.8 F | DIASTOLIC BLOOD PRESSURE: 82 MMHG

## 2019-01-08 DIAGNOSIS — Z95.2 STATUS POST MITRAL VALVE REPLACEMENT: ICD-10-CM

## 2019-01-08 DIAGNOSIS — Z87.09 H/O PNEUMOTHORAX: ICD-10-CM

## 2019-01-08 DIAGNOSIS — Z98.890 HISTORY OF MITRAL VALVE REPAIR: ICD-10-CM

## 2019-01-08 DIAGNOSIS — Z86.79 HISTORY OF ATRIAL FIBRILLATION: ICD-10-CM

## 2019-01-08 DIAGNOSIS — R09.02 HYPOXEMIA: ICD-10-CM

## 2019-01-08 DIAGNOSIS — G47.33 OBSTRUCTIVE SLEEP APNEA SYNDROME: ICD-10-CM

## 2019-01-08 DIAGNOSIS — J44.9 CHRONIC OBSTRUCTIVE PULMONARY DISEASE, UNSPECIFIED COPD TYPE (HCC): ICD-10-CM

## 2019-01-08 PROCEDURE — 99214 OFFICE O/P EST MOD 30 MIN: CPT | Performed by: INTERNAL MEDICINE

## 2019-01-08 RX ORDER — PREDNISONE 10 MG/1
TABLET ORAL
Qty: 30 TAB | Refills: 2 | Status: ON HOLD | OUTPATIENT
Start: 2019-01-08 | End: 2019-02-25 | Stop reason: CLARIF

## 2019-01-08 RX ORDER — AZITHROMYCIN 250 MG/1
TABLET, FILM COATED ORAL
Qty: 6 TAB | Refills: 0 | Status: ON HOLD | OUTPATIENT
Start: 2019-01-08 | End: 2019-02-25 | Stop reason: CLARIF

## 2019-01-08 ASSESSMENT — PAIN SCALES - GENERAL: PAINLEVEL: NO PAIN

## 2019-01-08 NOTE — LETTER
Alexsander Gardnre M.D.  [facility] Pulmonary Medicine   [address] [place] [address] [place]  [place] [address]  Phone: [phone] - Fax: [phone]           Encounter Date: 1/8/2019  Provider: Alexsander Gardner M.D.  Location of Care: [facility] PULMONARY MEDICINE      Patient:   Kelly Lovett   MR Number: [id number]   YOB: 1945     PROGRESS NOTE:  Chief Complaint   Patient presents with   • Follow-Up     Dyspnea       HPI:  Patient is 73-year-old woman with significant chronic obstructive pulmonary disease. She had cardiac surgery with a mitral valve repair and Maze procedure. She had postoperative complications including pneumothoraces. She continues on supplemental oxygen. Her FEV1 is 0.68 L..  She has completed pulmonary rehabilitation.  She is trying to stay active.  She is walking daily.  She continues on Spiriva, Advair, Ventolin, and supplemental oxygen at 2.5 L/min 24/7.  She uses a portable oxygen concentrator during the day.  She does say that her voice is somewhat hoarse since her surgery.  She has not had any exacerbations since her last visit.  She has not had any cardiac issues.    Past Medical History:   Diagnosis Date   • Breath shortness     pt reports using o2 at night 2L, no problems at this time   • COPD (chronic obstructive pulmonary disease) (HCC)    • Emphysema of lung (HCC)    • Heart valve disease    • High cholesterol    • History of heart surgery    • Hyperlipidemia    • Hypertension    • Sleep apnea     uses cpap       ROS: ***  Constitutional: Denies fevers, chills, night sweats, fatigue or weight loss  Eyes: Denies vision loss, pain, drainage, double vision  Ears, Nose, Throat: Denies earache, tinnitus, hoarseness  Cardiovascular: Denies chest pain, tightness, palpitations  Respiratory: Denies shortness of breath, sputum production, cough, hemoptysis  Sleep: Denies, snoring, apnea  GI: Denies abdominal pain, nausea, vomiting, diarrhea  :  Denies frequent urination, hematuria, painful urination  Musculoskeletal: Denies back pain, painful joints, sore muscles  Neurological: Denies headaches, seizures  Skin: Denies rashes, color changes  Psychiatric: Denies depression or thoughts of suicide  Hematologic: Denies bleeding tendency or clotting tendency  Allergic/Immunologic: Denies rhinitis, skin sensitivity    Social History     Social History   • Marital status:      Spouse name: N/A   • Number of children: N/A   • Years of education: N/A     Occupational History   • Not on file.     Social History Main Topics   • Smoking status: Former Smoker     Packs/day: 0.50     Years: 38.00     Types: Cigarettes     Quit date: 10/11/2001   • Smokeless tobacco: Never Used   • Alcohol use 8.4 oz/week     14 Shots of liquor per week      Comment: 2 per day   • Drug use: No   • Sexual activity: Yes     Partners: Male     Other Topics Concern   • Not on file     Social History Narrative   • No narrative on file     Sulfa drugs  Current Outpatient Prescriptions on File Prior to Visit   Medication Sig Dispense Refill   • sertraline (ZOLOFT) 100 MG Tab Take 1 Tab by mouth every day. 90 Tab 1   • Tiotropium Bromide Monohydrate (SPIRIVA RESPIMAT) 1.25 MCG/ACT Aero Soln Inhale 2 Puffs by mouth every day. 3 Inhaler 3   • warfarin (COUMADIN) 2.5 MG Tab Take 1 to 1.5 tablets by mouth daily as directed by the coumadin clinic 135 Tab 1   • atorvastatin (LIPITOR) 40 MG Tab Take 1 Tab by mouth every day. 90 Tab 3   • fluticasone-salmeterol (ADVAIR) 250-50 MCG/DOSE AEROSOL POWDER, BREATH ACTIVATED Inhale 1 Puff by mouth 2 times a day. 3 Inhaler 3   • mupirocin (BACTROBAN) 2 % Ointment Apply 1 Application to affected area(s) 2 times a day. 1 Tube 2   • Umeclidinium Bromide (INCRUSE ELLIPTA) 62.5 MCG/INH AEROSOL POWDER, BREATH ACTIVATED Inhale 1 Puff by mouth every day. (Patient not taking: Reported on 1/8/2019) 1 Each 11   • albuterol (VENTOLIN HFA) 108 (90 Base) MCG/ACT Aero  "Soln inhalation aerosol Inhale 2 Puffs by mouth every four hours as needed for Shortness of Breath. 3 Inhaler 3   • Acetaminophen (TYLENOL EXTRA STRENGTH PO) Take 1 Tab by mouth as needed.     • NON SPECIFIED Please provide patient with a portable oxygen concentrator 2.5L continuous flow at all times for 3 months    ICD10 Chronic Respiratory Failure with hypoxia J96.11  Pulse ox off of oxygen 87%  O2 off of 1 Each 0   • non-formulary med Inhale 2 L/min by mouth Continuous. Oxygen 2L x NC continuous  Indications: COPD     • magnesium oxide (MAG-OX) 400 MG Tab Take 400 mg by mouth 2 times a day. takes 500 mg tab       No current facility-administered medications on file prior to visit.      Blood pressure 132/82, pulse (!) 124, temperature 36.6 °C (97.8 °F), temperature source Oral, resp. rate 18, height 1.676 m (5' 6\"), weight 57.2 kg (126 lb), SpO2 96 %, not currently breastfeeding.  Family History   Problem Relation Age of Onset   • Cancer Father         colon cancer    • Hypertension Mother    • Cancer Sister 68        ovarian cancer       Physical Exam:    HEENT: PERRLA, EOMI, no scleral icterus, no nasal or oral lesions  Neck: No thyromegaly, no adenopathy, no bruits  Mallampatti: Grade II  Lungs: Equal breath sounds, no wheezes or crackles  Heart: Regular rate and rhythm, no gallops or murmurs  Abdomen: Soft, benign, no organomegaly  Extremities: No clubbing, cyanosis, or edema  Neurologic: Cranial nerve, motor, and sensory exam are normal    1. Chronic obstructive pulmonary disease, unspecified COPD type (HCC)    2. Hypoxemia    3. Obstructive sleep apnea syndrome    4. Status post mitral valve replacement    5. H/O pneumothorax        ***         Electronically signed by Alexsander Gardner M.D.  on 01/08/19    No Recipients                  "

## 2019-01-08 NOTE — LETTER
Alexsander Gardner M.D.  [facility] Pulmonary Medicine   [address] [place] [address] [place]  [place] [address]  Phone: [phone] - Fax: [phone]           Encounter Date: 1/8/2019  Provider: Alexsander Gardner M.D.  Location of Care: [facility] PULMONARY MEDICINE      Patient:   Kelly Lovett   MR Number: [id number]   YOB: 1945     PROGRESS NOTE:  Chief Complaint   Patient presents with   • Follow-Up     Dyspnea       HPI:  Patient is 73-year-old woman with significant chronic obstructive pulmonary disease. She had cardiac surgery with a mitral valve repair and Maze procedure. She had postoperative complications including pneumothoraces. She continues on supplemental oxygen. Her FEV1 is 0.68 L..  She has completed pulmonary rehabilitation.  She is trying to stay active.  She is walking daily.  She continues on Spiriva, Advair, Ventolin, and supplemental oxygen at 2.5 L/min 24/7.  She uses a portable oxygen concentrator during the day.  She does say that her voice is somewhat hoarse since her surgery.  She has not had any exacerbations since her last visit.  She has not had any cardiac issues.  She has seen ENT and has no significant vocal cord issues.  She does have some shakiness on a regular basis probably due to her medications.  On arrival today her oxygen saturations were okay but she was somewhat tachycardic.  Once she relaxed her tachycardia resolved.  Tachycardia has been a problem for her and she has been evaluated by cardiology.  She does continue on anticoagulation.  She did venture another cruise to the [facility] and visited [place].  She did not have any significant oxygen issues on this cruise.    Past Medical History:   Diagnosis Date   • Breath shortness     pt reports using o2 at night 2L, no problems at this time   • COPD (chronic obstructive pulmonary disease) (HCC)    • Emphysema of lung (HCC)    • Heart valve disease    • High cholesterol    • History  of heart surgery    • Hyperlipidemia    • Hypertension    • Sleep apnea     uses cpap       ROS:   Constitutional: Denies fevers, chills, night sweats, fatigue or weight loss  Eyes: Denies vision loss, pain, drainage, double vision  Ears, Nose, Throat: Denies earache, tinnitus, hoarseness  Cardiovascular: Denies chest pain, tightness.  She does become tachycardic with exertion  Respiratory: See HPI  Sleep: Denies, snoring, apnea  GI: Denies abdominal pain, nausea, vomiting, diarrhea  : Denies frequent urination, hematuria, painful urination  Musculoskeletal: Denies back pain, painful joints, sore muscles  Neurological: Denies headaches, seizures  Skin: Denies rashes, color changes  Psychiatric: Denies depression or thoughts of suicide  Hematologic: Denies bleeding tendency or clotting tendency  Allergic/Immunologic: Denies rhinitis, skin sensitivity    Social History     Social History   • Marital status:      Spouse name: N/A   • Number of children: N/A   • Years of education: N/A     Occupational History   • Not on file.     Social History Main Topics   • Smoking status: Former Smoker     Packs/day: 0.50     Years: 38.00     Types: Cigarettes     Quit date: 10/11/2001   • Smokeless tobacco: Never Used   • Alcohol use 8.4 oz/week     14 Shots of liquor per week      Comment: 2 per day   • Drug use: No   • Sexual activity: Yes     Partners: Male     Other Topics Concern   • Not on file     Social History Narrative   • No narrative on file     Sulfa drugs  Current Outpatient Prescriptions on File Prior to Visit   Medication Sig Dispense Refill   • sertraline (ZOLOFT) 100 MG Tab Take 1 Tab by mouth every day. 90 Tab 1   • Tiotropium Bromide Monohydrate (SPIRIVA RESPIMAT) 1.25 MCG/ACT Aero Soln Inhale 2 Puffs by mouth every day. 3 Inhaler 3   • warfarin (COUMADIN) 2.5 MG Tab Take 1 to 1.5 tablets by mouth daily as directed by the coumadin clinic 135 Tab 1   • atorvastatin (LIPITOR) 40 MG Tab Take 1 Tab by  "mouth every day. 90 Tab 3   • fluticasone-salmeterol (ADVAIR) 250-50 MCG/DOSE AEROSOL POWDER, BREATH ACTIVATED Inhale 1 Puff by mouth 2 times a day. 3 Inhaler 3   • mupirocin (BACTROBAN) 2 % Ointment Apply 1 Application to affected area(s) 2 times a day. 1 Tube 2   • Umeclidinium Bromide (INCRUSE ELLIPTA) 62.5 MCG/INH AEROSOL POWDER, BREATH ACTIVATED Inhale 1 Puff by mouth every day. (Patient not taking: Reported on 1/8/2019) 1 Each 11   • albuterol (VENTOLIN HFA) 108 (90 Base) MCG/ACT Aero Soln inhalation aerosol Inhale 2 Puffs by mouth every four hours as needed for Shortness of Breath. 3 Inhaler 3   • Acetaminophen (TYLENOL EXTRA STRENGTH PO) Take 1 Tab by mouth as needed.     • NON SPECIFIED Please provide patient with a portable oxygen concentrator 2.5L continuous flow at all times for 3 months    ICD10 Chronic Respiratory Failure with hypoxia J96.11  Pulse ox off of oxygen 87%  O2 off of 1 Each 0   • non-formulary med Inhale 2 L/min by mouth Continuous. Oxygen 2L x NC continuous  Indications: COPD     • magnesium oxide (MAG-OX) 400 MG Tab Take 400 mg by mouth 2 times a day. takes 500 mg tab       No current facility-administered medications on file prior to visit.      Blood pressure 132/82, pulse (!) 124, temperature 36.6 °C (97.8 °F), temperature source Oral, resp. rate 18, height 1.676 m (5' 6\"), weight 57.2 kg (126 lb), SpO2 96 %, not currently breastfeeding.  Family History   Problem Relation Age of Onset   • Cancer Father         colon cancer    • Hypertension Mother    • Cancer Sister 68        ovarian cancer       Physical Exam:  No distress at rest on supplemental oxygen  HEENT: PERRLA, EOMI, no scleral icterus, no nasal or oral lesions  Neck: No thyromegaly, no adenopathy, no bruits  Mallampatti: Grade II  Lungs: Distant breath sounds, no wheezes or crackles  Heart: Regular rate and rhythm, no gallops or murmurs.  She has a regular tachycardia.  Abdomen: Soft, benign, no organomegaly  Extremities: No " clubbing, cyanosis, or edema  Neurologic: Cranial nerve, motor, and sensory exam are normal    1. Chronic obstructive pulmonary disease, unspecified COPD type (HCC)    2. Hypoxemia    3. Obstructive sleep apnea syndrome    4. Status post mitral valve replacement    5. H/O pneumothorax        She has significant COPD but is reasonably well controlled on her current medications and oxygen.  She does get tachycardia with activity.  That has been evaluated by cardiology.  We will make no changes in her current medications and oxygen supplementation.  We will provide her with azithromycin and prednisone prescriptions in the event of exacerbation.  We will see her in follow-up in 6 months or sooner if she has issues.      Electronically signed by Alexsander Gardner M.D.  on 01/08/19    No Recipients

## 2019-01-08 NOTE — PROGRESS NOTES
Chief Complaint   Patient presents with   • Follow-Up     Dyspnea       HPI:  Patient is 73-year-old woman with significant chronic obstructive pulmonary disease. She had cardiac surgery with a mitral valve repair and Maze procedure. She had postoperative complications including pneumothoraces. She continues on supplemental oxygen. Her FEV1 is 0.68 L..  She has completed pulmonary rehabilitation.  She is trying to stay active.  She is walking daily.  She continues on Spiriva, Advair, Ventolin, and supplemental oxygen at 2.5 L/min 24/7.  She uses a portable oxygen concentrator during the day.  She does say that her voice is somewhat hoarse since her surgery.  She has not had any exacerbations since her last visit.  She has not had any cardiac issues.  She has seen ENT and has no significant vocal cord issues.  She does have some shakiness on a regular basis probably due to her medications.  On arrival today her oxygen saturations were okay but she was somewhat tachycardic.  Once she relaxed her tachycardia resolved.  Tachycardia has been a problem for her and she has been evaluated by cardiology.  She does continue on anticoagulation.  She did venture another cruise to the [facility] and visited [place].  She did not have any significant oxygen issues on this cruise.    Past Medical History:   Diagnosis Date   • Breath shortness     pt reports using o2 at night 2L, no problems at this time   • COPD (chronic obstructive pulmonary disease) (HCC)    • Emphysema of lung (HCC)    • Heart valve disease    • High cholesterol    • History of heart surgery    • Hyperlipidemia    • Hypertension    • Sleep apnea     uses cpap       ROS:   Constitutional: Denies fevers, chills, night sweats, fatigue or weight loss  Eyes: Denies vision loss, pain, drainage, double vision  Ears, Nose, Throat: Denies earache, tinnitus, hoarseness  Cardiovascular: Denies chest pain, tightness.  She does become tachycardic with exertion  Respiratory: See  HPI  Sleep: Denies, snoring, apnea  GI: Denies abdominal pain, nausea, vomiting, diarrhea  : Denies frequent urination, hematuria, painful urination  Musculoskeletal: Denies back pain, painful joints, sore muscles  Neurological: Denies headaches, seizures  Skin: Denies rashes, color changes  Psychiatric: Denies depression or thoughts of suicide  Hematologic: Denies bleeding tendency or clotting tendency  Allergic/Immunologic: Denies rhinitis, skin sensitivity    Social History     Social History   • Marital status:      Spouse name: N/A   • Number of children: N/A   • Years of education: N/A     Occupational History   • Not on file.     Social History Main Topics   • Smoking status: Former Smoker     Packs/day: 0.50     Years: 38.00     Types: Cigarettes     Quit date: 10/11/2001   • Smokeless tobacco: Never Used   • Alcohol use 8.4 oz/week     14 Shots of liquor per week      Comment: 2 per day   • Drug use: No   • Sexual activity: Yes     Partners: Male     Other Topics Concern   • Not on file     Social History Narrative   • No narrative on file     Sulfa drugs  Current Outpatient Prescriptions on File Prior to Visit   Medication Sig Dispense Refill   • sertraline (ZOLOFT) 100 MG Tab Take 1 Tab by mouth every day. 90 Tab 1   • Tiotropium Bromide Monohydrate (SPIRIVA RESPIMAT) 1.25 MCG/ACT Aero Soln Inhale 2 Puffs by mouth every day. 3 Inhaler 3   • warfarin (COUMADIN) 2.5 MG Tab Take 1 to 1.5 tablets by mouth daily as directed by the coumadin clinic 135 Tab 1   • atorvastatin (LIPITOR) 40 MG Tab Take 1 Tab by mouth every day. 90 Tab 3   • fluticasone-salmeterol (ADVAIR) 250-50 MCG/DOSE AEROSOL POWDER, BREATH ACTIVATED Inhale 1 Puff by mouth 2 times a day. 3 Inhaler 3   • mupirocin (BACTROBAN) 2 % Ointment Apply 1 Application to affected area(s) 2 times a day. 1 Tube 2   • Umeclidinium Bromide (INCRUSE ELLIPTA) 62.5 MCG/INH AEROSOL POWDER, BREATH ACTIVATED Inhale 1 Puff by mouth every day. (Patient not  "taking: Reported on 1/8/2019) 1 Each 11   • albuterol (VENTOLIN HFA) 108 (90 Base) MCG/ACT Aero Soln inhalation aerosol Inhale 2 Puffs by mouth every four hours as needed for Shortness of Breath. 3 Inhaler 3   • Acetaminophen (TYLENOL EXTRA STRENGTH PO) Take 1 Tab by mouth as needed.     • NON SPECIFIED Please provide patient with a portable oxygen concentrator 2.5L continuous flow at all times for 3 months    ICD10 Chronic Respiratory Failure with hypoxia J96.11  Pulse ox off of oxygen 87%  O2 off of 1 Each 0   • non-formulary med Inhale 2 L/min by mouth Continuous. Oxygen 2L x NC continuous  Indications: COPD     • magnesium oxide (MAG-OX) 400 MG Tab Take 400 mg by mouth 2 times a day. takes 500 mg tab       No current facility-administered medications on file prior to visit.      Blood pressure 132/82, pulse (!) 124, temperature 36.6 °C (97.8 °F), temperature source Oral, resp. rate 18, height 1.676 m (5' 6\"), weight 57.2 kg (126 lb), SpO2 96 %, not currently breastfeeding.  Family History   Problem Relation Age of Onset   • Cancer Father         colon cancer    • Hypertension Mother    • Cancer Sister 68        ovarian cancer       Physical Exam:  No distress at rest on supplemental oxygen  HEENT: PERRLA, EOMI, no scleral icterus, no nasal or oral lesions  Neck: No thyromegaly, no adenopathy, no bruits  Mallampatti: Grade II  Lungs: Distant breath sounds, no wheezes or crackles  Heart: Regular rate and rhythm, no gallops or murmurs.  She has a regular tachycardia.  Abdomen: Soft, benign, no organomegaly  Extremities: No clubbing, cyanosis, or edema  Neurologic: Cranial nerve, motor, and sensory exam are normal    1. Chronic obstructive pulmonary disease, unspecified COPD type (HCC)    2. Hypoxemia    3. Obstructive sleep apnea syndrome    4. Status post mitral valve replacement    5. H/O pneumothorax        She has significant COPD but is reasonably well controlled on her current medications and oxygen.  She " does get tachycardia with activity.  That has been evaluated by cardiology.  We will make no changes in her current medications and oxygen supplementation.  We will provide her with azithromycin and prednisone prescriptions in the event of exacerbation.  We will see her in follow-up in 6 months or sooner if she has issues.

## 2019-01-10 ENCOUNTER — ANTICOAGULATION VISIT (OUTPATIENT)
Dept: MEDICAL GROUP | Facility: MEDICAL CENTER | Age: 74
End: 2019-01-10
Payer: MEDICARE

## 2019-01-10 DIAGNOSIS — Z79.01 LONG TERM (CURRENT) USE OF ANTICOAGULANTS: ICD-10-CM

## 2019-01-10 DIAGNOSIS — I48.92 ATRIAL FLUTTER, UNSPECIFIED TYPE (HCC): ICD-10-CM

## 2019-01-10 LAB — INR PPP: 2.1 (ref 2–3.5)

## 2019-01-10 PROCEDURE — 85610 PROTHROMBIN TIME: CPT | Performed by: FAMILY MEDICINE

## 2019-01-10 PROCEDURE — 99999 PR NO CHARGE: CPT | Performed by: INTERNAL MEDICINE

## 2019-01-10 NOTE — PROGRESS NOTES
Anticoagulation Summary  As of 1/10/2019    INR goal:   1.5-2.5   TTR:   61.7 % (1.6 y)   Today's INR:   2.1   Warfarin maintenance plan:   3.75 mg (2.5 mg x 1.5) on Mon, Wed, Fri; 2.5 mg (2.5 mg x 1) all other days   Weekly warfarin total:   21.25 mg   Plan last modified:   Amanda Perera PharmD (8/21/2018)   Next INR check:   3/21/2019   Target end date:       Indications    Atrial flutter (HCC) [I48.92]  Mitral valve insufficiency (Resolved) [I34.0]  Long term (current) use of anticoagulants [Z79.01] [Z79.01]             Anticoagulation Episode Summary     INR check location:       Preferred lab:       Send INR reminders to:       Comments:   INR range per Dr. Sams and pts request, see note from 8-23-17 from Dr. sams      Anticoagulation Care Providers     Provider Role Specialty Phone number    Lacey Porras M.D.  Cardiac Surgery [phone]    Amanda Perera PharmD           Anticoagulation Patient Findings    History of Present Illness: follow up appointment for chronic anticoagulation with the high risk medication, warfarin for atrial flutter    Pt remains therapeutic today. Continue current dosing regimen. Follow up in 8 weeks, to reduce the risk of adverse events related to this high risk medication, warfarin.    Kelly Dean, Clinical Pharmacist    Pt declines vitals today

## 2019-01-11 ENCOUNTER — TELEPHONE (OUTPATIENT)
Dept: MEDICAL GROUP | Facility: LAB | Age: 74
End: 2019-01-11

## 2019-01-11 NOTE — TELEPHONE ENCOUNTER
Phone Number Called: [phone] (home)     Message: needing to change her apt. To another day.    Left Message for patient to call back: yes

## 2019-01-22 ENCOUNTER — TELEPHONE (OUTPATIENT)
Dept: MEDICAL GROUP | Facility: LAB | Age: 74
End: 2019-01-22

## 2019-01-22 NOTE — TELEPHONE ENCOUNTER
ESTABLISHED PATIENT PRE-VISIT PLANNING     Patient was NOT contacted to complete PVP.     Note: Patient will not be contacted if there is no indication to call.     1.  Reviewed notes from the last few office visits within the medical group: Yes    2.  If any orders were placed at last visit or intended to be done for this visit (i.e. 6 mos follow-up), do we have Results/Consult Notes?        •  Labs - Labs were not ordered at last office visit.       •  Imaging - Imaging was not ordered at last office visit.       •  Referrals - No referrals were ordered at last office visit.    3. Is this appointment scheduled as a Hospital Follow-Up? No    4.  Immunizations were updated in Epic using WebIZ?: Epic matches WebIZ       •  Web Iz Recommendations: FLU, HEPATITIS A , HEPATITIS B and SHINGRIX (Shingles)    5.  Patient is due for the following Health Maintenance Topics:   Health Maintenance Due   Topic Date Due   • IMM ZOSTER VACCINES (2 of 3) 10/10/2013   • PFT SCREENING-FEV1 AND FEV/FVC RATIO / SPIROMETRY SHOULD BE PERFORMED ANNUALLY  10/19/2017   • IMM INFLUENZA (1) 09/01/2018   • MAMMOGRAM  01/03/2019   • Annual Wellness Visit  01/11/2019       - Patient has completed PNEUMOVAX (PPSV23), PREVNAR (PCV13)  and TDAP Immunization(s) per WebIZ. Chart has been updated.    6. Orders for overdue Health Maintenance topics pended in Pre-Charting? YES    7.  AHA (MDX) form printed for Provider? YES    8.  Patient was NOT informed to arrive 15 min prior to their scheduled appointment and bring in their medication bottles.

## 2019-01-30 DIAGNOSIS — J44.9 CHRONIC OBSTRUCTIVE PULMONARY DISEASE, UNSPECIFIED COPD TYPE (HCC): ICD-10-CM

## 2019-02-21 ENCOUNTER — OFFICE VISIT (OUTPATIENT)
Dept: URGENT CARE | Facility: MEDICAL CENTER | Age: 74
End: 2019-02-21
Payer: MEDICARE

## 2019-02-21 ENCOUNTER — APPOINTMENT (OUTPATIENT)
Dept: RADIOLOGY | Facility: MEDICAL CENTER | Age: 74
DRG: 291 | End: 2019-02-21
Attending: EMERGENCY MEDICINE
Payer: MEDICARE

## 2019-02-21 ENCOUNTER — HOSPITAL ENCOUNTER (INPATIENT)
Facility: MEDICAL CENTER | Age: 74
LOS: 4 days | DRG: 291 | End: 2019-02-25
Attending: EMERGENCY MEDICINE | Admitting: FAMILY MEDICINE
Payer: MEDICARE

## 2019-02-21 VITALS
BODY MASS INDEX: 20.34 KG/M2 | OXYGEN SATURATION: 96 % | HEIGHT: 66 IN | WEIGHT: 126.6 LBS | DIASTOLIC BLOOD PRESSURE: 68 MMHG | HEART RATE: 107 BPM | TEMPERATURE: 97.8 F | SYSTOLIC BLOOD PRESSURE: 110 MMHG

## 2019-02-21 DIAGNOSIS — R06.02 SHORTNESS OF BREATH: ICD-10-CM

## 2019-02-21 DIAGNOSIS — J18.9 PNEUMONIA OF LEFT LOWER LOBE DUE TO INFECTIOUS ORGANISM: ICD-10-CM

## 2019-02-21 DIAGNOSIS — R06.02 SOB (SHORTNESS OF BREATH): ICD-10-CM

## 2019-02-21 LAB
ALBUMIN SERPL BCP-MCNC: 4.5 G/DL (ref 3.2–4.9)
ALBUMIN/GLOB SERPL: 1.8 G/DL
ALP SERPL-CCNC: 56 U/L (ref 30–99)
ALT SERPL-CCNC: 26 U/L (ref 2–50)
ANION GAP SERPL CALC-SCNC: 13 MMOL/L (ref 0–11.9)
APTT PPP: 29.3 SEC (ref 24.7–36)
AST SERPL-CCNC: 31 U/L (ref 12–45)
BASE EXCESS BLDV CALC-SCNC: 1 MMOL/L
BASOPHILS # BLD AUTO: 0.6 % (ref 0–1.8)
BASOPHILS # BLD: 0.05 K/UL (ref 0–0.12)
BILIRUB SERPL-MCNC: 0.9 MG/DL (ref 0.1–1.5)
BNP SERPL-MCNC: 2290 PG/ML (ref 0–100)
BODY TEMPERATURE: ABNORMAL CENTIGRADE
BUN SERPL-MCNC: 24 MG/DL (ref 8–22)
CALCIUM SERPL-MCNC: 9.4 MG/DL (ref 8.4–10.2)
CHLORIDE SERPL-SCNC: 102 MMOL/L (ref 96–112)
CO2 SERPL-SCNC: 24 MMOL/L (ref 20–33)
CREAT SERPL-MCNC: 0.88 MG/DL (ref 0.5–1.4)
D DIMER PPP IA.FEU-MCNC: <0.4 UG/ML (FEU) (ref 0–0.5)
EKG IMPRESSION: NORMAL
EKG IMPRESSION: NORMAL
EOSINOPHIL # BLD AUTO: 0.04 K/UL (ref 0–0.51)
EOSINOPHIL NFR BLD: 0.5 % (ref 0–6.9)
ERYTHROCYTE [DISTWIDTH] IN BLOOD BY AUTOMATED COUNT: 47.4 FL (ref 35.9–50)
GLOBULIN SER CALC-MCNC: 2.5 G/DL (ref 1.9–3.5)
GLUCOSE SERPL-MCNC: 126 MG/DL (ref 65–99)
HCO3 BLDV-SCNC: 26 MMOL/L (ref 24–28)
HCT VFR BLD AUTO: 39.3 % (ref 37–47)
HGB BLD-MCNC: 12.4 G/DL (ref 12–16)
IMM GRANULOCYTES # BLD AUTO: 0.03 K/UL (ref 0–0.11)
IMM GRANULOCYTES NFR BLD AUTO: 0.3 % (ref 0–0.9)
INR PPP: 2.08 (ref 0.87–1.13)
LIPASE SERPL-CCNC: 37 U/L (ref 7–58)
LYMPHOCYTES # BLD AUTO: 1.18 K/UL (ref 1–4.8)
LYMPHOCYTES NFR BLD: 13.5 % (ref 22–41)
MAGNESIUM SERPL-MCNC: 1.5 MG/DL (ref 1.5–2.5)
MCH RBC QN AUTO: 30.6 PG (ref 27–33)
MCHC RBC AUTO-ENTMCNC: 31.6 G/DL (ref 33.6–35)
MCV RBC AUTO: 97 FL (ref 81.4–97.8)
MONOCYTES # BLD AUTO: 0.94 K/UL (ref 0–0.85)
MONOCYTES NFR BLD AUTO: 10.7 % (ref 0–13.4)
NEUTROPHILS # BLD AUTO: 6.53 K/UL (ref 2–7.15)
NEUTROPHILS NFR BLD: 74.4 % (ref 44–72)
NRBC # BLD AUTO: 0 K/UL
NRBC BLD-RTO: 0 /100 WBC
PCO2 BLDV: 40.1 MMHG (ref 41–51)
PH BLDV: 7.42 [PH] (ref 7.31–7.45)
PHOSPHATE SERPL-MCNC: 3.8 MG/DL (ref 2.5–4.5)
PLATELET # BLD AUTO: 227 K/UL (ref 164–446)
PMV BLD AUTO: 10 FL (ref 9–12.9)
PO2 BLDV: 70.7 MMHG (ref 25–40)
POTASSIUM SERPL-SCNC: 3.8 MMOL/L (ref 3.6–5.5)
PROT SERPL-MCNC: 7 G/DL (ref 6–8.2)
PROTHROMBIN TIME: 23.1 SEC (ref 12–14.6)
RBC # BLD AUTO: 4.05 M/UL (ref 4.2–5.4)
SAO2 % BLDV: 93.4 %
SODIUM SERPL-SCNC: 139 MMOL/L (ref 135–145)
TROPONIN I SERPL-MCNC: 0.04 NG/ML (ref 0–0.04)
TROPONIN I SERPL-MCNC: 0.04 NG/ML (ref 0–0.04)
WBC # BLD AUTO: 8.8 K/UL (ref 4.8–10.8)

## 2019-02-21 PROCEDURE — 96375 TX/PRO/DX INJ NEW DRUG ADDON: CPT

## 2019-02-21 PROCEDURE — 94002 VENT MGMT INPAT INIT DAY: CPT

## 2019-02-21 PROCEDURE — 700105 HCHG RX REV CODE 258: Performed by: EMERGENCY MEDICINE

## 2019-02-21 PROCEDURE — 99233 SBSQ HOSP IP/OBS HIGH 50: CPT | Performed by: FAMILY MEDICINE

## 2019-02-21 PROCEDURE — 85730 THROMBOPLASTIN TIME PARTIAL: CPT

## 2019-02-21 PROCEDURE — 85025 COMPLETE CBC W/AUTO DIFF WBC: CPT

## 2019-02-21 PROCEDURE — 94640 AIRWAY INHALATION TREATMENT: CPT

## 2019-02-21 PROCEDURE — 99214 OFFICE O/P EST MOD 30 MIN: CPT | Performed by: NURSE PRACTITIONER

## 2019-02-21 PROCEDURE — 94760 N-INVAS EAR/PLS OXIMETRY 1: CPT

## 2019-02-21 PROCEDURE — 700111 HCHG RX REV CODE 636 W/ 250 OVERRIDE (IP): Performed by: EMERGENCY MEDICINE

## 2019-02-21 PROCEDURE — 99285 EMERGENCY DEPT VISIT HI MDM: CPT

## 2019-02-21 PROCEDURE — 36415 COLL VENOUS BLD VENIPUNCTURE: CPT

## 2019-02-21 PROCEDURE — 96365 THER/PROPH/DIAG IV INF INIT: CPT

## 2019-02-21 PROCEDURE — 93000 ELECTROCARDIOGRAM COMPLETE: CPT | Performed by: NURSE PRACTITIONER

## 2019-02-21 PROCEDURE — 84484 ASSAY OF TROPONIN QUANT: CPT

## 2019-02-21 PROCEDURE — 83735 ASSAY OF MAGNESIUM: CPT

## 2019-02-21 PROCEDURE — 85379 FIBRIN DEGRADATION QUANT: CPT

## 2019-02-21 PROCEDURE — 71045 X-RAY EXAM CHEST 1 VIEW: CPT

## 2019-02-21 PROCEDURE — 85610 PROTHROMBIN TIME: CPT

## 2019-02-21 PROCEDURE — 770020 HCHG ROOM/CARE - TELE (206)

## 2019-02-21 PROCEDURE — 83880 ASSAY OF NATRIURETIC PEPTIDE: CPT

## 2019-02-21 PROCEDURE — 84100 ASSAY OF PHOSPHORUS: CPT

## 2019-02-21 PROCEDURE — 82803 BLOOD GASES ANY COMBINATION: CPT

## 2019-02-21 PROCEDURE — 700117 HCHG RX CONTRAST REV CODE 255: Performed by: EMERGENCY MEDICINE

## 2019-02-21 PROCEDURE — 84145 PROCALCITONIN (PCT): CPT

## 2019-02-21 PROCEDURE — 700101 HCHG RX REV CODE 250: Performed by: EMERGENCY MEDICINE

## 2019-02-21 PROCEDURE — 71275 CT ANGIOGRAPHY CHEST: CPT

## 2019-02-21 PROCEDURE — 83690 ASSAY OF LIPASE: CPT

## 2019-02-21 PROCEDURE — 93005 ELECTROCARDIOGRAM TRACING: CPT | Performed by: EMERGENCY MEDICINE

## 2019-02-21 PROCEDURE — 80053 COMPREHEN METABOLIC PANEL: CPT

## 2019-02-21 RX ORDER — POLYETHYLENE GLYCOL 3350 17 G/17G
1 POWDER, FOR SOLUTION ORAL
Status: DISCONTINUED | OUTPATIENT
Start: 2019-02-21 | End: 2019-02-25

## 2019-02-21 RX ORDER — FUROSEMIDE 10 MG/ML
20 INJECTION INTRAMUSCULAR; INTRAVENOUS
Status: DISCONTINUED | OUTPATIENT
Start: 2019-02-22 | End: 2019-02-25

## 2019-02-21 RX ORDER — LEVALBUTEROL INHALATION SOLUTION 1.25 MG/3ML
1.25 SOLUTION RESPIRATORY (INHALATION)
Status: ACTIVE | OUTPATIENT
Start: 2019-02-21 | End: 2019-02-21

## 2019-02-21 RX ORDER — SODIUM CHLORIDE 9 MG/ML
500 INJECTION, SOLUTION INTRAVENOUS ONCE
Status: COMPLETED | OUTPATIENT
Start: 2019-02-21 | End: 2019-02-21

## 2019-02-21 RX ORDER — LEVALBUTEROL INHALATION SOLUTION 1.25 MG/3ML
1.25 SOLUTION RESPIRATORY (INHALATION) ONCE
Status: COMPLETED | OUTPATIENT
Start: 2019-02-21 | End: 2019-02-21

## 2019-02-21 RX ORDER — TIOTROPIUM BROMIDE 18 UG/1
1 CAPSULE ORAL; RESPIRATORY (INHALATION)
Status: DISCONTINUED | OUTPATIENT
Start: 2019-02-22 | End: 2019-02-25

## 2019-02-21 RX ORDER — BUDESONIDE AND FORMOTEROL FUMARATE DIHYDRATE 160; 4.5 UG/1; UG/1
2 AEROSOL RESPIRATORY (INHALATION)
Status: DISCONTINUED | OUTPATIENT
Start: 2019-02-22 | End: 2019-02-25

## 2019-02-21 RX ORDER — METHYLPREDNISOLONE SODIUM SUCCINATE 125 MG/2ML
125 INJECTION, POWDER, LYOPHILIZED, FOR SOLUTION INTRAMUSCULAR; INTRAVENOUS ONCE
Status: COMPLETED | OUTPATIENT
Start: 2019-02-21 | End: 2019-02-21

## 2019-02-21 RX ORDER — BISACODYL 10 MG
10 SUPPOSITORY, RECTAL RECTAL
Status: DISCONTINUED | OUTPATIENT
Start: 2019-02-21 | End: 2019-02-25

## 2019-02-21 RX ORDER — LORAZEPAM 2 MG/ML
0.5 INJECTION INTRAMUSCULAR ONCE
Status: COMPLETED | OUTPATIENT
Start: 2019-02-21 | End: 2019-02-21

## 2019-02-21 RX ORDER — IPRATROPIUM BROMIDE AND ALBUTEROL SULFATE 2.5; .5 MG/3ML; MG/3ML
3 SOLUTION RESPIRATORY (INHALATION)
Status: COMPLETED | OUTPATIENT
Start: 2019-02-21 | End: 2019-02-21

## 2019-02-21 RX ORDER — AMOXICILLIN 250 MG
2 CAPSULE ORAL 2 TIMES DAILY
Status: DISCONTINUED | OUTPATIENT
Start: 2019-02-21 | End: 2019-02-25

## 2019-02-21 RX ORDER — ATORVASTATIN CALCIUM 40 MG/1
40 TABLET, FILM COATED ORAL DAILY
Status: DISCONTINUED | OUTPATIENT
Start: 2019-02-22 | End: 2019-02-22

## 2019-02-21 RX ADMIN — LEVALBUTEROL HYDROCHLORIDE 1.25 MG: 1.25 SOLUTION RESPIRATORY (INHALATION) at 19:54

## 2019-02-21 RX ADMIN — SODIUM CHLORIDE 500 ML: 9 INJECTION, SOLUTION INTRAVENOUS at 22:48

## 2019-02-21 RX ADMIN — AZITHROMYCIN MONOHYDRATE 500 MG: 500 INJECTION, POWDER, LYOPHILIZED, FOR SOLUTION INTRAVENOUS at 19:15

## 2019-02-21 RX ADMIN — SODIUM CHLORIDE 500 ML: 9 INJECTION, SOLUTION INTRAVENOUS at 18:19

## 2019-02-21 RX ADMIN — IPRATROPIUM BROMIDE AND ALBUTEROL SULFATE 3 ML: .5; 3 SOLUTION RESPIRATORY (INHALATION) at 17:53

## 2019-02-21 RX ADMIN — METHYLPREDNISOLONE SODIUM SUCCINATE 125 MG: 125 INJECTION, POWDER, FOR SOLUTION INTRAMUSCULAR; INTRAVENOUS at 18:19

## 2019-02-21 RX ADMIN — SODIUM CHLORIDE 500 ML: 9 INJECTION, SOLUTION INTRAVENOUS at 20:11

## 2019-02-21 RX ADMIN — IOHEXOL 88 ML: 350 INJECTION, SOLUTION INTRAVENOUS at 21:45

## 2019-02-21 RX ADMIN — LORAZEPAM 0.5 MG: 2 INJECTION INTRAMUSCULAR; INTRAVENOUS at 20:23

## 2019-02-21 ASSESSMENT — PULMONARY FUNCTION TESTS
EPAP_CMH2O: 4
EPAP_CMH2O: 4

## 2019-02-21 ASSESSMENT — LIFESTYLE VARIABLES: EVER_SMOKED: YES

## 2019-02-21 ASSESSMENT — ENCOUNTER SYMPTOMS
FEVER: 0
SHORTNESS OF BREATH: 1
COUGH: 1
DIFFICULTY BREATHING: 1
CHILLS: 0

## 2019-02-22 ENCOUNTER — APPOINTMENT (OUTPATIENT)
Dept: CARDIOLOGY | Facility: MEDICAL CENTER | Age: 74
DRG: 291 | End: 2019-02-22
Attending: FAMILY MEDICINE
Payer: MEDICARE

## 2019-02-22 ENCOUNTER — APPOINTMENT (OUTPATIENT)
Dept: PULMONOLOGY | Facility: HOSPICE | Age: 74
End: 2019-02-22
Payer: MEDICARE

## 2019-02-22 PROBLEM — J96.01 ACUTE RESPIRATORY FAILURE WITH HYPOXIA (HCC): Status: ACTIVE | Noted: 2019-02-21

## 2019-02-22 LAB
ALBUMIN SERPL BCP-MCNC: 3.9 G/DL (ref 3.2–4.9)
ALBUMIN/GLOB SERPL: 1.8 G/DL
ALP SERPL-CCNC: 59 U/L (ref 30–99)
ALT SERPL-CCNC: 45 U/L (ref 2–50)
ANION GAP SERPL CALC-SCNC: 9 MMOL/L (ref 0–11.9)
AST SERPL-CCNC: 55 U/L (ref 12–45)
BASOPHILS # BLD AUTO: 0.1 % (ref 0–1.8)
BASOPHILS # BLD: 0.01 K/UL (ref 0–0.12)
BILIRUB SERPL-MCNC: 0.9 MG/DL (ref 0.1–1.5)
BNP SERPL-MCNC: 2996 PG/ML (ref 0–100)
BUN SERPL-MCNC: 18 MG/DL (ref 8–22)
CALCIUM SERPL-MCNC: 8.7 MG/DL (ref 8.4–10.2)
CHLORIDE SERPL-SCNC: 102 MMOL/L (ref 96–112)
CO2 SERPL-SCNC: 25 MMOL/L (ref 20–33)
CREAT SERPL-MCNC: 0.96 MG/DL (ref 0.5–1.4)
EKG IMPRESSION: NORMAL
EOSINOPHIL # BLD AUTO: 0 K/UL (ref 0–0.51)
EOSINOPHIL NFR BLD: 0 % (ref 0–6.9)
ERYTHROCYTE [DISTWIDTH] IN BLOOD BY AUTOMATED COUNT: 48.2 FL (ref 35.9–50)
GLOBULIN SER CALC-MCNC: 2.2 G/DL (ref 1.9–3.5)
GLUCOSE SERPL-MCNC: 220 MG/DL (ref 65–99)
HCT VFR BLD AUTO: 38.4 % (ref 37–47)
HGB BLD-MCNC: 12.2 G/DL (ref 12–16)
IMM GRANULOCYTES # BLD AUTO: 0.04 K/UL (ref 0–0.11)
IMM GRANULOCYTES NFR BLD AUTO: 0.5 % (ref 0–0.9)
INR PPP: 1.79 (ref 0.87–1.13)
LV EJECT FRACT  99904: 20
LV EJECT FRACT MOD 2C 99903: 10.87
LV EJECT FRACT MOD 4C 99902: 12
LV EJECT FRACT MOD BP 99901: 12.08
LYMPHOCYTES # BLD AUTO: 0.23 K/UL (ref 1–4.8)
LYMPHOCYTES NFR BLD: 3.1 % (ref 22–41)
MCH RBC QN AUTO: 31.3 PG (ref 27–33)
MCHC RBC AUTO-ENTMCNC: 31.8 G/DL (ref 33.6–35)
MCV RBC AUTO: 98.5 FL (ref 81.4–97.8)
MONOCYTES # BLD AUTO: 0.12 K/UL (ref 0–0.85)
MONOCYTES NFR BLD AUTO: 1.6 % (ref 0–13.4)
NEUTROPHILS # BLD AUTO: 6.96 K/UL (ref 2–7.15)
NEUTROPHILS NFR BLD: 94.7 % (ref 44–72)
NRBC # BLD AUTO: 0 K/UL
NRBC BLD-RTO: 0 /100 WBC
PLATELET # BLD AUTO: 215 K/UL (ref 164–446)
PMV BLD AUTO: 10.3 FL (ref 9–12.9)
POTASSIUM SERPL-SCNC: 3.7 MMOL/L (ref 3.6–5.5)
PROCALCITONIN SERPL-MCNC: <0.05 NG/ML
PROT SERPL-MCNC: 6.1 G/DL (ref 6–8.2)
PROTHROMBIN TIME: 20.6 SEC (ref 12–14.6)
RBC # BLD AUTO: 3.9 M/UL (ref 4.2–5.4)
SODIUM SERPL-SCNC: 136 MMOL/L (ref 135–145)
TSH SERPL DL<=0.005 MIU/L-ACNC: 0.65 UIU/ML (ref 0.38–5.33)
WBC # BLD AUTO: 7.4 K/UL (ref 4.8–10.8)

## 2019-02-22 PROCEDURE — 93306 TTE W/DOPPLER COMPLETE: CPT | Mod: 26 | Performed by: INTERNAL MEDICINE

## 2019-02-22 PROCEDURE — 93306 TTE W/DOPPLER COMPLETE: CPT

## 2019-02-22 PROCEDURE — A9270 NON-COVERED ITEM OR SERVICE: HCPCS | Performed by: INTERNAL MEDICINE

## 2019-02-22 PROCEDURE — 93010 ELECTROCARDIOGRAM REPORT: CPT | Performed by: INTERNAL MEDICINE

## 2019-02-22 PROCEDURE — 700101 HCHG RX REV CODE 250: Performed by: FAMILY MEDICINE

## 2019-02-22 PROCEDURE — 99233 SBSQ HOSP IP/OBS HIGH 50: CPT | Performed by: INTERNAL MEDICINE

## 2019-02-22 PROCEDURE — 93005 ELECTROCARDIOGRAM TRACING: CPT | Performed by: FAMILY MEDICINE

## 2019-02-22 PROCEDURE — 84443 ASSAY THYROID STIM HORMONE: CPT

## 2019-02-22 PROCEDURE — A9270 NON-COVERED ITEM OR SERVICE: HCPCS | Performed by: FAMILY MEDICINE

## 2019-02-22 PROCEDURE — 94760 N-INVAS EAR/PLS OXIMETRY 1: CPT

## 2019-02-22 PROCEDURE — 770020 HCHG ROOM/CARE - TELE (206)

## 2019-02-22 PROCEDURE — 700102 HCHG RX REV CODE 250 W/ 637 OVERRIDE(OP): Performed by: INTERNAL MEDICINE

## 2019-02-22 PROCEDURE — 700111 HCHG RX REV CODE 636 W/ 250 OVERRIDE (IP): Performed by: INTERNAL MEDICINE

## 2019-02-22 PROCEDURE — 700111 HCHG RX REV CODE 636 W/ 250 OVERRIDE (IP): Performed by: FAMILY MEDICINE

## 2019-02-22 PROCEDURE — 80053 COMPREHEN METABOLIC PANEL: CPT

## 2019-02-22 PROCEDURE — 85025 COMPLETE CBC W/AUTO DIFF WBC: CPT

## 2019-02-22 PROCEDURE — 85610 PROTHROMBIN TIME: CPT

## 2019-02-22 PROCEDURE — 94640 AIRWAY INHALATION TREATMENT: CPT

## 2019-02-22 PROCEDURE — 99223 1ST HOSP IP/OBS HIGH 75: CPT | Performed by: INTERNAL MEDICINE

## 2019-02-22 PROCEDURE — 700102 HCHG RX REV CODE 250 W/ 637 OVERRIDE(OP): Performed by: FAMILY MEDICINE

## 2019-02-22 PROCEDURE — 83036 HEMOGLOBIN GLYCOSYLATED A1C: CPT

## 2019-02-22 PROCEDURE — 83880 ASSAY OF NATRIURETIC PEPTIDE: CPT

## 2019-02-22 RX ORDER — WARFARIN SODIUM 2 MG/1
4 TABLET ORAL
Status: COMPLETED | OUTPATIENT
Start: 2019-02-22 | End: 2019-02-22

## 2019-02-22 RX ORDER — LORAZEPAM 0.5 MG/1
0.5 TABLET ORAL EVERY 4 HOURS PRN
Status: DISCONTINUED | OUTPATIENT
Start: 2019-02-22 | End: 2019-02-25

## 2019-02-22 RX ORDER — DIGOXIN 0.25 MG/ML
250 INJECTION INTRAMUSCULAR; INTRAVENOUS ONCE
Status: COMPLETED | OUTPATIENT
Start: 2019-02-22 | End: 2019-02-22

## 2019-02-22 RX ORDER — WARFARIN SODIUM 2.5 MG/1
2.5 TABLET ORAL
Status: COMPLETED | OUTPATIENT
Start: 2019-02-22 | End: 2019-02-22

## 2019-02-22 RX ORDER — LORAZEPAM 0.5 MG/1
0.5 TABLET ORAL ONCE
Status: COMPLETED | OUTPATIENT
Start: 2019-02-22 | End: 2019-02-22

## 2019-02-22 RX ORDER — DIGOXIN 125 MCG
125 TABLET ORAL DAILY
Status: DISCONTINUED | OUTPATIENT
Start: 2019-02-24 | End: 2019-02-25

## 2019-02-22 RX ORDER — LEVALBUTEROL INHALATION SOLUTION 1.25 MG/3ML
1.25 SOLUTION RESPIRATORY (INHALATION)
Status: DISCONTINUED | OUTPATIENT
Start: 2019-02-22 | End: 2019-02-25

## 2019-02-22 RX ORDER — ATORVASTATIN CALCIUM 40 MG/1
40 TABLET, FILM COATED ORAL EVERY EVENING
Status: DISCONTINUED | OUTPATIENT
Start: 2019-02-22 | End: 2019-02-25

## 2019-02-22 RX ORDER — CARVEDILOL 3.12 MG/1
3.12 TABLET ORAL 2 TIMES DAILY WITH MEALS
Status: DISCONTINUED | OUTPATIENT
Start: 2019-02-22 | End: 2019-02-25

## 2019-02-22 RX ORDER — DIGOXIN 0.25 MG/ML
250 INJECTION INTRAMUSCULAR; INTRAVENOUS DAILY
Status: COMPLETED | OUTPATIENT
Start: 2019-02-23 | End: 2019-02-23

## 2019-02-22 RX ADMIN — BUDESONIDE AND FORMOTEROL FUMARATE DIHYDRATE 2 PUFF: 160; 4.5 AEROSOL RESPIRATORY (INHALATION) at 20:07

## 2019-02-22 RX ADMIN — FUROSEMIDE 20 MG: 10 INJECTION, SOLUTION INTRAVENOUS at 15:46

## 2019-02-22 RX ADMIN — LORAZEPAM 0.5 MG: 0.5 TABLET ORAL at 00:51

## 2019-02-22 RX ADMIN — Medication 400 MG: at 05:46

## 2019-02-22 RX ADMIN — LEVALBUTEROL HYDROCHLORIDE 1.25 MG: 1.25 SOLUTION RESPIRATORY (INHALATION) at 11:06

## 2019-02-22 RX ADMIN — CARVEDILOL 3.12 MG: 3.12 TABLET, FILM COATED ORAL at 00:51

## 2019-02-22 RX ADMIN — ATORVASTATIN CALCIUM 40 MG: 40 TABLET, FILM COATED ORAL at 00:50

## 2019-02-22 RX ADMIN — LEVALBUTEROL HYDROCHLORIDE 1.25 MG: 1.25 SOLUTION RESPIRATORY (INHALATION) at 01:37

## 2019-02-22 RX ADMIN — LEVALBUTEROL HYDROCHLORIDE 1.25 MG: 1.25 SOLUTION RESPIRATORY (INHALATION) at 07:35

## 2019-02-22 RX ADMIN — BUDESONIDE AND FORMOTEROL FUMARATE DIHYDRATE 2 PUFF: 160; 4.5 AEROSOL RESPIRATORY (INHALATION) at 07:41

## 2019-02-22 RX ADMIN — DIGOXIN 250 MCG: 0.25 INJECTION INTRAMUSCULAR; INTRAVENOUS at 19:14

## 2019-02-22 RX ADMIN — LEVALBUTEROL HYDROCHLORIDE 1.25 MG: 1.25 SOLUTION RESPIRATORY (INHALATION) at 15:06

## 2019-02-22 RX ADMIN — TIOTROPIUM BROMIDE 1 CAPSULE: 18 CAPSULE ORAL; RESPIRATORY (INHALATION) at 07:39

## 2019-02-22 RX ADMIN — WARFARIN SODIUM 2.5 MG: 2.5 TABLET ORAL at 00:51

## 2019-02-22 RX ADMIN — CARVEDILOL 3.12 MG: 3.12 TABLET, FILM COATED ORAL at 08:25

## 2019-02-22 RX ADMIN — LORAZEPAM 0.5 MG: 0.5 TABLET ORAL at 22:26

## 2019-02-22 RX ADMIN — ATORVASTATIN CALCIUM 40 MG: 40 TABLET, FILM COATED ORAL at 17:28

## 2019-02-22 RX ADMIN — FUROSEMIDE 20 MG: 10 INJECTION, SOLUTION INTRAVENOUS at 05:48

## 2019-02-22 RX ADMIN — SERTRALINE HYDROCHLORIDE 100 MG: 50 TABLET ORAL at 05:46

## 2019-02-22 RX ADMIN — LEVALBUTEROL HYDROCHLORIDE 1.25 MG: 1.25 SOLUTION RESPIRATORY (INHALATION) at 20:07

## 2019-02-22 RX ADMIN — FUROSEMIDE 20 MG: 10 INJECTION, SOLUTION INTRAVENOUS at 00:51

## 2019-02-22 RX ADMIN — WARFARIN SODIUM 4 MG: 2 TABLET ORAL at 17:31

## 2019-02-22 RX ADMIN — Medication 400 MG: at 17:28

## 2019-02-22 RX ADMIN — CARVEDILOL 3.12 MG: 3.12 TABLET, FILM COATED ORAL at 17:28

## 2019-02-22 ASSESSMENT — LIFESTYLE VARIABLES
EVER_SMOKED: YES
TOTAL SCORE: 0
AVERAGE NUMBER OF DAYS PER WEEK YOU HAVE A DRINK CONTAINING ALCOHOL: 7
CONSUMPTION TOTAL: POSITIVE
TOTAL SCORE: 0
EVER FELT BAD OR GUILTY ABOUT YOUR DRINKING: NO
HAVE PEOPLE ANNOYED YOU BY CRITICIZING YOUR DRINKING: NO
ON A TYPICAL DAY WHEN YOU DRINK ALCOHOL HOW MANY DRINKS DO YOU HAVE: 2
EVER_SMOKED: YES
TOTAL SCORE: 0
HAVE YOU EVER FELT YOU SHOULD CUT DOWN ON YOUR DRINKING: NO
ALCOHOL_USE: YES
HOW MANY TIMES IN THE PAST YEAR HAVE YOU HAD 5 OR MORE DRINKS IN A DAY: 0
EVER HAD A DRINK FIRST THING IN THE MORNING TO STEADY YOUR NERVES TO GET RID OF A HANGOVER: NO

## 2019-02-22 ASSESSMENT — COGNITIVE AND FUNCTIONAL STATUS - GENERAL
MOBILITY SCORE: 24
SUGGESTED CMS G CODE MODIFIER DAILY ACTIVITY: CH
DAILY ACTIVITIY SCORE: 24
SUGGESTED CMS G CODE MODIFIER MOBILITY: CH

## 2019-02-22 ASSESSMENT — COPD QUESTIONNAIRES
COPD SCREENING SCORE: 6
HAVE YOU SMOKED AT LEAST 100 CIGARETTES IN YOUR ENTIRE LIFE: YES
DO YOU EVER COUGH UP ANY MUCUS OR PHLEGM?: NO/ONLY WITH OCCASIONAL COLDS OR INFECTIONS
DURING THE PAST 4 WEEKS HOW MUCH DID YOU FEEL SHORT OF BREATH: SOME OF THE TIME

## 2019-02-22 ASSESSMENT — PATIENT HEALTH QUESTIONNAIRE - PHQ9
SUM OF ALL RESPONSES TO PHQ9 QUESTIONS 1 AND 2: 0
2. FEELING DOWN, DEPRESSED, IRRITABLE, OR HOPELESS: NOT AT ALL
1. LITTLE INTEREST OR PLEASURE IN DOING THINGS: NOT AT ALL

## 2019-02-22 ASSESSMENT — ENCOUNTER SYMPTOMS
VOMITING: 0
NAUSEA: 0
PND: 1
ABDOMINAL PAIN: 0
PALPITATIONS: 0
WEAKNESS: 1
BLURRED VISION: 0
ORTHOPNEA: 1
SHORTNESS OF BREATH: 1

## 2019-02-22 NOTE — PROGRESS NOTES
Admit to 315-2 from ED. Oriented to room and equipment. Assessment and admit completed. C/o continued anxiety and SOB, MD paged, medicated per MAR. RT paged for  Treatment. On NC at 4L at time of admission, O2 sat 97%. Denies pain, nausea at this time. Due medications given. EKG completed for frequent ectopy on tele monitor. Call light instructions given, in reach at all times.

## 2019-02-22 NOTE — PROGRESS NOTES
Resting with eyes closed. Respirations even and unlabored. NC in place. O2 sat 97%. No c/o. Due medications given without issue. Call light in reach.

## 2019-02-22 NOTE — FLOWSHEET NOTE
Patient received a duoneb treatment at 1800 and just now getting xopenex treatment.  Patient states that treatments are not helping her and that she is feeling anxious.  Dr. Madsen notified 2014.  Breath sounds very diminished throughout with no change after nebulizer tx.     02/21/19 1955   Interdisciplinary Plan of Care-Goals (Indications)   Bronchodilator Indications History / Diagnosis   RT Assessment of Delivered Medications   Evaluation of Medication Delivery Daily Yes-- Pt /Family has been Instructed in use of Respiratory Medications and Adverse Reactions   SVN Group   #SVN Performed Yes   Given By: Mouthpiece  (1.25 xopenex)   Chest Exam   Work Of Breathing / Effort Mild   Respiration 20   Breath Sounds   RUL Breath Sounds Diminished   RML Breath Sounds Diminished   RLL Breath Sounds Diminished   DELLA Breath Sounds Diminished   LLL Breath Sounds Diminished   Secretions   Cough Dry;Non Productive   Oximetry   Continuous Oximetry Yes   Oxygen   Home O2 LPM Flow 2.5 LPM   Pulse Oximetry 98 %   O2 (LPM) 4   O2 Daily Delivery Respiratory  Silicone Nasal Cannula

## 2019-02-22 NOTE — ED TRIAGE NOTES
Pt bib family with c/o difficulty breathing and shortness or breath since Monday. Pt was seen at  and advised to be seen in the Ed for further evaluation for an abnormal EKG.

## 2019-02-22 NOTE — H&P
DATE OF ADMISSION:  02/21/2019    HISTORY OF PRESENT ILLNESS:  This is a 73-year-old female that comes to the   emergency room with her  by her side with a complaint of shortness of   breath.  The patient states the shortness of breath started at about this past   Monday.  However, she also states that she noticed that when she walks from   the bedroom to the living room, she gets short of breath.  The patient also   states that there has been no fever, no chills.  She has had some minor cough   with no sputum production.  The patient states the shortness of birth is lot   worse today.  She had gone to her primary care physician.  She was sent to the   urgent care.  From urgent care, the patient was sent to the emergency room   here at the hospital.  The patient also denies any chest pain, denies any   fever, and denies any chills. The patient also denies any wheezing.  The   patient states the only thing that brought her to the hospital was increased   shortness of breath.    PAST MEDICAL HISTORY:  The patient has a past medical history of paroxysmal   atrial fibrillation, hyperlipidemia, and COPD.    SOCIAL HISTORY:  The patient denies any tobacco use, admits to occasional   alcohol.    PAST SURGICAL HISTORY:  Positive for having mitral valve repair about 3 years   ago, also appendectomy.    MEDICATIONS:  On the day of admission medications are ProAir p.r.n.    Apparently she was given prednisone taper recently for question of COPD   exacerbation; also she was given Zithromax recently.  The patient is on   Coumadin 1.5 mg p.o. daily, Tylenol p.r.n., Zoloft 100 mg p.o. daily, Spiriva   2 puffs daily, Bactroban is topical 2 times a day, Lipitor is 40 mg p.o.   daily, Incruse 1 puff daily, Advair Diskus 250/50 one puff b.i.d., and   magnesium oxide 400 mg p.o. 2 times a day.    Code status was discussed with the patient and the patient would like to be   full code.    REVIEW OF SYSTEMS:  All 14 systems  discussed.  All of them were negative   except for what I have mentioned in the history of present illness.    PHYSICAL EXAMINATION:  VITAL SIGNS:  Temperature of 36.4, pulse of 102, respiratory rate of 25, O2   saturation of 98% on 4 L of oxygen, and blood pressure of 121/79.  GENERAL APPEARANCE:  The patient is awake, alert, and oriented x3.  NEUROLOGIC:  Cranial nerves II through XII are intact.  HEAD AND NECK:  Neck is supple.  Oral cavity is dry.  CARDIOVASCULAR:  S1, S2 tachy.  LUNGS:  Diminished breath sounds bilaterally.  No wheezing and no rhonchi   noted diffusely.  ABDOMEN:  Soft and nontender.  LOWER EXTREMITIES:  No edema or rash.    LABORATORY STUDIES:  WBC of 8.8, H and H of 12.4 and 39.3, platelets of   227,000.  Sodium 139, potassium is 3.8, chloride of 102, bicarbonate of 24,   glucose 126, BUN of 24, creatinine of 0.88.  BNP of 2290 and troponin is 0.04   x2.  INR is 2.08.  A CTA was done on the patient.  CTA impression is no large   essential pulmonary embolus is appreciated.  Evaluation of the subsegmental   branches is essentially nondiagnostic due to motion artifact.  Additional   imaging would be required for definitive exclusion of small distal pulmonary   emboli.  A small left pleural effusion and trace right pleral effusion.    Linear consolidations in the bilateral lung bases favor changes of   atelectasis.  Emphysema noted, cardiomegaly noted, atherosclerosis and   coronary artery disease.    ASSESSMENT AND PLAN:   This is a 73-year-old female with acute congestive heart failure:  1.  Admit.  2.  Cardiac tele.  3.  We will start the patient on Lasix 20 mg IV b.i.d.  4.  Her BNP is elevated.  5.  We will check the BNP in the morning.  6.  We will get a cardiac echo on the patient.  7.  Also start the patient on Coreg 3.125 mg p.o. b.i.d.  8.  No ACE since the patient's blood pressure is on the low side.    For chronic obstructive pulmonary disease:  1.  This is chronic.  2.  She is not in  acute exacerbation.  3.  We will continue with home medications.    For paroxysmal atrial fibrillation:  1.  EKG shows patient is in sinus rhythm at this time.   2.  We will observe on tele.  3.  INR is therapeutic.  4.  Coumadin is per pharmacy.    Question of pneumonia:  1.  I do not believe the patient has pneumonia.  2.  CTA of the chest is noted.  3.  We will get a procalcitonin on the patient.  4.  No antibiotics at this time.    For deep venous thrombosis prophylaxis the patient is on Coumadin and INR is   therapeutic.       ____________________________________     Ayanna Graham MD    [facility]    DD:  02/21/2019 23:39:51  DT:  02/22/2019 01:48:24    D#:  [id number]  Job#:  [id number]

## 2019-02-22 NOTE — PROGRESS NOTES
Tele strip at 0004 shows Sinus Tach, HR of 108     CT 0.14  QRS 0.10  QT 0.34     Telemetry Summary     Rhythm Sinus Rhythm/ Sinus Tach  Rate   Ectopy  Frequent PVC, Couplet, Triplets, Bi, Tri, wandering atrial pacemaker, occasional PAC, per MT Mike     Telemetry monitoring strips placed in patient's chart.

## 2019-02-22 NOTE — CARE PLAN
Problem: Oxygenation:  Goal: Maintain adequate oxygenation dependent on patient condition  Outcome: PROGRESSING AS EXPECTED  Monitor oxygenation for SpO2 >90%  Intervention: Manage oxygen therapy by monitoring pulse oximetry and/or ABG values  Oxygen is currently required at 3 lpm nasal cannula for SpO2 >90%  Intervention: Levels of oxygenation will improve to baseline  Patient did require oxygen at home prior to this admission.       Problem: Bronchoconstriction:  Goal: Improve in air movement and diminished wheezing  Outcome: PROGRESSING AS EXPECTED  Patient does claim a benefit from bronchodilator therapy. There was a increase in aeration heard  Intervention: Implement inhaled treatments  Patient is receiving Xopenex Qid and Symbicort BID.  Intervention: Evaluate and manage medication effects  Patient will be evaluated before and after medication delivery to assess effectiveness of therapy. Currently there seems to be very little change.

## 2019-02-22 NOTE — PROGRESS NOTES
Pt sitting up in bed, RR E/U , Pt's anxiety and WOB has improved.  at bedside, Pt denies pain., WCM

## 2019-02-22 NOTE — FLOWSHEET NOTE
02/22/19 0740   Events/Summary/Plan   Non-Invasive Resp Device Site Inspection Completed Intact   Interdisciplinary Plan of Care-Goals (Indications)   Bronchodilator Indications Physical Exam / Hyperinflation / Wheezing (bronchospasm)   Hyperinflation Protocol Indications Atelectasis Documented by Chest X-Ray   Interdisciplinary Plan of Care-Outcomes    Bronchodilator Outcome Improvement in Airflow (peak flow, PFT)   Education   Education Yes - Pt. / Family has been Instructed in use of Respiratory Medications and Adverse Reactions   RT Assessment of Delivered Medications   Evaluation of Medication Delivery Daily Yes-- Pt /Family has been Instructed in use of Respiratory Medications and Adverse Reactions   SVN Group   #SVN Performed Yes   Given By: Mouthpiece   Date SVN Next Change Due (Q 7 Days) 03/01/19   MDI/DPI Group   #MDI/DPI Given MDI/DPI x 2   Incentive Spirometry Group   Breathing Exercises Yes   Incentive Spirometer Volume 1000 mL   Incentive Spirometer Next Change Date (Q 30 Days) 03/22/19   Chest Exam   Work Of Breathing / Effort Moderate   Respiration (!) 24   Pulse 96   Breath Sounds   RUL Breath Sounds Diminished   RML Breath Sounds Diminished   RLL Breath Sounds Diminished   DELLA Breath Sounds Diminished   LLL Breath Sounds Diminished   Secretions   Cough Congested   How Sputum Obtained Spontaneous   Oximetry   Continuous Oximetry Yes   Oxygen   Home O2 LPM Flow 2.5 LPM   Home O2 Delivery Method Nasal Cannula   Pulse Oximetry 98 %   O2 (LPM) 3   O2 Daily Delivery Respiratory  Silicone Nasal Cannula

## 2019-02-22 NOTE — ASSESSMENT & PLAN NOTE
-ECHO with EF 5-10%, prior mitral valve surgery  Somewhat improved  Continue daily weights and i/o trending.   -continue IV lasix 20 mg BID  -trend BNP  -cards following  -continue coreg and statin, consider ACE  -PAGE monday for mitral valve.   -ok TSH

## 2019-02-22 NOTE — FLOWSHEET NOTE
02/22/19 1110   Events/Summary/Plan   Non-Invasive Resp Device Site Inspection Completed Intact   Interdisciplinary Plan of Care-Goals (Indications)   Bronchodilator Indications Physical Exam / Hyperinflation / Wheezing (bronchospasm)   Interdisciplinary Plan of Care-Outcomes    Bronchodilator Outcome Improved Patient Appearance with Decreased use of Accessory Muscles   Education   Education Yes - Pt. / Family has been Instructed in use of Respiratory Medications and Adverse Reactions   RT Assessment of Delivered Medications   Evaluation of Medication Delivery Daily Yes-- Pt /Family has been Instructed in use of Respiratory Medications and Adverse Reactions   SVN Group   #SVN Performed Yes   Given By: Mouthpiece   Date SVN Next Change Due (Q 7 Days) 03/01/19   Incentive Spirometry Group   Breathing Exercises Yes   Incentive Spirometer Volume 1000 mL   Incentive Spirometer Next Change Date (Q 30 Days) 03/22/19   Chest Exam   Work Of Breathing / Effort Mild   Respiration 20   Pulse 100   Breath Sounds   RUL Breath Sounds Diminished;Clear   RML Breath Sounds Diminished   RLL Breath Sounds Diminished   DELLA Breath Sounds Diminished;Clear   LLL Breath Sounds Diminished   Secretions   Cough Congested   How Sputum Obtained Spontaneous   Oximetry   Continuous Oximetry Yes   Oxygen   Pulse Oximetry 95 %   O2 (LPM) 3   O2 Daily Delivery Respiratory  Silicone Nasal Cannula

## 2019-02-22 NOTE — CARE PLAN
Problem: Safety  Goal: Will remain free from injury  Outcome: PROGRESSING AS EXPECTED  Oriented to room and equipment. Call light instructions given, in reach at all times. Slipper socks in place. Floor dry and uncluttered. Bed locked and in lowest position.     Problem: Knowledge Deficit  Goal: Knowledge of disease process/condition, treatment plan, diagnostic tests, and medications will improve  Outcome: PROGRESSING AS EXPECTED  Plan of care discussed with patient. Opportunity given for questions. States understanding.     Problem: Pain Management  Goal: Pain level will decrease to patient's comfort goal  Outcome: PROGRESSING AS EXPECTED  0-10 scale used appropriately. Pharmacologic and nonpharmacologic interventions in place.

## 2019-02-22 NOTE — PROGRESS NOTES
Hospital Medicine Daily Progress Note    Date of Service  2/22/2019    Chief Complaint  73 y.o. female admitted 2/21/2019 with acute systolic CHF exacerbation    Hospital Course  See below    Interval Problem Update  sCHF-EF noted to be 20%, mitral valve appears to be functional. Anxiety is better this AM, urinating somewhat, denies any increased fluid on her body. Still quite SOB with ambulation. TELE showed ST, frequent ectopy.     Consultants/Specialty  [facility]-CARDS    Code Status  fcfc    Disposition  tele    Review of Systems  Review of Systems   Constitutional: Positive for malaise/fatigue.   Eyes: Negative for blurred vision.   Respiratory: Positive for shortness of breath.    Cardiovascular: Positive for orthopnea and PND. Negative for chest pain, palpitations and leg swelling.   Gastrointestinal: Negative for abdominal pain, nausea and vomiting.   Genitourinary: Negative for dysuria.   Neurological: Positive for weakness.   All other systems reviewed and are negative.       Physical Exam  Temp:  [36.4 °C (97.5 °F)-36.6 °C (97.8 °F)] 36.4 °C (97.5 °F)  Pulse:  [] 102  Resp:  [14-38] 16  BP: ()/(65-96) 90/70  SpO2:  [94 %-100 %] 94 %    Physical Exam   Constitutional: She is oriented to person, place, and time. She appears well-developed and well-nourished. No distress.   HENT:   Head: Normocephalic and atraumatic.   Mouth/Throat: No oropharyngeal exudate.   Eyes: Pupils are equal, round, and reactive to light. No scleral icterus.   Neck: Normal range of motion. Neck supple. No thyromegaly present.   Cardiovascular: Regular rhythm and intact distal pulses.    Murmur heard.  ST   Pulmonary/Chest: Effort normal. No respiratory distress. She has rales.   Abdominal: Soft. Bowel sounds are normal. She exhibits no distension. There is no tenderness.   Musculoskeletal: Normal range of motion. She exhibits no edema or tenderness.   Warm peripherally, thin limbs b/l   Neurological: She is alert and  oriented to person, place, and time. No cranial nerve deficit.   Skin: Skin is warm and dry. No rash noted.   Psychiatric: She has a normal mood and affect.   Nursing note and vitals reviewed.      Fluids    Intake/Output Summary (Last 24 hours) at 02/22/19 1452  Last data filed at 02/22/19 1056   Gross per 24 hour   Intake              350 ml   Output              380 ml   Net              -30 ml       Laboratory  Recent Labs      02/21/19 1744 02/22/19 0328   WBC  8.8  7.4   RBC  4.05*  3.90*   HEMOGLOBIN  12.4  12.2   HEMATOCRIT  39.3  38.4   MCV  97.0  98.5*   MCH  30.6  31.3   MCHC  31.6*  31.8*   RDW  47.4  48.2   PLATELETCT  227  215   MPV  10.0  10.3     Recent Labs      02/21/19 1744 02/22/19   0328   SODIUM  139  136   POTASSIUM  3.8  3.7   CHLORIDE  102  102   CO2  24  25   GLUCOSE  126*  220*   BUN  24*  18   CREATININE  0.88  0.96   CALCIUM  9.4  8.7     Recent Labs      02/21/19 1744 02/22/19   0328   APTT  29.3   --    INR  2.08*  1.79*     Recent Labs      02/21/19 1744 02/22/19   0328   BNPBTYPENAT  2290*  2996*           Imaging  EC-ECHOCARDIOGRAM COMPLETE W/O CONT   Final Result      CT-CTA CHEST PULMONARY ARTERY W/ RECONS   Final Result         1.  No large central pulmonary embolus is appreciated, evaluation of the subsegmental branches is essentially nondiagnostic due to motion artifacts. Additional imaging would be required for definitive exclusion of small distal pulmonary emboli.   2.  Small left pleural effusion and trace right pleural effusion.   3.  Linear consolidations in the bilateral lung bases favor changes of atelectasis.   4.  Emphysema   5.  Cardiomegaly   6.  Atherosclerosis and atherosclerotic coronary artery disease.      DX-CHEST-LIMITED (1 VIEW)   Final Result      1.  Minimal left lower lobe atelectasis or pneumonia.      2.  Stable cardiomegaly with prior median sternotomy.           Assessment/Plan  Acute respiratory failure with hypoxia (HCC)- (present on  admission)   Assessment & Plan    -CTA with no clear consolidations, pro-calc is normal  -not an infectious process  -minimal fluid on lung exam and CT imaging  -ECHO with new EF 20%  -consulted cards  -continue IV lasix 20 mg BID  -no wheezing  -try to wean O2     Paroxysmal atrial fibrillation (HCC)- (present on admission)   Assessment & Plan    Will observe on tele, HR is decent at this time, might need to up titrate  inr is theraputic  Coumadin as per pharmacy     Acute systolic congestive heart failure (HCC)- (present on admission)   Assessment & Plan    -ECHO with EF 20%, prior mitral valve surgery  -?tachycardia induced, valvular issues, ?ischemic heart disease  -continue IV lasix 20 mg BID  -trend BNP  -consulted cards  -continue coreg and statin, consider ACE  -might need angiogram  -check TSH     Atrial flutter (HCC)- (present on admission)   Assessment & Plan    -Tele did not show this rhythm, see above     COPD (chronic obstructive pulmonary disease) (HCC)- (present on admission)   Assessment & Plan    This is chronic  She is not in acute exacerbation  Will continue with home meds          VTE prophylaxis: coumadin

## 2019-02-22 NOTE — FLOWSHEET NOTE
02/21/19 1757   Events/Summary/Plan   Events/Summary/Plan SVN given in ER   Interdisciplinary Plan of Care-Goals (Indications)   Bronchodilator Indications History / Diagnosis   Education   Education Yes - Pt. / Family has been Instructed in use of Respiratory Equipment;Yes - Pt. / Family has been Instructed in use of Respiratory Medications and Adverse Reactions   SVN Group   #SVN Performed Yes   Given By: Mouthpiece   Date SVN Last Changed 02/21/19   Respiratory WDL   Respiratory (WDL) X   Chest Exam   Work Of Breathing / Effort Mild   Respiration 20  (post 20)   Pulse (!) 108  (post 108)   Heart Rate (Monitored) (!) 108   Breath Sounds   Pre/Post Intervention Pre Intervention Assessment  (increased aeration following svn)   RUL Breath Sounds Diminished   RML Breath Sounds Diminished   RLL Breath Sounds Diminished   DELLA Breath Sounds Diminished   LLL Breath Sounds Diminished   Oximetry   #Pulse Oximetry (Single Determination) Yes   Oxygen   Home O2 Use Prior To Admission? Yes   Home O2 LPM Flow 2.5 LPM  (3-4 liters with exertion)   Home O2 Delivery Method Nasal Cannula   Pulse Oximetry 97 %   O2 (LPM) 3   O2 Daily Delivery Respiratory  Nasal Cannula

## 2019-02-22 NOTE — ED PROVIDER NOTES
ED Provider Note    CHIEF COMPLAINT  Chief Complaint   Patient presents with   • Shortness of Breath   • Sent from Urgent Care     HPI  Kelly Lovett is a 73 y.o. female who presents to the emergency department today for evaluation of breathing difficulty.  She originally presented to her outside provider who recommended she come to the emergency room due to an irregular EKG.  Patient has a history of proximal A. fib, copd, hypertension, mitral valve repair and reports that she is chronically on oxygen and over the last 7 days has had worsening dyspnea.  This is now only with exertion and at rest and is out of proportion to her normal amount of respiratory discomfort.  She denies any fevers chills or recent illness.  She has no new leg swelling and no other acute complaints.    EKG at the outside facility was remarkable for occasional PVCs.  notes she has required oxygen for at night due to her COPD, now full time oxygen following mitral valve repair 3 years ago    Denies recent travel, leg swelling, recent surgery or immobitilty, prior DVT/PE    REVIEW OF SYSTEMS  Constitutional: No fevers, chills, or recent illness.  Skin: No rashes or diaphoresis.  Eyes: No change in vision, no discharge.  ENT: No hearing change. No rhinorrhea or nasal congestion, no ST or difficulty swallowing.  Respiratory:Chronic cough, no hemoptysis. No Wheezing.  Cardiac: No CP, edema. + hx of PND/orthopnea.  GI: No Abdominal Pain; N/V; diarrhea, constipation. No blood in stool.  : No dysuria. No D/C. No frequency or urgency.  MSK: No pain in joints or muscles. No calf pain or swelling.  Neuro: No HA or paresthesias. No focal weakness.  Psych: No SI, HI  Endocrine: No polyuria or polydipsia. No heat or cold intolerance.  Heme: No easy bruising. No history of bleeding disorders or anemia.    PAST MEDICAL HISTORY   has a past medical history of Breath shortness; COPD (chronic obstructive pulmonary disease) (HCC); Emphysema of  "lung (HCC); Heart valve disease; High cholesterol; History of heart surgery; Hyperlipidemia; Hypertension; and Sleep apnea.    SOCIAL HISTORY  Social History     Social History Main Topics   • Smoking status: Former Smoker     Packs/day: 0.50     Years: 38.00     Types: Cigarettes     Quit date: 10/11/2001   • Smokeless tobacco: Never Used   • Alcohol use 8.4 oz/week     14 Shots of liquor per week      Comment: 2 per day   • Drug use: No   • Sexual activity: Yes     Partners: Male     SURGICAL HISTORY   has a past surgical history that includes oophorectomy; appendectomy; mitral valve repair (4/20/2017); maze procedure (Left, 4/20/2017); and idalmis (4/20/2017).    CURRENT MEDICATIONS  Home Medications    **Home medications have not yet been reviewed for this encounter**       ALLERGIES  Allergies   Allergen Reactions   • Sulfa Drugs Rash     Rxn - years ago in her 20's     PHYSICAL EXAM  VITAL SIGNS: /87   Pulse (!) 115   Temp 36.4 °C (97.6 °F) (Temporal)   Resp (!) 24   Ht 1.676 m (5' 6\")   Wt 57.2 kg (126 lb)   SpO2 100%   BMI 20.34 kg/m²   Pulse ox interpretation: I interpret this pulse ox as normal.  Genl: F sitting in gurney comfortably, speaking clearly, appears in very mild respiratory distress   Head: NC/AT   ENT: Mucous membranes slightly dry, posterior pharynx clear, uvula midline, nares patent bilaterally   Eyes: Normal sclera, pupils equal round reactive to light  Neck: Supple, FROM, no LAD appreciated   Pulmonary: Lungs have decreased air movement throughout without wheezes or noted crackles in lower lung fields  Chest: No TTP  CV:  RRR, no murmur appreciated, pulses 2+ in both upper and lower extremities,  Abdomen: soft, NT/ND; no rebound/guarding, no masses palpated, no HSM   : no CVA or suprapubic tenderness  Musculoskeletal: Pain free ROM of the neck. Moving upper and lower extremities and spontaneous in coordinated fashion  Neuro: A&Ox4 (person, place, time, situation), speech fluent, " gait not assessed, no focal deficits appreciated, No cerebellar signs.Sensation is grossly intact in the distal upper and lower extremities.  5/5 strength in  and dorsiflexion/plantar flexion of the ankles  Psych: Patient has an appropriate affect and behavior  Skin: No rash or lesions.  No pallor or jaundice.  No cyanosis.  Warm and dry.     DIAGNOSTIC STUDIES / PROCEDURES    Results for orders placed or performed during the hospital encounter of 02/21/19   Troponin   Result Value Ref Range    Troponin I 0.04 0.00 - 0.04 ng/mL   Btype Natriuretic Peptide   Result Value Ref Range    B Natriuretic Peptide 2290 (H) 0 - 100 pg/mL   CBC with Differential   Result Value Ref Range    WBC 8.8 4.8 - 10.8 K/uL    RBC 4.05 (L) 4.20 - 5.40 M/uL    Hemoglobin 12.4 12.0 - 16.0 g/dL    Hematocrit 39.3 37.0 - 47.0 %    MCV 97.0 81.4 - 97.8 fL    MCH 30.6 27.0 - 33.0 pg    MCHC 31.6 (L) 33.6 - 35.0 g/dL    RDW 47.4 35.9 - 50.0 fL    Platelet Count 227 164 - 446 K/uL    MPV 10.0 9.0 - 12.9 fL    Neutrophils-Polys 74.40 (H) 44.00 - 72.00 %    Lymphocytes 13.50 (L) 22.00 - 41.00 %    Monocytes 10.70 0.00 - 13.40 %    Eosinophils 0.50 0.00 - 6.90 %    Basophils 0.60 0.00 - 1.80 %    Immature Granulocytes 0.30 0.00 - 0.90 %    Nucleated RBC 0.00 /100 WBC    Neutrophils (Absolute) 6.53 2.00 - 7.15 K/uL    Lymphs (Absolute) 1.18 1.00 - 4.80 K/uL    Monos (Absolute) 0.94 (H) 0.00 - 0.85 K/uL    Eos (Absolute) 0.04 0.00 - 0.51 K/uL    Baso (Absolute) 0.05 0.00 - 0.12 K/uL    Immature Granulocytes (abs) 0.03 0.00 - 0.11 K/uL    NRBC (Absolute) 0.00 K/uL   Complete Metabolic Panel (CMP)   Result Value Ref Range    Sodium 139 135 - 145 mmol/L    Potassium 3.8 3.6 - 5.5 mmol/L    Chloride 102 96 - 112 mmol/L    Co2 24 20 - 33 mmol/L    Anion Gap 13.0 (H) 0.0 - 11.9    Glucose 126 (H) 65 - 99 mg/dL    Bun 24 (H) 8 - 22 mg/dL    Creatinine 0.88 0.50 - 1.40 mg/dL    Calcium 9.4 8.4 - 10.2 mg/dL    AST(SGOT) 31 12 - 45 U/L    ALT(SGPT) 26 2 -  50 U/L    Alkaline Phosphatase 56 30 - 99 U/L    Total Bilirubin 0.9 0.1 - 1.5 mg/dL    Albumin 4.5 3.2 - 4.9 g/dL    Total Protein 7.0 6.0 - 8.2 g/dL    Globulin 2.5 1.9 - 3.5 g/dL    A-G Ratio 1.8 g/dL   Prothrombin Time   Result Value Ref Range    PT 23.1 (H) 12.0 - 14.6 sec    INR 2.08 (H) 0.87 - 1.13   APTT   Result Value Ref Range    APTT 29.3 24.7 - 36.0 sec   Lipase   Result Value Ref Range    Lipase 37 7 - 58 U/L   Magnesium   Result Value Ref Range    Magnesium 1.5 1.5 - 2.5 mg/dL   Phosphorus   Result Value Ref Range    Phosphorus 3.8 2.5 - 4.5 mg/dL   D-Dimer (only helpful in low pre-test probability wells critieria. Do not order if patient ruled out by PERC criteria. See Weblinks at top of Labs section)   Result Value Ref Range    D-Dimer Screen <0.40 0.00 - 0.50 ug/mL (FEU)   Venous Blood Gas   Result Value Ref Range    Venous Bg Ph 7.42 7.31 - 7.45    Venous Bg Pco2 40.1 (L) 41.0 - 51.0 mmHg    Venous Bg Po2 70.7 (H) 25.0 - 40.0 mmHg    Venous Bg O2 Saturation 93.4 %    Venous Bg Hco3 26 24 - 28 mmol/L    Venous Bg Base Excess 1 mmol/L    Body Temp see below Centigrade   ESTIMATED GFR   Result Value Ref Range    GFR If African American >60 >60 mL/min/1.73 m 2    GFR If Non African American >60 >60 mL/min/1.73 m 2   TROPONIN   Result Value Ref Range    Troponin I 0.04 0.00 - 0.04 ng/mL   EKG   Result Value Ref Range    Report       [facility].    Test Date:  2019  Pt Name:    JOSE QUIROS             Department: [facility]  MRN:        [id number]                      [facility]  Gender:     Female                       Technician: STEPHANIE  :        [id number]                   Requested By:ER TRIAGE PROTOCOL  Order #:    [id number]                    Reading MD:    Measurements  Intervals                                Axis  Rate:       113                          P:          22  MA:         156                          QRS:        -15  QRSD:       92                            T:          117  QT:         328  QTc:        450    Interpretive Statements  SINUS TACHYCARDIA  PAIRED VENTRICULAR PREMATURE COMPLEXES  BORDERLINE LEFT AXIS DEVIATION  NONSPECIFIC REPOL ABNORMALITY, DIFFUSE LEADS  Compared to ECG 2018 10:25:36  Ventricular premature complex(es) now present  Sinus rhythm no longer present     EKG   Result Value Ref Range    Report       [facility]    Test Date:  2019  Pt Name:    JOSE QUIROS             Department: [facility]  MRN:        [id number]                      [facility]  Gender:     Female                       Technician: RON  :        [id number]                   Requested By:ILYA ARMSTRONG  Order #:    [id number]                    Reading MD:    Measurements  Intervals                                Axis  Rate:       112                          P:          49  WI:         143                          QRS:        -13  QRSD:       100                          T:          145  QT:         339  QTc:        463    Interpretive Statements  Sinus tachycardia  Ventricular tachycardia, unsustained  Probable LVH with secondary repol abnrm  Compared to ECG 2019 17:34:53  Ventricular tachycardia now present  Ventricular premature complex(es) no longer present       EKG  EKG#1 at 1740 shows: rate of 113, rhythm sinus, axis leftward, intervals normal, QRS narrow, ST/T waves without acute ischemia or infarction    EKG#2 at 2050 shows: rate of 112, rhythm sinus with multiple PVCs noted, axis leftward, intervals normal, QRS narrow, ST/T waves without acute ischemia.    RADIOLOGY  CXR:  1.  Minimal left lower lobe atelectasis or pneumonia.  2.  Stable cardiomegaly with prior median sternotomy.    CTA Chest:  1.  No large central pulmonary embolus is appreciated, evaluation of the subsegmental branches is essentially nondiagnostic due to motion artifacts. Additional imaging would be required for  definitive exclusion of small distal pulmonary emboli.  2.  Small left pleural effusion and trace right pleural effusion.  3.  Linear consolidations in the bilateral lung bases favor changes of atelectasis.  4.  Emphysema  5.  Cardiomegaly  6.  Atherosclerosis and atherosclerotic coronary artery disease.    COURSE & MEDICAL DECISION MAKING  Pertinent Labs & Imaging studies reviewed. (See chart for details)    DDX:  Pneumothorax  Pulmonary embolism  Pneumonia  COPD exacerbation  Asthma exacerbation  CHF exacerbation  Rib fractures  ACS  Anxiety    MDM    Initial evaluation at 1748:    Patient presents with a chief complaint of shortness of breath and is not in severe respiratory distress. I reviewed the patient's history reported in the chart as well as with the patient and do not see prior hospitalization for prior COPD within the last year. This was my primary concern based on presentation. Vital signs were reviewed and determined to be notable for tachycardia, however the pt was anxious appearing. Intravenous access is obtained. Laboratory analysis is performed to evaluate for myocardial infarction, electrolyte abnormality, evidence of infection and shows no gross metabolic derangement.  Troponin is borderline normal at 0.04. Initial VBG is reassuring. Chest x-ray is obtained and reveals likely evidence of left lower lobe pneumonia.  No pneumothorax, pleural effusion on my read. I gave the patient  albuterol/ipratropium breathing treatments with great improvement of symptoms on reevaluation. The patient's presentation has elements of COPD exacerbation, CHF and PNA.  She is at moderate risk for pulmonary embolism, Ddimer negative.   ?  The patient received continued DuoNeb, Solu-Medrol, azithromycin, rocephin. Blood pressure was within normal limits. As the patient's symptoms improved slightly then she had worsening dyspnea.  Repeat evaluation demonstrated dullness in her lungs with worsening air movement after 3  breathing treatments.  BNP elevated and started on BPAP 10/4 at 45%.  Progressive Dyspnea concerning for Possible PE.  After BiPAP trial she was sent to CT scanner.  No acute PE or dissection    Pt has PNA, possible COPD and likely new CHF.  Due to ongoing concerns for her dyspnea, new CHF and concurrent pneumonia, patient was admitted to the medicine service (Dr. Graham) for ongoing observation and management.    I saw and evaluated this patient and provided 38 minutes of critical care time to the patient excluding billable procedures and directly and personally provided the following treatment and critical care management:    Critical Care Interventions  Continuous hemodynamic and respiratory monitoring  Serial respiratory exams  Serial airway observations  Fluid resuscitation  Airway assessment and management  Noninvasive ventilation    HYDRATION: Based on the patient's presentation of Tachycardia the patient was given IV fluids. IV Hydration was used because oral hydration was not adequate alone. Upon recheck following hydration, the patient was slightly improved.    FINAL IMPRESSION  Visit Diagnoses     ICD-10-CM   1. Shortness of breath R06.02   2. SOB (shortness of breath) R06.02   3. Pneumonia of left lower lobe due to infectious organism (HCC) J18.1     Electronically signed by: Joao Madsen, 2/21/2019 5:32 PM

## 2019-02-22 NOTE — PROGRESS NOTES
0715 Report received from NOC., Pt resting in bed with c/o SOB, Pt is on 3 L NC which is her baseline at home. Pt assisted to BSC to void, post receiving Lasix early this a.m. POC reviewed with Pt. Pt appears anxious, reassurance given, 02 sat 96%, WOB moderate. SR RT called for treatment.    0800 Full nursing assessment completed see Flowsheet. Pt's WOB has improved.     0915 Pt seen by Dr Chandler, all orders and POC reviewed with MD.  Echo result and new EF result discussed with MD. MD also aware of Ectopy, frequent PAC's and runs of VTACH up to 11 beats. Pt asymptomatic. Cardiology to consult. Pt's Anxiety level discussed with MD and orders received.

## 2019-02-22 NOTE — ED NOTES
Report called to floor RN, pt transported up to floor via [employer] w/ tech and RN

## 2019-02-22 NOTE — ASSESSMENT & PLAN NOTE
2/2 chf and possible valve disease.   Possible component of copd and pulmonary htn as well  Diuresis ongoing.

## 2019-02-22 NOTE — PROGRESS NOTES
Subjective:      Kelly Lovett is a 73 y.o. female who presents with Breathing Problem (difficulty breathing/ is on oxygen/ huffing and puffing X1 week)    Past Medical History:   Diagnosis Date   • Breath shortness     pt reports using o2 at night 2L, no problems at this time   • COPD (chronic obstructive pulmonary disease) (HCC)    • Emphysema of lung (HCC)    • Heart valve disease    • High cholesterol    • History of heart surgery    • Hyperlipidemia    • Hypertension    • Sleep apnea     uses cpap     Social History     Social History   • Marital status:      Spouse name: N/A   • Number of children: N/A   • Years of education: N/A     Occupational History   • Not on file.     Social History Main Topics   • Smoking status: Former Smoker     Packs/day: 0.50     Years: 38.00     Types: Cigarettes     Quit date: 10/11/2001   • Smokeless tobacco: Never Used   • Alcohol use 8.4 oz/week     14 Shots of liquor per week      Comment: 2 per day   • Drug use: No   • Sexual activity: Yes     Partners: Male     Other Topics Concern   • Not on file     Social History Narrative   • No narrative on file     Family History   Problem Relation Age of Onset   • Cancer Father         colon cancer    • Hypertension Mother    • Cancer Sister 68        ovarian cancer       Allergies: Sulfa drugs    Patient is a 73-year-old female who presents today with complaint of shortness of breath.  Symptoms have been ongoing over the last week.  States that she has dyspnea with exertion.  No cough or respiratory symptoms.  She is currently on oxygen 2 L/min all the time.          Breathing Problem   This is a recurrent problem. The current episode started in the past 7 days. The problem occurs constantly. The problem has been unchanged. Pertinent negatives include no fever. Nothing aggravates the symptoms. The treatment provided no relief.       Review of Systems   Constitutional: Positive for malaise/fatigue. Negative for chills  "and fever.   Respiratory: Positive for cough and shortness of breath.    Skin: Negative.    All other systems reviewed and are negative.         Objective:     /68   Pulse (!) 107   Temp 36.6 °C (97.8 °F) (Temporal)   Ht 1.676 m (5' 6\")   Wt 57.4 kg (126 lb 9.6 oz)   SpO2 96%   BMI 20.43 kg/m²      Physical Exam   Constitutional: She is oriented to person, place, and time. She appears well-developed and well-nourished. No distress.   HENT:   Head: Normocephalic.   Right Ear: External ear normal.   Left Ear: External ear normal.   Nose: Nose normal.   Mouth/Throat: Oropharynx is clear and moist. No oropharyngeal exudate.   Eyes: Pupils are equal, round, and reactive to light. Conjunctivae are normal.   Neck: Normal range of motion.   Cardiovascular:   Heart rate and rhythm are irregular.   Pulmonary/Chest: Effort normal and breath sounds normal.   Musculoskeletal: Normal range of motion.   Neurological: She is alert and oriented to person, place, and time.   Skin: Skin is warm and dry. Capillary refill takes less than 2 seconds. She is not diaphoretic.   Psychiatric: She has a normal mood and affect. Her behavior is normal. Judgment and thought content normal.   Vitals reviewed.    EKG: ventricular trigeminy, tachycardia rate of 106.  No ST elevation or depression noted.    Patient is noted to be dyspneic even at rest, though she does state that dyspnea and shortness of breath occur with exertion.  I discussed with patient and her  at length of my concern for for the patient's symptoms, and I do not have a way to work this up any further in the urgent care.  I strongly recommend transfer to emergency room for further evaluation.  Patient and her  are willing to do this.  Patient transferred to [facility] ER at [facility] via POV driven by her .          Assessment/Plan:   Dyspnea with exertion    Patient referred to ER for further evaluation and higher level of care.  There are no " diagnoses linked to this encounter.

## 2019-02-22 NOTE — PROGRESS NOTES
Inpatient Anticoagulation Service Note    Date: 2/22/2019  Reason for Anticoagulation: Atrial Flutter, Mitral Valve insufficiency  Hemoglobin Value: 12.2  Hematocrit Value: 38.4  Lab Platelet Value: 215  Target INR: 1.5 to 2.5    INR from last 7 days     Date/Time INR Value    02/22/19 0328 (!)  1.79    02/21/19 1744 (!)  2.08        Dose from last 7 days     Date/Time Dose (mg)    02/22/19 1000  4    02/22/19 0000  2.5        Significant Interactions: Statin (sertraline)  Bridge Therapy: No    Comments:  (3.75 mg (2.5 mg x 1.5) on Mon, Wed, Fri; 2.5 mg (2.5 mg x 1))    Plan:  Coumadin 4 mg PO tonight for INR 1.79  Education Material Provided?: No (established Anticoagulation Clinic patient)  Pharmacist suggested discharge dosing: resume outpatient regimen as above     Malia ChapinD

## 2019-02-22 NOTE — DISCHARGE PLANNING
Care Transition Team Assessment    Information Source  Information Given By: Patient  Who is responsible for making decisions for patient? : Patient    Readmission Evaluation  Is this a readmission?: No    Elopement Risk  Legal Hold: No  Ambulatory or Self Mobile in Wheelchair: No-Not an Elopement Risk  Elopement Risk: Not at Risk for Elopement    Interdisciplinary Discharge Planning  Does Admitting Nurse Feel This Could be a Complex Discharge?: No  Lives with - Patient's Self Care Capacity: Spouse  Patient or legal guardian wants to designate a caregiver (see row info): No  Support Systems: Spouse / Significant Other  Do You Take your Prescribed Medications Regularly: Yes  Able to Return to Previous ADL's: Yes  Mobility Issues: No  Prior Services: Housekeeping / Homemaker Services  Assistance Needed: No  Durable Medical Equipment: Home Oxygen    Discharge Preparedness  What is your plan after discharge?: Home with help  What are your discharge supports?: Spouse  Prior Functional Level: Ambulatory, Independent with Activities of Daily Living, Independent with Medication Management  Difficulity with ADLs: None  Difficulity with IADLs: Driving  Difficulity with IADL Comments:  drives.  She states its too hard with her O2 tank.    Functional Assesment  Prior Functional Level: Ambulatory, Independent with Activities of Daily Living, Independent with Medication Management    Finances  Financial Barriers to Discharge: No  Prescription Coverage: Yes    Vision / Hearing Impairment  Vision Impairment : Yes  Right Eye Vision: Impaired, Wears Glasses  Left Eye Vision: Impaired, Wears Glasses  Hearing Impairment : No    Values / Beliefs / Concerns  Values / Beliefs Concerns : No    Advance Directive  Advance Directive?: DPOA for Health Care  Durable Power of  Name and Contact : Cruz Lovett    Domestic Abuse  Have you ever been the victim of abuse or violence?: No  Physical Abuse or Sexual Abuse: No  Verbal  Abuse or Emotional Abuse: No  Possible Abuse Reported to:: Not Applicable    Psychological Assessment  History of Substance Abuse: Alcohol  Substance Abuse Comments: 2 per day  History of Psychiatric Problems: No    Discharge Risks or Barriers  Discharge risks or barriers?: Complex medical needs  Patient risk factors: Complex medical needs    Anticipated Discharge Information  Anticipated discharge disposition: Home

## 2019-02-23 LAB
ANION GAP SERPL CALC-SCNC: 8 MMOL/L (ref 0–11.9)
BNP SERPL-MCNC: 1964 PG/ML (ref 0–100)
BUN SERPL-MCNC: 20 MG/DL (ref 8–22)
CALCIUM SERPL-MCNC: 8.9 MG/DL (ref 8.4–10.2)
CHLORIDE SERPL-SCNC: 99 MMOL/L (ref 96–112)
CO2 SERPL-SCNC: 32 MMOL/L (ref 20–33)
CREAT SERPL-MCNC: 1.08 MG/DL (ref 0.5–1.4)
EST. AVERAGE GLUCOSE BLD GHB EST-MCNC: 120 MG/DL
GLUCOSE SERPL-MCNC: 105 MG/DL (ref 65–99)
HBA1C MFR BLD: 5.8 % (ref 0–5.6)
MAGNESIUM SERPL-MCNC: 1.6 MG/DL (ref 1.5–2.5)
POTASSIUM SERPL-SCNC: 3.3 MMOL/L (ref 3.6–5.5)
SODIUM SERPL-SCNC: 139 MMOL/L (ref 135–145)

## 2019-02-23 PROCEDURE — 94760 N-INVAS EAR/PLS OXIMETRY 1: CPT

## 2019-02-23 PROCEDURE — A9270 NON-COVERED ITEM OR SERVICE: HCPCS | Performed by: HOSPITALIST

## 2019-02-23 PROCEDURE — 83880 ASSAY OF NATRIURETIC PEPTIDE: CPT

## 2019-02-23 PROCEDURE — 80048 BASIC METABOLIC PNL TOTAL CA: CPT

## 2019-02-23 PROCEDURE — 94640 AIRWAY INHALATION TREATMENT: CPT

## 2019-02-23 PROCEDURE — 700111 HCHG RX REV CODE 636 W/ 250 OVERRIDE (IP): Performed by: FAMILY MEDICINE

## 2019-02-23 PROCEDURE — 700111 HCHG RX REV CODE 636 W/ 250 OVERRIDE (IP): Performed by: INTERNAL MEDICINE

## 2019-02-23 PROCEDURE — 83735 ASSAY OF MAGNESIUM: CPT

## 2019-02-23 PROCEDURE — 99233 SBSQ HOSP IP/OBS HIGH 50: CPT | Mod: 25 | Performed by: HOSPITALIST

## 2019-02-23 PROCEDURE — 700101 HCHG RX REV CODE 250: Performed by: FAMILY MEDICINE

## 2019-02-23 PROCEDURE — 700102 HCHG RX REV CODE 250 W/ 637 OVERRIDE(OP): Performed by: INTERNAL MEDICINE

## 2019-02-23 PROCEDURE — 700111 HCHG RX REV CODE 636 W/ 250 OVERRIDE (IP): Performed by: HOSPITALIST

## 2019-02-23 PROCEDURE — A9270 NON-COVERED ITEM OR SERVICE: HCPCS | Performed by: INTERNAL MEDICINE

## 2019-02-23 PROCEDURE — 700102 HCHG RX REV CODE 250 W/ 637 OVERRIDE(OP): Performed by: FAMILY MEDICINE

## 2019-02-23 PROCEDURE — A9270 NON-COVERED ITEM OR SERVICE: HCPCS | Performed by: FAMILY MEDICINE

## 2019-02-23 PROCEDURE — 700102 HCHG RX REV CODE 250 W/ 637 OVERRIDE(OP): Performed by: HOSPITALIST

## 2019-02-23 PROCEDURE — 99497 ADVNCD CARE PLAN 30 MIN: CPT | Performed by: HOSPITALIST

## 2019-02-23 PROCEDURE — 99233 SBSQ HOSP IP/OBS HIGH 50: CPT | Performed by: INTERNAL MEDICINE

## 2019-02-23 PROCEDURE — 770020 HCHG ROOM/CARE - TELE (206)

## 2019-02-23 RX ORDER — POTASSIUM CHLORIDE 20 MEQ/1
40 TABLET, EXTENDED RELEASE ORAL ONCE
Status: COMPLETED | OUTPATIENT
Start: 2019-02-23 | End: 2019-02-23

## 2019-02-23 RX ORDER — WARFARIN SODIUM 2 MG/1
4 TABLET ORAL
Status: COMPLETED | OUTPATIENT
Start: 2019-02-23 | End: 2019-02-23

## 2019-02-23 RX ORDER — MAGNESIUM SULFATE HEPTAHYDRATE 40 MG/ML
2 INJECTION, SOLUTION INTRAVENOUS ONCE
Status: COMPLETED | OUTPATIENT
Start: 2019-02-23 | End: 2019-02-23

## 2019-02-23 RX ADMIN — ATORVASTATIN CALCIUM 40 MG: 40 TABLET, FILM COATED ORAL at 17:23

## 2019-02-23 RX ADMIN — MAGNESIUM SULFATE HEPTAHYDRATE 2 G: 40 INJECTION, SOLUTION INTRAVENOUS at 09:09

## 2019-02-23 RX ADMIN — Medication 400 MG: at 05:55

## 2019-02-23 RX ADMIN — LORAZEPAM 0.5 MG: 0.5 TABLET ORAL at 22:28

## 2019-02-23 RX ADMIN — BUDESONIDE AND FORMOTEROL FUMARATE DIHYDRATE 2 PUFF: 160; 4.5 AEROSOL RESPIRATORY (INHALATION) at 20:39

## 2019-02-23 RX ADMIN — DIGOXIN 250 MCG: 0.25 INJECTION INTRAMUSCULAR; INTRAVENOUS at 17:16

## 2019-02-23 RX ADMIN — FUROSEMIDE 20 MG: 10 INJECTION, SOLUTION INTRAVENOUS at 17:23

## 2019-02-23 RX ADMIN — BUDESONIDE AND FORMOTEROL FUMARATE DIHYDRATE 2 PUFF: 160; 4.5 AEROSOL RESPIRATORY (INHALATION) at 06:38

## 2019-02-23 RX ADMIN — CARVEDILOL 3.12 MG: 3.12 TABLET, FILM COATED ORAL at 17:23

## 2019-02-23 RX ADMIN — WARFARIN SODIUM 4 MG: 2 TABLET ORAL at 17:22

## 2019-02-23 RX ADMIN — TIOTROPIUM BROMIDE 1 CAPSULE: 18 CAPSULE ORAL; RESPIRATORY (INHALATION) at 06:39

## 2019-02-23 RX ADMIN — FUROSEMIDE 20 MG: 10 INJECTION, SOLUTION INTRAVENOUS at 05:54

## 2019-02-23 RX ADMIN — CARVEDILOL 3.12 MG: 3.12 TABLET, FILM COATED ORAL at 05:57

## 2019-02-23 RX ADMIN — Medication 400 MG: at 17:23

## 2019-02-23 RX ADMIN — LEVALBUTEROL HYDROCHLORIDE 1.25 MG: 1.25 SOLUTION RESPIRATORY (INHALATION) at 12:52

## 2019-02-23 RX ADMIN — LEVALBUTEROL HYDROCHLORIDE 1.25 MG: 1.25 SOLUTION RESPIRATORY (INHALATION) at 06:36

## 2019-02-23 RX ADMIN — LEVALBUTEROL HYDROCHLORIDE 1.25 MG: 1.25 SOLUTION RESPIRATORY (INHALATION) at 17:25

## 2019-02-23 RX ADMIN — LEVALBUTEROL HYDROCHLORIDE 1.25 MG: 1.25 SOLUTION RESPIRATORY (INHALATION) at 20:39

## 2019-02-23 RX ADMIN — POTASSIUM CHLORIDE 40 MEQ: 1500 TABLET, EXTENDED RELEASE ORAL at 09:09

## 2019-02-23 RX ADMIN — SERTRALINE HYDROCHLORIDE 100 MG: 50 TABLET ORAL at 05:55

## 2019-02-23 ASSESSMENT — ENCOUNTER SYMPTOMS
BLURRED VISION: 0
BACK PAIN: 0
PALPITATIONS: 0
HEADACHES: 0
EYE PAIN: 0
CHILLS: 0
INSOMNIA: 0
DEPRESSION: 0
WEAKNESS: 1
PND: 1
ORTHOPNEA: 1
FEVER: 0
COUGH: 0
TINGLING: 0
SHORTNESS OF BREATH: 1
ABDOMINAL PAIN: 0
SORE THROAT: 0
NAUSEA: 0
DIZZINESS: 0
NECK PAIN: 0
VOMITING: 0

## 2019-02-23 NOTE — PROGRESS NOTES
Cardiology Progress Note    DOS: 2/23/2019    Chief complaint/Reason for consult: Acute on chronic systolic CHF    Interval History:  Patient is a 74 yo WF. History of PAF, COPD, mitral valve prolapse s/p MV repair and MAZE with ERI ligation in 4/2017. Presented with acute on chronic systolic CHF exacerbation. Apparently decompensation was fairly rapid, telling me November she was vacationing in [place] doing fine. Over last 1-2 weeks progressively more short of breath. Now LV function is severely depressed. Severe MR. ONOFRE (+ highlighted in red):  Constitutional: Fevers/chills/fatigue/weightloss  Respiratory: Shortness of breath/cough  Cardiovascular: Chest pain/palpitations/edema/orthopnea/syncope    Past Medical History:   Diagnosis Date   • Breath shortness     pt reports using o2 at night 2L, no problems at this time   • COPD (chronic obstructive pulmonary disease) (HCC)    • Emphysema of lung (HCC)    • Heart valve disease    • High cholesterol    • History of heart surgery    • Hyperlipidemia    • Hypertension    • Sleep apnea     uses cpap       Allergies   Allergen Reactions   • Sulfa Drugs Rash     Rxn - years ago in her 20's       Current Facility-Administered Medications   Medication Dose Route Frequency Provider Last Rate Last Dose   • warfarin (COUMADIN) tablet 4 mg  4 mg Oral COUMADIN-ONCE Fernandez Preston M.D.       • magnesium sulfate IVPB premix 2 g  2 g Intravenous Once Fernandez Preston M.D.       • potassium chloride SA (Kdur) tablet 40 mEq  40 mEq Oral Once Fernandez Preston M.D.       • atorvastatin (LIPITOR) tablet 40 mg  40 mg Oral Q EVENING Ayanna Graham M.D.   40 mg at 02/22/19 1728   • carvedilol (COREG) tablet 3.125 mg  3.125 mg Oral BID WITH MEALS Ayanna Graham M.D.   3.125 mg at 02/23/19 0557   • levalbuterol (XOPENEX) 1.25 MG/3ML nebulizer solution 1.25 mg  1.25 mg Nebulization Q4H PRN (RT) Ayanna Graham M.D.   1.25 mg at 02/22/19 0137   • levalbuterol (XOPENEX)  1.25 MG/3ML nebulizer solution 1.25 mg  1.25 mg Nebulization 4X/DAY (RT) Ayanna Graham M.D.   1.25 mg at 02/23/19 0636   • LORazepam (ATIVAN) tablet 0.5 mg  0.5 mg Oral Q4HRS PRN Dante Chandler M.D.   0.5 mg at 02/22/19 2226   • digoxin (LANOXIN) injection 250 mcg  250 mcg Intravenous DAILY AT 1800 Boyd Saavedra M.D.       • [START ON 2/24/2019] digoxin (LANOXIN) tablet 125 mcg  125 mcg Oral DAILY AT 1800 Boyd Saavedra M.D.       • magnesium oxide (MAG-OX) tablet 400 mg  400 mg Oral BID Ayanna Graham M.D.   400 mg at 02/23/19 0555   • sertraline (ZOLOFT) tablet 100 mg  100 mg Oral DAILY Ayanna Graham M.D.   100 mg at 02/23/19 0555   • senna-docusate (PERICOLACE or SENOKOT S) 8.6-50 MG per tablet 2 Tab  2 Tab Oral BID Ayanna Graham M.D.        And   • polyethylene glycol/lytes (MIRALAX) PACKET 1 Packet  1 Packet Oral QDAY PRN Ayanna Graham M.D.        And   • magnesium hydroxide (MILK OF MAGNESIA) suspension 30 mL  30 mL Oral QDAY PRN Ayanna Graham M.D.        And   • bisacodyl (DULCOLAX) suppository 10 mg  10 mg Rectal QDAY PRN Ayanna Graham M.D.       • Respiratory Care per Protocol   Nebulization Continuous RT Ayanna Graham M.D.       • furosemide (LASIX) injection 20 mg  20 mg Intravenous BID DIURETIC Ayanna Graham M.D.   20 mg at 02/23/19 0554   • MD Alert...Warfarin per Pharmacy   Other PHARMACY TO DOSE Ayanna Graham M.D.       • tiotropium (SPIRIVA) 18 MCG inhalation capsule 1 Cap  1 Cap Inhalation QDAILY (RT) Ayanna Graham M.D.   1 Cap at 02/23/19 0639   • budesonide-formoterol (SYMBICORT) 160-4.5 MCG/ACT inhaler 2 Puff  2 Puff Inhalation BID (RT) Ayanna Graham M.D.   2 Puff at 02/23/19 0638       Physical Exam:  Vitals:    02/23/19 0354 02/23/19 0557 02/23/19 0639 02/23/19 0711   BP: (!) 99/64 111/77  112/92   Pulse: 91 88 61 89   Resp: 18 18 19   Temp: 36.9 °C (98.4 °F)   36.3 °C (97.4 °F)   TempSrc:  Oral   Oral   SpO2: 91%  93% 94%   Weight:       Height:         General appearance: NAD, conversant   Eyes: anicteric sclerae, moist conjunctivae; no lid-lag; PERRLA  HENT: Atraumatic; oropharynx clear with moist mucous membranes and no mucosal ulcerations; normal hard and soft palate  Neck: Trachea midline; FROM, supple, no thyromegaly or lymphadenopathy  Lungs: CTA, with normal respiratory effort and no intercostal retractions  CV: irregular, 3/6 systolic murmur, +JVD  Abdomen: Soft, non-tender; no masses or HSM  Extremities: No peripheral edema or extremity lymphadenopathy  Skin: Normal temperature, turgor and texture; no rash, ulcers or subcutaneous nodules  Psych: Appropriate affect, alert and oriented to person, place and time    Data:  Labs reviewed    Prior echo/stress reviewed:  LVEF 10-15%, severe MR    EKG interpreted by me:  Sinus tachy, PACs    Prior cath with no significant obstructive CAD    Impression/Plan:  1)Acute on chronic systolic CHF  2)Severe MR  3)PAF s/p MAZE and ERI closure  4)MARY s/p CPAP  5)HTN    -Not sure why her LV function tanked  -Possibly 2/2 to her now severe MR, or this is a function of her now depressed LV function I am not sure  -Course seems somewhat fast if her history is to be believed  -Agree with dig  -Should keep track of I/Os, aim for 1-2 L over next day, if not making it, increase lasix   -Keep NPO Sunday evening, PAGE on Monday    Radhames Naranjo MD

## 2019-02-23 NOTE — PROGRESS NOTES
Hospital Medicine Daily Progress Note    Date of Service  2/23/2019    Chief Complaint  73 y.o. female admitted 2/21/2019 with acute systolic CHF exacerbation    Hospital Course  See below    Interval Problem Update  2/22- sCHF-EF noted to be 20%, mitral valve appears to be functional. Anxiety is better this AM, urinating somewhat, denies any increased fluid on her body. Still quite SOB with ambulation. TELE showed ST, frequent ectopy.     2/23- still dyspnea especially with any activity. Can barely make it to the bathroom.   I replaced her K and mag. I added daily weights. i discussed her with Dr Naranjo from cardiology today. PAGE planned for monday.     I reviewed her echo  1. Severely reduced left ventricular systolic function.  Left   ventricular ejection fraction is visually estimated to be 20%.  2. Severely dilated left atrium.  3. Estimated right ventricular systolic pressure  is 50 mmHg.  Mildly   dilated right ventricle.  Reduced right ventricular systolic function.      [employer] care planning in addition to 37min e/m time: I discussed advanced care today with the p[atient and her family at the bedside. We discussed cpr, tube feeding, intubation and desire for aggressive care. She has elected dnr with temporary tube feeding. We filled out a POLST form and signed it.       Consultants/Specialty  [facility]-CARDS    Code Status  fcfc    Disposition  tele    Review of Systems  Review of Systems   Constitutional: Positive for malaise/fatigue. Negative for chills and fever.   HENT: Negative for sore throat.    Eyes: Negative for blurred vision and pain.   Respiratory: Positive for shortness of breath. Negative for cough.    Cardiovascular: Positive for orthopnea and PND. Negative for chest pain, palpitations and leg swelling.   Gastrointestinal: Negative for abdominal pain, nausea and vomiting.   Genitourinary: Negative for dysuria and urgency.   Musculoskeletal: Negative for back pain and neck pain.   Skin: Negative  for itching and rash.   Neurological: Positive for weakness. Negative for dizziness, tingling and headaches.   Psychiatric/Behavioral: Negative for depression. The patient does not have insomnia.    All other systems reviewed and are negative.       Physical Exam  Temp:  [36.3 °C (97.4 °F)-36.9 °C (98.4 °F)] 36.3 °C (97.4 °F)  Pulse:  [] 89  Resp:  [16-24] 19  BP: ()/(58-92) 112/92  SpO2:  [91 %-97 %] 94 %    Physical Exam   Constitutional: She is oriented to person, place, and time. She appears well-developed and well-nourished. No distress.   Patient seen and examined  Discussed plan with SOY SOTELO:   Head: Normocephalic and atraumatic.   Right Ear: External ear normal.   Left Ear: External ear normal.   Nose: Nose normal.   Mouth/Throat: No oropharyngeal exudate.   Eyes: Pupils are equal, round, and reactive to light. Conjunctivae are normal. Right eye exhibits no discharge. Left eye exhibits no discharge. No scleral icterus.   Neck: Normal range of motion. Neck supple. No JVD present. No thyromegaly present.   Cardiovascular: Regular rhythm and intact distal pulses.    Murmur heard.  ST   Pulmonary/Chest: Effort normal. No stridor. No respiratory distress. She has no wheezes. She has rales.   Abdominal: Soft. Bowel sounds are normal. She exhibits no distension. There is no tenderness.   Musculoskeletal: Normal range of motion. She exhibits no edema or tenderness.   Warm peripherally, thin limbs b/l   Neurological: She is alert and oriented to person, place, and time. No cranial nerve deficit.   Skin: Skin is warm and dry. No rash noted. She is not diaphoretic. No erythema.   Normal skin  Color.    Psychiatric: She has a normal mood and affect. Her behavior is normal.   Nursing note and vitals reviewed.      Fluids    Intake/Output Summary (Last 24 hours) at 02/23/19 0755  Last data filed at 02/23/19 0600   Gross per 24 hour   Intake              120 ml   Output             1480 ml   Net             -1360 ml       Laboratory  Recent Labs      02/21/19 1744 02/22/19 0328   WBC  8.8  7.4   RBC  4.05*  3.90*   HEMOGLOBIN  12.4  12.2   HEMATOCRIT  39.3  38.4   MCV  97.0  98.5*   MCH  30.6  31.3   MCHC  31.6*  31.8*   RDW  47.4  48.2   PLATELETCT  227  215   MPV  10.0  10.3     Recent Labs      02/21/19 1744 02/22/19 0328  02/23/19   0333   SODIUM  139  136  139   POTASSIUM  3.8  3.7  3.3*   CHLORIDE  102  102  99   CO2  24  25  32   GLUCOSE  126*  220*  105*   BUN  24*  18  20   CREATININE  0.88  0.96  1.08   CALCIUM  9.4  8.7  8.9     Recent Labs      02/21/19 1744 02/22/19 0328   APTT  29.3   --    INR  2.08*  1.79*     Recent Labs      02/21/19 1744 02/22/19 0328  02/23/19   0333   BNPBTYPENAT  2290*  2996*  1964*           Imaging  EC-ECHOCARDIOGRAM COMPLETE W/O CONT   Final Result      CT-CTA CHEST PULMONARY ARTERY W/ RECONS   Final Result         1.  No large central pulmonary embolus is appreciated, evaluation of the subsegmental branches is essentially nondiagnostic due to motion artifacts. Additional imaging would be required for definitive exclusion of small distal pulmonary emboli.   2.  Small left pleural effusion and trace right pleural effusion.   3.  Linear consolidations in the bilateral lung bases favor changes of atelectasis.   4.  Emphysema   5.  Cardiomegaly   6.  Atherosclerosis and atherosclerotic coronary artery disease.      DX-CHEST-LIMITED (1 VIEW)   Final Result      1.  Minimal left lower lobe atelectasis or pneumonia.      2.  Stable cardiomegaly with prior median sternotomy.           Assessment/Plan  Acute respiratory failure with hypoxia (HCC)- (present on admission)   Assessment & Plan    2/2 chf and possible valve disease.   Possible component of copd and pulmonary htn as well  Diuresis ongoing.      Paroxysmal atrial fibrillation (HCC)- (present on admission)   Assessment & Plan    Intermittent mild RVR  Trend on tele  coreg  Coumadin  per pharmacy     Acute  systolic congestive heart failure (HCC)- (present on admission)   Assessment & Plan    -ECHO with EF 5-10%, prior mitral valve surgery  Not improved  Add daily weights and i/o trending.   -continue IV lasix 20 mg BID  -trend BNP  -cards following  -continue coreg and statin, consider ACE  -PAGE monday for mitral valve.   -okTSH     Atrial flutter (HCC)- (present on admission)   Assessment & Plan    -Tele did not show this rhythm, see above     COPD (chronic obstructive pulmonary disease) (HCC)- (present on admission)   Assessment & Plan    This is chronic  She is not in acute exacerbation  Will continue with home meds          VTE prophylaxis: coumadin

## 2019-02-23 NOTE — PROGRESS NOTES
Report received from Ginette OLIVIER. Assumed care of pt. A/O x4. VSS. Responds appropriately. Denies pain, moderate SOB on exertion. Assessment completed. Explained importance of calling before getting OOB. Call light and belongings within reach. Bed in the lowest position. Treaded socks in place. Hourly rounding in progress. Safety precautions in place will monitor I/O closely today for diuresis

## 2019-02-23 NOTE — CARE PLAN
Problem: Safety  Goal: Will remain free from injury  Outcome: PROGRESSING AS EXPECTED  Patient calls appropriately, bed in low position, all needs met, no injury    Problem: Respiratory:  Goal: Respiratory status will improve  Outcome: PROGRESSING AS EXPECTED  Has mild SOB with exertion none at rest, moist cough    Problem: Mobility  Goal: Risk for activity intolerance will decrease  Outcome: PROGRESSING SLOWER THAN EXPECTED  Not tolerating normal activity, fatigues easily, SOB on exertion

## 2019-02-23 NOTE — PROGRESS NOTES
Pt seen by Cardiologist Dr Saavedra, orders and POC reviewedwith MD at bedside. Pt's questions answered. Pt VU of PAGE to be scheduled on Monday 2/25. Pt resting calmly in bed on 3 L NC, Pt in no acute distress.

## 2019-02-23 NOTE — FLOWSHEET NOTE
02/23/19 1252   RT Assessment of Delivered Medications   Evaluation of Medication Delivery Daily Yes-- Pt /Family has been Instructed in use of Respiratory Medications and Adverse Reactions   SVN Group   #SVN Performed Yes   Given By: Mouthpiece   Chest Exam   Work Of Breathing / Effort Mild   Respiration 20   Pulse 92   Breath Sounds   RUL Breath Sounds Clear   RML Breath Sounds Clear;Diminished   RLL Breath Sounds Expiratory Wheezes;Diminished   DELLA Breath Sounds Clear   LLL Breath Sounds Clear;Diminished   Oxygen   Home O2 LPM Flow 2.5 LPM   Pulse Oximetry 98 %   O2 (LPM) 3   O2 Daily Delivery Respiratory  Silicone Nasal Cannula

## 2019-02-23 NOTE — PROGRESS NOTES
Inpatient Anticoagulation Service Note    Date: 2/23/2019  Reason for Anticoagulation: Atrial Fibrillation, Mitral Valve Repair        Hemoglobin Value: 12.2  Hematocrit Value: 38.4  Lab Platelet Value: 215  Target INR: Other (Comments)  (1.5 to 2.5 per Dr. Li, cardiology)    INR from last 7 days     Date/Time INR Value    02/22/19 0328 (!)  1.79    02/21/19 1744 (!)  2.08        Dose from last 7 days     Date/Time Dose (mg)    02/23/19 0700  4    02/22/19 1000  4    02/22/19 0000  2.5        Significant Interactions: Statin (sertraline)  Bridge Therapy: No (If less than 5 days and overlap therapy discontinued -- document reason (i.e. Bleed Risk))    (If still on overlap therapy, if No -- document reason (i.e. Bleed Risk))    Comments:  (3.75 mg (2.5 mg x 1.5) on Mon, Wed, Fri; 2.5 mg (2.5 mg x 1))    Plan:  Will give warfarin 4 mg po tonight for INR of 1.79  Education Material Provided?: No (established Anticoagulation Clinic patient)  Pharmacist suggested discharge dosing: To be determined.     Rex Aiken [facility]

## 2019-02-23 NOTE — PROGRESS NOTES
Assessment complete, medicated per MAR. Discussed POC and RT protocol, allowed time to ask questions. Pt resting in bed, RR even and unlabored. Pt educated to call for assistance. Declines needs at this time. Safety precautions in place.    1955 pt up to commode. Asking for RT. Pt informed that RT would be in room shortly that they were in with another pt. Pt verbalized understanding.     2230 pt medicated per MAR for anxiety. Stated her breathing was better since the treatment from RT. Declines additional needs at this time.     0230 pt resting in bed, RR even and unlabored.    0400 pt up to commode and back to bed. SOB noted on return to bed.    0450 spoke with Dr Graham regarding increase in ectopy. Give Coreg now.    0600 pt medicated per MAR. RT called regarding pt request for treatment.

## 2019-02-23 NOTE — CONSULTS
Cardiology Initial Consult Note    Date of note:    2/22/2019      Consulting Physician: Dante Chandler M.D.    Patient ID:    Name:   Kelly Lovett   YOB: 1945  Age:   73 y.o.  female   MRN:   [id number]      Reason for Consultation: heart failure    HPI:  Kelly Lovett is a 73 y.o.-year-old female with a history of COPD, hypertension, MARY on CPAP, mitral valve repair and MAZE procedure, paroxysmal atrial fibrillation and dyslipidemia who presented on 2/21/2019 with SOB found to be in acute decompensated systolic heart failure.     She normally sees Dr. Jose Luis sams as an outpatient and last saw him on 8/16/2018. She was asymptomatic at that time.    She is unable to give me a good baseline exercise tolerance, but she can normally get around the house without difficulty. She does not walk significantly or exercise.     Starting around 4 days prior to admission she started noticing progressive mild to moderate shortness of breath without LE swelling, weight gain, palpitations, or chest pain/pressure.  This SOB eventually became severe and thus she sought out medical attention.      She denies recent infectious symptoms, cough,  URI symptoms, or any symptoms resembling angina.     She does have at least two [place] drinks a night.     On admission, her exam was consistent with acute heart failure.  Her echocardiogram showed a new LVEF of 20% down to 60-70% just two years ago.  Cardiology was consulted for assistance with management.       ROS  Constitution: Negative for chills, fever and night sweats.   HENT: Negative for nosebleeds.    Eyes: Negative for vision loss in left eye and vision loss in right eye.   Respiratory: Negative for hemoptysis.    Gastrointestinal: Negative for hematemesis, hematochezia and melena.   Genitourinary: Negative for hematuria.   Neurological: Negative for focal weakness, numbness and paresthesias.      All others reviewed and negative.      Past  Medical History:   Diagnosis Date   • Breath shortness     pt reports using o2 at night 2L, no problems at this time   • COPD (chronic obstructive pulmonary disease) ([facility])    • Emphysema of lung (HCC)    • Heart valve disease    • High cholesterol    • History of heart surgery    • Hyperlipidemia    • Hypertension    • Sleep apnea     uses cpap       Past Surgical History:   Procedure Laterality Date   • MITRAL VALVE REPAIR  4/20/2017    Procedure: MITRAL VALVE REPAIR ;  Surgeon: Lacey Porras M.D.;  Location: SURGERY [facility];  Service:    • MAZE PROCEDURE Left 4/20/2017    Procedure: MAZE PROCEDURE, Left atrial appendage ligation;  Surgeon: Lacey Porras M.D.;  Location: SURGERY [facility];  Service:    • PAGE  4/20/2017    Procedure: PAGE;  Surgeon: Lacey Porras M.D.;  Location: SURGERY [facility];  Service:    • APPENDECTOMY      1967   • OOPHORECTOMY           Prescriptions Prior to Admission   Medication Sig Dispense Refill Last Dose   • PROAIR  (90 Base) MCG/ACT Aero Soln inhalation aerosol INHALE 2 PUFFS BY MOUTH EVERY FOUR HOURS AS NEEDED FOR SHORTNESS OF BREATH. (Patient not taking: Reported on 2/21/2019) 27 g 3 Not Taking   • predniSONE (DELTASONE) 10 MG Tab Take 30mg x 5 days, then take 20mg x 5 days, then take 10mg x 5 days, with food, then discontinue. (Patient not taking: Reported on 2/21/2019) 30 Tab 2 Not Taking   • azithromycin (ZITHROMAX) 250 MG Tab Take 2 tablets on day 1, then take 1 tablet a day for 4 days. (Patient not taking: Reported on 2/21/2019) 6 Tab 0 Not Taking   • sertraline (ZOLOFT) 100 MG Tab Take 1 Tab by mouth every day. 90 Tab 1 2/21/2019 at 0800   • Tiotropium Bromide Monohydrate (SPIRIVA RESPIMAT) 1.25 MCG/ACT Aero Soln Inhale 2 Puffs by mouth every day. 3 Inhaler 3 Taking   • warfarin (COUMADIN) 2.5 MG Tab Take 1 to 1.5 tablets by mouth daily as directed by the coumadin clinic (Patient taking differently: Take 1 to 1.5 tablets by mouth daily as  directed by the coumadin clinic. 1.5 tablet Mon Wed Fri, 1 tablet Tues, Thurs, Sunday) 135 Tab 1 2/20 at 2200   • mupirocin (BACTROBAN) 2 % Ointment Apply 1 Application to affected area(s) 2 times a day. (Patient not taking: Reported on 2/21/2019) 1 Tube 2 Not Taking   • atorvastatin (LIPITOR) 40 MG Tab Take 1 Tab by mouth every day. 90 Tab 3 2/20/2019 at 2200   • Umeclidinium Bromide (INCRUSE ELLIPTA) 62.5 MCG/INH AEROSOL POWDER, BREATH ACTIVATED Inhale 1 Puff by mouth every day. (Patient not taking: Reported on 1/8/2019) 1 Each 11 Not Taking   • fluticasone-salmeterol (ADVAIR) 250-50 MCG/DOSE AEROSOL POWDER, BREATH ACTIVATED Inhale 1 Puff by mouth 2 times a day. (Patient taking differently: Inhale 1 Puff by mouth every day.) 3 Inhaler 3 Taking   • Acetaminophen (TYLENOL EXTRA STRENGTH PO) Take 1 Tab by mouth as needed.   Not Taking   • NON SPECIFIED Please provide patient with a portable oxygen concentrator 2.5L continuous flow at all times for 3 months    ICD10 Chronic Respiratory Failure with hypoxia J96.11  Pulse ox off of oxygen 87%  O2 off of (Patient not taking: Reported on 2/21/2019) 1 Each 0 Not Taking   • non-formulary med Inhale 2 L/min by mouth Continuous. Oxygen 2L x NC continuous  Indications: COPD   Not Taking   • magnesium oxide (MAG-OX) 400 MG Tab Take 400 mg by mouth 2 times a day. takes 500 mg tab   2/21/2019 at 0800           Allergies   Allergen Reactions   • Sulfa Drugs Rash     Rxn - years ago in her 20's         Family History   Problem Relation Age of Onset   • Cancer Father         colon cancer    • Hypertension Mother    • Cancer Sister 68        ovarian cancer         Social History     Social History   • Marital status:      Spouse name: N/A   • Number of children: N/A   • Years of education: N/A     Occupational History   • Not on file.     Social History Main Topics   • Smoking status: Former Smoker     Packs/day: 0.50     Years: 38.00     Types: Cigarettes     Quit date:  "10/11/2001   • Smokeless tobacco: Never Used   • Alcohol use 8.4 oz/week     14 Shots of liquor per week      Comment: 2 per day   • Drug use: No   • Sexual activity: Yes     Partners: Male     Other Topics Concern   • Not on file     Social History Narrative   • No narrative on file         Physical Exam  Body mass index is 20.34 kg/m².  Blood pressure 101/64, pulse (!) 101, temperature 36.7 °C (98 °F), temperature source Oral, resp. rate 18, height 1.676 m (5' 6\"), weight 57.2 kg (126 lb), SpO2 97 %, not currently breastfeeding.  Vitals:    02/22/19 1132 02/22/19 1222 02/22/19 1506 02/22/19 1611   BP: (!) 90/70   101/64   Pulse: (!) 102   (!) 101   Resp: 20 16 16 18   Temp: 36.4 °C (97.5 °F)   36.7 °C (98 °F)   TempSrc: Oral   Oral   SpO2: 94%   97%   Weight:       Height:         Oxygen Therapy:  Pulse Oximetry: 97 %, O2 (LPM): 3, O2 Delivery: Nasal Cannula    General: No apparent distress  Eyes: nl conjunctiva  ENT: OP clear  Neck: JVP 13-14 cm H2O with significant respiratory variation, no carotid bruits  Lungs: normal respiratory effort, bilateral decreased breath sounds  Heart: tachycardic, irregular,  2/6 systolic murmur, no rubs or gallops, trace edema bilateral lower extremities. No LV/RV heave on cardiac palpatation. 2+ bilateral radial pulses.  2+ bilateral dp pulses.   Abdomen: soft, non tender, non distended, no masses, normal bowel sounds.  No HSM.  Extremities/MSK: no clubbing, no cyanosis  Neurological: No focal sensory deficits  Psychiatric: Appropriate affect, A/O x 3  Skin: Warm extremities        Labs (personally reviewed and notable for):   BNP almost 3000      Cardiac Imaging and Procedures Review:    EKG dated 2/21/2019: My personal interpretation is MAT, PVC, non-specific st changes, consider LVH.     Echo dated 2/22/2019:   FINDINGS  Left Ventricle  Left ventricle is severely dilated. Mild concentric left ventricular   hypertrophy. Severely reduced left ventricular systolic function. Left "   ventricular ejection fraction is visually estimated to be 20%. Global   hypokinesis with regional variation. Diastolic function is difficult to   assess.    Right Ventricle  Mildly dilated right ventricle. Reduced right ventricular systolic   function.    Right Atrium  Enlarged right atrium. Normal inferior vena cava size without   inspiratory collapse.    Left Atrium  Severely dilated left atrium. Left atrial volume index is 52 mL/sq m.    Mitral Valve  Known mitral valve repair. Mean transvalvular gradient is 4 mmHg at a   heart rate of 109 BPM. Moderate mitral regurgitation. ERO by PISA   method is 0.2 sq cm.    Aortic Valve  Structurally normal aortic valve without significant stenosis or   regurgitation.    Tricuspid Valve  No tricuspid stenosis. Mild tricuspid regurgitation. Estimated right   ventricular systolic pressure  is 50 mmHg. Right atrial pressure is   estimated to be 8 mmHg.    Pulmonic Valve  Structurally normal pulmonic valve without significant stenosis or   regurgitation.    Pericardium  Normal pericardium without effusion.    Aorta  The aortic root is normal. Ascending aorta diameter is 2.7 cm.    TTE 3/7/2017  FINDINGS  Left Ventricle  Normal left ventricular chamber size. Mild concentric left ventricular   hypertrophy. Normal left ventricular systolic function. Left   ventricular ejection fraction is visually estimated to be 60%. Normal   regional wall motion. Grade II diastolic dysfunction.    Right Ventricle  The right ventricle was normal in size and function.    Right Atrium  The right atrium is normal in size.  Normal inferior vena cava size and   inspiratory collapse.    Left Atrium  Severely dilated left atrium. Left atrial volume index is 65 mL/sq m.    Mitral Valve  Prolapse of the posterior mitral leaflet was present.  No flail noted.    Severe, explosive mitral regurgitation.    Aortic Valve  Structurally normal aortic valve without significant stenosis or    regurgitation.    Tricuspid Valve  Structurally normal tricuspid valve without significant stenosis.  Mild   tricuspid regurgitation. Right ventricular systolic pressure is   estimated to be at least 45 mmHg.    Pulmonic Valve  Structurally normal pulmonic valve without significant stenosis or   regurgitation.    Pericardium  Normal pericardium without effusion.    Aorta  The aortic root is normal.        [facility] (3/17/2017):   FINDINGS:  1.  HEMODYNAMICS:  LV systolic pressure was about 90 with LV index pressure of   10-12.  There was no gradient across the aortic valve.  2.  LEFT VENTRICULOGRAPHY:  Showed normal-sized left ventricle with   hyperdynamic LV systolic function, ejection fraction of greater than 70%.    There was severe mitral regurgitation and too severely enlarged left atrium.  3.  No significant coronary artery disease.    Op Note 4/20/2017:  DATE OF SERVICE:  04/20/2017     REFERRING PHYSICIAN:  Jose Luis Li MD     PREOPERATIVE DIAGNOSES:  Severe mitral regurgitation (4+, degenerative),   bileaflet mitral valve prolapse, paroxysmal atrial fibrillation, severe   chronic obstructive pulmonary disease (on home oxygen and steroids),   hypertension, dyslipidemia.     POSTOPERATIVE DIAGNOSES:  Severe mitral regurgitation (4+, degenerative),   bileaflet mitral valve prolapse, paroxysmal atrial fibrillation, severe   chronic obstructive pulmonary disease (on home oxygen and steroids),   hypertension, dyslipidemia.     PROCEDURE:  Radical mitral valve repair (P2, triangular resection, 36 mm   Anna flexible annuloplasty band), left-sided maze procedure, left atrial   appendage ligation and intraoperative transesophageal echocardiography.    Radiology test Review:  CTA Chest 2/21/2019:  1.  No large central pulmonary embolus is appreciated, evaluation of the subsegmental branches is essentially nondiagnostic due to motion artifacts. Additional imaging would be required for definitive exclusion of small distal  pulmonary emboli.  2.  Small left pleural effusion and trace right pleural effusion.  3.  Linear consolidations in the bilateral lung bases favor changes of atelectasis.  4.  Emphysema  5.  Cardiomegaly  6.  Atherosclerosis and atherosclerotic coronary artery disease.    By my personal review, minimal proximal LAD calcium.       Impression and Medical Decision Making:  # Acute decompensated systolic heart failure - NYHA class IV with comorbid severe COPD.  # Non-ischemic cardiomyopathy (recent C with no significant disease) - ddx includes secondary to recurrent mitral regurgitation, vs tachycardia induced vs alcohol induced  # Paroxysmal atrial fibrillation s/p MAZE procedure and left atrial appendage closure.   # Mitral valve repair with possible recurrent severe mitral regurgitation. This was for mitral valve prolapse with severe MR with preserved LVEF 4/2017.   # WHO group II/III pulmonary hypertension  # COPD, severe  # MARY on CPAP  # Hypertension   # Dyslipidemia  # Chronic hypoxemic respiratory failure on continuous home O2.       Recommendations:  # continue diuresis over the weekend, goal net negative 1L at least per day, stop if creatinine increases above 1.2.  # continue coreg 3.125mg PO bid. May need to switch to metoprolol if hypotension.   # start digoxin, 250mcg today, 250mcg tomorrow morning then 125mcg daily. For rate control  # PAGE to check for recurrent severe MR with anesthesia, hopefully we can plan this for Monday  # if PAGE without severe MR as cause of cardiomyopathy, will continue aggressive rate control (consider discharge with Holter/ziopatch), and I did advise complete abstinence of alcohol.   # Would give patient advanced directive paperwork to fill out while in the hospital.   # consider stopping coumadin if left atrial appendage appears closed on PAGE as I do not see any other indication at this time    Discussed with Dr. Chandler.     Thank you for allowing me to participate in the  care of this patient, Dr. Ahuja will continue to follow on Monday.  Please contact me with any questions.      Boyd Saavedra MD  Cardiologist, [facility] [place]   [phone]

## 2019-02-23 NOTE — CARE PLAN
Problem: Communication  Goal: The ability to communicate needs accurately and effectively will improve  Outcome: PROGRESSING AS EXPECTED  Discussed use of pain scale, call light, medications and nonpharmacologic ways to control pain. Whiteboard updated, POC discussed.    Problem: Psychosocial Needs:  Goal: Level of anxiety will decrease  Outcome: PROGRESSING AS EXPECTED  POC discussed, medicated for anxiety as needed. Reduced distractions in room.

## 2019-02-23 NOTE — FLOWSHEET NOTE
02/23/19 0639   Events/Summary/Plan   Events/Summary/Plan Tx given, IS done   Non-Invasive Resp Device Site Inspection Completed Intact   Interdisciplinary Plan of Care-Goals (Indications)   Bronchodilator Indications History / Diagnosis;Strong Subjective / Objective Improvement   Hyperinflation Protocol Indications Atelectasis Documented by Chest X-Ray   Interdisciplinary Plan of Care-Outcomes    Bronchodilator Outcome Patient at Stable Baseline   Hyperinflation Protocol Goals/Outcome Greater Than 60% of Predicted I.S. Volume x 24 hrs   Education   Education Yes - Pt. / Family has been Instructed in use of Respiratory Medications and Adverse Reactions;Yes - Pt. / Family has been Instructed in use of Respiratory Equipment   RT Assessment of Delivered Medications   Evaluation of Medication Delivery Daily Yes-- Pt /Family has been Instructed in use of Respiratory Medications and Adverse Reactions   SVN Group   #SVN Performed Yes   Given By: Mouthpiece   MDI/DPI Group   #MDI/DPI Given MDI/DPI x 2   Incentive Spirometry Group   Incentive Spirometry Instruction Yes   Breathing Exercises Yes   Incentive Spirometer Volume 750 mL   Chest Exam   Respiration 18   Pulse 61   Breath Sounds   Pre/Post Intervention Pre Intervention Assessment   RUL Breath Sounds Clear   RML Breath Sounds Clear;Diminished   RLL Breath Sounds Diminished   DELLA Breath Sounds Clear   LLL Breath Sounds Diminished   Oximetry   #Pulse Oximetry (Single Determination) Yes   Oxygen   Pulse Oximetry 93 %   O2 (LPM) 3   O2 Daily Delivery Respiratory  Silicone Nasal Cannula

## 2019-02-23 NOTE — PROGRESS NOTES
Telemetry Shift Summary    Rhythm SR/ST, wandering atrial pacer  HR Range 's  Ectopy Freq PVC's, PAC's, bigem, trigem, couplets, triplets  Measurements 0.14/0.10/0.32        Normal Values  Rhythm SR  HR Range    Measurements 0.12-0.20 / 0.06-0.10  / 0.30-0.52

## 2019-02-23 NOTE — FLOWSHEET NOTE
02/22/19 2007   Events/Summary/Plan   Non-Invasive Resp Device Site Inspection Completed Intact   Interdisciplinary Plan of Care-Goals (Indications)   Bronchodilator Indications History / Diagnosis   Interdisciplinary Plan of Care-Outcomes    Bronchodilator Outcome Improved Vital Signs and Measures of Gas Exchange   Education   Education Yes - Pt. / Family has been Instructed in use of Respiratory Equipment;Yes - Pt. / Family has been Instructed in use of Respiratory Medications and Adverse Reactions   RT Assessment of Delivered Medications   Evaluation of Medication Delivery Daily Yes-- Pt /Family has been Instructed in use of Respiratory Medications and Adverse Reactions   SVN Group   #SVN Performed Yes   Given By: Mouthpiece   MDI/DPI Group   #MDI/DPI Given MDI/DPI x 1   Respiratory WDL   Respiratory (WDL) X   Chest Exam   Work Of Breathing / Effort Mild   Respiration 18   Pulse 100   Breath Sounds   Pre/Post Intervention Pre Intervention Assessment   RUL Breath Sounds Clear   RML Breath Sounds Diminished   DELLA Breath Sounds Clear   LLL Breath Sounds Diminished   Oximetry   #Pulse Oximetry (Single Determination) Yes   Oxygen   Pulse Oximetry 97 %   O2 (LPM) 3   O2 Daily Delivery Respiratory  Silicone Nasal Cannula

## 2019-02-24 LAB
ALBUMIN SERPL BCP-MCNC: 4 G/DL (ref 3.2–4.9)
ALBUMIN/GLOB SERPL: 1.5 G/DL
ALP SERPL-CCNC: 57 U/L (ref 30–99)
ALT SERPL-CCNC: 31 U/L (ref 2–50)
ANION GAP SERPL CALC-SCNC: 8 MMOL/L (ref 0–11.9)
AST SERPL-CCNC: 26 U/L (ref 12–45)
BASOPHILS # BLD AUTO: 0.9 % (ref 0–1.8)
BASOPHILS # BLD: 0.1 K/UL (ref 0–0.12)
BILIRUB SERPL-MCNC: 0.8 MG/DL (ref 0.1–1.5)
BUN SERPL-MCNC: 27 MG/DL (ref 8–22)
CALCIUM SERPL-MCNC: 9.8 MG/DL (ref 8.4–10.2)
CHLORIDE SERPL-SCNC: 98 MMOL/L (ref 96–112)
CO2 SERPL-SCNC: 34 MMOL/L (ref 20–33)
CREAT SERPL-MCNC: 0.99 MG/DL (ref 0.5–1.4)
EOSINOPHIL # BLD AUTO: 0.24 K/UL (ref 0–0.51)
EOSINOPHIL NFR BLD: 2.2 % (ref 0–6.9)
ERYTHROCYTE [DISTWIDTH] IN BLOOD BY AUTOMATED COUNT: 46.9 FL (ref 35.9–50)
GLOBULIN SER CALC-MCNC: 2.6 G/DL (ref 1.9–3.5)
GLUCOSE SERPL-MCNC: 120 MG/DL (ref 65–99)
HCT VFR BLD AUTO: 42.3 % (ref 37–47)
HGB BLD-MCNC: 13.3 G/DL (ref 12–16)
IMM GRANULOCYTES # BLD AUTO: 0.04 K/UL (ref 0–0.11)
IMM GRANULOCYTES NFR BLD AUTO: 0.4 % (ref 0–0.9)
INR PPP: 2.13 (ref 0.87–1.13)
LYMPHOCYTES # BLD AUTO: 1.86 K/UL (ref 1–4.8)
LYMPHOCYTES NFR BLD: 17.1 % (ref 22–41)
MAGNESIUM SERPL-MCNC: 1.9 MG/DL (ref 1.5–2.5)
MCH RBC QN AUTO: 30.6 PG (ref 27–33)
MCHC RBC AUTO-ENTMCNC: 31.4 G/DL (ref 33.6–35)
MCV RBC AUTO: 97.5 FL (ref 81.4–97.8)
MONOCYTES # BLD AUTO: 1.19 K/UL (ref 0–0.85)
MONOCYTES NFR BLD AUTO: 10.9 % (ref 0–13.4)
NEUTROPHILS # BLD AUTO: 7.44 K/UL (ref 2–7.15)
NEUTROPHILS NFR BLD: 68.5 % (ref 44–72)
NRBC # BLD AUTO: 0 K/UL
NRBC BLD-RTO: 0 /100 WBC
PLATELET # BLD AUTO: 225 K/UL (ref 164–446)
PMV BLD AUTO: 10 FL (ref 9–12.9)
POTASSIUM SERPL-SCNC: 3.4 MMOL/L (ref 3.6–5.5)
PROT SERPL-MCNC: 6.6 G/DL (ref 6–8.2)
PROTHROMBIN TIME: 23.5 SEC (ref 12–14.6)
RBC # BLD AUTO: 4.34 M/UL (ref 4.2–5.4)
SODIUM SERPL-SCNC: 140 MMOL/L (ref 135–145)
WBC # BLD AUTO: 10.9 K/UL (ref 4.8–10.8)

## 2019-02-24 PROCEDURE — 770020 HCHG ROOM/CARE - TELE (206)

## 2019-02-24 PROCEDURE — 700111 HCHG RX REV CODE 636 W/ 250 OVERRIDE (IP): Performed by: FAMILY MEDICINE

## 2019-02-24 PROCEDURE — A9270 NON-COVERED ITEM OR SERVICE: HCPCS | Performed by: INTERNAL MEDICINE

## 2019-02-24 PROCEDURE — 99232 SBSQ HOSP IP/OBS MODERATE 35: CPT | Mod: 25 | Performed by: HOSPITALIST

## 2019-02-24 PROCEDURE — 94640 AIRWAY INHALATION TREATMENT: CPT

## 2019-02-24 PROCEDURE — 700102 HCHG RX REV CODE 250 W/ 637 OVERRIDE(OP): Performed by: FAMILY MEDICINE

## 2019-02-24 PROCEDURE — 99232 SBSQ HOSP IP/OBS MODERATE 35: CPT | Performed by: INTERNAL MEDICINE

## 2019-02-24 PROCEDURE — 83735 ASSAY OF MAGNESIUM: CPT

## 2019-02-24 PROCEDURE — 85610 PROTHROMBIN TIME: CPT

## 2019-02-24 PROCEDURE — 700102 HCHG RX REV CODE 250 W/ 637 OVERRIDE(OP): Performed by: INTERNAL MEDICINE

## 2019-02-24 PROCEDURE — A9270 NON-COVERED ITEM OR SERVICE: HCPCS | Performed by: FAMILY MEDICINE

## 2019-02-24 PROCEDURE — 99356 PR PROLONGED SVC I/P OR OBS SETTING 1ST HOUR: CPT | Performed by: HOSPITALIST

## 2019-02-24 PROCEDURE — 700101 HCHG RX REV CODE 250: Performed by: FAMILY MEDICINE

## 2019-02-24 PROCEDURE — 94760 N-INVAS EAR/PLS OXIMETRY 1: CPT

## 2019-02-24 PROCEDURE — A9270 NON-COVERED ITEM OR SERVICE: HCPCS | Performed by: HOSPITALIST

## 2019-02-24 PROCEDURE — 85025 COMPLETE CBC W/AUTO DIFF WBC: CPT

## 2019-02-24 PROCEDURE — 80053 COMPREHEN METABOLIC PANEL: CPT

## 2019-02-24 PROCEDURE — 700102 HCHG RX REV CODE 250 W/ 637 OVERRIDE(OP): Performed by: HOSPITALIST

## 2019-02-24 RX ORDER — WARFARIN SODIUM 3 MG/1
3 TABLET ORAL
Status: COMPLETED | OUTPATIENT
Start: 2019-02-24 | End: 2019-02-24

## 2019-02-24 RX ORDER — POTASSIUM CHLORIDE 20 MEQ/1
40 TABLET, EXTENDED RELEASE ORAL 2 TIMES DAILY
Status: DISCONTINUED | OUTPATIENT
Start: 2019-02-24 | End: 2019-02-25

## 2019-02-24 RX ADMIN — POTASSIUM CHLORIDE 40 MEQ: 1500 TABLET, EXTENDED RELEASE ORAL at 17:18

## 2019-02-24 RX ADMIN — CARVEDILOL 3.12 MG: 3.12 TABLET, FILM COATED ORAL at 03:04

## 2019-02-24 RX ADMIN — TIOTROPIUM BROMIDE 1 CAPSULE: 18 CAPSULE ORAL; RESPIRATORY (INHALATION) at 05:49

## 2019-02-24 RX ADMIN — LEVALBUTEROL HYDROCHLORIDE 1.25 MG: 1.25 SOLUTION RESPIRATORY (INHALATION) at 16:02

## 2019-02-24 RX ADMIN — BUDESONIDE AND FORMOTEROL FUMARATE DIHYDRATE 2 PUFF: 160; 4.5 AEROSOL RESPIRATORY (INHALATION) at 19:35

## 2019-02-24 RX ADMIN — LEVALBUTEROL HYDROCHLORIDE 1.25 MG: 1.25 SOLUTION RESPIRATORY (INHALATION) at 19:35

## 2019-02-24 RX ADMIN — LEVALBUTEROL HYDROCHLORIDE 1.25 MG: 1.25 SOLUTION RESPIRATORY (INHALATION) at 11:23

## 2019-02-24 RX ADMIN — BUDESONIDE AND FORMOTEROL FUMARATE DIHYDRATE 2 PUFF: 160; 4.5 AEROSOL RESPIRATORY (INHALATION) at 05:46

## 2019-02-24 RX ADMIN — DIGOXIN 125 MCG: 125 TABLET ORAL at 17:26

## 2019-02-24 RX ADMIN — FUROSEMIDE 20 MG: 10 INJECTION, SOLUTION INTRAVENOUS at 17:17

## 2019-02-24 RX ADMIN — Medication 400 MG: at 17:17

## 2019-02-24 RX ADMIN — POTASSIUM CHLORIDE 40 MEQ: 1500 TABLET, EXTENDED RELEASE ORAL at 10:17

## 2019-02-24 RX ADMIN — ATORVASTATIN CALCIUM 40 MG: 40 TABLET, FILM COATED ORAL at 17:17

## 2019-02-24 RX ADMIN — CARVEDILOL 3.12 MG: 3.12 TABLET, FILM COATED ORAL at 17:26

## 2019-02-24 RX ADMIN — LORAZEPAM 0.5 MG: 0.5 TABLET ORAL at 22:34

## 2019-02-24 RX ADMIN — SERTRALINE HYDROCHLORIDE 100 MG: 50 TABLET ORAL at 05:06

## 2019-02-24 RX ADMIN — WARFARIN SODIUM 3 MG: 3 TABLET ORAL at 17:17

## 2019-02-24 RX ADMIN — Medication 400 MG: at 05:06

## 2019-02-24 RX ADMIN — LEVALBUTEROL HYDROCHLORIDE 1.25 MG: 1.25 SOLUTION RESPIRATORY (INHALATION) at 05:41

## 2019-02-24 RX ADMIN — FUROSEMIDE 20 MG: 10 INJECTION, SOLUTION INTRAVENOUS at 05:07

## 2019-02-24 ASSESSMENT — ENCOUNTER SYMPTOMS
NECK PAIN: 0
ORTHOPNEA: 1
BLURRED VISION: 0
SORE THROAT: 0
ABDOMINAL PAIN: 0
FEVER: 0
WEAKNESS: 1
INSOMNIA: 0
DIZZINESS: 0
DEPRESSION: 0
BACK PAIN: 0
NAUSEA: 0
SHORTNESS OF BREATH: 1
TINGLING: 0
PALPITATIONS: 0
PND: 1
COUGH: 0
EYE PAIN: 0
CHILLS: 0

## 2019-02-24 NOTE — PROGRESS NOTES
Cardiology Progress Note    Date of service: 2/24/2019    Reason for visit/Chief complaint: Acute on chronic systolic CHF    HPI:   Patient is a 74 yo WF. History of PAF, COPD, mitral valve prolapse s/p MV repair and MAZE with ERI ligation in 4/2017. Presented with acute on chronic systolic CHF exacerbation. Apparently decompensation was fairly rapid, telling me November she was vacationing in [place] doing fine. Over last 1-2 weeks progressively more short of breath. Now LV function is severely depressed. Severe MR.    Since yesterday improved diuresis, down 2.5 L over the admission.    ROS: Negative for orthopnea, PND, palpitations, chest pain    Physical Exam:  Vitals:    02/24/19 0500 02/24/19 0541 02/24/19 0604 02/24/19 0912   BP: 107/53   (!) 98/76   Pulse: 83 83  92   Resp: 16 18  18   Temp: 36.8 °C (98.2 °F)   36.6 °C (97.9 °F)   TempSrc: Oral   Oral   SpO2: 93% 97%  98%   Weight:   54.8 kg (120 lb 13 oz)    Height:         Gen: NAD, conversant  HEENT: PERRL, EOMI  LUNGS: CTA B, no w/r/r  CV: RRR, no m/r/g, no JVD  Abd: Soft, NT/ND, +BS  Ext: no edema, warm and well perfused    Labs reviewed     Prior echo/stress reviewed:  LVEF 10-15%, severe MR     EKG interpreted by me:  Sinus tachy, PACs     Prior cath with no significant obstructive CAD    Impression/Recs:  1)Acute on chronic systolic CHF  2)Severe MR  3)PAF s/p MAZE and ERI closure  4)MARY s/p CPAP  5)HTN     -Doing better with effective diuresis  -I would continue the current lasix dose until her Cr starts bumping  -Keep NPO AM, PAGE tomorrow AM     Ephraiming Marcella CLARK

## 2019-02-24 NOTE — PROGRESS NOTES
0245 notified by CCT that pt had a 26 beat run of vtach.  Pt asymptomatic and in bed. VSS.    0255 hospitalist paged. Give morning dose of Coreg now per Dr Graham.

## 2019-02-24 NOTE — PROGRESS NOTES
Assessment complete, medicated per MAR. Discussed POC, medications, and RT, allowed time to ask questions. Pt resting in bed using tablet, RR even and unlabored. Pt educated to call for assistance. Declines needs at this time. Safety precautions in place. Pt concerned about RT not getting to her. RN discussed medications times and reassured pt that she would not be forgotten.    2230 pt up to commode and back to bed. Declines needs at this time.    2335 pt in bed, RR even and unlabored. VSS, declines needs at this time.    0145 Pt resting in bed, eyes closed, RR even and unlabored. Belongings within reach. Safety precautions in place.    0305 pt medicated per Dr Graham.     0500 pt medicated per MAR. Declines needs at this time. Pt asking about when her breathing treatments are due. Times explained. Pt verbalized understanding.

## 2019-02-24 NOTE — PROGRESS NOTES
Telemetry Shift Summary    Rhythm SR  HR Range 80-90  Ectopy 8 beats VTACH x2, R Trigem, couplet, bigem. freq PVC, PACs  Measurements 0.14/0.10/0.40        Normal Values  Rhythm SR  HR Range    Measurements 0.12-0.20 / 0.06-0.10  / 0.30-0.52

## 2019-02-24 NOTE — PROGRESS NOTES
Inpatient Anticoagulation Service Note    Date: 2/24/2019  Reason for Anticoagulation: Atrial Fibrillation, Mitral Valve Repair        Hemoglobin Value: 13.3  Hematocrit Value: 42.3  Lab Platelet Value: 225  Target INR: Other (Comments)  (1.5 to 2.5 per Dr. Li, cardiology)    INR from last 7 days     Date/Time INR Value    02/24/19 0314 (!)  2.13    02/22/19 0328 (!)  1.79    02/21/19 1744 (!)  2.08        Dose from last 7 days     Date/Time Dose (mg)    02/24/19 0800  3    02/23/19 0700  4    02/22/19 1000  4    02/22/19 0000  2.5        Significant Interactions: Statin (sertraline)  Bridge Therapy: No (If less than 5 days and overlap therapy discontinued -- document reason (i.e. Bleed Risk))    (If still on overlap therapy, if No -- document reason (i.e. Bleed Risk))    Comments:  (3.75 mg (2.5 mg x 1.5) on Mon, Wed, Fri; 2.5 mg (2.5 mg x 1))    Plan:  Will give warfarin 3 mg po tonight for INR 2.13  Education Material Provided?: No (established Anticoagulation Clinic patient)  Pharmacist suggested discharge dosing: To be determined.     Rex Aiken [facility]

## 2019-02-24 NOTE — PROGRESS NOTES
Received bedside report from Ginette OLIVIER. Assumed care of pt who is resting in bed with pillow over eyes, RR even/unlabored. Fall protocol in place. CLIP. Will continue to monitor.

## 2019-02-24 NOTE — FLOWSHEET NOTE
02/24/19 0541   Interdisciplinary Plan of Care-Goals (Indications)   Bronchodilator Indications History / Diagnosis   RT Assessment of Delivered Medications   Evaluation of Medication Delivery Daily Yes-- Pt /Family has been Instructed in use of Respiratory Medications and Adverse Reactions   SVN Group   #SVN Performed Yes   Given By: Mouthpiece   Chest Exam   Respiration 18   Pulse 83   Breath Sounds   RUL Breath Sounds Clear;Diminished   RML Breath Sounds Diminished   RLL Breath Sounds Diminished   DELLA Breath Sounds Clear;Diminished   LLL Breath Sounds Diminished   Oxygen   Home O2 Use Prior To Admission? Yes   Home O2 LPM Flow 3 LPM   Home O2 Delivery Method Nasal Cannula   Home O2 Frequency of Use Continuous   Pulse Oximetry 97 %   O2 (LPM) 3   O2 Daily Delivery Respiratory  Silicone Nasal Cannula

## 2019-02-24 NOTE — PROGRESS NOTES
Bedside report received from Tamar OLIVIER. Pt using commode, RR even and unlabored. Safety precautions in place, call light within reach.

## 2019-02-24 NOTE — FLOWSHEET NOTE
02/24/19 1123   Interdisciplinary Plan of Care-Goals (Indications)   Bronchodilator Indications History / Diagnosis   RT Assessment of Delivered Medications   Evaluation of Medication Delivery Daily Yes-- Pt /Family has been Instructed in use of Respiratory Medications and Adverse Reactions   SVN Group   #SVN Performed Yes   Given By: Mouthpiece   Chest Exam   Respiration 18   Pulse 86   Breath Sounds   RUL Breath Sounds Clear   RML Breath Sounds Diminished   RLL Breath Sounds Diminished   DELLA Breath Sounds Clear   LLL Breath Sounds Diminished   Oxygen   Pulse Oximetry 96 %   O2 (LPM) 3   O2 Daily Delivery Respiratory  Silicone Nasal Cannula

## 2019-02-24 NOTE — CARE PLAN
Problem: Safety  Goal: Will remain free from falls  Outcome: PROGRESSING AS EXPECTED  Pt educated to call for assistance. Toileted before medications. Personal belongings within reach, room uncluttered. Commode at bedside.    Problem: Psychosocial Needs:  Goal: Level of anxiety will decrease  Outcome: PROGRESSING AS EXPECTED  Medicated for anxiety as needed, non pharm methods of anxiety reduction used and taught.

## 2019-02-24 NOTE — PROGRESS NOTES
Hospital Medicine Daily Progress Note    Date of Service  2/24/2019    Chief Complaint  73 y.o. female admitted 2/21/2019 with acute systolic CHF exacerbation    Hospital Course  See below    Interval Problem Update  2/22- sCHF-EF noted to be 20%, mitral valve appears to be functional. Anxiety is better this AM, urinating somewhat, denies any increased fluid on her body. Still quite SOB with ambulation. TELE showed ST, frequent ectopy.     2/23- still dyspnea especially with any activity. Can barely make it to the bathroom.   I replaced her K and mag. I added daily weights. i discussed her with Dr Naranjo from cardiology today. PAGE planned for monday.   2/24- I discussed her with cards this morning. She diuresed 2.5L. She is ambulating a little easier today.       echo  1. Severely reduced left ventricular systolic function.  Left   ventricular ejection fraction is visually estimated to be 20%.  2. Severely dilated left atrium.  3. Estimated right ventricular systolic pressure  is 50 mmHg.  Mildly   dilated right ventricle.  Reduced right ventricular systolic function.      Prolonged services in addition to 36min e/m time- I returned to the bedside to discuss plan of care and answer questions from her family today from 234-307pm today (33min)      Consultants/Specialty  Quentin-CARDS    Code Status  fcfc    Disposition  tele    Review of Systems  Review of Systems   Constitutional: Positive for malaise/fatigue. Negative for chills and fever.   HENT: Negative for sore throat.    Eyes: Negative for blurred vision and pain.   Respiratory: Positive for shortness of breath. Negative for cough.    Cardiovascular: Positive for orthopnea and PND. Negative for chest pain, palpitations and leg swelling.   Gastrointestinal: Negative for abdominal pain and nausea.   Genitourinary: Negative for dysuria and urgency.   Musculoskeletal: Negative for back pain and neck pain.   Skin: Negative for itching and rash.   Neurological: Positive  for weakness. Negative for dizziness and tingling.   Psychiatric/Behavioral: Negative for depression. The patient does not have insomnia.    All other systems reviewed and are negative.       Physical Exam  Temp:  [36.3 °C (97.3 °F)-36.8 °C (98.2 °F)] 36.6 °C (97.9 °F)  Pulse:  [] 92  Resp:  [16-20] 18  BP: ()/(53-89) 98/76  SpO2:  [93 %-100 %] 98 %    Physical Exam   Constitutional: She is oriented to person, place, and time. She appears well-developed and well-nourished. No distress.   Patient seen and examined  Discussed plan with SOY SOTELO:   Head: Normocephalic and atraumatic.   Right Ear: External ear normal.   Left Ear: External ear normal.   Nose: Nose normal.   Mouth/Throat: No oropharyngeal exudate.   Eyes: Pupils are equal, round, and reactive to light. Conjunctivae are normal. Right eye exhibits no discharge. Left eye exhibits no discharge. No scleral icterus.   Neck: Normal range of motion. Neck supple. No JVD present. No thyromegaly present.   Cardiovascular: Regular rhythm and intact distal pulses.    Murmur heard.  ST   Pulmonary/Chest: Effort normal. No stridor. No respiratory distress. She has no wheezes. She has rales.   Abdominal: Soft. Bowel sounds are normal. She exhibits no distension. There is no tenderness.   Musculoskeletal: Normal range of motion. She exhibits no edema or tenderness.   Warm peripherally, thin limbs b/l   Neurological: She is alert and oriented to person, place, and time. No cranial nerve deficit.   Skin: Skin is warm and dry. No rash noted. She is not diaphoretic. No erythema.   Normal skin  Color.    Psychiatric: She has a normal mood and affect. Her behavior is normal.   Nursing note and vitals reviewed.      Fluids    Intake/Output Summary (Last 24 hours) at 02/24/19 0928  Last data filed at 02/24/19 0912   Gross per 24 hour   Intake             1020 ml   Output             2350 ml   Net            -1330 ml       Laboratory  Recent Labs      02/21/19   1260   02/22/19 0328  02/24/19   0314   WBC  8.8  7.4  10.9*   RBC  4.05*  3.90*  4.34   HEMOGLOBIN  12.4  12.2  13.3   HEMATOCRIT  39.3  38.4  42.3   MCV  97.0  98.5*  97.5   MCH  30.6  31.3  30.6   MCHC  31.6*  31.8*  31.4*   RDW  47.4  48.2  46.9   PLATELETCT  227  215  225   MPV  10.0  10.3  10.0     Recent Labs      02/22/19 0328  02/23/19   0333  02/24/19   0314   SODIUM  136  139  140   POTASSIUM  3.7  3.3*  3.4*   CHLORIDE  102  99  98   CO2  25  32  34*   GLUCOSE  220*  105*  120*   BUN  18  20  27*   CREATININE  0.96  1.08  0.99   CALCIUM  8.7  8.9  9.8     Recent Labs      02/21/19   1744 02/22/19 0328  02/24/19   0314   APTT  29.3   --    --    INR  2.08*  1.79*  2.13*     Recent Labs      02/21/19 1744 02/22/19 0328  02/23/19   0333   BNPBTYPENAT  2290*  2996*  1964*           Imaging  EC-ECHOCARDIOGRAM COMPLETE W/O CONT   Final Result      CT-CTA CHEST PULMONARY ARTERY W/ RECONS   Final Result         1.  No large central pulmonary embolus is appreciated, evaluation of the subsegmental branches is essentially nondiagnostic due to motion artifacts. Additional imaging would be required for definitive exclusion of small distal pulmonary emboli.   2.  Small left pleural effusion and trace right pleural effusion.   3.  Linear consolidations in the bilateral lung bases favor changes of atelectasis.   4.  Emphysema   5.  Cardiomegaly   6.  Atherosclerosis and atherosclerotic coronary artery disease.      DX-CHEST-LIMITED (1 VIEW)   Final Result      1.  Minimal left lower lobe atelectasis or pneumonia.      2.  Stable cardiomegaly with prior median sternotomy.           Assessment/Plan  Acute respiratory failure with hypoxia (HCC)- (present on admission)   Assessment & Plan    2/2 chf and possible valve disease.   Possible component of copd and pulmonary htn as well  Diuresis ongoing.      Paroxysmal atrial fibrillation (HCC)- (present on admission)   Assessment & Plan    Intermittent mild RVR  Trend on  tele  coreg  Coumadin  per pharmacy     Acute systolic congestive heart failure (HCC)- (present on admission)   Assessment & Plan    -ECHO with EF 5-10%, prior mitral valve surgery  Somewhat improved  Continue daily weights and i/o trending.   -continue IV lasix 20 mg BID  -trend BNP  -cards following  -continue coreg and statin, consider ACE  -PAGE monday for mitral valve.   -ok TSH     Atrial flutter (HCC)- (present on admission)   Assessment & Plan    -Tele did not show this rhythm, see above     COPD (chronic obstructive pulmonary disease) ([facility])- (present on admission)   Assessment & Plan    This is chronic  She is not in acute exacerbation  Will continue with home meds          VTE prophylaxis: coumadin

## 2019-02-24 NOTE — PROGRESS NOTES
Notified by BRAYAN Rossi that patient was having 26 beats of ventricular tachycardia. Patient resting with eyes closed. Attempted to wake patient up. Patient asymptomatic and patient put back on nasal cannula. Blood pressure 99/63, pulse rate 85, oxygen saturation 97%. Primary RN, Gientte notified.

## 2019-02-24 NOTE — PROGRESS NOTES
· 2 RN skin check complete with SOY Burns.  · Devices in place oxygen and tele box.  · Skin assessed under devices YES.  · Confirmed pressure ulcers found on N/A.  · New potential pressure ulcers noted on N/A. Wound consult placed and wound reported.  · The following interventions in place encourage out of bed three times a day, moisturizer  · No skin breakdown noted

## 2019-02-24 NOTE — PROGRESS NOTES
Telemetry Shift Summary    Rhythm SR/ST, 26 beats of Vtach at 0245  HR Range 's  Ectopy freq PAC's/PVC's, rare to occ couplets/bigem/trigem, rare triplet  Measurements 0.18/0.10/0.38        Normal Values  Rhythm SR  HR Range    Measurements 0.12-0.20 / 0.06-0.10  / 0.30-0.52

## 2019-02-24 NOTE — PROGRESS NOTES
Patient assisted with shower and was able to tolerate activity, ambulated with hand held assist, now back in bed visiting with family, no SOB at rest, no change from morning assessment

## 2019-02-24 NOTE — FLOWSHEET NOTE
02/23/19 2040   Interdisciplinary Plan of Care-Goals (Indications)   Bronchodilator Indications History / Diagnosis   Interdisciplinary Plan of Care-Outcomes    Bronchodilator Outcome Patient at Stable Baseline   Education   Education Yes - Pt. / Family has been Instructed in use of Respiratory Equipment;Yes - Pt. / Family has been Instructed in use of Respiratory Medications and Adverse Reactions   RT Assessment of Delivered Medications   Evaluation of Medication Delivery Daily Yes-- Pt /Family has been Instructed in use of Respiratory Medications and Adverse Reactions   SVN Group   #SVN Performed Yes   Given By: Mouthpiece   MDI/DPI Group   #MDI/DPI Given MDI/DPI x 1   Respiratory WDL   Respiratory (WDL) X   Chest Exam   Respiration 16   Pulse (!) 102   Breath Sounds   Pre/Post Intervention Pre Intervention Assessment   RUL Breath Sounds Diminished   DELLA Breath Sounds Diminished   Oximetry   #Pulse Oximetry (Single Determination) Yes   Oxygen   Pulse Oximetry 96 %   O2 (LPM) 3   O2 Daily Delivery Respiratory  Silicone Nasal Cannula

## 2019-02-25 ENCOUNTER — APPOINTMENT (OUTPATIENT)
Dept: CARDIOLOGY | Facility: MEDICAL CENTER | Age: 74
DRG: 291 | End: 2019-02-25
Attending: INTERNAL MEDICINE
Payer: MEDICARE

## 2019-02-25 ENCOUNTER — HOSPITAL ENCOUNTER (INPATIENT)
Facility: MEDICAL CENTER | Age: 74
LOS: 11 days | DRG: 292 | End: 2019-03-08
Attending: HOSPITALIST | Admitting: HOSPITALIST
Payer: MEDICARE

## 2019-02-25 ENCOUNTER — APPOINTMENT (OUTPATIENT)
Dept: RADIOLOGY | Facility: MEDICAL CENTER | Age: 74
DRG: 292 | End: 2019-02-25
Attending: INTERNAL MEDICINE
Payer: MEDICARE

## 2019-02-25 VITALS
BODY MASS INDEX: 19.42 KG/M2 | WEIGHT: 120.81 LBS | TEMPERATURE: 97.9 F | HEART RATE: 80 BPM | RESPIRATION RATE: 20 BRPM | HEIGHT: 66 IN | OXYGEN SATURATION: 99 % | DIASTOLIC BLOOD PRESSURE: 70 MMHG | SYSTOLIC BLOOD PRESSURE: 86 MMHG

## 2019-02-25 DIAGNOSIS — E86.1 HYPOTENSION DUE TO HYPOVOLEMIA: ICD-10-CM

## 2019-02-25 DIAGNOSIS — J96.11 CHRONIC RESPIRATORY FAILURE WITH HYPOXIA (HCC): ICD-10-CM

## 2019-02-25 DIAGNOSIS — I48.3 TYPICAL ATRIAL FLUTTER (HCC): ICD-10-CM

## 2019-02-25 DIAGNOSIS — I50.21 ACUTE SYSTOLIC CONGESTIVE HEART FAILURE (HCC): ICD-10-CM

## 2019-02-25 DIAGNOSIS — R53.81 DEBILITY: ICD-10-CM

## 2019-02-25 DIAGNOSIS — I34.0 NON-RHEUMATIC MITRAL REGURGITATION: ICD-10-CM

## 2019-02-25 DIAGNOSIS — J44.9 CHRONIC OBSTRUCTIVE PULMONARY DISEASE, UNSPECIFIED COPD TYPE (HCC): ICD-10-CM

## 2019-02-25 DIAGNOSIS — F41.9 ANXIETY: ICD-10-CM

## 2019-02-25 DIAGNOSIS — I95.89 HYPOTENSION DUE TO HYPOVOLEMIA: ICD-10-CM

## 2019-02-25 DIAGNOSIS — J41.0 SIMPLE CHRONIC BRONCHITIS (HCC): ICD-10-CM

## 2019-02-25 LAB
ALBUMIN SERPL BCP-MCNC: 4 G/DL (ref 3.2–4.9)
ALBUMIN/GLOB SERPL: 1.6 G/DL
ALP SERPL-CCNC: 59 U/L (ref 30–99)
ALT SERPL-CCNC: 26 U/L (ref 2–50)
ANION GAP SERPL CALC-SCNC: 11 MMOL/L (ref 0–11.9)
AST SERPL-CCNC: 22 U/L (ref 12–45)
BASOPHILS # BLD AUTO: 0.6 % (ref 0–1.8)
BASOPHILS # BLD: 0.06 K/UL (ref 0–0.12)
BILIRUB SERPL-MCNC: 0.8 MG/DL (ref 0.1–1.5)
BNP SERPL-MCNC: 960 PG/ML (ref 0–100)
BUN SERPL-MCNC: 28 MG/DL (ref 8–22)
CALCIUM SERPL-MCNC: 9.8 MG/DL (ref 8.4–10.2)
CHLORIDE SERPL-SCNC: 94 MMOL/L (ref 96–112)
CO2 SERPL-SCNC: 35 MMOL/L (ref 20–33)
CREAT SERPL-MCNC: 1.1 MG/DL (ref 0.5–1.4)
EOSINOPHIL # BLD AUTO: 0.47 K/UL (ref 0–0.51)
EOSINOPHIL NFR BLD: 4.8 % (ref 0–6.9)
ERYTHROCYTE [DISTWIDTH] IN BLOOD BY AUTOMATED COUNT: 46.2 FL (ref 35.9–50)
GLOBULIN SER CALC-MCNC: 2.5 G/DL (ref 1.9–3.5)
GLUCOSE SERPL-MCNC: 114 MG/DL (ref 65–99)
HCT VFR BLD AUTO: 43.7 % (ref 37–47)
HGB BLD-MCNC: 14 G/DL (ref 12–16)
IMM GRANULOCYTES # BLD AUTO: 0.04 K/UL (ref 0–0.11)
IMM GRANULOCYTES NFR BLD AUTO: 0.4 % (ref 0–0.9)
INR PPP: 2.29 (ref 0.87–1.13)
IRON SATN MFR SERPL: 14 % (ref 15–55)
IRON SERPL-MCNC: 62 UG/DL (ref 40–170)
LV EJECT FRACT  99904: 20
LYMPHOCYTES # BLD AUTO: 1.95 K/UL (ref 1–4.8)
LYMPHOCYTES NFR BLD: 20.1 % (ref 22–41)
MAGNESIUM SERPL-MCNC: 1.9 MG/DL (ref 1.5–2.5)
MCH RBC QN AUTO: 31.2 PG (ref 27–33)
MCHC RBC AUTO-ENTMCNC: 32 G/DL (ref 33.6–35)
MCV RBC AUTO: 97.3 FL (ref 81.4–97.8)
MONOCYTES # BLD AUTO: 1.17 K/UL (ref 0–0.85)
MONOCYTES NFR BLD AUTO: 12 % (ref 0–13.4)
NEUTROPHILS # BLD AUTO: 6.02 K/UL (ref 2–7.15)
NEUTROPHILS NFR BLD: 62.1 % (ref 44–72)
NRBC # BLD AUTO: 0 K/UL
NRBC BLD-RTO: 0 /100 WBC
PLATELET # BLD AUTO: 225 K/UL (ref 164–446)
PMV BLD AUTO: 9.9 FL (ref 9–12.9)
POTASSIUM SERPL-SCNC: 3.7 MMOL/L (ref 3.6–5.5)
PROT SERPL-MCNC: 6.5 G/DL (ref 6–8.2)
PROTHROMBIN TIME: 24.9 SEC (ref 12–14.6)
RBC # BLD AUTO: 4.49 M/UL (ref 4.2–5.4)
SODIUM SERPL-SCNC: 140 MMOL/L (ref 135–145)
TIBC SERPL-MCNC: 458 UG/DL (ref 250–450)
WBC # BLD AUTO: 9.7 K/UL (ref 4.8–10.8)

## 2019-02-25 PROCEDURE — A9270 NON-COVERED ITEM OR SERVICE: HCPCS | Performed by: HOSPITALIST

## 2019-02-25 PROCEDURE — 700102 HCHG RX REV CODE 250 W/ 637 OVERRIDE(OP): Performed by: INTERNAL MEDICINE

## 2019-02-25 PROCEDURE — 700111 HCHG RX REV CODE 636 W/ 250 OVERRIDE (IP): Performed by: INTERNAL MEDICINE

## 2019-02-25 PROCEDURE — 93308 TTE F-UP OR LMTD: CPT | Mod: 26 | Performed by: INTERNAL MEDICINE

## 2019-02-25 PROCEDURE — 80053 COMPREHEN METABOLIC PANEL: CPT

## 2019-02-25 PROCEDURE — 700101 HCHG RX REV CODE 250: Performed by: HOSPITALIST

## 2019-02-25 PROCEDURE — 700102 HCHG RX REV CODE 250 W/ 637 OVERRIDE(OP): Performed by: HOSPITALIST

## 2019-02-25 PROCEDURE — 85025 COMPLETE CBC W/AUTO DIFF WBC: CPT

## 2019-02-25 PROCEDURE — 770022 HCHG ROOM/CARE - ICU (200)

## 2019-02-25 PROCEDURE — 83735 ASSAY OF MAGNESIUM: CPT

## 2019-02-25 PROCEDURE — 99222 1ST HOSP IP/OBS MODERATE 55: CPT | Mod: AI | Performed by: HOSPITALIST

## 2019-02-25 PROCEDURE — 700101 HCHG RX REV CODE 250: Performed by: FAMILY MEDICINE

## 2019-02-25 PROCEDURE — 94640 AIRWAY INHALATION TREATMENT: CPT

## 2019-02-25 PROCEDURE — A9270 NON-COVERED ITEM OR SERVICE: HCPCS | Performed by: FAMILY MEDICINE

## 2019-02-25 PROCEDURE — 83880 ASSAY OF NATRIURETIC PEPTIDE: CPT

## 2019-02-25 PROCEDURE — 71045 X-RAY EXAM CHEST 1 VIEW: CPT

## 2019-02-25 PROCEDURE — 99291 CRITICAL CARE FIRST HOUR: CPT | Performed by: INTERNAL MEDICINE

## 2019-02-25 PROCEDURE — 700111 HCHG RX REV CODE 636 W/ 250 OVERRIDE (IP): Performed by: FAMILY MEDICINE

## 2019-02-25 PROCEDURE — 83540 ASSAY OF IRON: CPT

## 2019-02-25 PROCEDURE — A9270 NON-COVERED ITEM OR SERVICE: HCPCS | Performed by: INTERNAL MEDICINE

## 2019-02-25 PROCEDURE — 83550 IRON BINDING TEST: CPT

## 2019-02-25 PROCEDURE — 700102 HCHG RX REV CODE 250 W/ 637 OVERRIDE(OP): Performed by: FAMILY MEDICINE

## 2019-02-25 PROCEDURE — 94760 N-INVAS EAR/PLS OXIMETRY 1: CPT

## 2019-02-25 PROCEDURE — 93308 TTE F-UP OR LMTD: CPT

## 2019-02-25 PROCEDURE — 99233 SBSQ HOSP IP/OBS HIGH 50: CPT | Performed by: INTERNAL MEDICINE

## 2019-02-25 PROCEDURE — 700117 HCHG RX CONTRAST REV CODE 255: Performed by: INTERNAL MEDICINE

## 2019-02-25 PROCEDURE — 85610 PROTHROMBIN TIME: CPT

## 2019-02-25 RX ORDER — LISINOPRIL 2.5 MG/1
2.5 TABLET ORAL
Status: CANCELLED | OUTPATIENT
Start: 2019-02-26

## 2019-02-25 RX ORDER — LISINOPRIL 2.5 MG/1
2.5 TABLET ORAL
Status: DISCONTINUED | OUTPATIENT
Start: 2019-02-25 | End: 2019-02-25

## 2019-02-25 RX ORDER — LEVALBUTEROL INHALATION SOLUTION 1.25 MG/3ML
1.25 SOLUTION RESPIRATORY (INHALATION)
Status: CANCELLED | OUTPATIENT
Start: 2019-02-25

## 2019-02-25 RX ORDER — AMOXICILLIN 250 MG
2 CAPSULE ORAL 2 TIMES DAILY
Status: CANCELLED | OUTPATIENT
Start: 2019-02-25

## 2019-02-25 RX ORDER — POTASSIUM CHLORIDE 20 MEQ/1
40 TABLET, EXTENDED RELEASE ORAL 2 TIMES DAILY
Status: CANCELLED | OUTPATIENT
Start: 2019-02-25

## 2019-02-25 RX ORDER — LORAZEPAM 0.5 MG/1
0.5 TABLET ORAL EVERY 4 HOURS PRN
Status: DISCONTINUED | OUTPATIENT
Start: 2019-02-25 | End: 2019-03-08 | Stop reason: HOSPADM

## 2019-02-25 RX ORDER — LEVALBUTEROL INHALATION SOLUTION 1.25 MG/3ML
1.25 SOLUTION RESPIRATORY (INHALATION)
Status: DISCONTINUED | OUTPATIENT
Start: 2019-02-25 | End: 2019-03-08 | Stop reason: HOSPADM

## 2019-02-25 RX ORDER — SPIRONOLACTONE 25 MG/1
12.5 TABLET ORAL
Status: CANCELLED | OUTPATIENT
Start: 2019-02-26

## 2019-02-25 RX ORDER — CARVEDILOL 3.12 MG/1
3.12 TABLET ORAL 2 TIMES DAILY WITH MEALS
Status: CANCELLED | OUTPATIENT
Start: 2019-02-25

## 2019-02-25 RX ORDER — TIOTROPIUM BROMIDE 18 UG/1
1 CAPSULE ORAL; RESPIRATORY (INHALATION)
Status: DISCONTINUED | OUTPATIENT
Start: 2019-02-26 | End: 2019-02-26

## 2019-02-25 RX ORDER — BUDESONIDE AND FORMOTEROL FUMARATE DIHYDRATE 160; 4.5 UG/1; UG/1
2 AEROSOL RESPIRATORY (INHALATION)
Status: CANCELLED | OUTPATIENT
Start: 2019-02-25

## 2019-02-25 RX ORDER — CARVEDILOL 3.12 MG/1
3.12 TABLET ORAL 2 TIMES DAILY WITH MEALS
Status: DISCONTINUED | OUTPATIENT
Start: 2019-02-25 | End: 2019-02-27

## 2019-02-25 RX ORDER — FUROSEMIDE 10 MG/ML
20 INJECTION INTRAMUSCULAR; INTRAVENOUS
Status: DISCONTINUED | OUTPATIENT
Start: 2019-02-26 | End: 2019-02-27

## 2019-02-25 RX ORDER — FUROSEMIDE 10 MG/ML
20 INJECTION INTRAMUSCULAR; INTRAVENOUS
Status: DISCONTINUED | OUTPATIENT
Start: 2019-02-26 | End: 2019-02-25

## 2019-02-25 RX ORDER — LORAZEPAM 0.5 MG/1
0.5 TABLET ORAL EVERY 4 HOURS PRN
Status: CANCELLED | OUTPATIENT
Start: 2019-02-25

## 2019-02-25 RX ORDER — PREDNISONE 20 MG/1
20 TABLET ORAL DAILY
Status: DISCONTINUED | OUTPATIENT
Start: 2019-02-25 | End: 2019-03-01

## 2019-02-25 RX ORDER — SERTRALINE HYDROCHLORIDE 100 MG/1
100 TABLET, FILM COATED ORAL DAILY
Status: DISCONTINUED | OUTPATIENT
Start: 2019-02-26 | End: 2019-03-08 | Stop reason: HOSPADM

## 2019-02-25 RX ORDER — LEVALBUTEROL INHALATION SOLUTION 1.25 MG/3ML
1.25 SOLUTION RESPIRATORY (INHALATION)
Status: DISCONTINUED | OUTPATIENT
Start: 2019-02-25 | End: 2019-02-27

## 2019-02-25 RX ORDER — AMOXICILLIN 250 MG
2 CAPSULE ORAL 2 TIMES DAILY
Status: DISCONTINUED | OUTPATIENT
Start: 2019-02-25 | End: 2019-03-08 | Stop reason: HOSPADM

## 2019-02-25 RX ORDER — WARFARIN SODIUM 3 MG/1
3 TABLET ORAL
Status: DISCONTINUED | OUTPATIENT
Start: 2019-02-25 | End: 2019-02-25 | Stop reason: HOSPADM

## 2019-02-25 RX ORDER — POLYETHYLENE GLYCOL 3350 17 G/17G
1 POWDER, FOR SOLUTION ORAL
Status: DISCONTINUED | OUTPATIENT
Start: 2019-02-25 | End: 2019-03-08 | Stop reason: HOSPADM

## 2019-02-25 RX ORDER — BISACODYL 10 MG
10 SUPPOSITORY, RECTAL RECTAL
Status: CANCELLED | OUTPATIENT
Start: 2019-02-25

## 2019-02-25 RX ORDER — BISACODYL 10 MG
10 SUPPOSITORY, RECTAL RECTAL
Status: DISCONTINUED | OUTPATIENT
Start: 2019-02-25 | End: 2019-03-08 | Stop reason: HOSPADM

## 2019-02-25 RX ORDER — DIGOXIN 125 MCG
125 TABLET ORAL DAILY
Status: CANCELLED | OUTPATIENT
Start: 2019-02-25

## 2019-02-25 RX ORDER — ALBUTEROL SULFATE 90 UG/1
2 AEROSOL, METERED RESPIRATORY (INHALATION) EVERY 6 HOURS PRN
COMMUNITY
End: 2020-09-18 | Stop reason: SDUPTHER

## 2019-02-25 RX ORDER — FUROSEMIDE 10 MG/ML
20 INJECTION INTRAMUSCULAR; INTRAVENOUS
Status: CANCELLED | OUTPATIENT
Start: 2019-02-26

## 2019-02-25 RX ORDER — POLYETHYLENE GLYCOL 3350 17 G/17G
1 POWDER, FOR SOLUTION ORAL
Status: CANCELLED | OUTPATIENT
Start: 2019-02-25

## 2019-02-25 RX ORDER — BACLOFEN 20 MG
500 TABLET ORAL EVERY MORNING
Status: ON HOLD | COMMUNITY
End: 2019-03-04

## 2019-02-25 RX ORDER — TIOTROPIUM BROMIDE 18 UG/1
1 CAPSULE ORAL; RESPIRATORY (INHALATION)
Status: CANCELLED | OUTPATIENT
Start: 2019-02-26

## 2019-02-25 RX ORDER — HEPARIN SODIUM 1000 [USP'U]/ML
4000 INJECTION, SOLUTION INTRAVENOUS; SUBCUTANEOUS ONCE
Status: DISCONTINUED | OUTPATIENT
Start: 2019-02-25 | End: 2019-02-25

## 2019-02-25 RX ORDER — SPIRONOLACTONE 25 MG/1
12.5 TABLET ORAL
Status: DISCONTINUED | OUTPATIENT
Start: 2019-02-26 | End: 2019-02-27

## 2019-02-25 RX ORDER — POTASSIUM CHLORIDE 20 MEQ/1
40 TABLET, EXTENDED RELEASE ORAL 2 TIMES DAILY
Status: DISCONTINUED | OUTPATIENT
Start: 2019-02-25 | End: 2019-03-02

## 2019-02-25 RX ORDER — WARFARIN SODIUM 3 MG/1
3 TABLET ORAL
Status: DISCONTINUED | OUTPATIENT
Start: 2019-02-25 | End: 2019-03-01

## 2019-02-25 RX ORDER — ATORVASTATIN CALCIUM 40 MG/1
40 TABLET, FILM COATED ORAL EVERY EVENING
Status: CANCELLED | OUTPATIENT
Start: 2019-02-25

## 2019-02-25 RX ORDER — ATORVASTATIN CALCIUM 40 MG/1
40 TABLET, FILM COATED ORAL EVERY EVENING
Status: DISCONTINUED | OUTPATIENT
Start: 2019-02-25 | End: 2019-03-08 | Stop reason: HOSPADM

## 2019-02-25 RX ORDER — HEPARIN SODIUM 1000 [USP'U]/ML
2200 INJECTION, SOLUTION INTRAVENOUS; SUBCUTANEOUS PRN
Status: DISCONTINUED | OUTPATIENT
Start: 2019-02-25 | End: 2019-02-25

## 2019-02-25 RX ORDER — DIGOXIN 125 MCG
125 TABLET ORAL DAILY
Status: DISCONTINUED | OUTPATIENT
Start: 2019-02-25 | End: 2019-03-08 | Stop reason: HOSPADM

## 2019-02-25 RX ORDER — WARFARIN SODIUM 2.5 MG/1
2.5-5 TABLET ORAL EVERY EVENING
Status: ON HOLD | COMMUNITY
End: 2019-03-04

## 2019-02-25 RX ORDER — BUDESONIDE AND FORMOTEROL FUMARATE DIHYDRATE 160; 4.5 UG/1; UG/1
2 AEROSOL RESPIRATORY (INHALATION)
Status: DISCONTINUED | OUTPATIENT
Start: 2019-02-25 | End: 2019-02-26

## 2019-02-25 RX ORDER — LISINOPRIL 5 MG/1
2.5 TABLET ORAL
Status: DISCONTINUED | OUTPATIENT
Start: 2019-02-26 | End: 2019-02-26

## 2019-02-25 RX ORDER — SPIRONOLACTONE 25 MG/1
12.5 TABLET ORAL
Status: DISCONTINUED | OUTPATIENT
Start: 2019-02-25 | End: 2019-02-25

## 2019-02-25 RX ADMIN — POTASSIUM CHLORIDE 40 MEQ: 1500 TABLET, EXTENDED RELEASE ORAL at 07:34

## 2019-02-25 RX ADMIN — Medication 400 MG: at 07:35

## 2019-02-25 RX ADMIN — LEVALBUTEROL HYDROCHLORIDE 1.25 MG: 1.25 SOLUTION RESPIRATORY (INHALATION) at 10:19

## 2019-02-25 RX ADMIN — HUMAN ALBUMIN MICROSPHERES AND PERFLUTREN 3 ML: 10; .22 INJECTION, SOLUTION INTRAVENOUS at 08:18

## 2019-02-25 RX ADMIN — CARVEDILOL 3.12 MG: 3.12 TABLET, FILM COATED ORAL at 09:00

## 2019-02-25 RX ADMIN — DIGOXIN 125 MCG: 125 TABLET ORAL at 17:49

## 2019-02-25 RX ADMIN — SERTRALINE HYDROCHLORIDE 100 MG: 50 TABLET ORAL at 07:35

## 2019-02-25 RX ADMIN — LEVALBUTEROL HYDROCHLORIDE 1.25 MG: 1.25 SOLUTION RESPIRATORY (INHALATION) at 07:42

## 2019-02-25 RX ADMIN — PREDNISONE 20 MG: 20 TABLET ORAL at 17:50

## 2019-02-25 RX ADMIN — SPIRONOLACTONE 12.5 MG: 25 TABLET, FILM COATED ORAL at 09:03

## 2019-02-25 RX ADMIN — BUDESONIDE AND FORMOTEROL FUMARATE DIHYDRATE 2 PUFF: 160; 4.5 AEROSOL RESPIRATORY (INHALATION) at 07:47

## 2019-02-25 RX ADMIN — WARFARIN SODIUM 3 MG: 3 TABLET ORAL at 19:45

## 2019-02-25 RX ADMIN — CARVEDILOL 3.12 MG: 3.12 TABLET, FILM COATED ORAL at 17:49

## 2019-02-25 RX ADMIN — FUROSEMIDE 20 MG: 10 INJECTION, SOLUTION INTRAVENOUS at 06:33

## 2019-02-25 RX ADMIN — Medication 400 MG: at 17:48

## 2019-02-25 RX ADMIN — POTASSIUM CHLORIDE 40 MEQ: 1500 TABLET, EXTENDED RELEASE ORAL at 17:50

## 2019-02-25 RX ADMIN — LISINOPRIL 2.5 MG: 2.5 TABLET ORAL at 09:00

## 2019-02-25 RX ADMIN — LEVALBUTEROL HYDROCHLORIDE 1.25 MG: 1.25 SOLUTION RESPIRATORY (INHALATION) at 19:56

## 2019-02-25 RX ADMIN — TIOTROPIUM BROMIDE 1 CAPSULE: 18 CAPSULE ORAL; RESPIRATORY (INHALATION) at 07:47

## 2019-02-25 RX ADMIN — ATORVASTATIN CALCIUM 40 MG: 40 TABLET, FILM COATED ORAL at 17:50

## 2019-02-25 ASSESSMENT — ENCOUNTER SYMPTOMS
DEPRESSION: 0
BRUISES/BLEEDS EASILY: 0
FEVER: 0
COUGH: 0
CHILLS: 0
PALPITATIONS: 0
SHORTNESS OF BREATH: 1
DIZZINESS: 0
TINGLING: 0
BRUISES/BLEEDS EASILY: 0
NERVOUS/ANXIOUS: 0
SORE THROAT: 0
DIZZINESS: 0
DEPRESSION: 0
ABDOMINAL PAIN: 0
HEADACHES: 0
BACK PAIN: 0
SPUTUM PRODUCTION: 0
ORTHOPNEA: 1
ABDOMINAL PAIN: 0
VOMITING: 0
CONSTITUTIONAL NEGATIVE: 1
NEUROLOGICAL NEGATIVE: 1
NAUSEA: 0
PALPITATIONS: 0
CHILLS: 0
NECK PAIN: 0
HEMOPTYSIS: 0
HEADACHES: 0
WHEEZING: 1
FEVER: 0
BACK PAIN: 0
WEAKNESS: 1
NAUSEA: 0
FLANK PAIN: 0
EYE PAIN: 0
VOMITING: 0
SHORTNESS OF BREATH: 1
INSOMNIA: 0
BLURRED VISION: 0
BLURRED VISION: 0
SORE THROAT: 0
COUGH: 1

## 2019-02-25 ASSESSMENT — COGNITIVE AND FUNCTIONAL STATUS - GENERAL
DAILY ACTIVITIY SCORE: 22
CLIMB 3 TO 5 STEPS WITH RAILING: A LITTLE
TOILETING: A LITTLE
DRESSING REGULAR LOWER BODY CLOTHING: A LITTLE
MOBILITY SCORE: 20
STANDING UP FROM CHAIR USING ARMS: A LITTLE
MOVING FROM LYING ON BACK TO SITTING ON SIDE OF FLAT BED: A LITTLE
SUGGESTED CMS G CODE MODIFIER MOBILITY: CJ
WALKING IN HOSPITAL ROOM: A LITTLE
SUGGESTED CMS G CODE MODIFIER DAILY ACTIVITY: CJ

## 2019-02-25 ASSESSMENT — CHA2DS2 SCORE
AGE 75 OR GREATER: NO
CHF OR LEFT VENTRICULAR DYSFUNCTION: YES
VASCULAR DISEASE: NO
PRIOR STROKE OR TIA OR THROMBOEMBOLISM: NO
CHA2DS2 VASC SCORE: 4
HYPERTENSION: YES
AGE 65 TO 74: YES
SEX: FEMALE
DIABETES: NO

## 2019-02-25 ASSESSMENT — COPD QUESTIONNAIRES
HAVE YOU SMOKED AT LEAST 100 CIGARETTES IN YOUR ENTIRE LIFE: YES
COPD SCREENING SCORE: 6
DURING THE PAST 4 WEEKS HOW MUCH DID YOU FEEL SHORT OF BREATH: SOME OF THE TIME
DO YOU EVER COUGH UP ANY MUCUS OR PHLEGM?: YES, A FEW DAYS A WEEK OR MONTH

## 2019-02-25 ASSESSMENT — LIFESTYLE VARIABLES: EVER_SMOKED: YES

## 2019-02-25 NOTE — PROGRESS NOTES
Report received from SOY Lott. Pt denies needs at this time. Safety precautions in place. Call light within reach.

## 2019-02-25 NOTE — FLOWSHEET NOTE
02/24/19 1936   Interdisciplinary Plan of Care-Goals (Indications)   Bronchodilator Indications History / Diagnosis   Interdisciplinary Plan of Care-Outcomes    Bronchodilator Outcome Patient at Stable Baseline   Education   Education Yes - Pt. / Family has been Instructed in use of Respiratory Equipment;Yes - Pt. / Family has been Instructed in use of Respiratory Medications and Adverse Reactions   RT Assessment of Delivered Medications   Evaluation of Medication Delivery Daily Yes-- Pt /Family has been Instructed in use of Respiratory Medications and Adverse Reactions   SVN Group   #SVN Performed Yes   Given By: Mouthpiece   Respiratory WDL   Respiratory (WDL) WDL   Chest Exam   Respiration 19   Pulse 71   Oximetry   #Pulse Oximetry (Single Determination) Yes   Oxygen   Pulse Oximetry 99 %   O2 (LPM) 3   O2 Daily Delivery Respiratory  Silicone Nasal Cannula

## 2019-02-25 NOTE — PROGRESS NOTES
Report received from SOY Anderson ext. [phone]. Received Bedside report. Assumed care at ***. This pt is AOx4, ambulatory with SBA, voiding appropriately, no s/s of pain. Patient and RN discussed plan of care: questions answered. Labs noted, assessment complete, patient tolerating regular diet and is to be NPO at midnight for PAGE scheduled tomorrow. Tele box in place. Verified with monitor room. Pt is on 3L of O2 via NC, this is baseline for the patient. Call light in place, fall precautions in place, patient educated on importance of calling for assistance. No additional needs at this time. Systolic blood pressures are in the 80s-90s.

## 2019-02-25 NOTE — PROGRESS NOTES
Tele Note    Rhythm sr with 5 beats of v-tach  Rate 80-90  HI 0.14  QRS 0.10  QT 0.38  Ectopy f trip, big, pvc, trig

## 2019-02-25 NOTE — H&P
Hospital Medicine History & Physical Note    Date of Service  2/25/2019    Primary Care Physician  Ritika Jurado M.D.    Consultants  Cardiology    Code Status  DNR    Chief Complaint  dyspnea    History of Presenting Illness  73 y.o. female who presented 2/21/2019 with dyspnea. She was admitted with evidence for acute systolic CHF. She was diuresed and optimized on medical therapy. Cardiology was consulted and conitnues to follow. Her. They have requested that she be moved to [facility] for a IDALMIS. She has a significant history of a mitral valve repair in the past. She has diuresed well since admission wiht improvement in her dyspnea with exertion. Her echo is quite severe and noted below.     Review of Systems  Review of Systems   Constitutional: Negative for chills and fever.   HENT: Negative for sore throat.    Eyes: Negative for blurred vision and pain.   Respiratory: Positive for shortness of breath. Negative for cough.    Cardiovascular: Negative for chest pain and palpitations.   Gastrointestinal: Negative for abdominal pain, nausea and vomiting.   Genitourinary: Negative for dysuria and urgency.   Musculoskeletal: Negative for back pain and neck pain.   Skin: Negative for itching and rash.   Neurological: Negative for dizziness, tingling and headaches.   Psychiatric/Behavioral: Negative for depression. The patient does not have insomnia.    All other systems reviewed and are negative.      Past Medical History   has a past medical history of Breath shortness; COPD (chronic obstructive pulmonary disease) (HCC); Emphysema of lung (HCC); Heart valve disease; High cholesterol; History of heart surgery; Hyperlipidemia; Hypertension; and Sleep apnea.    Surgical History   has a past surgical history that includes oophorectomy; appendectomy; mitral valve repair (4/20/2017); maze procedure (Left, 4/20/2017); and idalmis (4/20/2017).     Family History  family history includes Cancer in her father;  Cancer (age of onset: 68) in her sister; Hypertension in her mother.     Social History   reports that she quit smoking about 17 years ago. Her smoking use included Cigarettes. She has a 19.00 pack-year smoking history. She has never used smokeless tobacco. She reports that she drinks about 8.4 oz of alcohol per week . She reports that she does not use drugs.    Allergies  Allergies   Allergen Reactions   • Sulfa Drugs Rash     Rxn - years ago in her 20's       Medications  Prior to Admission Medications   Prescriptions Last Dose Informant Patient Reported? Taking?   Acetaminophen (TYLENOL EXTRA STRENGTH PO)   Yes No   Sig: Take 1 Tab by mouth as needed.   NON SPECIFIED   No No   Sig: Please provide patient with a portable oxygen concentrator 2.5L continuous flow at all times for 3 months    ICD10 Chronic Respiratory Failure with hypoxia J96.11  Pulse ox off of oxygen 87%  O2 off of   Patient not taking: Reported on 2/21/2019   PROAIR  (90 Base) MCG/ACT Aero Soln inhalation aerosol   No No   Sig: INHALE 2 PUFFS BY MOUTH EVERY FOUR HOURS AS NEEDED FOR SHORTNESS OF BREATH.   Patient not taking: Reported on 2/21/2019   Tiotropium Bromide Monohydrate (SPIRIVA RESPIMAT) 1.25 MCG/ACT Aero Soln   No No   Sig: Inhale 2 Puffs by mouth every day.   Umeclidinium Bromide (INCRUSE ELLIPTA) 62.5 MCG/INH AEROSOL POWDER, BREATH ACTIVATED   No No   Sig: Inhale 1 Puff by mouth every day.   Patient not taking: Reported on 1/8/2019   atorvastatin (LIPITOR) 40 MG Tab 2/20/2019 at 2200  No No   Sig: Take 1 Tab by mouth every day.   azithromycin (ZITHROMAX) 250 MG Tab   No No   Sig: Take 2 tablets on day 1, then take 1 tablet a day for 4 days.   Patient not taking: Reported on 2/21/2019   fluticasone-salmeterol (ADVAIR) 250-50 MCG/DOSE AEROSOL POWDER, BREATH ACTIVATED   No No   Sig: Inhale 1 Puff by mouth 2 times a day.   Patient taking differently: Inhale 1 Puff by mouth every day.   magnesium oxide (MAG-OX) 400 MG Tab 2/21/2019  at 0800 Patient Yes No   Sig: Take 400 mg by mouth 2 times a day. takes 500 mg tab   mupirocin (BACTROBAN) 2 % Ointment   No No   Sig: Apply 1 Application to affected area(s) 2 times a day.   Patient not taking: Reported on 2/21/2019   non-formulary med   Yes No   Sig: Inhale 2 L/min by mouth Continuous. Oxygen 2L x NC continuous  Indications: COPD   predniSONE (DELTASONE) 10 MG Tab   No No   Sig: Take 30mg x 5 days, then take 20mg x 5 days, then take 10mg x 5 days, with food, then discontinue.   Patient not taking: Reported on 2/21/2019   sertraline (ZOLOFT) 100 MG Tab 2/21/2019 at 0800  No No   Sig: Take 1 Tab by mouth every day.   warfarin (COUMADIN) 2.5 MG Tab 2/20 at 2200  No No   Sig: Take 1 to 1.5 tablets by mouth daily as directed by the coumadin clinic   Patient taking differently: Take 1 to 1.5 tablets by mouth daily as directed by the coumadin clinic. 1.5 tablet Mon Wed Fri, 1 tablet Tues, Thurs, Sunday      Facility-Administered Medications: None       Physical Exam  Temp:  [36.3 °C (97.4 °F)-36.6 °C (97.8 °F)] 36.3 °C (97.4 °F)  Pulse:  [71-99] 92  Resp:  [18-20] 18  BP: ()/(66-80) 93/80  SpO2:  [94 %-100 %] 100 %    Physical Exam   Constitutional: She is oriented to person, place, and time. She appears well-developed and well-nourished. No distress.   Patient seen and examined  Discussed plan with RN   HENT:   Right Ear: External ear normal.   Left Ear: External ear normal.   Nose: Nose normal.   Eyes: Conjunctivae are normal. Right eye exhibits no discharge. Left eye exhibits no discharge.   Neck: JVD present.   Cardiovascular: Regular rhythm and normal heart sounds.    No murmur heard.  Cap refill 2sec  Pulses 2+ throughout     Pulmonary/Chest: Effort normal. No stridor. No respiratory distress. She has no wheezes. She has rales.   Abdominal: Soft. Bowel sounds are normal. She exhibits no distension. There is no tenderness.   Musculoskeletal: She exhibits no edema or tenderness.   Neurological:  She is alert and oriented to person, place, and time.   Skin: Skin is warm and dry. She is not diaphoretic. No erythema.   Normal skin  Color.    Psychiatric: She has a normal mood and affect. Her behavior is normal.   Nursing note and vitals reviewed.      Laboratory:  Recent Labs      02/24/19 0314 02/25/19 0431   WBC  10.9*  9.7   RBC  4.34  4.49   HEMOGLOBIN  13.3  14.0   HEMATOCRIT  42.3  43.7   MCV  97.5  97.3   MCH  30.6  31.2   MCHC  31.4*  32.0*   RDW  46.9  46.2   PLATELETCT  225  225   MPV  10.0  9.9     Recent Labs      02/23/19 0333 02/24/19 0314 02/25/19 0431   SODIUM  139  140  140   POTASSIUM  3.3*  3.4*  3.7   CHLORIDE  99  98  94*   CO2  32  34*  35*   GLUCOSE  105*  120*  114*   BUN  20  27*  28*   CREATININE  1.08  0.99  1.10   CALCIUM  8.9  9.8  9.8     Recent Labs      02/23/19 0333 02/24/19 0314 02/25/19 0431   ALTSGPT   --   31  26   ASTSGOT   --   26  22   ALKPHOSPHAT   --   57  59   TBILIRUBIN   --   0.8  0.8   GLUCOSE  105*  120*  114*     Recent Labs      02/24/19 0314 02/25/19 0431   INR  2.13*  2.29*     Recent Labs      02/23/19 0333 02/25/19 0431   BNPBTYPENAT  1964*  960*         No results for input(s): TROPONINI in the last 72 hours.    Urinalysis:    No results found     Imaging:  EC-ECHOCARDIOGRAM LTD W/ CONT   Final Result      EC-ECHOCARDIOGRAM COMPLETE W/O CONT   Final Result      CT-CTA CHEST PULMONARY ARTERY W/ RECONS   Final Result         1.  No large central pulmonary embolus is appreciated, evaluation of the subsegmental branches is essentially nondiagnostic due to motion artifacts. Additional imaging would be required for definitive exclusion of small distal pulmonary emboli.   2.  Small left pleural effusion and trace right pleural effusion.   3.  Linear consolidations in the bilateral lung bases favor changes of atelectasis.   4.  Emphysema   5.  Cardiomegaly   6.  Atherosclerosis and atherosclerotic coronary artery disease.       DX-CHEST-LIMITED (1 VIEW)   Final Result      1.  Minimal left lower lobe atelectasis or pneumonia.      2.  Stable cardiomegaly with prior median sternotomy.            Assessment/Plan:  I anticipate this patient will require at least two midnights for appropriate medical management, necessitating inpatient admission.    Acute respiratory failure with hypoxia (HCC)- (present on admission)   Assessment & Plan    2/2 chf and possible valve disease.   Possible component of copd and pulmonary htn as well  Diuresis ongoing.      Paroxysmal atrial fibrillation (HCC)- (present on admission)   Assessment & Plan    Intermittent mild RVR  Trend on tele  coreg  Coumadin  per pharmacy     Acute systolic congestive heart failure (HCC)- (present on admission)   Assessment & Plan    -ECHO with EF 5-10%, prior mitral valve surgery  Somewhat improved  Continue daily weights and i/o trending.   -continue IV lasix 20 mg BID  -trend BNP  -cards following  -continue coreg and statin, consider ACE  -PAGE monday for mitral valve.   -ok TSH     Atrial flutter (HCC)- (present on admission)   Assessment & Plan    -Tele did not show this rhythm, see above     COPD (chronic obstructive pulmonary disease) (HCC)- (present on admission)   Assessment & Plan    This is chronic  She is not in acute exacerbation  Will continue with home meds         VTE prophylaxis: coumadin

## 2019-02-25 NOTE — FLOWSHEET NOTE
02/25/19 1023   Events/Summary/Plan   Non-Invasive Resp Device Site Inspection Completed Intact   Interdisciplinary Plan of Care-Goals (Indications)   Bronchodilator Indications History / Diagnosis   Interdisciplinary Plan of Care-Outcomes    Bronchodilator Outcome Improved Patient Appearance with Decreased use of Accessory Muscles   Education   Education Yes - Pt. / Family has been Instructed in use of Respiratory Medications and Adverse Reactions   RT Assessment of Delivered Medications   Evaluation of Medication Delivery Daily Yes-- Pt /Family has been Instructed in use of Respiratory Medications and Adverse Reactions   SVN Group   #SVN Performed Yes   Given By: Mouthpiece   Date SVN Next Change Due (Q 7 Days) 03/01/19   Chest Exam   Work Of Breathing / Effort Mild   Respiration 20   Pulse 88   Breath Sounds   RUL Breath Sounds Clear   RML Breath Sounds Fine Crackles;Diminished   RLL Breath Sounds Fine Crackles;Diminished   DELLA Breath Sounds Clear   LLL Breath Sounds Diminished;Fine Crackles   Secretions   Cough Dry   How Sputum Obtained Cough on Request   Oximetry   #Pulse Oximetry (Single Determination) Yes   Oxygen   Pulse Oximetry 99 %   O2 (LPM) 3   O2 Daily Delivery Respiratory  Silicone Nasal Cannula

## 2019-02-25 NOTE — PROGRESS NOTES
Pt bp= 99/75, lasix due, pt npo w/out fluids infusing. There are no perameters for lasix. Hospitalist says ok to give.

## 2019-02-25 NOTE — FLOWSHEET NOTE
02/24/19 1602   RT Assessment of Delivered Medications   Evaluation of Medication Delivery Daily Yes-- Pt /Family has been Instructed in use of Respiratory Medications and Adverse Reactions   SVN Group   #SVN Performed Yes   Given By: Mouthpiece   Chest Exam   Respiration 20   Pulse 82   Breath Sounds   RUL Breath Sounds Clear   RML Breath Sounds Diminished   RLL Breath Sounds Diminished   DELLA Breath Sounds Clear   LLL Breath Sounds Diminished   Oximetry   #Pulse Oximetry (Single Determination) Yes   Oxygen   Pulse Oximetry 94 %   O2 (LPM) 3   O2 Daily Delivery Respiratory  Silicone Nasal Cannula

## 2019-02-25 NOTE — DISCHARGE PLANNING
Anticipated Discharge Disposition: [facility]    Action: Per transfer center pt going to [facility] 727 bed 1.  RN phone #[phone].  Requested [facility] to set up [facility] transport at 1230 pm.    Barriers to Discharge: [facility]    Plan: [facility]

## 2019-02-25 NOTE — PROGRESS NOTES
Inpatient Anticoagulation Service Note    Date: 2/25/2019  Reason for Anticoagulation: Atrial Fibrillation, Mitral Valve Repair   Hemoglobin Value: 14  Hematocrit Value: 43.7  Lab Platelet Value: 225  Target INR:  1.5 - 2.5 per Dr. Li; Cardiology    INR from last 7 days     Date/Time INR Value    02/25/19 0431 (!)  2.29    02/24/19 0314 (!)  2.13    02/22/19 0328 (!)  1.79    02/21/19 1744 (!)  2.08        Dose from last 7 days     Date/Time Dose (mg)    02/25/19 1000  3    02/24/19 0800  3    02/23/19 0700  4    02/22/19 1000  4    02/22/19 0000  2.5        Average Dose (mg):  (Patients home dose 3.75 mg MWF, 2.5 mg all other days)  Significant Interactions: Statin, sertraline  Bridge Therapy: No    Reversal Agent Administered: Not Applicable  Comments:  (Followed by [facility] anticoagulation clinic)    Plan:  Warfarin 3 mg today for INR 2.29  Education Material Provided?: No  Pharmacist suggested discharge dosing: Resume home regimen of 3.75 mg MWF, 2.5 mg all other days and follow up for close monitoring with [facility] coumadin clinic.     Cori Mario  PharmD Candidate 2019    ANALIA Fortune PharmD

## 2019-02-25 NOTE — CONSULTS
Critical Care Consultation    Date of consult: 2/25/2019    Referring Physician  Fernandez Preston M.D.    Reason for Consultation  CHF, MR, hypotension    History of Presenting Illness  73 y.o. female who presented 2/25/2019 with a past medical history significant for mitral valve repair, stage III COPD on 2 L/min nasal cannula 24 hours a day, hyperlipidemia, sleep apnea (no CPAP used) who presents to the emergency department at [facility] on 2/21 complaining of shortness of breath, dyspnea on exertion.  She denies any fevers, chills and has a nonproductive cough.  She was seen at the urgent care who sent her to the emergency department.  She was admitted to the hospital for acute decompensated systolic heart failure and started on heart failure medications.  She was seen by cardiology Dr. Ahuja who recommended transfer to [facility] for transesophageal echocardiography to further evaluate her mitral valve disease.  In route patient became hypotensive with a systolic blood pressure of 70 and EMS brought her to the ED where she was given 250 mL bolus before being admitted to intensive care unit.  I was consulted for assistance in her management.  Of note she had received lisinopril and Aldactone this morning as part of her heart failure medication management.    Code Status  DNAR/DNI    Review of Systems  Review of Systems   Constitutional: Negative for chills, fever and malaise/fatigue.   HENT: Negative for congestion and sore throat.    Eyes: Negative for blurred vision.   Respiratory: Positive for cough, shortness of breath and wheezing. Negative for hemoptysis and sputum production.    Cardiovascular: Positive for orthopnea. Negative for chest pain, palpitations and leg swelling.   Gastrointestinal: Negative for abdominal pain, nausea and vomiting.   Genitourinary: Negative for dysuria and flank pain.   Musculoskeletal: Negative for back pain.   Skin: Negative for rash.   Neurological: Positive for  weakness. Negative for dizziness and headaches.   Endo/Heme/Allergies: Does not bruise/bleed easily.   Psychiatric/Behavioral: Negative for depression. The patient is not nervous/anxious.    All other systems reviewed and are negative.      Past Medical History   has a past medical history of Breath shortness; COPD (chronic obstructive pulmonary disease) (HCC); Emphysema of lung (HCC); Heart valve disease; High cholesterol; History of heart surgery; Hyperlipidemia; Hypertension; and Sleep apnea.    Surgical History   has a past surgical history that includes oophorectomy; appendectomy; mitral valve repair (4/20/2017); maze procedure (Left, 4/20/2017); and idalmis (4/20/2017).    Family History  family history includes Cancer in her father; Cancer (age of onset: 68) in her sister; Hypertension in her mother.    Social History   reports that she quit smoking about 17 years ago. Her smoking use included Cigarettes. She has a 19.00 pack-year smoking history. She has never used smokeless tobacco. She reports that she drinks about 8.4 oz of alcohol per week . She reports that she does not use drugs.    Medications  Home Medications    **Home medications have not yet been reviewed for this encounter**       Current Facility-Administered Medications   Medication Dose Route Frequency Provider Last Rate Last Dose   • lidocaine (XYLOCAINE) 1 % injection 0.5 mL  0.5 mL Intradermal Once PRN MOUNA CampoverdeRCOLLIN.       • senna-docusate (PERICOLACE or SENOKOT S) 8.6-50 MG per tablet 2 Tab  2 Tab Oral BID Fernandez Preston M.D.        And   • polyethylene glycol/lytes (MIRALAX) PACKET 1 Packet  1 Packet Oral QDAY PRN Fernandez Preston M.D.        And   • magnesium hydroxide (MILK OF MAGNESIA) suspension 30 mL  30 mL Oral QDAY PRN Fernandez Preston M.D.        And   • bisacodyl (DULCOLAX) suppository 10 mg  10 mg Rectal QDAY PRN Fernandez Preston M.D.       • Respiratory Care per Protocol   Nebulization Continuous RT Fernandez BARBOZA  SIRISHA Preston       • atorvastatin (LIPITOR) tablet 40 mg  40 mg Oral Q EVENING Fernandez Preston M.D.       • budesonide-formoterol (SYMBICORT) 160-4.5 MCG/ACT inhaler 2 Puff  2 Puff Inhalation BID (RT) Fernandez Preston M.D.       • carvedilol (COREG) tablet 3.125 mg  3.125 mg Oral BID WITH MEALS Fernandez Preston M.D.       • digoxin (LANOXIN) tablet 125 mcg  125 mcg Oral DAILY AT 1800 Fernandez Preston M.D.       • [START ON 2/26/2019] furosemide (LASIX) injection 20 mg  20 mg Intravenous Q DAY Fernandez Preston M.D.       • levalbuterol (XOPENEX) 1.25 MG/3ML nebulizer solution 1.25 mg  1.25 mg Nebulization Q4H PRN (RT) Fernandez Preston M.D.       • levalbuterol (XOPENEX) 1.25 MG/3ML nebulizer solution 1.25 mg  1.25 mg Nebulization 4X/DAY (RT) Fernandez Preston M.D.   Stopped at 02/25/19 1500   • [START ON 2/26/2019] lisinopril (PRINIVIL) tablet 2.5 mg  2.5 mg Oral Q DAY Fernandez Preston M.D.       • LORazepam (ATIVAN) tablet 0.5 mg  0.5 mg Oral Q4HRS PRN Fernandez Preston M.D.       • magnesium oxide (MAG-OX) tablet 400 mg  400 mg Oral BID Fernandez Preston M.D.       • MD Alert...Warfarin per Pharmacy   Other PHARMACY TO DOSE Fernandez Preston M.D.       • potassium chloride SA (Kdur) tablet 40 mEq  40 mEq Oral BID Fernandez Preston M.D.       • [START ON 2/26/2019] sertraline (ZOLOFT) tablet 100 mg  100 mg Oral DAILY Fernandez Preston M.D.       • [START ON 2/26/2019] spironolactone (ALDACTONE) tablet 12.5 mg  12.5 mg Oral Q DAY Fernandez Preston M.D.       • [START ON 2/26/2019] tiotropium (SPIRIVA) 18 MCG inhalation capsule 1 Cap  1 Cap Inhalation QDAILY (RT) Fernandez Preston M.D.           Allergies  Allergies   Allergen Reactions   • Sulfa Drugs Rash     Rxn - years ago in her 20's       Vital Signs last 24 hours  Temp:  [36.6 °C (97.8 °F)] 36.6 °C (97.8 °F)  Pulse:  [77-83] 83  Resp:  [19-31] 31  BP: (76)/(49) 76/49  SpO2:  [97 %] 97 %    Physical Exam  Physical Exam   Constitutional: She is oriented to person,  place, and time. She appears well-developed and well-nourished. No distress.   HENT:   Head: Normocephalic and atraumatic.   Nose: Nose normal.   Mouth/Throat: Oropharynx is clear and moist.   Eyes: Pupils are equal, round, and reactive to light. Conjunctivae are normal. No scleral icterus.   Neck: Neck supple. No JVD present. No tracheal deviation present.   Cardiovascular: Normal rate, regular rhythm and intact distal pulses.  Exam reveals no gallop.    Murmur heard.  Sternotomy scar noted   Pulmonary/Chest: Effort normal. No accessory muscle usage or stridor. No tachypnea. No respiratory distress. She has wheezes. She has rhonchi in the left lower field. She has rales.   Speaking in full sentences   Abdominal: Soft. Bowel sounds are normal. She exhibits no distension. There is no tenderness. There is no rebound.   Musculoskeletal: She exhibits no edema, tenderness or deformity.   Lymphadenopathy:     She has no cervical adenopathy.   Neurological: She is alert and oriented to person, place, and time. No cranial nerve deficit. She exhibits normal muscle tone.   Skin: Skin is warm and dry. No rash noted.   Psychiatric: She has a normal mood and affect. Her behavior is normal. Judgment and thought content normal.   Nursing note and vitals reviewed.      Fluids  No intake or output data in the 24 hours ending 02/25/19 1456    Laboratory  Recent Results (from the past 48 hour(s))   Comp Metabolic Panel    Collection Time: 02/24/19  3:14 AM   Result Value Ref Range    Sodium 140 135 - 145 mmol/L    Potassium 3.4 (L) 3.6 - 5.5 mmol/L    Chloride 98 96 - 112 mmol/L    Co2 34 (H) 20 - 33 mmol/L    Anion Gap 8.0 0.0 - 11.9    Glucose 120 (H) 65 - 99 mg/dL    Bun 27 (H) 8 - 22 mg/dL    Creatinine 0.99 0.50 - 1.40 mg/dL    Calcium 9.8 8.4 - 10.2 mg/dL    AST(SGOT) 26 12 - 45 U/L    ALT(SGPT) 31 2 - 50 U/L    Alkaline Phosphatase 57 30 - 99 U/L    Total Bilirubin 0.8 0.1 - 1.5 mg/dL    Albumin 4.0 3.2 - 4.9 g/dL    Total  Protein 6.6 6.0 - 8.2 g/dL    Globulin 2.6 1.9 - 3.5 g/dL    A-G Ratio 1.5 g/dL   CBC WITH DIFFERENTIAL    Collection Time: 02/24/19  3:14 AM   Result Value Ref Range    WBC 10.9 (H) 4.8 - 10.8 K/uL    RBC 4.34 4.20 - 5.40 M/uL    Hemoglobin 13.3 12.0 - 16.0 g/dL    Hematocrit 42.3 37.0 - 47.0 %    MCV 97.5 81.4 - 97.8 fL    MCH 30.6 27.0 - 33.0 pg    MCHC 31.4 (L) 33.6 - 35.0 g/dL    RDW 46.9 35.9 - 50.0 fL    Platelet Count 225 164 - 446 K/uL    MPV 10.0 9.0 - 12.9 fL    Neutrophils-Polys 68.50 44.00 - 72.00 %    Lymphocytes 17.10 (L) 22.00 - 41.00 %    Monocytes 10.90 0.00 - 13.40 %    Eosinophils 2.20 0.00 - 6.90 %    Basophils 0.90 0.00 - 1.80 %    Immature Granulocytes 0.40 0.00 - 0.90 %    Nucleated RBC 0.00 /100 WBC    Neutrophils (Absolute) 7.44 (H) 2.00 - 7.15 K/uL    Lymphs (Absolute) 1.86 1.00 - 4.80 K/uL    Monos (Absolute) 1.19 (H) 0.00 - 0.85 K/uL    Eos (Absolute) 0.24 0.00 - 0.51 K/uL    Baso (Absolute) 0.10 0.00 - 0.12 K/uL    Immature Granulocytes (abs) 0.04 0.00 - 0.11 K/uL    NRBC (Absolute) 0.00 K/uL   MAGNESIUM    Collection Time: 02/24/19  3:14 AM   Result Value Ref Range    Magnesium 1.9 1.5 - 2.5 mg/dL   ESTIMATED GFR    Collection Time: 02/24/19  3:14 AM   Result Value Ref Range    GFR If African American >60 >60 mL/min/1.73 m 2    GFR If Non African American 55 (A) >60 mL/min/1.73 m 2   Prothrombin Time    Collection Time: 02/24/19  3:14 AM   Result Value Ref Range    PT 23.5 (H) 12.0 - 14.6 sec    INR 2.13 (H) 0.87 - 1.13   Prothrombin Time    Collection Time: 02/25/19  4:31 AM   Result Value Ref Range    PT 24.9 (H) 12.0 - 14.6 sec    INR 2.29 (H) 0.87 - 1.13   Comp Metabolic Panel    Collection Time: 02/25/19  4:31 AM   Result Value Ref Range    Sodium 140 135 - 145 mmol/L    Potassium 3.7 3.6 - 5.5 mmol/L    Chloride 94 (L) 96 - 112 mmol/L    Co2 35 (H) 20 - 33 mmol/L    Anion Gap 11.0 0.0 - 11.9    Glucose 114 (H) 65 - 99 mg/dL    Bun 28 (H) 8 - 22 mg/dL    Creatinine 1.10 0.50 -  1.40 mg/dL    Calcium 9.8 8.4 - 10.2 mg/dL    AST(SGOT) 22 12 - 45 U/L    ALT(SGPT) 26 2 - 50 U/L    Alkaline Phosphatase 59 30 - 99 U/L    Total Bilirubin 0.8 0.1 - 1.5 mg/dL    Albumin 4.0 3.2 - 4.9 g/dL    Total Protein 6.5 6.0 - 8.2 g/dL    Globulin 2.5 1.9 - 3.5 g/dL    A-G Ratio 1.6 g/dL   CBC WITH DIFFERENTIAL    Collection Time: 02/25/19  4:31 AM   Result Value Ref Range    WBC 9.7 4.8 - 10.8 K/uL    RBC 4.49 4.20 - 5.40 M/uL    Hemoglobin 14.0 12.0 - 16.0 g/dL    Hematocrit 43.7 37.0 - 47.0 %    MCV 97.3 81.4 - 97.8 fL    MCH 31.2 27.0 - 33.0 pg    MCHC 32.0 (L) 33.6 - 35.0 g/dL    RDW 46.2 35.9 - 50.0 fL    Platelet Count 225 164 - 446 K/uL    MPV 9.9 9.0 - 12.9 fL    Neutrophils-Polys 62.10 44.00 - 72.00 %    Lymphocytes 20.10 (L) 22.00 - 41.00 %    Monocytes 12.00 0.00 - 13.40 %    Eosinophils 4.80 0.00 - 6.90 %    Basophils 0.60 0.00 - 1.80 %    Immature Granulocytes 0.40 0.00 - 0.90 %    Nucleated RBC 0.00 /100 WBC    Neutrophils (Absolute) 6.02 2.00 - 7.15 K/uL    Lymphs (Absolute) 1.95 1.00 - 4.80 K/uL    Monos (Absolute) 1.17 (H) 0.00 - 0.85 K/uL    Eos (Absolute) 0.47 0.00 - 0.51 K/uL    Baso (Absolute) 0.06 0.00 - 0.12 K/uL    Immature Granulocytes (abs) 0.04 0.00 - 0.11 K/uL    NRBC (Absolute) 0.00 K/uL   MAGNESIUM    Collection Time: 02/25/19  4:31 AM   Result Value Ref Range    Magnesium 1.9 1.5 - 2.5 mg/dL   BTYPE NATRIURETIC PEPTIDE    Collection Time: 02/25/19  4:31 AM   Result Value Ref Range    B Natriuretic Peptide 960 (H) 0 - 100 pg/mL   ESTIMATED GFR    Collection Time: 02/25/19  4:31 AM   Result Value Ref Range    GFR If  59 (A) >60 mL/min/1.73 m 2    GFR If Non African American 49 (A) >60 mL/min/1.73 m 2   EC-ECHOCARDIOGRAM LTD W/ CONT    Collection Time: 02/25/19  8:22 AM   Result Value Ref Range    Left Ventrical Ejection Fraction 20        Imaging  DX-CHEST-LIMITED (1 VIEW)    (Results Pending)    *Personally reviewed chest x-ray from 2/21 showing a large cardiac  silhouette with mild edema pattern, sternotomy wires noted, retrocardiac opacification   *Personally reviewed CT PE study from 2/21 and agree with radiology report   *Echocardiography 2/22: CONCLUSIONS  Comapred to the prior echocardiogram dated 3/7/2017, significant   changes are noted.  1. Severely reduced left ventricular systolic function.  Left   ventricular ejection fraction is visually estimated to be 20%.  2. Severely dilated left atrium.  3. Estimated right ventricular systolic pressure  is 50 mmHg.  Mildly   dilated right ventricle.  Reduced right ventricular systolic function.     Assessment/Plan  Non-rheumatic mitral regurgitation- (present on admission)   Assessment & Plan    S/p MVR in 2017  PAGE in morning with conscious sedation  Diuresis, BP and HR control  Anticoagulation     Chronic respiratory failure with hypoxia (HCC)- (present on admission)   Assessment & Plan    RT/O2 protocols  Mobilize  Titrate O2 to keep SaO2 >90%  Encourage IS      Acute systolic congestive heart failure (HCC)- (present on admission)   Assessment & Plan    Decompensated with associated pulmonary edema and respiratory failure  Digoxin, diuretic  ACE inhibitor and Coreg if blood pressure improves     COPD (chronic obstructive pulmonary disease) (HCC)- (present on admission)   Assessment & Plan    With mild exacerbation  Start prednisone  Continue scheduled and PRN bronchodilators, Symbicort, Spiriva     Hyperlipidemia- (present on admission)   Assessment & Plan    Statin     Atrial flutter (HCC)- (present on admission)   Assessment & Plan    Currently normal sinus rhythm  Continue anticoagulation, digoxin  Hold Coreg in the setting of hypotension  Optimize electrolytes     Obstructive sleep apnea syndrome- (present on admission)   Assessment & Plan    Does not use CPAP at home per pulmonary  Continue nocturnal oxygen  Limit sedatives     Long term (current) use of anticoagulants [Z79.01]- (present on admission)   Assessment  & Plan    Coumadin per pharmacy     Anxiety- (present on admission)   Assessment & Plan    Continue zoloft and prn ativan     DNR/DNI    Discussed patient condition and risk of morbidity and/or mortality with Hospitalist, Family, RN, RT, Pharmacy, Charge nurse / hot rounds, Patient and cardiology.    The patient remains critically ill.  Critical care time = 32 minutes in directly providing and coordinating critical care and extensive data review.  No time overlap and excludes procedures.

## 2019-02-25 NOTE — PROGRESS NOTES
Patient seen and examined in ER trauma bay  Developed hypotension with SBP in the 70's during transport  Changed admit order to ICU  Will tolerate MAP of 50 as she seems completely asymptomatic  All other plans as per Dr. Preston's H&P

## 2019-02-25 NOTE — PROGRESS NOTES
Assumed care of pt at 0725, received report from Darline OLIVIER. A/Ox4, discussed plan of care. Pt on 3 liters of oxygen, pts diet changed to regular this AM, called down for breakfast tray, up to bedside commode. Pt hypotensive this AM, discussed morning medications with MD, oked to give. Pt had echo with contrast done this AM, awaiting results. All needs met at this time. Bed in lowest position and call light within reach, instructed to call for any assistance.

## 2019-02-25 NOTE — CARE PLAN
Problem: Bowel/Gastric:  Goal: Normal bowel function is maintained or improved  Outcome: PROGRESSING AS EXPECTED  Pt reports having bm with ease/ not straining.  Intervention: Educate patient and significant other/support system about diet, fluid intake, medications and activity to promote bowel function  Reviewed factors for healthy bowel function. Ambulation, stool softeners and certain foods (fibrous/prune juice).

## 2019-02-25 NOTE — PROGRESS NOTES
Inpatient Anticoagulation Service Note    Date: 2/25/2019  Reason for Anticoagulation: Atrial Fibrillation   [id number] VASc Score: 4    Target INR: 2.0 to 3.0    INR from last 7 days     None        Dose from last 7 days     Date/Time Dose (mg)    02/25/19 1500  3        Average Dose (mg): 3 (Confirmed w/ pt: 3.75 mg MWF & 2.5 mg all other; ~3mg day)  Significant Interactions: Statin  Bridge Therapy: Not applicable at this time     Reversal Agent Administered: Not Applicable at this time   Comments: Pt undergoing a planned PAGE that is scheduled for tomorrow. INR is with goal range. H/H and platelets are within range and stable. No overt s/s of bleeding noted. Confirmed home regimen with patient, 3.75 mg MWF and 2.5 mg all other days; 21.25 mg weekly. This averages to be 3 mg daily. Plan to give 3 mg tonight and continue 3 mg daily for simplicity. Pharmacy to follow INR.     Education Material Provided?: No (Home medication)  Pharmacist suggested discharge dosing: 3 mg daily appears reasonable. Next scheduled appointment if coag clinic 3/21/19      Thanks!  Monica Mckenzie, [facility] Pharmacy Student     Addendum:  Patient case reviewed with Pharmacy Intern and agree with above recommendations. INR within therapeutic range at [facility] this AM, confirmed with cardiology it is OK to continue warfarin this evening in preparation for PAGE tomorrow. Repeat coags in AM to assess INR trends.    Pharmacy will continue to follow.     Meghana Silva, PharmD

## 2019-02-25 NOTE — DISCHARGE PLANNING
Anticipated [place] Disposition[place] [facility]    Action: Per MD Mohan Turner from transfer center notified that MD requests pt transfer to tele bed at [facility] for PAGE.    Barriers to Discharge: transfer    Plan: Follow up with Yue

## 2019-02-25 NOTE — PROGRESS NOTES
Direct admit from [facility], referred by ARACELY Cueva For CHF. Admitting MD is SUZANNE Cueva. ADT order signed and held, to be released upon patient's arrival on the unit.

## 2019-02-25 NOTE — FLOWSHEET NOTE
02/25/19 0745   Events/Summary/Plan   Non-Invasive Resp Device Site Inspection Completed Intact   Interdisciplinary Plan of Care-Goals (Indications)   Bronchodilator Indications History / Diagnosis   Hyperinflation Protocol Indications Atelectasis Documented by Chest X-Ray   Interdisciplinary Plan of Care-Outcomes    Bronchodilator Outcome Patient at Stable Baseline   Education   Education Yes - Pt. / Family has been Instructed in use of Respiratory Medications and Adverse Reactions   RT Assessment of Delivered Medications   Evaluation of Medication Delivery Daily Yes-- Pt /Family has been Instructed in use of Respiratory Medications and Adverse Reactions   SVN Group   #SVN Performed Yes   Given By: Mouthpiece   Date SVN Next Change Due (Q 7 Days) 03/01/19   MDI/DPI Group   #MDI/DPI Given MDI/DPI x 2   Chest Exam   Work Of Breathing / Effort Mild   Respiration 18   Pulse 88   Breath Sounds   RUL Breath Sounds Clear   RML Breath Sounds Clear   RLL Breath Sounds Diminished   DELLA Breath Sounds Clear   LLL Breath Sounds Diminished   Secretions   Cough Dry   How Sputum Obtained Cough on Request   Oxygen   Home O2 LPM Flow 3 LPM   Pulse Oximetry 97 %   O2 (LPM) 3   O2 Daily Delivery Respiratory  Silicone Nasal Cannula

## 2019-02-25 NOTE — PROGRESS NOTES
Telemetry Shift Summary    Rhythm SR  HR Range    Ectopy f PVC, f PAC, o Coup, o Bigem, r Trip, r Trigem, ST depression  Measurements 0.14/0.10/0.36        Normal Values  Rhythm SR  HR Range    Measurements 0.12-0.20 / 0.06-0.10  / 0.30-0.52

## 2019-02-25 NOTE — CARE PLAN
Problem: Communication  Goal: The ability to communicate needs accurately and effectively will improve  Outcome: PROGRESSING AS EXPECTED  PT had Echo with contrast this AM, pt switched on regular diet after echo. Pt up to chair with 1 person assist.     Problem: Mobility  Goal: Risk for activity intolerance will decrease  Outcome: PROGRESSING SLOWER THAN EXPECTED  PT ambulated to chair by RN, pt unsteady. Pt educated to call prior to ambulating. Pt has family at bedside.

## 2019-02-25 NOTE — PROGRESS NOTES
Cardiology Follow Up Progress Note    Date of Service  2/25/2019    Attending Physician  Fernandez Preston M.D.    Chief Complaint   Dyspnea.    ALLEN Lovett is a 73 y.o. female admitted 2/21/2019 with dyspnea and found to have severe LV dysfunction with EF of less than 20%.  Echo was personally reviewed and this is the suggestion of an apical mural thrombus.  Mitral insufficiency is at least moderate and may improve with diuresis and improvement in the LV dimension.  She is breathing better.  Patient has not been out of bed.        Review of Systems  Review of Systems   Constitutional: Negative.    Respiratory: Positive for shortness of breath.    Cardiovascular: Negative for chest pain, palpitations and leg swelling.   Neurological: Negative.    Hematological: Does not bruise/bleed easily.       Vital signs in last 24 hours  Temp:  [36.3 °C (97.4 °F)-36.6 °C (97.9 °F)] 36.3 °C (97.4 °F)  Pulse:  [71-99] 92  Resp:  [18-20] 18  BP: ()/(66-79) 116/66  SpO2:  [94 %-100 %] 100 %    Physical Exam  Physical Exam   Constitutional: She is oriented to person, place, and time. She appears well-developed and well-nourished. No distress.   HENT:   Head: Normocephalic and atraumatic.   Eyes: Pupils are equal, round, and reactive to light. Conjunctivae and EOM are normal. No scleral icterus.   Neck: Neck supple. JVD present.   Cardiovascular: Normal rate and regular rhythm.    Murmur heard.   Systolic murmur is present with a grade of 1/6   Apical heave.   Pulmonary/Chest: Effort normal. No respiratory distress. She has no wheezes. She has no rales.   Abdominal: Soft. There is no tenderness.   Musculoskeletal: She exhibits no edema.   evidence of muscle wasting   Neurological: She is alert and oriented to person, place, and time.   Skin: Skin is warm and dry. She is not diaphoretic.   Psychiatric: She has a normal mood and affect.       Lab Review  Lab Results   Component Value Date/Time    WBC 9.7 02/25/2019  04:31 AM    RBC 4.49 02/25/2019 04:31 AM    HEMOGLOBIN 14.0 02/25/2019 04:31 AM    HEMATOCRIT 43.7 02/25/2019 04:31 AM    MCV 97.3 02/25/2019 04:31 AM    MCH 31.2 02/25/2019 04:31 AM    MCHC 32.0 (L) 02/25/2019 04:31 AM    MPV 9.9 02/25/2019 04:31 AM      Lab Results   Component Value Date/Time    SODIUM 140 02/25/2019 04:31 AM    POTASSIUM 3.7 02/25/2019 04:31 AM    CHLORIDE 94 (L) 02/25/2019 04:31 AM    CO2 35 (H) 02/25/2019 04:31 AM    GLUCOSE 114 (H) 02/25/2019 04:31 AM    BUN 28 (H) 02/25/2019 04:31 AM    CREATININE 1.10 02/25/2019 04:31 AM      Lab Results   Component Value Date/Time    ASTSGOT 22 02/25/2019 04:31 AM    ALTSGPT 26 02/25/2019 04:31 AM     Lab Results   Component Value Date/Time    CHOLSTRLTOT 211 (H) 08/27/2018 08:20 AM    LDL 82 08/27/2018 08:20 AM     08/27/2018 08:20 AM    TRIGLYCERIDE 53 08/27/2018 08:20 AM    TROPONINI 0.04 02/21/2019 08:50 PM       Recent Labs      02/23/19   0333  02/25/19   0431   BNPBTYPENAT  1964*  960*       Systolic heart failure, acute: The patient had a rapid decline in function.  Echo reviewed.  I suspect there is an LV apical thrombus.  Limited echo study with contrast will be obtained to verify this.  In the interim to be started on anticoagulation.    Her heart failure regimen will be intensified with addition of lisinopril and spironolactone.  Because of her tenuous blood pressure, these medications will be started at a low dose and then titrated upwards as tolerated.  Her BUN and creatinine are rising.  Recommend reducing her diuretic dose.  Case discussed with nursing and with the hospitalist.    Recommend PAGE be done electively once her heart failure has improved.  Limited echo for LV thrombus today.  Lamonte Ahuja M.D.   Cardiologist, [facility]  [phone]

## 2019-02-25 NOTE — DISCHARGE PLANNING
Received Transport Form @ 1118  Spoke to Henry DAVID    Transport is scheduled for 2/25 @1230 going to [address] [facility] [facility].

## 2019-02-26 ENCOUNTER — PATIENT OUTREACH (OUTPATIENT)
Dept: HEALTH INFORMATION MANAGEMENT | Facility: OTHER | Age: 74
End: 2019-02-26

## 2019-02-26 ENCOUNTER — APPOINTMENT (OUTPATIENT)
Dept: CARDIOLOGY | Facility: MEDICAL CENTER | Age: 74
DRG: 292 | End: 2019-02-26
Attending: NURSE PRACTITIONER
Payer: MEDICARE

## 2019-02-26 PROBLEM — Z66 DNR (DO NOT RESUSCITATE): Status: ACTIVE | Noted: 2019-02-26

## 2019-02-26 LAB
ALBUMIN SERPL BCP-MCNC: 4.7 G/DL (ref 3.2–4.9)
ALBUMIN/GLOB SERPL: 1.6 G/DL
ALP SERPL-CCNC: 55 U/L (ref 30–99)
ALT SERPL-CCNC: 19 U/L (ref 2–50)
ANION GAP SERPL CALC-SCNC: 8 MMOL/L (ref 0–11.9)
AST SERPL-CCNC: 27 U/L (ref 12–45)
BASOPHILS # BLD AUTO: 0.4 % (ref 0–1.8)
BASOPHILS # BLD: 0.04 K/UL (ref 0–0.12)
BILIRUB SERPL-MCNC: 0.6 MG/DL (ref 0.1–1.5)
BNP SERPL-MCNC: 392 PG/ML (ref 0–100)
BUN SERPL-MCNC: 35 MG/DL (ref 8–22)
CALCIUM SERPL-MCNC: 9.6 MG/DL (ref 8.5–10.5)
CHLORIDE SERPL-SCNC: 97 MMOL/L (ref 96–112)
CO2 SERPL-SCNC: 31 MMOL/L (ref 20–33)
CREAT SERPL-MCNC: 0.85 MG/DL (ref 0.5–1.4)
EOSINOPHIL # BLD AUTO: 0.07 K/UL (ref 0–0.51)
EOSINOPHIL NFR BLD: 0.7 % (ref 0–6.9)
ERYTHROCYTE [DISTWIDTH] IN BLOOD BY AUTOMATED COUNT: 48 FL (ref 35.9–50)
GLOBULIN SER CALC-MCNC: 2.9 G/DL (ref 1.9–3.5)
GLUCOSE SERPL-MCNC: 108 MG/DL (ref 65–99)
HCT VFR BLD AUTO: 47.7 % (ref 37–47)
HGB BLD-MCNC: 15.1 G/DL (ref 12–16)
IMM GRANULOCYTES # BLD AUTO: 0.04 K/UL (ref 0–0.11)
IMM GRANULOCYTES NFR BLD AUTO: 0.4 % (ref 0–0.9)
INR PPP: 2.24 (ref 0.87–1.13)
LV EJECT FRACT  99904: 20
LYMPHOCYTES # BLD AUTO: 1.15 K/UL (ref 1–4.8)
LYMPHOCYTES NFR BLD: 12.2 % (ref 22–41)
MAGNESIUM SERPL-MCNC: 2.2 MG/DL (ref 1.5–2.5)
MCH RBC QN AUTO: 31.7 PG (ref 27–33)
MCHC RBC AUTO-ENTMCNC: 31.7 G/DL (ref 33.6–35)
MCV RBC AUTO: 100.2 FL (ref 81.4–97.8)
MONOCYTES # BLD AUTO: 0.67 K/UL (ref 0–0.85)
MONOCYTES NFR BLD AUTO: 7.1 % (ref 0–13.4)
NEUTROPHILS # BLD AUTO: 7.49 K/UL (ref 2–7.15)
NEUTROPHILS NFR BLD: 79.2 % (ref 44–72)
NRBC # BLD AUTO: 0 K/UL
NRBC BLD-RTO: 0 /100 WBC
PHOSPHATE SERPL-MCNC: 3.9 MG/DL (ref 2.5–4.5)
PLATELET # BLD AUTO: 243 K/UL (ref 164–446)
PMV BLD AUTO: 10.5 FL (ref 9–12.9)
POTASSIUM SERPL-SCNC: 4.9 MMOL/L (ref 3.6–5.5)
PROT SERPL-MCNC: 7.6 G/DL (ref 6–8.2)
PROTHROMBIN TIME: 24.8 SEC (ref 12–14.6)
RBC # BLD AUTO: 4.76 M/UL (ref 4.2–5.4)
SODIUM SERPL-SCNC: 136 MMOL/L (ref 135–145)
VIT B12 SERPL-MCNC: 494 PG/ML (ref 211–911)
WBC # BLD AUTO: 9.5 K/UL (ref 4.8–10.8)

## 2019-02-26 PROCEDURE — B24BZZ4 ULTRASONOGRAPHY OF HEART WITH AORTA, TRANSESOPHAGEAL: ICD-10-PCS | Performed by: INTERNAL MEDICINE

## 2019-02-26 PROCEDURE — 93325 DOPPLER ECHO COLOR FLOW MAPG: CPT

## 2019-02-26 PROCEDURE — 93312 ECHO TRANSESOPHAGEAL: CPT | Mod: 26 | Performed by: INTERNAL MEDICINE

## 2019-02-26 PROCEDURE — 99157 MOD SED OTHER PHYS/QHP EA: CPT | Performed by: INTERNAL MEDICINE

## 2019-02-26 PROCEDURE — 85610 PROTHROMBIN TIME: CPT

## 2019-02-26 PROCEDURE — 700111 HCHG RX REV CODE 636 W/ 250 OVERRIDE (IP): Performed by: INTERNAL MEDICINE

## 2019-02-26 PROCEDURE — 94640 AIRWAY INHALATION TREATMENT: CPT

## 2019-02-26 PROCEDURE — 700101 HCHG RX REV CODE 250: Performed by: INTERNAL MEDICINE

## 2019-02-26 PROCEDURE — 700101 HCHG RX REV CODE 250: Performed by: HOSPITALIST

## 2019-02-26 PROCEDURE — 700111 HCHG RX REV CODE 636 W/ 250 OVERRIDE (IP): Performed by: HOSPITALIST

## 2019-02-26 PROCEDURE — 83735 ASSAY OF MAGNESIUM: CPT

## 2019-02-26 PROCEDURE — 99156 MOD SED OTH PHYS/QHP 5/>YRS: CPT | Performed by: INTERNAL MEDICINE

## 2019-02-26 PROCEDURE — 770020 HCHG ROOM/CARE - TELE (206)

## 2019-02-26 PROCEDURE — 99233 SBSQ HOSP IP/OBS HIGH 50: CPT | Mod: 25 | Performed by: INTERNAL MEDICINE

## 2019-02-26 PROCEDURE — 700102 HCHG RX REV CODE 250 W/ 637 OVERRIDE(OP): Performed by: HOSPITALIST

## 2019-02-26 PROCEDURE — 93321 DOPPLER ECHO F-UP/LMTD STD: CPT | Mod: 26 | Performed by: INTERNAL MEDICINE

## 2019-02-26 PROCEDURE — 84100 ASSAY OF PHOSPHORUS: CPT

## 2019-02-26 PROCEDURE — 83880 ASSAY OF NATRIURETIC PEPTIDE: CPT

## 2019-02-26 PROCEDURE — 99233 SBSQ HOSP IP/OBS HIGH 50: CPT | Performed by: INTERNAL MEDICINE

## 2019-02-26 PROCEDURE — A9270 NON-COVERED ITEM OR SERVICE: HCPCS | Performed by: HOSPITALIST

## 2019-02-26 PROCEDURE — 82607 VITAMIN B-12: CPT

## 2019-02-26 PROCEDURE — 80053 COMPREHEN METABOLIC PANEL: CPT

## 2019-02-26 PROCEDURE — 99233 SBSQ HOSP IP/OBS HIGH 50: CPT | Performed by: HOSPITALIST

## 2019-02-26 PROCEDURE — 85025 COMPLETE CBC W/AUTO DIFF WBC: CPT

## 2019-02-26 RX ORDER — KETAMINE HYDROCHLORIDE 50 MG/ML
2 INJECTION, SOLUTION INTRAMUSCULAR; INTRAVENOUS ONCE
Status: COMPLETED | OUTPATIENT
Start: 2019-02-26 | End: 2019-02-26

## 2019-02-26 RX ORDER — PROPOFOL 10 MG/ML
200 INJECTION, EMULSION INTRAVENOUS ONCE
Status: COMPLETED | OUTPATIENT
Start: 2019-02-26 | End: 2019-02-26

## 2019-02-26 RX ORDER — TIOTROPIUM BROMIDE 18 UG/1
1 CAPSULE ORAL; RESPIRATORY (INHALATION) DAILY
Status: DISCONTINUED | OUTPATIENT
Start: 2019-02-27 | End: 2019-03-08 | Stop reason: HOSPADM

## 2019-02-26 RX ORDER — LOSARTAN POTASSIUM 25 MG/1
25 TABLET ORAL
Status: DISCONTINUED | OUTPATIENT
Start: 2019-02-27 | End: 2019-03-02

## 2019-02-26 RX ORDER — BUDESONIDE AND FORMOTEROL FUMARATE DIHYDRATE 160; 4.5 UG/1; UG/1
2 AEROSOL RESPIRATORY (INHALATION) 2 TIMES DAILY
Status: DISCONTINUED | OUTPATIENT
Start: 2019-02-26 | End: 2019-03-08 | Stop reason: HOSPADM

## 2019-02-26 RX ORDER — PHENYLEPHRINE HCL IN 0.9% NACL 0.5 MG/5ML
SYRINGE (ML) INTRAVENOUS
Status: DISCONTINUED
Start: 2019-02-26 | End: 2019-02-26

## 2019-02-26 RX ADMIN — POTASSIUM CHLORIDE 40 MEQ: 1500 TABLET, EXTENDED RELEASE ORAL at 17:30

## 2019-02-26 RX ADMIN — BUDESONIDE AND FORMOTEROL FUMARATE DIHYDRATE 2 PUFF: 160; 4.5 AEROSOL RESPIRATORY (INHALATION) at 17:28

## 2019-02-26 RX ADMIN — FUROSEMIDE 20 MG: 10 INJECTION, SOLUTION INTRAMUSCULAR; INTRAVENOUS at 05:23

## 2019-02-26 RX ADMIN — CARVEDILOL 3.12 MG: 3.12 TABLET, FILM COATED ORAL at 05:30

## 2019-02-26 RX ADMIN — SPIRONOLACTONE 12.5 MG: 25 TABLET ORAL at 05:23

## 2019-02-26 RX ADMIN — LEVALBUTEROL HYDROCHLORIDE 1.25 MG: 1.25 SOLUTION RESPIRATORY (INHALATION) at 19:44

## 2019-02-26 RX ADMIN — CARVEDILOL 3.12 MG: 3.12 TABLET, FILM COATED ORAL at 17:30

## 2019-02-26 RX ADMIN — LEVALBUTEROL HYDROCHLORIDE 1.25 MG: 1.25 SOLUTION RESPIRATORY (INHALATION) at 11:19

## 2019-02-26 RX ADMIN — LISINOPRIL 2.5 MG: 5 TABLET ORAL at 05:23

## 2019-02-26 RX ADMIN — POTASSIUM CHLORIDE 40 MEQ: 1500 TABLET, EXTENDED RELEASE ORAL at 05:23

## 2019-02-26 RX ADMIN — PROPOFOL 200 MG: 10 INJECTION, EMULSION INTRAVENOUS at 08:45

## 2019-02-26 RX ADMIN — LORAZEPAM 0.5 MG: 1 TABLET ORAL at 21:50

## 2019-02-26 RX ADMIN — LEVALBUTEROL HYDROCHLORIDE 1.25 MG: 1.25 SOLUTION RESPIRATORY (INHALATION) at 07:18

## 2019-02-26 RX ADMIN — ATORVASTATIN CALCIUM 40 MG: 40 TABLET, FILM COATED ORAL at 17:30

## 2019-02-26 RX ADMIN — DIGOXIN 125 MCG: 125 TABLET ORAL at 17:30

## 2019-02-26 RX ADMIN — WARFARIN SODIUM 3 MG: 3 TABLET ORAL at 17:30

## 2019-02-26 RX ADMIN — PREDNISONE 20 MG: 20 TABLET ORAL at 05:23

## 2019-02-26 RX ADMIN — BUDESONIDE AND FORMOTEROL FUMARATE DIHYDRATE 2 PUFF: 160; 4.5 AEROSOL RESPIRATORY (INHALATION) at 07:17

## 2019-02-26 RX ADMIN — Medication 400 MG: at 05:23

## 2019-02-26 RX ADMIN — SERTRALINE HYDROCHLORIDE 100 MG: 100 TABLET ORAL at 05:23

## 2019-02-26 RX ADMIN — TIOTROPIUM BROMIDE 1 CAPSULE: 18 CAPSULE ORAL; RESPIRATORY (INHALATION) at 07:17

## 2019-02-26 RX ADMIN — Medication 400 MG: at 17:30

## 2019-02-26 RX ADMIN — LEVALBUTEROL HYDROCHLORIDE 1.25 MG: 1.25 SOLUTION RESPIRATORY (INHALATION) at 14:38

## 2019-02-26 RX ADMIN — KETAMINE HYDROCHLORIDE 107 MG: 50 INJECTION, SOLUTION INTRAMUSCULAR; INTRAVENOUS at 08:45

## 2019-02-26 ASSESSMENT — ENCOUNTER SYMPTOMS
SPUTUM PRODUCTION: 0
BLOOD IN STOOL: 0
FEVER: 0
ABDOMINAL PAIN: 0
WHEEZING: 0
SHORTNESS OF BREATH: 1
BRUISES/BLEEDS EASILY: 0
PALPITATIONS: 0
HALLUCINATIONS: 0
HEADACHES: 0
BACK PAIN: 0
CHILLS: 0
ORTHOPNEA: 0
ROS GI COMMENTS: EATING WELL
NAUSEA: 0
SHORTNESS OF BREATH: 0
VOMITING: 0
DOUBLE VISION: 0
COUGH: 0
DIZZINESS: 0
EYE DISCHARGE: 0
FLANK PAIN: 0
DEPRESSION: 0
MYALGIAS: 0
EYE PAIN: 0

## 2019-02-26 NOTE — ASSESSMENT & PLAN NOTE
Remains in normal sinus rhythm with frequent ectopy  Continue anticoagulation, digoxin  Resume coreg  Optimize electrolytes

## 2019-02-26 NOTE — ASSESSMENT & PLAN NOTE
Does not use/need CPAP at home per pulmonary/sleep study  Continue nocturnal oxygen  Limit sedatives

## 2019-02-26 NOTE — PROCEDURES
Conscious Sedation Procedure Note    Indication: PAGE    Consent: I have discussed with the patient and/or the patient representative the indication, alternatives, and the possible risks and/or complications of the planned procedure and the anesthesia methods. The patient and/or patient representative appear to understand and agree to proceed.    Physician Involvement: The attending physician was present and supervising this procedure.    Pre-Sedation Documentation and Exam: I have personally completed a history, physical exam & review of systems for this patient (see notes).  Vital signs have been reviewed (see flow sheet for vitals).  I have reviewed the patient's history and review of systems.  Lungs: minimal BB rhonchi, no wheezing, Cardiovascular: regular rate and rhythm, occasional ectopy, (+) murmur.    Airway Assessment: Mallampati Class I - (soft palate, fauces, uvula & anterior/posterior tonsillar pillars are visible)    Prior History of Anesthesia Complications: none    ASA Classification: Class 3 - A patient with severe systemic disease that limits activity but is not incapacitating    Sedation/ Anesthesia Plan: intravenous sedation    Medications Used: ketamine 125mg intravenously and propofol 70 mg intravenously    Monitoring and Safety: The patient was placed on a cardiac monitor and vital signs, pulse oximetry and level of consciousness were continuously evaluated throughout the procedure. The patient was closely monitored until recovery from the medications was complete and the patient had returned to baseline status. Respiratory therapy was on standby at all times during the procedure.    Post-Sedation Vital Signs: Vital signs were reviewed and were stable after the procedure (see flow sheet for vitals)            Post-Sedation Exam: Lungs: unchanged BB rhonchi, mild increase in secretions but strong cough           Complications: none    Sedation Time: 0835 to 0910. 35 Total minutes    CPT: 16127,  [id number]

## 2019-02-26 NOTE — PROGRESS NOTES
· 2 RN skin check complete with OSY [place].  · Devices in place: NC.  · Skin assessed under devices intact.

## 2019-02-26 NOTE — CARE PLAN
Problem: Respiratory:  Goal: Respiratory status will improve    Intervention: Administer and titrate oxygen therapy  Pt on 3 liters NC

## 2019-02-26 NOTE — PROGRESS NOTES
"Subjective     Chief Complaint   Patient presents with   • Exposure to STD     exposure to gonorrhea, also has a positive pregnancy test.       Robert Beach is a 31 y.o.  whose LMP is Patient's last menstrual period was 2018. presents with + pregnancy test and exposure to gonorrhea.  She has a h/o recurrent miscarriage. She denies any pain or bleeding, only  Notes mild cramping. She was not planning to be pregnant, she is planning to keep the pregnancy.    She notes that she was treated for gonorrhea in Feb but says her partner was not treated and she is had IC with him since then. She denies any sx's.      She is  Pt of Dr Barth, but sees Dr Mckeon for pregnancy      No Additional Complaints Reported    The following portions of the patient's history were reviewed and updated as appropriate:vital signs, allergies, current medications, past family history, past medical history, past social history, past surgical history and problem list      Review of Systems   A comprehensive review of systems was negative except for: Genitourinary: positive for positive pregnancy test and exposure to gonorrhea    Objective      /82   Ht 170.2 cm (67\")   Wt 82.6 kg (182 lb)   LMP 2018   BMI 28.51 kg/m²     Physical Exam    General:   alert, comfortable and no distress   Heart: Not performed today   Lungs: Not performed today.   Breast: Not performed today   Neck: na   Abdomen: {Not performed today   CVA: Not performed today   Pelvis: External genitalia: normal general appearance  Vaginal: discharge, white  Cervix: normal appearance  Adnexa: normal bimanual exam  Uterus: normal single, nontender and enlarged   Extremities: Not performed today   Neurologic: negative   Psychiatric: Normal affect, judgement, and mood       Lab Review   Labs: Urine pregnancy test , reviewed labs from clinic as well, +gnorrhea    Imaging   No data reviewed    Assessment/Plan     ASSESSMENT  1. Missed menses    2. Positive " 12 CC/Monitor Summary    Afebrile  NSR 70-90s  SBP 90-110s    3L NC    .20/.08/.34    2 RN skin check complete     pregnancy test    3. Screen for STD (sexually transmitted disease)        PLAN  1.   Orders Placed This Encounter   Procedures   • Chlamydia trachomatis, Neisseria gonorrhoeae, Trichomonas vaginalis, PCR - Swab, Vagina   • HCG, B-subunit, Quantitative   • Progesterone   • RPR   • Hepatitis B surface antigen   • Hepatitis C antibody   • HIV-1 / O / 2 Ag / Antibody 4th Generation   • POC Pregnancy, Urine       2. Plan quant levels today and rpt on Thursday.  Gave samples of PNV to start as well. Call for any pain or bleeding    3. Plan tx for gonorrhea based on exposure and labs in system show + gonorrhea in Feb.  Also sent in azithromycin to cover per protocol. She is requesting STD labs as well as a vaginal swab    Follow up: 1 week(s)    PERICO Cardona  4/10/2018

## 2019-02-26 NOTE — PROGRESS NOTES
Critical Care Progress Note    Date of admission  2/25/2019    Chief Complaint  73 y.o. female admitted 2/25/2019 with SOB    Hospital Course    73 y.o. female who presented 2/25/2019 with a past medical history significant for mitral valve repair, stage III COPD on 2 L/min nasal cannula 24 hours a day, hyperlipidemia, sleep apnea (no CPAP used) who presents to the emergency department at [facility] on 2/21 complaining of shortness of breath, dyspnea on exertion.  She denies any fevers, chills and has a nonproductive cough.  She was seen at the urgent care who sent her to the emergency department.  She was admitted to the hospital for acute decompensated systolic heart failure and started on heart failure medications.  She was seen by cardiology Dr. Ahuja who recommended transfer to [facility] for transesophageal echocardiography to further evaluate her mitral valve disease.  In route patient became hypotensive with a systolic blood pressure of 70 and EMS brought her to the ED where she was given 250 mL bolus before being admitted to intensive care unit.  I was consulted for assistance in her management.  Of note she had received lisinopril and Aldactone this morning as part of her heart failure medication management.        Interval Problem Update  Reviewed last 24 hour events:   - BP remains    - AAOx4   - PAGE showed mod MR   - BM last night   - good UOP   - mobilizing   - therapeutic on INR with coumadin   - prednisone 20 mg daily   - BNP improving     Review of Systems  Review of Systems   Constitutional: Negative for chills and fever.   HENT: Negative for congestion.    Eyes: Negative for double vision.   Respiratory: Positive for shortness of breath. Negative for cough, sputum production and wheezing.    Cardiovascular: Negative for chest pain, palpitations, orthopnea and leg swelling.   Gastrointestinal: Negative for abdominal pain and vomiting.   Genitourinary: Negative for flank pain.    Musculoskeletal: Negative for back pain.   Skin: Negative for rash.   Neurological: Negative for dizziness and headaches.   Endo/Heme/Allergies: Does not bruise/bleed easily.   Psychiatric/Behavioral: Negative for depression.   All other systems reviewed and are negative.       Vital Signs for last 24 hours   Temp:  [36.6 °C (97.8 °F)-36.9 °C (98.4 °F)] 36.9 °C (98.4 °F)  Pulse:  [64-92] 92  Resp:  [13-31] 20  BP: (76)/(49) 76/49  SpO2:  [95 %-100 %] 100 %    Respiratory Information for the last 24 hours   3 lpm n/c, duonebs, symbicort/spiriva    Physical Exam   Physical Exam   Constitutional: She is oriented to person, place, and time. She appears well-developed and well-nourished. No distress.   HENT:   Head: Normocephalic and atraumatic.   Nose: Nose normal.   Mouth/Throat: Oropharynx is clear and moist.   Eyes: Pupils are equal, round, and reactive to light. Conjunctivae and EOM are normal.   Neck: Neck supple. No JVD present. No tracheal deviation present.   Cardiovascular: Normal rate and regular rhythm.   Occasional extrasystoles are present. PMI is displaced.  Exam reveals no distant heart sounds and no decreased pulses.    Murmur heard.  Pulmonary/Chest: No accessory muscle usage. No tachypnea. No respiratory distress. She has no decreased breath sounds. She has no wheezes. She has rhonchi in the left lower field. She has rales in the right lower field and the left lower field.   Abdominal: Soft. Bowel sounds are normal. She exhibits no distension. There is no tenderness. There is no rebound.   Musculoskeletal: She exhibits no edema or tenderness.   Lymphadenopathy:     She has no cervical adenopathy.   Neurological: She is alert and oriented to person, place, and time. No cranial nerve deficit. She exhibits normal muscle tone.   Skin: Skin is warm and dry. No pallor.   Psychiatric: She has a normal mood and affect. Her behavior is normal. Judgment and thought content normal.   Nursing note and vitals  reviewed.      Medications  Current Facility-Administered Medications   Medication Dose Route Frequency Provider Last Rate Last Dose   • budesonide-formoterol (SYMBICORT) 160-4.5 MCG/ACT inhaler 2 Puff  2 Puff Inhalation BID Jeremy M Gonda, M.D.       • [START ON 2/27/2019] tiotropium (SPIRIVA) 18 MCG inhalation capsule 1 Cap  1 Cap Inhalation DAILY Jeremy M Gonda, M.D.       • ketamine (KETALAR) 50 mg/ml injection  2 mg/kg Intravenous Once Jeremy M Gonda, M.D.       • propofol (DIPRIVAN) injection  200 mg Intravenous Once Jeremy M Gonda, M.D.       • PHENYLEPHRINE HCL-NACL 1-0.9 MG/10ML-% IV SOSY            • [START ON 2/27/2019] sacubitril-valsartan (ENTRESTO) 24-26 MG tablet 1 Tab  1 Tab Oral BID Dalia Li M.D.       • lidocaine (XYLOCAINE) 1 % injection 0.5 mL  0.5 mL Intradermal Once PRN LISSETH Campoverde.       • senna-docusate (PERICOLACE or SENOKOT S) 8.6-50 MG per tablet 2 Tab  2 Tab Oral BID Fernandez Preston M.D.        And   • polyethylene glycol/lytes (MIRALAX) PACKET 1 Packet  1 Packet Oral QDAY PRN Fernandez Preston M.D.        And   • magnesium hydroxide (MILK OF MAGNESIA) suspension 30 mL  30 mL Oral QDAY PRN Fernandez Preston M.D.        And   • bisacodyl (DULCOLAX) suppository 10 mg  10 mg Rectal QDAY PRN Fernandez Preston M.D.       • Respiratory Care per Protocol   Nebulization Continuous RT Fernandez Preston M.D.       • atorvastatin (LIPITOR) tablet 40 mg  40 mg Oral Q EVENING Fernandez Preston M.D.   40 mg at 02/25/19 1750   • carvedilol (COREG) tablet 3.125 mg  3.125 mg Oral BID WITH MEALS Fernandez Preston M.D.   3.125 mg at 02/26/19 0530   • digoxin (LANOXIN) tablet 125 mcg  125 mcg Oral DAILY AT 1800 Fernandez Preston M.D.   125 mcg at 02/25/19 1749   • furosemide (LASIX) injection 20 mg  20 mg Intravenous Q DAY Fernandez Preston M.D.   20 mg at 02/26/19 0523   • levalbuterol (XOPENEX) 1.25 MG/3ML nebulizer solution 1.25 mg  1.25 mg Nebulization Q4H PRN (RT) Fernandez Preston  M.D.       • levalbuterol (XOPENEX) 1.25 MG/3ML nebulizer solution 1.25 mg  1.25 mg Nebulization 4X/DAY (RT) Fernandez Preston M.D.   1.25 mg at 02/26/19 0718   • LORazepam (ATIVAN) tablet 0.5 mg  0.5 mg Oral Q4HRS PRN Fernandez Preston M.D.       • magnesium oxide (MAG-OX) tablet 400 mg  400 mg Oral BID Fernandez Preston M.D.   400 mg at 02/26/19 0523   • MD Alert...Warfarin per Pharmacy   Other PHARMACY TO DOSE Fernandez Preston M.D.       • potassium chloride SA (Kdur) tablet 40 mEq  40 mEq Oral BID Fernandez Preston M.D.   40 mEq at 02/26/19 0523   • sertraline (ZOLOFT) tablet 100 mg  100 mg Oral DAILY Fernandez Preston M.D.   100 mg at 02/26/19 0523   • spironolactone (ALDACTONE) tablet 12.5 mg  12.5 mg Oral Q DAY Fernandez Preston M.D.   12.5 mg at 02/26/19 0523   • predniSONE (DELTASONE) tablet 20 mg  20 mg Oral DAILY Jeremy M Gonda, M.D.   20 mg at 02/26/19 0523   • warfarin (COUMADIN) tablet 3 mg  3 mg Oral COUMADIN-DAILY Fernandez Preston M.D.   3 mg at 02/25/19 1945       Fluids    Intake/Output Summary (Last 24 hours) at 02/26/19 0915  Last data filed at 02/25/19 1800   Gross per 24 hour   Intake                0 ml   Output              300 ml   Net             -300 ml       Laboratory      Recent Labs      02/25/19   0431  02/26/19   0546   BNPBTYPENAT  960*  392*     Recent Labs      02/24/19   0314  02/25/19   0431  02/26/19   0546   SODIUM  140  140  136   POTASSIUM  3.4*  3.7  4.9   CHLORIDE  98  94*  97   CO2  34*  35*  31   BUN  27*  28*  35*   CREATININE  0.99  1.10  0.85   MAGNESIUM  1.9  1.9  2.2   PHOSPHORUS   --    --   3.9   CALCIUM  9.8  9.8  9.6     Recent Labs      02/24/19   0314  02/25/19   0431  02/26/19   0546   ALTSGPT  31  26  19   ASTSGOT  26  22  27   ALKPHOSPHAT  57  59  55   TBILIRUBIN  0.8  0.8  0.6   GLUCOSE  120*  114*  108*     Recent Labs      02/24/19   0314  02/25/19   0431  02/26/19   0546   WBC  10.9*  9.7  9.5   NEUTSPOLYS  68.50  62.10  79.20*   LYMPHOCYTES  17.10*  20.10*   12.20*   MONOCYTES  10.90  12.00  7.10   EOSINOPHILS  2.20  4.80  0.70   BASOPHILS  0.90  0.60  0.40   ASTSGOT  26  22  27   ALTSGPT  31  26  19   ALKPHOSPHAT  57  59  55   TBILIRUBIN  0.8  0.8  0.6     Recent Labs      02/24/19   0314  02/25/19   0431  02/26/19   0546   RBC  4.34  4.49  4.76   HEMOGLOBIN  13.3  14.0  15.1   HEMATOCRIT  42.3  43.7  47.7*   PLATELETCT  225  225  243   PROTHROMBTM  23.5*  24.9*   --    INR  2.13*  2.29*   --    IRON   --   62   --    TOTIRONBC   --   458*   --        Imaging  X-Ray:  I have personally reviewed the images and compared with prior images. and My impression is: 2/25 showing residual left lower lobe atelectasis with enlarged cardiac silhouette, mild edema, sternotomy wires noted, no tubes or lines  Echo:   Reviewed    Assessment/Plan  Non-rheumatic mitral regurgitation- (present on admission)   Assessment & Plan    S/p MVR in 2017  Mod MR on PAGE  Cont diuresis, BP and HR control  Anticoagulation     Chronic respiratory failure with hypoxia (HCC)- (present on admission)   Assessment & Plan    Cont RT/O2 protocols  Mobilize  Titrate O2 to keep SaO2 >90%  Encourage IS     Acute systolic congestive heart failure (HCC)- (present on admission)   Assessment & Plan    Decompensated with associated pulmonary edema and respiratory failure  Continue current dosages of digoxin and diuretic  Resume Coreg, Aldactone     COPD (chronic obstructive pulmonary disease) (HCC)- (present on admission)   Assessment & Plan    With mild exacerbation  Continue low-dose oral prednisone  Continue scheduled and PRN bronchodilators, Symbicort, Spiriva     Hyperlipidemia- (present on admission)   Assessment & Plan    Statin     Atrial flutter (HCC)- (present on admission)   Assessment & Plan    Remains in normal sinus rhythm with frequent ectopy  Continue anticoagulation, digoxin  Resume coreg  Optimize electrolytes     Obstructive sleep apnea syndrome- (present on admission)   Assessment & Plan    Does  not use/need CPAP at home per pulmonary/sleep study  Continue nocturnal oxygen  Limit sedatives     Long term (current) use of anticoagulants [Z79.01]- (present on admission)   Assessment & Plan    Coumadin per pharmacy     Anxiety- (present on admission)   Assessment & Plan    Continue zoloft and prn ativan     DNR/DNI    VTE:  Coumadin  Ulcer: Not Indicated  Lines: None    I have performed a physical exam and reviewed and updated ROS and Plan today (2/26/2019). In review of yesterday's note (2/25/2019), there are no changes except as documented above.     Discussed patient condition and risk of morbidity and/or mortality with Hospitalist, Family, RN, RT, Pharmacy, Charge nurse / hot rounds, Patient and cardiology

## 2019-02-26 NOTE — RESPIRATORY CARE
Conscious Sedation Respiratory Update       O2 (LPM):  (3L with 15L Oxymask) (02/26/19 0850)  O2 Daily Delivery Respiratory : OxyMask (02/26/19 0850)    Events/Summary/Plan: PAGE performed with sedation.  Pt. placed on ETCO2 and per MD's request also placed on 15L Oxymask. Monitored throughout, pt. suctioned post treatment and recovered without any issues. (02/26/19 0850)

## 2019-02-26 NOTE — PROGRESS NOTES
Med Rec complete per Pt at bedside  Allergies Reviewed  No ABX in the last 30 days    Pt takes WARFARIN 2.5-5 mg daily  5 mg every Mon, Wed and Fri   2.5 mg all other days of the week

## 2019-02-26 NOTE — HEART FAILURE PROGRAM
Cardiovascular Nurse Navigator () Advanced Heart Failure Program Inpatient Consult Note:     Transfer from [facility] where patient presented c SOB. Found to have new EF of 20%. She was diuresed and transferred to [facility] for PAGE to evaluate her prior MVR. Echo showed mod MR.     She is known to have severe lung disease and is followed by outpatient pulmonology. Known NICM, angio in 2017 showed no CAD.     Patient lives in [place] and has SCP insurance, she has an AD on file and is a DNAR/DNI.    GD Secondary Prevention interventions:    · Pneumococcal vaccine: not addressed, this needs to be addressed as HF is a high risk condition.  · Influenza vaccine: not addressed, needs to be addressed.    HFrEF GDMT:    · ACE-I/ARB/ARNI: Not currently prescribed, will need to be addressed: prescribed or a provider note indicating why not prescribed, prior to discharge.  · Evidence Based BB (bisoprolol, carvedilol, or Toprol XL): carvedilol  · Aldosterone receptor antagonist: spironolactone   · AC for AF?: no afib/flutter documented. Pt is on Warfarin for her MVR.      BEDSIDE NURSING Daily Weights and Intake and Output ordered    · Every HF patient should have daily weights and I's and O's  · Please weigh patient daily on a standing scale if not clinically contraindicated.   · While doing this, teach patient to write weight down on the Symptom Tracker in the HF book - and ask them what they would do with that weight at home (ie: call for 3# weight gain). This is an excellent opportunity for impactful, in the moment teaching.  Providers rely on your complete documentation of weights and I's and O's to decide whether or not our treatment is working and whether or not a HF patient is ready to discharge!    If pt does not own a working scale and cannot afford to purchase one, please provide one. Scales can be found in the Care Coordination Rooms on T-7&T-8.    [facility] Plan Notes:   none  Therapy Notes:   none  Palliative Involvement: no  "current consult pt does have AD as above.  The ACC recommends engaging palliative care as part of optimization of HFrEF treatment to solicit goals of care and focus on quality of life throughout the clinical course of HF.    Follow up appointment:   If discharged from acute care to home (exception hospice discharge), pt must have an appointment scheduled within 7 days of discharge (Cardiology, PCP, or DC Clinic).    If discharged to Transitional Care Facility (LTAC, SNF, IRH), appointment should be made about a month out for after TCF.     Bedside Nursing Education: Please confirm that the provider has informed the patient that she has HF, then:  Please provide HF booklet and repeated, ongoing education while administering medications, weighing patient, discussing management of symptoms, diet and need to follow up and act on changes.    Bedside Nursing Discharge:  When completing the after visit summary (discharge instructions) please select \"Cardiac Diagnosis, and Heart Failure\" in the special instructions section to populate the heart failure specific discharge instructions.     Referrals/Orders Placed:    Hospital Schedulers for HF f/u?  yes  Registered Dietician  yes  [facility] CP Program for patients with Medicaid, [facility], or custodial Plus coverage?  Yes, referral sent  Outpatient Care Coordination for patients with [employer] coverage and with 3 or more admissions within a year?  Has SCP but only this admission within past year    Madison CUMMINGS RN, CNN ext. [phone] M-F    Mar 15, 2019  8:15 AM PDT  Heart Failure New Patient with Dalia Li M.D.  [facility] (--) [address], [address]  [place] [id number]  [phone]         "

## 2019-02-26 NOTE — ASSESSMENT & PLAN NOTE
Decompensated with associated pulmonary edema and respiratory failure  Continue current dosages of digoxin and diuretic  Resume Coreg, Aldactone

## 2019-02-26 NOTE — CARE PLAN
Problem: Safety  Goal: Will remain free from injury  Outcome: PROGRESSING AS EXPECTED  Bed alarm on. Pt call light within reach, bed in lowest position, pt educated on use of call light and importance of bed alarm.

## 2019-02-26 NOTE — CARE PLAN
Problem: Bronchoconstriction:  Goal: Improve in air movement and diminished wheezing  Outcome: PROGRESSING AS EXPECTED  Pt. On QID Xopenex, BID Symbicort, and QDay Spiriva.  3L via NC which is same as home reg.

## 2019-02-26 NOTE — ASSESSMENT & PLAN NOTE
With mild exacerbation  Continue low-dose oral prednisone  Continue scheduled and PRN bronchodilators, Symbicort, Spiriva

## 2019-02-26 NOTE — PROGRESS NOTES
[facility] Medicine Daily Progress Note    Date of Service  2/26/2019    Chief Complaint  73 y.o. female admitted 2/25/2019 with shortness of breath.    Hospital Course    Ms. Bro has a history of mitral valve repair as well as COPD and hypertension that presented to [facility] on 2/21/2019 with dyspnea.  She was given significant diuresis and cardiology was consulted.  Given her history of mitral valve repair in low ejection fraction, she was transferred here for a transesophageal echocardiogram.  She is been admitted to the intensive care unit.      Interval Problem Update    2/26: patient seen and evaluated in the ICU. She is on 3 liters of nasal cannula oxygen. Her INR is therapeutic. BUN went up to 35 today. She had a PAGE this morning.   Her 2 daughters and  are at bedside and we discussed her condition.  She is anxious to try and get up and walk this evening.  Consultants/Specialty  Critical Care. I discussed her condition with Dr. Gonda on ICU Hot Rounds.   Cardiology     Code Status  DNR/DNI    Disposition  tele    Review of Systems  Review of Systems   Constitutional: Negative for chills and fever.   Respiratory: Negative for cough and shortness of breath.    Cardiovascular: Negative for chest pain and leg swelling.        No near syncopal episodes today   Gastrointestinal:        Eating well   All other systems reviewed and are negative.       Physical Exam  Temp:  [36.6 °C (97.8 °F)-36.9 °C (98.4 °F)] 36.9 °C (98.4 °F)  Pulse:  [64-92] 92  Resp:  [13-31] 20  BP: (76)/(49) 76/49  SpO2:  [95 %-100 %] 100 %    Physical Exam   Constitutional: She is oriented to person, place, and time. No distress.   HENT:   Head: Normocephalic and atraumatic.   Neck: Neck supple.   Cardiovascular: Normal rate and regular rhythm.    Murmur heard.  Pulmonary/Chest: No respiratory distress. She has no wheezes.   Abdominal: Soft.   Musculoskeletal: She exhibits no edema or tenderness.   Neurological: She  is alert and oriented to person, place, and time.   Skin: Skin is warm and dry. She is not diaphoretic.   Psychiatric: She has a normal mood and affect. Her behavior is normal.   Nursing note and vitals reviewed.      Fluids    Intake/Output Summary (Last 24 hours) at 02/26/19 0936  Last data filed at 02/25/19 1800   Gross per 24 hour   Intake                0 ml   Output              300 ml   Net             -300 ml       Laboratory  Recent Labs      02/24/19 0314 02/25/19 0431 02/26/19   0546   WBC  10.9*  9.7  9.5   RBC  4.34  4.49  4.76   HEMOGLOBIN  13.3  14.0  15.1   HEMATOCRIT  42.3  43.7  47.7*   MCV  97.5  97.3  100.2*   MCH  30.6  31.2  31.7   MCHC  31.4*  32.0*  31.7*   RDW  46.9  46.2  48.0   PLATELETCT  225  225  243   MPV  10.0  9.9  10.5     Recent Labs      02/24/19 0314 02/25/19 0431 02/26/19   0546   SODIUM  140  140  136   POTASSIUM  3.4*  3.7  4.9   CHLORIDE  98  94*  97   CO2  34*  35*  31   GLUCOSE  120*  114*  108*   BUN  27*  28*  35*   CREATININE  0.99  1.10  0.85   CALCIUM  9.8  9.8  9.6     Recent Labs      02/24/19 0314 02/25/19   0431   INR  2.13*  2.29*     Recent Labs      02/25/19 0431  02/26/19   0546   BNPBTYPENAT  960*  392*           Imaging  EC-PAGE W/O CONT   Final Result      DX-CHEST-LIMITED (1 VIEW)   Final Result      1.  No acute cardiopulmonary disease.   2.  Stable cardiomegaly.           Assessment/Plan  * Acute systolic congestive heart failure (HCC)- (present on admission)   Assessment & Plan    Cardiogram reveals an ejection fraction 20%  This is confounded by significant mitral valve disease  Continue IV Lasix and check her basic metabolic panel in the morning to follow potassium and creatinine levels  He is on an angiotensin receptor blocker as well as Aldactone.  Continue low-dose Coreg for beta-blocker.  Continuous telemetry monitoring  Cardiology has been consulted     Non-rheumatic mitral regurgitation- (present on admission)   Assessment & Plan     Noted on transesophageal echocardiogram     Chronic respiratory failure with hypoxia (HCC)- (present on admission)   Assessment & Plan    Vehicle care has consulted and she is on supplemental oxygen     Atrial flutter (HCC)- (present on admission)   Assessment & Plan    She is on Coumadin for anticoagulation  Titrate her Coumadin levels daily with a goal INR of 2-3     COPD (chronic obstructive pulmonary disease) (HCC)- (present on admission)   Assessment & Plan    History of     DNR (do not resuscitate)- (present on admission)   Assessment & Plan    Per patient's wishes, DNR status has been established          VTE prophylaxis: coumadin

## 2019-02-26 NOTE — PROGRESS NOTES
Cardiology Follow Up Progress Note    Date of Service  2/26/2019    Attending Physician  Fernandez Preston M.D.    Chief Complaint   Dyspnea    HPI  Kelly Lovett is a 73 y.o. female admitted 2/25/2019 with prior history of mitral valve repair, MAZE, now with new reduction in LVEF at 20%.    Interim Events  No telemetry events.  No acute events overnight.      Review of Systems  Review of Systems   Constitutional: Negative for chills and fever.   HENT: Negative for ear discharge, ear pain, hearing loss and nosebleeds.    Eyes: Negative for pain and discharge.   Respiratory: Positive for shortness of breath. Negative for cough.    Cardiovascular: Negative for chest pain, palpitations and leg swelling.   Gastrointestinal: Negative for abdominal pain, blood in stool, nausea and vomiting.   Genitourinary: Negative for dysuria and hematuria.   Musculoskeletal: Negative for myalgias.   Skin: Negative for rash.   Allergic/Immunologic: Negative for environmental allergies.   Neurological: Negative for dizziness and headaches.   Hematological: Does not bruise/bleed easily.   Psychiatric/Behavioral: Negative for hallucinations and suicidal ideas.       Vital signs in last 24 hours  Temp:  [36.6 °C (97.8 °F)-36.9 °C (98.4 °F)] 36.9 °C (98.4 °F)  Pulse:  [64-91] 84  Resp:  [13-31] 18  BP: (76)/(49) 76/49  SpO2:  [95 %-100 %] 100 %    Physical Exam  Physical Exam   Constitutional: She is oriented to person, place, and time. She appears well-developed and well-nourished.   HENT:   Head: Normocephalic and atraumatic.   Eyes: EOM are normal.   Neck: No JVD present.   Cardiovascular: Normal rate, regular rhythm and intact distal pulses.  Exam reveals no gallop and no friction rub.    Murmur heard.  3/6 systolic mumur heard best at lateral side area (MV).     Pulmonary/Chest: No respiratory distress. She has no wheezes. She has no rales. She exhibits no tenderness.   Abdominal: She exhibits no distension. There is no  tenderness. There is no rebound and no guarding.   Musculoskeletal: She exhibits no edema or tenderness.   Lymphadenopathy:     She has no cervical adenopathy.   Neurological: She is alert and oriented to person, place, and time.   Skin: Skin is dry.   Psychiatric: She has a normal mood and affect.   Nursing note and vitals reviewed.      Lab Review  Lab Results   Component Value Date/Time    WBC 9.5 02/26/2019 05:46 AM    RBC 4.76 02/26/2019 05:46 AM    HEMOGLOBIN 15.1 02/26/2019 05:46 AM    HEMATOCRIT 47.7 (H) 02/26/2019 05:46 AM    .2 (H) 02/26/2019 05:46 AM    MCH 31.7 02/26/2019 05:46 AM    MCHC 31.7 (L) 02/26/2019 05:46 AM    MPV 10.5 02/26/2019 05:46 AM      Lab Results   Component Value Date/Time    SODIUM 136 02/26/2019 05:46 AM    POTASSIUM 4.9 02/26/2019 05:46 AM    CHLORIDE 97 02/26/2019 05:46 AM    CO2 31 02/26/2019 05:46 AM    GLUCOSE 108 (H) 02/26/2019 05:46 AM    BUN 35 (H) 02/26/2019 05:46 AM    CREATININE 0.85 02/26/2019 05:46 AM      Lab Results   Component Value Date/Time    ASTSGOT 27 02/26/2019 05:46 AM    ALTSGPT 19 02/26/2019 05:46 AM     Lab Results   Component Value Date/Time    CHOLSTRLTOT 211 (H) 08/27/2018 08:20 AM    LDL 82 08/27/2018 08:20 AM     08/27/2018 08:20 AM    TRIGLYCERIDE 53 08/27/2018 08:20 AM    TROPONINI 0.04 02/21/2019 08:50 PM       Recent Labs      02/25/19   0431  02/26/19   0546   BNPBTYPENAT  960*  392*       Cardiac Imaging and Procedures Review  EKG:  My personal interpretation of the EKG dated 02/22/2019 is sinus tachycardia.    Echocardiogram:  LVEF of 20% with moderate MR.    Cardiac Catheterization:  No obstructive CAD on 03/17/2017.    Imaging  Chest X-Ray:  No evidence of edema.     Stress Test:  none    Assessment/Plan  Active Problems:    COPD (chronic obstructive pulmonary disease) (HCC) POA: Yes    Acute systolic congestive heart failure (HCC) POA: Yes    Chronic respiratory failure with hypoxia (HCC) POA: Yes    Non-rheumatic mitral  regurgitation POA: Yes    Obstructive sleep apnea syndrome POA: Yes    Atrial flutter (HCC) POA: Yes    Hyperlipidemia POA: Yes    Anxiety POA: Yes    Long term (current) use of anticoagulants [Z79.01] POA: Yes  Resolved Problems:    * No resolved hospital problems. *    Based on the overall clinical history and profile, patient is a good candidate for Entresto therapy (with superiority over ACE-I therapy in terms of survival benefits and prevention of future hospitalization).  We will stop ACE-I therapy.  Will start Entresto therapy at 24/26 mg po bid after 30 hours washout period.  Last use of Lisinopril was at 05:00M this morning.    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    Will start Spironolactone 12.5 mg po daily.    Continue Coreg 3.125 mg po bid.      Thank you for allowing me to participate in the care of this patient.  I will continue to follow this patient    Please contact me with any questions.    Dalia Li M.D.   Cardiologist, [facility]  [phone] - [phone]

## 2019-02-27 ENCOUNTER — APPOINTMENT (OUTPATIENT)
Dept: RADIOLOGY | Facility: MEDICAL CENTER | Age: 74
DRG: 292 | End: 2019-02-27
Attending: HOSPITALIST
Payer: MEDICARE

## 2019-02-27 LAB
ANION GAP SERPL CALC-SCNC: 11 MMOL/L (ref 0–11.9)
APPEARANCE UR: ABNORMAL
BASOPHILS # BLD AUTO: 0.3 % (ref 0–1.8)
BASOPHILS # BLD: 0.05 K/UL (ref 0–0.12)
BILIRUB UR QL STRIP.AUTO: NEGATIVE
BUN SERPL-MCNC: 43 MG/DL (ref 8–22)
CALCIUM SERPL-MCNC: 9.9 MG/DL (ref 8.5–10.5)
CHLORIDE SERPL-SCNC: 97 MMOL/L (ref 96–112)
CO2 SERPL-SCNC: 27 MMOL/L (ref 20–33)
COLOR UR: YELLOW
CREAT SERPL-MCNC: 1.03 MG/DL (ref 0.5–1.4)
EOSINOPHIL # BLD AUTO: 0.08 K/UL (ref 0–0.51)
EOSINOPHIL NFR BLD: 0.5 % (ref 0–6.9)
ERYTHROCYTE [DISTWIDTH] IN BLOOD BY AUTOMATED COUNT: 47 FL (ref 35.9–50)
GLUCOSE SERPL-MCNC: 147 MG/DL (ref 65–99)
GLUCOSE UR STRIP.AUTO-MCNC: NEGATIVE MG/DL
HCT VFR BLD AUTO: 43.2 % (ref 37–47)
HGB BLD-MCNC: 13.8 G/DL (ref 12–16)
IMM GRANULOCYTES # BLD AUTO: 0.07 K/UL (ref 0–0.11)
IMM GRANULOCYTES NFR BLD AUTO: 0.4 % (ref 0–0.9)
KETONES UR STRIP.AUTO-MCNC: ABNORMAL MG/DL
LACTATE BLD-SCNC: 2 MMOL/L (ref 0.5–2)
LEUKOCYTE ESTERASE UR QL STRIP.AUTO: ABNORMAL
LYMPHOCYTES # BLD AUTO: 0.38 K/UL (ref 1–4.8)
LYMPHOCYTES NFR BLD: 2.2 % (ref 22–41)
MAGNESIUM SERPL-MCNC: 2.1 MG/DL (ref 1.5–2.5)
MCH RBC QN AUTO: 31.7 PG (ref 27–33)
MCHC RBC AUTO-ENTMCNC: 31.9 G/DL (ref 33.6–35)
MCV RBC AUTO: 99.1 FL (ref 81.4–97.8)
MICRO URNS: ABNORMAL
MONOCYTES # BLD AUTO: 0.93 K/UL (ref 0–0.85)
MONOCYTES NFR BLD AUTO: 5.4 % (ref 0–13.4)
NEUTROPHILS # BLD AUTO: 15.81 K/UL (ref 2–7.15)
NEUTROPHILS NFR BLD: 91.2 % (ref 44–72)
NITRITE UR QL STRIP.AUTO: NEGATIVE
NRBC # BLD AUTO: 0 K/UL
NRBC BLD-RTO: 0 /100 WBC
PH UR STRIP.AUTO: 5 [PH]
PHOSPHATE SERPL-MCNC: 3.3 MG/DL (ref 2.5–4.5)
PLATELET # BLD AUTO: 264 K/UL (ref 164–446)
PMV BLD AUTO: 10.1 FL (ref 9–12.9)
POTASSIUM SERPL-SCNC: 4 MMOL/L (ref 3.6–5.5)
PROT UR QL STRIP: NEGATIVE MG/DL
RBC # BLD AUTO: 4.36 M/UL (ref 4.2–5.4)
RBC UR QL AUTO: NEGATIVE
SODIUM SERPL-SCNC: 135 MMOL/L (ref 135–145)
SP GR UR STRIP.AUTO: 1.02
UROBILINOGEN UR STRIP.AUTO-MCNC: 0.2 MG/DL
WBC # BLD AUTO: 17.3 K/UL (ref 4.8–10.8)

## 2019-02-27 PROCEDURE — 71045 X-RAY EXAM CHEST 1 VIEW: CPT

## 2019-02-27 PROCEDURE — 97535 SELF CARE MNGMENT TRAINING: CPT

## 2019-02-27 PROCEDURE — A9270 NON-COVERED ITEM OR SERVICE: HCPCS | Performed by: HOSPITALIST

## 2019-02-27 PROCEDURE — 84100 ASSAY OF PHOSPHORUS: CPT

## 2019-02-27 PROCEDURE — 80048 BASIC METABOLIC PNL TOTAL CA: CPT

## 2019-02-27 PROCEDURE — 700102 HCHG RX REV CODE 250 W/ 637 OVERRIDE(OP): Performed by: HOSPITALIST

## 2019-02-27 PROCEDURE — 99232 SBSQ HOSP IP/OBS MODERATE 35: CPT | Performed by: HOSPITALIST

## 2019-02-27 PROCEDURE — 700111 HCHG RX REV CODE 636 W/ 250 OVERRIDE (IP): Performed by: INTERNAL MEDICINE

## 2019-02-27 PROCEDURE — 36415 COLL VENOUS BLD VENIPUNCTURE: CPT

## 2019-02-27 PROCEDURE — A9270 NON-COVERED ITEM OR SERVICE: HCPCS | Performed by: INTERNAL MEDICINE

## 2019-02-27 PROCEDURE — 94640 AIRWAY INHALATION TREATMENT: CPT

## 2019-02-27 PROCEDURE — 83735 ASSAY OF MAGNESIUM: CPT

## 2019-02-27 PROCEDURE — 81001 URINALYSIS AUTO W/SCOPE: CPT

## 2019-02-27 PROCEDURE — 700111 HCHG RX REV CODE 636 W/ 250 OVERRIDE (IP): Performed by: HOSPITALIST

## 2019-02-27 PROCEDURE — 97165 OT EVAL LOW COMPLEX 30 MIN: CPT

## 2019-02-27 PROCEDURE — 85025 COMPLETE CBC W/AUTO DIFF WBC: CPT

## 2019-02-27 PROCEDURE — 94760 N-INVAS EAR/PLS OXIMETRY 1: CPT

## 2019-02-27 PROCEDURE — 83605 ASSAY OF LACTIC ACID: CPT

## 2019-02-27 PROCEDURE — 87040 BLOOD CULTURE FOR BACTERIA: CPT | Mod: 91

## 2019-02-27 PROCEDURE — 700102 HCHG RX REV CODE 250 W/ 637 OVERRIDE(OP): Performed by: INTERNAL MEDICINE

## 2019-02-27 PROCEDURE — 99232 SBSQ HOSP IP/OBS MODERATE 35: CPT | Performed by: INTERNAL MEDICINE

## 2019-02-27 PROCEDURE — 770020 HCHG ROOM/CARE - TELE (206)

## 2019-02-27 PROCEDURE — 97162 PT EVAL MOD COMPLEX 30 MIN: CPT

## 2019-02-27 PROCEDURE — 700101 HCHG RX REV CODE 250: Performed by: HOSPITALIST

## 2019-02-27 RX ORDER — CARVEDILOL 6.25 MG/1
6.25 TABLET ORAL 2 TIMES DAILY WITH MEALS
Status: DISCONTINUED | OUTPATIENT
Start: 2019-02-27 | End: 2019-02-28

## 2019-02-27 RX ORDER — ACETAMINOPHEN 500 MG
1000 TABLET ORAL ONCE
Status: COMPLETED | OUTPATIENT
Start: 2019-02-27 | End: 2019-02-27

## 2019-02-27 RX ORDER — ACETAMINOPHEN 325 MG/1
650 TABLET ORAL EVERY 6 HOURS PRN
Status: DISCONTINUED | OUTPATIENT
Start: 2019-02-27 | End: 2019-03-08 | Stop reason: HOSPADM

## 2019-02-27 RX ORDER — SPIRONOLACTONE 25 MG/1
25 TABLET ORAL
Status: DISCONTINUED | OUTPATIENT
Start: 2019-02-28 | End: 2019-03-01

## 2019-02-27 RX ADMIN — BUDESONIDE AND FORMOTEROL FUMARATE DIHYDRATE 2 PUFF: 160; 4.5 AEROSOL RESPIRATORY (INHALATION) at 06:17

## 2019-02-27 RX ADMIN — SERTRALINE HYDROCHLORIDE 100 MG: 100 TABLET ORAL at 06:18

## 2019-02-27 RX ADMIN — POTASSIUM CHLORIDE 40 MEQ: 1500 TABLET, EXTENDED RELEASE ORAL at 06:18

## 2019-02-27 RX ADMIN — LEVALBUTEROL HYDROCHLORIDE 1.25 MG: 1.25 SOLUTION RESPIRATORY (INHALATION) at 15:26

## 2019-02-27 RX ADMIN — TIOTROPIUM BROMIDE 1 CAPSULE: 18 CAPSULE ORAL; RESPIRATORY (INHALATION) at 06:17

## 2019-02-27 RX ADMIN — DIGOXIN 125 MCG: 125 TABLET ORAL at 17:58

## 2019-02-27 RX ADMIN — LEVALBUTEROL HYDROCHLORIDE 1.25 MG: 1.25 SOLUTION RESPIRATORY (INHALATION) at 08:21

## 2019-02-27 RX ADMIN — LORAZEPAM 0.5 MG: 1 TABLET ORAL at 20:04

## 2019-02-27 RX ADMIN — PREDNISONE 20 MG: 20 TABLET ORAL at 06:18

## 2019-02-27 RX ADMIN — SPIRONOLACTONE 12.5 MG: 25 TABLET ORAL at 06:18

## 2019-02-27 RX ADMIN — WARFARIN SODIUM 3 MG: 3 TABLET ORAL at 18:24

## 2019-02-27 RX ADMIN — Medication 400 MG: at 17:58

## 2019-02-27 RX ADMIN — BUDESONIDE AND FORMOTEROL FUMARATE DIHYDRATE 2 PUFF: 160; 4.5 AEROSOL RESPIRATORY (INHALATION) at 20:52

## 2019-02-27 RX ADMIN — LOSARTAN POTASSIUM 25 MG: 25 TABLET ORAL at 06:18

## 2019-02-27 RX ADMIN — ATORVASTATIN CALCIUM 40 MG: 40 TABLET, FILM COATED ORAL at 17:58

## 2019-02-27 RX ADMIN — Medication 400 MG: at 06:00

## 2019-02-27 RX ADMIN — ACETAMINOPHEN 1000 MG: 500 TABLET ORAL at 20:52

## 2019-02-27 RX ADMIN — FUROSEMIDE 20 MG: 10 INJECTION, SOLUTION INTRAMUSCULAR; INTRAVENOUS at 06:18

## 2019-02-27 RX ADMIN — LEVALBUTEROL HYDROCHLORIDE 1.25 MG: 1.25 SOLUTION RESPIRATORY (INHALATION) at 11:43

## 2019-02-27 RX ADMIN — POTASSIUM CHLORIDE 40 MEQ: 1500 TABLET, EXTENDED RELEASE ORAL at 17:59

## 2019-02-27 ASSESSMENT — GAIT ASSESSMENTS
ASSISTIVE DEVICE: NONE;FRONT WHEEL WALKER
GAIT LEVEL OF ASSIST: STAND BY ASSIST
DISTANCE (FEET): 100
DEVIATION: OTHER (COMMENT)

## 2019-02-27 ASSESSMENT — ENCOUNTER SYMPTOMS
COUGH: 1
SHORTNESS OF BREATH: 0
FEVER: 0
COUGH: 0
HALLUCINATIONS: 0
VOMITING: 0
WEAKNESS: 0
ABDOMINAL PAIN: 0
BLOOD IN STOOL: 0
ROS GI COMMENTS: EATING WELL
NAUSEA: 0
SHORTNESS OF BREATH: 1
CHILLS: 0
HEADACHES: 0
MYALGIAS: 0
DIZZINESS: 0
PALPITATIONS: 0
EYE PAIN: 0
EYE DISCHARGE: 0
BRUISES/BLEEDS EASILY: 0

## 2019-02-27 ASSESSMENT — ACTIVITIES OF DAILY LIVING (ADL): TOILETING: INDEPENDENT

## 2019-02-27 ASSESSMENT — PATIENT HEALTH QUESTIONNAIRE - PHQ9
2. FEELING DOWN, DEPRESSED, IRRITABLE, OR HOPELESS: NOT AT ALL
2. FEELING DOWN, DEPRESSED, IRRITABLE, OR HOPELESS: NOT AT ALL
SUM OF ALL RESPONSES TO PHQ9 QUESTIONS 1 AND 2: 0
SUM OF ALL RESPONSES TO PHQ9 QUESTIONS 1 AND 2: 0
1. LITTLE INTEREST OR PLEASURE IN DOING THINGS: NOT AT ALL
1. LITTLE INTEREST OR PLEASURE IN DOING THINGS: NOT AT ALL

## 2019-02-27 ASSESSMENT — COGNITIVE AND FUNCTIONAL STATUS - GENERAL
MOBILITY SCORE: 23
SUGGESTED CMS G CODE MODIFIER MOBILITY: CI
CLIMB 3 TO 5 STEPS WITH RAILING: A LITTLE

## 2019-02-27 NOTE — PROGRESS NOTES
Inpatient Anticoagulation Service Note    Date: 2/26/2019  Reason for Anticoagulation: Atrial Fibrillation   [id number] VASc Score: 4    Hemoglobin Value: 15.1  Hematocrit Value: (!) 47.7  Lab Platelet Value: 243  Target INR: 2.0 to 3.0    INR from last 7 days     None        Dose from last 7 days     Date/Time Dose (mg)    02/26/19 1100  3    02/25/19 1500  3        Average Dose (mg): 3 (Confirmed w/ pt: 3.75 mg MWF & 2.5 mg all other; ~3mg day)  Significant Interactions: Corticosteroids, Proton Pump Inhibitor, Statin  Bridge Therapy: Not applicable at this time    Reversal Agent Administered: Not applicable at this time   Comments: Underwent a PAGE this morning with a new reduction in LVEF at 20%. H/H and platelets remain stable. No overt s/s of bleeding. Patient has been previously stable on home regimen.  Today's INR has not resulted. Will continue home dose of warfarin, 3 mg daily. Pharmacy to follow INR with morning labs.     Education Material Provided?:  ((home medication))  Pharmacist suggested discharge dosing: Home regimen of 3 mg daily is reasonable at this time.      Thanks!  Monica Mckenzie, Pharmacy Intern     Addendum:  Patient case reviewed with Pharmacy Intern and agree with warfarin dose & monitoring recommendations.     Pharmacy will continue to follow.     Meghana Silva, DariD

## 2019-02-27 NOTE — CARE PLAN
Problem: Communication  Goal: The ability to communicate needs accurately and effectively will improve  Outcome: PROGRESSING AS EXPECTED  Patient calls appropriately and is able to express needs adequately. Family at bedside. Hourly rounding.     Problem: Knowledge Deficit  Goal: Knowledge of disease process/condition, treatment plan, diagnostic tests, and medications will improve  Outcome: PROGRESSING AS EXPECTED  Pt and family educated on POC, all questions answered in regards to disease process, treatment and diet. Pt and family verbalize understanding and voice no further questions at this time.

## 2019-02-27 NOTE — CARE PLAN
Problem: Skin Integrity  Goal: Risk for impaired skin integrity will decrease  Outcome: PROGRESSING AS EXPECTED  Pt turns self.  Dietary need met, moisture prevented

## 2019-02-27 NOTE — CARE PLAN
Problem: Safety  Goal: Will remain free from falls  Outcome: PROGRESSING AS EXPECTED  Bed alarm set, hourly rounds on patient, call bell in reach.    Problem: Skin Integrity  Goal: Risk for impaired skin integrity will decrease  Outcome: PROGRESSING AS EXPECTED  Pt turned and repositioned q2, skin care as needed, heels elevated off bed with pillows.

## 2019-02-27 NOTE — PROGRESS NOTES
Bedside report received from [facility] nurse, patient care assumed. Patient is showing no signs of distress at this time. POC discussed with patient and verbalizes understanding. Patient denies any needs at this time. Bed is locked and in lowest position, call light within reach. Family at bedside.

## 2019-02-27 NOTE — ASSESSMENT & PLAN NOTE
"Echocardiogram reveals an ejection fraction 20% with severe mitral valve regurg.  Lasix,  Cozaar, Aldactone stopped due to hypotension and dyspnea resolving   Continue digoxin.  NSVT, asymptomatic mag low at 1.7-give IV magnesium and  increase Mag-Ox to 400 mg  3 times daily.  Lopressor just restarted per cardiology . Cardiology re eval if significant recurrent Vtach.  Dr. Li will evaluate for Entresto outpatient, although use may be limited by low blood pressure.     3/5 improving shortness of breath, continue Lasix    3/6 improving, dc to snf vs home with home health    3/6 Addendum: improving strength. Minimal support at home.  Plan for transfer to snf when accepted.  POC reviewed extensively with pt/sister and dtr Yudy.  Medication reconciliation to be reviewed with family and staff.  Dtr continues to report concerns with \"communication breakdown\", but has spoken to several hospital staff, including my RN, staff RN and myself regarding POC.  Pt and sister at bedside, appreciate of updates, all questions and concerns addressed.  Pt's dtr continues to express lack comfort with \"communication\".  RN has reviewed with dtr POC again.  Pt is feeling better and states her dtr talks a lot without listening to explanations, results reviewed with family/dtr multiple times by myself and staff.    Additional time spent 65 min.    3/7  Feels better, happy to be going to snf for additional therapy  - per SW, now approved, recommending expanding choice for acceptance      "

## 2019-02-27 NOTE — PROGRESS NOTES
Cardiology Follow Up Progress Note    Date of Service  2/27/2019    Attending Physician  Fernandez Preston M.D.    Chief Complaint   Dyspnea    HPI  Kelly Lovett is a 73 y.o. female admitted 2/25/2019 with prior history of mitral valve repair, MAZE, now with new reduction in LVEF at 20%.    Interim Events  No telemetry events.  No acute events overnight.      Review of Systems  Review of Systems   Constitutional: Negative for chills and fever.   HENT: Negative for ear discharge, ear pain, hearing loss and nosebleeds.    Eyes: Negative for pain and discharge.   Respiratory: Positive for shortness of breath. Negative for cough.    Cardiovascular: Negative for chest pain, palpitations and leg swelling.   Gastrointestinal: Negative for abdominal pain, blood in stool, nausea and vomiting.   Genitourinary: Negative for dysuria and hematuria.   Musculoskeletal: Negative for myalgias.   Skin: Negative for rash.   Allergic/Immunologic: Negative for environmental allergies.   Neurological: Negative for dizziness and headaches.   Hematological: Does not bruise/bleed easily.   Psychiatric/Behavioral: Negative for hallucinations and suicidal ideas.       Vital signs in last 24 hours  Temp:  [36.3 °C (97.4 °F)-36.6 °C (97.9 °F)] 36.5 °C (97.7 °F)  Pulse:  [55-99] 81  Resp:  [13-24] 13  SpO2:  [94 %-100 %] 98 %    Physical Exam  Physical Exam   Constitutional: She is oriented to person, place, and time. She appears well-developed and well-nourished.   HENT:   Head: Normocephalic and atraumatic.   Eyes: EOM are normal.   Neck: No JVD present.   Cardiovascular: Normal rate, regular rhythm and intact distal pulses.  Exam reveals no gallop and no friction rub.    Murmur heard.  3/6 systolic mumur heard best at lateral side area (MV).     Pulmonary/Chest: No respiratory distress. She has no wheezes. She has no rales. She exhibits no tenderness.   Abdominal: She exhibits no distension. There is no tenderness. There is no  rebound and no guarding.   Musculoskeletal: She exhibits no edema or tenderness.   Lymphadenopathy:     She has no cervical adenopathy.   Neurological: She is alert and oriented to person, place, and time.   Skin: Skin is dry.   Psychiatric: She has a normal mood and affect.   Nursing note and vitals reviewed.      Lab Review  Lab Results   Component Value Date/Time    WBC 9.5 02/26/2019 05:46 AM    RBC 4.76 02/26/2019 05:46 AM    HEMOGLOBIN 15.1 02/26/2019 05:46 AM    HEMATOCRIT 47.7 (H) 02/26/2019 05:46 AM    .2 (H) 02/26/2019 05:46 AM    MCH 31.7 02/26/2019 05:46 AM    MCHC 31.7 (L) 02/26/2019 05:46 AM    MPV 10.5 02/26/2019 05:46 AM      Lab Results   Component Value Date/Time    SODIUM 136 02/26/2019 05:46 AM    POTASSIUM 4.9 02/26/2019 05:46 AM    CHLORIDE 97 02/26/2019 05:46 AM    CO2 31 02/26/2019 05:46 AM    GLUCOSE 108 (H) 02/26/2019 05:46 AM    BUN 35 (H) 02/26/2019 05:46 AM    CREATININE 0.85 02/26/2019 05:46 AM      Lab Results   Component Value Date/Time    ASTSGOT 27 02/26/2019 05:46 AM    ALTSGPT 19 02/26/2019 05:46 AM     Lab Results   Component Value Date/Time    CHOLSTRLTOT 211 (H) 08/27/2018 08:20 AM    LDL 82 08/27/2018 08:20 AM     08/27/2018 08:20 AM    TRIGLYCERIDE 53 08/27/2018 08:20 AM    TROPONINI 0.04 02/21/2019 08:50 PM       Recent Labs      02/25/19   0431  02/26/19   0546   BNPBTYPENAT  960*  392*       Cardiac Imaging and Procedures Review  EKG:  My personal interpretation of the EKG dated 02/22/2019 is sinus tachycardia.    Echocardiogram:  LVEF of 20% with moderate MR.    Cardiac Catheterization:  No obstructive CAD on 03/17/2017.    Imaging  Chest X-Ray:  No evidence of edema.     Stress Test:  none    Assessment/Plan  Principal Problem:    Acute systolic congestive heart failure (HCC) POA: Yes  Active Problems:    Non-rheumatic mitral regurgitation POA: Yes    COPD (chronic obstructive pulmonary disease) (HCC) POA: Yes    Obstructive sleep apnea syndrome POA: Yes     Atrial flutter (HCC) POA: Yes    Chronic respiratory failure with hypoxia (HCC) POA: Yes    Hyperlipidemia POA: Yes    Anxiety POA: Yes    Long term (current) use of anticoagulants [Z79.01] POA: Yes    DNR (do not resuscitate) POA: Yes  Resolved Problems:    * No resolved hospital problems. *    Based on the overall clinical history and profile, patient is a good candidate for Entresto therapy (with superiority over ACE-I therapy in terms of survival benefits and prevention of future hospitalization).  Due to hospital policy, Entresto cannot be initiated. We will use Losartan 25 mg po daily for now.  Will increase Coreg to 6.25 mg po bid.    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    Will increase pironolactone to 25 mg po daily.        Thank you for allowing me to participate in the care of this patient.  I will continue to follow this patient    Please contact me with any questions.    Dalia Li M.D.   Cardiologist, [facility]  [phone]

## 2019-02-27 NOTE — PROGRESS NOTES
Inpatient Anticoagulation Service Note    Date: 2/27/2019  Reason for Anticoagulation: Atrial Fibrillation   [id number] VASc Score: 4    Hemoglobin Value: 15.1  Hematocrit Value: (!) 47.7  Lab Platelet Value: 243  Target INR: 2.0 to 3.0    INR from last 7 days     Date/Time INR Value    02/26/19 1600 (!)  2.24        Dose from last 7 days     Date/Time Dose (mg)    02/27/19 1100  3    02/26/19 1100  3    02/25/19 1500  3        Average Dose (mg): 3 (Confirmed w/ pt: 3.75 mg MWF & 2.5 mg all other; ~3mg day)  Significant Interactions: Corticosteroids, Proton Pump Inhibitor, Statin  Bridge Therapy: Not applicable at this time     Reversal Agent Administered: Not applicable at this time   Comments: S/p PAGE. New reduction in LVEF, 20%. INR remains stable. H/H and platelets remain stable. No overt s/s of bleeding. Home regimen of 3 mg remains stable. Will continue 3 mg daily, and can reduce INR checks to twice weekly while inpatient.     Education Material Provided?:  ((home medication))  Pharmacist suggested discharge dosing: Home regimen of 3 mg daily is reasonable at this time.      Monica Mckenzie      Addendum:  Patient case reviewed with PharmD Intern. Patient clinically improved and trasferred out of ICU this AM. Patient remains on steroids, no other changes in warfarin drug or dietary interactions identified. Home warfarin dose averages ~3 mg/day, attempting to stabilize patient on this dose instead of alternating dose/days as she was previously on. INR remains stable, agree with reduced frequency of INR checks.    Pharmacy will continue to follow.     Meghana Silva, DariD

## 2019-02-27 NOTE — ASSESSMENT & PLAN NOTE
History of.  Clinically better-  no significant wheezes  on exam.  Completed prednisone  Continue Symbicort, Xopenex, Spiriva   RT protocols

## 2019-02-27 NOTE — ASSESSMENT & PLAN NOTE
Patient is post recent PAGE revealing no thrombosis.  History of maze and left atrial appendage ligation.  Has maintained a sinus rhythm.  Continue digoxin  Stop warfarin per cardiology recs.  Start Asa daily .

## 2019-02-27 NOTE — PROGRESS NOTES
[facility] Medicine Daily Progress Note    Date of Service  2/27/2019    Chief Complaint  73 y.o. female admitted 2/25/2019 with shortness of breath.    Hospital Course    Ms. Bro has a history of mitral valve repair as well as COPD and hypertension that presented to [facility] on 2/21/2019 with dyspnea.  She was given significant diuresis and cardiology was consulted.  Given her history of mitral valve repair in low ejection fraction, she was transferred here for a transesophageal echocardiogram.  She is been admitted to the intensive care unit.      Interval Problem Update    2/26: patient seen and evaluated in the ICU. She is on 3 liters of nasal cannula oxygen. Her INR is therapeutic. BUN went up to 35 today. She had a PAGE this morning.   Her 2 daughters and  are at bedside and we discussed her condition.  She is anxious to try and get up and walk this evening.  2/27: Patient seen and evaluated on the telemetry floor.  She was transferred to the intensive care unit this morning per due to her weakness, physical therapy and occupational therapy have been consulted.  I met with her  and daughter at bedside and had an extensive discussion about the safety of her going home versus to a skilled nursing facility.  Patient and her family want her to go home.  We talked about outpatient cardiac rehab.  Her  states they live in a one-story house with one step.  He has a dry cough today with a little bit of clear fluid sputum. She is followed by Dr. Gardner, pulmonology   Consultants/Specialty  Critical Care. I discussed her condition with Dr. Gonda on ICU Hot Rounds.   Cardiology     Code Status  DNR/DNI    Disposition  tele    Review of Systems  Review of Systems   Constitutional: Negative for chills and fever.   Respiratory: Positive for cough. Negative for shortness of breath.    Cardiovascular: Negative for chest pain and leg swelling.        No near syncopal episodes today    Gastrointestinal:        Eating well   Neurological: Negative for weakness.   All other systems reviewed and are negative.       Physical Exam  Temp:  [36.3 °C (97.4 °F)-36.8 °C (98.2 °F)] 36.5 °C (97.7 °F)  Pulse:  [55-99] 81  Resp:  [13-24] 13  SpO2:  [94 %-100 %] 98 %    Physical Exam   Constitutional: She is oriented to person, place, and time. No distress.   HENT:   Head: Normocephalic and atraumatic.   Cardiovascular: Normal rate and regular rhythm.    Murmur heard.  Pulmonary/Chest: No respiratory distress. She has no wheezes.   You crackles  Good air  Normal work of breathing   Abdominal: Soft. She exhibits no distension.   Musculoskeletal: She exhibits no edema or tenderness.   Neurological: She is alert and oriented to person, place, and time.   Skin: Skin is dry. She is not diaphoretic.   Psychiatric: She has a normal mood and affect. Her behavior is normal.   Nursing note and vitals reviewed.      Fluids    Intake/Output Summary (Last 24 hours) at 02/27/19 0729  Last data filed at 02/26/19 1400   Gross per 24 hour   Intake              240 ml   Output                0 ml   Net              240 ml       Laboratory  Recent Labs      02/25/19 0431 02/26/19   0546   WBC  9.7  9.5   RBC  4.49  4.76   HEMOGLOBIN  14.0  15.1   HEMATOCRIT  43.7  47.7*   MCV  97.3  100.2*   MCH  31.2  31.7   MCHC  32.0*  31.7*   RDW  46.2  48.0   PLATELETCT  225  243   MPV  9.9  10.5     Recent Labs      02/25/19 0431 02/26/19   0546   SODIUM  140  136   POTASSIUM  3.7  4.9   CHLORIDE  94*  97   CO2  35*  31   GLUCOSE  114*  108*   BUN  28*  35*   CREATININE  1.10  0.85   CALCIUM  9.8  9.6     Recent Labs      02/25/19 0431 02/26/19   1600   INR  2.29*  2.24*     Recent Labs      02/25/19 0431 02/26/19   0546   BNPBTYPENAT  960*  392*           Imaging  EC-PAGE W/O CONT   Final Result      DX-CHEST-LIMITED (1 VIEW)   Final Result      1.  No acute cardiopulmonary disease.   2.  Stable cardiomegaly.            Assessment/Plan  * Acute systolic congestive heart failure (HCC)- (present on admission)   Assessment & Plan    Cardiogram reveals an ejection fraction 20%  This is confounded by significant mitral valve disease  s/p IV Lasix   He is on an angiotensin receptor blocker as well as Aldactone.  Continue low-dose Coreg for beta-blocker and digoxin.  Continuous telemetry monitoring  Cardiology has been consulted  Dr. Li will evaluate for Entresto outpatient  PT/OT consultations placed     Non-rheumatic mitral regurgitation- (present on admission)   Assessment & Plan    Noted on transesophageal echocardiogram     Chronic respiratory failure with hypoxia (HCC)- (present on admission)   Assessment & Plan    Pulmonology  has consulted and she is on supplemental oxygen     Atrial flutter (HCC)- (present on admission)   Assessment & Plan    She is on Coumadin for anticoagulation  Titrate her Coumadin levels daily with a goal INR of 2-3     COPD (chronic obstructive pulmonary disease) (HCC)- (present on admission)   Assessment & Plan    History of     DNR (do not resuscitate)- (present on admission)   Assessment & Plan    Per patient's wishes, DNR status has been established          VTE prophylaxis: coumadin

## 2019-02-28 PROBLEM — D72.829 LEUKOCYTOSIS: Status: ACTIVE | Noted: 2019-02-28

## 2019-02-28 LAB
ANION GAP SERPL CALC-SCNC: 10 MMOL/L (ref 0–11.9)
BACTERIA #/AREA URNS HPF: ABNORMAL /HPF
BASOPHILS # BLD AUTO: 0.4 % (ref 0–1.8)
BASOPHILS # BLD: 0.07 K/UL (ref 0–0.12)
BUN SERPL-MCNC: 46 MG/DL (ref 8–22)
CALCIUM SERPL-MCNC: 9.9 MG/DL (ref 8.5–10.5)
CHLORIDE SERPL-SCNC: 99 MMOL/L (ref 96–112)
CO2 SERPL-SCNC: 27 MMOL/L (ref 20–33)
CREAT SERPL-MCNC: 1.18 MG/DL (ref 0.5–1.4)
EOSINOPHIL # BLD AUTO: 0.15 K/UL (ref 0–0.51)
EOSINOPHIL NFR BLD: 0.9 % (ref 0–6.9)
EPI CELLS #/AREA URNS HPF: ABNORMAL /HPF
ERYTHROCYTE [DISTWIDTH] IN BLOOD BY AUTOMATED COUNT: 47.4 FL (ref 35.9–50)
GLUCOSE SERPL-MCNC: 130 MG/DL (ref 65–99)
HCT VFR BLD AUTO: 42.9 % (ref 37–47)
HGB BLD-MCNC: 13.7 G/DL (ref 12–16)
HYALINE CASTS #/AREA URNS LPF: ABNORMAL /LPF
IMM GRANULOCYTES # BLD AUTO: 0.25 K/UL (ref 0–0.11)
IMM GRANULOCYTES NFR BLD AUTO: 1.5 % (ref 0–0.9)
LACTATE BLD-SCNC: 1.2 MMOL/L (ref 0.5–2)
LACTATE BLD-SCNC: 1.7 MMOL/L (ref 0.5–2)
LACTATE BLD-SCNC: 2 MMOL/L (ref 0.5–2)
LYMPHOCYTES # BLD AUTO: 0.29 K/UL (ref 1–4.8)
LYMPHOCYTES NFR BLD: 1.7 % (ref 22–41)
MCH RBC QN AUTO: 31.6 PG (ref 27–33)
MCHC RBC AUTO-ENTMCNC: 31.9 G/DL (ref 33.6–35)
MCV RBC AUTO: 99.1 FL (ref 81.4–97.8)
MONOCYTES # BLD AUTO: 1.21 K/UL (ref 0–0.85)
MONOCYTES NFR BLD AUTO: 7.1 % (ref 0–13.4)
NEUTROPHILS # BLD AUTO: 15.18 K/UL (ref 2–7.15)
NEUTROPHILS NFR BLD: 88.4 % (ref 44–72)
NRBC # BLD AUTO: 0 K/UL
NRBC BLD-RTO: 0 /100 WBC
PLATELET # BLD AUTO: 213 K/UL (ref 164–446)
PMV BLD AUTO: 10 FL (ref 9–12.9)
POTASSIUM SERPL-SCNC: 4.8 MMOL/L (ref 3.6–5.5)
RBC # BLD AUTO: 4.33 M/UL (ref 4.2–5.4)
RBC # URNS HPF: ABNORMAL /HPF
SODIUM SERPL-SCNC: 136 MMOL/L (ref 135–145)
WBC # BLD AUTO: 17.2 K/UL (ref 4.8–10.8)
WBC #/AREA URNS HPF: ABNORMAL /HPF

## 2019-02-28 PROCEDURE — 700105 HCHG RX REV CODE 258: Performed by: HOSPITALIST

## 2019-02-28 PROCEDURE — A9270 NON-COVERED ITEM OR SERVICE: HCPCS | Performed by: INTERNAL MEDICINE

## 2019-02-28 PROCEDURE — 80048 BASIC METABOLIC PNL TOTAL CA: CPT

## 2019-02-28 PROCEDURE — 700111 HCHG RX REV CODE 636 W/ 250 OVERRIDE (IP): Performed by: INTERNAL MEDICINE

## 2019-02-28 PROCEDURE — 99232 SBSQ HOSP IP/OBS MODERATE 35: CPT | Performed by: HOSPITALIST

## 2019-02-28 PROCEDURE — 36415 COLL VENOUS BLD VENIPUNCTURE: CPT

## 2019-02-28 PROCEDURE — A9270 NON-COVERED ITEM OR SERVICE: HCPCS | Performed by: HOSPITALIST

## 2019-02-28 PROCEDURE — 83605 ASSAY OF LACTIC ACID: CPT | Mod: 91

## 2019-02-28 PROCEDURE — 700111 HCHG RX REV CODE 636 W/ 250 OVERRIDE (IP): Performed by: HOSPITALIST

## 2019-02-28 PROCEDURE — 99232 SBSQ HOSP IP/OBS MODERATE 35: CPT | Performed by: INTERNAL MEDICINE

## 2019-02-28 PROCEDURE — 770020 HCHG ROOM/CARE - TELE (206)

## 2019-02-28 PROCEDURE — 700102 HCHG RX REV CODE 250 W/ 637 OVERRIDE(OP): Performed by: INTERNAL MEDICINE

## 2019-02-28 PROCEDURE — 700102 HCHG RX REV CODE 250 W/ 637 OVERRIDE(OP): Performed by: HOSPITALIST

## 2019-02-28 PROCEDURE — 85025 COMPLETE CBC W/AUTO DIFF WBC: CPT

## 2019-02-28 RX ORDER — CEFDINIR 300 MG/1
300 CAPSULE ORAL EVERY 12 HOURS
Status: COMPLETED | OUTPATIENT
Start: 2019-03-01 | End: 2019-03-04

## 2019-02-28 RX ORDER — METOPROLOL SUCCINATE 25 MG/1
25 TABLET, EXTENDED RELEASE ORAL
Status: DISCONTINUED | OUTPATIENT
Start: 2019-03-01 | End: 2019-03-02

## 2019-02-28 RX ADMIN — LOSARTAN POTASSIUM 25 MG: 25 TABLET ORAL at 05:59

## 2019-02-28 RX ADMIN — POTASSIUM CHLORIDE 40 MEQ: 1500 TABLET, EXTENDED RELEASE ORAL at 05:59

## 2019-02-28 RX ADMIN — Medication 400 MG: at 06:13

## 2019-02-28 RX ADMIN — Medication 400 MG: at 17:47

## 2019-02-28 RX ADMIN — TIOTROPIUM BROMIDE 1 CAPSULE: 18 CAPSULE ORAL; RESPIRATORY (INHALATION) at 05:59

## 2019-02-28 RX ADMIN — SERTRALINE HYDROCHLORIDE 100 MG: 100 TABLET ORAL at 05:59

## 2019-02-28 RX ADMIN — WARFARIN SODIUM 3 MG: 3 TABLET ORAL at 17:48

## 2019-02-28 RX ADMIN — SPIRONOLACTONE 25 MG: 25 TABLET, FILM COATED ORAL at 05:59

## 2019-02-28 RX ADMIN — DIGOXIN 125 MCG: 125 TABLET ORAL at 17:47

## 2019-02-28 RX ADMIN — CEFTRIAXONE SODIUM 1 G: 1 INJECTION, POWDER, FOR SOLUTION INTRAMUSCULAR; INTRAVENOUS at 00:14

## 2019-02-28 RX ADMIN — BUDESONIDE AND FORMOTEROL FUMARATE DIHYDRATE 2 PUFF: 160; 4.5 AEROSOL RESPIRATORY (INHALATION) at 06:00

## 2019-02-28 RX ADMIN — LORAZEPAM 0.5 MG: 1 TABLET ORAL at 01:36

## 2019-02-28 RX ADMIN — PREDNISONE 20 MG: 20 TABLET ORAL at 05:59

## 2019-02-28 RX ADMIN — ACETAMINOPHEN 650 MG: 325 TABLET, FILM COATED ORAL at 17:48

## 2019-02-28 RX ADMIN — POTASSIUM CHLORIDE 40 MEQ: 1500 TABLET, EXTENDED RELEASE ORAL at 17:47

## 2019-02-28 RX ADMIN — CARVEDILOL 6.25 MG: 6.25 TABLET, FILM COATED ORAL at 11:40

## 2019-02-28 RX ADMIN — BUDESONIDE AND FORMOTEROL FUMARATE DIHYDRATE 2 PUFF: 160; 4.5 AEROSOL RESPIRATORY (INHALATION) at 17:47

## 2019-02-28 RX ADMIN — ATORVASTATIN CALCIUM 40 MG: 40 TABLET, FILM COATED ORAL at 17:47

## 2019-02-28 RX ADMIN — LORAZEPAM 0.5 MG: 1 TABLET ORAL at 23:01

## 2019-02-28 ASSESSMENT — LIFESTYLE VARIABLES
CONSUMPTION TOTAL: POSITIVE
HOW MANY TIMES IN THE PAST YEAR HAVE YOU HAD 5 OR MORE DRINKS IN A DAY: 3
TOTAL SCORE: 0
EVER HAD A DRINK FIRST THING IN THE MORNING TO STEADY YOUR NERVES TO GET RID OF A HANGOVER: NO
AVERAGE NUMBER OF DAYS PER WEEK YOU HAVE A DRINK CONTAINING ALCOHOL: 7
TOTAL SCORE: 0
EVER FELT BAD OR GUILTY ABOUT YOUR DRINKING: NO
ON A TYPICAL DAY WHEN YOU DRINK ALCOHOL HOW MANY DRINKS DO YOU HAVE: 2
HAVE PEOPLE ANNOYED YOU BY CRITICIZING YOUR DRINKING: NO
ALCOHOL_USE: YES
TOTAL SCORE: 0
HAVE YOU EVER FELT YOU SHOULD CUT DOWN ON YOUR DRINKING: NO

## 2019-02-28 ASSESSMENT — ENCOUNTER SYMPTOMS
APNEA: 0
COUGH: 0
MYALGIAS: 0
CHOKING: 0
FOCAL WEAKNESS: 0
WEAKNESS: 1
WHEEZING: 0
BLOOD IN STOOL: 0
SHORTNESS OF BREATH: 1
ABDOMINAL PAIN: 0
BACK PAIN: 0
SHORTNESS OF BREATH: 0
VOMITING: 0
PALPITATIONS: 0
HALLUCINATIONS: 0
EYE DISCHARGE: 0
FLANK PAIN: 0
DIZZINESS: 0
STRIDOR: 0
HEADACHES: 0
NAUSEA: 0
SPEECH CHANGE: 0
FATIGUE: 1
CHILLS: 0
SENSORY CHANGE: 0
COUGH: 1
DIAPHORESIS: 0
CHEST TIGHTNESS: 0
DIARRHEA: 0
NAUSEA: 1
EYE PAIN: 0
FEVER: 0
NECK PAIN: 0
BRUISES/BLEEDS EASILY: 0

## 2019-02-28 NOTE — RESPIRATORY CARE
COPD EDUCATION by COPD CLINICAL EDUCATOR  2/28/2019 at 10:50 AM by Yuliana Barron     Patient interviewed by COPD education team. Patient refused COPD program at this time.

## 2019-02-28 NOTE — THERAPY
"Occupational Therapy Evaluation completed.   Functional Status:  Pt pleasant & motivated.  Pt's family present.  Pt was supervised with bed mobility & was able to dress with set up.  Pt has good tolerance for activity & does a good job of pacing herself.  Pt's  will be able to assist if needed upon D/C.  Plan of Care: Patient with no further skilled OT needs in the acute care setting at this time  Discharge Recommendations:  Equipment: No Equipment Needed. Post-acute therapy Discharge to home with outpatient or home health for additional skilled therapy services.    See \"Rehab Therapy-Acute\" Patient Summary Report for complete documentation.    "

## 2019-02-28 NOTE — FACE TO FACE
Face to Face Supporting Documentation - Home Health    The encounter with this patient was in whole or in part the primary reason for home health admission.    Date of encounter:   Patient:                    MRN:                       YOB: 2020  Kelly Lovett  [id number]  1945     Home health to see patient for:  Skilled Nursing care for assessment, interventions & education and Physical Therapy evaluation and treatment    Skilled need for:  Exacerbation of Chronic Disease State congestive heart failure, COPD    Skilled nursing interventions to include:  Comment: Skilled RN, PT    Homebound status evidenced by:  Need the aid of supportive devices such as crutches, canes, wheelchairs or walkers or Needs the assistance of another person in order to leave the home. Leaving home requires a considerable and taxing effort. There is a normal inability to leave the home.    Community Physician to provide follow up care: Ritika Jurado M.D.     Optional Interventions? No      I certify the face to face encounter for this home health care referral meets the CMS requirements and the encounter/clinical assessment with the patient was, in whole, or in part, for the medical condition(s) listed above, which is the primary reason for home health care. Based on my clinical findings: the service(s) are medically necessary, support the need for home health care, and the homebound criteria are met.  I certify that this patient has had a face to face encounter by myself.  Bo Ott M.D. - NPI: [id number]

## 2019-02-28 NOTE — PROGRESS NOTES
2 RN skin check    Left ear is pink and blanching  Bilat elbows pink and blanching  Bilat knees pink and blanching  Bilat heels dry, pink, blanching  Sacrum is pink and blanching  Patient turns herself in bed, ambulates, pillows to support and reposition, silicone O2 in place

## 2019-02-28 NOTE — THERAPY
"Physical Therapy Evaluation completed.   Transfers: Sit to Stand: Supervised  Gait: Level Of Assist: Stand by Assist with No Equipment Needed or FWW; see below      Plan of Care: Patient with no further skilled PT needs in the acute care setting at this time  Discharge Recommendations: Equipment: No Equipment Needed. See below    After initial evaluation and pt/spouse/family education, pt has no further acute PT needs. She is able to demonstrate hallway ambulation with or without FWW with no lavonne LOB.  Primary concerns from this PT were regarding pt's reduced EF to 20% since prior mitral valve repair. She and family were given extensive education re: HF and relation to exertional activity, signs/symptoms of HF, activity recommendations and intiatiion of walking program with use of RPE as well as \"talk test\" for self monitoring with progression. She would optimally benefit from outpatient cardiac rehab at time of dc for continued cardiac education, progression of aerobic capacity as able, and continued monitoring of hemodynamics with progression of activity. Spoke with SW/CM re: dc planning and possible outpatient cardiac rehab needs given change in EF and current concerns with education from pt and family re: CHF.    See \"Rehab Therapy-Acute\" Patient Summary Report for complete documentation.     "

## 2019-02-28 NOTE — DISCHARGE PLANNING
Received Choice form at 1158  Agency/Facility Name: [facility]  Referral sent per Choice form @ 1200     @1320  Agency/Facility Name: [facility]  Spoke To: Tea  Outcome: Trying to see if they can take [employer] for Insurance.    @[phone]  Agency/Facility Name: [facility]  Spoke To: Tea  Outcome: Accepted.

## 2019-02-28 NOTE — PROGRESS NOTES
[facility] Medicine Daily Progress Note    Date of Service  2/28/2019    Chief Complaint  73 y.o. female admitted 2/25/2019 with shortness of breath.    Hospital Course    73-year-old female history of mitral valve repair, COPD, hypertension initially transferred from [facility] with shortness of breath.  Patient initially was hypotensive requiring IV fluid bolus.  Following admission to [facility] she was given diuresis for CHF and transferred to [facility] for PAGE.  Patient downgraded to telemetry floor.    Interval Problem Update    Mildly hypotensive stop blood pressure 90s with tachycardia.  Plan to increase Coreg and monitor heart rate response.  Patient to be mobilized and arrange for home health services as needed.    Consultants/Specialty    Dr. Li, Cardiology     Code Status  DNR      Disposition    Anticipate DC home in stable.    Review of Systems  Review of Systems   Constitutional: Negative for chills, diaphoresis and fever.   HENT: Negative for congestion.    Respiratory: Negative for cough, shortness of breath, wheezing and stridor.    Cardiovascular: Negative for chest pain and leg swelling.   Gastrointestinal: Negative for abdominal pain, diarrhea and vomiting.   Genitourinary: Negative for flank pain and hematuria.   Musculoskeletal: Negative for back pain, joint pain and neck pain.   Neurological: Positive for weakness (Generalized, improved). Negative for sensory change, speech change and focal weakness.        Physical Exam  Temp:  [36.2 °C (97.1 °F)-37.9 °C (100.3 °F)] 37.3 °C (99.1 °F)  Pulse:  [] 106  Resp:  [16-20] 18  BP: ()/(55-68) 94/66  SpO2:  [92 %-97 %] 95 %    Physical Exam   Constitutional: She is oriented to person, place, and time. No distress.   Flat affect   HENT:   Head: Normocephalic and atraumatic.   Right Ear: External ear normal.   Left Ear: External ear normal.   Nose: Nose normal.   Eyes: EOM are normal. Right eye exhibits no discharge. Left eye  exhibits no discharge.   Neck: Neck supple. No JVD present.   Cardiovascular: Normal rate and regular rhythm.    Murmur (2/6) heard.  Pulmonary/Chest: Effort normal. No stridor. She has no wheezes. She has no rales.   Abdominal: Soft. Bowel sounds are normal. She exhibits no distension. There is no tenderness.   Musculoskeletal: She exhibits no edema or tenderness.   Neurological: She is alert and oriented to person, place, and time. No cranial nerve deficit.   Skin: Skin is warm and dry. She is not diaphoretic. No pallor.   Vitals reviewed.      Fluids    Intake/Output Summary (Last 24 hours) at 02/28/19 0925  Last data filed at 02/27/19 2000   Gross per 24 hour   Intake              360 ml   Output              100 ml   Net              260 ml       Laboratory  Recent Labs      02/26/19   0546  02/27/19 2045 02/28/19   0030   WBC  9.5  17.3*  17.2*   RBC  4.76  4.36  4.33   HEMOGLOBIN  15.1  13.8  13.7   HEMATOCRIT  47.7*  43.2  42.9   MCV  100.2*  99.1*  99.1*   MCH  31.7  31.7  31.6   MCHC  31.7*  31.9*  31.9*   RDW  48.0  47.0  47.4   PLATELETCT  243  264  213   MPV  10.5  10.1  10.0     Recent Labs      02/26/19   0546  02/27/19 2045 02/28/19   0848   SODIUM  136  135  136   POTASSIUM  4.9  4.0  4.8   CHLORIDE  97  97  99   CO2  31  27  27   GLUCOSE  108*  147*  130*   BUN  35*  43*  46*   CREATININE  0.85  1.03  1.18   CALCIUM  9.6  9.9  9.9     Recent Labs      02/26/19   1600   INR  2.24*     Recent Labs      02/26/19   0546   BNPBTYPENAT  392*           Imaging  DX-CHEST-PORTABLE (1 VIEW)   Final Result      1.  Hypoinflation without evidence for pneumonia or pulmonary edema.   2.  Stable multichamber cardiac enlargement.      EC-PAGE W/O CONT   Final Result      DX-CHEST-LIMITED (1 VIEW)   Final Result      1.  No acute cardiopulmonary disease.   2.  Stable cardiomegaly.           Assessment/Plan  * Acute systolic congestive heart failure (HCC)- (present on admission)   Assessment & Plan     Cardiogram reveals an ejection fraction 20% with severe mitral valve regurg  s/p IV Lasix .  Holding additional diuretics due to low blood pressure and improve dyspnea.   Continue Cozaar  Discussed with  cardiology to increase Coreg with holding parameters  Continue digoxin.  Continuous telemetry monitoring  Dr. Li will evaluate for Entresto outpatient  PT/OT consultations placed  Arrange for home health.     Non-rheumatic mitral regurgitation- (present on admission)   Assessment & Plan    Noted on transesophageal echocardiogram     Chronic respiratory failure with hypoxia (HCC)- (present on admission)   Assessment & Plan    Likely multifactorial COPD, CHF, clinically better  O2 requirements at baseline  Try to titrate oxygen as tolerated     Atrial flutter (HCC)- (present on admission)   Assessment & Plan    Continue Coumadin for stroke risk reduction  Titrate her Coumadin levels daily with a goal INR of 2-3     COPD (chronic obstructive pulmonary disease) (HCC)- (present on admission)   Assessment & Plan    History of.  Clinically better-  no segment wheeze on exam  Continue Symbicort, Xopenex, Spiriva , prednisone     Leukocytosis   Assessment & Plan    Suspect steroid effect  Follow clinically  UA may suggest UTI.  Patient without urinary symptoms although may be unreliable elderly.  IV Rocephin started, switch to oral Omnicef     DNR (do not resuscitate)- (present on admission)   Assessment & Plan    Per patient's wishes, DNR status has been established     Anxiety- (present on admission)   Assessment & Plan    Continue PRN Ativan  Schedule Zoloft          VTE prophylaxis: Warfarin    Discussed with case management, children and  plan of care at bedside

## 2019-02-28 NOTE — CARE PLAN
Problem: Knowledge Deficit  Goal: Knowledge of disease process/condition, treatment plan, diagnostic tests, and medications will improve  POC discussed with the patient and family. All questions and concerns addressed and answered. Patient is involved in POC and verbalizes the understanding of the disease process, medications, tests and treatments. Questions on POC encouraged throughout care.       Problem: Skin Integrity  Goal: Risk for impaired skin integrity will decrease  Skin is assessed for integrity throughout the shift. Pt is encouraged to reposition and is turned at least every 2 hours. Pt is kept dry and clean.     Problem: Psychosocial Needs:  Goal: Level of anxiety will decrease  Anxiety triggers identified. Calming techniques provided to patient. Patient is involved in POC and is encouraged to verbalize questions and concerns.

## 2019-02-28 NOTE — PROGRESS NOTES
Received bedside report from PM nurse. Assumed patient care. Chart reviewed. Pt was resting in bed. A&O x 4. Patient denies pain. C/O fatigue.  POC updated with pt and on the patient communication board. Bed locked and in the lowest position. Call light within reach. Tele box on. Will continue to monitor.

## 2019-02-28 NOTE — PROGRESS NOTES
Cardiology Follow Up Progress Note    Date of Service  2/28/2019    Attending Physician  Fernandez Preston M.D.    Chief Complaint   Dyspnea    HPI  Kelly Lovett is a 73 y.o. female admitted 2/25/2019 with prior history of mitral valve repair, MAZE, now with new reduction in LVEF at 20%.    Interim Events  No telemetry events.  No acute events overnight.    Has not been getting BB due to SBP in 90s.    Review of Systems  Review of Systems   Constitutional: Negative for chills and fever.   HENT: Negative for ear discharge, ear pain, hearing loss and nosebleeds.    Eyes: Negative for pain and discharge.   Respiratory: Positive for shortness of breath. Negative for cough.    Cardiovascular: Negative for chest pain, palpitations and leg swelling.   Gastrointestinal: Negative for abdominal pain, blood in stool, nausea and vomiting.   Genitourinary: Negative for dysuria and hematuria.   Musculoskeletal: Negative for myalgias.   Skin: Negative for rash.   Allergic/Immunologic: Negative for environmental allergies.   Neurological: Negative for dizziness and headaches.   Hematological: Does not bruise/bleed easily.   Psychiatric/Behavioral: Negative for hallucinations and suicidal ideas.       Vital signs in last 24 hours  Temp:  [36.2 °C (97.2 °F)-37.9 °C (100.3 °F)] 36.9 °C (98.4 °F)  Pulse:  [] 96  Resp:  [16-20] 18  BP: ()/(49-68) 86/49  SpO2:  [92 %-96 %] 96 %    Physical Exam  Physical Exam   Constitutional: She is oriented to person, place, and time. She appears well-developed and well-nourished.   HENT:   Head: Normocephalic and atraumatic.   Eyes: EOM are normal.   Neck: No JVD present.   Cardiovascular: Normal rate, regular rhythm and intact distal pulses.  Exam reveals no gallop and no friction rub.    Murmur heard.  3/6 systolic mumur heard best at lateral side area (MV).     Pulmonary/Chest: No respiratory distress. She has no wheezes. She has no rales. She exhibits no tenderness.    Abdominal: She exhibits no distension. There is no tenderness. There is no rebound and no guarding.   Musculoskeletal: She exhibits no edema or tenderness.   Lymphadenopathy:     She has no cervical adenopathy.   Neurological: She is alert and oriented to person, place, and time.   Skin: Skin is dry.   Psychiatric: She has a normal mood and affect.   Nursing note and vitals reviewed.      Lab Review  Lab Results   Component Value Date/Time    WBC 17.2 (H) 02/28/2019 12:30 AM    RBC 4.33 02/28/2019 12:30 AM    HEMOGLOBIN 13.7 02/28/2019 12:30 AM    HEMATOCRIT 42.9 02/28/2019 12:30 AM    MCV 99.1 (H) 02/28/2019 12:30 AM    MCH 31.6 02/28/2019 12:30 AM    MCHC 31.9 (L) 02/28/2019 12:30 AM    MPV 10.0 02/28/2019 12:30 AM      Lab Results   Component Value Date/Time    SODIUM 136 02/28/2019 08:48 AM    POTASSIUM 4.8 02/28/2019 08:48 AM    CHLORIDE 99 02/28/2019 08:48 AM    CO2 27 02/28/2019 08:48 AM    GLUCOSE 130 (H) 02/28/2019 08:48 AM    BUN 46 (H) 02/28/2019 08:48 AM    CREATININE 1.18 02/28/2019 08:48 AM      Lab Results   Component Value Date/Time    ASTSGOT 27 02/26/2019 05:46 AM    ALTSGPT 19 02/26/2019 05:46 AM     Lab Results   Component Value Date/Time    CHOLSTRLTOT 211 (H) 08/27/2018 08:20 AM    LDL 82 08/27/2018 08:20 AM     08/27/2018 08:20 AM    TRIGLYCERIDE 53 08/27/2018 08:20 AM    TROPONINI 0.04 02/21/2019 08:50 PM       Recent Labs      02/26/19   0546   BNPBTYPENAT  392*       Cardiac Imaging and Procedures Review  EKG:  My personal interpretation of the EKG dated 02/22/2019 is sinus tachycardia.    Echocardiogram:  LVEF of 20% with moderate MR.    Cardiac Catheterization:  No obstructive CAD on 03/17/2017.    Imaging  Chest X-Ray:  No evidence of edema.     Stress Test:  none    Assessment/Plan  Principal Problem:    Acute systolic congestive heart failure (HCC) POA: Yes  Active Problems:    Non-rheumatic mitral regurgitation POA: Yes    COPD (chronic obstructive pulmonary disease) ([facility])  POA: Yes    Obstructive sleep apnea syndrome POA: Yes    Atrial flutter (HCC) POA: Yes    Chronic respiratory failure with hypoxia (HCC) POA: Yes    Hyperlipidemia POA: Yes    Anxiety POA: Yes    Long term (current) use of anticoagulants [Z79.01] POA: Yes    DNR (do not resuscitate) POA: Yes  Resolved Problems:    * No resolved hospital problems. *    Based on the overall clinical history and profile, patient is a good candidate for Entresto therapy (with superiority over ACE-I therapy in terms of survival benefits and prevention of future hospitalization).  Due to hospital policy, Entresto cannot be initiated. We will use Losartan 25 mg po daily for now.  Continue Coreg to 6.25 mg po bid. I rewrote parameters.    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    Will increase pironolactone to 25 mg po daily.        Thank you for allowing me to participate in the care of this patient.  We will sign off at this time.  Outpatient meds titration.    Please contact me with any questions.    Dalia Li M.D.   Cardiologist, [facility]  [phone]

## 2019-02-28 NOTE — PROGRESS NOTES
BP 72/40. Rechecked manually . Pt is fatigued all day. Notified Dr. Ott. Order to call Dr. Li. Paged .

## 2019-02-28 NOTE — ASSESSMENT & PLAN NOTE
Suspect steroid effect, possible UTI-WBC normalized.   UA may suggest UTI.  Patient without urinary symptoms although may be unreliable elderly.  Complete 5-day course antibiotics with Omnicef

## 2019-02-28 NOTE — DISCHARGE PLANNING
Anticipated Discharge Disposition: Home with home health for skilled therapies.    Action: CHOICE for home health obtained and faxed to [facility] at ext. [phone].    Barriers to Discharge: Accepting home health.    Plan: Notify team when accepting home health agency is obtained.

## 2019-02-28 NOTE — PROGRESS NOTES
Cardiology Follow Up Progress Note    Date of Service  2/28/2019    Attending Physician  Bo Ott M.D.    Reason for consultation     New cardiomyopathy, LVEF 20% underwent aggressive diuretic therapy, was transferred from [facility] to [facility] for transesophageal echo to rule out severity of mitral valve    History of     Mitral valve prolapse (bileaflet) underwent repair on 4/20/17 (triangular resection of P2 and a 36 mm Anna flexible annuloplasty band), in addition left maze procedure in the left atrial appendage ligation, cardiac cath showed no CAD on 3/2017, stage III COPD on 2 L of oxygen, hyperlipidemia, sleep apnea, no CPAP)    Admitted for   Difficulty breathing mostly with exertion, BNP 2900 on admission, EF 20 % by echo 2/25/19 ( echo 2017, LVEF 65% )       Interim Events    2/28/19 , leukocytosis overnight, febrile overnight, chest x-ray unremarkable, UA showed large leukocyte Estrace with WBC and RBC, received Rocephin, fatigue this morning, new cough, no rhythm disturbances    Review of Systems  Review of Systems   Constitutional: Positive for fatigue.   Respiratory: Positive for cough. Negative for apnea, choking, chest tightness, shortness of breath, wheezing and stridor.    Cardiovascular: Negative for chest pain and leg swelling.   Gastrointestinal: Positive for nausea.       Vital signs in last 24 hours  Temp:  [36.2 °C (97.2 °F)-37.9 °C (100.3 °F)] 37.3 °C (99.1 °F)  Pulse:  [] 106  Resp:  [16-20] 18  BP: ()/(61-68) 94/66  SpO2:  [92 %-97 %] 95 %    Physical Exam  Physical Exam   Constitutional: She is oriented to person, place, and time.   HENT:   Head: Normocephalic.   Eyes: Conjunctivae are normal.   Neck: No JVD present. No thyromegaly present.   Cardiovascular: Normal rate and regular rhythm.    Pulses:       Carotid pulses are 2+ on the right side, and 2+ on the left side.       Radial pulses are 2+ on the right side, and 2+ on the left side.   Pulmonary/Chest:  She has no wheezes.   Abdominal: Soft.   Musculoskeletal: She exhibits no edema.   Neurological: She is alert and oriented to person, place, and time.   Skin: Skin is warm and dry.       Lab Review  Lab Results   Component Value Date/Time    WBC 17.2 (H) 02/28/2019 12:30 AM    RBC 4.33 02/28/2019 12:30 AM    HEMOGLOBIN 13.7 02/28/2019 12:30 AM    HEMATOCRIT 42.9 02/28/2019 12:30 AM    MCV 99.1 (H) 02/28/2019 12:30 AM    MCH 31.6 02/28/2019 12:30 AM    MCHC 31.9 (L) 02/28/2019 12:30 AM    MPV 10.0 02/28/2019 12:30 AM      Lab Results   Component Value Date/Time    SODIUM 136 02/28/2019 08:48 AM    POTASSIUM 4.8 02/28/2019 08:48 AM    CHLORIDE 99 02/28/2019 08:48 AM    CO2 27 02/28/2019 08:48 AM    GLUCOSE 130 (H) 02/28/2019 08:48 AM    BUN 46 (H) 02/28/2019 08:48 AM    CREATININE 1.18 02/28/2019 08:48 AM      Lab Results   Component Value Date/Time    ASTSGOT 27 02/26/2019 05:46 AM    ALTSGPT 19 02/26/2019 05:46 AM     Lab Results   Component Value Date/Time    CHOLSTRLTOT 211 (H) 08/27/2018 08:20 AM    LDL 82 08/27/2018 08:20 AM     08/27/2018 08:20 AM    TRIGLYCERIDE 53 08/27/2018 08:20 AM    TROPONINI 0.04 02/21/2019 08:50 PM       Recent Labs      02/26/19   0546   BNPBTYPENAT  392*         Assessment/Plan      Febrile and leukocytosis overnight, new cough, chest x-ray unremarkable  -On Rocephin for new UTI    New severe cardiomyopathy, LVEF 20%    -Coronary angiography 3/2017 showed no coronary artery disease  -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  -Continue with carvedilol 6.25 BID, losartan 25 mg, spironolactone 25 mg  -Close follow-up with cardiology office as an outpatient        Acute decompensated systolic heart failure  -BNP 2900 and admission, currently 392  --Received IV Lasix  -Currently euvolemic  -Weight this morning 118 pounds (baseline 126 pounds)  -Dyspnea improved        Nonrheumatic mitral regurgitation, repair in 2017 secondary to mitral valve prolapse (bileaflet)  -s/p PAGE 2/26/19   :  known mitral valve repair functioning adequately with moderate   regurgitation in the setting of severe left ventricular systolic   Dysfunction.      Stage III COPD  -On Symbicort & prednisone  -Close outpatient follow-up with pulmonary group & pulmonary rehab      Atrial flutter  -Continue with warfarin and Coumadin  -Currently normal sinus rhythm  -Continue with digoxin and carvedilol      Appointment with  on 3/15/19

## 2019-02-28 NOTE — PROGRESS NOTES
Inpatient Anticoagulation Service Note    Date: 2/28/2019  Reason for Anticoagulation: Atrial Fibrillation   [id number] VASc Score: 4    Hemoglobin Value: 13.7  Hematocrit Value: 42.9  Lab Platelet Value: 213  Target INR: 2.0 to 3.0    INR from last 7 days     Date/Time INR Value    02/26/19 1600 (!)  2.24        Dose from last 7 days     Date/Time Dose (mg)    02/28/19 1400  3    02/27/19 1100  3    02/26/19 1100  3    02/25/19 1500  3        Average Dose (mg): 3 (Confirmed w/ pt: 3.75 mg MWF & 2.5 mg all other; ~3mg day)  Significant Interactions: Corticosteroids, Proton Pump Inhibitor, Statin  Bridge Therapy: No       Comments: No INR since 2/26. INR was stable at that point on warfarin ~3 mg daily. Will continue current warfarin dosing and check another INR tomorrow.      Education Material Provided?:  ((home medication))  Pharmacist suggested discharge dosing: warfarin 3 mg daily with follow up INR 2-3 days after discharge     Steven Sin, DariD

## 2019-02-28 NOTE — PROGRESS NOTES
Pt requiring multiple trips to the bathroom. Now feeling slightly nauseous and does not want to ambulate all the way the to the bathroom.     Patient found to be slightly febrile. MD aware. Sepsis workup initiated. Will monitor.

## 2019-03-01 PROBLEM — E86.1 HYPOTENSION DUE TO HYPOVOLEMIA: Status: ACTIVE | Noted: 2019-03-01

## 2019-03-01 PROBLEM — I95.89 HYPOTENSION DUE TO HYPOVOLEMIA: Status: ACTIVE | Noted: 2019-03-01

## 2019-03-01 LAB
ANION GAP SERPL CALC-SCNC: 6 MMOL/L (ref 0–11.9)
ANION GAP SERPL CALC-SCNC: 7 MMOL/L (ref 0–11.9)
BASOPHILS # BLD AUTO: 0.5 % (ref 0–1.8)
BASOPHILS # BLD: 0.05 K/UL (ref 0–0.12)
BUN SERPL-MCNC: 43 MG/DL (ref 8–22)
BUN SERPL-MCNC: 49 MG/DL (ref 8–22)
CALCIUM SERPL-MCNC: 9.2 MG/DL (ref 8.5–10.5)
CALCIUM SERPL-MCNC: 9.5 MG/DL (ref 8.5–10.5)
CHLORIDE SERPL-SCNC: 104 MMOL/L (ref 96–112)
CHLORIDE SERPL-SCNC: 104 MMOL/L (ref 96–112)
CO2 SERPL-SCNC: 28 MMOL/L (ref 20–33)
CO2 SERPL-SCNC: 30 MMOL/L (ref 20–33)
CREAT SERPL-MCNC: 0.96 MG/DL (ref 0.5–1.4)
CREAT SERPL-MCNC: 1.07 MG/DL (ref 0.5–1.4)
EOSINOPHIL # BLD AUTO: 0.53 K/UL (ref 0–0.51)
EOSINOPHIL NFR BLD: 5.1 % (ref 0–6.9)
ERYTHROCYTE [DISTWIDTH] IN BLOOD BY AUTOMATED COUNT: 49.2 FL (ref 35.9–50)
GLUCOSE SERPL-MCNC: 104 MG/DL (ref 65–99)
GLUCOSE SERPL-MCNC: 122 MG/DL (ref 65–99)
HCT VFR BLD AUTO: 41.4 % (ref 37–47)
HGB BLD-MCNC: 12.8 G/DL (ref 12–16)
IMM GRANULOCYTES # BLD AUTO: 0.05 K/UL (ref 0–0.11)
IMM GRANULOCYTES NFR BLD AUTO: 0.5 % (ref 0–0.9)
INR PPP: 3.06 (ref 0.87–1.13)
LYMPHOCYTES # BLD AUTO: 0.79 K/UL (ref 1–4.8)
LYMPHOCYTES NFR BLD: 7.6 % (ref 22–41)
MCH RBC QN AUTO: 31.6 PG (ref 27–33)
MCHC RBC AUTO-ENTMCNC: 30.9 G/DL (ref 33.6–35)
MCV RBC AUTO: 102.2 FL (ref 81.4–97.8)
MONOCYTES # BLD AUTO: 0.94 K/UL (ref 0–0.85)
MONOCYTES NFR BLD AUTO: 9 % (ref 0–13.4)
NEUTROPHILS # BLD AUTO: 8.07 K/UL (ref 2–7.15)
NEUTROPHILS NFR BLD: 77.3 % (ref 44–72)
NRBC # BLD AUTO: 0 K/UL
NRBC BLD-RTO: 0 /100 WBC
PLATELET # BLD AUTO: 208 K/UL (ref 164–446)
PMV BLD AUTO: 10.5 FL (ref 9–12.9)
POTASSIUM SERPL-SCNC: 4 MMOL/L (ref 3.6–5.5)
POTASSIUM SERPL-SCNC: 4.6 MMOL/L (ref 3.6–5.5)
PROTHROMBIN TIME: 31.7 SEC (ref 12–14.6)
RBC # BLD AUTO: 4.05 M/UL (ref 4.2–5.4)
SODIUM SERPL-SCNC: 139 MMOL/L (ref 135–145)
SODIUM SERPL-SCNC: 140 MMOL/L (ref 135–145)
WBC # BLD AUTO: 10.4 K/UL (ref 4.8–10.8)

## 2019-03-01 PROCEDURE — 700101 HCHG RX REV CODE 250: Performed by: HOSPITALIST

## 2019-03-01 PROCEDURE — 700102 HCHG RX REV CODE 250 W/ 637 OVERRIDE(OP): Performed by: INTERNAL MEDICINE

## 2019-03-01 PROCEDURE — 770020 HCHG ROOM/CARE - TELE (206)

## 2019-03-01 PROCEDURE — A9270 NON-COVERED ITEM OR SERVICE: HCPCS | Performed by: HOSPITALIST

## 2019-03-01 PROCEDURE — 700102 HCHG RX REV CODE 250 W/ 637 OVERRIDE(OP): Performed by: HOSPITALIST

## 2019-03-01 PROCEDURE — 85610 PROTHROMBIN TIME: CPT

## 2019-03-01 PROCEDURE — 700111 HCHG RX REV CODE 636 W/ 250 OVERRIDE (IP): Performed by: INTERNAL MEDICINE

## 2019-03-01 PROCEDURE — 36415 COLL VENOUS BLD VENIPUNCTURE: CPT

## 2019-03-01 PROCEDURE — A9270 NON-COVERED ITEM OR SERVICE: HCPCS | Performed by: INTERNAL MEDICINE

## 2019-03-01 PROCEDURE — 85025 COMPLETE CBC W/AUTO DIFF WBC: CPT

## 2019-03-01 PROCEDURE — 99232 SBSQ HOSP IP/OBS MODERATE 35: CPT | Performed by: INTERNAL MEDICINE

## 2019-03-01 PROCEDURE — 99232 SBSQ HOSP IP/OBS MODERATE 35: CPT | Performed by: HOSPITALIST

## 2019-03-01 PROCEDURE — 80048 BASIC METABOLIC PNL TOTAL CA: CPT

## 2019-03-01 PROCEDURE — 94640 AIRWAY INHALATION TREATMENT: CPT

## 2019-03-01 PROCEDURE — 700105 HCHG RX REV CODE 258: Performed by: INTERNAL MEDICINE

## 2019-03-01 PROCEDURE — 94760 N-INVAS EAR/PLS OXIMETRY 1: CPT

## 2019-03-01 RX ORDER — WARFARIN SODIUM 3 MG/1
1.5 TABLET ORAL
Status: COMPLETED | OUTPATIENT
Start: 2019-03-01 | End: 2019-03-01

## 2019-03-01 RX ORDER — SODIUM CHLORIDE 9 MG/ML
500 INJECTION, SOLUTION INTRAVENOUS ONCE
Status: COMPLETED | OUTPATIENT
Start: 2019-03-01 | End: 2019-03-01

## 2019-03-01 RX ORDER — WARFARIN SODIUM 3 MG/1
3 TABLET ORAL
Status: DISCONTINUED | OUTPATIENT
Start: 2019-03-02 | End: 2019-03-04

## 2019-03-01 RX ADMIN — ACETAMINOPHEN 650 MG: 325 TABLET, FILM COATED ORAL at 20:54

## 2019-03-01 RX ADMIN — WARFARIN SODIUM 1.5 MG: 3 TABLET ORAL at 17:22

## 2019-03-01 RX ADMIN — Medication 400 MG: at 04:42

## 2019-03-01 RX ADMIN — SPIRONOLACTONE 25 MG: 25 TABLET, FILM COATED ORAL at 04:42

## 2019-03-01 RX ADMIN — BUDESONIDE AND FORMOTEROL FUMARATE DIHYDRATE 2 PUFF: 160; 4.5 AEROSOL RESPIRATORY (INHALATION) at 19:29

## 2019-03-01 RX ADMIN — TIOTROPIUM BROMIDE 1 CAPSULE: 18 CAPSULE ORAL; RESPIRATORY (INHALATION) at 04:40

## 2019-03-01 RX ADMIN — LEVALBUTEROL 1.25 MG: 1.25 SOLUTION RESPIRATORY (INHALATION) at 15:23

## 2019-03-01 RX ADMIN — SERTRALINE HYDROCHLORIDE 100 MG: 100 TABLET ORAL at 04:41

## 2019-03-01 RX ADMIN — DIGOXIN 125 MCG: 125 TABLET ORAL at 17:23

## 2019-03-01 RX ADMIN — BUDESONIDE AND FORMOTEROL FUMARATE DIHYDRATE 2 PUFF: 160; 4.5 AEROSOL RESPIRATORY (INHALATION) at 04:40

## 2019-03-01 RX ADMIN — Medication 400 MG: at 17:23

## 2019-03-01 RX ADMIN — METOPROLOL SUCCINATE 25 MG: 25 TABLET, EXTENDED RELEASE ORAL at 04:40

## 2019-03-01 RX ADMIN — SODIUM CHLORIDE 500 ML: 9 INJECTION, SOLUTION INTRAVENOUS at 08:00

## 2019-03-01 RX ADMIN — LOSARTAN POTASSIUM 25 MG: 25 TABLET ORAL at 04:42

## 2019-03-01 RX ADMIN — POTASSIUM CHLORIDE 40 MEQ: 1500 TABLET, EXTENDED RELEASE ORAL at 17:23

## 2019-03-01 RX ADMIN — PREDNISONE 20 MG: 20 TABLET ORAL at 04:42

## 2019-03-01 RX ADMIN — ATORVASTATIN CALCIUM 40 MG: 40 TABLET, FILM COATED ORAL at 17:22

## 2019-03-01 RX ADMIN — LORAZEPAM 0.5 MG: 1 TABLET ORAL at 22:02

## 2019-03-01 RX ADMIN — POTASSIUM CHLORIDE 40 MEQ: 1500 TABLET, EXTENDED RELEASE ORAL at 04:42

## 2019-03-01 RX ADMIN — CEFDINIR 300 MG: 300 CAPSULE ORAL at 17:22

## 2019-03-01 RX ADMIN — CEFDINIR 300 MG: 300 CAPSULE ORAL at 04:41

## 2019-03-01 ASSESSMENT — ENCOUNTER SYMPTOMS
CHILLS: 0
SPEECH CHANGE: 0
CHEST TIGHTNESS: 0
HEADACHES: 0
COUGH: 1
ABDOMINAL PAIN: 0
SHORTNESS OF BREATH: 0
FOCAL WEAKNESS: 0
SENSORY CHANGE: 0
WHEEZING: 0
CHOKING: 0
BACK PAIN: 0
EYE DISCHARGE: 0
BRUISES/BLEEDS EASILY: 0
HALLUCINATIONS: 0
NAUSEA: 0
NECK PAIN: 0
EYE PAIN: 0
FATIGUE: 1
BLOOD IN STOOL: 0
SHORTNESS OF BREATH: 1
FEVER: 0
PALPITATIONS: 0
STRIDOR: 0
MYALGIAS: 0
DIARRHEA: 0
COUGH: 0
WEAKNESS: 1
NAUSEA: 1
VOMITING: 0
APNEA: 0
DIZZINESS: 0

## 2019-03-01 NOTE — CARE PLAN
Problem: Infection  Goal: Will remain free from infection  Outcome: PROGRESSING AS EXPECTED  Hand hygiene performed before and after contact with patient.       Problem: Venous Thromboembolism (VTW)/Deep Vein Thrombosis (DVT) Prevention:  Goal: Patient will participate in Venous Thrombosis (VTE)/Deep Vein Thrombosis (DVT)Prevention Measures  Outcome: PROGRESSING AS EXPECTED  Pt is on coumadin

## 2019-03-01 NOTE — PROGRESS NOTES
Bedside report received, Pt care assumed. Tele box on. VSS. Pt assessment complete. Pt AOX4, no signs of distress noted at this time.  Pt c/o of 0/10 pain. Pt denies any additional needs at this time. Bed in lowest position, bed alarm is on, ambulates with one assistance. POC discussed with Pt/family, verbalizes understanding, no questions at this time. Pt educated on fall risk and verbalizes understanding, call light within reach, will continue to monitor.

## 2019-03-01 NOTE — PROGRESS NOTES
Cardiology Follow Up Progress Note    Date of Service  3/1/2019    Attending Physician  Fernandez Preston M.D.    Chief Complaint   Dyspnea    HPI  Kelly Lovett is a 73 y.o. female admitted 2/25/2019 with prior history of mitral valve repair, MAZE, now with new reduction in LVEF at 20%.    Interim Events  No telemetry events.  No acute events overnight.    SBP still in 90s.    Review of Systems  Review of Systems   Constitutional: Negative for chills and fever.   HENT: Negative for ear discharge, ear pain, hearing loss and nosebleeds.    Eyes: Negative for pain and discharge.   Respiratory: Positive for shortness of breath. Negative for cough.    Cardiovascular: Negative for chest pain, palpitations and leg swelling.   Gastrointestinal: Negative for abdominal pain, blood in stool, nausea and vomiting.   Genitourinary: Negative for dysuria and hematuria.   Musculoskeletal: Negative for myalgias.   Skin: Negative for rash.   Allergic/Immunologic: Negative for environmental allergies.   Neurological: Negative for dizziness and headaches.   Hematological: Does not bruise/bleed easily.   Psychiatric/Behavioral: Negative for hallucinations and suicidal ideas.       Vital signs in last 24 hours  Temp:  [36.1 °C (96.9 °F)-36.9 °C (98.4 °F)] 36.4 °C (97.6 °F)  Pulse:  [61-96] 61  Resp:  [16-18] 16  BP: (72-98)/(48-63) 90/48  SpO2:  [94 %-100 %] 97 %    Physical Exam  Physical Exam   Constitutional: She is oriented to person, place, and time. She appears well-developed and well-nourished.   HENT:   Head: Normocephalic and atraumatic.   Eyes: EOM are normal.   Neck: No JVD present.   Cardiovascular: Normal rate, regular rhythm and intact distal pulses.  Exam reveals no gallop and no friction rub.    Murmur heard.  3/6 systolic mumur heard best at lateral side area (MV).     Pulmonary/Chest: No respiratory distress. She has no wheezes. She has no rales. She exhibits no tenderness.   Abdominal: She exhibits no  distension. There is no tenderness. There is no rebound and no guarding.   Musculoskeletal: She exhibits no edema or tenderness.   Lymphadenopathy:     She has no cervical adenopathy.   Neurological: She is alert and oriented to person, place, and time.   Skin: Skin is dry.   Psychiatric: She has a normal mood and affect.   Nursing note and vitals reviewed.      Lab Review  Lab Results   Component Value Date/Time    WBC 10.4 03/01/2019 02:36 AM    RBC 4.05 (L) 03/01/2019 02:36 AM    HEMOGLOBIN 12.8 03/01/2019 02:36 AM    HEMATOCRIT 41.4 03/01/2019 02:36 AM    .2 (H) 03/01/2019 02:36 AM    MCH 31.6 03/01/2019 02:36 AM    MCHC 30.9 (L) 03/01/2019 02:36 AM    MPV 10.5 03/01/2019 02:36 AM      Lab Results   Component Value Date/Time    SODIUM 140 03/01/2019 09:14 AM    POTASSIUM 4.6 03/01/2019 09:14 AM    CHLORIDE 104 03/01/2019 09:14 AM    CO2 30 03/01/2019 09:14 AM    GLUCOSE 122 (H) 03/01/2019 09:14 AM    BUN 43 (H) 03/01/2019 09:14 AM    CREATININE 0.96 03/01/2019 09:14 AM      Lab Results   Component Value Date/Time    ASTSGOT 27 02/26/2019 05:46 AM    ALTSGPT 19 02/26/2019 05:46 AM     Lab Results   Component Value Date/Time    CHOLSTRLTOT 211 (H) 08/27/2018 08:20 AM    LDL 82 08/27/2018 08:20 AM     08/27/2018 08:20 AM    TRIGLYCERIDE 53 08/27/2018 08:20 AM    TROPONINI 0.04 02/21/2019 08:50 PM             Cardiac Imaging and Procedures Review  EKG:  My personal interpretation of the EKG dated 02/22/2019 is sinus tachycardia.    Echocardiogram:  LVEF of 20% with moderate MR.    Cardiac Catheterization:  No obstructive CAD on 03/17/2017.    Imaging  Chest X-Ray:  No evidence of edema.     Stress Test:  none    Assessment/Plan  Principal Problem:    Acute systolic congestive heart failure (HCC) POA: Yes  Active Problems:    Non-rheumatic mitral regurgitation POA: Yes    COPD (chronic obstructive pulmonary disease) (HCC) POA: Yes    Obstructive sleep apnea syndrome POA: Yes    Atrial flutter (HCC) POA:  Yes    Chronic respiratory failure with hypoxia (HCC) POA: Yes    Hyperlipidemia POA: Yes    Anxiety POA: Yes    Long term (current) use of anticoagulants [Z79.01] POA: Yes    DNR (do not resuscitate) POA: Yes    Leukocytosis POA: Unknown    Hypotension due to hypovolemia POA: Unknown  Resolved Problems:    * No resolved hospital problems. *    Based on the overall clinical history and profile, patient is a good candidate for Entresto therapy (with superiority over ACE-I therapy in terms of survival benefits and prevention of future hospitalization).  Due to hospital policy, Entresto cannot be initiated. We will use Losartan 25 mg po daily for now.  Continue Coreg to 6.25 mg po bid.  Appears to be dry, will give back 500 cc of NS.  Will stop Spironolactone for now. Will restart in future in the outpatient setting.    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      Thank you for allowing me to participate in the care of this patient.  We will sign off at this time.  Outpatient meds titration.    Please contact me with any questions.    Dalia Li M.D.   Cardiologist, [facility]  [phone]

## 2019-03-01 NOTE — PROGRESS NOTES
0720: Report received from Mickie OLIVIER. Patient resting in bed, alert and oriented at this time. Fall precautions in place, safety maintained, bed alarm on, and call light within reach. Vitals wnl, will continue to monitor.

## 2019-03-01 NOTE — CARE PLAN
Problem: Communication  Goal: The ability to communicate needs accurately and effectively will improve  Outcome: PROGRESSING AS EXPECTED  Patient able to make needs known, will continue to encourage to make needs known.     Problem: Safety  Goal: Will remain free from injury  Outcome: PROGRESSING AS EXPECTED  Fall precaution education given, will continue to monitor and assist as needed.

## 2019-03-01 NOTE — PROGRESS NOTES
12 hr cc   The resident's documentation has been prepared under my direction and personally reviewed by me in its entirety. I confirm that the note above accurately reflects all work, treatment, procedures, and medical decision making performed by me.  Solomon Sales MD

## 2019-03-01 NOTE — PROGRESS NOTES
Inpatient Anticoagulation Service Note    Date: 3/1/2019  Reason for Anticoagulation: Atrial Fibrillation   [id number] VASc Score: 4    Hemoglobin Value: 12.8  Hematocrit Value: 41.4  Lab Platelet Value: 208  Target INR: 2.0 to 3.0    INR from last 7 days     Date/Time INR Value    03/01/19 0236 (!)  3.06    02/26/19 1600 (!)  2.24        Dose from last 7 days     Date/Time Dose (mg)    03/01/19 1100  1.5    02/28/19 1400  3    02/27/19 1100  3    02/26/19 1100  3    02/25/19 1500  3        Average Dose (mg): 3 (Confirmed w/ pt: 3.75 mg MWF & 2.5 mg all other; ~3mg day)  Significant Interactions: Corticosteroids, Proton Pump Inhibitor, Statin  Bridge Therapy: No    Comments: INR up from 2.24 to 3.06. Pt on warfarin 3 mg daily. Pt on steroids and abx which may be interacting with warfarin and causing increased INR. Abx to stop tomorrow. Will decrease warfarin dose today to 1.5 mg and then resume warfarin 3 mg daily tomorrow. Will check INR tomorrow.      Education Material Provided?:  ((home medication))  Pharmacist suggested discharge dosing: warfarin 3 mg daily with follow up INR 2-3 days after discharge     Steven Sin, DariD

## 2019-03-01 NOTE — PROGRESS NOTES
[facility] Medicine Daily Progress Note    Date of Service  3/1/2019    Chief Complaint  73 y.o. female admitted 2/25/2019 with shortness of breath.    Hospital Course    73-year-old female history of mitral valve repair, COPD, hypertension initially transferred from [facility] with shortness of breath.  Patient initially was hypotensive requiring IV fluid bolus.  Following admission to [facility] she was given diuresis for CHF and transferred to [facility] for PAGE.  Patient downgraded to telemetry floor.    Interval Problem Update    Hypotensive this morning systolic blood pressure 80s.  No dizziness or lightheadedness.  Discussed with  cardiology to plan IV fluid bolus.  Hold metoprolol as needed.     Consultants/Specialty    Dr. Li, Cardiology     Code Status  DNR      Disposition    Anticipate DC home in stable.    Review of Systems  Review of Systems   Constitutional: Negative for chills and fever.   HENT: Negative for congestion.    Respiratory: Negative for cough, shortness of breath and stridor.    Cardiovascular: Negative for chest pain and leg swelling.   Gastrointestinal: Negative for abdominal pain, diarrhea and vomiting.   Musculoskeletal: Negative for back pain, joint pain and neck pain.   Neurological: Positive for weakness (Generalized, improved). Negative for dizziness, sensory change, speech change, focal weakness and headaches.        Physical Exam  Temp:  [36.1 °C (96.9 °F)-36.9 °C (98.4 °F)] 36.4 °C (97.6 °F)  Pulse:  [61-96] 61  Resp:  [16-18] 16  BP: (72-98)/(48-63) 90/48  SpO2:  [94 %-100 %] 97 %    Physical Exam   Constitutional: She is oriented to person, place, and time. No distress.   Alert, appropriate oriented, no apparent distress   HENT:   Head: Normocephalic and atraumatic.   Eyes: EOM are normal. Right eye exhibits no discharge. Left eye exhibits no discharge.   Neck: Neck supple.   Cardiovascular: Normal rate and regular rhythm.    Murmur (2/6)  heard.  Pulmonary/Chest: Effort normal. No stridor. She has no wheezes. She has no rales.   Abdominal: Soft. Bowel sounds are normal. She exhibits no distension. There is no tenderness.   Musculoskeletal: She exhibits no edema or tenderness.   Neurological: She is alert and oriented to person, place, and time. No cranial nerve deficit.   No gross focal weakness   Skin: Skin is warm and dry. She is not diaphoretic. No pallor.   Vitals reviewed.      Fluids    Intake/Output Summary (Last 24 hours) at 03/01/19 1128  Last data filed at 02/28/19 1950   Gross per 24 hour   Intake              360 ml   Output              100 ml   Net              260 ml       Laboratory  Recent Labs      02/27/19   2045  02/28/19   0030  03/01/19   0236   WBC  17.3*  17.2*  10.4   RBC  4.36  4.33  4.05*   HEMOGLOBIN  13.8  13.7  12.8   HEMATOCRIT  43.2  42.9  41.4   MCV  99.1*  99.1*  102.2*   MCH  31.7  31.6  31.6   MCHC  31.9*  31.9*  30.9*   RDW  47.0  47.4  49.2   PLATELETCT  264  213  208   MPV  10.1  10.0  10.5     Recent Labs      02/28/19   0848  03/01/19   0236  03/01/19   0914   SODIUM  136  139  140   POTASSIUM  4.8  4.0  4.6   CHLORIDE  99  104  104   CO2  27  28  30   GLUCOSE  130*  104*  122*   BUN  46*  49*  43*   CREATININE  1.18  1.07  0.96   CALCIUM  9.9  9.5  9.2     Recent Labs      02/26/19   1600  03/01/19   0236   INR  2.24*  3.06*               Imaging  DX-CHEST-PORTABLE (1 VIEW)   Final Result      1.  Hypoinflation without evidence for pneumonia or pulmonary edema.   2.  Stable multichamber cardiac enlargement.      EC-PAGE W/O CONT   Final Result      DX-CHEST-LIMITED (1 VIEW)   Final Result      1.  No acute cardiopulmonary disease.   2.  Stable cardiomegaly.           Assessment/Plan  * Acute systolic congestive heart failure (HCC)- (present on admission)   Assessment & Plan    Cardiogram reveals an ejection fraction 20% with severe mitral valve regurg  s/p IV Lasix .  Holding additional diuretics due to low  blood pressure and improve dyspnea.   Continue Cozaar with holding parameters  Plan IV fluid bolus.  Coreg changed to Toprol-XL with holding parameters-monitor blood pressure response  Stop Aldactone if persistently low blood pressure  Discussed with  cardiology   Continue digoxin.  Continuous telemetry monitoring  Dr. Li will evaluate for Entresto outpatient.  Patient was counseled regarding cessation of heavy alcohol use.        Non-rheumatic mitral regurgitation- (present on admission)   Assessment & Plan    Noted on transesophageal echocardiogram     Chronic respiratory failure with hypoxia (HCC)- (present on admission)   Assessment & Plan    Likely multifactorial COPD, CHF, clinically better  O2 requirements at baseline       Atrial flutter (HCC)- (present on admission)   Assessment & Plan    INR slightly supratherapeutic  Adjust warfarin-follow-up INR     COPD (chronic obstructive pulmonary disease) (HCC)- (present on admission)   Assessment & Plan    History of.  Clinically better-  no significant wheezes  on exam  Continue Symbicort, Xopenex, Spiriva   Complete prednisone 5-day course     Leukocytosis   Assessment & Plan    Suspect steroid effect, possible UTI-WBC normalized  UA may suggest UTI.  Patient without urinary symptoms although may be unreliable elderly.  Complete antibiotics  5-day course, continue with Omnicef     DNR (do not resuscitate)- (present on admission)   Assessment & Plan    Per patient's wishes, DNR status has been established     Anxiety- (present on admission)   Assessment & Plan    Continue PRN Ativan  Schedule Zoloft          VTE prophylaxis: Warfarin    Discussed with case management, children and  plan of care at bedside

## 2019-03-01 NOTE — PROGRESS NOTES
Cardiology Follow Up Progress Note    Date of Service  3/1/2019    Attending Physician  Bo Ott M.D.    Reason for consultation     New cardiomyopathy, LVEF 20% underwent aggressive diuretic therapy, was transferred from [facility] to [facility] for transesophageal echo to rule out severity of mitral valve    History of     Mitral valve prolapse (bileaflet) underwent repair on 4/20/17 (triangular resection of P2 and a 36 mm Anna flexible annuloplasty band), in addition left maze procedure in the left atrial appendage ligation, cardiac cath showed no CAD on 3/2017, stage III COPD on 2 L of oxygen, hyperlipidemia, sleep apnea, no CPAP)    Admitted for   Difficulty breathing mostly with exertion, BNP 2900 on admission, EF 20 % by echo 2/25/19 ( echo 2017, LVEF 65% )       Interim Events    3/1/19, no overnight issues, no fever, leukocytosis resolved, hypotension improved with a bolus of saline, encouraged ambulation, sinus rhythm    2/28/19 , leukocytosis overnight, febrile overnight, chest x-ray unremarkable, UA showed large leukocyte Estrace with WBC and RBC, received Rocephin, fatigue this morning, new cough, no rhythm disturbances    Review of Systems  Review of Systems   Constitutional: Positive for fatigue.   Respiratory: Positive for cough. Negative for apnea, choking, chest tightness, shortness of breath, wheezing and stridor.    Cardiovascular: Negative for chest pain and leg swelling.   Gastrointestinal: Positive for nausea.       Vital signs in last 24 hours  Temp:  [36.1 °C (96.9 °F)-36.9 °C (98.4 °F)] 36.4 °C (97.6 °F)  Pulse:  [61-96] 61  Resp:  [16-18] 16  BP: (72-98)/(48-63) 90/48  SpO2:  [94 %-100 %] 97 %    Physical Exam  Physical Exam   Constitutional: She is oriented to person, place, and time.   HENT:   Head: Normocephalic.   Eyes: Conjunctivae are normal.   Neck: No JVD present. No thyromegaly present.   Cardiovascular: Normal rate and regular rhythm.    Pulses:       Carotid pulses  are 2+ on the right side, and 2+ on the left side.       Radial pulses are 2+ on the right side, and 2+ on the left side.   Pulmonary/Chest: She has no wheezes.   Abdominal: Soft.   Musculoskeletal: She exhibits no edema.   Neurological: She is alert and oriented to person, place, and time.   Skin: Skin is warm and dry.       Lab Review  Lab Results   Component Value Date/Time    WBC 10.4 03/01/2019 02:36 AM    RBC 4.05 (L) 03/01/2019 02:36 AM    HEMOGLOBIN 12.8 03/01/2019 02:36 AM    HEMATOCRIT 41.4 03/01/2019 02:36 AM    .2 (H) 03/01/2019 02:36 AM    MCH 31.6 03/01/2019 02:36 AM    MCHC 30.9 (L) 03/01/2019 02:36 AM    MPV 10.5 03/01/2019 02:36 AM      Lab Results   Component Value Date/Time    SODIUM 139 03/01/2019 02:36 AM    POTASSIUM 4.0 03/01/2019 02:36 AM    CHLORIDE 104 03/01/2019 02:36 AM    CO2 28 03/01/2019 02:36 AM    GLUCOSE 104 (H) 03/01/2019 02:36 AM    BUN 49 (H) 03/01/2019 02:36 AM    CREATININE 1.07 03/01/2019 02:36 AM      Lab Results   Component Value Date/Time    ASTSGOT 27 02/26/2019 05:46 AM    ALTSGPT 19 02/26/2019 05:46 AM     Lab Results   Component Value Date/Time    CHOLSTRLTOT 211 (H) 08/27/2018 08:20 AM    LDL 82 08/27/2018 08:20 AM     08/27/2018 08:20 AM    TRIGLYCERIDE 53 08/27/2018 08:20 AM    TROPONINI 0.04 02/21/2019 08:50 PM               Assessment/Plan      Febrile and leukocytosis 2/27/19, new cough, chest x-ray unremarkable  -Leukocytosis resolved  - no fever, overall feeling better today, less fatigue, no nausea, will increase activity today  -Hypotension improved with bolus of NS  -On Omnicef for new UTI    New severe cardiomyopathy, LVEF 20%    -Coronary angiography 3/2017 showed no coronary artery disease  -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  -Continue with Toprol XL 25 , losartan 25 mg, spironolactone 25 mg  -Close follow-up with cardiology office as an outpatient        Acute decompensated systolic heart failure  -BNP 2900 and admission, currently  392  --Received IV Lasix  -Currently euvolemic  -Weight this morning 118 pounds (baseline 126 pounds)  -Dyspnea improved        Nonrheumatic mitral regurgitation, repair in 2017 secondary to mitral valve prolapse (bileaflet)  -s/p PAGE 2/26/19  :  known mitral valve repair functioning adequately with moderate   regurgitation in the setting of severe left ventricular systolic   Dysfunction.      Stage III COPD  -On Symbicort & prednisone  -Close outpatient follow-up with pulmonary group & pulmonary rehab      Atrial flutter  -Continue with warfarin and Coumadin  -Currently normal sinus rhythm  -Continue with digoxin & Toprol XL 25      Appointment with  on 3/15/19  Cardiology will sign off

## 2019-03-02 ENCOUNTER — APPOINTMENT (OUTPATIENT)
Dept: RADIOLOGY | Facility: MEDICAL CENTER | Age: 74
DRG: 292 | End: 2019-03-02
Attending: INTERNAL MEDICINE
Payer: MEDICARE

## 2019-03-02 LAB
ANION GAP SERPL CALC-SCNC: 7 MMOL/L (ref 0–11.9)
BUN SERPL-MCNC: 34 MG/DL (ref 8–22)
CALCIUM SERPL-MCNC: 9.9 MG/DL (ref 8.5–10.5)
CHLORIDE SERPL-SCNC: 104 MMOL/L (ref 96–112)
CO2 SERPL-SCNC: 30 MMOL/L (ref 20–33)
CREAT SERPL-MCNC: 1.03 MG/DL (ref 0.5–1.4)
GLUCOSE SERPL-MCNC: 100 MG/DL (ref 65–99)
INR PPP: 2.68 (ref 0.87–1.13)
POTASSIUM SERPL-SCNC: 4.1 MMOL/L (ref 3.6–5.5)
PROTHROMBIN TIME: 28.6 SEC (ref 12–14.6)
SODIUM SERPL-SCNC: 141 MMOL/L (ref 135–145)

## 2019-03-02 PROCEDURE — 94760 N-INVAS EAR/PLS OXIMETRY 1: CPT

## 2019-03-02 PROCEDURE — A9270 NON-COVERED ITEM OR SERVICE: HCPCS | Performed by: HOSPITALIST

## 2019-03-02 PROCEDURE — 71045 X-RAY EXAM CHEST 1 VIEW: CPT

## 2019-03-02 PROCEDURE — 99233 SBSQ HOSP IP/OBS HIGH 50: CPT | Performed by: HOSPITALIST

## 2019-03-02 PROCEDURE — 85610 PROTHROMBIN TIME: CPT

## 2019-03-02 PROCEDURE — A9270 NON-COVERED ITEM OR SERVICE: HCPCS | Performed by: INTERNAL MEDICINE

## 2019-03-02 PROCEDURE — 770020 HCHG ROOM/CARE - TELE (206)

## 2019-03-02 PROCEDURE — 700101 HCHG RX REV CODE 250: Performed by: NURSE PRACTITIONER

## 2019-03-02 PROCEDURE — 700102 HCHG RX REV CODE 250 W/ 637 OVERRIDE(OP): Performed by: HOSPITALIST

## 2019-03-02 PROCEDURE — 700102 HCHG RX REV CODE 250 W/ 637 OVERRIDE(OP): Performed by: INTERNAL MEDICINE

## 2019-03-02 PROCEDURE — 700101 HCHG RX REV CODE 250: Performed by: HOSPITALIST

## 2019-03-02 PROCEDURE — 99233 SBSQ HOSP IP/OBS HIGH 50: CPT | Performed by: INTERNAL MEDICINE

## 2019-03-02 PROCEDURE — 36415 COLL VENOUS BLD VENIPUNCTURE: CPT

## 2019-03-02 PROCEDURE — 94640 AIRWAY INHALATION TREATMENT: CPT

## 2019-03-02 PROCEDURE — 80048 BASIC METABOLIC PNL TOTAL CA: CPT

## 2019-03-02 RX ORDER — LOSARTAN POTASSIUM 25 MG/1
12.5 TABLET ORAL
Status: DISCONTINUED | OUTPATIENT
Start: 2019-03-03 | End: 2019-03-02

## 2019-03-02 RX ORDER — LEVALBUTEROL INHALATION SOLUTION 1.25 MG/3ML
1.25 SOLUTION RESPIRATORY (INHALATION)
Status: DISCONTINUED | OUTPATIENT
Start: 2019-03-02 | End: 2019-03-03

## 2019-03-02 RX ORDER — METOPROLOL SUCCINATE 25 MG/1
25 TABLET, EXTENDED RELEASE ORAL EVERY EVENING
Status: DISCONTINUED | OUTPATIENT
Start: 2019-03-03 | End: 2019-03-08 | Stop reason: HOSPADM

## 2019-03-02 RX ORDER — SODIUM CHLORIDE 9 MG/ML
500 INJECTION, SOLUTION INTRAVENOUS
Status: DISCONTINUED | OUTPATIENT
Start: 2019-03-02 | End: 2019-03-08 | Stop reason: HOSPADM

## 2019-03-02 RX ADMIN — WARFARIN SODIUM 3 MG: 3 TABLET ORAL at 17:57

## 2019-03-02 RX ADMIN — Medication 400 MG: at 05:28

## 2019-03-02 RX ADMIN — LORAZEPAM 0.5 MG: 1 TABLET ORAL at 22:32

## 2019-03-02 RX ADMIN — SERTRALINE HYDROCHLORIDE 100 MG: 100 TABLET ORAL at 05:28

## 2019-03-02 RX ADMIN — POTASSIUM CHLORIDE 40 MEQ: 1500 TABLET, EXTENDED RELEASE ORAL at 05:28

## 2019-03-02 RX ADMIN — Medication 400 MG: at 17:56

## 2019-03-02 RX ADMIN — LEVALBUTEROL 1.25 MG: 1.25 SOLUTION RESPIRATORY (INHALATION) at 15:07

## 2019-03-02 RX ADMIN — ACETAMINOPHEN 650 MG: 325 TABLET, FILM COATED ORAL at 17:56

## 2019-03-02 RX ADMIN — BUDESONIDE AND FORMOTEROL FUMARATE DIHYDRATE 2 PUFF: 160; 4.5 AEROSOL RESPIRATORY (INHALATION) at 19:16

## 2019-03-02 RX ADMIN — CEFDINIR 300 MG: 300 CAPSULE ORAL at 05:29

## 2019-03-02 RX ADMIN — TIOTROPIUM BROMIDE 1 CAPSULE: 18 CAPSULE ORAL; RESPIRATORY (INHALATION) at 05:39

## 2019-03-02 RX ADMIN — LOSARTAN POTASSIUM 25 MG: 25 TABLET ORAL at 05:29

## 2019-03-02 RX ADMIN — METOPROLOL SUCCINATE 25 MG: 25 TABLET, EXTENDED RELEASE ORAL at 05:29

## 2019-03-02 RX ADMIN — DIGOXIN 125 MCG: 125 TABLET ORAL at 17:56

## 2019-03-02 RX ADMIN — LEVALBUTEROL 1.25 MG: 1.25 SOLUTION RESPIRATORY (INHALATION) at 22:55

## 2019-03-02 RX ADMIN — LEVALBUTEROL 1.25 MG: 1.25 SOLUTION RESPIRATORY (INHALATION) at 19:16

## 2019-03-02 RX ADMIN — CEFDINIR 300 MG: 300 CAPSULE ORAL at 17:56

## 2019-03-02 RX ADMIN — ATORVASTATIN CALCIUM 40 MG: 40 TABLET, FILM COATED ORAL at 17:56

## 2019-03-02 RX ADMIN — BUDESONIDE AND FORMOTEROL FUMARATE DIHYDRATE 2 PUFF: 160; 4.5 AEROSOL RESPIRATORY (INHALATION) at 05:28

## 2019-03-02 ASSESSMENT — ENCOUNTER SYMPTOMS
NAUSEA: 1
COUGH: 0
DIZZINESS: 0
NECK PAIN: 0
BACK PAIN: 0
FEVER: 0
SPEECH CHANGE: 0
CHOKING: 0
CHILLS: 0
SHORTNESS OF BREATH: 0
HEADACHES: 0
FOCAL WEAKNESS: 0
SENSORY CHANGE: 0
APNEA: 0
WEAKNESS: 1
COUGH: 1
FATIGUE: 1
WHEEZING: 0
DIARRHEA: 0
VOMITING: 0
STRIDOR: 0
CHEST TIGHTNESS: 0
ABDOMINAL PAIN: 0

## 2019-03-02 ASSESSMENT — PATIENT HEALTH QUESTIONNAIRE - PHQ9
2. FEELING DOWN, DEPRESSED, IRRITABLE, OR HOPELESS: NOT AT ALL
SUM OF ALL RESPONSES TO PHQ9 QUESTIONS 1 AND 2: 0
1. LITTLE INTEREST OR PLEASURE IN DOING THINGS: NOT AT ALL

## 2019-03-02 NOTE — PROGRESS NOTES
Inpatient Anticoagulation Service Note    Date: 3/2/2019  Reason for Anticoagulation: Atrial Fibrillation   [id number] VASc Score: 4    Hemoglobin Value: 12.8  Hematocrit Value: 41.4  Lab Platelet Value: 208  Target INR: 2.0 to 3.0    INR from last 7 days     Date/Time INR Value    03/02/19 0249 (!)  2.68    03/01/19 0236 (!)  3.06    02/26/19 1600 (!)  2.24        Dose from last 7 days     Date/Time Dose (mg)    03/02/19 0800  3    03/01/19 1100  1.5    02/28/19 1400  3    02/27/19 1100  3    02/26/19 1100  3    02/25/19 1500  3        Average Dose (mg): 3 (Confirmed w/ pt: 3.75 mg MWF & 2.5 mg all other; ~3mg day)  Significant Interactions: Antibiotics, Statin  Bridge Therapy: No    Reversal Agent Administered: Not Applicable  Comments: INR dropped from 3.06 to 2.68 today. Patient off of steroids but continues on cefdinir for UTI treatment. Will dose at 3 mg today due to discontinuation of steroid. H/H otherwise WNL and stable, as is plt. Vitals stable w/o s/sx of active bleed.    Plan:  3 mg warfarin daily  Education Material Provided?:  ((home medication))  Pharmacist suggested discharge dosing: 3mg daily with follow up within 48-72 hr of discharge     Cinthia Alatorre, PharmD

## 2019-03-02 NOTE — PROGRESS NOTES
0711: Report received from Mickie OLIVIER. Patient is alert and oriented resting in bed. Fall precautions in place, safety maintained, bed alarm on, and call light within reach. Vitals wnl, will continue to monitor.    0712: MD Ott notified of 5 beat run of v-tach, Patient asymptomatic, will continue to monitor.      0900: Patient has BP of 77/57 MD Ott at bedside, pt asymptomatic, will recheck in 15mins.     0915: BP 93/53, MD Ott updated will continue to monitor.

## 2019-03-02 NOTE — PROGRESS NOTES
Pt observed, no change from original assessment, vss, resting comfortably, no other signs or symptoms of distress, fall precautions in place, call light within reach, all questions answered, will continue to monitor.

## 2019-03-02 NOTE — PROGRESS NOTES
Cardiology Follow Up Progress Note    Date of Service  3/2/2019    Attending Physician  Bo Ott M.D.    Reason for consultation     New cardiomyopathy by echo 2/22/19, LVEF 20% , underwent aggressive diuretic therapy, was transferred from [facility] for transesophageal echo to rule out severity of mitral valve    History of     Mitral valve prolapse (bileaflet) underwent repair on 4/20/17 (triangular resection of P2 and a 36 mm Anna flexible annuloplasty band), in addition left maze procedure in the left atrial appendage ligation, cardiac cath showed no CAD on 3/2017, stage III COPD on 2 L of oxygen, hyperlipidemia, sleep apnea, no CPAP)    Admitted for   Difficulty breathing mostly with exertion, BNP 2900 on admission, EF 20 % by echo 2/25/19 ( echo 2017, LVEF 65% )       Interim Events    3/2/19 , fatigue, SOB, wet cough, SR, wheezing    3/1/19, no overnight issues, no fever, leukocytosis resolved, hypotension improved with a bolus of saline, encouraged ambulation, sinus rhythm    2/28/19 , leukocytosis overnight, febrile overnight, chest x-ray unremarkable, UA showed large leukocyte Estrace with WBC and RBC, received Rocephin, fatigue this morning, new cough, no rhythm disturbances    Review of Systems  Review of Systems   Constitutional: Positive for fatigue.   Respiratory: Positive for cough. Negative for apnea, choking, chest tightness, shortness of breath, wheezing and stridor.    Cardiovascular: Negative for chest pain and leg swelling.   Gastrointestinal: Positive for nausea.       Vital signs in last 24 hours  Temp:  [36.4 °C (97.5 °F)-37.1 °C (98.7 °F)] 37.1 °C (98.7 °F)  Pulse:  [45-98] 86  Resp:  [16-18] 18  BP: ()/(53-84) 100/60  SpO2:  [95 %-100 %] 98 %    Physical Exam  Physical Exam   Constitutional: She is oriented to person, place, and time.   HENT:   Head: Normocephalic.   Eyes: Conjunctivae are normal.   Neck: No JVD present. No thyromegaly present.    Cardiovascular: Normal rate and regular rhythm.    Pulses:       Carotid pulses are 2+ on the right side, and 2+ on the left side.       Radial pulses are 2+ on the right side, and 2+ on the left side.   Pulmonary/Chest: She has wheezes.   Abdominal: Soft.   Musculoskeletal: She exhibits no edema.   Neurological: She is alert and oriented to person, place, and time.   Skin: Skin is warm and dry.       Lab Review  Lab Results   Component Value Date/Time    WBC 10.4 03/01/2019 02:36 AM    RBC 4.05 (L) 03/01/2019 02:36 AM    HEMOGLOBIN 12.8 03/01/2019 02:36 AM    HEMATOCRIT 41.4 03/01/2019 02:36 AM    .2 (H) 03/01/2019 02:36 AM    MCH 31.6 03/01/2019 02:36 AM    MCHC 30.9 (L) 03/01/2019 02:36 AM    MPV 10.5 03/01/2019 02:36 AM      Lab Results   Component Value Date/Time    SODIUM 141 03/02/2019 02:49 AM    POTASSIUM 4.1 03/02/2019 02:49 AM    CHLORIDE 104 03/02/2019 02:49 AM    CO2 30 03/02/2019 02:49 AM    GLUCOSE 100 (H) 03/02/2019 02:49 AM    BUN 34 (H) 03/02/2019 02:49 AM    CREATININE 1.03 03/02/2019 02:49 AM      Lab Results   Component Value Date/Time    ASTSGOT 27 02/26/2019 05:46 AM    ALTSGPT 19 02/26/2019 05:46 AM     Lab Results   Component Value Date/Time    CHOLSTRLTOT 211 (H) 08/27/2018 08:20 AM    LDL 82 08/27/2018 08:20 AM     08/27/2018 08:20 AM    TRIGLYCERIDE 53 08/27/2018 08:20 AM    TROPONINI 0.04 02/21/2019 08:50 PM               Assessment/Plan      Febrile and leukocytosis 2/27/19, new cough, chest x-ray unremarkable  -Leukocytosis resolved, ( was on prednisone )  -On Omnicef ,? UTI    New severe cardiomyopathy, LVEF 20%    -Coronary angiography 3/2017 showed no coronary artery disease  -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  -Unable to tolerate HF meds secondary to hypotension  -Will hold losartan and spironolactone      Acute decompensated systolic heart failure  -BNP 2900 ---> 392  -Euvolemic  -Weight 116 pounds (baseline 126 pounds)  -c/o lack of energy today, dyspnea  with exertion        Nonrheumatic mitral regurgitation, repair in 2017 secondary to mitral valve prolapse (bileaflet)  -s/p PAGE 2/26/19  :  known mitral valve repair functioning adequately with moderate   regurgitation in the setting of severe left ventricular systolic dysfunction.      Stage III COPD  -On Symbicort & prednisone  -Close outpatient follow-up with pulmonary group & pulmonary rehab      Atrial flutter  -Continue with warfarin   -Currently normal sinus rhythm  -Continue with digoxin & Toprol XL 25      Appointment with  on 3/15/19

## 2019-03-02 NOTE — CARE PLAN
Problem: Infection  Goal: Will remain free from infection  Outcome: PROGRESSING AS EXPECTED  Hand hygiene performed before and after contact with patient.       Problem: Venous Thromboembolism (VTW)/Deep Vein Thrombosis (DVT) Prevention:  Goal: Patient will participate in Venous Thrombosis (VTE)/Deep Vein Thrombosis (DVT)Prevention Measures  Outcome: PROGRESSING AS EXPECTED  Pt is on warfarin po

## 2019-03-02 NOTE — CARE PLAN
Problem: Safety  Goal: Will remain free from injury  Outcome: PROGRESSING AS EXPECTED  Fall education provided, patient stable and up at [place] at this time, will continue to monitor.     Problem: Pain Management  Goal: Pain level will decrease to patient's comfort goal  Outcome: PROGRESSING AS EXPECTED  Patient reports no pain at this time, will continue to monitor.

## 2019-03-02 NOTE — PROGRESS NOTES
[facility] Medicine Daily Progress Note    Date of Service  3/2/2019    Chief Complaint  73 y.o. female admitted 2/25/2019 with shortness of breath.    Hospital Course    73-year-old female history of mitral valve repair, COPD, hypertension initially transferred from [facility] with shortness of breath.  Patient initially was hypotensive requiring IV fluid bolus.  Following admission to [facility] she was given diuresis for CHF and transferred to [facility] for PAGE.  Patient downgraded to telemetry floor.    Interval Problem Update    Recurrent hypotension SBP 86 following morning blood pressure medications.  No lightheadedness or dizziness.  Family concerned for home safety-- notes he cannot care for all her needs-- Conferring about possible rehab at nursing facility.     Consultants/Specialty    Dr. Li, Cardiology     Code Status  DNR      Disposition    To be determined    Review of Systems  Review of Systems   Constitutional: Negative for chills and fever.   Respiratory: Negative for cough and shortness of breath.    Cardiovascular: Negative for chest pain and leg swelling.   Gastrointestinal: Negative for abdominal pain, diarrhea and vomiting.   Musculoskeletal: Negative for back pain, joint pain and neck pain.   Neurological: Positive for weakness (Generalized, improved). Negative for dizziness, sensory change, speech change, focal weakness and headaches.        Physical Exam  Temp:  [36.4 °C (97.5 °F)-36.9 °C (98.5 °F)] 36.4 °C (97.5 °F)  Pulse:  [45-98] 92  Resp:  [16-18] 18  BP: ()/(53-84) 107/67  SpO2:  [95 %-100 %] 95 %    Physical Exam   Constitutional: She is oriented to person, place, and time. No distress.   Alert, appropriate oriented, no apparent distress   HENT:   Head: Normocephalic and atraumatic.   Eyes: EOM are normal. Right eye exhibits no discharge. Left eye exhibits no discharge.   Neck: Neck supple.   Cardiovascular: Normal rate and regular rhythm.    Murmur (2/6)  heard.  Pulmonary/Chest: No stridor. No respiratory distress. She has no wheezes. She has no rales.   Abdominal: Soft. Bowel sounds are normal. She exhibits no distension. There is no tenderness.   Musculoskeletal: She exhibits no edema or tenderness.   Neurological: She is alert and oriented to person, place, and time. No cranial nerve deficit.   No gross focal weakness   Skin: Skin is warm and dry. She is not diaphoretic. No pallor.   Vitals reviewed.      Fluids    Intake/Output Summary (Last 24 hours) at 03/02/19 1249  Last data filed at 03/02/19 0930   Gross per 24 hour   Intake              120 ml   Output                0 ml   Net              120 ml       Laboratory  Recent Labs      02/27/19   2045  02/28/19   0030  03/01/19   0236   WBC  17.3*  17.2*  10.4   RBC  4.36  4.33  4.05*   HEMOGLOBIN  13.8  13.7  12.8   HEMATOCRIT  43.2  42.9  41.4   MCV  99.1*  99.1*  102.2*   MCH  31.7  31.6  31.6   MCHC  31.9*  31.9*  30.9*   RDW  47.0  47.4  49.2   PLATELETCT  264  213  208   MPV  10.1  10.0  10.5     Recent Labs      03/01/19   0236  03/01/19   0914  03/02/19   0249   SODIUM  139  140  141   POTASSIUM  4.0  4.6  4.1   CHLORIDE  104  104  104   CO2  28  30  30   GLUCOSE  104*  122*  100*   BUN  49*  43*  34*   CREATININE  1.07  0.96  1.03   CALCIUM  9.5  9.2  9.9     Recent Labs      03/01/19   0236  03/02/19   0249   INR  3.06*  2.68*               Imaging  DX-CHEST-PORTABLE (1 VIEW)   Final Result      1.  Hypoinflation without evidence for pneumonia or pulmonary edema.   2.  Stable multichamber cardiac enlargement.      EC-PAGE W/O CONT   Final Result      DX-CHEST-LIMITED (1 VIEW)   Final Result      1.  No acute cardiopulmonary disease.   2.  Stable cardiomegaly.           Assessment/Plan  * Acute systolic congestive heart failure (HCC)- (present on admission)   Assessment & Plan    Echocardiogram reveals an ejection fraction 20% with severe mitral valve regurg.  Lasix stopped due to hypotension and  improved dyspnea  Reduce Cozaar to 12.5 mg daily  Coreg changed to Toprol-XL with holding parameters-monitor blood pressure response  Aldactone stopped  Continue digoxin.  telemetry monitoring  Dr. Li will evaluate for Entresto outpatient, although use may be limited by low blood pressure.   Cardiology input.        Non-rheumatic mitral regurgitation- (present on admission)   Assessment & Plan    Noted on transesophageal echocardiogram     Chronic respiratory failure with hypoxia (HCC)- (present on admission)   Assessment & Plan    Likely multifactorial COPD, CHF, clinically better  O2 requirements at baseline       Atrial flutter (HCC)- (present on admission)   Assessment & Plan    INR therapeutic  Continue metoprolol as tolerated  Continue digoxin for rate control  Adjust warfarin-follow-up INR     COPD (chronic obstructive pulmonary disease) (HCC)- (present on admission)   Assessment & Plan    History of.  Clinically better-  no significant wheezes  on exam.  Completed prednisone  Continue Symbicort, Xopenex, Spiriva   RT protocols     Leukocytosis   Assessment & Plan    Suspect steroid effect, possible UTI-WBC normalized.   UA may suggest UTI.  Patient without urinary symptoms although may be unreliable elderly.  Complete 5-day course antibiotics with Omnicef     DNR (do not resuscitate)- (present on admission)   Assessment & Plan    Per patient's wishes, DNR status has been established     Anxiety- (present on admission)   Assessment & Plan    Continue PRN Ativan  Schedule Zoloft          VTE prophylaxis: Warfarin    Discussed with , daughter plan of care at bedside.  Will place skilled nursing facility referral.

## 2019-03-03 ENCOUNTER — APPOINTMENT (OUTPATIENT)
Dept: RADIOLOGY | Facility: MEDICAL CENTER | Age: 74
DRG: 292 | End: 2019-03-03
Attending: INTERNAL MEDICINE
Payer: MEDICARE

## 2019-03-03 PROBLEM — R53.81 DEBILITY: Status: ACTIVE | Noted: 2019-03-03

## 2019-03-03 LAB
ANION GAP SERPL CALC-SCNC: 8 MMOL/L (ref 0–11.9)
BUN SERPL-MCNC: 23 MG/DL (ref 8–22)
CALCIUM SERPL-MCNC: 9.1 MG/DL (ref 8.5–10.5)
CHLORIDE SERPL-SCNC: 102 MMOL/L (ref 96–112)
CO2 SERPL-SCNC: 31 MMOL/L (ref 20–33)
CREAT SERPL-MCNC: 0.72 MG/DL (ref 0.5–1.4)
GLUCOSE SERPL-MCNC: 102 MG/DL (ref 65–99)
INR PPP: 1.87 (ref 0.87–1.13)
POTASSIUM SERPL-SCNC: 3.7 MMOL/L (ref 3.6–5.5)
PROTHROMBIN TIME: 21.6 SEC (ref 12–14.6)
SODIUM SERPL-SCNC: 141 MMOL/L (ref 135–145)

## 2019-03-03 PROCEDURE — 700102 HCHG RX REV CODE 250 W/ 637 OVERRIDE(OP): Performed by: INTERNAL MEDICINE

## 2019-03-03 PROCEDURE — A9270 NON-COVERED ITEM OR SERVICE: HCPCS | Performed by: INTERNAL MEDICINE

## 2019-03-03 PROCEDURE — 94760 N-INVAS EAR/PLS OXIMETRY 1: CPT

## 2019-03-03 PROCEDURE — 85610 PROTHROMBIN TIME: CPT

## 2019-03-03 PROCEDURE — A9270 NON-COVERED ITEM OR SERVICE: HCPCS | Performed by: NURSE PRACTITIONER

## 2019-03-03 PROCEDURE — A9270 NON-COVERED ITEM OR SERVICE: HCPCS | Performed by: HOSPITALIST

## 2019-03-03 PROCEDURE — 700102 HCHG RX REV CODE 250 W/ 637 OVERRIDE(OP): Performed by: HOSPITALIST

## 2019-03-03 PROCEDURE — 80048 BASIC METABOLIC PNL TOTAL CA: CPT

## 2019-03-03 PROCEDURE — 36415 COLL VENOUS BLD VENIPUNCTURE: CPT

## 2019-03-03 PROCEDURE — 99233 SBSQ HOSP IP/OBS HIGH 50: CPT | Performed by: INTERNAL MEDICINE

## 2019-03-03 PROCEDURE — 99232 SBSQ HOSP IP/OBS MODERATE 35: CPT | Performed by: HOSPITALIST

## 2019-03-03 PROCEDURE — 770020 HCHG ROOM/CARE - TELE (206)

## 2019-03-03 PROCEDURE — 700102 HCHG RX REV CODE 250 W/ 637 OVERRIDE(OP): Performed by: NURSE PRACTITIONER

## 2019-03-03 PROCEDURE — 94640 AIRWAY INHALATION TREATMENT: CPT

## 2019-03-03 PROCEDURE — 700101 HCHG RX REV CODE 250: Performed by: NURSE PRACTITIONER

## 2019-03-03 RX ORDER — LEVALBUTEROL INHALATION SOLUTION 1.25 MG/3ML
1.25 SOLUTION RESPIRATORY (INHALATION)
Status: DISCONTINUED | OUTPATIENT
Start: 2019-03-04 | End: 2019-03-05

## 2019-03-03 RX ADMIN — Medication 400 MG: at 17:43

## 2019-03-03 RX ADMIN — CEFDINIR 300 MG: 300 CAPSULE ORAL at 17:44

## 2019-03-03 RX ADMIN — LEVALBUTEROL 1.25 MG: 1.25 SOLUTION RESPIRATORY (INHALATION) at 19:28

## 2019-03-03 RX ADMIN — METOPROLOL SUCCINATE 25 MG: 25 TABLET, EXTENDED RELEASE ORAL at 17:44

## 2019-03-03 RX ADMIN — SERTRALINE HYDROCHLORIDE 100 MG: 100 TABLET ORAL at 04:50

## 2019-03-03 RX ADMIN — CEFDINIR 300 MG: 300 CAPSULE ORAL at 04:50

## 2019-03-03 RX ADMIN — DIGOXIN 125 MCG: 125 TABLET ORAL at 17:43

## 2019-03-03 RX ADMIN — BUDESONIDE AND FORMOTEROL FUMARATE DIHYDRATE 2 PUFF: 160; 4.5 AEROSOL RESPIRATORY (INHALATION) at 04:50

## 2019-03-03 RX ADMIN — BUDESONIDE AND FORMOTEROL FUMARATE DIHYDRATE 2 PUFF: 160; 4.5 AEROSOL RESPIRATORY (INHALATION) at 19:47

## 2019-03-03 RX ADMIN — LEVALBUTEROL 1.25 MG: 1.25 SOLUTION RESPIRATORY (INHALATION) at 11:56

## 2019-03-03 RX ADMIN — LORAZEPAM 0.5 MG: 1 TABLET ORAL at 22:20

## 2019-03-03 RX ADMIN — LEVALBUTEROL 1.25 MG: 1.25 SOLUTION RESPIRATORY (INHALATION) at 15:24

## 2019-03-03 RX ADMIN — TIOTROPIUM BROMIDE 1 CAPSULE: 18 CAPSULE ORAL; RESPIRATORY (INHALATION) at 04:50

## 2019-03-03 RX ADMIN — Medication 400 MG: at 04:50

## 2019-03-03 RX ADMIN — ATORVASTATIN CALCIUM 40 MG: 40 TABLET, FILM COATED ORAL at 17:43

## 2019-03-03 RX ADMIN — LEVALBUTEROL 1.25 MG: 1.25 SOLUTION RESPIRATORY (INHALATION) at 07:41

## 2019-03-03 ASSESSMENT — ENCOUNTER SYMPTOMS
VOMITING: 0
CHOKING: 0
FEVER: 0
SHORTNESS OF BREATH: 0
APNEA: 0
SENSORY CHANGE: 0
SPEECH CHANGE: 0
FOCAL WEAKNESS: 0
PALPITATIONS: 0
COUGH: 1
NECK PAIN: 0
NAUSEA: 0
WEAKNESS: 1
BACK PAIN: 0
FATIGUE: 0
COUGH: 0
NERVOUS/ANXIOUS: 1
CHEST TIGHTNESS: 0
ABDOMINAL PAIN: 0
WHEEZING: 0
CHILLS: 0
STRIDOR: 0
DIARRHEA: 0

## 2019-03-03 NOTE — DISCHARGE PLANNING
Met w/pt,  and daughter at bedside. Discussed choice for SNF. Per daughter, she would like time to see the providers list and call a friend who lives locally and can provide input in which agency to choose. Daughter also inquired about having a caregiver after pt is discharged home.    SW-intern provided a list for the homemaker agencies to the daughter.     Follow-up: To wait for the family to decide on a SNF agency.     Around 1230 SW-intern check in with the family to see if they had chosen an agency. Per , they have not decide yet, daughter took the list with her to visit some of the agencies.

## 2019-03-03 NOTE — PROGRESS NOTES
[facility] Medicine Daily Progress Note    Date of Service  3/3/2019    Chief Complaint  73 y.o. female admitted 2/25/2019 with shortness of breath.    Hospital Course    73-year-old female history of mitral valve repair, COPD, hypertension initially transferred from [facility] with shortness of breath.  Patient initially was hypotensive requiring IV fluid bolus.  Following admission to [facility] she was given diuresis for CHF and transferred to [facility] for PAGE.  Patient downgraded to telemetry floor.    Interval Problem Update    Beta-blocker, ARB stopped yesterday.  No new hypotensive episodes. Plan referral to nursing facility for rehab per family wishes.  Discussed with Dr. Saavedra cardiology who performed her PAGE.  Noted she had no thrombosis and is post left-sided maze procedure, left atrial  appendage ligation , remains in sinus rhythm-likely to stop Coumadin.    Consultants/Specialty    Dr. Li, Cardiology     Code Status  DNR      Disposition    Anticipate DC to skilled nursing facility    Review of Systems  Review of Systems   Constitutional: Negative for chills and fever.   Respiratory: Negative for cough and shortness of breath.    Cardiovascular: Negative for chest pain and palpitations.   Gastrointestinal: Negative for abdominal pain, diarrhea and vomiting.   Musculoskeletal: Negative for back pain, joint pain and neck pain.   Neurological: Positive for weakness (Generalized, improved). Negative for sensory change, speech change and focal weakness.   Psychiatric/Behavioral: The patient is nervous/anxious.         Physical Exam  Temp:  [36.4 °C (97.6 °F)-37.1 °C (98.7 °F)] 36.7 °C (98.1 °F)  Pulse:  [68-90] 73  Resp:  [16-18] 18  BP: (100-117)/(60-81) 117/81  SpO2:  [92 %-98 %] 98 %    Physical Exam   Constitutional: She is oriented to person, place, and time. No distress.   Alert, appropriate oriented, flat affect   HENT:   Head: Normocephalic and atraumatic.   Eyes: EOM are normal.  Right eye exhibits no discharge. Left eye exhibits no discharge.   Neck: Neck supple.   Cardiovascular: Normal rate and regular rhythm.    Murmur (2/6) heard.  Pulmonary/Chest: No stridor. She has no wheezes. She has no rales.   Abdominal: Soft. Bowel sounds are normal. She exhibits no distension. There is no tenderness.   Musculoskeletal: She exhibits no edema or tenderness.   Neurological: She is alert and oriented to person, place, and time. No cranial nerve deficit.   No gross focal weakness   Skin: Skin is warm and dry. She is not diaphoretic. No pallor.   Vitals reviewed.      Fluids    Intake/Output Summary (Last 24 hours) at 03/03/19 1120  Last data filed at 03/02/19 1330   Gross per 24 hour   Intake              120 ml   Output                0 ml   Net              120 ml       Laboratory  Recent Labs      03/01/19   0236   WBC  10.4   RBC  4.05*   HEMOGLOBIN  12.8   HEMATOCRIT  41.4   MCV  102.2*   MCH  31.6   MCHC  30.9*   RDW  49.2   PLATELETCT  208   MPV  10.5     Recent Labs      03/01/19   0914  03/02/19   0249  03/03/19   0333   SODIUM  140  141  141   POTASSIUM  4.6  4.1  3.7   CHLORIDE  104  104  102   CO2  30  30  31   GLUCOSE  122*  100*  102*   BUN  43*  34*  23*   CREATININE  0.96  1.03  0.72   CALCIUM  9.2  9.9  9.1     Recent Labs      03/01/19   0236  03/02/19   0249  03/03/19   0333   INR  3.06*  2.68*  1.87*               Imaging  DX-CHEST-PORTABLE (1 VIEW)   Final Result         1. Stable cardiomegaly.      DX-CHEST-PORTABLE (1 VIEW)   Final Result      1.  Hypoinflation without evidence for pneumonia or pulmonary edema.   2.  Stable multichamber cardiac enlargement.      EC-PAGE W/O CONT   Final Result      DX-CHEST-LIMITED (1 VIEW)   Final Result      1.  No acute cardiopulmonary disease.   2.  Stable cardiomegaly.      NM-CARDIAC STRESS TEST    (Results Pending)        Assessment/Plan  * Acute systolic congestive heart failure (HCC)- (present on admission)   Assessment & Plan     Echocardiogram reveals an ejection fraction 20% with severe mitral valve regurg.  Lasix, metoprolol, Cozaar, Aldactone stopped due to hypotension .   Continue digoxin.  telemetry monitoring  Dr. Li will evaluate for Entresto outpatient, although use may be limited by low blood pressure.   Discussed with Dr. Saavedra cardiology       Non-rheumatic mitral regurgitation- (present on admission)   Assessment & Plan    Noted on transesophageal echocardiogram     Chronic respiratory failure with hypoxia (HCC)- (present on admission)   Assessment & Plan    Likely multifactorial COPD, CHF, clinically better  O2 requirements at baseline       Atrial flutter (HCC)- (present on admission)   Assessment & Plan    Patient is post recent PAGE revealing no thrombosis.  History of maze and left atrial appendage ligation.  Has maintained a sinus rhythm.  Continue digoxin, likely to stop Coumadin, cardiology input     COPD (chronic obstructive pulmonary disease) (HCC)- (present on admission)   Assessment & Plan    History of.  Clinically better-  no significant wheezes  on exam.  Completed prednisone  Continue Symbicort, Xopenex, Spiriva   RT protocols     Leukocytosis   Assessment & Plan    Suspect steroid effect, possible UTI-WBC normalized.   UA may suggest UTI.  Patient without urinary symptoms although may be unreliable elderly.  Complete 5-day course antibiotics with Omnicef     DNR (do not resuscitate)- (present on admission)   Assessment & Plan    Per patient's wishes, DNR status has been established     Debility   Assessment & Plan    Due to heart failure, COPD, chronic respiratory failure, deconditioning  PT/OT   for discharge planning-referral to skilled nursing facility.     Anxiety- (present on admission)   Assessment & Plan    Continue PRN Ativan  Schedule Zoloft          VTE prophylaxis: Warfarin    Discussed with wife, has been, cardiology plan of care

## 2019-03-03 NOTE — CARE PLAN
Problem: Bronchoconstriction:  Goal: Improve in air movement and diminished wheezing  Patient receiving Xopenex 1.25mg Q4, Breath sounds are slight end expiratory wheezes apically that are improving after nebs tx's.

## 2019-03-03 NOTE — PROGRESS NOTES
0710: report received from Ruth CAO. Patient resting in bed. Fall precautions in place, safety maintained, bed alarm on, and call light within reach. Patient NPO for possible stress test today. Vitals wnl, will continue to monitor.

## 2019-03-03 NOTE — PROGRESS NOTES
Inpatient Anticoagulation Service Note    Date: 3/3/2019  Reason for Anticoagulation: Atrial Fibrillation   [id number] VASc Score: 4    Hemoglobin Value: 12.8  Hematocrit Value: 41.4  Lab Platelet Value: 208  Target INR: 2.0 to 3.0    INR from last 7 days     Date/Time INR Value    03/03/19 0333 (!)  1.87    03/02/19 0249 (!)  2.68    03/01/19 0236 (!)  3.06    02/26/19 1600 (!)  2.24        Dose from last 7 days     Date/Time Dose (mg)    03/03/19 1400  3    03/02/19 0800  3    03/01/19 1100  1.5    02/28/19 1400  3    02/27/19 1100  3    02/26/19 1100  3    02/25/19 1500  3        Average Dose (mg): 3 (Confirmed w/ pt: 3.75 mg MWF & 2.5 mg all other; ~3mg day)  Significant Interactions: Antibiotics, Statin  Bridge Therapy: No     Reversal Agent Administered: Not Applicable  Comments: INR dropped yet again today from 2.68 to 3.06, most likely as a result of the 1.5 mg dose given two days ago as well as the discontinuation of prednisone on 3/1.  Vitals currently stable w/o s/sx of active bleed. H/H WNL, as is plt. Will dose at 3 mg again today and increase dose tomorrow if INR remains downtrending 3 days after the 1.5 mg dose tomorrow. Patient has been in normal sinus rhythm with h/o MAZE and left atrial appendage ligation. Cardiology may decide to DC warfarin.     Plan:  Warfarin 3 mg today  Education Material Provided?:  ((home medication))  Pharmacist suggested discharge dosing: 3 mg daily with f/u INR w/in 2 - 3 days of discharge     Cinthia Alatorre, PharmD

## 2019-03-03 NOTE — CARE PLAN
Problem: Safety  Goal: Will remain free from injury  Outcome: PROGRESSING AS EXPECTED  Fall precautions in place, safety maintained, patient calls appropriately to ambulate, will continue to monitor.     Problem: Pain Management  Goal: Pain level will decrease to patient's comfort goal  Outcome: PROGRESSING AS EXPECTED  Patient denies any pain at this time, will continue to assess and monitor.

## 2019-03-03 NOTE — DISCHARGE PLANNING
Care Transition Team Assessment    Met w/pt,  and daughter, Manuela at bedside. Reviewed and verified Facesheet information, address, phone numbers, emergency contact and PCP. Pt. Lives w/ in [place], daughter lives in the [place]. Pt is expected to discharged to SNF. Daughter inquired information about a caregiver in case that pt is not strong enough after d/cing home. Family reported no prior community assistance or used of DME or prior visits to SNF. No concerns at the moment.     Information Source  Orientation : Oriented x 4  Information Given By: Patient, Spouse, Other (Comments) (Daughter)  Informant's Name: Rachell Mendoza Bill peggy Roy  Who is responsible for making decisions for patient? : Patient    Elopement Risk  Legal Hold: No  Ambulatory or Self Mobile in Wheelchair: Yes  Disoriented: No  Psychiatric Symptoms: None  History of Wandering: No  Elopement this Admit: No  Vocalizing Wanting to Leave: No  Displays Behaviors, Body Language Wanting to Leave: No-Not at Risk for Elopement  Elopement Risk: Not at Risk for Elopement    Interdisciplinary Discharge Planning  Lives with - Patient's Self Care Capacity: Spouse  Patient or legal guardian wants to designate a caregiver (see row info): No  Housing / Facility: [address] [facility] [place] [facility]  Prior Services: None    Discharge Preparedness  What is your plan after discharge?: Skilled nursing facility  What are your discharge supports?: Child, Spouse  Prior Functional Level: Ambulatory, Independent with Activities of Daily Living  Difficulity with ADLs:  (needs stand by assistance w/mobility)    Functional Assesment  Prior Functional Level: Ambulatory, Independent with Activities of Daily Living    Finances  Financial Barriers to Discharge: No  Prescription Coverage: Yes (Pt uses [facility] on [address])    Psychological Assessment  History of Substance Abuse: None  History of Psychiatric Problems: No  Non-compliant with Treatment: No    Discharge Risks or  Barriers  Discharge risks or barriers?: No  Patient risk factors: Vulnerable adult    Anticipated Discharge Information  Anticipated discharge disposition: SNF

## 2019-03-03 NOTE — ASSESSMENT & PLAN NOTE
Due to heart failure, COPD, chronic respiratory failure, deconditioning. Family concerned that  cannot care for wife at home.   PT/OT re eval  SW for discharge planning, possible SNF for rehab vs home with home health.    3/5 require skilled nursing facility placement, pending bed availability

## 2019-03-03 NOTE — PROGRESS NOTES
Pt observed, no change from original assessment, vss, resting, no other signs or symptoms of distress, fall precautions in place, call light within reach, all questions answered, will continue to monitor.

## 2019-03-03 NOTE — PROGRESS NOTES
Cardiology Follow Up Progress Note    Date of Service  3/3/2019    Attending Physician  Bo Ott M.D.    Reason for consultation     New cardiomyopathy by echo 2/22/19, LVEF 20% , underwent aggressive diuretic therapy, was transferred from [facility] for transesophageal echo to rule out severity of mitral valve    History of     Mitral valve prolapse (bileaflet) underwent repair on 4/20/17 (triangular resection of P2 and a 36 mm Anna flexible annuloplasty band), in addition left maze procedure in the left atrial appendage ligation, cardiac cath showed no CAD on 3/2017, stage III COPD on 2 L of oxygen, hyperlipidemia, sleep apnea, no CPAP)    Admitted for   Difficulty breathing mostly with exertion, BNP 2900 on admission, EF 20 % by echo 2/25/19 ( echo 2017, LVEF 65% )       Interim Events    3/3/19 no overnight issues, normal sinus rhythm, blood pressure improved without heart failure medications, encouraged ambulation, discussed with RN daily weight &ambulation    3/2/19 , fatigue, SOB, wet cough, SR, wheezing    3/1/19, no overnight issues, no fever, leukocytosis resolved, hypotension improved with a bolus of saline, encouraged ambulation, sinus rhythm    2/28/19 , leukocytosis overnight, febrile overnight, chest x-ray unremarkable, UA showed large leukocyte Estrace with WBC and RBC, received Rocephin, fatigue this morning, new cough, no rhythm disturbances    Review of Systems  Review of Systems   Constitutional: Negative for fatigue.   Respiratory: Positive for cough. Negative for apnea, choking, chest tightness, shortness of breath, wheezing and stridor.    Cardiovascular: Negative for chest pain and leg swelling.   Gastrointestinal: Negative for nausea.       Vital signs in last 24 hours  Temp:  [36.4 °C (97.6 °F)-37.1 °C (98.7 °F)] 36.4 °C (97.6 °F)  Pulse:  [68-93] 93  Resp:  [16-18] 18  BP: (100-117)/(60-81) 111/79  SpO2:  [92 %-98 %] 98 %    Physical Exam  Physical Exam    Constitutional: She is oriented to person, place, and time.   HENT:   Head: Normocephalic.   Eyes: Conjunctivae are normal.   Neck: No JVD present. No thyromegaly present.   Cardiovascular: Normal rate and regular rhythm.    Pulses:       Carotid pulses are 2+ on the right side, and 2+ on the left side.       Radial pulses are 2+ on the right side, and 2+ on the left side.   Pulmonary/Chest: She has wheezes.   Abdominal: Soft.   Musculoskeletal: She exhibits no edema.   Neurological: She is alert and oriented to person, place, and time.   Skin: Skin is warm and dry.       Lab Review  Lab Results   Component Value Date/Time    WBC 10.4 03/01/2019 02:36 AM    RBC 4.05 (L) 03/01/2019 02:36 AM    HEMOGLOBIN 12.8 03/01/2019 02:36 AM    HEMATOCRIT 41.4 03/01/2019 02:36 AM    .2 (H) 03/01/2019 02:36 AM    MCH 31.6 03/01/2019 02:36 AM    MCHC 30.9 (L) 03/01/2019 02:36 AM    MPV 10.5 03/01/2019 02:36 AM      Lab Results   Component Value Date/Time    SODIUM 141 03/03/2019 03:33 AM    POTASSIUM 3.7 03/03/2019 03:33 AM    CHLORIDE 102 03/03/2019 03:33 AM    CO2 31 03/03/2019 03:33 AM    GLUCOSE 102 (H) 03/03/2019 03:33 AM    BUN 23 (H) 03/03/2019 03:33 AM    CREATININE 0.72 03/03/2019 03:33 AM      Lab Results   Component Value Date/Time    ASTSGOT 27 02/26/2019 05:46 AM    ALTSGPT 19 02/26/2019 05:46 AM     Lab Results   Component Value Date/Time    CHOLSTRLTOT 211 (H) 08/27/2018 08:20 AM    LDL 82 08/27/2018 08:20 AM     08/27/2018 08:20 AM    TRIGLYCERIDE 53 08/27/2018 08:20 AM    TROPONINI 0.04 02/21/2019 08:50 PM               Assessment/Plan      Febrile and leukocytosis 2/27/19, new cough, chest x-ray unremarkable  -Leukocytosis resolved, ( was on prednisone )  -On Omnicef ,? UTI    New severe cardiomyopathy, LVEF 20%    -Coronary angiography 3/2017 showed no coronary artery disease  -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  -Unable to tolerate HF meds secondary to hypotension  -Will hold losartan and  spironolactone  -BP improved without HF meds, resume Toprol XL tonight       Acute decompensated systolic heart failure  -BNP 2900 ---> 392  -Euvolemic  -Weight 116 pounds (baseline 126 pounds)  -Less fatigue this am, encouraged ambulation        Mitral valve regurgitation, repair in 2017 secondary to mitral valve prolapse (bileaflet)      -s/p PAGE 2/26/19  :  known mitral valve repair functioning adequately with moderate regurgitation in the setting of severe left ventricular systolic dysfunction          Stage III COPD  -On Symbicort & prednisone  -Close outpatient follow-up with pulmonary group & pulmonary rehab      Atrial flutter  -Continue with warfarin   -INR 1.87  -Currently normal sinus rhythm  -Continue with digoxin & Toprol XL 25    Encouraged ambulation   Appointment with  on 3/15/19  DC planning for SNF in am if BP remains stable

## 2019-03-04 LAB
ANION GAP SERPL CALC-SCNC: 9 MMOL/L (ref 0–11.9)
BACTERIA BLD CULT: NORMAL
BACTERIA BLD CULT: NORMAL
BUN SERPL-MCNC: 21 MG/DL (ref 8–22)
CALCIUM SERPL-MCNC: 9.4 MG/DL (ref 8.5–10.5)
CHLORIDE SERPL-SCNC: 103 MMOL/L (ref 96–112)
CO2 SERPL-SCNC: 29 MMOL/L (ref 20–33)
CREAT SERPL-MCNC: 0.67 MG/DL (ref 0.5–1.4)
GLUCOSE SERPL-MCNC: 137 MG/DL (ref 65–99)
INR PPP: 1.56 (ref 0.87–1.13)
MAGNESIUM SERPL-MCNC: 1.7 MG/DL (ref 1.5–2.5)
POTASSIUM SERPL-SCNC: 4.2 MMOL/L (ref 3.6–5.5)
PROTHROMBIN TIME: 18.8 SEC (ref 12–14.6)
SIGNIFICANT IND 70042: NORMAL
SIGNIFICANT IND 70042: NORMAL
SITE SITE: NORMAL
SITE SITE: NORMAL
SODIUM SERPL-SCNC: 141 MMOL/L (ref 135–145)
SOURCE SOURCE: NORMAL
SOURCE SOURCE: NORMAL

## 2019-03-04 PROCEDURE — 700102 HCHG RX REV CODE 250 W/ 637 OVERRIDE(OP): Performed by: HOSPITALIST

## 2019-03-04 PROCEDURE — 94640 AIRWAY INHALATION TREATMENT: CPT

## 2019-03-04 PROCEDURE — A9270 NON-COVERED ITEM OR SERVICE: HCPCS | Performed by: NURSE PRACTITIONER

## 2019-03-04 PROCEDURE — 80048 BASIC METABOLIC PNL TOTAL CA: CPT

## 2019-03-04 PROCEDURE — 99233 SBSQ HOSP IP/OBS HIGH 50: CPT | Performed by: HOSPITALIST

## 2019-03-04 PROCEDURE — 700102 HCHG RX REV CODE 250 W/ 637 OVERRIDE(OP): Performed by: INTERNAL MEDICINE

## 2019-03-04 PROCEDURE — 700101 HCHG RX REV CODE 250: Performed by: NURSE PRACTITIONER

## 2019-03-04 PROCEDURE — 700111 HCHG RX REV CODE 636 W/ 250 OVERRIDE (IP): Performed by: HOSPITALIST

## 2019-03-04 PROCEDURE — 85610 PROTHROMBIN TIME: CPT

## 2019-03-04 PROCEDURE — A9270 NON-COVERED ITEM OR SERVICE: HCPCS | Performed by: HOSPITALIST

## 2019-03-04 PROCEDURE — 770020 HCHG ROOM/CARE - TELE (206)

## 2019-03-04 PROCEDURE — A9270 NON-COVERED ITEM OR SERVICE: HCPCS | Performed by: INTERNAL MEDICINE

## 2019-03-04 PROCEDURE — 94760 N-INVAS EAR/PLS OXIMETRY 1: CPT

## 2019-03-04 PROCEDURE — 700102 HCHG RX REV CODE 250 W/ 637 OVERRIDE(OP): Performed by: NURSE PRACTITIONER

## 2019-03-04 PROCEDURE — 83735 ASSAY OF MAGNESIUM: CPT

## 2019-03-04 PROCEDURE — 36415 COLL VENOUS BLD VENIPUNCTURE: CPT

## 2019-03-04 RX ORDER — LORAZEPAM 0.5 MG/1
0.5 TABLET ORAL EVERY 8 HOURS PRN
Qty: 9 TAB | Refills: 0 | Status: SHIPPED | OUTPATIENT
Start: 2019-03-04 | End: 2019-03-07

## 2019-03-04 RX ORDER — ASPIRIN 81 MG/1
81 TABLET, CHEWABLE ORAL DAILY
Status: DISCONTINUED | OUTPATIENT
Start: 2019-03-04 | End: 2019-03-08 | Stop reason: HOSPADM

## 2019-03-04 RX ORDER — METOPROLOL SUCCINATE 25 MG/1
25 TABLET, EXTENDED RELEASE ORAL EVERY EVENING
Qty: 30 TAB
Start: 2019-03-04 | End: 2019-03-08 | Stop reason: SDUPTHER

## 2019-03-04 RX ORDER — LEVALBUTEROL INHALATION SOLUTION 1.25 MG/3ML
1.25 SOLUTION RESPIRATORY (INHALATION) EVERY 6 HOURS PRN
Qty: 24 ML
Start: 2019-03-04 | End: 2019-03-15

## 2019-03-04 RX ORDER — MAGNESIUM SULFATE HEPTAHYDRATE 40 MG/ML
2 INJECTION, SOLUTION INTRAVENOUS ONCE
Status: COMPLETED | OUTPATIENT
Start: 2019-03-04 | End: 2019-03-04

## 2019-03-04 RX ORDER — DIGOXIN 125 MCG
125 TABLET ORAL DAILY
Qty: 30 TAB
Start: 2019-03-04 | End: 2019-03-08 | Stop reason: SDUPTHER

## 2019-03-04 RX ORDER — AMOXICILLIN 250 MG
2 CAPSULE ORAL 2 TIMES DAILY
Qty: 30 TAB | Refills: 0 | Status: SHIPPED | OUTPATIENT
Start: 2019-03-04 | End: 2019-03-15

## 2019-03-04 RX ORDER — ASPIRIN 81 MG/1
81 TABLET, CHEWABLE ORAL DAILY
Qty: 100 TAB
Start: 2019-03-05 | End: 2019-06-20

## 2019-03-04 RX ADMIN — SERTRALINE HYDROCHLORIDE 100 MG: 100 TABLET ORAL at 05:33

## 2019-03-04 RX ADMIN — Medication 400 MG: at 05:33

## 2019-03-04 RX ADMIN — LEVALBUTEROL 1.25 MG: 1.25 SOLUTION RESPIRATORY (INHALATION) at 15:48

## 2019-03-04 RX ADMIN — MAGNESIUM SULFATE IN WATER 2 G: 40 INJECTION, SOLUTION INTRAVENOUS at 13:05

## 2019-03-04 RX ADMIN — ATORVASTATIN CALCIUM 40 MG: 40 TABLET, FILM COATED ORAL at 18:08

## 2019-03-04 RX ADMIN — BUDESONIDE AND FORMOTEROL FUMARATE DIHYDRATE 2 PUFF: 160; 4.5 AEROSOL RESPIRATORY (INHALATION) at 19:00

## 2019-03-04 RX ADMIN — MAGNESIUM OXIDE TAB 400 MG (241.3 MG ELEMENTAL MG) 400 MG: 400 (241.3 MG) TAB at 18:07

## 2019-03-04 RX ADMIN — METOPROLOL SUCCINATE 25 MG: 25 TABLET, EXTENDED RELEASE ORAL at 18:08

## 2019-03-04 RX ADMIN — LORAZEPAM 0.5 MG: 1 TABLET ORAL at 22:19

## 2019-03-04 RX ADMIN — ASPIRIN 81 MG 81 MG: 81 TABLET ORAL at 12:50

## 2019-03-04 RX ADMIN — MAGNESIUM OXIDE TAB 400 MG (241.3 MG ELEMENTAL MG) 400 MG: 400 (241.3 MG) TAB at 13:09

## 2019-03-04 RX ADMIN — CEFDINIR 300 MG: 300 CAPSULE ORAL at 05:33

## 2019-03-04 RX ADMIN — DIGOXIN 125 MCG: 125 TABLET ORAL at 18:08

## 2019-03-04 RX ADMIN — LEVALBUTEROL 1.25 MG: 1.25 SOLUTION RESPIRATORY (INHALATION) at 11:42

## 2019-03-04 RX ADMIN — LEVALBUTEROL 1.25 MG: 1.25 SOLUTION RESPIRATORY (INHALATION) at 08:38

## 2019-03-04 RX ADMIN — LEVALBUTEROL 1.25 MG: 1.25 SOLUTION RESPIRATORY (INHALATION) at 18:59

## 2019-03-04 RX ADMIN — BUDESONIDE AND FORMOTEROL FUMARATE DIHYDRATE 2 PUFF: 160; 4.5 AEROSOL RESPIRATORY (INHALATION) at 05:33

## 2019-03-04 RX ADMIN — CEFDINIR 300 MG: 300 CAPSULE ORAL at 18:08

## 2019-03-04 RX ADMIN — TIOTROPIUM BROMIDE 1 CAPSULE: 18 CAPSULE ORAL; RESPIRATORY (INHALATION) at 05:33

## 2019-03-04 ASSESSMENT — PATIENT HEALTH QUESTIONNAIRE - PHQ9
SUM OF ALL RESPONSES TO PHQ9 QUESTIONS 1 AND 2: 0
1. LITTLE INTEREST OR PLEASURE IN DOING THINGS: NOT AT ALL
2. FEELING DOWN, DEPRESSED, IRRITABLE, OR HOPELESS: NOT AT ALL

## 2019-03-04 ASSESSMENT — ENCOUNTER SYMPTOMS
HEADACHES: 0
FOCAL WEAKNESS: 0
FEVER: 0
ABDOMINAL PAIN: 0
WEAKNESS: 1
SPEECH CHANGE: 0
BACK PAIN: 0
DIARRHEA: 0
COUGH: 0
DIZZINESS: 0
PALPITATIONS: 0
SENSORY CHANGE: 0
NECK PAIN: 0
SHORTNESS OF BREATH: 0
CHILLS: 0
NERVOUS/ANXIOUS: 1
VOMITING: 0

## 2019-03-04 NOTE — DISCHARGE PLANNING
Received Choice form   Agency/Facility Name: [facility]  Referral sent per Choice form @ [phone]

## 2019-03-04 NOTE — CARE PLAN
Problem: Communication  Goal: The ability to communicate needs accurately and effectively will improve  Outcome: PROGRESSING AS EXPECTED      Problem: Safety  Goal: Will remain free from injury  Outcome: PROGRESSING AS EXPECTED    Goal: Will remain free from falls  Outcome: PROGRESSING AS EXPECTED      Problem: Infection  Goal: Will remain free from infection  Outcome: PROGRESSING AS EXPECTED      Problem: Venous Thromboembolism (VTW)/Deep Vein Thrombosis (DVT) Prevention:  Goal: Patient will participate in Venous Thrombosis (VTE)/Deep Vein Thrombosis (DVT)Prevention Measures  Outcome: PROGRESSING AS EXPECTED      Problem: Bowel/Gastric:  Goal: Normal bowel function is maintained or improved  Outcome: PROGRESSING AS EXPECTED    Goal: Will not experience complications related to bowel motility  Outcome: PROGRESSING AS EXPECTED      Problem: Knowledge Deficit  Goal: Knowledge of disease process/condition, treatment plan, diagnostic tests, and medications will improve  Outcome: PROGRESSING AS EXPECTED    Goal: Knowledge of the prescribed therapeutic regimen will improve  Outcome: PROGRESSING AS EXPECTED      Problem: Discharge Barriers/Planning  Goal: Patient's continuum of care needs will be met  Outcome: PROGRESSING AS EXPECTED      Problem: Fluid Volume:  Goal: Will maintain balanced intake and output  Outcome: PROGRESSING AS EXPECTED      Problem: Respiratory:  Goal: Respiratory status will improve  Outcome: PROGRESSING AS EXPECTED      Problem: Skin Integrity  Goal: Risk for impaired skin integrity will decrease  Outcome: PROGRESSING AS EXPECTED      Problem: Pain Management  Goal: Pain level will decrease to patient's comfort goal  Outcome: PROGRESSING AS EXPECTED      Problem: Psychosocial Needs:  Goal: Level of anxiety will decrease  Outcome: PROGRESSING AS EXPECTED      Problem: Urinary Elimination:  Goal: Ability to reestablish a normal urinary elimination pattern will improve  Outcome: PROGRESSING AS  EXPECTED

## 2019-03-04 NOTE — THERAPY
Physical Therapy Contact Note    Per RN patient planned for DC to SNF. Will continue to follow and resume PT as appropriate if DC unsuccessful.    Anai Link, PT, DPT  [phone]

## 2019-03-04 NOTE — CARE PLAN
Problem: Infection  Goal: Will remain free from infection  Outcome: PROGRESSING AS EXPECTED  Hand hygiene performed before and after contact with patient.       Problem: Venous Thromboembolism (VTW)/Deep Vein Thrombosis (DVT) Prevention:  Goal: Patient will participate in Venous Thrombosis (VTE)/Deep Vein Thrombosis (DVT)Prevention Measures  Outcome: PROGRESSING AS EXPECTED  Pt is on coumadin po

## 2019-03-04 NOTE — DISCHARGE PLANNING
[facility]  Outcome: Left message, awaiting call back.    Agency/Facility Name: [facility]  Outcome: Left message, awaiting call back.    @1040  [facility]  Spoke To: Carlin  Outcome: Accepted pending bed.    @1240  Agency/Facility Name: [facility]  Outcome: No answer, already left message, awaiting call back.

## 2019-03-04 NOTE — DISCHARGE PLANNING
Anticipated Discharge Disposition: SNF for skilled therapies and then home.    Action: RN CM notified pt, family and bedside nurse of accepting snf's, but no available beds. Dr. Ott notified.    Barriers to Discharge: Bed availability at snf.    Plan: Continue to assist with dc needs.

## 2019-03-04 NOTE — PROGRESS NOTES
[facility] Medicine Daily Progress Note    Date of Service  3/4/2019    Chief Complaint  73 y.o. female admitted 2/25/2019 with shortness of breath.    Hospital Course    73-year-old female history of mitral valve repair, COPD, hypertension initially transferred from [facility] with shortness of breath.  Patient initially was hypotensive requiring IV fluid bolus.  Following admission to [facility] she was given diuresis for CHF and transferred to [facility] for PAGE.  Patient downgraded to telemetry floor.    Interval Problem Update    Started on lopressor last night.  5 beats of Vtach this morning, asymptomatic. Plan to mobilize, PT assessment.  Follow-up magnesium level low at 1.7    Consultants/Specialty    Dr. Li, Cardiology     Code Status  DNR      Disposition    Anticipate DC to skilled nursing facility    Review of Systems  Review of Systems   Constitutional: Negative for chills and fever.   Respiratory: Negative for cough and shortness of breath.    Cardiovascular: Negative for chest pain, palpitations and leg swelling.   Gastrointestinal: Negative for abdominal pain, diarrhea and vomiting.   Musculoskeletal: Negative for back pain, joint pain and neck pain.   Neurological: Positive for weakness (Generalized). Negative for dizziness, sensory change, speech change, focal weakness and headaches.   Psychiatric/Behavioral: The patient is nervous/anxious.         Physical Exam  Temp:  [36.1 °C (96.9 °F)-36.5 °C (97.7 °F)] 36.5 °C (97.7 °F)  Pulse:  [] 72  Resp:  [16-18] 18  BP: (100-120)/(60-85) 100/63  SpO2:  [94 %-99 %] 99 %    Physical Exam   Constitutional: She is oriented to person, place, and time. No distress.   Alert, appropriate oriented   HENT:   Head: Normocephalic and atraumatic.   Eyes: EOM are normal. Right eye exhibits no discharge. Left eye exhibits no discharge.   Neck: Neck supple. No JVD present.   Cardiovascular: Normal rate and regular rhythm.    Murmur (2/6)  heard.  Pulmonary/Chest: No stridor. No respiratory distress. She has no wheezes. She has no rales.   Abdominal: Soft. She exhibits no distension. There is no tenderness.   Musculoskeletal: She exhibits no edema or tenderness.   Neurological: She is alert and oriented to person, place, and time. No cranial nerve deficit.   No gross focal weakness   Skin: Skin is warm and dry. She is not diaphoretic.   Vitals reviewed.      Fluids    Intake/Output Summary (Last 24 hours) at 03/04/19 1021  Last data filed at 03/04/19 0952   Gross per 24 hour   Intake                0 ml   Output              600 ml   Net             -600 ml       Laboratory      Recent Labs      03/02/19   0249  03/03/19   0333   SODIUM  141  141   POTASSIUM  4.1  3.7   CHLORIDE  104  102   CO2  30  31   GLUCOSE  100*  102*   BUN  34*  23*   CREATININE  1.03  0.72   CALCIUM  9.9  9.1     Recent Labs      03/02/19   0249  03/03/19   0333  03/04/19   0233   INR  2.68*  1.87*  1.56*               Imaging  DX-CHEST-PORTABLE (1 VIEW)   Final Result         1. Stable cardiomegaly.      DX-CHEST-PORTABLE (1 VIEW)   Final Result      1.  Hypoinflation without evidence for pneumonia or pulmonary edema.   2.  Stable multichamber cardiac enlargement.      EC-PAGE W/O CONT   Final Result      DX-CHEST-LIMITED (1 VIEW)   Final Result      1.  No acute cardiopulmonary disease.   2.  Stable cardiomegaly.           Assessment/Plan  * Acute systolic congestive heart failure (HCC)- (present on admission)   Assessment & Plan    Echocardiogram reveals an ejection fraction 20% with severe mitral valve regurg.  Lasix,  Cozaar, Aldactone stopped due to hypotension and dyspnea resolving   Continue digoxin.  NSVT, asymptomatic mag low at 1.7-give IV magnesium and  increase Mag-Ox to 400 mg  3 times daily.  Lopressor just restarted per cardiology . Cardiology re eval if significant recurrent Vtach.  Dr. Li will evaluate for Entresto outpatient, although use may be limited by  low blood pressure.          Non-rheumatic mitral regurgitation- (present on admission)   Assessment & Plan    Noted on transesophageal echocardiogram     Chronic respiratory failure with hypoxia (HCC)- (present on admission)   Assessment & Plan    Likely multifactorial COPD, CHF, clinically better  O2 requirements at baseline       Atrial flutter (HCC)- (present on admission)   Assessment & Plan    Patient is post recent PAGE revealing no thrombosis.  History of maze and left atrial appendage ligation.  Has maintained a sinus rhythm.  Continue digoxin  Stop warfarin per cardiology recs.  Start Asa daily .     COPD (chronic obstructive pulmonary disease) (HCC)- (present on admission)   Assessment & Plan    History of.  Clinically better-  no significant wheezes  on exam.  Completed prednisone  Continue Symbicort, Xopenex, Spiriva   RT protocols     Leukocytosis   Assessment & Plan    Suspect steroid effect, possible UTI-WBC normalized.   UA may suggest UTI.  Patient without urinary symptoms although may be unreliable elderly.  Complete 5-day course antibiotics with Omnicef     DNR (do not resuscitate)- (present on admission)   Assessment & Plan    Per patient's wishes, DNR status has been established     Debility   Assessment & Plan    Due to heart failure, COPD, chronic respiratory failure, deconditioning. Family concerned that  cannot care for wife at home.   PT/OT re rainal  ROSALVA for discharge planning, possible SNF for rehab vs home with home health.      Anxiety- (present on admission)   Assessment & Plan    Continue PRN Ativan  Schedule Zoloft          VTE prophylaxis: Warfarin    Discussed with , family plan of care.

## 2019-03-04 NOTE — DISCHARGE PLANNING
Agency/Facility Name: [facility]  Outcome: Left message, awaiting call back    @1600  Agency/Facility Name: [facility]  Spoke To: Sophia  Outcome: Declined due to care exceeds capacity.

## 2019-03-05 LAB
ANION GAP SERPL CALC-SCNC: 5 MMOL/L (ref 0–11.9)
BUN SERPL-MCNC: 22 MG/DL (ref 8–22)
CALCIUM SERPL-MCNC: 10 MG/DL (ref 8.5–10.5)
CHLORIDE SERPL-SCNC: 100 MMOL/L (ref 96–112)
CO2 SERPL-SCNC: 34 MMOL/L (ref 20–33)
CREAT SERPL-MCNC: 0.9 MG/DL (ref 0.5–1.4)
GLUCOSE SERPL-MCNC: 103 MG/DL (ref 65–99)
MAGNESIUM SERPL-MCNC: 2 MG/DL (ref 1.5–2.5)
POTASSIUM SERPL-SCNC: 4.2 MMOL/L (ref 3.6–5.5)
SODIUM SERPL-SCNC: 139 MMOL/L (ref 135–145)

## 2019-03-05 PROCEDURE — 700102 HCHG RX REV CODE 250 W/ 637 OVERRIDE(OP): Performed by: NURSE PRACTITIONER

## 2019-03-05 PROCEDURE — 83735 ASSAY OF MAGNESIUM: CPT

## 2019-03-05 PROCEDURE — 770020 HCHG ROOM/CARE - TELE (206)

## 2019-03-05 PROCEDURE — 700102 HCHG RX REV CODE 250 W/ 637 OVERRIDE(OP): Performed by: HOSPITALIST

## 2019-03-05 PROCEDURE — A9270 NON-COVERED ITEM OR SERVICE: HCPCS | Performed by: NURSE PRACTITIONER

## 2019-03-05 PROCEDURE — 94640 AIRWAY INHALATION TREATMENT: CPT

## 2019-03-05 PROCEDURE — 99233 SBSQ HOSP IP/OBS HIGH 50: CPT | Performed by: INTERNAL MEDICINE

## 2019-03-05 PROCEDURE — 36415 COLL VENOUS BLD VENIPUNCTURE: CPT

## 2019-03-05 PROCEDURE — 80048 BASIC METABOLIC PNL TOTAL CA: CPT

## 2019-03-05 PROCEDURE — 700101 HCHG RX REV CODE 250: Performed by: NURSE PRACTITIONER

## 2019-03-05 PROCEDURE — A9270 NON-COVERED ITEM OR SERVICE: HCPCS | Performed by: HOSPITALIST

## 2019-03-05 RX ORDER — LEVALBUTEROL INHALATION SOLUTION 1.25 MG/3ML
1.25 SOLUTION RESPIRATORY (INHALATION)
Status: DISCONTINUED | OUTPATIENT
Start: 2019-03-05 | End: 2019-03-08 | Stop reason: HOSPADM

## 2019-03-05 RX ORDER — POTASSIUM CHLORIDE 750 MG/1
10 TABLET, EXTENDED RELEASE ORAL
Qty: 30 TAB | Refills: 1 | Status: SHIPPED | OUTPATIENT
Start: 2019-03-05 | End: 2019-04-01

## 2019-03-05 RX ORDER — FUROSEMIDE 20 MG/1
20 TABLET ORAL
Qty: 30 TAB | Refills: 1 | Status: SHIPPED | OUTPATIENT
Start: 2019-03-05 | End: 2019-07-22

## 2019-03-05 RX ADMIN — LEVALBUTEROL 1.25 MG: 1.25 SOLUTION RESPIRATORY (INHALATION) at 07:53

## 2019-03-05 RX ADMIN — DIGOXIN 125 MCG: 125 TABLET ORAL at 18:03

## 2019-03-05 RX ADMIN — METOPROLOL SUCCINATE 25 MG: 25 TABLET, EXTENDED RELEASE ORAL at 18:04

## 2019-03-05 RX ADMIN — MAGNESIUM OXIDE TAB 400 MG (241.3 MG ELEMENTAL MG) 400 MG: 400 (241.3 MG) TAB at 18:03

## 2019-03-05 RX ADMIN — LORAZEPAM 0.5 MG: 1 TABLET ORAL at 22:49

## 2019-03-05 RX ADMIN — BUDESONIDE AND FORMOTEROL FUMARATE DIHYDRATE 2 PUFF: 160; 4.5 AEROSOL RESPIRATORY (INHALATION) at 07:54

## 2019-03-05 RX ADMIN — ATORVASTATIN CALCIUM 40 MG: 40 TABLET, FILM COATED ORAL at 18:03

## 2019-03-05 RX ADMIN — TIOTROPIUM BROMIDE 1 CAPSULE: 18 CAPSULE ORAL; RESPIRATORY (INHALATION) at 07:54

## 2019-03-05 RX ADMIN — ASPIRIN 81 MG 81 MG: 81 TABLET ORAL at 05:33

## 2019-03-05 RX ADMIN — MAGNESIUM OXIDE TAB 400 MG (241.3 MG ELEMENTAL MG) 400 MG: 400 (241.3 MG) TAB at 12:59

## 2019-03-05 RX ADMIN — SERTRALINE HYDROCHLORIDE 100 MG: 100 TABLET ORAL at 05:33

## 2019-03-05 RX ADMIN — BUDESONIDE AND FORMOTEROL FUMARATE DIHYDRATE 2 PUFF: 160; 4.5 AEROSOL RESPIRATORY (INHALATION) at 18:04

## 2019-03-05 RX ADMIN — MAGNESIUM OXIDE TAB 400 MG (241.3 MG ELEMENTAL MG) 400 MG: 400 (241.3 MG) TAB at 05:34

## 2019-03-05 ASSESSMENT — ENCOUNTER SYMPTOMS
NAUSEA: 0
BACK PAIN: 0
VOMITING: 0
SHORTNESS OF BREATH: 0
NECK PAIN: 0
NERVOUS/ANXIOUS: 1
ABDOMINAL PAIN: 0
WEAKNESS: 1
DIAPHORESIS: 0
MYALGIAS: 0
CHILLS: 0
HEADACHES: 0
PALPITATIONS: 0
FOCAL WEAKNESS: 0
FEVER: 0

## 2019-03-05 NOTE — DISCHARGE SUMMARY
Hospital Medicine Discharge Note     Admit Date:  2/25/2019       Discharge Date:   3/8/2019    Attending Physician:  Sandra Brewer D.O.      Diagnoses (includes active and resolved):     Principal Problem:    Acute systolic congestive heart failure (HCC) POA: Yes  Active Problems:    Non-rheumatic mitral regurgitation POA: Yes    COPD (chronic obstructive pulmonary disease) (HCC) POA: Yes    Obstructive sleep apnea syndrome POA: Yes    Atrial flutter (HCC) POA: Yes    Chronic respiratory failure with hypoxia (HCC) POA: Yes    Hyperlipidemia POA: Yes    Anxiety POA: Yes    Long term (current) use of anticoagulants [Z79.01] POA: Yes    Debility POA: Unknown    DNR (do not resuscitate) POA: Yes    Leukocytosis POA: Unknown    Hypotension due to hypovolemia POA: Unknown  Resolved Problems:  Atrial flutter  Leukocytosis  Hypotension    Hospital Summary (Brief Narrative):       73-year-old female history of mitral valve repair, PAF, COPD, mitral valve prolapse s/p MV repair and MAZE with ERI ligation in 4/2017, COPD, hypertension initially transferred from [facility] with shortness of breath.  Patient initially was hypotensive requiring IV fluid bolus.  Following admission to [facility] she was given diuresis for CHF and transferred to [facility] for PAGE.  Patient downgraded to telemetry floor.  She had hypotension and her Aldactone, Lasix, ARB were discontinued.  She had episodes of nonsustained V. tach, asymptomatic and Lopressor restarted.  She was given IV magnesium for low levels and will be continued on magnesium supplements.  She completed treatment for possible UTI.  O2 requirements decreased at baseline.  She remained in sinus rhythm.  PAGE earlier at [facility] revealed mitral regurg and signs of atrial appendage ligation.  Cardiology recommended stopping Coumadin and continue aspirin as she has been in sinus rhythm.  she has continued debility and felt  cannot care for her will be  transferred to nursing facility for ongoing rehab.  On day of discharge patient was stable and transfer to nursing facility.    3/5 addendum : She is noted to have occasional SVT, last noted was 6 beats.  As per cardiology, continue beta-blocker use.  Recommendation is also to continue Lasix 20 mg as needed for weight gain of greater than 2 pounds or dyspnea on exertion with lower extremity edema.  At this point patient appears stable for transfer to skilled nursing facility when bed available.     Consultants:        Travis Harvey, cardiology , Dr Naranjo cardiology   Dr. Gonda, Pulmonary     Imaging/ Testing:      DX-CHEST-PORTABLE (1 VIEW)   Final Result         1. Stable cardiomegaly.      DX-CHEST-PORTABLE (1 VIEW)   Final Result      1.  Hypoinflation without evidence for pneumonia or pulmonary edema.   2.  Stable multichamber cardiac enlargement.      EC-PAGE W/O CONT   Final Result      DX-CHEST-LIMITED (1 VIEW)   Final Result      1.  No acute cardiopulmonary disease.   2.  Stable cardiomegaly.            Procedures:              Discharge Medications:             Medication List      START taking these medications      Instructions   aspirin 81 MG Chew chewable tablet  Commonly known as:  ASA   Take 1 Tab by mouth every day.  Dose:  81 mg     furosemide 20 MG Tabs  Commonly known as:  LASIX   Take 1 Tab by mouth 1 time daily as needed (for weight gain > 2 lbs).  Dose:  20 mg     levalbuterol 1.25 MG/3ML Nebu  Commonly known as:  XOPENEX   3 mL by Nebulization route every 6 hours as needed for Shortness of Breath.  Dose:  1.25 mg     magnesium oxide 400 (241.3 Mg) MG Tabs tablet  Commonly known as:  MAG-OX  Replaces:  magnesium oxide 400 MG Tabs   Take 1 Tab by mouth every day.  Dose:  400 mg     metoprolol SR 25 MG Tb24  Commonly known as:  TOPROL XL   Take 1 Tab by mouth every evening.  Dose:  25 mg     potassium chloride SA 10 MEQ Tbcr  Commonly known as:  K-DUR   Take 1 Tab by mouth 1 time daily as needed  (only when on Lasix).  Dose:  10 mEq     senna-docusate 8.6-50 MG Tabs  Commonly known as:  PERICOLACE or SENOKOT S   Take 2 Tabs by mouth 2 Times a Day.  Dose:  2 Tab        CONTINUE taking these medications      Instructions   albuterol 108 (90 Base) MCG/ACT Aers inhalation aerosol   Inhale 2 Puffs by mouth every 6 hours as needed for Shortness of Breath.  Dose:  2 Puff     atorvastatin 40 MG Tabs  Commonly known as:  LIPITOR   Take 1 Tab by mouth every day.  Dose:  40 mg     fluticasone-salmeterol 250-50 MCG/DOSE Aepb  Commonly known as:  ADVAIR   Inhale 1 Puff by mouth 2 times a day.  Dose:  1 Puff     sertraline 100 MG Tabs  Commonly known as:  ZOLOFT   Take 1 Tab by mouth every day.  Dose:  100 mg     Tiotropium Bromide Monohydrate 1.25 MCG/ACT Aers  Commonly known as:  SPIRIVA RESPIMAT   Inhale 2 Puffs by mouth every day.  Dose:  2 Puff     TYLENOL 500 MG Tabs  Generic drug:  acetaminophen   Take 500 mg by mouth 1 time daily as needed. Headache  Dose:  500 mg        STOP taking these medications    magnesium oxide 400 MG Tabs  Commonly known as:  MAG-OX  Replaced by:  magnesium oxide 400 (241.3 Mg) MG Tabs tablet     Magnesium Oxide 500 MG Tabs     warfarin 2.5 MG Tabs  Commonly known as:  COUMADIN        ASK your doctor about these medications      Instructions   LORazepam 0.5 MG Tabs  Commonly known as:  ATIVAN  Ask about: Should I take this medication?   Take 1 Tab by mouth every 8 hours as needed for Anxiety for up to 3 days.  Dose:  0.5 mg               Diet:       DIET ORDERS (Through next 24h)    Start     Ordered    03/03/19 0734  Diet Order Cardiac  START AT BREAKFAST     Question:  Diet:  Answer:  Cardiac    03/03/19 0734            Activity:   As tolerated and directed by skilled nursing.      Code status:   DNR    Primary Care Provider:    Ritika Jurado M.D.    Follow up appointment details :      .  Ritika Jurado M.D.  [address]  "[id number]  [phone]    In 2 weeks      Dalia Li M.D.  [address]  [address]  [place] [id number]  [phone]    On 3/15/2019      Future Appointments  Date Time Provider Department Center   3/6/2019 2:40 PM Ritika Jurado M.D. [facility] None   3/15/2019 8:15 AM Dalia Li M.D. [facility] None   3/21/2019 11:15 AM [facility] PHARMACIST [facility]   8/13/2019 11:00 AM Alexsander Gardner M.D. [facility] None           Time spent on discharge day patient visit: 40 minutes    3/6 Addendum: improving strength. Minimal support at home.  Plan for transfer to snf when accepted.  POC reviewed extensively with pt/sister and dtr Yudy.  Medication reconciliation to be reviewed with family and staff.  Dtr continues to report concerns with \"communication breakdown\", but has spoken to several hospital staff, including my RN, staff RN and myself regarding POC.  Pt and sister at bedside, appreciate of updates, all questions and concerns addressed.  Pt's dtr continues to express lack comfort with \"communication\".  RN has reviewed with dtr POC again.  Pt is feeling better and states her dtr talks a lot without listening to explanations, results reviewed with family/dtr multiple times by myself and staff.    March 8: Addendum: During the course of her stay, there were issues with insurance coverage of skilled nursing facility placement.  Patient's strength continued to improve.  She is now requesting for discharge home with home health.  She does report 24-hour assistance with family support.  She has been ambulating with staff with minimal support.  She will discharged home with home health today.    #################################################        "

## 2019-03-05 NOTE — CARE PLAN
Problem: Oxygenation:  Goal: Maintain adequate oxygenation dependent on patient condition  Outcome: PROGRESSING AS EXPECTED  Pt resting well on 1.5L with a sat of 95, no resp distress noted.     Problem: Bronchoconstriction:  Goal: Improve in air movement and diminished wheezing  Outcome: PROGRESSING AS EXPECTED  QID xop.

## 2019-03-05 NOTE — CARE PLAN
Problem: Respiratory:  Goal: Respiratory status will improve  Outcome: PROGRESSING AS EXPECTED    Intervention: Administer and titrate oxygen therapy  Patient currently at 2.5L of O2, which is what she wears at home.  Intervention: Educate and encourage coughing and deep breathing  Using IS and aerobika  Intervention: Educate and encourage incentive spirometry usage  Best volume is 1500 for my shift.  Intervention: Collaborate with respiratory therapist and Interdisciplinary Team on treatment measures to improve respiratory function  Currently doing

## 2019-03-05 NOTE — DISCHARGE PLANNING
Agency[facility] Name[facility]  Spoke To: Carlin  Outcome: Checking bed availability after morning meeting.    @1100  [facility]  Spoke To: Carlin  Outcome: No open beds today.

## 2019-03-05 NOTE — DISCHARGE SUMMARY
Discharge Summary    CHIEF COMPLAINT ON ADMISSION  Chief Complaint   Patient presents with   • Shortness of Breath   • Sent from Urgent Care       Reason for Admission  Sent by      Admission Date  2/21/2019    CODE STATUS  DNAR/DNI    HPI & HOSPITAL COURSE  This is a 73 y.o. female here with dyspnea. See h/p dictated for transfer to main.

## 2019-03-05 NOTE — PROGRESS NOTES
Monitor Room Report  SR w/PVC's Trig, Big, Coup  Had runs of SVT. Longest run was 6 beats. Physician is aware. Patient was asymptomatic. Mg 1.7 replenished Mg.  HR 76-96  WY 0.14  QRS .08  QT 0.36

## 2019-03-06 ENCOUNTER — APPOINTMENT (OUTPATIENT)
Dept: MEDICAL GROUP | Facility: LAB | Age: 74
End: 2019-03-06
Payer: MEDICARE

## 2019-03-06 PROCEDURE — A9270 NON-COVERED ITEM OR SERVICE: HCPCS | Performed by: NURSE PRACTITIONER

## 2019-03-06 PROCEDURE — 97535 SELF CARE MNGMENT TRAINING: CPT

## 2019-03-06 PROCEDURE — A9270 NON-COVERED ITEM OR SERVICE: HCPCS | Performed by: HOSPITALIST

## 2019-03-06 PROCEDURE — 770020 HCHG ROOM/CARE - TELE (206)

## 2019-03-06 PROCEDURE — 700102 HCHG RX REV CODE 250 W/ 637 OVERRIDE(OP): Performed by: HOSPITALIST

## 2019-03-06 PROCEDURE — 700101 HCHG RX REV CODE 250: Performed by: HOSPITALIST

## 2019-03-06 PROCEDURE — 99233 SBSQ HOSP IP/OBS HIGH 50: CPT | Performed by: INTERNAL MEDICINE

## 2019-03-06 PROCEDURE — 94760 N-INVAS EAR/PLS OXIMETRY 1: CPT

## 2019-03-06 PROCEDURE — 700102 HCHG RX REV CODE 250 W/ 637 OVERRIDE(OP): Performed by: NURSE PRACTITIONER

## 2019-03-06 PROCEDURE — 94640 AIRWAY INHALATION TREATMENT: CPT

## 2019-03-06 RX ADMIN — BUDESONIDE AND FORMOTEROL FUMARATE DIHYDRATE 2 PUFF: 160; 4.5 AEROSOL RESPIRATORY (INHALATION) at 05:56

## 2019-03-06 RX ADMIN — LEVALBUTEROL 1.25 MG: 1.25 SOLUTION RESPIRATORY (INHALATION) at 17:04

## 2019-03-06 RX ADMIN — BUDESONIDE AND FORMOTEROL FUMARATE DIHYDRATE 2 PUFF: 160; 4.5 AEROSOL RESPIRATORY (INHALATION) at 19:00

## 2019-03-06 RX ADMIN — METOPROLOL SUCCINATE 25 MG: 25 TABLET, EXTENDED RELEASE ORAL at 17:25

## 2019-03-06 RX ADMIN — SERTRALINE HYDROCHLORIDE 100 MG: 100 TABLET ORAL at 05:57

## 2019-03-06 RX ADMIN — ATORVASTATIN CALCIUM 40 MG: 40 TABLET, FILM COATED ORAL at 17:25

## 2019-03-06 RX ADMIN — LORAZEPAM 0.5 MG: 1 TABLET ORAL at 22:08

## 2019-03-06 RX ADMIN — ASPIRIN 81 MG 81 MG: 81 TABLET ORAL at 05:59

## 2019-03-06 RX ADMIN — TIOTROPIUM BROMIDE 1 CAPSULE: 18 CAPSULE ORAL; RESPIRATORY (INHALATION) at 05:56

## 2019-03-06 RX ADMIN — MAGNESIUM OXIDE TAB 400 MG (241.3 MG ELEMENTAL MG) 400 MG: 400 (241.3 MG) TAB at 05:57

## 2019-03-06 RX ADMIN — DIGOXIN 125 MCG: 125 TABLET ORAL at 17:25

## 2019-03-06 RX ADMIN — MAGNESIUM OXIDE TAB 400 MG (241.3 MG ELEMENTAL MG) 400 MG: 400 (241.3 MG) TAB at 17:25

## 2019-03-06 RX ADMIN — MAGNESIUM OXIDE TAB 400 MG (241.3 MG ELEMENTAL MG) 400 MG: 400 (241.3 MG) TAB at 12:17

## 2019-03-06 ASSESSMENT — ENCOUNTER SYMPTOMS
DEPRESSION: 0
HEADACHES: 0
WHEEZING: 0
BACK PAIN: 0
MYALGIAS: 0
ABDOMINAL PAIN: 0
NERVOUS/ANXIOUS: 1
SHORTNESS OF BREATH: 0
FEVER: 0
FOCAL WEAKNESS: 0
COUGH: 0
HEARTBURN: 0
WEAKNESS: 1
DIZZINESS: 0
CHILLS: 0

## 2019-03-06 ASSESSMENT — COGNITIVE AND FUNCTIONAL STATUS - GENERAL
CLIMB 3 TO 5 STEPS WITH RAILING: A LITTLE
SUGGESTED CMS G CODE MODIFIER MOBILITY: CI
MOBILITY SCORE: 23

## 2019-03-06 ASSESSMENT — GAIT ASSESSMENTS
GAIT LEVEL OF ASSIST: SUPERVISED
DISTANCE (FEET): 50

## 2019-03-06 NOTE — THERAPY
"Physical Therapy Treatment completed.   Bed Mobility:  Supine to Sit: Modified Independent (HOB flat, no rail)  Transfers: Sit to Stand: Supervised  Gait: Level Of Assist: Supervised with No Equipment Needed       Plan of Care: Patient with no further skilled PT needs in the acute care setting at this time  Discharge Recommendations: Equipment: No Equipment Needed. Post-acute therapy: see below.    See \"Rehab Therapy-Acute\" Patient Summary Report for complete documentation.     Patient seen for PT treatment upon request of case management given patient's insistence on DCing to SNF. Patient ambulated approximately 250ft in unit without AD and supervision with no LOB, required standing rest breaks which patient initiated appropriately without cueing. Patient at supervision and mod I level for bed mobility and transfers. Patient able to verbalize some of prior cardiac education from prior session though demonstrated poor insight at times. Patient reported concerns regarding returning home given spouse is 80, currently has diarrhea, and is not proactive in helping care for patient and household (examples given during conversation included he goes to the grocery store at 4pm when he is exhausted, is not open to utilizing a grocery delivery service, and he wouldn't move an object out of the path of the patient if it was in the way). Attempted to provide education regarding role of therapy in acute, sub acute, and home settings, patient not receptive and dismissed this therapist during conversation with obvious frustration. Continue to recommend outpatient cardiac rehab education following DC, and patient may benefit from home health PT for home assessment and progression of functional mobility within home setting. No further acute PT needs.  "

## 2019-03-06 NOTE — PROGRESS NOTES
Report received at bedside, patient care assumed. Tele box on. Patient resting in bed, updated on POC, no requests at this time. Fall precautions in place with bed in lowest position, treaded socks on, and call light within reach.

## 2019-03-06 NOTE — DISCHARGE PLANNING
[facility] called and said that their director decided that she will go ahead and authorize 5 days at a SNF.  I went to the patient's room and told the patient and her sister.  [facility] [place], is in contact with [facility] to see when they can take her. This RN CM sent a Tiger Text to Dr. Brewer to let her know of the above.

## 2019-03-06 NOTE — FACE TO FACE
Face to Face Supporting Documentation - Home Health    The encounter with this patient was in whole or in part the primary reason for home health admission.    Date of encounter:   Patient:                    MRN:                       YOB: 2020  Kelly Lovett  [id number]  1945     Home health to see patient for:  Skilled Nursing care for assessment, interventions & education, Physical Therapy evaluation and treatment and Occupational therapy evaluation and treatment    Skilled need for:  Exacerbation of Chronic Disease State chf    Skilled nursing interventions to include:  Comment: pt/ot    Homebound status evidenced by:  Needs the assistance of another person in order to leave the home. Leaving home requires a considerable and taxing effort. There is a normal inability to leave the home.    Community Physician to provide follow up care: Ritika Jurado M.D.     Optional Interventions? No      I certify the face to face encounter for this home health care referral meets the CMS requirements and the encounter/clinical assessment with the patient was, in whole, or in part, for the medical condition(s) listed above, which is the primary reason for home health care. Based on my clinical findings: the service(s) are medically necessary, support the need for home health care, and the homebound criteria are met.  I certify that this patient has had a face to face encounter by myself.  Sandra Brewer D.O. - NPI: [id number]

## 2019-03-06 NOTE — DISCHARGE PLANNING
Agency/Facility Name: [facility]  Spoke To: Vijaya  Outcome: Requesting new order and referral.

## 2019-03-06 NOTE — PROGRESS NOTES
"Hospital Medicine Daily Progress Note    Date of Service  3/6/2019    Chief Complaint  73 y.o. female admitted 2/25/2019 with shortness of breath.    Hospital Course    73-year-old female history of mitral valve repair, COPD, hypertension initially transferred from [facility] with shortness of breath.  Patient initially was hypotensive requiring IV fluid bolus.  Following admission to [facility] she was given diuresis for CHF and transferred to [facility] for PAGE.  Patient downgraded to telemetry floor.    Interval Problem Update  Min sob at rest  Ambulated with staff  Pt feels better, but frustrated about dc plan home, as she is not at her baseline strength    POC reviewed extensively with pt/sister and dtr Yudy.  Medication reconciliation to be reviewed with family and staff.  Dtr continues to report concerns with \"communication breakdown\", but has spoken to several hospital staff, including my RN, staff RN and myself regarding POC.  Pt and sister at bedside, appreciate of updates, all questions and concerns addressed.  Pt's dtr continues to express lack comfort with \"communication\".  RN has reviewed with dtr POC again.  Pt is feeling better and states her dtr talks a lot without listening to explanations, results reviewed with family/dtr multiple times by myself and staff.    Additional time spent 65 min.        Consultants/Specialty    Dr. Li, Cardiology     Code Status  DNR      Disposition    Anticipate DC to skilled nursing facility, pending bed availability   assisting    Review of Systems  Review of Systems   Constitutional: Negative for chills and fever.   HENT: Negative for congestion.    Respiratory: Negative for cough, shortness of breath and wheezing.    Cardiovascular: Negative for chest pain and leg swelling.   Gastrointestinal: Negative for abdominal pain and heartburn.   Musculoskeletal: Negative for back pain and myalgias.   Neurological: Positive for weakness " (Generalized). Negative for dizziness, focal weakness and headaches.   Psychiatric/Behavioral: Negative for depression. The patient is nervous/anxious.         Physical Exam  Temp:  [36.1 °C (96.9 °F)-37.1 °C (98.7 °F)] 37.1 °C (98.7 °F)  Pulse:  [65-91] 65  Resp:  [16-18] 18  BP: ()/(60-88) 110/78  SpO2:  [93 %-100 %] 94 %    Physical Exam   Constitutional: She is oriented to person, place, and time. No distress.   Alert, appropriate oriented   HENT:   Head: Normocephalic and atraumatic.   Eyes: Pupils are equal, round, and reactive to light. EOM are normal. Left eye exhibits no discharge.   Neck: Normal range of motion. Neck supple.   Cardiovascular: Normal rate and regular rhythm.    Murmur (2/6) heard.  Pulmonary/Chest: Effort normal. No respiratory distress.   Abdominal: Soft. She exhibits no distension. There is no tenderness.   Musculoskeletal: She exhibits no edema.   Neurological: She is alert and oriented to person, place, and time. No cranial nerve deficit.   No gross focal weakness   Skin: Skin is warm and dry. There is pallor.   Psychiatric: She has a normal mood and affect. Her behavior is normal. Judgment normal.   Vitals reviewed.      Fluids    Intake/Output Summary (Last 24 hours) at 03/06/19 1547  Last data filed at 03/06/19 0900   Gross per 24 hour   Intake              480 ml   Output              300 ml   Net              180 ml       Laboratory      Recent Labs      03/04/19   1100  03/05/19   0253   SODIUM  141  139   POTASSIUM  4.2  4.2   CHLORIDE  103  100   CO2  29  34*   GLUCOSE  137*  103*   BUN  21  22   CREATININE  0.67  0.90   CALCIUM  9.4  10.0     Recent Labs      03/04/19   0233   INR  1.56*               Imaging  DX-CHEST-PORTABLE (1 VIEW)   Final Result         1. Stable cardiomegaly.      DX-CHEST-PORTABLE (1 VIEW)   Final Result      1.  Hypoinflation without evidence for pneumonia or pulmonary edema.   2.  Stable multichamber cardiac enlargement.      EC-PAGE W/O CONT  "  Final Result      DX-CHEST-LIMITED (1 VIEW)   Final Result      1.  No acute cardiopulmonary disease.   2.  Stable cardiomegaly.           Assessment/Plan  * Acute systolic congestive heart failure (HCC)- (present on admission)   Assessment & Plan    Echocardiogram reveals an ejection fraction 20% with severe mitral valve regurg.  Lasix,  Cozaar, Aldactone stopped due to hypotension and dyspnea resolving   Continue digoxin.  NSVT, asymptomatic mag low at 1.7-give IV magnesium and  increase Mag-Ox to 400 mg  3 times daily.  Lopressor just restarted per cardiology . Cardiology re eval if significant recurrent Vtach.  Dr. Li will evaluate for Entresto outpatient, although use may be limited by low blood pressure.     3/5 improving shortness of breath, continue Lasix    3/6 improving, dc to snf vs home with home health    3/6 Addendum: improving strength. Minimal support at home.  Plan for transfer to snf when accepted.  POC reviewed extensively with pt/sister and dtr Yudy.  Medication reconciliation to be reviewed with family and staff.  Dtr continues to report concerns with \"communication breakdown\", but has spoken to several hospital staff, including my RN, staff RN and myself regarding POC.  Pt and sister at bedside, appreciate of updates, all questions and concerns addressed.  Pt's dtr continues to express lack comfort with \"communication\".  RN has reviewed with dtr POC again.  Pt is feeling better and states her dtr talks a lot without listening to explanations, results reviewed with family/dtr multiple times by myself and staff.    Additional time spent 65 min.         Non-rheumatic mitral regurgitation- (present on admission)   Assessment & Plan    Noted on transesophageal echocardiogram     Chronic respiratory failure with hypoxia (HCC)- (present on admission)   Assessment & Plan    Likely multifactorial COPD, CHF, clinically better  O2 requirements at baseline       Atrial flutter (HCC)- (present on " admission)   Assessment & Plan    Patient is post recent PAGE revealing no thrombosis.  History of maze and left atrial appendage ligation.  Has maintained a sinus rhythm.  Continue digoxin  Stop warfarin per cardiology recs.  Start Asa daily .     COPD (chronic obstructive pulmonary disease) (HCC)- (present on admission)   Assessment & Plan    History of.  Clinically better-  no significant wheezes  on exam.  Completed prednisone  Continue Symbicort, Xopenex, Spiriva   RT protocols     Leukocytosis   Assessment & Plan    Suspect steroid effect, possible UTI-WBC normalized.   UA may suggest UTI.  Patient without urinary symptoms although may be unreliable elderly.  Complete 5-day course antibiotics with Omnicef     DNR (do not resuscitate)- (present on admission)   Assessment & Plan    Per patient's wishes, DNR status has been established     Debility   Assessment & Plan    Due to heart failure, COPD, chronic respiratory failure, deconditioning. Family concerned that  cannot care for wife at home.   PT/OT re eval  SW for discharge planning, possible SNF for rehab vs home with home health.    3/5 require skilled nursing facility placement, pending bed availability      Anxiety- (present on admission)   Assessment & Plan    Continue PRN Ativan  Schedule Zoloft          VTE prophylaxis: Warfarin    Discussed with , family plan of care.

## 2019-03-06 NOTE — CARE PLAN
Problem: Safety  Goal: Will remain free from injury  Outcome: PROGRESSING AS EXPECTED  Educated patient on use of call light, no slip socks on, bed lowest position. All needs attended to. Patient verbalized understanding.     Problem: Fluid Volume:  Goal: Will maintain balanced intake and output  Outcome: PROGRESSING AS EXPECTED  Reinforced the importance of monitoring I/Os and the need to call for assistance with toileting. Patient verbalized understanding and demonstrated call light use.

## 2019-03-06 NOTE — DISCHARGE PLANNING
Agency/Facility Name: [facility]  Outcome: Sent updated referral and HH order + Face to Face.    @1210  Agency/Facility Name: [facility]  Spoke To: Alicia  Outcome: Accepted and will see patient with in 24-48 once she is home.    @9654  Agency/Facility Name: [facility]   Spoke To: Alicia  Outcome: Informed patient is going SNF.    Agency[facility]Facility Name: [facility]  Outcome: Left message, awaiting call back.

## 2019-03-06 NOTE — DISCHARGE PLANNING
Anticipated Discharge Disposition: To skilled nursing when bed available at [facility] and after updated PT note is completed.    Action: This RN CM spoke with Kristel at [facility] and she advised that we need an updated PT note and a recommendation as to whether or not the patient still needs skilled nursing.  I paged Anai, who had [facility] for PT today.  When she calls back I will ask her to see the patient.     Barriers to Discharge: Needs to be accepted at [facility] and needs an update PT note.    Plan: To skilled nursing if still recommended by PT and when [facility] has a bed.    Update: This RN CM spoke with the physical therapist about seeing the patient again today.  PT said that the patient doesn't meet skilled criteria and that she will advise the patient and daughter of this.  I called Kristel back at [facility] to let her know that the patient won't be going to skilled nursing because she doesn't meet the criteria.  IMM was signed today. Will proceed with discharge home with [facility].  We will notify them of the discharge.

## 2019-03-06 NOTE — PROGRESS NOTES
[facility] Medicine Daily Progress Note    Date of Service  3/5/2019    Chief Complaint  73 y.o. female admitted 2/25/2019 with shortness of breath.    Hospital Course    73-year-old female history of mitral valve repair, COPD, hypertension initially transferred from [facility] with shortness of breath.  Patient initially was hypotensive requiring IV fluid bolus.  Following admission to [facility] she was given diuresis for CHF and transferred to [facility] for PAGE.  Patient downgraded to telemetry floor.    Interval Problem Update    SVT this a.m., asymptomatic  Improving strength and shortness of breath  Family at bedside, plan of care reviewed      Consultants/Specialty    Dr. Li, Cardiology     Code Status  DNR      Disposition    Anticipate DC to skilled nursing facility, pending bed availability   assisting    Review of Systems  Review of Systems   Constitutional: Negative for chills, diaphoresis, fever and malaise/fatigue.   HENT: Negative for congestion.    Respiratory: Negative for shortness of breath.    Cardiovascular: Negative for palpitations and leg swelling.   Gastrointestinal: Negative for abdominal pain, nausea and vomiting.   Musculoskeletal: Negative for back pain, myalgias and neck pain.   Neurological: Positive for weakness (Generalized). Negative for focal weakness and headaches.   Psychiatric/Behavioral: The patient is nervous/anxious.         Physical Exam  Temp:  [36.2 °C (97.1 °F)-36.4 °C (97.5 °F)] 36.3 °C (97.4 °F)  Pulse:  [64-93] 77  Resp:  [16-18] 18  BP: (104-118)/(59-88) 116/88  SpO2:  [92 %-100 %] 97 %    Physical Exam   Constitutional: She is oriented to person, place, and time. No distress.   Alert, appropriate oriented   HENT:   Head: Normocephalic and atraumatic.   Eyes: Pupils are equal, round, and reactive to light. EOM are normal.   Neck: Normal range of motion. Neck supple. No JVD present.   Cardiovascular: Normal rate and regular rhythm.    Murmur  (2/6) heard.  Pulmonary/Chest: Effort normal. No respiratory distress. She has no wheezes.   Abdominal: Soft. She exhibits no distension.   Musculoskeletal: She exhibits no edema or tenderness.   Neurological: She is alert and oriented to person, place, and time. No cranial nerve deficit. Coordination normal.   No gross focal weakness   Skin: Skin is warm and dry. No rash noted. She is not diaphoretic. No erythema. There is pallor.   Psychiatric: She has a normal mood and affect. Her behavior is normal.   Vitals reviewed.      Fluids    Intake/Output Summary (Last 24 hours) at 03/05/19 1603  Last data filed at 03/04/19 2200   Gross per 24 hour   Intake              360 ml   Output                0 ml   Net              360 ml       Laboratory      Recent Labs      03/03/19   0333  03/04/19   1100  03/05/19   0253   SODIUM  141  141  139   POTASSIUM  3.7  4.2  4.2   CHLORIDE  102  103  100   CO2  31  29  34*   GLUCOSE  102*  137*  103*   BUN  23*  21  22   CREATININE  0.72  0.67  0.90   CALCIUM  9.1  9.4  10.0     Recent Labs      03/03/19   0333  03/04/19   0233   INR  1.87*  1.56*               Imaging  DX-CHEST-PORTABLE (1 VIEW)   Final Result         1. Stable cardiomegaly.      DX-CHEST-PORTABLE (1 VIEW)   Final Result      1.  Hypoinflation without evidence for pneumonia or pulmonary edema.   2.  Stable multichamber cardiac enlargement.      EC-PAGE W/O CONT   Final Result      DX-CHEST-LIMITED (1 VIEW)   Final Result      1.  No acute cardiopulmonary disease.   2.  Stable cardiomegaly.           Assessment/Plan  * Acute systolic congestive heart failure (HCC)- (present on admission)   Assessment & Plan    Echocardiogram reveals an ejection fraction 20% with severe mitral valve regurg.  Lasix,  Cozaar, Aldactone stopped due to hypotension and dyspnea resolving   Continue digoxin.  NSVT, asymptomatic mag low at 1.7-give IV magnesium and  increase Mag-Ox to 400 mg  3 times daily.  Lopressor just restarted per  cardiology . Cardiology re eval if significant recurrent Vtach.  Dr. Li will evaluate for Entresto outpatient, although use may be limited by low blood pressure.     3/5 improving shortness of breath, continue Lasix       Non-rheumatic mitral regurgitation- (present on admission)   Assessment & Plan    Noted on transesophageal echocardiogram     Chronic respiratory failure with hypoxia (HCC)- (present on admission)   Assessment & Plan    Likely multifactorial COPD, CHF, clinically better  O2 requirements at baseline       Atrial flutter (HCC)- (present on admission)   Assessment & Plan    Patient is post recent PAGE revealing no thrombosis.  History of maze and left atrial appendage ligation.  Has maintained a sinus rhythm.  Continue digoxin  Stop warfarin per cardiology recs.  Start Asa daily .     COPD (chronic obstructive pulmonary disease) (HCC)- (present on admission)   Assessment & Plan    History of.  Clinically better-  no significant wheezes  on exam.  Completed prednisone  Continue Symbicort, Xopenex, Spiriva   RT protocols     Leukocytosis   Assessment & Plan    Suspect steroid effect, possible UTI-WBC normalized.   UA may suggest UTI.  Patient without urinary symptoms although may be unreliable elderly.  Complete 5-day course antibiotics with Omnicef     DNR (do not resuscitate)- (present on admission)   Assessment & Plan    Per patient's wishes, DNR status has been established     Debility   Assessment & Plan    Due to heart failure, COPD, chronic respiratory failure, deconditioning. Family concerned that  cannot care for wife at home.   PT/OT re eval  SW for discharge planning, possible SNF for rehab vs home with home health.    3/5 require skilled nursing facility placement, pending bed availability      Anxiety- (present on admission)   Assessment & Plan    Continue PRN Ativan  Schedule Zoloft          VTE prophylaxis: Warfarin    Discussed with , family plan of care.

## 2019-03-06 NOTE — DISCHARGE INSTRUCTIONS
Discharge Instructions    Discharged to other by medical transportation with escort. Discharged via wheelchair, hospital escort: Yes.  Special equipment needed: Oxygen and Wheelchair    Be sure to schedule a follow-up appointment with your primary care doctor or any specialists as instructed.     Discharge Plan:   Diet Plan: Discussed  Activity Level: Discussed  Confirmed Follow up Appointment: Appointment Scheduled  Confirmed Symptoms Management: Discussed  Medication Reconciliation Updated: Yes  Influenza Vaccine Indication: Patient Refuses    I understand that a diet low in cholesterol, fat, and sodium is recommended for good health. Unless I have been given specific instructions below for another diet, I accept this instruction as my diet prescription.   Other diet: Cardiac    Special Instructions:   HF Patient Discharge Instructions  · Monitor your weight daily, and maintain a weight chart, to track your weight changes.   · Activity as tolerated, unless your Doctor has ordered otherwise. Other activity order:  · Follow a low fat, low cholesterol, low salt diet unless instructed otherwise by your Doctor. Read the labels on the back of food products and track your intake of fat, cholesterol and salt.   · Fluid Restriction No. If a Fluid Restriction has been ordered by your Doctor, measure fluids with a measuring cup to ensure that you are not exceeding the restriction.   · No smoking.  · Oxygen Yes. If your Doctor has ordered that you wear Oxygen at home, it is important to wear it as ordered.  · Did you receive an explanation from staff on the importance of taking each of your medications and why it is necessary to keep taking them unless your doctor says to stop? Yes  · Were all of your questions answered about how to manage your heart failure and what to do if you have increased signs and symptoms after you go home? Yes  · Do you feel like your heart failure care team involved you in the care treatment plan  and allowed you to make decisions regarding your care while in the hospital and addressed any discharge needs you might have? Yes    See the educational handout provided at discharge for more information on monitoring your daily weight, activity and diet. This also explains more about Heart Failure, symptoms of a flare-up and some of the tests that you have undergone.     Warning Signs of a Flare-Up include:  · Swelling in the ankles or lower legs.  · Shortness of breath, while at rest, or while doing normal activities.   · Shortness of breath at night when in bed, or coughing in bed.   · Requiring more pillows to sleep at night, or needing to sit up at night to sleep.  · Feeling weak, dizzy or fatigued.     When to call your Doctor:  · Call [facility] seven days a week from 8:00 a.m. to 8:00 p.m. for medical questions [phone].  · Call your Primary Care Physician or Cardiologist if:   1. You experience any pain radiating to your jaw or neck.  2. You have any difficulty breathing.  3. You experience weight gain of 3 lbs in a day or 5 lbs in a week.   4. You feel any palpitations or irregular heartbeats.  5. You become dizzy or lose consciousness.   If you have had an angiogram or had a pacemaker or AICD placed, and experience:  1. Bleeding, drainage or swelling at the surgical / puncture site.  2. Fever greater than 100.0 F  3. Shock from internal defibrillator.  4. Cool and / or numb extremities.      · Is patient discharged on Warfarin / Coumadin?   No     Depression / Suicide Risk    As you are discharged from this [facility], it is important to learn how to keep safe from harming yourself.    Recognize the warning signs:  · Abrupt changes in personality, positive or negative- including increase in energy   · Giving away possessions  · Change in eating patterns- significant weight changes-  positive or negative  · Change in sleeping patterns- unable to sleep or sleeping all the  time   · Unwillingness or inability to communicate  · Depression  · Unusual sadness, discouragement and loneliness  · Talk of wanting to die  · Neglect of personal appearance   · Rebelliousness- reckless behavior  · Withdrawal from people/activities they love  · Confusion- inability to concentrate     If you or a loved one observes any of these behaviors or has concerns about self-harm, here's what you can do:  · Talk about it- your feelings and reasons for harming yourself  · Remove any means that you might use to hurt yourself (examples: pills, rope, extension cords, firearm)  · Get professional help from the community (Mental Health, Substance Abuse, psychological counseling)  · Do not be alone:Call your Safe Contact- someone whom you trust who will be there for you.  · Call your local CRISIS HOTLINE 844-0274 or 035-642-2591  · Call your local Children's Mobile Crisis Response Team [place] (503) 793-7690 or www.Pipeline  · Call the toll free National Suicide Prevention Hotlines   · National Suicide Prevention Lifeline 782-014-UXBY (2204)  · [employer] Hope Line Network 800-SUICIDE (835-9399)      Heart Failure  Heart failure means your heart has trouble pumping blood. This makes it hard for your body to work well. Heart failure is usually a long-term (chronic) condition. You must take good care of yourself and follow your doctor's treatment plan.  HOME CARE  · Take your heart medicine as told by your doctor.  ¨ Do not stop taking medicine unless your doctor tells you to.  ¨ Do not skip any dose of medicine.  ¨ Refill your medicines before they run out.  ¨ Take other medicines only as told by your doctor or pharmacist.  · Stay active if told by your doctor. The elderly and people with severe heart failure should talk with a doctor about physical activity.  · Eat heart-healthy foods. Choose foods that are without trans fat and are low in saturated fat, cholesterol, and salt (sodium). This includes fresh  or frozen fruits and vegetables, fish, lean meats, fat-free or low-fat dairy foods, whole grains, and high-fiber foods. Lentils and dried peas and beans (legumes) are also good choices.  · Limit salt if told by your doctor.  · Cook in a healthy way. Roast, grill, broil, bake, poach, steam, or stir-jenkins foods.  · Limit fluids as told by your doctor.  · Weigh yourself every morning. Do this after you pee (urinate) and before you eat breakfast. Write down your weight to give to your doctor.  · Take your blood pressure and write it down if your doctor tells you to.  · Ask your doctor how to check your pulse. Check your pulse as told.  · Lose weight if told by your doctor.  · Stop smoking or chewing tobacco. Do not use gum or patches that help you quit without your doctor's approval.  · Schedule and go to doctor visits as told.  · Nonpregnant women should have no more than 1 drink a day. Men should have no more than 2 drinks a day. Talk to your doctor about drinking alcohol.  · Stop illegal drug use.  · Stay current with shots (immunizations).  · Manage your health conditions as told by your doctor.  · Learn to manage your stress.  · Rest when you are tired.  · If it is really hot outside:  ¨ Avoid intense activities.  ¨ Use air conditioning or fans, or get in a cooler place.  ¨ Avoid caffeine and alcohol.  ¨ Wear loose-fitting, lightweight, and light-colored clothing.  · If it is really cold outside:  ¨ Avoid intense activities.  ¨ Layer your clothing.  ¨ Wear mittens or gloves, a hat, and a scarf when going outside.  ¨ Avoid alcohol.  · Learn about heart failure and get support as needed.  · Get help to maintain or improve your quality of life and your ability to care for yourself as needed.  GET HELP IF:   · You gain weight quickly.  · You are more short of breath than usual.  · You cannot do your normal activities.  · You tire easily.  · You cough more than normal, especially with activity.  · You have any or more  puffiness (swelling) in areas such as your hands, feet, ankles, or belly (abdomen).  · You cannot sleep because it is hard to breathe.  · You feel like your heart is beating fast (palpitations).  · You get dizzy or light-headed when you stand up.  GET HELP RIGHT AWAY IF:   · You have trouble breathing.  · There is a change in mental status, such as becoming less alert or not being able to focus.  · You have chest pain or discomfort.  · You faint.  MAKE SURE YOU:   · Understand these instructions.  · Will watch your condition.  · Will get help right away if you are not doing well or get worse.  This information is not intended to replace advice given to you by your health care provider. Make sure you discuss any questions you have with your health care provider.  Document Released: 09/26/2009 Document Revised: 01/08/2016 Document Reviewed: 02/03/2014  [employer] Interactive Patient Education © 2017 [employer]    Chronic Respiratory Failure  Respiratory failure is when your lungs are not working well and your breathing (respiratory) system fails. When respiratory failure occurs, it is difficult for your lungs to get enough oxygen or get rid of carbon dioxide or both. Respiratory failure can be life threatening.  Respiratory failure can be acute or chronic. Acute respiratory failure is sudden, severe, and requires emergency medical treatment. Chronic respiratory failure is less severe, happens over time, and requires ongoing treatment.  What are the causes?  Any problem affecting the heart or lungs can cause respiratory failure. Some of these causes may be:  · Chronic bronchitis and emphysema (COPD).  · Blood clot going to the lung (pulmonary embolism).  · Having water in the lungs caused by heart failure, lung injury, or infection (pulmonary edema).  · Collapsed lung (pneumothorax).  · Pneumonia.  · Pulmonary fibrosis.  · Obesity.  · Asthma.  · Heart failure.  · Any type of trauma to the chest that can make breathing  difficult.  · Nerve or muscle diseases making chest movements difficult.  What are the signs or symptoms?  Signs and symptoms of chronic respiratory failure include:  · Shortness of breath (dyspnea) with or without activity.  · Rapid, fast breathing (tachypnea).  · Wheezing.  · Fast heart rate.  · Bluish color to the fingernail or toenail beds.  · Confusion or drowsiness or both.  How is this diagnosed?  Initial diagnosis requires a thorough history and a physical exam by your health care provider. Additional tests may include:  · Chest X-ray.  · CT scan of your lungs.  · Ultrasound to check for blood clots.  · Blood tests, such as an arterial blood gas test (ABG). This is a blood test that looks at the oxygen and carbon dioxide levels in your arterial blood.  · Your vital signs will be taken. This includes your respiratory rate (how many times a minute you are breathing), oxygen saturation (this measures the oxygen level in your blood), heart rate, and blood pressure. These numbers help your health care provider determine the next steps.  · Electrocardiogram.  How is this treated?  Treatment of chronic respiratory failure depends on the cause of the respiratory failure. Treatment can include the following:  · Oxygen. Oxygen can be delivered through the following:  ¨ Nasal cannula. This is small tubing that goes in your nose to give you oxygen.  ¨ Face mask. A face mask covers your nose and mouth to give you oxygen.  · Medicine. Different medicines can be given to help with breathing. These can include:  ¨ Nebulizers. Nebulizers deliver medicines to open the air passages (bronchodilators). These medicines help to open or relax the airways in the lungs so you can breathe better. They can also help loosen mucus from your lungs.  ¨ Diuretics. Diuretic medicines can help you breathe better by getting rid of extra fluid in your body.  ¨ Steroids. Steroid medicines can help decrease inflammation in your lungs.  · Chest  tube. If you have a collapsed lung (pneumothorax), a chest tube is placed to help reinflate the lung.  · Noninvasive positive pressure ventilation (NPPV). This is a tight-fitting mask that goes over your nose and mouth. The mask has tubing that is attached to a machine. The machine blows air into the tubing, which helps to keep the tiny air sacs (alveoli) in your lungs open. This machine allows you to breathe on your own.  · Ventilator. A ventilator is a breathing machine. When on a ventilator, a breathing tube is put into the lungs. A ventilator is used when you can no longer breathe well enough on your own. You may have low oxygen levels or high carbon dioxide (CO2) levels in your blood. When you are on a ventilator, sedation and pain medicines are given to make you sleep so your lungs can heal.  Follow these instructions at home:  · Follow your health care provider's directions about medicines and respiratory therapy.  · Quit smoking if you smoke.  Contact a health care provider if:  · You have increasing shortness of breath and are less functional than you have been.  · You have increased sputum, wheezing, coughing, or loss of energy.  · You are on oxygen and are requiring more.  Get help right away if:  · Your shortness of breath is significantly worse.  · You are unable to say more than a few words without having to catch your breath.  · You are much less functional.  This information is not intended to replace advice given to you by your health care provider. Make sure you discuss any questions you have with your health care provider.  Document Released: 12/18/2006 Document Revised: 05/25/2017 Document Reviewed: 10/16/2014  [employer] Interactive Patient Education © 2017 [employer]      Chronic Obstructive Pulmonary Disease Exacerbation  Chronic obstructive pulmonary disease (COPD) is a common lung problem. In COPD, the flow of air from the lungs is limited. COPD exacerbations are times that breathing gets  worse and you need extra treatment. Without treatment they can be life threatening. If they happen often, your lungs can become more damaged. If your COPD gets worse, your doctor may treat you with:  · Medicines.  · Oxygen.  · Different ways to clear your airway, such as using a mask.  Follow these instructions at home:  · Do not smoke.  · Avoid tobacco smoke and other things that bother your lungs.  · If given, take your antibiotic medicine as told. Finish the medicine even if you start to feel better.  · Only take medicines as told by your doctor.  · Drink enough fluids to keep your pee (urine) clear or pale yellow (unless your doctor has told you not to).  · Use a cool mist machine (vaporizer).  · If you use oxygen or a machine that turns liquid medicine into a mist (nebulizer), continue to use them as told.  · Keep up with shots (vaccinations) as told by your doctor.  · Exercise regularly.  · Eat healthy foods.  · Keep all doctor visits as told.  Get help right away if:  · You are very short of breath and it gets worse.  · You have trouble talking.  · You have bad chest pain.  · You have blood in your spit (sputum).  · You have a fever.  · You keep throwing up (vomiting).  · You feel weak, or you pass out (faint).  · You feel confused.  · You keep getting worse.  This information is not intended to replace advice given to you by your health care provider. Make sure you discuss any questions you have with your health care provider.  Document Released: 12/06/2012 Document Revised: 05/25/2017 Document Reviewed: 08/22/2014  [employer] Interactive Patient Education © 2017 [employer]      Atrial Flutter  Atrial flutter is a type of abnormal heart rhythm (arrhythmia). In atrial flutter, the heartbeat is fast but regular. There are two types of atrial flutter:  · Paroxysmal atrial flutter. This type starts suddenly. It usually stops on its own soon after it starts.  · Permanent atrial flutter. This type does not go  away.  What are the causes?  This condition may be caused by:  · A heart condition or problem, such as:  ¨ A heart attack.  ¨ Heart failure.  ¨ A heart valve problem.  · A lung problem, such as:  ¨ A blood clot in the lungs (pulmonary embolism, or PE).  ¨ Chronic obstructive pulmonary disease.  · Poorly controlled high blood pressure (hypertension).  · Hyperthyroidism.  · Caffeine.  · Some decongestant cold medicines.  · Low levels of minerals called electrolytes in the blood.  · Cocaine.  What increases the risk?  This condition is more likely to develop in:  · Elderly adults.  · Men.  What are the signs or symptoms?  Symptoms of this condition include:  · A feeling that your heart is pounding or racing (palpitations).  · Shortness of breath.  · Chest pain.  · Feeling light-headed.  · Dizziness.  · Fainting.  How is this diagnosed?  This condition may be diagnosed with tests, including:  · An electrocardiogram (ECG). This is a painless test that records electrical signals in the heart.  · Holter monitoring. For this test, you wear a device that records your heartbeat for 1-2 days.  · Cardiac event monitoring. For this test, you wear a device that records your heartbeat for up to 30 days.  · An echocardiogram. This is a painless test that uses sound waves to make a picture of your heart.  · Stress test. This test records your heartbeat while you exercise.  · Blood tests.  How is this treated?  This condition may be treated with:  · Treatment of any underlying conditions.  · Medicine to make your heart beat more slowly.  · Medicine to keep the condition from coming back.  · A procedure to keep the condition under control. Some procedures to do this include:  ¨ Cardioversion. During this procedure, medicines or an electrical shock are given to make the heart beat normally.  ¨ Ablation. During this procedure, the heart tissue that is causing the problem is destroyed. This procedure may be done if atrial flutter lasts a  long time or happens often.  Follow these instructions at home:  · Take over-the-counter and prescription medicines only as told by your health care provider.  · Do not take any new medicines without talking to your health care provider.  · Do not use tobacco products, including cigarettes, chewing tobacco, or e-cigarettes. If you need help quitting, ask your health care provider.  · Limit alcohol intake to no more than 1 drink per day for nonpregnant women and 2 drinks per day for men. One drink equals 12 oz of beer, 5 oz of wine, or 1½ oz of hard liquor.  · Try to reduce any stress. Stress can make your symptoms worse.  Contact a health care provider if:  · Your symptoms get worse.  Get help right away if:  · You are dizzy.  · You feel like fainting or you faint.  · You have shortness of breath.  · You feel pain or pressure in your chest.  · You suddenly feel nauseous or you suddenly vomit.  · There is a sudden change in your ability to speak, eat, or move.  · You are sweating a lot for no reason.  This information is not intended to replace advice given to you by your health care provider. Make sure you discuss any questions you have with your health care provider.  Document Released: 05/06/2010 Document Revised: 04/26/2017 Document Reviewed: 07/01/2016  [employer] Interactive Patient Education © 2017 [employer]    Digoxin tablets or capsules  What is this medicine?  DIGOXIN (di JOX in) is used to treat congestive heart failure and heart rhythm problems.  This medicine may be used for other purposes; ask your health care provider or pharmacist if you have questions.  COMMON BRAND NAME(S): Digitek, Lanoxicaps, Lanoxin  What should I tell my health care provider before I take this medicine?  They need to know if you have any of these conditions:  -certain heart rhythm disorders  -heart disease or recent heart attack  -kidney or liver disease  -an unusual or allergic reaction to digoxin, other medicines, foods,  dyes, or preservatives  -pregnant or trying to get pregnant  -breast-feeding  How should I use this medicine?  Take this medicine by mouth with a glass of water. Follow the directions on the prescription label. Take your doses at regular intervals. Do not take your medicine more often than directed.  Talk to your pediatrician regarding the use of this medicine in children. Special care may be needed.  Overdosage: If you think you have taken too much of this medicine contact a poison control center or emergency room at once.  NOTE: This medicine is only for you. Do not share this medicine with others.  What if I miss a dose?  If you miss a dose, take it as soon as you can. If it is almost time for your next dose, take only that dose. Do not take double or extra doses.  What may interact with this medicine?  -activated charcoal  -albuterol  -alprazolam  -antacids  -antiviral medicines for HIV or AIDS like ritonavir and saquinavir  -calcium  -certain antibiotics like azithromycin, clarithromycin, erythromycin, gentamicin, neomycin, trimethoprim, and tetracycline  -certain medicines for blood pressure, heart disease, irregular heart beat  -certain medicines for cancer  -certain medicines for cholesterol like atorvastatin, cholestyramine, and colestipol  -certain medicines for diabetes, like acarbose, exenatide, miglitol, and metformin  -certain medicines for fungal infections like ketoconazole and itraconazole  -certain medicines for stomach problems like omeprazole, esomeprazole, lansoprazole, rabeprazole, metoclopramide, and sucralfate  -cyclosporine  -diphenoxylate  -epinephrine  -kaolin; pectin  -nefazodone  -NSAIDS, medicines for pain and inflammation, like celecoxib, ibuprofen, or naproxen  -penicillamine  -phenytoin  -propantheline  -quinine  -phenytoin  -rifampin  -succinylcholine  -Minden's Wort  -sulfasalazine  -teriparatide  -thyroid hormones  -tolvaptan  This list may not describe all possible  interactions. Give your health care provider a list of all the medicines, herbs, non-prescription drugs, or dietary supplements you use. Also tell them if you smoke, drink alcohol, or use illegal drugs. Some items may interact with your medicine.  What should I watch for while using this medicine?  Visit your doctor or health care professional for regular checks on your progress. Do not stop taking this medicine without the advice of your doctor or health care professional, even if you feel better. Do not change the brand you are taking, other brands may affect you differently.  Check your heart rate and blood pressure regularly while you are taking this medicine. Ask your doctor or health care professional what your heart rate and blood pressure should be, and when you should contact him or her. Your doctor or health care professional also may schedule regular blood tests and electrocardiograms to check your progress.  Watch your diet. Less digoxin may be absorbed from the stomach if you have a diet high in bran fiber.  Do not treat yourself for coughs, colds or allergies without asking your doctor or health care professional for advice. Some ingredients can increase possible side effects.  What side effects may I notice from receiving this medicine?  Side effects that you should report to your doctor or health care professional as soon as possible:  -allergic reactions like skin rash, itching or hives, swelling of the face, lips, or tongue  -changes in behavior, mood, or mental ability  -changes in vision  -confusion  -fast, irregular heartbeat  -feeling faint or lightheaded, falls  -headache  -nausea, vomiting  -unusual bleeding, bruising  -unusually weak or tired  Side effects that usually do not require medical attention (report to your doctor or health care professional if they continue or are bothersome):  -breast enlargement in men and women  -diarrhea  This list may not describe all possible side effects.  Call your doctor for medical advice about side effects. You may report side effects to FDA at 5-269-CSI-5365.  Where should I keep my medicine?  Keep out of the reach of children.  Store at room temperature between 15 and 30 degrees C (59 and 86 degrees F). Protect from light and moisture. Throw away any unused medicine after the expiration date.  NOTE: This sheet is a summary. It may not cover all possible information. If you have questions about this medicine, talk to your doctor, pharmacist, or health care provider.  © 2018 [employer] (2014-02-27 12:40:27)    Furosemide tablets  What is this medicine?  FUROSEMIDE (fyoor [place] se mide) is a diuretic. It helps you make more urine and to lose salt and excess water from your body. This medicine is used to treat high blood pressure, and edema or swelling from heart, kidney, or liver disease.  This medicine may be used for other purposes; ask your health care provider or pharmacist if you have questions.  COMMON BRAND NAME(S): Active-Medicated Specimen Kit, Delone, Diuscreen, Lasix, RX Specimen Collection Kit, Specimen Collection Kit, URINX Medicated Specimen Collection  What should I tell my health care provider before I take this medicine?  They need to know if you have any of these conditions:  -abnormal blood electrolytes  -diarrhea or vomiting  -gout  -heart disease  -kidney disease, small amounts of urine, or difficulty passing urine  -liver disease  -thyroid disease  -an unusual or allergic reaction to furosemide, sulfa drugs, other medicines, foods, dyes, or preservatives  -pregnant or trying to get pregnant  -breast-feeding  How should I use this medicine?  Take this medicine by mouth with a glass of water. Follow the directions on the prescription label. You may take this medicine with or without food. If it upsets your stomach, take it with food or milk. Do not take your medicine more often than directed. Remember that you will need to pass more  urine after taking this medicine. Do not take your medicine at a time of day that will cause you problems. Do not take at bedtime.  Talk to your pediatrician regarding the use of this medicine in children. While this drug may be prescribed for selected conditions, precautions do apply.  Overdosage: If you think you have taken too much of this medicine contact a poison control center or emergency room at once.  NOTE: This medicine is only for you. Do not share this medicine with others.  What if I miss a dose?  If you miss a dose, take it as soon as you can. If it is almost time for your next dose, take only that dose. Do not take double or extra doses.  What may interact with this medicine?  -aspirin and aspirin-like medicines  -certain antibiotics  -chloral hydrate  -cisplatin  -cyclosporine  -digoxin  -diuretics  -laxatives  -lithium  -medicines for blood pressure  -medicines that relax muscles for surgery  -methotrexate  -NSAIDs, medicines for pain and inflammation like ibuprofen, naproxen, or indomethacin  -phenytoin  -steroid medicines like prednisone or cortisone  -sucralfate  -thyroid hormones  This list may not describe all possible interactions. Give your health care provider a list of all the medicines, herbs, non-prescription drugs, or dietary supplements you use. Also tell them if you smoke, drink alcohol, or use illegal drugs. Some items may interact with your medicine.  What should I watch for while using this medicine?  Visit your doctor or health care professional for regular checks on your progress. Check your blood pressure regularly. Ask your doctor or health care professional what your blood pressure should be, and when you should contact him or her. If you are a diabetic, check your blood sugar as directed.  You may need to be on a special diet while taking this medicine. Check with your doctor. Also, ask how many glasses of fluid you need to drink a day. You must not get dehydrated.  You may  get drowsy or dizzy. Do not drive, use machinery, or do anything that needs mental alertness until you know how this drug affects you. Do not stand or sit up quickly, especially if you are an older patient. This reduces the risk of dizzy or fainting spells. Alcohol can make you more drowsy and dizzy. Avoid alcoholic drinks.  This medicine can make you more sensitive to the sun. Keep out of the sun. If you cannot avoid being in the sun, wear protective clothing and use sunscreen. Do not use sun lamps or tanning beds/booths.  What side effects may I notice from receiving this medicine?  Side effects that you should report to your doctor or health care professional as soon as possible:  -blood in urine or stools  -dry mouth  -fever or chills  -hearing loss or ringing in the ears  -irregular heartbeat  -muscle pain or weakness, cramps  -skin rash  -stomach upset, pain, or nausea  -tingling or numbness in the hands or feet  -unusually weak or tired  -vomiting or diarrhea  -yellowing of the eyes or skin  Side effects that usually do not require medical attention (report to your doctor or health care professional if they continue or are bothersome):  -headache  -loss of appetite  -unusual bleeding or bruising  This list may not describe all possible side effects. Call your doctor for medical advice about side effects. You may report side effects to FDA at 0-447-FDA-3471.  Where should I keep my medicine?  Keep out of the reach of children.  Store at room temperature between 15 and 30 degrees C (59 and 86 degrees F). Protect from light. Throw away any unused medicine after the expiration date.  NOTE: This sheet is a summary. It may not cover all possible information. If you have questions about this medicine, talk to your doctor, pharmacist, or health care provider.  © 2018 Elsevier/Gold Standard (2016-03-09 13:49:50)    Metoprolol tablets  What is this medicine?  METOPROLOL (me TOE proe lole) is a beta-blocker.  Beta-blockers reduce the workload on the heart and help it to beat more regularly. This medicine is used to treat high blood pressure and to prevent chest pain. It is also used to after a heart attack and to prevent an additional heart attack from occurring.  This medicine may be used for other purposes; ask your health care provider or pharmacist if you have questions.  COMMON BRAND NAME(S): Lopressor  What should I tell my health care provider before I take this medicine?  They need to know if you have any of these conditions:  -diabetes  -heart or vessel disease like slow heart rate, worsening heart failure, heart block, sick sinus syndrome or Raynaud's disease  -kidney disease  -liver disease  -lung or breathing disease, like asthma or emphysema  -pheochromocytoma  -thyroid disease  -an unusual or allergic reaction to metoprolol, other beta-blockers, medicines, foods, dyes, or preservatives  -pregnant or trying to get pregnant  -breast-feeding  How should I use this medicine?  Take this medicine by mouth with a drink of water. Follow the directions on the prescription label. Take this medicine immediately after meals. Take your doses at regular intervals. Do not take more medicine than directed. Do not stop taking this medicine suddenly. This could lead to serious heart-related effects.  Talk to your pediatrician regarding the use of this medicine in children. Special care may be needed.  Overdosage: If you think you have taken too much of this medicine contact a poison control center or emergency room at once.  NOTE: This medicine is only for you. Do not share this medicine with others.  What if I miss a dose?  If you miss a dose, take it as soon as you can. If it is almost time for your next dose, take only that dose. Do not take double or extra doses.  What may interact with this medicine?  This medicine may interact with the following medications:  -certain medicines for blood pressure, heart disease,  irregular heart beat  -certain medicines for depression like monoamine oxidase (MAO) inhibitors, fluoxetine, or paroxetine  -clonidine  -dobutamine  -epinephrine  -isoproterenol  -reserpine  This list may not describe all possible interactions. Give your health care provider a list of all the medicines, herbs, non-prescription drugs, or dietary supplements you use. Also tell them if you smoke, drink alcohol, or use illegal drugs. Some items may interact with your medicine.  What should I watch for while using this medicine?  Visit your doctor or health care professional for regular check ups. Contact your doctor right away if your symptoms worsen. Check your blood pressure and pulse rate regularly. Ask your health care professional what your blood pressure and pulse rate should be, and when you should contact them.  You may get drowsy or dizzy. Do not drive, use machinery, or do anything that needs mental alertness until you know how this medicine affects you. Do not sit or stand up quickly, especially if you are an older patient. This reduces the risk of dizzy or fainting spells. Contact your doctor if these symptoms continue. Alcohol may interfere with the effect of this medicine. Avoid alcoholic drinks.  What side effects may I notice from receiving this medicine?  Side effects that you should report to your doctor or health care professional as soon as possible:  -allergic reactions like skin rash, itching or hives  -cold or numb hands or feet  -depression  -difficulty breathing  -faint  -fever with sore throat  -irregular heartbeat, chest pain  -rapid weight gain  -swollen legs or ankles  Side effects that usually do not require medical attention (report to your doctor or health care professional if they continue or are bothersome):  -anxiety or nervousness  -change in sex drive or performance  -dry skin  -headache  -nightmares or trouble sleeping  -short term memory loss  -stomach upset or diarrhea  -unusually  tired  This list may not describe all possible side effects. Call your doctor for medical advice about side effects. You may report side effects to FDA at 3-368-AJE-7705.  Where should I keep my medicine?  Keep out of the reach of children.  Store at room temperature between 15 and 30 degrees C (59 and 86 degrees F). Throw away any unused medicine after the expiration date.  NOTE: This sheet is a summary. It may not cover all possible information. If you have questions about this medicine, talk to your doctor, pharmacist, or health care provider.  © 2018 Elsevier/Gold Standard (2014-08-22 14:40:36)

## 2019-03-07 PROCEDURE — 770006 HCHG ROOM/CARE - MED/SURG/GYN SEMI*

## 2019-03-07 PROCEDURE — 700102 HCHG RX REV CODE 250 W/ 637 OVERRIDE(OP): Performed by: INTERNAL MEDICINE

## 2019-03-07 PROCEDURE — A9270 NON-COVERED ITEM OR SERVICE: HCPCS | Performed by: HOSPITALIST

## 2019-03-07 PROCEDURE — A9270 NON-COVERED ITEM OR SERVICE: HCPCS | Performed by: NURSE PRACTITIONER

## 2019-03-07 PROCEDURE — 99232 SBSQ HOSP IP/OBS MODERATE 35: CPT | Performed by: INTERNAL MEDICINE

## 2019-03-07 PROCEDURE — 700102 HCHG RX REV CODE 250 W/ 637 OVERRIDE(OP): Performed by: NURSE PRACTITIONER

## 2019-03-07 PROCEDURE — A9270 NON-COVERED ITEM OR SERVICE: HCPCS | Performed by: INTERNAL MEDICINE

## 2019-03-07 PROCEDURE — 700102 HCHG RX REV CODE 250 W/ 637 OVERRIDE(OP): Performed by: HOSPITALIST

## 2019-03-07 RX ADMIN — ATORVASTATIN CALCIUM 40 MG: 40 TABLET, FILM COATED ORAL at 17:24

## 2019-03-07 RX ADMIN — SERTRALINE HYDROCHLORIDE 100 MG: 100 TABLET ORAL at 06:27

## 2019-03-07 RX ADMIN — METOPROLOL SUCCINATE 25 MG: 25 TABLET, EXTENDED RELEASE ORAL at 17:25

## 2019-03-07 RX ADMIN — LORAZEPAM 0.5 MG: 1 TABLET ORAL at 22:48

## 2019-03-07 RX ADMIN — DIGOXIN 125 MCG: 125 TABLET ORAL at 17:24

## 2019-03-07 RX ADMIN — BUDESONIDE AND FORMOTEROL FUMARATE DIHYDRATE 2 PUFF: 160; 4.5 AEROSOL RESPIRATORY (INHALATION) at 19:26

## 2019-03-07 RX ADMIN — TIOTROPIUM BROMIDE 1 CAPSULE: 18 CAPSULE ORAL; RESPIRATORY (INHALATION) at 06:26

## 2019-03-07 RX ADMIN — MAGNESIUM OXIDE TAB 400 MG (241.3 MG ELEMENTAL MG) 400 MG: 400 (241.3 MG) TAB at 17:25

## 2019-03-07 RX ADMIN — BUDESONIDE AND FORMOTEROL FUMARATE DIHYDRATE 2 PUFF: 160; 4.5 AEROSOL RESPIRATORY (INHALATION) at 06:27

## 2019-03-07 RX ADMIN — ASPIRIN 81 MG 81 MG: 81 TABLET ORAL at 06:27

## 2019-03-07 RX ADMIN — MAGNESIUM OXIDE TAB 400 MG (241.3 MG ELEMENTAL MG) 400 MG: 400 (241.3 MG) TAB at 06:27

## 2019-03-07 RX ADMIN — MAGNESIUM OXIDE TAB 400 MG (241.3 MG ELEMENTAL MG) 400 MG: 400 (241.3 MG) TAB at 13:26

## 2019-03-07 ASSESSMENT — ENCOUNTER SYMPTOMS
COUGH: 0
NERVOUS/ANXIOUS: 1
CHILLS: 0
DIAPHORESIS: 0
FLANK PAIN: 0
TINGLING: 0
SHORTNESS OF BREATH: 0
FOCAL WEAKNESS: 0
PALPITATIONS: 0
WEAKNESS: 1
NAUSEA: 0
MYALGIAS: 0
FEVER: 0
BACK PAIN: 0
ABDOMINAL PAIN: 0

## 2019-03-07 NOTE — DISCHARGE PLANNING
Agency/Facility Name: [facility]  Spoke To: Carlin  Outcome: No beds today.    @[id number]  Agency/Facility Name: [facility]  Outcome: Left message, awaiting call back.

## 2019-03-07 NOTE — DISCHARGE PLANNING
RN CM met with pt and her sister, Ilda, to discuss need for new CHOICE for snf's due to no bed availability still at [facility]. Ilda is going now to look at two of the remaining facilities on the CHOICE form that they will consider. Additionally, Ilda asked for referral to [facility] be resubmitted even though pt was declined before. Ilda has CHOICE form and is going to get back to RN CM this afternoon in person or by phone, or in the morning with CHOICE decisions.

## 2019-03-07 NOTE — PROGRESS NOTES
[facility] Medicine Daily Progress Note    Date of Service  3/7/2019    Chief Complaint  73 y.o. female admitted 2/25/2019 with shortness of breath.    Hospital Course    73-year-old female history of mitral valve repair, COPD, hypertension initially transferred from [facility] with shortness of breath.  Patient initially was hypotensive requiring IV fluid bolus.  Following admission to [facility] she was given diuresis for CHF and transferred to [facility] for PAGE.  Patient downgraded to telemetry floor.    Interval Problem Update  No new complaints  Anxious for dc to snf    Consultants/Specialty    Dr. Li, Cardiology     Code Status  DNR      Disposition    Anticipate DC to skilled nursing facility, pending bed availability   assisting    Review of Systems  Review of Systems   Constitutional: Negative for chills, diaphoresis, fever and malaise/fatigue.   Respiratory: Negative for cough and shortness of breath.    Cardiovascular: Negative for palpitations and leg swelling.   Gastrointestinal: Negative for abdominal pain and nausea.   Genitourinary: Negative for dysuria and flank pain.   Musculoskeletal: Negative for back pain and myalgias.   Neurological: Positive for weakness (Generalized). Negative for tingling and focal weakness.   Psychiatric/Behavioral: The patient is nervous/anxious.         Physical Exam  Temp:  [36.1 °C (96.9 °F)-36.9 °C (98.4 °F)] 36.4 °C (97.6 °F)  Pulse:  [72-96] 75  Resp:  [16-19] 17  BP: ()/(61-74) 104/71  SpO2:  [92 %-98 %] 94 %    Physical Exam   Constitutional: She is oriented to person, place, and time. No distress.   Alert, appropriate oriented   HENT:   Head: Normocephalic and atraumatic.   Eyes: Pupils are equal, round, and reactive to light. EOM are normal. Right eye exhibits no discharge. Left eye exhibits no discharge.   Neck: Normal range of motion. Neck supple. No JVD present.   Cardiovascular: Normal rate and regular rhythm.    Murmur (2/6)  heard.  Pulmonary/Chest: Effort normal. No respiratory distress. She has no rales.   Abdominal: Soft. She exhibits no distension and no mass. There is no tenderness.   Musculoskeletal: She exhibits no edema or tenderness.   Neurological: She is alert and oriented to person, place, and time. No cranial nerve deficit. Coordination normal.   No gross focal weakness   Skin: Skin is warm and dry. She is not diaphoretic. No erythema. There is pallor.   Psychiatric: She has a normal mood and affect. Her behavior is normal. Judgment normal.   Vitals reviewed.      Fluids    Intake/Output Summary (Last 24 hours) at 03/07/19 1503  Last data filed at 03/07/19 0900   Gross per 24 hour   Intake              480 ml   Output                0 ml   Net              480 ml       Laboratory      Recent Labs      03/05/19   0253   SODIUM  139   POTASSIUM  4.2   CHLORIDE  100   CO2  34*   GLUCOSE  103*   BUN  22   CREATININE  0.90   CALCIUM  10.0                   Imaging  DX-CHEST-PORTABLE (1 VIEW)   Final Result         1. Stable cardiomegaly.      DX-CHEST-PORTABLE (1 VIEW)   Final Result      1.  Hypoinflation without evidence for pneumonia or pulmonary edema.   2.  Stable multichamber cardiac enlargement.      EC-PAGE W/O CONT   Final Result      DX-CHEST-LIMITED (1 VIEW)   Final Result      1.  No acute cardiopulmonary disease.   2.  Stable cardiomegaly.           Assessment/Plan  * Acute systolic congestive heart failure (HCC)- (present on admission)   Assessment & Plan    Echocardiogram reveals an ejection fraction 20% with severe mitral valve regurg.  Lasix,  Cozaar, Aldactone stopped due to hypotension and dyspnea resolving   Continue digoxin.  NSVT, asymptomatic mag low at 1.7-give IV magnesium and  increase Mag-Ox to 400 mg  3 times daily.  Lopressor just restarted per cardiology . Cardiology re eval if significant recurrent Vtach.  Dr. Li will evaluate for Entresto outpatient, although use may be limited by low blood  "pressure.     3/5 improving shortness of breath, continue Lasix    3/6 improving, dc to snf vs home with home health    3/6 Addendum: improving strength. Minimal support at home.  Plan for transfer to snf when accepted.  POC reviewed extensively with pt/sister and dtr Yudy.  Medication reconciliation to be reviewed with family and staff.  Dtr continues to report concerns with \"communication breakdown\", but has spoken to several hospital staff, including my RN, staff RN and myself regarding POC.  Pt and sister at bedside, appreciate of updates, all questions and concerns addressed.  Pt's dtr continues to express lack comfort with \"communication\".  RN has reviewed with dtr POC again.  Pt is feeling better and states her dtr talks a lot without listening to explanations, results reviewed with family/dtr multiple times by myself and staff.    Additional time spent 65 min.    3/7  Feels better, happy to be going to snf for additional therapy  - per , now approved, recommending expanding choice for acceptance         Non-rheumatic mitral regurgitation- (present on admission)   Assessment & Plan    Noted on transesophageal echocardiogram     Chronic respiratory failure with hypoxia (HCC)- (present on admission)   Assessment & Plan    Likely multifactorial COPD, CHF, clinically better  O2 requirements at baseline       Atrial flutter (HCC)- (present on admission)   Assessment & Plan    Patient is post recent PAGE revealing no thrombosis.  History of maze and left atrial appendage ligation.  Has maintained a sinus rhythm.  Continue digoxin  Stop warfarin per cardiology recs.  Start Asa daily .     COPD (chronic obstructive pulmonary disease) (HCC)- (present on admission)   Assessment & Plan    History of.  Clinically better-  no significant wheezes  on exam.  Completed prednisone  Continue Symbicort, Xopenex, Spiriva   RT protocols     Leukocytosis   Assessment & Plan    Suspect steroid effect, possible UTI-WBC normalized. "   UA may suggest UTI.  Patient without urinary symptoms although may be unreliable elderly.  Complete 5-day course antibiotics with Omnicef     DNR (do not resuscitate)- (present on admission)   Assessment & Plan    Per patient's wishes, DNR status has been established     Debility   Assessment & Plan    Due to heart failure, COPD, chronic respiratory failure, deconditioning. Family concerned that  cannot care for wife at home.   PT/OT re eval  SW for discharge planning, possible SNF for rehab vs home with home health.    3/5 require skilled nursing facility placement, pending bed availability      Anxiety- (present on admission)   Assessment & Plan    Continue PRN Ativan  Schedule Zoloft          VTE prophylaxis: Warfarin    Discussed with , family plan of care.

## 2019-03-08 ENCOUNTER — PATIENT MESSAGE (OUTPATIENT)
Dept: MEDICAL GROUP | Facility: LAB | Age: 74
End: 2019-03-08

## 2019-03-08 VITALS
HEIGHT: 62 IN | RESPIRATION RATE: 17 BRPM | DIASTOLIC BLOOD PRESSURE: 74 MMHG | TEMPERATURE: 97.1 F | HEART RATE: 67 BPM | SYSTOLIC BLOOD PRESSURE: 110 MMHG | WEIGHT: 116.84 LBS | BODY MASS INDEX: 21.5 KG/M2 | OXYGEN SATURATION: 98 %

## 2019-03-08 PROCEDURE — 700102 HCHG RX REV CODE 250 W/ 637 OVERRIDE(OP): Performed by: HOSPITALIST

## 2019-03-08 PROCEDURE — 99239 HOSP IP/OBS DSCHRG MGMT >30: CPT | Performed by: INTERNAL MEDICINE

## 2019-03-08 PROCEDURE — A9270 NON-COVERED ITEM OR SERVICE: HCPCS | Performed by: HOSPITALIST

## 2019-03-08 RX ORDER — METOPROLOL SUCCINATE 25 MG/1
25 TABLET, EXTENDED RELEASE ORAL EVERY EVENING
Qty: 30 TAB | Refills: 5 | Status: SHIPPED | OUTPATIENT
Start: 2019-03-08 | End: 2019-06-20 | Stop reason: SDUPTHER

## 2019-03-08 RX ORDER — DIGOXIN 125 MCG
125 TABLET ORAL DAILY
Qty: 30 TAB | Refills: 2 | Status: SHIPPED | OUTPATIENT
Start: 2019-03-08 | End: 2019-06-06 | Stop reason: SDUPTHER

## 2019-03-08 RX ADMIN — ASPIRIN 81 MG 81 MG: 81 TABLET ORAL at 06:13

## 2019-03-08 RX ADMIN — MAGNESIUM OXIDE TAB 400 MG (241.3 MG ELEMENTAL MG) 400 MG: 400 (241.3 MG) TAB at 12:55

## 2019-03-08 RX ADMIN — SERTRALINE HYDROCHLORIDE 100 MG: 100 TABLET ORAL at 06:13

## 2019-03-08 RX ADMIN — TIOTROPIUM BROMIDE 1 CAPSULE: 18 CAPSULE ORAL; RESPIRATORY (INHALATION) at 06:22

## 2019-03-08 RX ADMIN — MAGNESIUM OXIDE TAB 400 MG (241.3 MG ELEMENTAL MG) 400 MG: 400 (241.3 MG) TAB at 06:13

## 2019-03-08 RX ADMIN — BUDESONIDE AND FORMOTEROL FUMARATE DIHYDRATE 2 PUFF: 160; 4.5 AEROSOL RESPIRATORY (INHALATION) at 06:15

## 2019-03-08 NOTE — PROGRESS NOTES
Pt A&O x's 4, VSS, denies pain at this time. Pt is up with SBA, steady gait. Pt's O2 demands remain the same, pt denies any SOB, chest pain or difficulty breathing. Discharge order placed, POC discussed, all questions and concerns have been discussed. Will continue to advance to discharge.

## 2019-03-08 NOTE — DISCHARGE PLANNING
Pt's sister, Ilda, came to office stating they still prefer to wait for [facility], they do not want any other snf's. Sister also states she doesn't see why pt can't go home with home health. [facility] has accepted pt. Dr. Brewer notified via [employer].

## 2019-03-08 NOTE — PROGRESS NOTES
Pt alert and oriented x 4, pleasant and cooperative with care. Able to make needs known. No c/o pain or discomfort. Up to BR with steady gait. Requests Ativan this evening when she goes to bed. Made comfortable, call light in reach.

## 2019-03-08 NOTE — DISCHARGE PLANNING
Agency[facility]Facility Name: [facility]  Outcome: Left message, awaiting call back.    Agency/Facility Name: [facility]  Spoke To: Hay  Outcome: Per request resent referral to alternate fax # [phone].

## 2019-03-08 NOTE — CARE PLAN
Problem: Safety  Goal: Will remain free from falls  Outcome: PROGRESSING AS EXPECTED  Pt is alert and oriented x 4, calls appropriately for assistance.

## 2019-03-08 NOTE — CARE PLAN
Problem: Communication  Goal: The ability to communicate needs accurately and effectively will improve  Outcome: PROGRESSING AS EXPECTED  Patient educated on medications, procedures and use of call light. Patient communicates needs appropriately      Problem: Safety  Goal: Will remain free from injury  Outcome: PROGRESSING AS EXPECTED  Pt educated on the use of call light, no slip socks on, bed lowest position. All needs attended to. Patient verbalized understanding.

## 2019-03-11 ENCOUNTER — ANTICOAGULATION MONITORING (OUTPATIENT)
Dept: MEDICAL GROUP | Facility: MEDICAL CENTER | Age: 74
End: 2019-03-11

## 2019-03-11 ENCOUNTER — TELEPHONE (OUTPATIENT)
Dept: MEDICAL GROUP | Facility: LAB | Age: 74
End: 2019-03-11

## 2019-03-11 ENCOUNTER — PATIENT OUTREACH (OUTPATIENT)
Dept: HEALTH INFORMATION MANAGEMENT | Facility: OTHER | Age: 74
End: 2019-03-11

## 2019-03-11 DIAGNOSIS — Z79.01 LONG TERM (CURRENT) USE OF ANTICOAGULANTS: ICD-10-CM

## 2019-03-11 DIAGNOSIS — I48.92 ATRIAL FLUTTER, UNSPECIFIED TYPE (HCC): ICD-10-CM

## 2019-03-11 NOTE — PROGRESS NOTES
Pt is no longer on coumadin. Will discharge from [facility] anticoagulation clinic.     Amanda Perera, Pharm D

## 2019-03-11 NOTE — TELEPHONE ENCOUNTER
1. Caller Name: Kelly Lovett     Call Back Number: [phone] (home)         Patient approves a detailed voicemail message: N\A    Received a phone call from home health care, calling to inform that she has been admitted to their facility. -ANDRE

## 2019-03-15 ENCOUNTER — OFFICE VISIT (OUTPATIENT)
Dept: CARDIOLOGY | Facility: MEDICAL CENTER | Age: 74
End: 2019-03-15
Payer: MEDICARE

## 2019-03-15 ENCOUNTER — TELEPHONE (OUTPATIENT)
Dept: CARDIOLOGY | Facility: MEDICAL CENTER | Age: 74
End: 2019-03-15

## 2019-03-15 VITALS
OXYGEN SATURATION: 99 % | HEIGHT: 65 IN | BODY MASS INDEX: 19.33 KG/M2 | WEIGHT: 116 LBS | HEART RATE: 58 BPM | DIASTOLIC BLOOD PRESSURE: 70 MMHG | SYSTOLIC BLOOD PRESSURE: 102 MMHG

## 2019-03-15 DIAGNOSIS — Z99.81 OXYGEN DEPENDENT: ICD-10-CM

## 2019-03-15 DIAGNOSIS — Z98.890 S/P MITRAL VALVE REPAIR: ICD-10-CM

## 2019-03-15 DIAGNOSIS — E78.2 MIXED HYPERLIPIDEMIA: ICD-10-CM

## 2019-03-15 DIAGNOSIS — I50.20 ACC/AHA STAGE C SYSTOLIC HEART FAILURE (HCC): ICD-10-CM

## 2019-03-15 DIAGNOSIS — Z79.899 HIGH RISK MEDICATION USE: ICD-10-CM

## 2019-03-15 DIAGNOSIS — Z86.79 S/P MAZE OPERATION FOR ATRIAL FIBRILLATION: ICD-10-CM

## 2019-03-15 DIAGNOSIS — I48.0 PAROXYSMAL ATRIAL FIBRILLATION (HCC): ICD-10-CM

## 2019-03-15 DIAGNOSIS — I10 HTN (HYPERTENSION), MALIGNANT: ICD-10-CM

## 2019-03-15 DIAGNOSIS — Z98.890 S/P MAZE OPERATION FOR ATRIAL FIBRILLATION: ICD-10-CM

## 2019-03-15 DIAGNOSIS — I51.89 LEFT VENTRICULAR SYSTOLIC DYSFUNCTION, NYHA CLASS 3: ICD-10-CM

## 2019-03-15 LAB — EKG IMPRESSION: NORMAL

## 2019-03-15 PROCEDURE — 93000 ELECTROCARDIOGRAM COMPLETE: CPT | Mod: 59 | Performed by: INTERNAL MEDICINE

## 2019-03-15 PROCEDURE — 94618 PULMONARY STRESS TESTING: CPT | Performed by: INTERNAL MEDICINE

## 2019-03-15 PROCEDURE — 99215 OFFICE O/P EST HI 40 MIN: CPT | Mod: 25 | Performed by: INTERNAL MEDICINE

## 2019-03-15 ASSESSMENT — ENCOUNTER SYMPTOMS
DOUBLE VISION: 0
BLOOD IN STOOL: 0
MYALGIAS: 0
DEPRESSION: 0
HEADACHES: 0
PND: 0
SHORTNESS OF BREATH: 1
COUGH: 0
DIAPHORESIS: 0
CLAUDICATION: 0
ABDOMINAL PAIN: 0
EYE DISCHARGE: 0
CHILLS: 0
EYE PAIN: 0
BRUISES/BLEEDS EASILY: 0
VOMITING: 0
FEVER: 0
MEMORY LOSS: 0
SPEECH CHANGE: 0
SENSORY CHANGE: 0
PALPITATIONS: 0
WEIGHT LOSS: 0
DIZZINESS: 0
LOSS OF CONSCIOUSNESS: 0
ORTHOPNEA: 0
BLURRED VISION: 0
HALLUCINATIONS: 0
NAUSEA: 0
FALLS: 0

## 2019-03-15 ASSESSMENT — MINNESOTA LIVING WITH HEART FAILURE QUESTIONNAIRE (MLHF)
MAKING YOU SHORT OF BREATH: 4
COSTING YOU MONEY FOR MEDICAL CARE: 0
MAKING YOU WORRY: 4
DIFFICULTY WITH SEXUAL ACTIVITIES: 0
DIFFICULTY WORKING TO EARN A LIVING: 0
DIFFICULTY SOCIALIZING WITH FAMILY OR FRIENDS: 3
GIVING YOU SIDE EFFECTS FROM TREATMENTS: 0
HAVING TO SIT OR LIE DOWN DURING THE DAY: 3
DIFFICULTY TO CONCENTRATE OR REMEMBERING THINGS: 1
TOTAL_SCORE: 35
MAKING YOU STAY IN A HOSPITAL: 3
TIRED, FATIGUED OR LOW ON ENERGY: 1
SWELLING IN ANKLES OR LEGS: 0
DIFFICULTY WITH RECREATIONAL PASTIMES, SPORTS, HOBBIES: 3
DIFFICULTY SLEEPING WELL AT NIGHT: 0
WALKING ABOUT OR CLIMBING STAIRS DIFFICULT: 3
WORKING AROUND THE HOUSE OR YARD DIFFICULT: 1
DIFFICULTY GOING AWAY FROM HOME: 3
MAKING YOU FEEL DEPRESSED: 3
FEELING LIKE A BURDEN TO FAMILY AND FRIENDS: 0
LOSS OF SELF CONTROL IN YOUR LIFE: 3
EATING LESS FOODS YOU LIKE: 0

## 2019-03-15 ASSESSMENT — 6 MINUTE WALK TEST (6MWT): TOTAL DISTANCE WALKED (METERS): 183

## 2019-03-15 ASSESSMENT — NEW YORK HEART ASSOCIATION (NYHA) CLASSIFICATION: NYHA FUNCTIONAL CLASS: CLASS III

## 2019-03-15 NOTE — PROGRESS NOTES
"Education Narrative  Reviewed anatomy and physiology of heart failure with patient and . Went over heart failure worksheet and patient's individual HF diagnosis, EF, risk factors, general medication classes and indications, as well as personal goals.  Goals: Patient's primary goal is be more active, requested cardiac rehab referral     Discussed daily weights, sodium restriction, worsening signs and symptoms to report to physician, heart medications, and importance of adherence to medication regimen. Emphasized recommendation from AHA/AAHFN to keep daily sodium intake between 1500mg-2000mg.      Reviewed dietary handouts, advanced care planning classes, and advance directive planning handout. Discussed dietary considerations and reviewed Seven Day Heart Healthy Meal Plan by [employer].     Invited patient and family members/friends to HF support group and encouraged patient to call Heart Failure clinic during normal business hours with any questions.  Heart Failure program card with number given to patient.           Patient states full understanding of all information given.     OP Heart Failure  Vitals     Weight: 52.6 kg (116 lb)        Height: 165.1 cm (5' 5\")  BMI (Calculated): 19.3  Blood Pressure : 102/70  Pulse: (!) 58    System Assessment  NYHA Functional Class Assessment: Class III  ACC/AHA HF Stage: C    Smoking Hx  Have you Ever Smoked: Yes  Have you Smoked in the Last 12 Mos: No     Confirm Quit Date: 01/01/01       Alcohol Hx  Do you Drink?: Yes (has quit since hospital stay)       Illicit Drug Hx  Illicit Drug History: No    Social Hx  Social History: Lives with Spouse  Level of Support: Good  Advance Directives: Present    Education  Symptoms: Verbalizes understanding  Weighing: Verbalizes Understanding  Weight Gain Response: Verbalizes Understanding   Recording Data: Verbalizes understanding  Teach Back Failures: Teach Back Successful  Compliance: Patient is Compliant   "     Medications  Medication Reconciliation : Complete  Medication Counseling Provided: Verbalizes Understanding  Able to Accurately Identify Medication Indications: Some  Medication Discrepancies: None  Is Patient on an Evidence Based Beta Blocker: Yes  Is Patient on ACE-1 or ARB: Yes (ARNI started)    Dietary Assessment  Food Labels: Verbalizes Understanding  Foods High in Sodium: Verbalizes Understanding  Daily Sodium Intake: Verbalizes Understanding  Diet: Verbalizes Understanding  Food Preparation: Verbalizes Understanding  Eating Out Plan: Verbalizes understanding  Healthier Options: Verbalizes Understanding  Fluid Restriction: Not Applicable    MN Living with Heart Failure  Swelling in Ankles or Legs: 0  Having to Sit or Lie Down During the Day: 3  Walking About or Climbing Stairs Difficult: 3  Working Around the House or Yard Difficult: 1  Difficulty Going Away from Home: 3  Difficulty Sleeping Well at Night: 0  Difficulty Socializing with Family or Friends: 3  Difficulty Working to Earn a Livin  Difficulty with Recreational Pastimes, Sports, Hobbies: 3  Difficulty with Sexual Activities: 0  Eating Less Foods You Like: 0  Making you Short of Breath: 4  Tired, Fatigued or Low on Energy: 1  Making you Stay in a Hospital: 3  Costing you Money for Medical Care?: 0  Giving you Side Effects from Treatments: 0  Feeling like a [place] to Family and Friends: 0  Loss of Self Control in your Life: 3  Making You Worry: 4  Difficulty to Concentrate or Remembering Things: 1  Making you Feel Depressed: 3  MLHF Total Score : 35    6 Minute Walk Test  Baseline to end of test: 6 mins  Total meters walked: 183  Comments: patient took a break to catch her breath @2mins 30sec. and @ 3 mins. 44sec.

## 2019-03-15 NOTE — PROGRESS NOTES
Chief Complaint   Patient presents with   • Atrial Fibrillation       Subjective:   Kelly Lovett is a 73 y.o. female who presents today for cardiac care and evaluation for her prior mitral valve repair, maze. Patient has been doing okay.      She still has significant pulmonary disease. She will be followed by pulmonologist. She will be participating in pulmonary rehab. She has not done cardiac rehab because her pulmonologist recommended pulmonary rehab before cardiac rehab.    Last month, she went into the hospital because of heart failure exacerbation.  She was found to have a new reduction in LV function which is now at 20%.  She does have significant history of daily drinking [place].  We optimized her medication diuresed and she was discharged.     I have independently reviewed blood tests results with patient in clinic which showed elevated LDL level, but normal renal and liver function.     I have independently reviewed patient's ECG with patient in clinic today, which shows normal sinus rhythm, normal OK, QT intervals. No evidence of acute coronary syndrome.    03/2019 Patient was able to complete 183 m during his 6 minute walk test. her O2 saturation at baseline was 99% and at the end of the test, the O2 saturation was 96%. she reported 4 level of dyspnea on Yen scale.      Past Medical History:   Diagnosis Date   • Breath shortness     pt reports using o2 at night 2L, no problems at this time   • COPD (chronic obstructive pulmonary disease) (HCC)    • Emphysema of lung (HCC)    • Heart valve disease    • High cholesterol    • History of heart surgery    • Hyperlipidemia    • Hypertension    • Hypotension due to hypovolemia 3/1/2019   • Sleep apnea     uses cpap     Past Surgical History:   Procedure Laterality Date   • MITRAL VALVE REPAIR  4/20/2017    Procedure: MITRAL VALVE REPAIR ;  Surgeon: Lacey Porras M.D.;  Location: SURGERY [facility];  Service:    • MAZE PROCEDURE Left  4/20/2017    Procedure: MAZE PROCEDURE, Left atrial appendage ligation;  Surgeon: Lacey Porras M.D.;  Location: SURGERY [facility];  Service:    • PAGE  4/20/2017    Procedure: PAGE;  Surgeon: Lacey Porras M.D.;  Location: SURGERY [facility];  Service:    • APPENDECTOMY      1967   • OOPHORECTOMY       Family History   Problem Relation Age of Onset   • Cancer Father         colon cancer    • Hypertension Mother    • Cancer Sister 68        ovarian cancer     Social History     Social History   • Marital status:      Spouse name: N/A   • Number of children: N/A   • Years of education: N/A     Occupational History   • Not on file.     Social History Main Topics   • Smoking status: Former Smoker     Packs/day: 0.50     Years: 38.00     Types: Cigarettes     Quit date: 10/11/2001   • Smokeless tobacco: Never Used   • Alcohol use 8.4 oz/week     14 Shots of liquor per week      Comment: 2 per day   • Drug use: No   • Sexual activity: Yes     Partners: Male     Other Topics Concern   • Not on file     Social History Narrative   • No narrative on file     Allergies   Allergen Reactions   • Sulfa Drugs Rash     Rxn - years ago in her 20's     Outpatient Encounter Prescriptions as of 3/15/2019   Medication Sig Dispense Refill   • sacubitril-valsartan (ENTRESTO) 24-26 MG Tab tablet Take 1 Tab by mouth 2 Times a Day. 60 Tab 0   • sacubitril-valsartan (ENTRESTO) 24-26 MG Tab tablet Take 1 Tab by mouth 2 Times a Day. 60 Tab 11   • digoxin (LANOXIN) 125 MCG Tab Take 1 Tab by mouth every day at 6 PM. 30 Tab 2   • metoprolol SR (TOPROL XL) 25 MG TABLET SR 24 HR Take 1 Tab by mouth every evening. 30 Tab 5   • magnesium oxide (MAG-OX) 400 (241.3 Mg) MG Tab tablet Take 1 Tab by mouth every day.     • aspirin (ASA) 81 MG Chew Tab chewable tablet Take 1 Tab by mouth every day. 100 Tab    • albuterol 108 (90 Base) MCG/ACT Aero Soln inhalation aerosol Inhale 2 Puffs by mouth every 6 hours as needed for Shortness of  Breath.     • sertraline (ZOLOFT) 100 MG Tab Take 1 Tab by mouth every day. 90 Tab 1   • Tiotropium Bromide Monohydrate (SPIRIVA RESPIMAT) 1.25 MCG/ACT Aero Soln Inhale 2 Puffs by mouth every day. 3 Inhaler 3   • atorvastatin (LIPITOR) 40 MG Tab Take 1 Tab by mouth every day. 90 Tab 3   • fluticasone-salmeterol (ADVAIR) 250-50 MCG/DOSE AEROSOL POWDER, BREATH ACTIVATED Inhale 1 Puff by mouth 2 times a day. 3 Inhaler 3   • acetaminophen (TYLENOL) 500 MG Tab Take 500 mg by mouth 1 time daily as needed. Headache     • furosemide (LASIX) 20 MG Tab Take 1 Tab by mouth 1 time daily as needed (for weight gain > 2 lbs). (Patient not taking: Reported on 3/15/2019) 30 Tab 1   • potassium chloride SA (K-DUR) 10 MEQ Tab CR Take 1 Tab by mouth 1 time daily as needed (only when on Lasix). 30 Tab 1   • [DISCONTINUED] levalbuterol (XOPENEX) 1.25 MG/3ML Nebu Soln 3 mL by Nebulization route every 6 hours as needed for Shortness of Breath. (Patient not taking: Reported on 3/15/2019) 24 mL    • [DISCONTINUED] senna-docusate (PERICOLACE OR SENOKOT S) 8.6-50 MG Tab Take 2 Tabs by mouth 2 Times a Day. (Patient not taking: Reported on 3/15/2019) 30 Tab 0     No facility-administered encounter medications on file as of 3/15/2019.      Review of Systems   Constitutional: Negative for chills, diaphoresis, fever, malaise/fatigue and weight loss.   HENT: Negative for ear discharge, ear pain, hearing loss and nosebleeds.    Eyes: Negative for blurred vision, double vision, pain and discharge.   Respiratory: Positive for shortness of breath. Negative for cough.    Cardiovascular: Negative for chest pain, palpitations, orthopnea, claudication, leg swelling and PND.   Gastrointestinal: Negative for abdominal pain, blood in stool, melena, nausea and vomiting.   Genitourinary: Negative for dysuria, frequency and hematuria.   Musculoskeletal: Negative for falls, joint pain and myalgias.   Skin: Negative for itching and rash.   Neurological: Negative  "for dizziness, sensory change, speech change, loss of consciousness and headaches.   Endo/Heme/Allergies: Negative for environmental allergies. Does not bruise/bleed easily.   Psychiatric/Behavioral: Negative for depression, hallucinations, memory loss and suicidal ideas.        Objective:   /70 (BP Location: Left arm, Patient Position: Sitting, BP Cuff Size: Adult)   Pulse (!) 58   Ht 1.651 m (5' 5\")   Wt 52.6 kg (116 lb)   SpO2 99%   BMI 19.30 kg/m²     Physical Exam   Constitutional: She is oriented to person, place, and time. No distress.   Patient is dependent on supplemental oxygen.     HENT:   Head: Normocephalic and atraumatic.   Right Ear: External ear normal.   Left Ear: External ear normal.   Eyes: Right eye exhibits no discharge. Left eye exhibits no discharge.   Neck: No JVD present. No thyromegaly present.   Cardiovascular: Normal rate, regular rhythm, normal heart sounds and intact distal pulses.  Exam reveals no gallop and no friction rub.    No murmur heard.  Pulmonary/Chest: Breath sounds normal. No respiratory distress.   Patient is dependent on supplemental oxygen.     Abdominal: Bowel sounds are normal. She exhibits no distension. There is no tenderness.   Musculoskeletal: She exhibits no edema or tenderness.   Neurological: She is alert and oriented to person, place, and time. No cranial nerve deficit.   Skin: Skin is warm and dry. She is not diaphoretic.   Psychiatric: She has a normal mood and affect. Her behavior is normal.   Nursing note and vitals reviewed.      Assessment:     1. ACC/AHA stage C systolic heart failure (HCC)  sacubitril-valsartan (ENTRESTO) 24-26 MG Tab tablet    BTYPE NATRIURETIC PEPTIDE    Basic Metabolic Panel    REFERRAL TO INTENSIVE CARDIAC REHAB/CARDIAC REHAB   2. Left ventricular systolic dysfunction, NYHA class 3  sacubitril-valsartan (ENTRESTO) 24-26 MG Tab tablet    BTYPE NATRIURETIC PEPTIDE    Basic Metabolic Panel    REFERRAL TO INTENSIVE CARDIAC " REHAB/CARDIAC REHAB   3. Paroxysmal atrial fibrillation (HCC)  EKG    BTYPE NATRIURETIC PEPTIDE    Basic Metabolic Panel   4. S/P mitral valve repair  EKG    BTYPE NATRIURETIC PEPTIDE    Basic Metabolic Panel    REFERRAL TO INTENSIVE CARDIAC REHAB/CARDIAC REHAB   5. S/P Maze operation for atrial fibrillation  EKG    BTYPE NATRIURETIC PEPTIDE    Basic Metabolic Panel    REFERRAL TO INTENSIVE CARDIAC REHAB/CARDIAC REHAB   6. Mixed hyperlipidemia  BTYPE NATRIURETIC PEPTIDE    Basic Metabolic Panel   7. HTN (hypertension), malignant  BTYPE NATRIURETIC PEPTIDE    Basic Metabolic Panel   8. Oxygen dependent  BTYPE NATRIURETIC PEPTIDE    Basic Metabolic Panel   9. High risk medication use  BTYPE NATRIURETIC PEPTIDE    Basic Metabolic Panel       Medical Decision Making:  Today's Assessment / Status / Plan:   Today, based on physical examination findings, patient is euvolemic. No JVD, lungs are clear to auscultation, no pitting edema in bilateral lower extremities, no ascites.    Dry weight is 116 lbs.    Based on the overall clinical history and profile, patient is a good candidate for Entresto therapy (with superiority over ACE-I therapy in terms of survival benefits and prevention of future hospitalization).  Will start Entresto therapy at 24/26 mg po bid.    Continue Toprol 25 mg po daily.    Continue Atorvastatin 40 mg po daily.    Continue Digoxin.    Will continue to closely monitor for side effects of patient's high risk medication(s) including liver, renal function and electrolytes.    I will see patient back in our Heart Failure Clinic with lab tests and studies results in 2 weeks.    I thank you for referring patient to our Heart Failure Clinic today.

## 2019-03-15 NOTE — LETTER
[facility]   [address] [place] [address]  [place] [address]  Phone: [phone]  Fax: [phone]              Kelly Lovett  1945    Encounter Date: 3/15/2019    Dalia Li M.D.          PROGRESS NOTE:  Chief Complaint   Patient presents with   • Atrial Fibrillation       Subjective:   Kelly Lovett is a 73 y.o. female who presents today for cardiac care and evaluation for her prior mitral valve repair, maze. Patient has been doing okay.      She still has significant pulmonary disease. She will be followed by pulmonologist. She will be participating in pulmonary rehab. She has not done cardiac rehab because her pulmonologist recommended pulmonary rehab before cardiac rehab.    Last month, she went into the hospital because of heart failure exacerbation.  She was found to have a new reduction in LV function which is now at 20%.  She does have significant history of daily drinking [place].  We optimized her medication diuresed and she was discharged.     I have independently reviewed blood tests results with patient in clinic which showed elevated LDL level, but normal renal and liver function.     I have independently reviewed patient's ECG with patient in clinic today, which shows normal sinus rhythm, normal TN, QT intervals. No evidence of acute coronary syndrome.    03/2019 Patient was able to complete 183 m during his 6 minute walk test. her O2 saturation at baseline was 99% and at the end of the test, the O2 saturation was 96%. she reported 4 level of dyspnea on Yen scale.      Past Medical History:   Diagnosis Date   • Breath shortness     pt reports using o2 at night 2L, no problems at this time   • COPD (chronic obstructive pulmonary disease) (HCC)    • Emphysema of lung (HCC)    • Heart valve disease    • High cholesterol    • History of heart surgery    • Hyperlipidemia    • Hypertension    • Hypotension due to hypovolemia  3/1/2019   • Sleep apnea     uses cpap     Past Surgical History:   Procedure Laterality Date   • MITRAL VALVE REPAIR  4/20/2017    Procedure: MITRAL VALVE REPAIR ;  Surgeon: Lacey Porras M.D.;  Location: SURGERY [facility];  Service:    • MAZE PROCEDURE Left 4/20/2017    Procedure: MAZE PROCEDURE, Left atrial appendage ligation;  Surgeon: Lacey Porras M.D.;  Location: SURGERY [facility];  Service:    • PAGE  4/20/2017    Procedure: PAGE;  Surgeon: Lacey Porras M.D.;  Location: SURGERY [facility];  Service:    • APPENDECTOMY      1967   • OOPHORECTOMY       Family History   Problem Relation Age of Onset   • Cancer Father         colon cancer    • Hypertension Mother    • Cancer Sister 68        ovarian cancer     Social History     Social History   • Marital status:      Spouse name: N/A   • Number of children: N/A   • Years of education: N/A     Occupational History   • Not on file.     Social History Main Topics   • Smoking status: Former Smoker     Packs/day: 0.50     Years: 38.00     Types: Cigarettes     Quit date: 10/11/2001   • Smokeless tobacco: Never Used   • Alcohol use 8.4 oz/week     14 Shots of liquor per week      Comment: 2 per day   • Drug use: No   • Sexual activity: Yes     Partners: Male     Other Topics Concern   • Not on file     Social History Narrative   • No narrative on file     Allergies   Allergen Reactions   • Sulfa Drugs Rash     Rxn - years ago in her 20's     Outpatient Encounter Prescriptions as of 3/15/2019   Medication Sig Dispense Refill   • sacubitril-valsartan (ENTRESTO) 24-26 MG Tab tablet Take 1 Tab by mouth 2 Times a Day. 60 Tab 0   • sacubitril-valsartan (ENTRESTO) 24-26 MG Tab tablet Take 1 Tab by mouth 2 Times a Day. 60 Tab 11   • digoxin (LANOXIN) 125 MCG Tab Take 1 Tab by mouth every day at 6 PM. 30 Tab 2   • metoprolol SR (TOPROL XL) 25 MG TABLET SR 24 HR Take 1 Tab by mouth every evening. 30 Tab 5   • magnesium oxide (MAG-OX) 400 (241.3 Mg)  MG Tab tablet Take 1 Tab by mouth every day.     • aspirin (ASA) 81 MG Chew Tab chewable tablet Take 1 Tab by mouth every day. 100 Tab    • albuterol 108 (90 Base) MCG/ACT Aero Soln inhalation aerosol Inhale 2 Puffs by mouth every 6 hours as needed for Shortness of Breath.     • sertraline (ZOLOFT) 100 MG Tab Take 1 Tab by mouth every day. 90 Tab 1   • Tiotropium Bromide Monohydrate (SPIRIVA RESPIMAT) 1.25 MCG/ACT Aero Soln Inhale 2 Puffs by mouth every day. 3 Inhaler 3   • atorvastatin (LIPITOR) 40 MG Tab Take 1 Tab by mouth every day. 90 Tab 3   • fluticasone-salmeterol (ADVAIR) 250-50 MCG/DOSE AEROSOL POWDER, BREATH ACTIVATED Inhale 1 Puff by mouth 2 times a day. 3 Inhaler 3   • acetaminophen (TYLENOL) 500 MG Tab Take 500 mg by mouth 1 time daily as needed. Headache     • furosemide (LASIX) 20 MG Tab Take 1 Tab by mouth 1 time daily as needed (for weight gain > 2 lbs). (Patient not taking: Reported on 3/15/2019) 30 Tab 1   • potassium chloride SA (K-DUR) 10 MEQ Tab CR Take 1 Tab by mouth 1 time daily as needed (only when on Lasix). 30 Tab 1   • [DISCONTINUED] levalbuterol (XOPENEX) 1.25 MG/3ML Nebu Soln 3 mL by Nebulization route every 6 hours as needed for Shortness of Breath. (Patient not taking: Reported on 3/15/2019) 24 mL    • [DISCONTINUED] senna-docusate (PERICOLACE OR SENOKOT S) 8.6-50 MG Tab Take 2 Tabs by mouth 2 Times a Day. (Patient not taking: Reported on 3/15/2019) 30 Tab 0     No facility-administered encounter medications on file as of 3/15/2019.      Review of Systems   Constitutional: Negative for chills, diaphoresis, fever, malaise/fatigue and weight loss.   HENT: Negative for ear discharge, ear pain, hearing loss and nosebleeds.    Eyes: Negative for blurred vision, double vision, pain and discharge.   Respiratory: Positive for shortness of breath. Negative for cough.    Cardiovascular: Negative for chest pain, palpitations, orthopnea, claudication, leg swelling and PND.   Gastrointestinal:  "Negative for abdominal pain, blood in stool, melena, nausea and vomiting.   Genitourinary: Negative for dysuria, frequency and hematuria.   Musculoskeletal: Negative for falls, joint pain and myalgias.   Skin: Negative for itching and rash.   Neurological: Negative for dizziness, sensory change, speech change, loss of consciousness and headaches.   Endo/Heme/Allergies: Negative for environmental allergies. Does not bruise/bleed easily.   Psychiatric/Behavioral: Negative for depression, hallucinations, memory loss and suicidal ideas.        Objective:   /70 (BP Location: Left arm, Patient Position: Sitting, BP Cuff Size: Adult)   Pulse (!) 58   Ht 1.651 m (5' 5\")   Wt 52.6 kg (116 lb)   SpO2 99%   BMI 19.30 kg/m²      Physical Exam   Constitutional: She is oriented to person, place, and time. No distress.   Patient is dependent on supplemental oxygen.     HENT:   Head: Normocephalic and atraumatic.   Right Ear: External ear normal.   Left Ear: External ear normal.   Eyes: Right eye exhibits no discharge. Left eye exhibits no discharge.   Neck: No JVD present. No thyromegaly present.   Cardiovascular: Normal rate, regular rhythm, normal heart sounds and intact distal pulses.  Exam reveals no gallop and no friction rub.    No murmur heard.  Pulmonary/Chest: Breath sounds normal. No respiratory distress.   Patient is dependent on supplemental oxygen.     Abdominal: Bowel sounds are normal. She exhibits no distension. There is no tenderness.   Musculoskeletal: She exhibits no edema or tenderness.   Neurological: She is alert and oriented to person, place, and time. No cranial nerve deficit.   Skin: Skin is warm and dry. She is not diaphoretic.   Psychiatric: She has a normal mood and affect. Her behavior is normal.   Nursing note and vitals reviewed.      Assessment:     1. ACC/AHA stage C systolic heart failure (HCC)  sacubitril-valsartan (ENTRESTO) 24-26 MG Tab tablet    BTYPE NATRIURETIC PEPTIDE    Basic " Metabolic Panel    REFERRAL TO INTENSIVE CARDIAC REHAB/CARDIAC REHAB   2. Left ventricular systolic dysfunction, NYHA class 3  sacubitril-valsartan (ENTRESTO) 24-26 MG Tab tablet    BTYPE NATRIURETIC PEPTIDE    Basic Metabolic Panel    REFERRAL TO INTENSIVE CARDIAC REHAB/CARDIAC REHAB   3. Paroxysmal atrial fibrillation (HCC)  EKG    BTYPE NATRIURETIC PEPTIDE    Basic Metabolic Panel   4. S/P mitral valve repair  EKG    BTYPE NATRIURETIC PEPTIDE    Basic Metabolic Panel    REFERRAL TO INTENSIVE CARDIAC REHAB/CARDIAC REHAB   5. S/P Maze operation for atrial fibrillation  EKG    BTYPE NATRIURETIC PEPTIDE    Basic Metabolic Panel    REFERRAL TO INTENSIVE CARDIAC REHAB/CARDIAC REHAB   6. Mixed hyperlipidemia  BTYPE NATRIURETIC PEPTIDE    Basic Metabolic Panel   7. HTN (hypertension), malignant  BTYPE NATRIURETIC PEPTIDE    Basic Metabolic Panel   8. Oxygen dependent  BTYPE NATRIURETIC PEPTIDE    Basic Metabolic Panel   9. High risk medication use  BTYPE NATRIURETIC PEPTIDE    Basic Metabolic Panel       Medical Decision Making:  Today's Assessment / Status / Plan:   Today, based on physical examination findings, patient is euvolemic. No JVD, lungs are clear to auscultation, no pitting edema in bilateral lower extremities, no ascites.    Dry weight is 116 lbs.    Based on the overall clinical history and profile, patient is a good candidate for Entresto therapy (with superiority over ACE-I therapy in terms of survival benefits and prevention of future hospitalization).  Will start Entresto therapy at 24/26 mg po bid.    Continue Toprol 25 mg po daily.    Continue Atorvastatin 40 mg po daily.    Continue Digoxin.    Will continue to closely monitor for side effects of patient's high risk medication(s) including liver, renal function and electrolytes.    I will see patient back in our Heart Failure Clinic with lab tests and studies results in 2 weeks.    I thank you for referring patient to our Heart Failure Clinic  today.            Ritika Jurado M.D.  [id number] [address]  VIA In Basket

## 2019-03-19 ENCOUNTER — TELEPHONE (OUTPATIENT)
Dept: MEDICAL GROUP | Facility: LAB | Age: 74
End: 2019-03-19

## 2019-03-19 NOTE — TELEPHONE ENCOUNTER
ESTABLISHED PATIENT PRE-VISIT PLANNING     Patient was NOT contacted to complete PVP.     Note: Patient will not be contacted if there is no indication to call.     1.  Reviewed notes from the last few office visits within the medical group: Yes    2.  If any orders were placed at last visit or intended to be done for this visit (i.e. 6 mos follow-up), do we have Results/Consult Notes?        •  Labs - Labs were not ordered at last office visit.       •  Imaging - Imaging was not ordered at last office visit.       •  Referrals - No referrals were ordered at last office visit.    3. Is this appointment scheduled as a Hospital Follow-Up? No    4.  Immunizations were updated in Epic using WebIZ?: Epic matches WebIZ       •  Web Iz Recommendations: FLU, HEPATITIS B and SHINGRIX (Shingles)    5.  Patient is due for the following Health Maintenance Topics:   Health Maintenance Due   Topic Date Due   • IMM ZOSTER VACCINES (2 of 3) 10/10/2013   • PFT SCREENING-FEV1 AND FEV/FVC RATIO / SPIROMETRY SHOULD BE PERFORMED ANNUALLY  10/19/2017   • IMM INFLUENZA (1) 09/01/2018   • MAMMOGRAM  01/03/2019   • Annual Wellness Visit  01/11/2019       - Patient has completed TDAP Immunization(s) per WebIZ. Chart has been updated.    6. Orders for overdue Health Maintenance topics pended in Pre-Charting? N\A    7.  AHA (MDX) form printed for Provider? YES    8.  Patient was NOT informed to arrive 15 min prior to their scheduled appointment and bring in their medication bottles.

## 2019-03-20 ENCOUNTER — OFFICE VISIT (OUTPATIENT)
Dept: MEDICAL GROUP | Facility: LAB | Age: 74
End: 2019-03-20
Payer: MEDICARE

## 2019-03-20 ENCOUNTER — TELEPHONE (OUTPATIENT)
Dept: MEDICAL GROUP | Facility: LAB | Age: 74
End: 2019-03-20

## 2019-03-20 VITALS
HEART RATE: 81 BPM | TEMPERATURE: 98.8 F | SYSTOLIC BLOOD PRESSURE: 98 MMHG | HEIGHT: 65 IN | BODY MASS INDEX: 19.66 KG/M2 | RESPIRATION RATE: 16 BRPM | WEIGHT: 118 LBS | OXYGEN SATURATION: 96 % | DIASTOLIC BLOOD PRESSURE: 62 MMHG

## 2019-03-20 DIAGNOSIS — I48.92 ATRIAL FLUTTER, UNSPECIFIED TYPE (HCC): ICD-10-CM

## 2019-03-20 DIAGNOSIS — I48.0 PAROXYSMAL ATRIAL FIBRILLATION (HCC): ICD-10-CM

## 2019-03-20 DIAGNOSIS — I50.22 CHRONIC SYSTOLIC CONGESTIVE HEART FAILURE (HCC): ICD-10-CM

## 2019-03-20 DIAGNOSIS — I70.0 AORTIC ATHEROSCLEROSIS (HCC): ICD-10-CM

## 2019-03-20 DIAGNOSIS — J41.0 SIMPLE CHRONIC BRONCHITIS (HCC): ICD-10-CM

## 2019-03-20 DIAGNOSIS — I27.22 PULMONARY HYPERTENSION DUE TO LEFT HEART DISEASE (HCC): ICD-10-CM

## 2019-03-20 DIAGNOSIS — J96.11 CHRONIC RESPIRATORY FAILURE WITH HYPOXIA (HCC): ICD-10-CM

## 2019-03-20 DIAGNOSIS — F32.1 MODERATE SINGLE CURRENT EPISODE OF MAJOR DEPRESSIVE DISORDER (HCC): ICD-10-CM

## 2019-03-20 DIAGNOSIS — I11.0 HYPERTENSIVE HEART DISEASE WITH HEART FAILURE (HCC): ICD-10-CM

## 2019-03-20 PROBLEM — D72.829 LEUKOCYTOSIS: Status: RESOLVED | Noted: 2019-02-28 | Resolved: 2019-03-20

## 2019-03-20 PROBLEM — J96.01 ACUTE RESPIRATORY FAILURE WITH HYPOXIA (HCC): Status: RESOLVED | Noted: 2019-02-21 | Resolved: 2019-03-20

## 2019-03-20 PROBLEM — E86.1 HYPOTENSION DUE TO HYPOVOLEMIA: Status: RESOLVED | Noted: 2019-03-01 | Resolved: 2019-03-20

## 2019-03-20 PROBLEM — I95.89 HYPOTENSION DUE TO HYPOVOLEMIA: Status: RESOLVED | Noted: 2019-03-01 | Resolved: 2019-03-20

## 2019-03-20 PROCEDURE — 99214 OFFICE O/P EST MOD 30 MIN: CPT | Performed by: FAMILY MEDICINE

## 2019-03-20 PROCEDURE — 8041 PR SCP AHA: Performed by: FAMILY MEDICINE

## 2019-03-20 ASSESSMENT — PATIENT HEALTH QUESTIONNAIRE - PHQ9: CLINICAL INTERPRETATION OF PHQ2 SCORE: 0

## 2019-03-20 NOTE — PROGRESS NOTES
Subjective:     Kelly Lovett is a 73 y.o. female here today for hospital f/u for CHF and Annual Health Assessment.    1. Chronic systolic congestive heart failure (HCC)  2. Hypertensive heart disease with heart failure (HCC)  This is a new problem to discuss with me.  Patient was in the hospital admitted 2/21/2019.  He spent a week in the hospital for new onset CHF after being unable to breathe.  She did not have any lower extremity edema at that time.  Medication adjustments were made during the hospital to help with diuresis and to help with remodeling of the heart.  After discharge, she was started on Entresto and she has not had any side effects from that medication.  She is monitoring her blood pressure regularly at home.  She is not having any lightheadedness when standing up.  She has not had any lower extremity edema.  Her breathing is stable.    3. Pulmonary hypertension due to left heart disease (HCC)  This is chronic.  She is following with her pulmonologist as well as her cardiologist.  She is on chronic oxygen.  She is now on Entresto and is compliant with her inhalers    4. Moderate single current episode of major depressive disorder (HCC)  This is chronic.  Patient is on Zoloft and is overall feeling well on this life stress in the hospital but states that she is dealing with this okay.    5. Paroxysmal atrial fibrillation (HCC)  6. Atrial flutter, unspecified type (HCC)  Chronic.  Following with cardiology regularly.  She is on aspirin, digoxin, metoprolol.  She is tolerating these medications well.    7. Aortic atherosclerosis (HCC)  This is a new problem.  She is on aspirin and a statin.  She is following with cardiology.  No current symptoms of lightheadedness or syncope    8. Simple chronic bronchitis (HCC)  Chronic.  She is on 2.5 L of oxygen.  She is following with pulmonology regularly.  She is consistent with her inhalers Advair and Spiriva.  Not using albuterol often.    9. Chronic  respiratory failure with hypoxia (HCC)  Chronic.  Her oxygen demand did go up while in the hospital but she is now back down to her baseline of 2.5-3 L of oxygen.  This is secondary to COPD and now also secondary to CHF.       Health Maintenance Summary                IMM ZOSTER VACCINES Overdue 10/10/2013      Done 8/15/2013 Imm Admin: Zoster Vaccine Live (ZVL) (Zostavax)    PFT SCREENING-FEV1 AND FEV/FVC RATIO / SPIROMETRY SHOULD BE PERFORMED ANNUALLY Overdue 10/19/2017      Done 10/19/2016 PFT DICTATED RESULTS    IMM INFLUENZA Overdue 9/1/2018      Done 11/13/2015 Imm Admin: Influenza Vaccine Adult HD     Patient has more history with this topic...    MAMMOGRAM Overdue 1/3/2019      Done 1/3/2018 MA-MAMMO SCREENING BILAT W/TOMOSYNTHESIS W/CAD     Patient has more history with this topic...    Annual Wellness Visit Overdue 1/11/2019      Done 1/10/2018 Visit Dx: Medicare annual wellness visit, subsequent     Patient has more history with this topic...    COLONOSCOPY Next Due 5/6/2019      Done 5/6/2009 REFERRAL TO GI FOR COLONOSCOPY    IMM DTaP/Tdap/Td Vaccine Next Due 9/17/2022      Done 9/17/2012 Imm Admin: Tdap Vaccine     Patient has more history with this topic...    BONE DENSITY Next Due 3/28/2023      Done 3/28/2018 DS-BONE DENSITY STUDY (DEXA)           Annual Health Assessment Questions:     1.  Are you currently engaging in any exercise or physical activity? Yes    2.  How would you describe your mood or emotional well-being today? good    3.  Have you had any falls in the last year? No    4.  Have you noticed any problems with your balance or had difficulty walking? No    5.  In the last six months have you experienced any leakage of urine? No    6. DPA/Advanced Directive: Patient has Advanced Directive on file.     Current medicines (including changes today)  Current Outpatient Prescriptions   Medication Sig Dispense Refill   • sacubitril-valsartan (ENTRESTO) 24-26 MG Tab tablet Take 1 Tab by mouth 2  Times a Day. 60 Tab 0   • sacubitril-valsartan (ENTRESTO) 24-26 MG Tab tablet Take 1 Tab by mouth 2 Times a Day. 60 Tab 11   • digoxin (LANOXIN) 125 MCG Tab Take 1 Tab by mouth every day at 6 PM. 30 Tab 2   • metoprolol SR (TOPROL XL) 25 MG TABLET SR 24 HR Take 1 Tab by mouth every evening. 30 Tab 5   • furosemide (LASIX) 20 MG Tab Take 1 Tab by mouth 1 time daily as needed (for weight gain > 2 lbs). 30 Tab 1   • potassium chloride SA (K-DUR) 10 MEQ Tab CR Take 1 Tab by mouth 1 time daily as needed (only when on Lasix). 30 Tab 1   • magnesium oxide (MAG-OX) 400 (241.3 Mg) MG Tab tablet Take 1 Tab by mouth every day.     • aspirin (ASA) 81 MG Chew Tab chewable tablet Take 1 Tab by mouth every day. 100 Tab    • albuterol 108 (90 Base) MCG/ACT Aero Soln inhalation aerosol Inhale 2 Puffs by mouth every 6 hours as needed for Shortness of Breath.     • sertraline (ZOLOFT) 100 MG Tab Take 1 Tab by mouth every day. 90 Tab 1   • Tiotropium Bromide Monohydrate (SPIRIVA RESPIMAT) 1.25 MCG/ACT Aero Soln Inhale 2 Puffs by mouth every day. 3 Inhaler 3   • atorvastatin (LIPITOR) 40 MG Tab Take 1 Tab by mouth every day. 90 Tab 3   • fluticasone-salmeterol (ADVAIR) 250-50 MCG/DOSE AEROSOL POWDER, BREATH ACTIVATED Inhale 1 Puff by mouth 2 times a day. 3 Inhaler 3   • acetaminophen (TYLENOL) 500 MG Tab Take 500 mg by mouth 1 time daily as needed. Headache       No current facility-administered medications for this visit.        She  has a past medical history of Breath shortness; Chronic systolic congestive heart failure (HCC) (7/7/2016); COPD (chronic obstructive pulmonary disease) ([facility]); Emphysema of lung (HCC); Heart valve disease; High cholesterol; History of heart surgery; Hyperlipidemia; Hypertension; Hypotension due to hypovolemia (3/1/2019); and Sleep apnea.    Sulfa drugs    She  reports that she quit smoking about 17 years ago. Her smoking use included Cigarettes. She has a 19.00 pack-year smoking history. She has never used  "smokeless tobacco. She reports that she drinks about 8.4 oz of alcohol per week . She reports that she does not use drugs.  Counseling given: Yes      ROS   No chest pain, no abdominal pain.     Objective:     Physical Exam:  Blood pressure (!) 98/62, pulse 81, temperature 37.1 °C (98.8 °F), temperature source Temporal, resp. rate 16, height 1.651 m (5' 5\"), weight 53.5 kg (118 lb), SpO2 96 %, not currently breastfeeding. Body mass index is 19.64 kg/m².   Constitutional: Alert, no distress.  Skin: Warm, dry, good turgor, no rashes in visible areas.  Eye: Equal, round and reactive, conjunctiva clear, lids normal.  ENMT: Lips without lesions, good dentition, oropharynx clear.  Neck: Trachea midline, no masses, no thyromegaly. No cervical or supraclavicular lymphadenopathy.  Respiratory: Unlabored respiratory effort, lungs clear to auscultation, no wheezes, no rhonchi.  Nasal cannula in place  Cardiovascular: Normal S1, S2, murmur present, no edema.  No JVD  Abdomen: Soft, non-tender, no masses, no hepatosplenomegaly.  Psych: Alert and oriented x3, normal affect and mood.    Assessment and Plan:     1. Chronic systolic congestive heart failure (HCC)  This is a new problem to discuss with me, currently stable after a long hospitalization for diuresis and blood pressure management.  She is now on Entresto, Lasix, aspirin, metoprolol.  She is tolerating these well without side effects and no significant hypotension.  She does have home health for physical therapy, occupational therapy, and skilled nursing.  She is continued to be homebound due to her oxygen demands.  She has labs ordered by Dr. sams that she will get done today    2. Hypertensive heart disease with heart failure (HCC)  This is a new problem to discuss with me, currently stable after a long hospitalization for diuresis and blood pressure management.  She is now on Entresto, Lasix, aspirin, metoprolol.  She is tolerating these well without side effects and no " significant hypotension.  She does have home health for physical therapy, occupational therapy, and skilled nursing.  She is continued to be homebound due to her oxygen demands.    3. Pulmonary hypertension due to left heart disease (HCC)  Chronic, currently stable with oxygen demand of 2-1/2-3 L and following with both cardiology and pulmonology    4. Moderate single current episode of major depressive disorder (HCC)  Chronic, stable on Zoloft without side effects    5. Paroxysmal atrial fibrillation (HCC)  Chronic, stable on digoxin, metoprolol, aspirin and following regularly with cardiology.    6. Atrial flutter, unspecified type (HCC)  Chronic, stable on digoxin, metoprolol, aspirin and following regularly with cardiology.    7. Aortic atherosclerosis (HCC)  Chronic, stable on a statin without current symptoms.  Monitored by cardiology.    8. Simple chronic bronchitis (HCC)  Chronic, stable, on Spiriva and Advair.  Not needing albuterol often.  Uses 2-1/2-3 L oxygen    9. Chronic respiratory failure with hypoxia (HCC)  Chronic, stable, secondary to COPD as well as CHF.  Continue 2-1/2-3 L      Discussion today about general wellness and lifestyle habits:    · Engage in regular physical activity and social activities.  · Prevent falls and reduce trip hazards; using ambulatory aides, hearing and vision testing if appropriate.  · Steps to improve urinary incontinence.  · Advanced care planning.    Follow-Up: Return in about 3 months (around 6/20/2019), or if symptoms worsen or fail to improve.         PLEASE NOTE: This dictation was created using voice recognition software. I have made every reasonable attempt to correct obvious errors, but I expect that there are errors of grammar and possibly content that I did not discover before finalizing the note.

## 2019-03-20 NOTE — TELEPHONE ENCOUNTER
"· Home Health paperwork received from [facility] requiring provider signature.     · All appropriate fields completed by Medical Assistant: No    · Paperwork placed in \"MA to Provider\" folder/basket. Awaiting provider completion/signature.  "

## 2019-03-21 ENCOUNTER — TELEPHONE (OUTPATIENT)
Dept: MEDICAL GROUP | Facility: LAB | Age: 74
End: 2019-03-21

## 2019-03-21 ENCOUNTER — APPOINTMENT (OUTPATIENT)
Dept: MEDICAL GROUP | Facility: MEDICAL CENTER | Age: 74
End: 2019-03-21
Payer: MEDICARE

## 2019-03-28 ENCOUNTER — TELEPHONE (OUTPATIENT)
Dept: CARDIOLOGY | Facility: MEDICAL CENTER | Age: 74
End: 2019-03-28

## 2019-03-28 NOTE — PROGRESS NOTES
Chief Complaint   Patient presents with   • Congestive Heart Failure       Subjective:   Kelly Lovett is a 73 y.o. Female with severe LV dysfunction/CHF, mitral valve repair now with moderate regurgitation, PAF s/p MVR, MAZE procedure with ERI ligation April 2017, COPD, hypertension who presents today for follow up.    Last seen by Dr. Li 3/15/19.  Recently hospitalized for heart failure exacerbation, reduction in LV function, now at 20%.  She was started on Entresto 24-26 twice daily.     Today she feels well.  Her respiratory status is stable, 2.5-3 L oxygen with ambulation and at rest.  She denies any symptoms with activity although her activity is minimal, includes light housework.     Weights have been stable, 117 pounds at home.    Does not measure BP at home, no dizziness, lightheadedness with starting new medications.     She has been taking her Lasix daily instead of as needed.  Prior to her hospitalization she did not have any orthopnea or lower extremity edema, she simply woke up short of breath.    She was recently switched to a salt substitute for cooking, understands this is high in potassium.    Past Medical History:   Diagnosis Date   • Breath shortness     pt reports using o2 at night 2L, no problems at this time   • Chronic systolic congestive heart failure (HCC) 7/7/2016   • COPD (chronic obstructive pulmonary disease) (HCC)    • Emphysema of lung (HCC)    • Heart valve disease    • High cholesterol    • History of heart surgery    • Hyperlipidemia    • Hypertension    • Hypotension due to hypovolemia 3/1/2019   • Sleep apnea     uses cpap     Past Surgical History:   Procedure Laterality Date   • MITRAL VALVE REPAIR  4/20/2017    Procedure: MITRAL VALVE REPAIR ;  Surgeon: Lacey Porras M.D.;  Location: SURGERY [facility];  Service:    • MAZE PROCEDURE Left 4/20/2017    Procedure: MAZE PROCEDURE, Left atrial appendage ligation;  Surgeon: Lacey Porras M.D.;  Location: SURGERY  [facility];  Service:    • PAGE  4/20/2017    Procedure: PAGE;  Surgeon: Lacey Porras M.D.;  Location: SURGERY [facility];  Service:    • APPENDECTOMY      1967   • OOPHORECTOMY       Family History   Problem Relation Age of Onset   • Cancer Father         colon cancer    • Hypertension Mother    • Cancer Sister 68        ovarian cancer     Social History     Social History   • Marital status:      Spouse name: N/A   • Number of children: N/A   • Years of education: N/A     Occupational History   • Not on file.     Social History Main Topics   • Smoking status: Former Smoker     Packs/day: 0.50     Years: 38.00     Types: Cigarettes     Quit date: 10/11/2001   • Smokeless tobacco: Never Used   • Alcohol use 8.4 oz/week     14 Shots of liquor per week      Comment: 2 per day   • Drug use: No   • Sexual activity: Yes     Partners: Male     Other Topics Concern   • Not on file     Social History Narrative   • No narrative on file     Allergies   Allergen Reactions   • Sulfa Drugs Rash     Rxn - years ago in her 20's     Outpatient Encounter Prescriptions as of 4/1/2019   Medication Sig Dispense Refill   • sacubitril-valsartan (ENTRESTO) 24-26 MG Tab tablet Take 1 Tab by mouth 2 Times a Day. 60 Tab 0   • sacubitril-valsartan (ENTRESTO) 24-26 MG Tab tablet Take 1 Tab by mouth 2 Times a Day. 60 Tab 11   • digoxin (LANOXIN) 125 MCG Tab Take 1 Tab by mouth every day at 6 PM. 30 Tab 2   • metoprolol SR (TOPROL XL) 25 MG TABLET SR 24 HR Take 1 Tab by mouth every evening. 30 Tab 5   • furosemide (LASIX) 20 MG Tab Take 1 Tab by mouth 1 time daily as needed (for weight gain > 2 lbs). 30 Tab 1   • potassium chloride SA (K-DUR) 10 MEQ Tab CR Take 1 Tab by mouth 1 time daily as needed (only when on Lasix). 30 Tab 1   • magnesium oxide (MAG-OX) 400 (241.3 Mg) MG Tab tablet Take 1 Tab by mouth every day.     • aspirin (ASA) 81 MG Chew Tab chewable tablet Take 1 Tab by mouth every day. 100 Tab    • albuterol 108 (90  "Base) MCG/ACT Aero Soln inhalation aerosol Inhale 2 Puffs by mouth every 6 hours as needed for Shortness of Breath.     • sertraline (ZOLOFT) 100 MG Tab Take 1 Tab by mouth every day. 90 Tab 1   • Tiotropium Bromide Monohydrate (SPIRIVA RESPIMAT) 1.25 MCG/ACT Aero Soln Inhale 2 Puffs by mouth every day. 3 Inhaler 3   • atorvastatin (LIPITOR) 40 MG Tab Take 1 Tab by mouth every day. 90 Tab 3   • fluticasone-salmeterol (ADVAIR) 250-50 MCG/DOSE AEROSOL POWDER, BREATH ACTIVATED Inhale 1 Puff by mouth 2 times a day. 3 Inhaler 3   • acetaminophen (TYLENOL) 500 MG Tab Take 500 mg by mouth 1 time daily as needed. Headache       No facility-administered encounter medications on file as of 4/1/2019.      Review of Systems   Constitutional: Negative for chills, fever and weight loss.   Respiratory: Positive for shortness of breath (Baseline). Negative for cough and wheezing.    Cardiovascular: Negative for chest pain, palpitations, orthopnea, leg swelling and PND.   Gastrointestinal: Negative for nausea and vomiting.   Neurological: Negative for dizziness, loss of consciousness and weakness.        Objective:   Ht 1.651 m (5' 5\")   Wt 53.8 kg (118 lb 9.6 oz)   BMI 19.74 kg/m²     Physical Exam   Constitutional: She is oriented to person, place, and time. She appears well-developed and well-nourished. No distress.   Thin female, appears well, no acute distress   HENT:   Head: Normocephalic and atraumatic.   Eyes: Conjunctivae are normal. No scleral icterus.   Neck: Neck supple. No JVD present.   Cardiovascular: Normal rate, regular rhythm and intact distal pulses.    No murmur heard.  Pulses:       Radial pulses are 1+ on the right side, and 1+ on the left side.        Posterior tibial pulses are 2+ on the right side, and 2+ on the left side.   Pulmonary/Chest: Effort normal and breath sounds normal. No respiratory distress. She has no wheezes. She has no rales.   Abdominal: Soft. Bowel sounds are normal. She exhibits no " distension. There is no tenderness.   Musculoskeletal: She exhibits no edema.   Neurological: She is alert and oriented to person, place, and time. No cranial nerve deficit.   Skin: Skin is warm and dry. She is not diaphoretic. No pallor.   Psychiatric: Judgment normal.   Nursing note and vitals reviewed.      Transesophageal echocardiogram 2/26/19  FINDINGS  Left ventricle is dilated. Severely reduced left ventricular systolic   function.  Normal right ventricular size. Reduced right ventricular systolic   Function.  The right atrium is normal in size.  Left Atrium  Mildly dilated left atrium.    Ligated left atrial appendage noted with no communication with the left   atrium observed.    The interatrial septum is normal.    The interventricular septum is normal.    Known mitral valve repair functioning adequately with moderate   regurgitation in the setting of severe left ventricular systolic   dysfunction.  The regurgitation of the repaired mitral valve is   primarily at the A2-P3 junction.  There is no systolic flow reversal in   the pulmonary veins either on the left or the right side.  Structurally normal aortic valve without significant stenosis or   regurgitation.  Structurally normal tricuspid valve. Mild tricuspid regurgitation.  Structurally normal pulmonic valve without significant stenosis or   regurgitation.  Normal pericardium without effusion  The aortic root is normal.    Transthoracic echocardiogram 2/21/19  CONCLUSIONS  Comapred to the prior echocardiogram dated 3/7/2017, significant   changes are noted.    1. Severely reduced left ventricular systolic function.  Left   ventricular ejection fraction is visually estimated to be 20%.    2. Severely dilated left atrium.    3. Estimated right ventricular systolic pressure  is 50 mmHg.  Mildly   dilated right ventricle.  Reduced right ventricular systolic function.      Cardiac catheterization 3/17/17  POSTOPERATIVE DIAGNOSES:  1.  Severe mitral  regurgitation.  2.  Normal left ventricular systolic function, ejection fraction greater than   75%.  3.  No significant coronary artery disease.    Reviewed labs results with patient  Lab Results   Component Value Date/Time    CHOLSTRLTOT 211 (H) 08/27/2018 08:20 AM    LDL 82 08/27/2018 08:20 AM     08/27/2018 08:20 AM    TRIGLYCERIDE 53 08/27/2018 08:20 AM       Lab Results   Component Value Date/Time    SODIUM 143 03/29/2019 07:36 AM    POTASSIUM 4.5 03/29/2019 07:36 AM    CHLORIDE 104 03/29/2019 07:36 AM    CO2 29 03/29/2019 07:36 AM    GLUCOSE 90 03/29/2019 07:36 AM    BUN 18 03/29/2019 07:36 AM    CREATININE 0.86 03/29/2019 07:36 AM     Lab Results   Component Value Date/Time    ALKPHOSPHAT 55 02/26/2019 05:46 AM    ASTSGOT 27 02/26/2019 05:46 AM    ALTSGPT 19 02/26/2019 05:46 AM    TBILIRUBIN 0.6 02/26/2019 05:46 AM        Assessment:     1. Chronic systolic congestive heart failure (HCC)     2. Non-rheumatic mitral regurgitation     3. Obstructive sleep apnea syndrome     4. Simple chronic bronchitis (HCC)     5. Atrial flutter, unspecified type (HCC)     6. Mixed hyperlipidemia     7. Prediabetes     8. Essential (primary) hypertension     9. Paroxysmal atrial fibrillation (HCC)     10. Hypertensive heart disease with heart failure (HCC)     11. Pulmonary hypertension due to left heart disease (HCC)         Medical Decision Making:  Today's Assessment / Status / Plan:   HFrEF, Stage C, Class III, LVEF 20%:   Dilated Cardiomyopathy, etiology? (clean coronaries on cath 2017)  -warm and dry on exam  -Metoprolol succinate 25 mg  -Entresto 24-26 twice daily  -start spirono 12.5mg daily for class III symptoms, only completed 183 m in the 6min walk test reported level for dyspnea, given this I think she would benefit from sudheer antag  -Continue Digoxin 125 mcg daily  -Lasix 20 mg as needed for 3lb weight gain /24hr, no potassium supplementation  - discussed high potassium content in salt replacement,  discontinue in light of starting Kieran  -Reinforced s/sx of worsening heart failure with patient and weight monitoring. Pt verbalizes understanding. Pt to call office or RTC if present.   -repeat echo end of May, see Dr. Li prior    Mitral Regurgitation s/p MV repair   -regurgitation of the repaired mitral valve is primarily at the A2-P3 junction.   - Performed by Dr. Porras 4/20/17  -aspirin 81mg    Atrial fibrillation status post left-sided Maze procedure, left atrial appendage ligation    COPD  -Oxygen dependent, 2.5L  -continues on Spiriva, Advair, Ventolin, and supplemental oxygen at 2.5 L/min 24/7  Her FEV1 is 0.68 L.  -Follows with pulmonary medicine, states reasonably controlled    Dyslipidemia, controlled  - lipitor 40mg q evening    Essential Hypertension, controlled  -meds as above    MARY  - CPAP nightly    Plan: start spironolactone, lasix PRN, no potassium, labs in 1 mo, f/u with Dr. Li, echo end of May (did not place order)    Collaborating MD: Dr. Naranjo

## 2019-03-28 NOTE — TELEPHONE ENCOUNTER
LM to remind patient to complete non-fasting labs before upcoming appt with Elena Harvey on 04/01/2019.

## 2019-03-29 ENCOUNTER — HOSPITAL ENCOUNTER (OUTPATIENT)
Dept: LAB | Facility: MEDICAL CENTER | Age: 74
End: 2019-03-29
Attending: INTERNAL MEDICINE
Payer: MEDICARE

## 2019-03-29 DIAGNOSIS — Z99.81 OXYGEN DEPENDENT: ICD-10-CM

## 2019-03-29 DIAGNOSIS — I48.0 PAROXYSMAL ATRIAL FIBRILLATION (HCC): ICD-10-CM

## 2019-03-29 DIAGNOSIS — I50.20 ACC/AHA STAGE C SYSTOLIC HEART FAILURE (HCC): ICD-10-CM

## 2019-03-29 DIAGNOSIS — I51.89 LEFT VENTRICULAR SYSTOLIC DYSFUNCTION, NYHA CLASS 3: ICD-10-CM

## 2019-03-29 DIAGNOSIS — I10 HTN (HYPERTENSION), MALIGNANT: ICD-10-CM

## 2019-03-29 DIAGNOSIS — Z86.79 S/P MAZE OPERATION FOR ATRIAL FIBRILLATION: ICD-10-CM

## 2019-03-29 DIAGNOSIS — Z79.899 HIGH RISK MEDICATION USE: ICD-10-CM

## 2019-03-29 DIAGNOSIS — Z98.890 S/P MITRAL VALVE REPAIR: ICD-10-CM

## 2019-03-29 DIAGNOSIS — Z98.890 S/P MAZE OPERATION FOR ATRIAL FIBRILLATION: ICD-10-CM

## 2019-03-29 DIAGNOSIS — E78.2 MIXED HYPERLIPIDEMIA: ICD-10-CM

## 2019-03-29 LAB
ANION GAP SERPL CALC-SCNC: 10 MMOL/L (ref 0–11.9)
BNP SERPL-MCNC: 604 PG/ML (ref 0–100)
BUN SERPL-MCNC: 18 MG/DL (ref 8–22)
CALCIUM SERPL-MCNC: 9.3 MG/DL (ref 8.5–10.5)
CHLORIDE SERPL-SCNC: 104 MMOL/L (ref 96–112)
CO2 SERPL-SCNC: 29 MMOL/L (ref 20–33)
CREAT SERPL-MCNC: 0.86 MG/DL (ref 0.5–1.4)
GLUCOSE SERPL-MCNC: 90 MG/DL (ref 65–99)
POTASSIUM SERPL-SCNC: 4.5 MMOL/L (ref 3.6–5.5)
SODIUM SERPL-SCNC: 143 MMOL/L (ref 135–145)

## 2019-03-29 PROCEDURE — 80048 BASIC METABOLIC PNL TOTAL CA: CPT

## 2019-03-29 PROCEDURE — 36415 COLL VENOUS BLD VENIPUNCTURE: CPT

## 2019-03-29 PROCEDURE — 83880 ASSAY OF NATRIURETIC PEPTIDE: CPT

## 2019-04-01 ENCOUNTER — OFFICE VISIT (OUTPATIENT)
Dept: CARDIOLOGY | Facility: MEDICAL CENTER | Age: 74
End: 2019-04-01
Payer: MEDICARE

## 2019-04-01 VITALS
OXYGEN SATURATION: 96 % | HEART RATE: 60 BPM | DIASTOLIC BLOOD PRESSURE: 70 MMHG | HEIGHT: 65 IN | WEIGHT: 118.6 LBS | BODY MASS INDEX: 19.76 KG/M2 | SYSTOLIC BLOOD PRESSURE: 110 MMHG

## 2019-04-01 DIAGNOSIS — I34.0 NON-RHEUMATIC MITRAL REGURGITATION: ICD-10-CM

## 2019-04-01 DIAGNOSIS — I11.0 HYPERTENSIVE HEART DISEASE WITH HEART FAILURE (HCC): ICD-10-CM

## 2019-04-01 DIAGNOSIS — I50.9 HEART FAILURE, NYHA CLASS 3 (HCC): ICD-10-CM

## 2019-04-01 DIAGNOSIS — Z79.899 HIGH RISK MEDICATION USE: ICD-10-CM

## 2019-04-01 DIAGNOSIS — I27.22 PULMONARY HYPERTENSION DUE TO LEFT HEART DISEASE (HCC): ICD-10-CM

## 2019-04-01 DIAGNOSIS — I50.22 CHRONIC SYSTOLIC CONGESTIVE HEART FAILURE (HCC): ICD-10-CM

## 2019-04-01 DIAGNOSIS — G47.33 OBSTRUCTIVE SLEEP APNEA SYNDROME: ICD-10-CM

## 2019-04-01 DIAGNOSIS — E78.2 MIXED HYPERLIPIDEMIA: ICD-10-CM

## 2019-04-01 DIAGNOSIS — I10 ESSENTIAL (PRIMARY) HYPERTENSION: ICD-10-CM

## 2019-04-01 DIAGNOSIS — I48.0 PAROXYSMAL ATRIAL FIBRILLATION (HCC): ICD-10-CM

## 2019-04-01 DIAGNOSIS — J41.0 SIMPLE CHRONIC BRONCHITIS (HCC): ICD-10-CM

## 2019-04-01 PROCEDURE — 99214 OFFICE O/P EST MOD 30 MIN: CPT | Performed by: PHYSICIAN ASSISTANT

## 2019-04-01 RX ORDER — SPIRONOLACTONE 25 MG/1
12.5 TABLET ORAL DAILY
Qty: 30 TAB | Refills: 3 | Status: SHIPPED | OUTPATIENT
Start: 2019-04-01 | End: 2019-05-15

## 2019-04-01 ASSESSMENT — ENCOUNTER SYMPTOMS
DIZZINESS: 0
COUGH: 0
SHORTNESS OF BREATH: 1
LOSS OF CONSCIOUSNESS: 0
PALPITATIONS: 0
NAUSEA: 0
WEIGHT LOSS: 0
FEVER: 0
WEAKNESS: 0
PND: 0
VOMITING: 0
CHILLS: 0
WHEEZING: 0
ORTHOPNEA: 0

## 2019-04-08 ENCOUNTER — PATIENT OUTREACH (OUTPATIENT)
Dept: HEALTH INFORMATION MANAGEMENT | Facility: OTHER | Age: 74
End: 2019-04-08

## 2019-04-09 NOTE — PROGRESS NOTES
Patient Kelly Lovett discharged on 3/08/19. [facility] Patient Advocate assisted with discharge orders including two cardiology appointments and one PCP appointment. Patient is scheduled for a cardiology appointment on 5/15/19, and a pulmonology appointment on 8/13/19. Patient also discharged with [facility]. Patient discharged with a high LACE score, therefore, a PPS screening was conducted and the patient scored 80%.

## 2019-05-02 ENCOUNTER — HOSPITAL ENCOUNTER (OUTPATIENT)
Dept: LAB | Facility: MEDICAL CENTER | Age: 74
End: 2019-05-02
Attending: PHYSICIAN ASSISTANT
Payer: MEDICARE

## 2019-05-02 DIAGNOSIS — I50.22 CHRONIC SYSTOLIC CONGESTIVE HEART FAILURE (HCC): ICD-10-CM

## 2019-05-02 DIAGNOSIS — J44.9 CHRONIC OBSTRUCTIVE PULMONARY DISEASE, UNSPECIFIED COPD TYPE (HCC): ICD-10-CM

## 2019-05-02 DIAGNOSIS — Z79.899 HIGH RISK MEDICATION USE: ICD-10-CM

## 2019-05-02 LAB
ANION GAP SERPL CALC-SCNC: 11 MMOL/L (ref 0–11.9)
BUN SERPL-MCNC: 25 MG/DL (ref 8–22)
CALCIUM SERPL-MCNC: 9.7 MG/DL (ref 8.5–10.5)
CHLORIDE SERPL-SCNC: 102 MMOL/L (ref 96–112)
CO2 SERPL-SCNC: 27 MMOL/L (ref 20–33)
CREAT SERPL-MCNC: 0.9 MG/DL (ref 0.5–1.4)
GLUCOSE SERPL-MCNC: 97 MG/DL (ref 65–99)
MAGNESIUM SERPL-MCNC: 1.4 MG/DL (ref 1.5–2.5)
POTASSIUM SERPL-SCNC: 4.5 MMOL/L (ref 3.6–5.5)
SODIUM SERPL-SCNC: 140 MMOL/L (ref 135–145)

## 2019-05-02 PROCEDURE — 36415 COLL VENOUS BLD VENIPUNCTURE: CPT

## 2019-05-02 PROCEDURE — 83735 ASSAY OF MAGNESIUM: CPT

## 2019-05-02 PROCEDURE — 80048 BASIC METABOLIC PNL TOTAL CA: CPT

## 2019-05-02 NOTE — TELEPHONE ENCOUNTER
Was the patient seen in the last year in this department? Yes  3/20/19  Does patient have an active prescription for medications requested? No     Received Request Via: Patient

## 2019-05-02 NOTE — TELEPHONE ENCOUNTER
----- Message from Kelly Lovett sent at 5/2/2019  2:57 PM PDT -----  Regarding: Prescription Question  Contact: [phone]  Dr. Bauer  Can you ok my prescription for [place]. We're going out of town & I will run out before we get back . Dashawn polk just sent you a request, so the sooner the better   Thanks, Kelly

## 2019-05-03 ENCOUNTER — TELEPHONE (OUTPATIENT)
Dept: MEDICAL GROUP | Facility: LAB | Age: 74
End: 2019-05-03

## 2019-05-03 NOTE — TELEPHONE ENCOUNTER
1. Caller Name: Kelly Tejeda [facility]      Call Back Number: [phone] (home)         Patient approves a detailed voicemail message: N\A    Patient called asking today if her prescription was filled already, it is confirmed by pharmacy, but I asked her to call her pharmacy.

## 2019-05-15 ENCOUNTER — OFFICE VISIT (OUTPATIENT)
Dept: CARDIOLOGY | Facility: MEDICAL CENTER | Age: 74
End: 2019-05-15
Payer: MEDICARE

## 2019-05-15 VITALS
BODY MASS INDEX: 19.59 KG/M2 | HEIGHT: 65 IN | DIASTOLIC BLOOD PRESSURE: 48 MMHG | HEART RATE: 87 BPM | SYSTOLIC BLOOD PRESSURE: 72 MMHG | OXYGEN SATURATION: 96 % | WEIGHT: 117.6 LBS

## 2019-05-15 DIAGNOSIS — I50.22 HYPERTENSIVE HEART DISEASE WITH CHRONIC SYSTOLIC CONGESTIVE HEART FAILURE (HCC): ICD-10-CM

## 2019-05-15 DIAGNOSIS — E78.2 MIXED HYPERLIPIDEMIA: ICD-10-CM

## 2019-05-15 DIAGNOSIS — I48.0 PAROXYSMAL ATRIAL FIBRILLATION (HCC): ICD-10-CM

## 2019-05-15 DIAGNOSIS — I50.9 HEART FAILURE, NYHA CLASS 3 (HCC): ICD-10-CM

## 2019-05-15 DIAGNOSIS — I50.22 CHRONIC SYSTOLIC CONGESTIVE HEART FAILURE (HCC): ICD-10-CM

## 2019-05-15 DIAGNOSIS — I50.20 ACC/AHA STAGE C SYSTOLIC HEART FAILURE (HCC): ICD-10-CM

## 2019-05-15 DIAGNOSIS — I11.0 HYPERTENSIVE HEART DISEASE WITH CHRONIC SYSTOLIC CONGESTIVE HEART FAILURE (HCC): ICD-10-CM

## 2019-05-15 DIAGNOSIS — I27.22 PULMONARY HYPERTENSION DUE TO LEFT HEART DISEASE (HCC): ICD-10-CM

## 2019-05-15 DIAGNOSIS — Z79.899 HIGH RISK MEDICATION USE: ICD-10-CM

## 2019-05-15 PROCEDURE — 99215 OFFICE O/P EST HI 40 MIN: CPT | Performed by: INTERNAL MEDICINE

## 2019-05-15 ASSESSMENT — ENCOUNTER SYMPTOMS
EYE PAIN: 0
NAUSEA: 0
CHILLS: 0
SPEECH CHANGE: 0
DEPRESSION: 0
WEIGHT LOSS: 0
PALPITATIONS: 0
SHORTNESS OF BREATH: 0
HALLUCINATIONS: 0
BLURRED VISION: 0
DOUBLE VISION: 0
ORTHOPNEA: 0
VOMITING: 0
CLAUDICATION: 0
BLOOD IN STOOL: 0
COUGH: 0
FALLS: 0
PND: 0
EYE DISCHARGE: 0
HEADACHES: 0
SENSORY CHANGE: 0
LOSS OF CONSCIOUSNESS: 0
BRUISES/BLEEDS EASILY: 0
DIZZINESS: 0
MYALGIAS: 0
ABDOMINAL PAIN: 0
FEVER: 0

## 2019-05-15 NOTE — LETTER
[facility]   [address] [place] [address]  [place] [address]  Phone: [phone]  Fax: [phone]              Kelly Lovett  1945    Encounter Date: 5/15/2019    Dalia Li M.D.          PROGRESS NOTE:  Chief Complaint   Patient presents with   • Congestive Heart Failure       Subjective:   Kelly Lovett is a 73 y.o. female who presents today for cardiac care and evaluation for her prior mitral valve repair, maze, now with Stage C systolic HF.     In 02/219, she went into the hospital because of heart failure exacerbation.  She was found to have a new reduction in LV function which is now at 20%.  She does have significant history of daily drinking [place]'s.  We optimized her medication diuresed and she was discharged.     I have independently reviewed blood tests results with patient in clinic which showed elevated LDL level, but normal renal and liver function.     I have independently reviewed patient's ECG with patient in clinic today, which shows normal sinus rhythm, normal UT, QT intervals. No evidence of acute coronary syndrome.     03/2019 Patient was able to complete 183 m during his 6 minute walk test. her O2 saturation at baseline was 99% and at the end of the test, the O2 saturation was 96%. she reported 4 level of dyspnea on Yen scale.    Past Medical History:   Diagnosis Date   • Breath shortness     pt reports using o2 at night 2L, no problems at this time   • Chronic systolic congestive heart failure (HCC) 7/7/2016   • COPD (chronic obstructive pulmonary disease) (HCC)    • Dilated cardiomyopathy (HCC)    • Emphysema of lung (HCC)    • Heart valve disease    • High cholesterol    • History of heart surgery    • Hyperlipidemia    • Hypertension    • Hypotension due to hypovolemia 3/1/2019   • Sleep apnea     uses cpap     Past Surgical History:   Procedure Laterality Date   • MITRAL VALVE REPAIR  4/20/2017    Procedure:  MITRAL VALVE REPAIR ;  Surgeon: Lacey Porras M.D.;  Location: SURGERY [facility];  Service:    • MAZE PROCEDURE Left 4/20/2017    Procedure: MAZE PROCEDURE, Left atrial appendage ligation;  Surgeon: Lacey Porras M.D.;  Location: SURGERY [facility];  Service:    • PAGE  4/20/2017    Procedure: PAGE;  Surgeon: Lacey Porras M.D.;  Location: SURGERY [facility];  Service:    • APPENDECTOMY      1967   • OOPHORECTOMY       Family History   Problem Relation Age of Onset   • Cancer Father         colon cancer    • Hypertension Mother    • Cancer Sister 68        ovarian cancer     Social History     Social History   • Marital status:      Spouse name: N/A   • Number of children: N/A   • Years of education: N/A     Occupational History   • Not on file.     Social History Main Topics   • Smoking status: Former Smoker     Packs/day: 0.50     Years: 38.00     Types: Cigarettes     Quit date: 10/11/2001   • Smokeless tobacco: Never Used   • Alcohol use 8.4 oz/week     14 Shots of liquor per week      Comment: 2 per day   • Drug use: No   • Sexual activity: Yes     Partners: Male     Other Topics Concern   • Not on file     Social History Narrative   • No narrative on file     Allergies   Allergen Reactions   • Sulfa Drugs Rash     Rxn - years ago in her 20's     Outpatient Encounter Prescriptions as of 5/15/2019   Medication Sig Dispense Refill   • fluticasone-salmeterol (ADVAIR) 250-50 MCG/DOSE AEROSOL POWDER, BREATH ACTIVATED Inhale 1 Puff by mouth 2 times a day. 3 Inhaler 3   • spironolactone (ALDACTONE) 25 MG Tab Take 0.5 Tabs by mouth every day. 30 Tab 3   • sacubitril-valsartan (ENTRESTO) 24-26 MG Tab tablet Take 1 Tab by mouth 2 Times a Day. 60 Tab 0   • digoxin (LANOXIN) 125 MCG Tab Take 1 Tab by mouth every day at 6 PM. 30 Tab 2   • metoprolol SR (TOPROL XL) 25 MG TABLET SR 24 HR Take 1 Tab by mouth every evening. 30 Tab 5   • furosemide (LASIX) 20 MG Tab Take 1 Tab by mouth 1 time daily as  "needed (for weight gain > 2 lbs). 30 Tab 1   • magnesium oxide (MAG-OX) 400 (241.3 Mg) MG Tab tablet Take 1 Tab by mouth every day.     • aspirin (ASA) 81 MG Chew Tab chewable tablet Take 1 Tab by mouth every day. 100 Tab    • albuterol 108 (90 Base) MCG/ACT Aero Soln inhalation aerosol Inhale 2 Puffs by mouth every 6 hours as needed for Shortness of Breath.     • sertraline (ZOLOFT) 100 MG Tab Take 1 Tab by mouth every day. 90 Tab 1   • Tiotropium Bromide Monohydrate (SPIRIVA RESPIMAT) 1.25 MCG/ACT Aero Soln Inhale 2 Puffs by mouth every day. 3 Inhaler 3   • atorvastatin (LIPITOR) 40 MG Tab Take 1 Tab by mouth every day. 90 Tab 3   • acetaminophen (TYLENOL) 500 MG Tab Take 500 mg by mouth 1 time daily as needed. Headache       No facility-administered encounter medications on file as of 5/15/2019.      Review of Systems   Constitutional: Negative for chills, fever, malaise/fatigue and weight loss.   HENT: Negative for ear discharge, ear pain, hearing loss and nosebleeds.    Eyes: Negative for blurred vision, double vision, pain and discharge.   Respiratory: Negative for cough and shortness of breath.    Cardiovascular: Negative for chest pain, palpitations, orthopnea, claudication, leg swelling and PND.   Gastrointestinal: Negative for abdominal pain, blood in stool, melena, nausea and vomiting.   Genitourinary: Negative for dysuria and hematuria.   Musculoskeletal: Negative for falls, joint pain and myalgias.   Skin: Negative for itching and rash.   Neurological: Negative for dizziness, sensory change, speech change, loss of consciousness and headaches.   Endo/Heme/Allergies: Negative for environmental allergies. Does not bruise/bleed easily.   Psychiatric/Behavioral: Negative for depression, hallucinations and suicidal ideas.        Objective:   BP (!) 72/48 (BP Location: Left arm, Patient Position: Sitting, BP Cuff Size: Adult)   Pulse 87   Ht 1.651 m (5' 5\")   Wt 53.3 kg (117 lb 9.6 oz)   SpO2 96%   BMI " 19.57 kg/m²      Physical Exam   Constitutional: She is oriented to person, place, and time. No distress.   HENT:   Head: Normocephalic and atraumatic.   Right Ear: External ear normal.   Left Ear: External ear normal.   Eyes: Right eye exhibits no discharge. Left eye exhibits no discharge.   Neck: No JVD present. No thyromegaly present.   Cardiovascular: Normal rate, regular rhythm, normal heart sounds and intact distal pulses.  Exam reveals no gallop and no friction rub.    No murmur heard.  Pulmonary/Chest: Breath sounds normal. No respiratory distress.   Abdominal: Bowel sounds are normal. She exhibits no distension. There is no tenderness.   Musculoskeletal: She exhibits no edema or tenderness.   Neurological: She is alert and oriented to person, place, and time. No cranial nerve deficit.   Skin: Skin is warm and dry. She is not diaphoretic.   Psychiatric: She has a normal mood and affect. Her behavior is normal.   Nursing note and vitals reviewed.      Assessment:     1. ACC/AHA stage C systolic heart failure (HCC)     2. Heart failure, NYHA class 3 (HCC)     3. Chronic systolic congestive heart failure (HCC)         Medical Decision Making:  Today's Assessment / Status / Plan:            Ritika Jurado M.D.  [address] [address]  VIA In Basket

## 2019-05-15 NOTE — PROGRESS NOTES
Chief Complaint   Patient presents with   • Congestive Heart Failure       Subjective:   Kelly Lovett is a 73 y.o. female who presents today for cardiac care and evaluation for her prior mitral valve repair, maze, now with Stage C systolic HF.     In 02/219, she went into the hospital because of heart failure exacerbation.  She was found to have a new reduction in LV function which is now at 20%.  She does have significant history of daily drinking [place].  We optimized her medication diuresed and she was discharged.     I have independently reviewed blood tests results with patient in clinic which showed elevated LDL level, but normal renal and liver function.     I have independently reviewed patient's ECG with patient in clinic today, which shows normal sinus rhythm, normal AZ, QT intervals. No evidence of acute coronary syndrome.     03/2019 Patient was able to complete 183 m during his 6 minute walk test. her O2 saturation at baseline was 99% and at the end of the test, the O2 saturation was 96%. she reported 4 level of dyspnea on Yen scale.    Patient still gets winded with daily living activities and exertion. No symptoms at rest.    I have independently interpreted and reviewed blood tests results with patient in clinic which shows normal  renal function.        Past Medical History:   Diagnosis Date   • Breath shortness     pt reports using o2 at night 2L, no problems at this time   • Chronic systolic congestive heart failure (HCC) 7/7/2016   • COPD (chronic obstructive pulmonary disease) (HCC)    • Dilated cardiomyopathy (HCC)    • Emphysema of lung (HCC)    • Heart valve disease    • High cholesterol    • History of heart surgery    • Hyperlipidemia    • Hypertension    • Hypotension due to hypovolemia 3/1/2019   • Sleep apnea     uses cpap     Past Surgical History:   Procedure Laterality Date   • MITRAL VALVE REPAIR  4/20/2017    Procedure: MITRAL VALVE REPAIR ;  Surgeon: Lacey Porras  M.D.;  Location: SURGERY [facility];  Service:    • MAZE PROCEDURE Left 4/20/2017    Procedure: MAZE PROCEDURE, Left atrial appendage ligation;  Surgeon: Lacey Porras M.D.;  Location: SURGERY [facility];  Service:    • PAGE  4/20/2017    Procedure: PAGE;  Surgeon: Lacey Porras M.D.;  Location: SURGERY [facility];  Service:    • APPENDECTOMY      1967   • OOPHORECTOMY       Family History   Problem Relation Age of Onset   • Cancer Father         colon cancer    • Hypertension Mother    • Cancer Sister 68        ovarian cancer     Social History     Social History   • Marital status:      Spouse name: N/A   • Number of children: N/A   • Years of education: N/A     Occupational History   • Not on file.     Social History Main Topics   • Smoking status: Former Smoker     Packs/day: 0.50     Years: 38.00     Types: Cigarettes     Quit date: 10/11/2001   • Smokeless tobacco: Never Used   • Alcohol use 8.4 oz/week     14 Shots of liquor per week      Comment: 2 per day   • Drug use: No   • Sexual activity: Yes     Partners: Male     Other Topics Concern   • Not on file     Social History Narrative   • No narrative on file     Allergies   Allergen Reactions   • Sulfa Drugs Rash     Rxn - years ago in her 20's     Outpatient Encounter Prescriptions as of 5/15/2019   Medication Sig Dispense Refill   • fluticasone-salmeterol (ADVAIR) 250-50 MCG/DOSE AEROSOL POWDER, BREATH ACTIVATED Inhale 1 Puff by mouth 2 times a day. 3 Inhaler 3   • sacubitril-valsartan (ENTRESTO) 24-26 MG Tab tablet Take 1 Tab by mouth 2 Times a Day. 60 Tab 0   • digoxin (LANOXIN) 125 MCG Tab Take 1 Tab by mouth every day at 6 PM. 30 Tab 2   • metoprolol SR (TOPROL XL) 25 MG TABLET SR 24 HR Take 1 Tab by mouth every evening. 30 Tab 5   • furosemide (LASIX) 20 MG Tab Take 1 Tab by mouth 1 time daily as needed (for weight gain > 2 lbs). 30 Tab 1   • magnesium oxide (MAG-OX) 400 (241.3 Mg) MG Tab tablet Take 1 Tab by mouth every day.   "   • aspirin (ASA) 81 MG Chew Tab chewable tablet Take 1 Tab by mouth every day. 100 Tab    • albuterol 108 (90 Base) MCG/ACT Aero Soln inhalation aerosol Inhale 2 Puffs by mouth every 6 hours as needed for Shortness of Breath.     • sertraline (ZOLOFT) 100 MG Tab Take 1 Tab by mouth every day. 90 Tab 1   • Tiotropium Bromide Monohydrate (SPIRIVA RESPIMAT) 1.25 MCG/ACT Aero Soln Inhale 2 Puffs by mouth every day. 3 Inhaler 3   • atorvastatin (LIPITOR) 40 MG Tab Take 1 Tab by mouth every day. 90 Tab 3   • acetaminophen (TYLENOL) 500 MG Tab Take 500 mg by mouth 1 time daily as needed. Headache     • [DISCONTINUED] spironolactone (ALDACTONE) 25 MG Tab Take 0.5 Tabs by mouth every day. 30 Tab 3     No facility-administered encounter medications on file as of 5/15/2019.      Review of Systems   Constitutional: Negative for chills, fever, malaise/fatigue and weight loss.   HENT: Negative for ear discharge, ear pain, hearing loss and nosebleeds.    Eyes: Negative for blurred vision, double vision, pain and discharge.   Respiratory: Negative for cough and shortness of breath.    Cardiovascular: Negative for chest pain, palpitations, orthopnea, claudication, leg swelling and PND.   Gastrointestinal: Negative for abdominal pain, blood in stool, melena, nausea and vomiting.   Genitourinary: Negative for dysuria and hematuria.   Musculoskeletal: Negative for falls, joint pain and myalgias.   Skin: Negative for itching and rash.   Neurological: Negative for dizziness, sensory change, speech change, loss of consciousness and headaches.   Endo/Heme/Allergies: Negative for environmental allergies. Does not bruise/bleed easily.   Psychiatric/Behavioral: Negative for depression, hallucinations and suicidal ideas.        Objective:   BP (!) 72/48 (BP Location: Left arm, Patient Position: Sitting, BP Cuff Size: Adult)   Pulse 87   Ht 1.651 m (5' 5\")   Wt 53.3 kg (117 lb 9.6 oz)   SpO2 96%   BMI 19.57 kg/m²     Physical Exam "   Constitutional: She is oriented to person, place, and time. No distress.   HENT:   Head: Normocephalic and atraumatic.   Right Ear: External ear normal.   Left Ear: External ear normal.   Eyes: Right eye exhibits no discharge. Left eye exhibits no discharge.   Neck: No JVD present. No thyromegaly present.   Cardiovascular: Normal rate, regular rhythm, normal heart sounds and intact distal pulses.  Exam reveals no gallop and no friction rub.    No murmur heard.  Pulmonary/Chest: Breath sounds normal. No respiratory distress.   Abdominal: Bowel sounds are normal. She exhibits no distension. There is no tenderness.   Musculoskeletal: She exhibits no edema or tenderness.   Neurological: She is alert and oriented to person, place, and time. No cranial nerve deficit.   Skin: Skin is warm and dry. She is not diaphoretic.   Psychiatric: She has a normal mood and affect. Her behavior is normal.   Nursing note and vitals reviewed.      Assessment:     1. ACC/AHA stage C systolic heart failure (HCC)     2. Heart failure, NYHA class 3 (HCC)     3. Chronic systolic congestive heart failure (HCC)     4. Hypertensive heart disease with chronic systolic congestive heart failure (HCC)     5. Mixed hyperlipidemia     6. Pulmonary hypertension due to left heart disease (HCC)     7. Paroxysmal atrial fibrillation (HCC)     8. High risk medication use         Medical Decision Making:  Today's Assessment / Status / Plan:   Her blood pressure is low today.  At this point time I do think that patient is hypovolemic.  We will stop Spironolactone.    Overall, it is quite difficult for us to adjust her volume status.  Therefore, I do think that patient is a good candidate for CardioMEMS implantation to further accurately monitor volume status to facilitate appropriate treatment for her overall.    In the meantime, we will continue current medical therapy of Entresto 50 mg bid, Toprol XL 25 mg daily and digoxin 125 mcg daily.    Patient did  not like anticoagulation therapy in the past.  She did have Maze procedure and has not had any recurrent atrial fibrillation episode known based on her EKG tracing.  Therefore, I do think that it it is okay for us to not push her with anticoagulation therapy.

## 2019-05-17 ENCOUNTER — TELEPHONE (OUTPATIENT)
Dept: CARDIOLOGY | Facility: MEDICAL CENTER | Age: 74
End: 2019-05-17

## 2019-05-29 ENCOUNTER — TELEPHONE (OUTPATIENT)
Dept: CARDIOLOGY | Facility: MEDICAL CENTER | Age: 74
End: 2019-05-29

## 2019-05-29 NOTE — TELEPHONE ENCOUNTER
Called to verify that patient will be in for cardiomems education tomorrow at 1pm, patient confirmed

## 2019-05-30 ENCOUNTER — TELEPHONE (OUTPATIENT)
Dept: CARDIOLOGY | Facility: MEDICAL CENTER | Age: 74
End: 2019-05-30

## 2019-05-30 NOTE — TELEPHONE ENCOUNTER
Patient is scheduled on 6-12-19 for a RHC w/cardiomems with Dr. Li. Patient was told to hold lasix am day of procedure and to check in at 10:00 for a 12:00 procedure. H&P was done on 5-15-19 by Dr. Li. PRe admit is scheduled on 6-11-19 at 11:15.

## 2019-05-31 ENCOUNTER — TELEPHONE (OUTPATIENT)
Dept: CARDIOLOGY | Facility: MEDICAL CENTER | Age: 74
End: 2019-05-31

## 2019-05-31 DIAGNOSIS — I50.20 ACC/AHA STAGE C SYSTOLIC HEART FAILURE (HCC): ICD-10-CM

## 2019-06-06 ENCOUNTER — PATIENT MESSAGE (OUTPATIENT)
Dept: MEDICAL GROUP | Facility: LAB | Age: 74
End: 2019-06-06

## 2019-06-06 DIAGNOSIS — F41.9 ANXIETY: ICD-10-CM

## 2019-06-06 DIAGNOSIS — F32.1 MODERATE SINGLE CURRENT EPISODE OF MAJOR DEPRESSIVE DISORDER (HCC): ICD-10-CM

## 2019-06-06 DIAGNOSIS — J44.9 CHRONIC OBSTRUCTIVE PULMONARY DISEASE, UNSPECIFIED COPD TYPE (HCC): ICD-10-CM

## 2019-06-06 DIAGNOSIS — E78.2 MIXED HYPERLIPIDEMIA: ICD-10-CM

## 2019-06-06 RX ORDER — DIGOXIN 125 MCG
125 TABLET ORAL DAILY
Qty: 30 TAB | Refills: 2 | Status: SHIPPED | OUTPATIENT
Start: 2019-06-06 | End: 2019-10-01 | Stop reason: SDUPTHER

## 2019-06-06 RX ORDER — ATORVASTATIN CALCIUM 40 MG/1
40 TABLET, FILM COATED ORAL DAILY
Qty: 100 TAB | Refills: 3 | Status: SHIPPED | OUTPATIENT
Start: 2019-06-06 | End: 2020-07-28

## 2019-06-06 RX ORDER — SERTRALINE HYDROCHLORIDE 100 MG/1
100 TABLET, FILM COATED ORAL DAILY
Qty: 90 TAB | Refills: 1 | Status: SHIPPED | OUTPATIENT
Start: 2019-06-06 | End: 2019-12-17

## 2019-06-06 NOTE — PATIENT COMMUNICATION
Was the patient seen in the last year in this department? Yes3/20/2019    Does patient have an active prescription for medications requested? No     Received Request Via: Pharmacy

## 2019-06-06 NOTE — TELEPHONE ENCOUNTER
Prescription Question   Received: Today   Message Contents   Kelly Wagnerit Angeliquejanet Reddy Ma   Phone Number: [phone]             Urgent. You did not send the prescription for advair. I will be out tomorrow

## 2019-06-06 NOTE — TELEPHONE ENCOUNTER
Was the patient seen in the last year in this department? Yes LOV 3/20/19    Does patient have an active prescription for medications requested? No     Received Request Via: Patient

## 2019-06-10 ENCOUNTER — TELEPHONE (OUTPATIENT)
Dept: CARDIOLOGY | Facility: MEDICAL CENTER | Age: 74
End: 2019-06-10

## 2019-06-10 DIAGNOSIS — I42.9 CARDIOMYOPATHY, UNSPECIFIED TYPE (HCC): ICD-10-CM

## 2019-06-10 RX ORDER — CLOPIDOGREL BISULFATE 75 MG/1
75 TABLET ORAL DAILY
Qty: 30 TAB | Refills: 0 | Status: SHIPPED | OUTPATIENT
Start: 2019-06-10 | End: 2019-07-15

## 2019-06-10 NOTE — TELEPHONE ENCOUNTER
Called patient to let her know that he Plavix prescription was sent to her pharmacy and to please pick it up prior to her procedure Wednesday, she is to start taking it the day after the procedure. Patient verbalized understanding.

## 2019-06-11 DIAGNOSIS — Z01.812 PRE-OPERATIVE LABORATORY EXAMINATION: ICD-10-CM

## 2019-06-11 DIAGNOSIS — Z01.810 PRE-OPERATIVE CARDIOVASCULAR EXAMINATION: ICD-10-CM

## 2019-06-11 LAB
ALBUMIN SERPL BCP-MCNC: 4 G/DL (ref 3.2–4.9)
ALBUMIN/GLOB SERPL: 1.6 G/DL
ALP SERPL-CCNC: 55 U/L (ref 30–99)
ALT SERPL-CCNC: 12 U/L (ref 2–50)
ANION GAP SERPL CALC-SCNC: 7 MMOL/L (ref 0–11.9)
APTT PPP: 22.8 SEC (ref 24.7–36)
AST SERPL-CCNC: 16 U/L (ref 12–45)
BILIRUB SERPL-MCNC: 0.5 MG/DL (ref 0.1–1.5)
BUN SERPL-MCNC: 28 MG/DL (ref 8–22)
CALCIUM SERPL-MCNC: 9.7 MG/DL (ref 8.5–10.5)
CHLORIDE SERPL-SCNC: 102 MMOL/L (ref 96–112)
CO2 SERPL-SCNC: 29 MMOL/L (ref 20–33)
CREAT SERPL-MCNC: 0.88 MG/DL (ref 0.5–1.4)
EKG IMPRESSION: NORMAL
ERYTHROCYTE [DISTWIDTH] IN BLOOD BY AUTOMATED COUNT: 46.1 FL (ref 35.9–50)
GLOBULIN SER CALC-MCNC: 2.5 G/DL (ref 1.9–3.5)
GLUCOSE SERPL-MCNC: 111 MG/DL (ref 65–99)
HCT VFR BLD AUTO: 35.7 % (ref 37–47)
HGB BLD-MCNC: 10.9 G/DL (ref 12–16)
INR PPP: 0.98 (ref 0.87–1.13)
MAGNESIUM SERPL-MCNC: 1.8 MG/DL (ref 1.5–2.5)
MCH RBC QN AUTO: 30.3 PG (ref 27–33)
MCHC RBC AUTO-ENTMCNC: 30.5 G/DL (ref 33.6–35)
MCV RBC AUTO: 99.2 FL (ref 81.4–97.8)
PLATELET # BLD AUTO: 280 K/UL (ref 164–446)
PMV BLD AUTO: 10.1 FL (ref 9–12.9)
POTASSIUM SERPL-SCNC: 4.1 MMOL/L (ref 3.6–5.5)
PROT SERPL-MCNC: 6.5 G/DL (ref 6–8.2)
PROTHROMBIN TIME: 13.2 SEC (ref 12–14.6)
RBC # BLD AUTO: 3.6 M/UL (ref 4.2–5.4)
SODIUM SERPL-SCNC: 138 MMOL/L (ref 135–145)
WBC # BLD AUTO: 8.6 K/UL (ref 4.8–10.8)

## 2019-06-11 PROCEDURE — 93010 ELECTROCARDIOGRAM REPORT: CPT | Performed by: INTERNAL MEDICINE

## 2019-06-11 PROCEDURE — 85610 PROTHROMBIN TIME: CPT

## 2019-06-11 PROCEDURE — 83735 ASSAY OF MAGNESIUM: CPT

## 2019-06-11 PROCEDURE — 93005 ELECTROCARDIOGRAM TRACING: CPT

## 2019-06-11 PROCEDURE — 85027 COMPLETE CBC AUTOMATED: CPT

## 2019-06-11 PROCEDURE — 85730 THROMBOPLASTIN TIME PARTIAL: CPT

## 2019-06-11 PROCEDURE — 36415 COLL VENOUS BLD VENIPUNCTURE: CPT

## 2019-06-11 PROCEDURE — 80053 COMPREHEN METABOLIC PANEL: CPT

## 2019-06-12 ENCOUNTER — APPOINTMENT (OUTPATIENT)
Dept: CARDIOLOGY | Facility: MEDICAL CENTER | Age: 74
End: 2019-06-12
Attending: INTERNAL MEDICINE
Payer: MEDICARE

## 2019-06-12 ENCOUNTER — HOSPITAL ENCOUNTER (OUTPATIENT)
Facility: MEDICAL CENTER | Age: 74
End: 2019-06-12
Attending: INTERNAL MEDICINE | Admitting: INTERNAL MEDICINE
Payer: MEDICARE

## 2019-06-12 VITALS
RESPIRATION RATE: 18 BRPM | WEIGHT: 115.3 LBS | SYSTOLIC BLOOD PRESSURE: 102 MMHG | BODY MASS INDEX: 18.53 KG/M2 | DIASTOLIC BLOOD PRESSURE: 60 MMHG | TEMPERATURE: 97.3 F | OXYGEN SATURATION: 96 % | HEIGHT: 66 IN | HEART RATE: 119 BPM

## 2019-06-12 DIAGNOSIS — I50.20 ACC/AHA STAGE C SYSTOLIC HEART FAILURE (HCC): ICD-10-CM

## 2019-06-12 LAB
SAO2 % BLD: 76.6 %
SPECIMEN DRAWN FROM PATIENT: NORMAL SOURCE

## 2019-06-12 PROCEDURE — 700105 HCHG RX REV CODE 258: Performed by: INTERNAL MEDICINE

## 2019-06-12 PROCEDURE — 160002 HCHG RECOVERY MINUTES (STAT)

## 2019-06-12 PROCEDURE — 33289 TCAT IMPL WRLS P-ART PRS SNR: CPT | Performed by: INTERNAL MEDICINE

## 2019-06-12 PROCEDURE — 700111 HCHG RX REV CODE 636 W/ 250 OVERRIDE (IP)

## 2019-06-12 PROCEDURE — 700101 HCHG RX REV CODE 250

## 2019-06-12 PROCEDURE — 99153 MOD SED SAME PHYS/QHP EA: CPT

## 2019-06-12 PROCEDURE — 93005 ELECTROCARDIOGRAM TRACING: CPT | Performed by: INTERNAL MEDICINE

## 2019-06-12 PROCEDURE — 99152 MOD SED SAME PHYS/QHP 5/>YRS: CPT | Performed by: INTERNAL MEDICINE

## 2019-06-12 PROCEDURE — 82810 BLOOD GASES O2 SAT ONLY: CPT

## 2019-06-12 RX ORDER — LIDOCAINE HYDROCHLORIDE 20 MG/ML
INJECTION, SOLUTION INFILTRATION; PERINEURAL
Status: COMPLETED
Start: 2019-06-12 | End: 2019-06-12

## 2019-06-12 RX ORDER — MIDAZOLAM HYDROCHLORIDE 1 MG/ML
INJECTION INTRAMUSCULAR; INTRAVENOUS
Status: COMPLETED
Start: 2019-06-12 | End: 2019-06-12

## 2019-06-12 RX ORDER — SODIUM CHLORIDE 9 MG/ML
1000 INJECTION, SOLUTION INTRAVENOUS
Status: DISCONTINUED | OUTPATIENT
Start: 2019-06-12 | End: 2019-06-12 | Stop reason: HOSPADM

## 2019-06-12 RX ORDER — HEPARIN SODIUM,PORCINE 1000/ML
VIAL (ML) INJECTION
Status: COMPLETED
Start: 2019-06-12 | End: 2019-06-12

## 2019-06-12 RX ADMIN — HEPARIN SODIUM: 1000 INJECTION, SOLUTION INTRAVENOUS; SUBCUTANEOUS at 12:09

## 2019-06-12 RX ADMIN — SODIUM CHLORIDE 1000 ML: 9 INJECTION, SOLUTION INTRAVENOUS at 11:08

## 2019-06-12 RX ADMIN — MIDAZOLAM HYDROCHLORIDE 2 MG: 1 INJECTION, SOLUTION INTRAMUSCULAR; INTRAVENOUS at 12:13

## 2019-06-12 RX ADMIN — FENTANYL CITRATE 25 MCG: 50 INJECTION INTRAMUSCULAR; INTRAVENOUS at 13:02

## 2019-06-12 RX ADMIN — HEPARIN SODIUM: 200 INJECTION, SOLUTION INTRAVENOUS at 11:45

## 2019-06-12 RX ADMIN — LIDOCAINE HYDROCHLORIDE: 20 INJECTION, SOLUTION INFILTRATION; PERINEURAL at 12:09

## 2019-06-12 NOTE — HEART FAILURE PROGRAM
Met with patient and  after procedure. CardioMEMS home monitor reviewed and set up as well as put away by . Education on equipment completed, daily reading procedure reviewed, and all questions answered. Reading taken with patient in logroll position.Reading was sent successfully to [facility] website. Temporary implant card and contact card for HF clinic given. All equipment left with patient. Follow up scheduled.    Genesis Calvert, RN  HF   [phone]    Latha Cote, RN  HF Clinic RN  [phone]    Madison Moore, RN  HF Nurse Navigator  [phone]

## 2019-06-12 NOTE — PROCEDURES
Cardiomems implantation documentation    Indication for procedure:    This is a 73 years old patient with prior history of NYHA class III along with recent hospitalization for heart failure exacerbation. Patient is indicated to undergo Cardiomems implantation to reduce repeated heart failure hospitalization and also to improve overall quality of life. Patient meets criteria to undergo such procedure. Patient has been managed in our heart failure clinic.    Procedures performed:  1) Ultrasound guided femoral venous access.  2) Right heart catheterization.  3) Pulmonary angiogram.  4) Wiring of the pulmonary arterial system.  5) Implantation of Cardiomems device.  6) Calibration between intracardiac pressures via pulmonary arterial catheter along with Cardiomems readings.      Procedures:  Patient's right internal jugular (IJ) venous area was prepped per protocol. It was sterilized per protocol. Based on patient's body habitus, venous assess through conventional means was difficult to perform safely without significant risk of retroperitoneal bleeding. Ultrasound was used to successfully obtain right IJ venous access after 2% lidocaine was given for local anesthesia. The right IJ ein was then dilated with sequential dilator to target goal of 12 Belgian. Through that, a 12 Belgian sheath was then inserted and locked in place. The pulmonary arterial Hayward-Lyn catheter was then advanced into the 12 Belgian sheath, inferior vena cava, right atrium, right ventricle and main pulmonary artery. The pulmonary arterial pressure was then obtained. Cardiac index and output was obtained via thermodilution and Concepcion's method. Pulmonary arterial wedge pressure was also successfully obtained. After that, the Hayward-Lyn catheter was then placed within the left pulmonary arterial system. Through that, a pulmonary angiogram was successfully obtained. This was done to delineate the appropriate palmar arterial branch for successful deployment  of Cardiomems device. Through the Claridge-Lyn catheter, a steel core wire was advanced into the appropriate branch of the left pulmonary arterial tree. The Claridge-Lyn catheter was then removed over the wire fashion. After which, the Cardiomems system was advanced over the wire into the appropriate branch of the pulmonary arterial tree. Measurement was made and the device was placed successfully without complications. After which, the catheter was removed and the steel core wire was left in place within the main pulmonary arterial system. The Claridge-Lyn catheter was then reintroduced back into the main pulmonary artery. The wire was then removed. Calibration between the main pulmonary artery readings and the device readings were made to ensure consistency. After that was done successfully, the Claridge-Lyn catheter was then pulled back into the right ventricle and then the right atrium for which appropriate measures of pressures were obtained. The Claridge-Lyn catheter was then completely removed from the body and the sheath was removed with hemostasis obtained through direct pressure.    Hemodynamics:  1) Pulmonary arterial pressure: Systolic of 42 mm Hg, diastolic of 16 mm Hg, mean of 29 mm Hg.  2) Pulmonary arterial wedge pressure with mean of 19 mm Hg.  3) Cardiac output of 4.5 and Cardiac index of 2.8 through thermodilution method.  4) Cardiac output of 4.3 and Cardiac index of 2.7 through Concepcion's method.  5) Right ventricular pressure: Systolic of 34 mm Hg, end diastolic pressure of 7 mm Hg.  6) Right atrial pressure: A wave of 11 mm Hg, V wave of 12 mm Hg, mean of 10 mm Hg.  7) Pulmonary vascular resistance of 2.3 Wood Units.    Conclusions:  1) Successful implantation of Cardiomems device for remote monitor of intracardiac pressures.  2) Patient will be monitored as part of our protocol in our heart failure program to further reduce repeated heart failure hospitalization and also to improve overall quality of life.  3)  Patient appears to be hypovolemic. IVF hydration was given.  4) Hypotensive but asymptomatic. Ok to be discharged.  5) Advised patient to hold Entresto for now until being evaluated again in clinic next week.      Dalia Li MD.   [facility].

## 2019-06-12 NOTE — PROCEDURES
Moderate sedation was used and supervised by me for the entire length of procedure (Dalia Li MD).    Agents: Fentanyl and Versed via intravenous injection (please see media tab for details of dosage).    Start time: 12:10 PM.    Stop time: 01:02 PM.    Complications: none.    Dalia Li M.D.

## 2019-06-12 NOTE — OR NURSING
1322 patient arrived from cath lab s/p Cardiomems. Right IJ access dressing clean. Patient not in any distress or discomfort.   1358 DR To at the bed side. Dr aware of low bp, ST patient asymptomatic. Order received for 500cc ns bolus. Ok to dc if patient ambulates and no s/s of low bp per DR To.   1400 patient given water tolerating.  1430 500cc ns bolus complete  1500 patient ambulated independently tolerated well.   1510 patient awake, denies pain, surgical site dressing cdi and soft. bp documented. Patient denies any symptoms of low bp. Criteria met to discharge patient home.  1536 discharge instructions given to patient and family, both patient and family verbalize understanding. Reviewed medications, worsening symptoms, activity, follow up.

## 2019-06-12 NOTE — DISCHARGE INSTRUCTIONS
ACTIVITY: Rest and take it easy for the first 24 hours.  A responsible adult is recommended to remain with you during that time.  It is normal to feel sleepy.  We encourage you to not do anything that requires balance, judgment or coordination.    MILD FLU-LIKE SYMPTOMS ARE NORMAL. YOU MAY EXPERIENCE GENERALIZED MUSCLE ACHES, THROAT IRRITATION, HEADACHE AND/OR SOME NAUSEA.    FOR 24 HOURS DO NOT:  Drive, operate machinery or run household appliances.  Drink beer or alcoholic beverages.   Make important decisions or sign legal documents.    SPECIAL INSTRUCTIONS: follow up with your cardiologist call find out when to follow up  Keep dressing clean dry intact for 24 hours, remove dressing after 24 hours.   If you experience chest pain, shortness of breath call 911 return to ER  Take medications as instructed by DR To  DIET: To avoid nausea, slowly advance diet as tolerated, avoiding spicy or greasy foods for the first day.  Add more substantial food to your diet according to your physician's instructions.  Babies can be fed formula or breast milk as soon as they are hungry.  INCREASE FLUIDS AND FIBER TO AVOID CONSTIPATION.    SURGICAL DRESSING/BATHING: keep dressing clean dry intact for 24 hours, remove dressing after 24 hours.     FOLLOW-UP APPOINTMENT:  A follow-up appointment should be arranged with your doctor in [phone]; call to schedule.    You should CALL YOUR PHYSICIAN if you develop:  Fever greater than 101 degrees F.  Pain not relieved by medication, or persistent nausea or vomiting.  Excessive bleeding (blood soaking through dressing) or unexpected drainage from the wound.  Extreme redness or swelling around the incision site, drainage of pus or foul smelling drainage.  Inability to urinate or empty your bladder within 8 hours.  Problems with breathing or chest pain.    You should call 911 if you develop problems with breathing or chest pain.  If you are unable to contact your doctor or surgical center,  you should go to the nearest emergency room or urgent care center.  Physician's telephone #: [phone]    If any questions arise, call your doctor.  If your doctor is not available, please feel free to call the Surgical Center at [phone].  The Center is open Monday through Friday from 7AM to 7PM.  You can also call the HEALTH HOTLINE open 24 hours/day, 7 days/week and speak to a nurse at [phone], or toll free at [phone].    A registered nurse may call you a few days after your surgery to see how you are doing after your procedure.    MEDICATIONS: Resume taking daily medication.  Take prescribed pain medication with food.  If no medication is prescribed, you may take non-aspirin pain medication if needed.  PAIN MEDICATION CAN BE VERY CONSTIPATING.  Take a stool softener or laxative such as senokot, pericolace, or milk of magnesia if needed.    If your physician has prescribed pain medication that includes Acetaminophen (Tylenol), do not take additional Acetaminophen (Tylenol) while taking the prescribed medication.    Depression / Suicide Risk    As you are discharged from this [facility] facility, it is important to learn how to keep safe from harming yourself.    Recognize the warning signs:  · Abrupt changes in personality, positive or negative- including increase in energy   · Giving away possessions  · Change in eating patterns- significant weight changes-  positive or negative  · Change in sleeping patterns- unable to sleep or sleeping all the time   · Unwillingness or inability to communicate  · Depression  · Unusual sadness, discouragement and loneliness  · Talk of wanting to die  · Neglect of personal appearance   · Rebelliousness- reckless behavior  · Withdrawal from people/activities they love  · Confusion- inability to concentrate     If you or a loved one observes any of these behaviors or has concerns about self-harm, here's what you can do:  · Talk about it- your feelings and  reasons for harming yourself  · Remove any means that you might use to hurt yourself (examples: pills, rope, extension cords, firearm)  · Get professional help from the community (Mental Health, Substance Abuse, psychological counseling)  · Do not be alone:Call your Safe Contact- someone whom you trust who will be there for you.  · Call your local CRISIS HOTLINE 285-9650 or 325-318-6441  · Call your local Children's Mobile Crisis Response Team [place] (160) 112-0690 or www.[place]  · Call the toll free National Suicide Prevention Hotlines   · National Suicide Prevention Lifeline 660-093-YQBY (7025)  · National Hope Line Network 800-SUICIDE (262-5162)

## 2019-06-13 LAB — EKG IMPRESSION: NORMAL

## 2019-06-13 PROCEDURE — 93010 ELECTROCARDIOGRAM REPORT: CPT | Performed by: INTERNAL MEDICINE

## 2019-06-18 ENCOUNTER — TELEPHONE (OUTPATIENT)
Dept: CARDIOLOGY | Facility: MEDICAL CENTER | Age: 74
End: 2019-06-18

## 2019-06-20 ENCOUNTER — OFFICE VISIT (OUTPATIENT)
Dept: CARDIOLOGY | Facility: MEDICAL CENTER | Age: 74
End: 2019-06-20
Payer: MEDICARE

## 2019-06-20 VITALS
WEIGHT: 117 LBS | SYSTOLIC BLOOD PRESSURE: 100 MMHG | HEIGHT: 66 IN | HEART RATE: 72 BPM | OXYGEN SATURATION: 99 % | DIASTOLIC BLOOD PRESSURE: 60 MMHG | BODY MASS INDEX: 18.8 KG/M2

## 2019-06-20 DIAGNOSIS — E78.2 MIXED HYPERLIPIDEMIA: ICD-10-CM

## 2019-06-20 DIAGNOSIS — I50.20 ACC/AHA STAGE C SYSTOLIC HEART FAILURE (HCC): ICD-10-CM

## 2019-06-20 DIAGNOSIS — Z95.9 STATUS POST INSERTION OF PRESSURE SENSOR INTO LEFT PULMONARY ARTERY: ICD-10-CM

## 2019-06-20 DIAGNOSIS — I48.92 ATRIAL FLUTTER, UNSPECIFIED TYPE (HCC): ICD-10-CM

## 2019-06-20 DIAGNOSIS — I42.9 CARDIOMYOPATHY, UNSPECIFIED TYPE (HCC): ICD-10-CM

## 2019-06-20 DIAGNOSIS — I48.0 PAROXYSMAL ATRIAL FIBRILLATION (HCC): ICD-10-CM

## 2019-06-20 DIAGNOSIS — I51.89 LEFT VENTRICULAR SYSTOLIC DYSFUNCTION, NYHA CLASS 3: ICD-10-CM

## 2019-06-20 LAB — EKG IMPRESSION: NORMAL

## 2019-06-20 PROCEDURE — 99215 OFFICE O/P EST HI 40 MIN: CPT | Performed by: PHYSICIAN ASSISTANT

## 2019-06-20 PROCEDURE — 93000 ELECTROCARDIOGRAM COMPLETE: CPT | Performed by: INTERNAL MEDICINE

## 2019-06-20 RX ORDER — METOPROLOL SUCCINATE 50 MG/1
50 TABLET, EXTENDED RELEASE ORAL EVERY EVENING
Qty: 30 TAB | Refills: 3 | Status: SHIPPED | OUTPATIENT
Start: 2019-06-20 | End: 2019-07-15 | Stop reason: SDUPTHER

## 2019-06-20 ASSESSMENT — ENCOUNTER SYMPTOMS
SHORTNESS OF BREATH: 1
WEIGHT LOSS: 0
COUGH: 0
ORTHOPNEA: 0
BLOOD IN STOOL: 0
DEPRESSION: 0
PND: 0
PALPITATIONS: 1
NAUSEA: 0
CHILLS: 0
FEVER: 0

## 2019-06-20 ASSESSMENT — MINNESOTA LIVING WITH HEART FAILURE QUESTIONNAIRE (MLHF)
GIVING YOU SIDE EFFECTS FROM TREATMENTS: 0
DIFFICULTY GOING AWAY FROM HOME: 0
DIFFICULTY TO CONCENTRATE OR REMEMBERING THINGS: 1
DIFFICULTY WITH RECREATIONAL PASTIMES, SPORTS, HOBBIES: 0
DIFFICULTY WORKING TO EARN A LIVING: 0
WALKING ABOUT OR CLIMBING STAIRS DIFFICULT: 0
WORKING AROUND THE HOUSE OR YARD DIFFICULT: 0
TOTAL_SCORE: 8
DIFFICULTY SOCIALIZING WITH FAMILY OR FRIENDS: 0
HAVING TO SIT OR LIE DOWN DURING THE DAY: 1
EATING LESS FOODS YOU LIKE: 0
MAKING YOU STAY IN A HOSPITAL: 0
MAKING YOU WORRY: 1
FEELING LIKE A BURDEN TO FAMILY AND FRIENDS: 0
LOSS OF SELF CONTROL IN YOUR LIFE: 0
MAKING YOU SHORT OF BREATH: 1
DIFFICULTY WITH SEXUAL ACTIVITIES: 0
TIRED, FATIGUED OR LOW ON ENERGY: 1
MAKING YOU FEEL DEPRESSED: 2
DIFFICULTY SLEEPING WELL AT NIGHT: 0
COSTING YOU MONEY FOR MEDICAL CARE: 0
SWELLING IN ANKLES OR LEGS: 1

## 2019-06-20 ASSESSMENT — 6 MINUTE WALK TEST (6MWT): TOTAL DISTANCE WALKED (METERS): 274

## 2019-06-20 NOTE — PROGRESS NOTES
Chief Complaint   Patient presents with   • Congestive Heart Failure       Subjective:   Kelly Lovett is a 73 y.o. female with severe LV dysfunction/CHF, s/p mitral valve repair and MAZE procedure with ERI ligation April 2017 now with moderate regurgitation, COPD, hypertension who presents today for follow up.    Patient of Dr. Li. Had cardiomems implanted 6/12, PA diastolic pressures have been ranging from 28-33mmHg.    Breathing is at baseline. No swelling or weight gain. O2 at 2.5-3.0L Does not get SOB in the house, does not exert herself enough to become short of breath.     BP at home is low, occasionally has dizziness, occurs less than once a day, with position changes.     Today she can feel her heart racing.    Weights have been stable, 117 pounds at home.    Past Medical History:   Diagnosis Date   • Breath shortness     pt reports using o2 at night 2L, no problems at this time   • Chronic systolic congestive heart failure (HCC) 7/7/2016   • COPD (chronic obstructive pulmonary disease) (HCC)    • Dilated cardiomyopathy (HCC)    • Emphysema of lung (HCC)    • Heart valve disease    • High cholesterol    • History of heart surgery    • Hyperlipidemia    • Hypertension    • Hypotension due to hypovolemia 3/1/2019   • Sleep apnea     uses cpap     Past Surgical History:   Procedure Laterality Date   • MITRAL VALVE REPAIR  4/20/2017    Procedure: MITRAL VALVE REPAIR ;  Surgeon: Lacey Porras M.D.;  Location: [facility];  Service:    • MAZE PROCEDURE Left 4/20/2017    Procedure: MAZE PROCEDURE, Left atrial appendage ligation;  Surgeon: Lacey Porras M.D.;  Location: [facility];  Service:    • PAGE  4/20/2017    Procedure: PAGE;  Surgeon: Lacey Porras M.D.;  Location: [facility];  Service:    • APPENDECTOMY      1967   • OOPHORECTOMY       Family History   Problem Relation Age of Onset   • Cancer Father         colon cancer    • Hypertension Mother    • Cancer  Sister 68        ovarian cancer     Social History     Social History   • Marital status:      Spouse name: N/A   • Number of children: N/A   • Years of education: N/A     Occupational History   • Not on file.     Social History Main Topics   • Smoking status: Former Smoker     Packs/day: 0.50     Years: 38.00     Types: Cigarettes     Quit date: 10/11/2001   • Smokeless tobacco: Never Used   • Alcohol use 8.4 oz/week     14 Shots of liquor per week      Comment: 2 per day   • Drug use: No   • Sexual activity: Yes     Partners: Male     Other Topics Concern   • Not on file     Social History Narrative   • No narrative on file     Allergies   Allergen Reactions   • Sulfa Drugs Rash     Rxn - years ago in her 20's     Outpatient Encounter Prescriptions as of 6/20/2019   Medication Sig Dispense Refill   • clopidogrel (PLAVIX) 75 MG Tab Take 1 Tab by mouth every day. 30 Tab 0   • sertraline (ZOLOFT) 100 MG Tab Take 1 Tab by mouth every day. 90 Tab 1   • atorvastatin (LIPITOR) 40 MG Tab Take 1 Tab by mouth every day. 100 Tab 3   • digoxin (LANOXIN) 125 MCG Tab Take 1 Tab by mouth every day at 6 PM. 30 Tab 2   • fluticasone-salmeterol (ADVAIR) 250-50 MCG/DOSE AEROSOL POWDER, BREATH ACTIVATED Inhale 1 Puff by mouth 2 times a day. 3 Inhaler 3   • sacubitril-valsartan (ENTRESTO) 24-26 MG Tab tablet Take 1 Tab by mouth 2 Times a Day. 60 Tab 0   • metoprolol SR (TOPROL XL) 25 MG TABLET SR 24 HR Take 1 Tab by mouth every evening. 30 Tab 5   • furosemide (LASIX) 20 MG Tab Take 1 Tab by mouth 1 time daily as needed (for weight gain > 2 lbs). 30 Tab 1   • magnesium oxide (MAG-OX) 400 (241.3 Mg) MG Tab tablet Take 1 Tab by mouth every day.     • aspirin (ASA) 81 MG Chew Tab chewable tablet Take 1 Tab by mouth every day. 100 Tab    • albuterol 108 (90 Base) MCG/ACT Aero Soln inhalation aerosol Inhale 2 Puffs by mouth every 6 hours as needed for Shortness of Breath.     • Tiotropium Bromide Monohydrate (SPIRIVA RESPIMAT) 1.25  "MCG/ACT Aero Soln Inhale 2 Puffs by mouth every day. 3 Inhaler 3   • acetaminophen (TYLENOL) 500 MG Tab Take 500 mg by mouth 1 time daily as needed. Headache     • [DISCONTINUED] NS infusion 1,000 mL        No facility-administered encounter medications on file as of 6/20/2019.      Review of Systems   Constitutional: Negative for chills, fever and weight loss.   HENT: Negative for nosebleeds.    Respiratory: Positive for shortness of breath. Negative for cough.    Cardiovascular: Positive for palpitations. Negative for chest pain, orthopnea, leg swelling and PND.   Gastrointestinal: Negative for blood in stool, melena and nausea.   Genitourinary: Negative for hematuria.   Psychiatric/Behavioral: Negative for depression.        Objective:   /60 (BP Location: Left arm, Patient Position: Sitting, BP Cuff Size: Adult)   Pulse 72   Ht 1.676 m (5' 6\")   Wt 53.1 kg (117 lb)   LMP  (LMP Unknown)   SpO2 99%   BMI 18.88 kg/m²     Physical Exam   Constitutional: She is oriented to person, place, and time. She appears well-developed and well-nourished. No distress.   HENT:   Head: Normocephalic and atraumatic.   Eyes: Conjunctivae are normal. No scleral icterus.   Neck: Neck supple. No JVD present.   Small 1cm firm hematoma on right @ IJ site, no erythema or tenderness.   Cardiovascular: Intact distal pulses.  An irregularly irregular rhythm present. Tachycardia present.    No murmur heard.  Pulses:       Radial pulses are 1+ on the right side, and 1+ on the left side.   Pulmonary/Chest: Effort normal and breath sounds normal. No respiratory distress. She has no wheezes. She has no rales.   3LO2   Abdominal: Soft. Bowel sounds are normal. She exhibits no distension. There is no tenderness.   Musculoskeletal: She exhibits no edema.   Lymphadenopathy:     She has no cervical adenopathy.   Neurological: She is alert and oriented to person, place, and time. No cranial nerve deficit.   Skin: Skin is warm and dry. She is " not diaphoretic. No pallor.   Psychiatric: Judgment normal.      RHC 6/12/19, reviewed   Hemodynamics:  1) Pulmonary arterial pressure: Systolic of 42 mm Hg, diastolic of 16 mm Hg, mean of 29 mm Hg.  2) Pulmonary arterial wedge pressure with mean of 19 mm Hg.  3) Cardiac output of 4.5 and Cardiac index of 2.8 through thermodilution method.  4) Cardiac output of 4.3 and Cardiac index of 2.7 through Concepcion's method.  5) Right ventricular pressure: Systolic of 34 mm Hg, end diastolic pressure of 7 mm Hg.  6) Right atrial pressure: A wave of 11 mm Hg, V wave of 12 mm Hg, mean of 10 mm Hg.  7) Pulmonary vascular resistance of 2.3 Wood Units.     Conclusions:  1) Successful implantation of Cardiomems device for remote monitor of intracardiac pressures.  2) Patient will be monitored as part of our protocol in our heart failure program to further reduce repeated heart failure hospitalization and also to improve overall quality of life.  3) Patient appears to be hypovolemic. IVF hydration was given.  4) Hypotensive but asymptomatic. Ok to be discharged.  5) Advised patient to hold Entresto for now until being evaluated again in clinic next week.    Transesophageal echocardiogram 2/26/19  FINDINGS  Left ventricle is dilated. Severely reduced left ventricular systolic   function.  Normal right ventricular size. Reduced right ventricular systolic function.  The right atrium is normal in size.  Mildly dilated left atrium.    Ligated left atrial appendage noted with no communication with the left atrium observed.    The interatrial septum is normal.    The interventricular septum is normal.    Known mitral valve repair functioning adequately with moderate   regurgitation in the setting of severe left ventricular systolic   dysfunction.  The regurgitation of the repaired mitral valve is   primarily at the A2-P3 junction.  There is no systolic flow reversal in   the pulmonary veins either on the left or the right side.  Structurally  normal aortic valve without significant stenosis or   regurgitation.  Structurally normal tricuspid valve. Mild tricuspid regurgitation.  Structurally normal pulmonic valve without significant stenosis or   regurgitation.  Normal pericardium without effusion  The aortic root is normal.    Transthoracic echocardiogram 2/21/19  CONCLUSIONS  Comapred to the prior echocardiogram dated 3/7/2017, significant   changes are noted.    1. Severely reduced left ventricular systolic function.  Left   ventricular ejection fraction is visually estimated to be 20%.    2. Severely dilated left atrium.    3. Estimated right ventricular systolic pressure  is 50 mmHg.  Mildly   dilated right ventricle.  Reduced right ventricular systolic function.      Cardiac catheterization 3/17/17  POSTOPERATIVE DIAGNOSES:  1.  Severe mitral regurgitation.  2.  Normal left ventricular systolic function, ejection fraction greater than   75%.  3.  No significant coronary artery disease.    PFT 10/19/16  ASSESSMENT:  1.  Severe obstructive lung disease.  2.  Very positive response to bronchodilator consistent with large reactive airways component.  3.  Severe hyperinflation to air trapping and mild distribution of gases.  4.  Severe loss of gas transfer consistent with loss of airway cap exchange units.  5.  Increased airways resistance.  6.  Normal room air oximetry.  7.  Abnormal flow volume that is consistent with severe obstruction.    I interpreted EKG in the office 6/20: clockwise atrial flutter rapid ventricular response, variable conduction,   Assessment:     1. Cardiomyopathy, unspecified type (HCC)     2. Mixed hyperlipidemia     3. ACC/AHA stage C systolic heart failure (HCC)     4. Heart failure, NYHA class 3 (HCC)     5. Hypertensive heart disease with chronic systolic congestive heart failure (HCC)     6. Paroxysmal atrial fibrillation (HCC)  EKG   7. Status post insertion of pressure sensor into left pulmonary artery     8. Left  ventricular systolic dysfunction, NYHA class 3         Medical Decision Making:  Today's Assessment / Status / Plan:   Atrial Flutter, with rapid ventricular response new finding  S/p MAZE and ERI ligation in April 2017  -Rates are high, 110 in the office. I will increase her metoprolol to 50mg QHS and restart coumadin (despite her ERI there is a risk for stroke and since my plan is to cardiovert her, it would be safest to be anticoagulated) , once her INR is therapeutic for 4 weeks we can electrically cardiovert her and place on amiodarone to maintain sinus  - start coumadin, referral to coumadin clinic, once INR is therapeutic can DC plavix  - stop aspirin    HFrEF, Stage C, Class III, LVEF 20%:   Dilated Cardiomyopathy, etiology: ?valvular, ETOH? (clean coronaries on cath 2017)  -warm and dry on exam, CO 4.5, CI 2.8 on RHC 6/12/19  -Increase metoprolol succinate 50mg  -entresto was previously held for hypotension since we are increasing her metop to control her rates, will hold on starting entresto at this time, plan for restart in future  -spirono 12.5mg  -Continue Digoxin 125 mcg daily  -Lasix 20 mg PRN for 3lb weight gain /24hr, no potassium supplementation  -Reinforced s/sx of worsening heart failure with patient and weight monitoring. Pt verbalizes understanding. Pt to call office or RTC if present.     s/p Cardiomems implant   PaD 28-33mmHg, set threshold at 35  -plavix for 30days    Mitral Regurgitation s/p MV repair   - Performed by Dr. Porras 4/20/17  -PAGE done during HF exacerbation, regurgitation of the repaired mitral valve at the A2-P3 junction.     COPD  -Oxygen dependent, 2.5-3.0L  -continues on Spiriva, Advair, Ventolin, and supplemental oxygen at 2.5 L/min 24/7  -Follows with pulmonary medicine, states reasonably controlled  - pulmonary vascular resistance 2.32woods calculated from RHC data    Dyslipidemia, controlled  - lipitor 40mg q evening    MARY  - CPAP nightly    Plan: metop 50 for rates,  start coumadin, once INR is therapeutic x4 weeks, DCCV    Collaborating MD: Dr. Rochelle NJ personally spent a total of 60 minutes in this office visit with the patient, 30 minutes of which were spent on counseling  and/or coordination of care of including consulting EP for OAC recommendations.

## 2019-06-21 ENCOUNTER — TELEPHONE (OUTPATIENT)
Dept: CARDIOLOGY | Facility: MEDICAL CENTER | Age: 74
End: 2019-06-21

## 2019-06-21 DIAGNOSIS — Z79.01 CHRONIC ANTICOAGULATION: ICD-10-CM

## 2019-06-21 RX ORDER — WARFARIN SODIUM 2.5 MG/1
3.75 TABLET ORAL DAILY
Qty: 45 TAB | Refills: 1 | Status: SHIPPED | OUTPATIENT
Start: 2019-06-21 | End: 2019-11-20 | Stop reason: SDUPTHER

## 2019-06-21 NOTE — PROGRESS NOTES
[facility]    Received new referral to restart warfarin.  Cardiology would like plavix to be discontinued when INR is >2.  Rx for warfarin sent to pt's pharmacy.  Begin taking 3.75 mg daily. Next INR in 72 hours.    Severiano Rosas, PharmD

## 2019-06-21 NOTE — TELEPHONE ENCOUNTER
----- Message -----   From: Elena Harvey P.A.-C.   Sent: 6/20/2019   5:45 PM   To: SCHUYLER Roy, please forward to Nydia, I don't know her last name...     I saw this patient Thursday and diagnosed her with atrial flutter, given her history of left atrial appendage ligation and MV repair I wanted to run it by EP. They recommended starting her on coumadin so I placed a referral to the coumadin clinic, they should give her a call Fri or Monday. When she starts taking the coumadin she can STOP the aspirin but continue the Plavix. Once her INR is at 2, she can stop the plavix as well. I let the coumadin clinic know this as well.   Please see my progress note from 6/20 if you have questions, you can also text me (I'm out of the office on Friday)   Thanks   KOFI Fried message sent to pt reiterating this plan.

## 2019-06-24 ENCOUNTER — ANTICOAGULATION VISIT (OUTPATIENT)
Dept: MEDICAL GROUP | Facility: MEDICAL CENTER | Age: 74
End: 2019-06-24
Payer: MEDICARE

## 2019-06-24 DIAGNOSIS — I48.92 ATRIAL FLUTTER, UNSPECIFIED TYPE (HCC): ICD-10-CM

## 2019-06-24 DIAGNOSIS — Z79.01 CHRONIC ANTICOAGULATION: Primary | ICD-10-CM

## 2019-06-24 LAB — INR PPP: 1.1 (ref 2–3.5)

## 2019-06-24 PROCEDURE — 85610 PROTHROMBIN TIME: CPT | Performed by: INTERNAL MEDICINE

## 2019-06-24 PROCEDURE — 99211 OFF/OP EST MAY X REQ PHY/QHP: CPT | Performed by: INTERNAL MEDICINE

## 2019-06-24 NOTE — PROGRESS NOTES
OP Anticoagulation Service Note    Date: 2019      Anticoagulation Summary  As of 2019    INR goal:   2.0-3.0   TTR:   63.6 % (1.8 y)   INR used for dosin.10! (2019)   Warfarin maintenance plan:   3.75 mg (2.5 mg x 1.5) every day   Weekly warfarin total:   26.25 mg   Plan last modified:   Amanda Perera PharmD (2019)   Next INR check:   2019   Target end date:   Indefinite    Indications    Atrial flutter (HCC) [I48.92]             Anticoagulation Episode Summary     INR check location:   [facility]    Preferred lab:       Send INR reminders to:       Comments:         Anticoagulation Care Providers     Provider Role Specialty Phone number    [facility]   [phone]        Anticoagulation Patient Findings      HPI:   Kelly Ileana Lovett seen in clinic today, on anticoagulation therapy with warfarin (a high risk medication) for atrial fibrillation, CHADS-VASC = 5    Pt previously on warfarin. Pt is returning to our clinic, restarting anticoagulation in preparation for cardioversion with amiodarone. Pt needs 4 weeks of therapeutic INS       Reviewed medications.       A/P   INR  subtherapeutic today, will require dose adjust ment today to prevent stroke and closer follow up.   Pt already took her dose todayTake 5 mg x 2 days.          Pt educated to contact our clinic with any changes in medications or s/s of bleeding or thrombosis. Pt is aware to seek immediate medical attention for falls, head injury or deep cuts    Follow up appointment in 3 days(s) to reduce risk of adverse events from warfarin   Amanda Perera PharmD

## 2019-06-27 ENCOUNTER — ANTICOAGULATION VISIT (OUTPATIENT)
Dept: MEDICAL GROUP | Facility: MEDICAL CENTER | Age: 74
End: 2019-06-27
Payer: MEDICARE

## 2019-06-27 DIAGNOSIS — I48.92 ATRIAL FLUTTER, UNSPECIFIED TYPE (HCC): ICD-10-CM

## 2019-06-27 LAB — INR PPP: 1.6 (ref 2–3.5)

## 2019-06-27 PROCEDURE — 99211 OFF/OP EST MAY X REQ PHY/QHP: CPT | Performed by: INTERNAL MEDICINE

## 2019-06-27 PROCEDURE — 85610 PROTHROMBIN TIME: CPT | Performed by: INTERNAL MEDICINE

## 2019-06-27 NOTE — PROGRESS NOTES
OP Anticoagulation Service Note    Date: 2019  There were no vitals filed for this visit.   pt declined vitals    Anticoagulation Summary  As of 2019    INR goal:   2.0-3.0   TTR:   --   INR used for dosin.60! (2019)   Warfarin maintenance plan:   5 mg (2.5 mg x 2) every day   Weekly warfarin total:   35 mg   Plan last modified:   Amanda Perera PharmD (2019)   Next INR check:   2019   Target end date:   Indefinite    Indications    Atrial flutter (HCC) [I48.92]             Anticoagulation Episode Summary     INR check location:   [facility]    Preferred lab:       Send INR reminders to:       Comments:         Anticoagulation Care Providers     Provider Role Specialty Phone number    [facility]   [phone]        Anticoagulation Patient Findings      HPI:   Kelly Lovett seen in clinic today, on anticoagulation therapy with warfarin (a high risk medication) for atrial flutter, CHADS-VASC = 5      Pt is here today to evaluate anticoagulation therapy    Previous INR was  1.1 on 19    Pt was instructed to start 5 mg of warfarin daily    Confirmed warfarin dosing regimen, denies missed or extra doses of coumadin.   Diet has been consistent with foods rich in vitamin K: Yes  Changes in ETOH:  No  Changes in smoking status: No  Changes in medication: No   Cost restriction: No  S/s of bleeding:  No  Falls or accidents since last visit No  Signs/symptoms  thrombosis since the last appt: No    A/P   INR  subtherapeutic today, w will require close follow up as pt is just resuming warfarin for an upcoming cardioversion  Continue 5 mg of warfarin daily         Pt educated to contact our clinic with any changes in medications or s/s of bleeding or thrombosis. Pt is aware to seek immediate medical attention for falls, head injury or deep cuts    Follow up appointment in 4 day(s) to reduce risk of adverse events from warfarin  Amanda Perera, PharmD

## 2019-07-01 ENCOUNTER — ANTICOAGULATION VISIT (OUTPATIENT)
Dept: MEDICAL GROUP | Facility: MEDICAL CENTER | Age: 74
End: 2019-07-01
Payer: MEDICARE

## 2019-07-01 DIAGNOSIS — Z79.01 CHRONIC ANTICOAGULATION: Primary | ICD-10-CM

## 2019-07-01 DIAGNOSIS — I48.92 ATRIAL FLUTTER, UNSPECIFIED TYPE (HCC): ICD-10-CM

## 2019-07-01 LAB — INR PPP: 3 (ref 2–3.5)

## 2019-07-01 PROCEDURE — 85610 PROTHROMBIN TIME: CPT | Performed by: INTERNAL MEDICINE

## 2019-07-01 PROCEDURE — 99211 OFF/OP EST MAY X REQ PHY/QHP: CPT | Performed by: INTERNAL MEDICINE

## 2019-07-01 NOTE — PROGRESS NOTES
OP Anticoagulation Service Note    Date: 7/1/2019      Anticoagulation Summary  As of 7/1/2019    INR goal:   2.0-3.0   TTR:   --   INR used for dosing:   3.00 (7/1/2019)   Warfarin maintenance plan:   3.75 mg (2.5 mg x 1.5) every Mon, Wed, Fri; 2.5 mg (2.5 mg x 1) all other days   Weekly warfarin total:   21.25 mg   Plan last modified:   Amanda Perera, PharmD (7/1/2019)   Next INR check:   7/8/2019   Target end date:   Indefinite    Indications    Atrial flutter (HCC) [I48.92]             Anticoagulation Episode Summary     INR check location:   [facility]    Preferred lab:       Send INR reminders to:       Comments:         Anticoagulation Care Providers     Provider Role Specialty Phone number    [facility]   [phone]        Anticoagulation Patient Findings      HPI:   Kelly Lovett seen in clinic today, on anticoagulation therapy with warfarin (a high risk medication) for atrial flutter , CHADS-VASC = 5      Pt is here today to evaluate anticoagulation therapy    Previous INR was  1.6 on 6-27-19    Pt was instructed to continue warfarin 5 mg daily    Confirmed warfarin dosing regimen, denies missed or extra doses of coumadin.   Diet has been consistent with foods rich in vitamin K: Yes  Changes in ETOH:  No  Changes in smoking status: No  Changes in medication: No   Cost restriction: No  S/s of bleeding:  No  Falls or accidents since last visit No  Signs/symptoms  thrombosis since the last appt: No    A/P   INR  therapeutic today, will require close follow up as previous INR was sub-therapeutic   INR trended up quickly. Pt going out of town. Pt will resume her previous warfarin regimen and get INR check in 1 week. Provided standing order for Pt/INR       Pt educated to contact our clinic with any changes in medications or s/s of bleeding or thrombosis. Pt is aware to seek immediate medical attention for falls, head injury or deep cuts    Follow up appointment in 1  week(s) to reduce risk of adverse events from warfarin  Amanda Perera, PharmD

## 2019-07-08 ENCOUNTER — TELEPHONE (OUTPATIENT)
Dept: CARDIOLOGY | Facility: MEDICAL CENTER | Age: 74
End: 2019-07-08

## 2019-07-08 ENCOUNTER — ANTICOAGULATION MONITORING (OUTPATIENT)
Dept: VASCULAR LAB | Facility: MEDICAL CENTER | Age: 74
End: 2019-07-08

## 2019-07-08 DIAGNOSIS — I48.92 ATRIAL FLUTTER, UNSPECIFIED TYPE (HCC): ICD-10-CM

## 2019-07-08 LAB — INR PPP: 2.2 (ref 2–3.5)

## 2019-07-08 NOTE — PROGRESS NOTES
Anticoagulation Summary  As of 2019    INR goal:   2.0-3.0   TTR:   100.0 % (4 d)   INR used for dosin.20 (2019)   Warfarin maintenance plan:   3.75 mg (2.5 mg x 1.5) every Mon, Wed, Fri; 2.5 mg (2.5 mg x 1) all other days   Weekly warfarin total:   21.25 mg   No change documented:   Estrada Lares Ass't   Plan last modified:   Dari GaliciaD (2019)   Next INR check:   7/15/2019   Target end date:   Indefinite    Indications    Atrial flutter (HCC) [I48.92]             Anticoagulation Episode Summary     INR check location:   [facility]    Preferred lab:       Send INR reminders to:       Comments:         Anticoagulation Care Providers     Provider Role Specialty Phone number    [facility]   [phone]        Anticoagulation Patient Findings  Patient Findings     Negatives:   Signs/symptoms of thrombosis, Signs/symptoms of bleeding, Laboratory test error suspected, Change in health, Change in alcohol use, Change in activity, Upcoming invasive procedure, Emergency department visit, Upcoming dental procedure, Missed doses, Extra doses, Change in medications, Change in diet/appetite, Hospital admission, Bruising, Other complaints      Spoke with patient to report a therapeutic INR.  Pt instructed to continue with current warfarin dosing regimen. Pt denies any s/s of bleeding, bruising, clotting or any changes to diet or medication.  Will follow up in 1 weeks.  Estrada Lares Ass't    I have reviewed and am in agreement with the above stated plan on 19.  Keyon Cortez, PharmD, BCACP

## 2019-07-08 NOTE — TELEPHONE ENCOUNTER
Patient moved from [place] to [facility] and is having issues with setting up her cardiomems pillow, she will call today- per patient no signs of fluid overload- 1 elevated reading, will re-evaluate on Thursday

## 2019-07-11 ASSESSMENT — ENCOUNTER SYMPTOMS
DEPRESSION: 0
PND: 0
PALPITATIONS: 1
SHORTNESS OF BREATH: 1
NAUSEA: 0
BLOOD IN STOOL: 0
WEIGHT LOSS: 0
CHILLS: 0
ORTHOPNEA: 0
COUGH: 0
FEVER: 0

## 2019-07-11 NOTE — PROGRESS NOTES
Chief Complaint   Patient presents with   • Atrial Flutter       Subjective:   Kelly Lovett is a 73 y.o. female with severe LV dysfunction/CHF, s/p mitral valve repair and MAZE procedure with ERI ligation April 2017 now with moderate regurgitation, COPD, hypertension who presents today for follow up.    Patient of Dr. Li. Had cardiomems implanted 6/12, PA diastolic pressures have been ranging from 28-33mmHg (today is 22). I last saw her 1 month ago and found she was in atrial flutter.  I started her on anticoagulation and increased her beta-blocker.    Today she reports feeling well. She has occasional palpitations when she is walking but they always resolve with resting.  No increased fatigue. No chest pain.  Has not been checking BP at home. No dizziness or presyncope/syncope episodes.    Breathing is at baseline. No swelling or weight gain. O2 at 2.5L Does not get SOB in the house, does not exert herself enough to become short of breath.     Past Medical History:   Diagnosis Date   • Breath shortness     pt reports using o2 at night 2L, no problems at this time   • Chronic systolic congestive heart failure (HCC) 7/7/2016   • COPD (chronic obstructive pulmonary disease) (HCC)    • Dilated cardiomyopathy (HCC)    • Emphysema of lung (HCC)    • Heart valve disease    • High cholesterol    • History of heart surgery    • Hyperlipidemia    • Hypertension    • Hypotension due to hypovolemia 3/1/2019   • Sleep apnea     uses cpap     Past Surgical History:   Procedure Laterality Date   • MITRAL VALVE REPAIR  4/20/2017    Procedure: MITRAL VALVE REPAIR ;  Surgeon: Lacey Porras M.D.;  Location: [facility];  Service:    • MAZE PROCEDURE Left 4/20/2017    Procedure: MAZE PROCEDURE, Left atrial appendage ligation;  Surgeon: Lacey Porras M.D.;  Location: [facility];  Service:    • PAGE  4/20/2017    Procedure: PAGE;  Surgeon: Lacey Porras M.D.;  Location: [facility];   Service:    • APPENDECTOMY      1967   • OOPHORECTOMY       Family History   Problem Relation Age of Onset   • Cancer Father         colon cancer    • Hypertension Mother    • Cancer Sister 68        ovarian cancer     Social History     Social History   • Marital status:      Spouse name: N/A   • Number of children: N/A   • Years of education: N/A     Occupational History   • Not on file.     Social History Main Topics   • Smoking status: Former Smoker     Packs/day: 0.50     Years: 38.00     Types: Cigarettes     Quit date: 10/11/2001   • Smokeless tobacco: Never Used   • Alcohol use 8.4 oz/week     14 Shots of liquor per week      Comment: 2 per day   • Drug use: No   • Sexual activity: Yes     Partners: Male     Other Topics Concern   • Not on file     Social History Narrative   • No narrative on file     Allergies   Allergen Reactions   • Sulfa Drugs Rash     Rxn - years ago in her 20's     Outpatient Encounter Prescriptions as of 7/15/2019   Medication Sig Dispense Refill   • metoprolol SR (TOPROL XL) 100 MG TABLET SR 24 HR Take 1 Tab by mouth every evening. 30 Tab 3   • warfarin (COUMADIN) 2.5 MG Tab Take 1.5 Tabs by mouth every day. Or as instructed by [facility] 45 Tab 1   • sertraline (ZOLOFT) 100 MG Tab Take 1 Tab by mouth every day. 90 Tab 1   • atorvastatin (LIPITOR) 40 MG Tab Take 1 Tab by mouth every day. 100 Tab 3   • digoxin (LANOXIN) 125 MCG Tab Take 1 Tab by mouth every day at 6 PM. 30 Tab 2   • fluticasone-salmeterol (ADVAIR) 250-50 MCG/DOSE AEROSOL POWDER, BREATH ACTIVATED Inhale 1 Puff by mouth 2 times a day. 3 Inhaler 3   • magnesium oxide (MAG-OX) 400 (241.3 Mg) MG Tab tablet Take 1 Tab by mouth every day.     • albuterol 108 (90 Base) MCG/ACT Aero Soln inhalation aerosol Inhale 2 Puffs by mouth every 6 hours as needed for Shortness of Breath.     • Tiotropium Bromide Monohydrate (SPIRIVA RESPIMAT) 1.25 MCG/ACT Aero Soln Inhale 2 Puffs by mouth every day. 3  "Inhaler 3   • acetaminophen (TYLENOL) 500 MG Tab Take 500 mg by mouth 1 time daily as needed. Headache     • [DISCONTINUED] metoprolol SR (TOPROL XL) 50 MG TABLET SR 24 HR Take 1 Tab by mouth every evening. 30 Tab 3   • clopidogrel (PLAVIX) 75 MG Tab Take 1 Tab by mouth every day. (Patient not taking: Reported on 7/15/2019) 30 Tab 0   • sacubitril-valsartan (ENTRESTO) 24-26 MG Tab tablet Take 1 Tab by mouth 2 Times a Day. (Patient not taking: Reported on 7/15/2019) 60 Tab 0   • furosemide (LASIX) 20 MG Tab Take 1 Tab by mouth 1 time daily as needed (for weight gain > 2 lbs). (Patient not taking: Reported on 7/15/2019) 30 Tab 1     No facility-administered encounter medications on file as of 7/15/2019.      Review of Systems   Constitutional: Negative for chills, fever and weight loss.   HENT: Negative for nosebleeds.    Respiratory: Positive for shortness of breath. Negative for cough.    Cardiovascular: Positive for palpitations. Negative for chest pain, orthopnea, leg swelling and PND.   Gastrointestinal: Negative for blood in stool, melena and nausea.   Genitourinary: Negative for hematuria.   Musculoskeletal: Negative for falls.   Neurological: Negative for dizziness and loss of consciousness.   Endo/Heme/Allergies: Bruises/bleeds easily.   Psychiatric/Behavioral: Negative for depression.        Objective:   /68 (BP Location: Left arm, Patient Position: Sitting, BP Cuff Size: Adult)   Pulse (!) 131   Ht 1.676 m (5' 6\")   Wt 51.7 kg (114 lb)   LMP  (LMP Unknown)   SpO2 96%   BMI 18.40 kg/m²     Physical Exam   Constitutional: She is oriented to person, place, and time. She appears well-developed and well-nourished. No distress.   Thin female, NAD   HENT:   Head: Normocephalic and atraumatic.   Eyes: Conjunctivae are normal. No scleral icterus.   Neck: Neck supple. No JVD present.   Cardiovascular: Regular rhythm and intact distal pulses.  Tachycardia present.    Pulses:       Radial pulses are 2+ on " the right side, and 2+ on the left side.   Pulmonary/Chest: Effort normal. No respiratory distress. She has decreased breath sounds. She has no wheezes. She has rales.   2.5L O2   Abdominal: Soft. Bowel sounds are normal. She exhibits no distension. There is no tenderness.   Musculoskeletal: She exhibits no edema.   Neurological: She is alert and oriented to person, place, and time. No cranial nerve deficit.   Skin: Skin is warm and dry. She is not diaphoretic. No pallor.   Psychiatric: Judgment normal.   Vitals reviewed.     [facility] 6/12/19, reviewed   Hemodynamics:  1) Pulmonary arterial pressure: Systolic of 42 mm Hg, diastolic of 16 mm Hg, mean of 29 mm Hg.  2) Pulmonary arterial wedge pressure with mean of 19 mm Hg.  3) Cardiac output of 4.5 and Cardiac index of 2.8 through thermodilution method.  4) Cardiac output of 4.3 and Cardiac index of 2.7 through Concepcion's method.  5) Right ventricular pressure: Systolic of 34 mm Hg, end diastolic pressure of 7 mm Hg.  6) Right atrial pressure: A wave of 11 mm Hg, V wave of 12 mm Hg, mean of 10 mm Hg.  7) Pulmonary vascular resistance of 2.3 Wood Units.     Conclusions:  1) Successful implantation of Cardiomems device for remote monitor of intracardiac pressures.  2) Patient will be monitored as part of our protocol in our heart failure program to further reduce repeated heart failure hospitalization and also to improve overall quality of life.  3) Patient appears to be hypovolemic. IVF hydration was given.  4) Hypotensive but asymptomatic. Ok to be discharged.  5) Advised patient to hold Entresto for now until being evaluated again in clinic next week.    Transesophageal echocardiogram 2/26/19  FINDINGS  Left ventricle is dilated. Severely reduced left ventricular systolic   function.  Normal right ventricular size. Reduced right ventricular systolic function.  The right atrium is normal in size.  Mildly dilated left atrium.    Ligated left atrial appendage noted with no  communication with the left atrium observed.    The interatrial septum is normal.    The interventricular septum is normal.    Known mitral valve repair functioning adequately with moderate   regurgitation in the setting of severe left ventricular systolic   dysfunction.  The regurgitation of the repaired mitral valve is   primarily at the A2-P3 junction.  There is no systolic flow reversal in   the pulmonary veins either on the left or the right side.  Structurally normal aortic valve without significant stenosis or   regurgitation.  Structurally normal tricuspid valve. Mild tricuspid regurgitation.  Structurally normal pulmonic valve without significant stenosis or   regurgitation.  Normal pericardium without effusion  The aortic root is normal.    Transthoracic echocardiogram 2/21/19  CONCLUSIONS  Comapred to the prior echocardiogram dated 3/7/2017, significant   changes are noted.    1. Severely reduced left ventricular systolic function.  Left   ventricular ejection fraction is visually estimated to be 20%.    2. Severely dilated left atrium.    3. Estimated right ventricular systolic pressure  is 50 mmHg.  Mildly   dilated right ventricle.  Reduced right ventricular systolic function.      Cardiac catheterization 3/17/17  POSTOPERATIVE DIAGNOSES:  1.  Severe mitral regurgitation.  2.  Normal left ventricular systolic function, ejection fraction greater than   75%.  3.  No significant coronary artery disease.    PFT 10/19/16  ASSESSMENT:  1.  Severe obstructive lung disease.  2.  Very positive response to bronchodilator consistent with large reactive airways component.  3.  Severe hyperinflation to air trapping and mild distribution of gases.  4.  Severe loss of gas transfer consistent with loss of airway cap exchange units.  5.  Increased airways resistance.  6.  Normal room air oximetry.  7.  Abnormal flow volume that is consistent with severe obstruction.    I interpreted EKG in the office 6/20: clockwise  atrial flutter rapid ventricular response, variable conduction,   Assessment:     1. Atrial flutter, unspecified type (HCC)  EC-PAGE W/O CONT    CL-CARDIOVERSION   2. Paroxysmal atrial fibrillation (HCC)  EKG   3. Simple chronic bronchitis (HCC)     4. Chronic systolic congestive heart failure (HCC)     5. Non-rheumatic mitral regurgitation     6. Left ventricular systolic dysfunction, NYHA class 3     7. ACC/AHA stage C systolic heart failure (HCC)         Medical Decision Making:  Today's Assessment / Status / Plan:   Atrial Flutter, with rapid ventricular response   S/p MAZE and ERI ligation in April 2017  -Rates still too high, increased metop to 100mg q evening.   - plan for PAGE/DCCV with Dr. Li.   - continue dig 125mcg   - her INR has been therapeutic since 7/1 (despite her ERI there is a risk for stroke and since my plan is to cardiovert her, it would be safest to be anticoagulated)  - discussed with Dr. Li, will consider starting amio at cardioversion    HFrEF, Stage C, Class III, LVEF 20%:   Dilated Cardiomyopathy, etiology: ?valvular, ETOH? (clean coronaries on cath 2017)  -warm and dry on exam, CO 4.5, CI 2.8 on RHC 6/12/19  -metop 50mg  -entresto has been held due to hypotension, once she is in NSR, may be able to tolerate again  -spirono 12.5mg  -Lasix 20 mg PRN for 3lb weight gain /24hr, no potassium supplementation  -reviewed s/sx of worsening heart failure with patient and weight monitoring. Pt verbalizes understanding. Pt to call office or RTC if present.     s/p Cardiomems implant   PaD 28-33mmHg,today PaD 22    Mitral Regurgitation s/p MV repair   - Performed by Dr. Porras 4/20/17  -PAGE done during HF exacerbation, regurgitation of the repaired mitral valve at the A2-P3 junction.     COPD  -Oxygen dependent, 2.5-3.0L  -continues on Spiriva, Advair, Ventolin, and supplemental oxygen at 2.5 L/min 24/7  -Follows with pulmonary medicine, states reasonably controlled  - PVR 2.32woods calculated from  RHC data    Dyslipidemia, controlled  - lipitor 40mg q evening      Plan: metop 100mg, PAGE/CV. Labs prior to cardioversion    Collaborating MD: Dr. Li

## 2019-07-15 ENCOUNTER — TELEPHONE (OUTPATIENT)
Dept: CARDIOLOGY | Facility: MEDICAL CENTER | Age: 74
End: 2019-07-15

## 2019-07-15 ENCOUNTER — OFFICE VISIT (OUTPATIENT)
Dept: CARDIOLOGY | Facility: MEDICAL CENTER | Age: 74
End: 2019-07-15
Payer: MEDICARE

## 2019-07-15 VITALS
SYSTOLIC BLOOD PRESSURE: 100 MMHG | WEIGHT: 114 LBS | HEIGHT: 66 IN | OXYGEN SATURATION: 96 % | HEART RATE: 131 BPM | BODY MASS INDEX: 18.32 KG/M2 | DIASTOLIC BLOOD PRESSURE: 68 MMHG

## 2019-07-15 DIAGNOSIS — I50.20 ACC/AHA STAGE C SYSTOLIC HEART FAILURE (HCC): ICD-10-CM

## 2019-07-15 DIAGNOSIS — E83.42 HYPOMAGNESEMIA: ICD-10-CM

## 2019-07-15 DIAGNOSIS — I34.0 NON-RHEUMATIC MITRAL REGURGITATION: ICD-10-CM

## 2019-07-15 DIAGNOSIS — I51.89 LEFT VENTRICULAR SYSTOLIC DYSFUNCTION, NYHA CLASS 3: ICD-10-CM

## 2019-07-15 DIAGNOSIS — Z98.890 H/O MAZE PROCEDURE: ICD-10-CM

## 2019-07-15 DIAGNOSIS — Z98.890 HISTORY OF MITRAL VALVE REPAIR: ICD-10-CM

## 2019-07-15 DIAGNOSIS — Z98.890 STATUS POST LIGATION OF LEFT ATRIAL APPENDAGE: ICD-10-CM

## 2019-07-15 DIAGNOSIS — I48.92 ATRIAL FLUTTER, UNSPECIFIED TYPE (HCC): ICD-10-CM

## 2019-07-15 DIAGNOSIS — I48.19 PERSISTENT ATRIAL FIBRILLATION (HCC): ICD-10-CM

## 2019-07-15 LAB — EKG IMPRESSION: NORMAL

## 2019-07-15 PROCEDURE — 99214 OFFICE O/P EST MOD 30 MIN: CPT | Performed by: PHYSICIAN ASSISTANT

## 2019-07-15 PROCEDURE — 93000 ELECTROCARDIOGRAM COMPLETE: CPT | Performed by: INTERNAL MEDICINE

## 2019-07-15 RX ORDER — METOPROLOL SUCCINATE 100 MG/1
100 TABLET, EXTENDED RELEASE ORAL EVERY EVENING
Qty: 30 TAB | Refills: 3 | Status: SHIPPED | OUTPATIENT
Start: 2019-07-15 | End: 2020-01-02

## 2019-07-15 ASSESSMENT — ENCOUNTER SYMPTOMS
LOSS OF CONSCIOUSNESS: 0
FALLS: 0
BRUISES/BLEEDS EASILY: 1
DIZZINESS: 0

## 2019-07-15 NOTE — TELEPHONE ENCOUNTER
Patient is scheduled on 7-25-19 for a PAGE/CV w/conscious sedation with Dr. Li. Patient was told to hold lasix am day of procedure and to check in at 8:15 for a 10:15 procedure. H&P was done on 7-15-19 by Elena Harvey. Pre admit to call patient due to her being out of town until procedure date.

## 2019-07-16 ENCOUNTER — HOSPITAL ENCOUNTER (OUTPATIENT)
Dept: LAB | Facility: MEDICAL CENTER | Age: 74
End: 2019-07-16
Attending: PHYSICIAN ASSISTANT
Payer: MEDICARE

## 2019-07-16 ENCOUNTER — HOSPITAL ENCOUNTER (OUTPATIENT)
Dept: LAB | Facility: MEDICAL CENTER | Age: 74
End: 2019-07-16
Attending: NURSE PRACTITIONER
Payer: MEDICARE

## 2019-07-16 DIAGNOSIS — I48.19 PERSISTENT ATRIAL FIBRILLATION (HCC): ICD-10-CM

## 2019-07-16 DIAGNOSIS — I48.92 ATRIAL FLUTTER, UNSPECIFIED TYPE (HCC): ICD-10-CM

## 2019-07-16 DIAGNOSIS — I50.20 ACC/AHA STAGE C SYSTOLIC HEART FAILURE (HCC): ICD-10-CM

## 2019-07-16 DIAGNOSIS — E83.42 HYPOMAGNESEMIA: ICD-10-CM

## 2019-07-16 LAB
ANION GAP SERPL CALC-SCNC: 8 MMOL/L (ref 0–11.9)
BUN SERPL-MCNC: 18 MG/DL (ref 8–22)
CALCIUM SERPL-MCNC: 9.6 MG/DL (ref 8.5–10.5)
CHLORIDE SERPL-SCNC: 102 MMOL/L (ref 96–112)
CO2 SERPL-SCNC: 30 MMOL/L (ref 20–33)
CREAT SERPL-MCNC: 0.81 MG/DL (ref 0.5–1.4)
GLUCOSE SERPL-MCNC: 84 MG/DL (ref 65–99)
INR PPP: 2.02 (ref 0.87–1.13)
MAGNESIUM SERPL-MCNC: 1.6 MG/DL (ref 1.5–2.5)
POTASSIUM SERPL-SCNC: 3.9 MMOL/L (ref 3.6–5.5)
PROTHROMBIN TIME: 23.5 SEC (ref 12–14.6)
SODIUM SERPL-SCNC: 140 MMOL/L (ref 135–145)
TSH SERPL DL<=0.005 MIU/L-ACNC: 1.99 UIU/ML (ref 0.38–5.33)

## 2019-07-16 PROCEDURE — 84443 ASSAY THYROID STIM HORMONE: CPT

## 2019-07-16 PROCEDURE — 83735 ASSAY OF MAGNESIUM: CPT

## 2019-07-16 PROCEDURE — 80048 BASIC METABOLIC PNL TOTAL CA: CPT

## 2019-07-16 PROCEDURE — 36415 COLL VENOUS BLD VENIPUNCTURE: CPT

## 2019-07-16 PROCEDURE — 85610 PROTHROMBIN TIME: CPT

## 2019-07-22 ENCOUNTER — APPOINTMENT (OUTPATIENT)
Dept: ADMISSIONS | Facility: MEDICAL CENTER | Age: 74
End: 2019-07-22
Attending: INTERNAL MEDICINE
Payer: MEDICARE

## 2019-07-25 ENCOUNTER — TELEPHONE (OUTPATIENT)
Dept: CARDIOLOGY | Facility: MEDICAL CENTER | Age: 74
End: 2019-07-25

## 2019-07-25 ENCOUNTER — APPOINTMENT (OUTPATIENT)
Dept: CARDIOLOGY | Facility: MEDICAL CENTER | Age: 74
End: 2019-07-25
Attending: PHYSICIAN ASSISTANT
Payer: MEDICARE

## 2019-07-25 ENCOUNTER — HOSPITAL ENCOUNTER (OUTPATIENT)
Facility: MEDICAL CENTER | Age: 74
End: 2019-07-25
Attending: INTERNAL MEDICINE | Admitting: INTERNAL MEDICINE
Payer: MEDICARE

## 2019-07-25 VITALS
DIASTOLIC BLOOD PRESSURE: 76 MMHG | SYSTOLIC BLOOD PRESSURE: 123 MMHG | TEMPERATURE: 98 F | OXYGEN SATURATION: 92 % | BODY MASS INDEX: 18.32 KG/M2 | HEART RATE: 70 BPM | WEIGHT: 113.98 LBS | RESPIRATION RATE: 12 BRPM | HEIGHT: 66 IN

## 2019-07-25 DIAGNOSIS — I48.91 ATRIAL FIBRILLATION BY ELECTROCARDIOGRAM (HCC): ICD-10-CM

## 2019-07-25 DIAGNOSIS — I48.92 ATRIAL FLUTTER, UNSPECIFIED TYPE (HCC): ICD-10-CM

## 2019-07-25 DIAGNOSIS — I48.0 PAF (PAROXYSMAL ATRIAL FIBRILLATION) (HCC): ICD-10-CM

## 2019-07-25 DIAGNOSIS — I50.22 CHRONIC SYSTOLIC CONGESTIVE HEART FAILURE (HCC): ICD-10-CM

## 2019-07-25 LAB
ANION GAP SERPL CALC-SCNC: 9 MMOL/L (ref 0–11.9)
BUN SERPL-MCNC: 24 MG/DL (ref 8–22)
CALCIUM SERPL-MCNC: 9.2 MG/DL (ref 8.5–10.5)
CHLORIDE SERPL-SCNC: 105 MMOL/L (ref 96–112)
CO2 SERPL-SCNC: 27 MMOL/L (ref 20–33)
CREAT SERPL-MCNC: 0.93 MG/DL (ref 0.5–1.4)
EKG IMPRESSION: NORMAL
EKG IMPRESSION: NORMAL
ERYTHROCYTE [DISTWIDTH] IN BLOOD BY AUTOMATED COUNT: 45.6 FL (ref 35.9–50)
GLUCOSE SERPL-MCNC: 85 MG/DL (ref 65–99)
HCT VFR BLD AUTO: 36.8 % (ref 37–47)
HGB BLD-MCNC: 11.7 G/DL (ref 12–16)
INR PPP: 2.24 (ref 0.87–1.13)
MCH RBC QN AUTO: 30.4 PG (ref 27–33)
MCHC RBC AUTO-ENTMCNC: 31.8 G/DL (ref 33.6–35)
MCV RBC AUTO: 95.6 FL (ref 81.4–97.8)
PLATELET # BLD AUTO: 263 K/UL (ref 164–446)
PMV BLD AUTO: 9.6 FL (ref 9–12.9)
POTASSIUM SERPL-SCNC: 4 MMOL/L (ref 3.6–5.5)
PROTHROMBIN TIME: 25.5 SEC (ref 12–14.6)
RBC # BLD AUTO: 3.85 M/UL (ref 4.2–5.4)
SODIUM SERPL-SCNC: 141 MMOL/L (ref 135–145)
WBC # BLD AUTO: 7 K/UL (ref 4.8–10.8)

## 2019-07-25 PROCEDURE — 700111 HCHG RX REV CODE 636 W/ 250 OVERRIDE (IP)

## 2019-07-25 PROCEDURE — 160002 HCHG RECOVERY MINUTES (STAT)

## 2019-07-25 PROCEDURE — 85027 COMPLETE CBC AUTOMATED: CPT

## 2019-07-25 PROCEDURE — 92960 CARDIOVERSION ELECTRIC EXT: CPT | Performed by: INTERNAL MEDICINE

## 2019-07-25 PROCEDURE — 93010 ELECTROCARDIOGRAM REPORT: CPT | Mod: 76,59 | Performed by: INTERNAL MEDICINE

## 2019-07-25 PROCEDURE — 93005 ELECTROCARDIOGRAM TRACING: CPT | Performed by: INTERNAL MEDICINE

## 2019-07-25 PROCEDURE — 99152 MOD SED SAME PHYS/QHP 5/>YRS: CPT | Performed by: INTERNAL MEDICINE

## 2019-07-25 PROCEDURE — 93010 ELECTROCARDIOGRAM REPORT: CPT | Mod: 59 | Performed by: INTERNAL MEDICINE

## 2019-07-25 PROCEDURE — 85610 PROTHROMBIN TIME: CPT

## 2019-07-25 PROCEDURE — 80048 BASIC METABOLIC PNL TOTAL CA: CPT

## 2019-07-25 PROCEDURE — 92960 CARDIOVERSION ELECTRIC EXT: CPT

## 2019-07-25 RX ORDER — AMIODARONE HYDROCHLORIDE 200 MG/1
200 TABLET ORAL 2 TIMES DAILY
Qty: 60 TAB | Refills: 3 | Status: SHIPPED | OUTPATIENT
Start: 2019-07-25 | End: 2019-08-12 | Stop reason: SDUPTHER

## 2019-07-25 RX ORDER — MIDAZOLAM HYDROCHLORIDE 1 MG/ML
INJECTION INTRAMUSCULAR; INTRAVENOUS
Status: COMPLETED
Start: 2019-07-25 | End: 2019-07-25

## 2019-07-25 RX ORDER — MIDAZOLAM HYDROCHLORIDE 1 MG/ML
.5-2 INJECTION INTRAMUSCULAR; INTRAVENOUS PRN
Status: DISCONTINUED | OUTPATIENT
Start: 2019-07-25 | End: 2019-07-25 | Stop reason: HOSPADM

## 2019-07-25 RX ORDER — SODIUM CHLORIDE 9 MG/ML
500 INJECTION, SOLUTION INTRAVENOUS
Status: DISCONTINUED | OUTPATIENT
Start: 2019-07-25 | End: 2019-07-25 | Stop reason: HOSPADM

## 2019-07-25 RX ADMIN — FENTANYL CITRATE 50 MCG: 0.05 INJECTION, SOLUTION INTRAMUSCULAR; INTRAVENOUS at 10:37

## 2019-07-25 RX ADMIN — MIDAZOLAM HYDROCHLORIDE 2 MG: 1 INJECTION, SOLUTION INTRAMUSCULAR; INTRAVENOUS at 10:37

## 2019-07-25 NOTE — PROCEDURES
Electrical Cardioversion  Date/Time: 7/25/2019 11:02 AM  Performed by: DALIA LI  Authorized by: DALIA LI     Consent:     Consent obtained:  Written and verbal    Consent given by:  Patient    Risks discussed:  Death, cutaneous burn, induced arrhythmia and pain    Alternatives discussed:  No treatment, rate-control medication, alternative treatment and anti-coagulation medication  Sedation:     Patient sedated: Yes      Sedation type:  Moderate (conscious) sedation    Vital signs: Vital signs monitored during sedation    Pre-procedure details:     Cardioversion basis:  Elective    Rhythm:  Atrial fibrillation    Electrode placement:  Anterior-posterior  Attempt one:     Cardioversion mode:  Synchronous    Shock (Joules):  120    Shock outcome:  Conversion to normal sinus rhythm  Post-procedure details:     Patient status:  Awake    Patient tolerance of procedure:  Tolerated well, no immediate complications  Comments:      Dalia Li M.D.

## 2019-07-25 NOTE — PROCEDURES
Procedures    Moderate sedation was used and supervised by me (Dalia Li MD).    Agents: Fentanyl and Versed via intravenous injection (please see media tab for details of dosage).    Start time: 10:40 AM.    Stop time: 10:50 AM.    Complications: none.    Dalia Li M.D.

## 2019-07-25 NOTE — CONSULTS
Cardiology brief note    This is a 73-year-old woman with a history of bileaflet mitral valve prolapse status post repair on 4/20/2017 at which time she had a triangular resection of P2 and a 36 mm Anna flexible annuloplasty band in addition to a left-sided Maze procedure in the left atrial appendage ligation.  She has a history of severe lung disease followed by outpatient pulmonology, and nonischemic cardiomyopathy (cardiac catheterization showed no coronary disease on 3/2017).      At this time she is admitted with dyspnea and pulmonary edema found to have new left ventricular systolic dysfunction with an ejection fraction of 20%, and moderate with borderline severe mitral regurgitation.    She was treated at our [facility] location, and diuresed aggressively started on heart failure therapy with hypotension.    I discussed her case today with my partner who saw her down at again the [facility] location.    I also discussed her case with pulmonology, and will plan for a transesophageal echocardiogram tomorrow morning.    Travis Harvey MD  Cardiologist, [facility]   Anxiety Anxiety Anxiety Anxiety Anxiety Anxiety

## 2019-07-25 NOTE — DISCHARGE INSTRUCTIONS
ACTIVITY: Rest and take it easy for the first 24 hours.  A responsible adult is recommended to remain with you during that time.  It is normal to feel sleepy.  We encourage you to not do anything that requires balance, judgment or coordination.    MILD FLU-LIKE SYMPTOMS ARE NORMAL. YOU MAY EXPERIENCE GENERALIZED MUSCLE ACHES, THROAT IRRITATION, HEADACHE AND/OR SOME NAUSEA.    FOR 24 HOURS DO NOT:  Drive, operate machinery or run household appliances.  Drink beer or alcoholic beverages.   Make important decisions or sign legal documents.      DIET: To avoid nausea, slowly advance diet as tolerated, avoiding spicy or greasy foods for the first day.  Add more substantial food to your diet according to your physician's instructions.  Babies can be fed formula or breast milk as soon as they are hungry.  INCREASE FLUIDS AND FIBER TO AVOID CONSTIPATION.      FOLLOW-UP APPOINTMENT:  A follow-up appointment should be arranged with your doctor ; call to schedule.    You should CALL YOUR PHYSICIAN if you develop:  Fever greater than 101 degrees F.  Pain not relieved by medication, or persistent nausea or vomiting.  Excessive bleeding (blood soaking through dressing) or unexpected drainage from the wound.  Extreme redness or swelling around the incision site, drainage of pus or foul smelling drainage.  Inability to urinate or empty your bladder within 8 hours.  Problems with breathing or chest pain.    You should call 911 if you develop problems with breathing or chest pain.  If you are unable to contact your doctor or surgical center, you should go to the nearest emergency room or urgent care center.      Physician's telephone #: [phone]    If any questions arise, call your doctor.  If your doctor is not available, please feel free to call the Surgical Center at [phone].  The Center is open Monday through Friday from 7AM to 7PM.  You can also call the [facility] HOTLINE open 24 hours/day, 7 days/week and speak to a nurse  at [phone], or toll free at [phone].    A registered nurse may call you a few days after your surgery to see how you are doing after your procedure.    MEDICATIONS: Resume taking daily medication.  Take prescribed pain medication with food.  If no medication is prescribed, you may take non-aspirin pain medication if needed.  PAIN MEDICATION CAN BE VERY CONSTIPATING.  Take a stool softener or laxative such as senokot, pericolace, or milk of magnesia if needed.      If your physician has prescribed pain medication that includes Acetaminophen (Tylenol), do not take additional Acetaminophen (Tylenol) while taking the prescribed medication.    Depression / Suicide Risk    As you are discharged from this [facility] facility, it is important to learn how to keep safe from harming yourself.    Recognize the warning signs:  · Abrupt changes in personality, positive or negative- including increase in energy   · Giving away possessions  · Change in eating patterns- significant weight changes-  positive or negative  · Change in sleeping patterns- unable to sleep or sleeping all the time   · Unwillingness or inability to communicate  · Depression  · Unusual sadness, discouragement and loneliness  · Talk of wanting to die  · Neglect of personal appearance   · Rebelliousness- reckless behavior  · Withdrawal from people/activities they love  · Confusion- inability to concentrate     If you or a loved one observes any of these behaviors or has concerns about self-harm, here's what you can do:  · Talk about it- your feelings and reasons for harming yourself  · Remove any means that you might use to hurt yourself (examples: pills, rope, extension cords, firearm)  · Get professional help from the community (Mental Health, Substance Abuse, psychological counseling)  · Do not be alone:Call your Safe Contact- someone whom you trust who will be there for you.  · Call your local CRISIS HOTLINE 945-6933 or  180.725.7389  · Call your local Children's Mobile Crisis Response Team [place] (831) 691-2293 or www.[employer]  · Call the toll free National Suicide Prevention Hotlines   · National Suicide Prevention Lifeline 436-843-AMGQ (7729)  · [place] [facility] Network 800-SUICIDE (441-3090)                          Electrical Cardioversion    Electrical cardioversion is the delivery of a jolt of electricity to restore a normal rhythm to the heart. A rhythm that is too fast or is not regular keeps the heart from pumping well. In this procedure, sticky patches or metal paddles are placed on the chest to deliver electricity to the heart from a device.  This procedure may be done in an emergency if:  · There is low or no blood pressure as a result of the heart rhythm.  · Normal rhythm must be restored as fast as possible to protect the brain and heart from further damage.  · It may save a life.  This procedure may also be done for irregular or fast heart rhythms that are not immediately life-threatening.  Tell a health care provider about:  · Any allergies you have.  · All medicines you are taking, including vitamins, herbs, eye drops, creams, and over-the-counter medicines.  · Any problems you or family members have had with anesthetic medicines.  · Any blood disorders you have.  · Any surgeries you have had.  · Any medical conditions you have.  · Whether you are pregnant or may be pregnant.  What are the risks?  Generally, this is a safe procedure. However, problems may occur, including:  · Allergic reactions to medicines.  · A blood clot that breaks free and travels to other parts of your body.  · The possible return of an abnormal heart rhythm within hours or days after the procedure.  · Your heart stopping (cardiac arrest ). This is rare.  What happens before the procedure?  Medicines  · Your health care provider may have you start taking:  ¨ Blood-thinning medicines (anticoagulants) so your blood does not  clot as easily.  ¨ Medicines may be given to help stabilize your heart rate and rhythm.  · Ask your health care provider about changing or stopping your regular medicines. This is especially important if you are taking diabetes medicines or blood thinners.  General instructions  · Plan to have someone take you home from the hospital or clinic.  · If you will be going home right after the procedure, plan to have someone with you for 24 hours.  · Follow instructions from your health care provider about eating or drinking restrictions.  What happens during the procedure?  · To lower your risk of infection:  ¨ Your health care team will wash or sanitize their hands.  ¨ Your skin will be washed with soap.  · An IV tube will be inserted into one of your veins.  · You will be given a medicine to help you relax (sedative).  · Sticky patches (electrodes) or metal paddles may be placed on your chest.  · An electrical shock will be delivered.  The procedure may vary among health care providers and hospitals.  What happens after the procedure?  · Your blood pressure, heart rate, breathing rate, and blood oxygen level will be monitored until the medicines you were given have worn off.  · Do not drive for 24 hours if you were given a sedative.  · Your heart rhythm will be watched to make sure it does not change.  This information is not intended to replace advice given to you by your health care provider. Make sure you discuss any questions you have with your health care provider.  Document Released: 12/08/2003 Document Revised: 08/16/2017 Document Reviewed: 06/23/2017  [employer] Interactive Patient Education © 2017 [employer]

## 2019-07-25 NOTE — OR NURSING
1101: Patient from cath lab to PPU via gurney s/p cardioversion. Patient is awake. VSS.  No c/o pain or nausea at this time. Will monitor closely. Vital signs per MD order.   1115: VSS. Family at bedside. Patient tolerated sips of water.   1230: VSS. DC instructions given. Questions answered. Family at bedside. No c/o pain or nausea. Patient wide awake. VSS. Patient met criteria for discharge.

## 2019-08-05 ENCOUNTER — ANTICOAGULATION VISIT (OUTPATIENT)
Dept: MEDICAL GROUP | Facility: MEDICAL CENTER | Age: 74
End: 2019-08-05
Payer: MEDICARE

## 2019-08-05 DIAGNOSIS — Z79.01 LONG TERM (CURRENT) USE OF ANTICOAGULANTS: Primary | ICD-10-CM

## 2019-08-05 DIAGNOSIS — I48.0 PAROXYSMAL ATRIAL FIBRILLATION (HCC): ICD-10-CM

## 2019-08-05 DIAGNOSIS — I48.92 ATRIAL FLUTTER, UNSPECIFIED TYPE (HCC): ICD-10-CM

## 2019-08-05 LAB — INR PPP: 2.1 (ref 2–3.5)

## 2019-08-05 PROCEDURE — 85610 PROTHROMBIN TIME: CPT | Performed by: INTERNAL MEDICINE

## 2019-08-05 PROCEDURE — 93793 ANTICOAG MGMT PT WARFARIN: CPT | Performed by: INTERNAL MEDICINE

## 2019-08-05 NOTE — PROGRESS NOTES
OP Anticoagulation Service Note    Date: 2019  There were no vitals filed for this visit.   pt declined vitals    Anticoagulation Summary  As of 2019    INR goal:   2.0-3.0   TTR:   100.0 % (1.1 mo)   INR used for dosin.10 (2019)   Warfarin maintenance plan:   3.75 mg (2.5 mg x 1.5) every Mon, Wed, Fri; 2.5 mg (2.5 mg x 1) all other days   Weekly warfarin total:   21.25 mg   Plan last modified:   Amanda Perera, PharmD (2019)   Next INR check:   2019   Target end date:   Indefinite    Indications    Atrial flutter (HCC) [I48.92]             Anticoagulation Episode Summary     INR check location:   Anticoagulation Clinic    Preferred lab:       Send INR reminders to:       Comments:         Anticoagulation Care Providers     Provider Role Specialty Phone number    [facility]   [phone]        Anticoagulation Patient Findings  Patient Findings     Positives:   Change in medications    Negatives:   Signs/symptoms of thrombosis, Signs/symptoms of bleeding, Laboratory test error suspected, Change in health, Change in alcohol use, Change in activity, Upcoming invasive procedure, Emergency department visit, Upcoming dental procedure, Missed doses, Extra doses, Change in diet/appetite, Hospital admission, Bruising, Other complaints          HPI:   Kelly Casebernadine seen in clinic today, on anticoagulation therapy with warfarin (a high risk medication) for atrial fib , CHADS-VASC = 5      Pt is here today to evaluate anticoagulation therapy    Previous INR was  2.24 on 19 at her cardioversion      Confirmed warfarin dosing regimen, denies missed or extra doses of coumadin.   Diet has been consistent with foods rich in vitamin K: Yes  Changes in ETOH:  No  Changes in smoking status: No  Changes in medication: Yes - pt started on amiodarone 200 mg BID after cardioversion  Cost restriction: No  S/s of bleeding:  No  Falls or accidents since last visit  No  Signs/symptoms  thrombosis since the last appt: No    A/P   INR  therapeutic today, will require close follow up as pt started on amiodarone 7-25-19  Continue current warfarin regimen       Pt educated to contact our clinic with any changes in medications or s/s of bleeding or thrombosis. Pt is aware to seek immediate medical attention for falls, head injury or deep cuts    Follow up appointment in 1 week(s) to reduce risk of adverse events from warfarin   Amanda Perera, PharmD

## 2019-08-12 ENCOUNTER — OFFICE VISIT (OUTPATIENT)
Dept: CARDIOLOGY | Facility: MEDICAL CENTER | Age: 74
End: 2019-08-12
Payer: MEDICARE

## 2019-08-12 ENCOUNTER — ANTICOAGULATION VISIT (OUTPATIENT)
Dept: VASCULAR LAB | Facility: MEDICAL CENTER | Age: 74
End: 2019-08-12
Attending: INTERNAL MEDICINE
Payer: MEDICARE

## 2019-08-12 VITALS
HEART RATE: 78 BPM | OXYGEN SATURATION: 93 % | WEIGHT: 111 LBS | SYSTOLIC BLOOD PRESSURE: 108 MMHG | DIASTOLIC BLOOD PRESSURE: 62 MMHG | HEIGHT: 66 IN | BODY MASS INDEX: 17.84 KG/M2

## 2019-08-12 DIAGNOSIS — I48.0 PAROXYSMAL ATRIAL FIBRILLATION (HCC): ICD-10-CM

## 2019-08-12 DIAGNOSIS — I48.19 PERSISTENT ATRIAL FIBRILLATION (HCC): ICD-10-CM

## 2019-08-12 DIAGNOSIS — I48.92 ATRIAL FLUTTER, UNSPECIFIED TYPE (HCC): ICD-10-CM

## 2019-08-12 DIAGNOSIS — I50.20 ACC/AHA STAGE C SYSTOLIC HEART FAILURE (HCC): ICD-10-CM

## 2019-08-12 DIAGNOSIS — I34.0 NON-RHEUMATIC MITRAL REGURGITATION: ICD-10-CM

## 2019-08-12 DIAGNOSIS — Z79.01 LONG TERM (CURRENT) USE OF ANTICOAGULANTS: ICD-10-CM

## 2019-08-12 DIAGNOSIS — I48.0 PAF (PAROXYSMAL ATRIAL FIBRILLATION) (HCC): ICD-10-CM

## 2019-08-12 DIAGNOSIS — I51.89 LEFT VENTRICULAR SYSTOLIC DYSFUNCTION, NYHA CLASS 3: ICD-10-CM

## 2019-08-12 DIAGNOSIS — Z98.890 STATUS POST LIGATION OF LEFT ATRIAL APPENDAGE: ICD-10-CM

## 2019-08-12 DIAGNOSIS — Z98.890 H/O MAZE PROCEDURE: ICD-10-CM

## 2019-08-12 DIAGNOSIS — I10 HTN (HYPERTENSION), MALIGNANT: ICD-10-CM

## 2019-08-12 DIAGNOSIS — Z98.890 HISTORY OF MITRAL VALVE REPAIR: ICD-10-CM

## 2019-08-12 LAB — EKG IMPRESSION: NORMAL

## 2019-08-12 PROCEDURE — 93000 ELECTROCARDIOGRAM COMPLETE: CPT | Performed by: INTERNAL MEDICINE

## 2019-08-12 PROCEDURE — 99215 OFFICE O/P EST HI 40 MIN: CPT | Performed by: INTERNAL MEDICINE

## 2019-08-12 RX ORDER — AMIODARONE HYDROCHLORIDE 200 MG/1
200 TABLET ORAL DAILY
Qty: 90 TAB | Refills: 0 | Status: SHIPPED | OUTPATIENT
Start: 2019-08-12 | End: 2020-01-08 | Stop reason: SDUPTHER

## 2019-08-12 ASSESSMENT — ENCOUNTER SYMPTOMS
MEMORY LOSS: 0
SENSORY CHANGE: 0
COUGH: 0
FALLS: 0
DIAPHORESIS: 0
ABDOMINAL PAIN: 0
PALPITATIONS: 0
BLURRED VISION: 0
HEADACHES: 0
DEPRESSION: 0
FEVER: 0
DIZZINESS: 0
SHORTNESS OF BREATH: 1
MYALGIAS: 0
BRUISES/BLEEDS EASILY: 0
DOUBLE VISION: 0

## 2019-08-12 NOTE — PROGRESS NOTES
Chief Complaint   Patient presents with   • CHF (Systolic)     F/V: 1 MO       Subjective:   Kelly Lovett is a 73 y.o. female who presents today for cardiac care and evaluation for her prior mitral valve repair, maze, now with Stage C systolic HF.     In 02/219, she went into the hospital because of heart failure exacerbation.  She was found to have a new reduction in LV function which is now at 20%.  She does have significant history of daily drinking [place].  We optimized her medication diuresed and she was discharged.     I have independently reviewed blood tests results with patient in clinic which showed elevated LDL level, but normal renal and liver function.     I have independently reviewed patient's ECG with patient in clinic today, which shows normal sinus rhythm, normal IL, QT intervals. No evidence of acute coronary syndrome.     03/2019 Patient was able to complete 183 m during his 6 minute walk test. her O2 saturation at baseline was 99% and at the end of the test, the O2 saturation was 96%. she reported 4 level of dyspnea on Yen scale.    07/2019 She underwent successful cardioversion with Amiodarone loading.    Since then, she is feeling better.    I have personally interpreted her EKG today with patient, in sinus rhythm.    Patient still gets winded with daily living activities and exertion. No symptoms at rest.     I have independently interpreted and reviewed blood tests results with patient in clinic which shows normal  renal function.    Past Medical History:   Diagnosis Date   • Asthma     inhalers   • Breath shortness     pt reports using o2 at night 2L, no problems at this time   • Chronic systolic congestive heart failure (HCC) 7/7/2016   • COPD (chronic obstructive pulmonary disease) (HCC)    • Dilated cardiomyopathy (HCC)    • Emphysema of lung (HCC)    • Heart valve disease    • High cholesterol    • History of heart surgery    • Hyperlipidemia    • Hypertension    •  Hypotension due to hypovolemia 3/1/2019   • Sleep apnea     uses cpap     Past Surgical History:   Procedure Laterality Date   • MITRAL VALVE REPAIR  2017    Procedure: MITRAL VALVE REPAIR ;  Surgeon: Lacey Porras M.D.;  Location: SURGERY [facility];  Service:    • MAZE PROCEDURE Left 2017    Procedure: MAZE PROCEDURE, Left atrial appendage ligation;  Surgeon: Lacey Porras M.D.;  Location: SURGERY [facility];  Service:    • PAGE  2017    Procedure: PAGE;  Surgeon: Lacey Porras M.D.;  Location: SURGERY [facility];  Service:    • APPENDECTOMY      1967   • OOPHORECTOMY       Family History   Problem Relation Age of Onset   • Cancer Father         colon cancer    • Hypertension Mother    • Cancer Sister 68        ovarian cancer     Social History     Socioeconomic History   • Marital status:      Spouse name: Not on file   • Number of children: Not on file   • Years of education: Not on file   • Highest education level: Not on file   Occupational History   • Not on file   Social Needs   • Financial resource strain: Not on file   • Food insecurity:     Worry: Not on file     Inability: Not on file   • Transportation needs:     Medical: Not on file     Non-medical: Not on file   Tobacco Use   • Smoking status: Former Smoker     Packs/day: 0.50     Years: 38.00     Pack years: 19.00     Types: Cigarettes     Last attempt to quit: 10/11/2001     Years since quittin.8   • Smokeless tobacco: Never Used   Substance and Sexual Activity   • Alcohol use: Yes     Alcohol/week: 8.4 oz     Types: 14 Shots of liquor per week     Comment: 4x week   • Drug use: No   • Sexual activity: Yes     Partners: Male   Lifestyle   • Physical activity:     Days per week: Not on file     Minutes per session: Not on file   • Stress: Not on file   Relationships   • Social connections:     Talks on phone: Not on file     Gets together: Not on file     Attends [employer] service: Not on file     Active  member of club or organization: Not on file     Attends meetings of clubs or organizations: Not on file     Relationship status: Not on file   • Intimate partner violence:     Fear of current or ex partner: Not on file     Emotionally abused: Not on file     Physically abused: Not on file     Forced sexual activity: Not on file   Other Topics Concern   • Not on file   Social History Narrative   • Not on file     Allergies   Allergen Reactions   • Sulfa Drugs Rash     Rxn - years ago in her 20's     Outpatient Encounter Medications as of 8/12/2019   Medication Sig Dispense Refill   • amiodarone (CORDARONE) 200 MG Tab Take 1 Tab by mouth every day. 90 Tab 0   • Calcium Carb-Cholecalciferol (CALCIUM 1000 + D PO) Take 1 Cap by mouth every day.     • metoprolol SR (TOPROL XL) 100 MG TABLET SR 24 HR Take 1 Tab by mouth every evening. 30 Tab 3   • warfarin (COUMADIN) 2.5 MG Tab Take 1.5 Tabs by mouth every day. Or as instructed by [facility] 45 Tab 1   • sertraline (ZOLOFT) 100 MG Tab Take 1 Tab by mouth every day. 90 Tab 1   • atorvastatin (LIPITOR) 40 MG Tab Take 1 Tab by mouth every day. 100 Tab 3   • digoxin (LANOXIN) 125 MCG Tab Take 1 Tab by mouth every day at 6 PM. 30 Tab 2   • fluticasone-salmeterol (ADVAIR) 250-50 MCG/DOSE AEROSOL POWDER, BREATH ACTIVATED Inhale 1 Puff by mouth 2 times a day. 3 Inhaler 3   • magnesium oxide (MAG-OX) 400 (241.3 Mg) MG Tab tablet Take 1 Tab by mouth every day.     • albuterol 108 (90 Base) MCG/ACT Aero Soln inhalation aerosol Inhale 2 Puffs by mouth every 6 hours as needed for Shortness of Breath.     • Tiotropium Bromide Monohydrate (SPIRIVA RESPIMAT) 1.25 MCG/ACT Aero Soln Inhale 2 Puffs by mouth every day. 3 Inhaler 3   • acetaminophen (TYLENOL) 500 MG Tab Take 500 mg by mouth 1 time daily as needed. Headache     • [DISCONTINUED] amiodarone (CORDARONE) 200 MG Tab Take 1 Tab by mouth 2 Times a Day. 60 Tab 3     No facility-administered encounter medications  "on file as of 8/12/2019.      Review of Systems   Constitutional: Negative for diaphoresis and fever.   HENT: Negative for nosebleeds.    Eyes: Negative for blurred vision and double vision.   Respiratory: Positive for shortness of breath. Negative for cough.    Cardiovascular: Negative for chest pain and palpitations.   Gastrointestinal: Negative for abdominal pain.   Genitourinary: Negative for dysuria and frequency.   Musculoskeletal: Negative for falls and myalgias.   Skin: Negative for rash.   Neurological: Negative for dizziness, sensory change and headaches.   Endo/Heme/Allergies: Does not bruise/bleed easily.   Psychiatric/Behavioral: Negative for depression and memory loss.        Objective:   /62 (BP Location: Right arm, Patient Position: Sitting, BP Cuff Size: Adult)   Pulse 78   Ht 1.676 m (5' 6\")   Wt 50.3 kg (111 lb)   LMP  (LMP Unknown)   SpO2 93%   BMI 17.92 kg/m²      Physical Exam   Constitutional: She is oriented to person, place, and time. No distress.   Patient is dependent on supplemental oxygen.     HENT:   Head: Normocephalic and atraumatic.   Right Ear: External ear normal.   Left Ear: External ear normal.   Eyes: Right eye exhibits no discharge. Left eye exhibits no discharge.   Neck: No JVD present. No thyromegaly present.   Cardiovascular: Normal rate, regular rhythm, normal heart sounds and intact distal pulses. Exam reveals no gallop and no friction rub.   No murmur heard.  Pulmonary/Chest: Breath sounds normal. No respiratory distress.   Abdominal: Bowel sounds are normal. She exhibits no distension. There is no tenderness.   Musculoskeletal: She exhibits no edema or tenderness.   Neurological: She is alert and oriented to person, place, and time. No cranial nerve deficit.   Skin: Skin is warm and dry. She is not diaphoretic.   Psychiatric: She has a normal mood and affect. Her behavior is normal.   Nursing note and vitals reviewed.      Assessment:     1. ACC/AHA stage C " systolic heart failure (HCC)     2. Left ventricular systolic dysfunction, NYHA class 3     3. PAF (paroxysmal atrial fibrillation) (HCC)  amiodarone (CORDARONE) 200 MG Tab   4. Non-rheumatic mitral regurgitation     5. History of mitral valve repair     6. H/O maze procedure     7. Status post ligation of left atrial appendage     8. Persistent atrial fibrillation (HCC)     9. Atrial flutter, unspecified type (HCC)     10. HTN (hypertension), malignant         Medical Decision Making:  Today's Assessment / Status / Plan:   Today, based on physical examination findings, patient is euvolemic. No JVD, lungs are clear to auscultation, no pitting edema in bilateral lower extremities, no ascites.    Dry weight is 111 lbs.    Patient definitely feels better when she is in sinus rhythm.  Therefore our priority is to keep her in sinus rhythm.  I will change amiodarone to 200 mg p.o. once a day for 3 months and consider dropping down to 100 mg p.o. once a day.  Hopefully, she will be maintained in sinus rhythm.    Her blood pressure is is better but still borderline low today.   Continue to hold Spironolactone.     She is status Cardiomems implant.     In the meantime, we will continue current medical therapy of Toprol XL  100 mg daily and digoxin 125 mcg daily.    Entresto  is on hold due to borderline low blood pressure.     Patient did not like anticoagulation therapy in the past.  Therefore, I do think that it it is okay for us to not push her with anticoagulation therapy.    Will continue to closely monitor for side effects of patient's high risk medication(s) including liver, renal function and electrolytes.    I will see patient back in our Heart Failure Clinic in 6 weeks.    I thank you for referring patient to our Heart Failure Clinic today.

## 2019-08-12 NOTE — PROGRESS NOTES
Anticoagulation Summary  As of 8/12/2019    INR goal:   2.0-3.0   TTR:   100.0 % (1.1 mo)   INR used for dosing:      Plan last modified:   Dari GaliciaD (7/1/2019)   Next INR check:      Target end date:   Indefinite    Indications    Atrial flutter (HCC) [I48.92]  Long term (current) use of anticoagulants [Z79.01] [Z79.01]  Paroxysmal atrial fibrillation (HCC) [I48.0]             Anticoagulation Episode Summary     INR check location:   Anticoagulation Clinic    Preferred lab:       Send INR reminders to:       Comments:         Anticoagulation Care Providers     Provider Role Specialty Phone number    [facility]   [phone]                Anticoagulation Patient Findings      HPI:  Kelly Lovett seen in clinic today, on anticoagulation therapy with *** for ***  Changes to current medical/health status since last appt: ***  Denies signs/symptoms of bleeding and/or thrombosis since the last appt.    Denies any interval changes to diet  Denies any interval changes to medications since last appt.   Denies any complications or cost restrictions with current therapy.   BP recorded in vitals. ***      A/P   INR  ***-therapeutic.   ***    Follow up appointment in {NUMBER :10} week(s).    Juan Carlos Pittman, DariD

## 2019-08-13 ENCOUNTER — OFFICE VISIT (OUTPATIENT)
Dept: PULMONOLOGY | Facility: HOSPICE | Age: 74
End: 2019-08-13
Payer: MEDICARE

## 2019-08-13 VITALS
BODY MASS INDEX: 17.84 KG/M2 | HEART RATE: 68 BPM | SYSTOLIC BLOOD PRESSURE: 108 MMHG | OXYGEN SATURATION: 98 % | RESPIRATION RATE: 16 BRPM | WEIGHT: 111 LBS | HEIGHT: 66 IN | TEMPERATURE: 97.6 F | DIASTOLIC BLOOD PRESSURE: 70 MMHG

## 2019-08-13 DIAGNOSIS — I50.22 CHRONIC SYSTOLIC CONGESTIVE HEART FAILURE (HCC): ICD-10-CM

## 2019-08-13 DIAGNOSIS — Z87.09 H/O PNEUMOTHORAX: ICD-10-CM

## 2019-08-13 DIAGNOSIS — Z86.79 HISTORY OF ATRIAL FIBRILLATION: ICD-10-CM

## 2019-08-13 DIAGNOSIS — Z95.2 STATUS POST MITRAL VALVE REPLACEMENT: ICD-10-CM

## 2019-08-13 DIAGNOSIS — R09.02 HYPOXEMIA: ICD-10-CM

## 2019-08-13 DIAGNOSIS — J44.9 CHRONIC OBSTRUCTIVE PULMONARY DISEASE, UNSPECIFIED COPD TYPE (HCC): ICD-10-CM

## 2019-08-13 PROCEDURE — 99214 OFFICE O/P EST MOD 30 MIN: CPT | Performed by: INTERNAL MEDICINE

## 2019-08-13 ASSESSMENT — PAIN SCALES - GENERAL: PAINLEVEL: NO PAIN

## 2019-08-14 NOTE — PROGRESS NOTES
Chief Complaint   Patient presents with   • Follow-Up     COPD       HPI:  Patient is 73-year-old woman with significant chronic obstructive pulmonary disease. She had cardiac surgery with a mitral valve repair and Maze procedure. She had postoperative complications including pneumothoraces. She continues on supplemental oxygen. Her FEV1 is 0.68 L..  She has completed pulmonary rehabilitation.  She is trying to stay active.  She is walking daily.  She continues on Spiriva, Advair, Ventolin, and supplemental oxygen at 2.5 L/min 24/7.  She uses a portable oxygen concentrator during the day.  She does say that her voice is somewhat hoarse since her surgery.  She has not had any exacerbations since her last visit.   She has seen ENT and has no significant vocal cord issues.  Since her last visit she was hospitalized for decompensated congestive heart failure and has had episodes of atrial fibrillation/flutter.  She had a cardiac catheterization.  She had cardioversion and is now in sinus rhythm.  She is also had a CardioMEMS monitor inserted.  With better control of her congestive failure and return of sinus rhythm she is breathing much better.  We did check her oxygen saturations on 2.5 L/min of pulsed supplemental oxygen.  Her saturations did remain in the mid 90s.    Past Medical History:   Diagnosis Date   • Asthma     inhalers   • Breath shortness     pt reports using o2 at night 2L, no problems at this time   • Chronic systolic congestive heart failure (HCC) 7/7/2016   • COPD (chronic obstructive pulmonary disease) (HCC)    • Dilated cardiomyopathy (HCC)    • Emphysema of lung (HCC)    • Heart valve disease    • High cholesterol    • History of heart surgery    • Hyperlipidemia    • Hypertension    • Hypotension due to hypovolemia 3/1/2019   • Sleep apnea     uses cpap       ROS:   Constitutional: Denies fevers, chills, night sweats, fatigue or weight loss  Eyes: Denies vision loss, pain, drainage, double  vision  Ears, Nose, Throat: Denies earache, tinnitus, hoarseness  Cardiovascular: Denies chest pain, tightness, palpitations at this time  Respiratory: See HPI  Sleep: Denies, snoring, apnea  GI: Denies abdominal pain, nausea, vomiting, diarrhea  : Denies frequent urination, hematuria, painful urination  Musculoskeletal: Denies back pain, painful joints, sore muscles  Neurological: Denies headaches, seizures  Skin: Denies rashes, color changes  Psychiatric: Denies depression or thoughts of suicide  Hematologic: Denies bleeding tendency or clotting tendency  Allergic/Immunologic: Denies rhinitis, skin sensitivity    Social History     Socioeconomic History   • Marital status:      Spouse name: Not on file   • Number of children: Not on file   • Years of education: Not on file   • Highest education level: Not on file   Occupational History   • Not on file   Social Needs   • Financial resource strain: Not on file   • Food insecurity:     Worry: Not on file     Inability: Not on file   • Transportation needs:     Medical: Not on file     Non-medical: Not on file   Tobacco Use   • Smoking status: Former Smoker     Packs/day: 0.50     Years: 38.00     Pack years: 19.00     Types: Cigarettes     Last attempt to quit: 10/11/2001     Years since quittin.8   • Smokeless tobacco: Never Used   Substance and Sexual Activity   • Alcohol use: Yes     Alcohol/week: 8.4 oz     Types: 14 Shots of liquor per week     Comment: 4x week   • Drug use: No   • Sexual activity: Yes     Partners: Male   Lifestyle   • Physical activity:     Days per week: Not on file     Minutes per session: Not on file   • Stress: Not on file   Relationships   • Social connections:     Talks on phone: Not on file     Gets together: Not on file     Attends [employer] service: Not on file     Active member of club or organization: Not on file     Attends meetings of clubs or organizations: Not on file     Relationship status: Not on file   •  "Intimate partner violence:     Fear of current or ex partner: Not on file     Emotionally abused: Not on file     Physically abused: Not on file     Forced sexual activity: Not on file   Other Topics Concern   • Not on file   Social History Narrative   • Not on file     Sulfa drugs  Current Outpatient Medications on File Prior to Visit   Medication Sig Dispense Refill   • amiodarone (CORDARONE) 200 MG Tab Take 1 Tab by mouth every day. 90 Tab 0   • Calcium Carb-Cholecalciferol (CALCIUM 1000 + D PO) Take 1 Cap by mouth every day.     • metoprolol SR (TOPROL XL) 100 MG TABLET SR 24 HR Take 1 Tab by mouth every evening. 30 Tab 3   • warfarin (COUMADIN) 2.5 MG Tab Take 1.5 Tabs by mouth every day. Or as instructed by [facility] 45 Tab 1   • sertraline (ZOLOFT) 100 MG Tab Take 1 Tab by mouth every day. 90 Tab 1   • atorvastatin (LIPITOR) 40 MG Tab Take 1 Tab by mouth every day. 100 Tab 3   • digoxin (LANOXIN) 125 MCG Tab Take 1 Tab by mouth every day at 6 PM. 30 Tab 2   • fluticasone-salmeterol (ADVAIR) 250-50 MCG/DOSE AEROSOL POWDER, BREATH ACTIVATED Inhale 1 Puff by mouth 2 times a day. 3 Inhaler 3   • magnesium oxide (MAG-OX) 400 (241.3 Mg) MG Tab tablet Take 1 Tab by mouth every day.     • albuterol 108 (90 Base) MCG/ACT Aero Soln inhalation aerosol Inhale 2 Puffs by mouth every 6 hours as needed for Shortness of Breath.     • Tiotropium Bromide Monohydrate (SPIRIVA RESPIMAT) 1.25 MCG/ACT Aero Soln Inhale 2 Puffs by mouth every day. 3 Inhaler 3   • acetaminophen (TYLENOL) 500 MG Tab Take 500 mg by mouth 1 time daily as needed. Headache       No current facility-administered medications on file prior to visit.      /70   Pulse 68   Temp 36.4 °C (97.6 °F) (Oral)   Resp 16   Ht 1.676 m (5' 6\")   Wt 50.3 kg (111 lb)   SpO2 98%   Family History   Problem Relation Age of Onset   • Cancer Father         colon cancer    • Hypertension Mother    • Cancer Sister 68        ovarian cancer "       Physical Exam:  No distress at rest on supplemental oxygen  HEENT: PERRLA, EOMI, no scleral icterus, no nasal or oral lesions  Neck: No thyromegaly, no adenopathy, no bruits  Mallampatti: Grade II  Lungs: Distant breath sounds, no wheezes or crackles  Heart: Regular rate and rhythm, no gallops or murmurs  Abdomen: Soft, benign, no organomegaly  Extremities: No clubbing, cyanosis, or edema  Neurologic: Cranial nerve, motor, and sensory exam are normal    1. Chronic obstructive pulmonary disease, unspecified COPD type (HCC)    2. Hypoxemia    3. Status post mitral valve replacement    4. H/O pneumothorax    5. History of atrial fibrillation    6. Chronic systolic congestive heart failure (HCC)      Her COPD is reasonably well controlled.  Her symptoms have now improved with better control of her congestive heart failure and tachyarrhythmia.  We will not make any changes in her current medication management or oxygen supplementation.  We will see her back in 6 months or sooner if she has issues.

## 2019-08-15 ENCOUNTER — ANTICOAGULATION VISIT (OUTPATIENT)
Dept: MEDICAL GROUP | Facility: MEDICAL CENTER | Age: 74
End: 2019-08-15
Payer: MEDICARE

## 2019-08-15 DIAGNOSIS — Z79.01 LONG TERM (CURRENT) USE OF ANTICOAGULANTS: Primary | ICD-10-CM

## 2019-08-15 DIAGNOSIS — I48.0 PAROXYSMAL ATRIAL FIBRILLATION (HCC): ICD-10-CM

## 2019-08-15 DIAGNOSIS — I48.92 ATRIAL FLUTTER, UNSPECIFIED TYPE (HCC): ICD-10-CM

## 2019-08-15 LAB — INR PPP: 2.8 (ref 2–3.5)

## 2019-08-15 PROCEDURE — 85610 PROTHROMBIN TIME: CPT | Performed by: INTERNAL MEDICINE

## 2019-08-15 PROCEDURE — 99211 OFF/OP EST MAY X REQ PHY/QHP: CPT | Performed by: INTERNAL MEDICINE

## 2019-08-15 NOTE — PROGRESS NOTES
OP Anticoagulation Service Note    Date: 8/15/2019  There were no vitals filed for this visit.   pt declined vitals    Anticoagulation Summary  As of 8/15/2019    INR goal:   2.0-3.0   TTR:   100.0 % (1.4 mo)   INR used for dosin.80 (8/15/2019)   Warfarin maintenance plan:   3.75 mg (2.5 mg x 1.5) every Mon, Fri; 2.5 mg (2.5 mg x 1) all other days   Weekly warfarin total:   20 mg   Plan last modified:   Amanda Perera, PharmD (8/15/2019)   Next INR check:   2019   Target end date:   Indefinite    Indications    Atrial flutter (HCC) [I48.92]  Long term (current) use of anticoagulants [Z79.01] [Z79.01]  Paroxysmal atrial fibrillation (HCC) [I48.0]             Anticoagulation Episode Summary     INR check location:   Anticoagulation Clinic    Preferred lab:       Send INR reminders to:       Comments:         Anticoagulation Care Providers     Provider Role Specialty Phone number    [facility]   [phone]        Anticoagulation Patient Findings      HPI:   Kelly Ileana Lovett seen in clinic today, on anticoagulation therapy with warfarin (a high risk medication) for atrial fibrillation, CHADS-VASC = 5      Pt is here today to evaluate anticoagulation therapy    Previous INR was  2.1 on 19    Pt was instructed to continue current regimen    Confirmed warfarin dosing regimen, denies missed or extra doses of coumadin.   Diet has been consistent with foods rich in vitamin K: Yes  Changes in ETOH:  No  Changes in smoking status: No  Changes in medication: Yes- pt on amiodarone for about a month, dose decreased 8-13-15   Cost restriction: No  S/s of bleeding:  No  Falls or accidents since last visit No  Signs/symptoms  thrombosis since the last appt: No    A/P   INR  therapeutic today, will require close follow up as pt started on amiodarone recently.   INR is trending up after starting amiodarone, will decrease weekly regimen.         check referral    Pt educated to contact  our clinic with any changes in medications or s/s of bleeding or thrombosis. Pt is aware to seek immediate medical attention for falls, head injury or deep cuts    Follow up appointment in 2 week(s) to reduce risk of adverse events from warfarin   Amanda Perera, PharmD

## 2019-08-29 ENCOUNTER — ANTICOAGULATION VISIT (OUTPATIENT)
Dept: MEDICAL GROUP | Facility: MEDICAL CENTER | Age: 74
End: 2019-08-29
Payer: MEDICARE

## 2019-08-29 DIAGNOSIS — Z79.01 LONG TERM (CURRENT) USE OF ANTICOAGULANTS: Primary | ICD-10-CM

## 2019-08-29 DIAGNOSIS — I48.0 PAROXYSMAL ATRIAL FIBRILLATION (HCC): ICD-10-CM

## 2019-08-29 DIAGNOSIS — I48.92 ATRIAL FLUTTER, UNSPECIFIED TYPE (HCC): ICD-10-CM

## 2019-08-29 LAB — INR PPP: 3.5 (ref 2–3.5)

## 2019-08-29 PROCEDURE — 85610 PROTHROMBIN TIME: CPT | Performed by: INTERNAL MEDICINE

## 2019-08-29 PROCEDURE — 99211 OFF/OP EST MAY X REQ PHY/QHP: CPT | Performed by: INTERNAL MEDICINE

## 2019-08-29 NOTE — PROGRESS NOTES
OP Anticoagulation Service Note    Date: 8/29/2019  There were no vitals filed for this visit.   pt declined vitals    Anticoagulation Summary  As of 8/29/2019    INR goal:   2.0-3.0   TTR:   82.3 % (1.9 mo)   INR used for dosing:   3.50! (8/29/2019)   Warfarin maintenance plan:   2.5 mg (2.5 mg x 1) every day   Weekly warfarin total:   17.5 mg   Plan last modified:   Amanda Perera, PharmD (8/29/2019)   Next INR check:   9/12/2019   Target end date:   Indefinite    Indications    Atrial flutter (HCC) [I48.92]  Long term (current) use of anticoagulants [Z79.01] [Z79.01]  Paroxysmal atrial fibrillation (HCC) [I48.0]             Anticoagulation Episode Summary     INR check location:   Anticoagulation Clinic    Preferred lab:       Send INR reminders to:       Comments:         Anticoagulation Care Providers     Provider Role Specialty Phone number    [facility]   [phone]        Anticoagulation Patient Findings      HPI:   Kelly Lovett seen in clinic today, on anticoagulation therapy with warfarin (a high risk medication) for atrial fibrillation, CHADS-VASC = 5      Pt is here today to evaluate anticoagulation therapy    Previous INR was  2.8 on 8-15-19    Pt was instructed to lower weekly regimen  Confirmed warfarin dosing regimen, denies missed or extra doses of coumadin.   Diet has been consistent with foods rich in vitamin K: Yes  Changes in ETOH:  No  Changes in smoking status: No  Changes in medication: No - but pt started on amiodarone about a month ago  Cost restriction: No  S/s of bleeding:  No  Falls or accidents since last visit No  Signs/symptoms  thrombosis since the last appt: No    A/P   INR  SUPRAtherapeutic today, will require dose adjust ment today to prevent bleeding complications  and closer follow up.   Tonight 1/2 tablets then lower weekly regimen.       6/20 check referral    Pt educated to contact our clinic with any changes in medications or s/s of  bleeding or thrombosis. Pt is aware to seek immediate medical attention for falls, head injury or deep cuts    Follow up appointment in 2 week(s) to reduce risk of adverse events from warfarin   Amanda Perera, PharmD

## 2019-09-12 ENCOUNTER — ANTICOAGULATION VISIT (OUTPATIENT)
Dept: MEDICAL GROUP | Facility: MEDICAL CENTER | Age: 74
End: 2019-09-12
Payer: MEDICARE

## 2019-09-12 DIAGNOSIS — I48.92 ATRIAL FLUTTER, UNSPECIFIED TYPE (HCC): ICD-10-CM

## 2019-09-12 DIAGNOSIS — I48.0 PAROXYSMAL ATRIAL FIBRILLATION (HCC): ICD-10-CM

## 2019-09-12 DIAGNOSIS — Z79.01 LONG TERM (CURRENT) USE OF ANTICOAGULANTS: ICD-10-CM

## 2019-09-12 LAB — INR PPP: 2.2 (ref 2–3.5)

## 2019-09-12 PROCEDURE — 93793 ANTICOAG MGMT PT WARFARIN: CPT | Performed by: INTERNAL MEDICINE

## 2019-09-12 NOTE — PROGRESS NOTES
Anticoagulation Summary  As of 2019    INR goal:   2.0-3.0   TTR:   78.3 % (2.3 mo)   INR used for dosin.20 (2019)   Warfarin maintenance plan:   2.5 mg (2.5 mg x 1) every day   Weekly warfarin total:   17.5 mg   Plan last modified:   Amanda Perera, PharmD (2019)   Next INR check:   10/3/2019   Target end date:   Indefinite    Indications    Atrial flutter (HCC) [I48.92]  Long term (current) use of anticoagulants [Z79.01] [Z79.01]  Paroxysmal atrial fibrillation (HCC) [I48.0]             Anticoagulation Episode Summary     INR check location:   Anticoagulation Clinic    Preferred lab:       Send INR reminders to:       Comments:         Anticoagulation Care Providers     Provider Role Specialty Phone number    [facility]   [phone]        Anticoagulation Patient Findings  Patient Findings     Negatives:   Signs/symptoms of thrombosis, Signs/symptoms of bleeding, Laboratory test error suspected, Change in health, Change in alcohol use, Change in activity, Upcoming invasive procedure, Emergency department visit, Upcoming dental procedure, Missed doses, Extra doses, Change in medications, Change in diet/appetite, Hospital admission, Bruising, Other complaints            HPI:   Pt seen in clinic today, on anticoagulation therapy with warfarin for stroke prevention due to history of atrial flutter/fibrillation    Patient's previous INR was supratherapeutic at 3.5 on 19, at which time patient was instructed to reduce dose x 1 day then decrease regimen.  She returns to clinic today to recheck INR to ensure it is therapeutic and thus preventing possible clotting and/or bleeding/bruising complications.    CHADS-VASc = 5    Does patient have any changes to current medical/health status since last appt (Y/N):  n  Does patient have any signs/symptoms of bleeding and/or thrombosis since the last appt (Y/N):  n  Does patient have any interval changes to diet or medications since  last appt (Y/N):  n  Are there any complications or cost restrictions with current therapy (Y/N):  n       Vitals declined today    Asssessment:      INR therapeutic at 2.2   Reason(s) for out of range INR today:  n/a      Plan:  Pt is to continue with current warfarin dosing regimen.     Follow up:  Because warfarin is a high risk medication and current CHEST guidelines recommend regular monitoring intervals (few days up to 12 weeks), will have patient return to clinic in 3 weeks to recheck INR.    Pt's [facility] PCP is Ritika Jurado M.D.  Referral due 06/2020    Gaby Holley, DariD

## 2019-09-19 ENCOUNTER — TELEPHONE (OUTPATIENT)
Dept: CARDIOLOGY | Facility: MEDICAL CENTER | Age: 74
End: 2019-09-19

## 2019-09-19 NOTE — TELEPHONE ENCOUNTER
Called patient regarding cardiomems readings, last reading 9/13- requested that patient take and additional reading or call the office to let us know if she is unable.

## 2019-09-23 ENCOUNTER — TELEPHONE (OUTPATIENT)
Dept: CARDIOLOGY | Facility: MEDICAL CENTER | Age: 74
End: 2019-09-23

## 2019-09-23 NOTE — TELEPHONE ENCOUNTER
Called patient regarding no readings since 9/13/19- patient called technical support because her pillow wouldn't connect to her internet, patient was sent a new pillow and will be back in town to set it up tomorrow. Will expect reading tomorrow, patient has no swelling, weight gain or SOB.

## 2019-09-26 ENCOUNTER — TELEPHONE (OUTPATIENT)
Dept: CARDIOLOGY | Facility: MEDICAL CENTER | Age: 74
End: 2019-09-26

## 2019-09-26 NOTE — TELEPHONE ENCOUNTER
Called patient regarding lack of cardiomems readings, patient recently ordered a new pillow but has not had time to set it up, will try to set it up and take a reading by tomorrow. Patient denies any SOB, weight gain or swelling.

## 2019-10-03 ENCOUNTER — ANTICOAGULATION VISIT (OUTPATIENT)
Dept: MEDICAL GROUP | Facility: MEDICAL CENTER | Age: 74
End: 2019-10-03
Payer: MEDICARE

## 2019-10-03 DIAGNOSIS — I48.0 PAROXYSMAL ATRIAL FIBRILLATION (HCC): ICD-10-CM

## 2019-10-03 DIAGNOSIS — Z79.01 LONG TERM (CURRENT) USE OF ANTICOAGULANTS: ICD-10-CM

## 2019-10-03 LAB — INR PPP: 3.1 (ref 2–3.5)

## 2019-10-03 PROCEDURE — 93793 ANTICOAG MGMT PT WARFARIN: CPT | Performed by: INTERNAL MEDICINE

## 2019-10-03 RX ORDER — DIGOXIN 125 MCG
TABLET ORAL
Qty: 90 TAB | Refills: 1 | Status: SHIPPED | OUTPATIENT
Start: 2019-10-03 | End: 2020-04-16

## 2019-10-03 NOTE — PROGRESS NOTES
Anticoagulation Summary  As of 10/3/2019    INR goal:   2.0-3.0   TTR:   80.7 % (3 mo)   INR used for dosing:   3.10! (10/3/2019)   Warfarin maintenance plan:   2.5 mg (2.5 mg x 1) every day   Weekly warfarin total:   17.5 mg   Plan last modified:   Amanda Perera, PharmD (8/29/2019)   Next INR check:   10/24/2019   Target end date:   Indefinite    Indications    Atrial flutter (HCC) [I48.92]  Long term (current) use of anticoagulants [Z79.01] [Z79.01]  Paroxysmal atrial fibrillation (HCC) [I48.0]             Anticoagulation Episode Summary     INR check location:   Anticoagulation Clinic    Preferred lab:       Send INR reminders to:       Comments:         Anticoagulation Care Providers     Provider Role Specialty Phone number    [facility]   [phone]        Anticoagulation Patient Findings  Patient Findings     Negatives:   Signs/symptoms of thrombosis, Signs/symptoms of bleeding, Laboratory test error suspected, Change in health, Change in alcohol use, Change in activity, Upcoming invasive procedure, Emergency department visit, Upcoming dental procedure, Missed doses, Extra doses, Change in medications, Change in diet/appetite, Hospital admission, Bruising, Other complaints            HPI:   Kelly Lovett seen in clinic today, on anticoagulation therapy with warfarin for stroke prevention due to history of A fib    Patient's previous INR was therapeutic at 2.20 on 9/12/19, at which time patient was instructed to continue current dose.  She returns to clinic today to recheck INR to ensure it is therapeutic and thus preventing possible clotting and/or bleeding/bruising complications.    CHADS-VASc = 5      Does patient have any changes to current medical/health status since last appt (Y/N):  n  Does patient have any signs/symptoms of bleeding and/or thrombosis since the last appt (Y/N):  n  Does patient have any interval changes to diet or medications since last appt (Y/N):  n  Are  there any complications or cost restrictions with current therapy (Y/N):  n       Vitals declined    Asssessment:      INR slightly supratherapeutic at 3.1   Reason(s) for out of range INR today:  n/a      Plan:  Pt is to continue with current warfarin dosing regimen.     Follow up:  Because warfarin is a high risk medication and current CHEST guidelines recommend regular monitoring intervals (few days up to 12 weeks), will have patient return to clinic in 3 weeks to recheck INR.    Pt's [facility] PCP is Ritika Jurado M.D.  Referral due 06/2020    Gaby Holley, DariD

## 2019-10-24 ENCOUNTER — NON-PROVIDER VISIT (OUTPATIENT)
Dept: MEDICAL GROUP | Facility: LAB | Age: 74
End: 2019-10-24
Payer: MEDICARE

## 2019-10-24 ENCOUNTER — APPOINTMENT (OUTPATIENT)
Dept: MEDICAL GROUP | Facility: MEDICAL CENTER | Age: 74
End: 2019-10-24
Payer: MEDICARE

## 2019-10-24 DIAGNOSIS — Z23 NEED FOR VACCINATION: ICD-10-CM

## 2019-10-24 PROCEDURE — 90662 IIV NO PRSV INCREASED AG IM: CPT | Performed by: FAMILY MEDICINE

## 2019-10-24 PROCEDURE — G0008 ADMIN INFLUENZA VIRUS VAC: HCPCS | Performed by: FAMILY MEDICINE

## 2019-10-28 ENCOUNTER — ANTICOAGULATION VISIT (OUTPATIENT)
Dept: MEDICAL GROUP | Facility: MEDICAL CENTER | Age: 74
End: 2019-10-28
Payer: MEDICARE

## 2019-10-28 DIAGNOSIS — Z79.01 LONG TERM (CURRENT) USE OF ANTICOAGULANTS: Primary | ICD-10-CM

## 2019-10-28 DIAGNOSIS — I48.0 PAROXYSMAL ATRIAL FIBRILLATION (HCC): ICD-10-CM

## 2019-10-28 LAB — INR PPP: 2.6 (ref 2–3.5)

## 2019-10-28 PROCEDURE — 93793 ANTICOAG MGMT PT WARFARIN: CPT | Performed by: PHYSICIAN ASSISTANT

## 2019-10-28 PROCEDURE — 85610 PROTHROMBIN TIME: CPT | Performed by: PHYSICIAN ASSISTANT

## 2019-10-28 RX ORDER — TIOTROPIUM BROMIDE INHALATION SPRAY 1.56 UG/1
SPRAY, METERED RESPIRATORY (INHALATION)
Qty: 12 G | Refills: 2 | Status: SHIPPED | OUTPATIENT
Start: 2019-10-28 | End: 2020-08-05 | Stop reason: SDUPTHER

## 2019-10-28 NOTE — PROGRESS NOTES
OP Anticoagulation Service Note    Date: 10/28/2019  There were no vitals filed for this visit.   pt declined vitals    Anticoagulation Summary  As of 10/28/2019    INR goal:   2.0-3.0   TTR:   80.6 % (3.9 mo)   INR used for dosin.60 (10/28/2019)   Warfarin maintenance plan:   2.5 mg (2.5 mg x 1) every day   Weekly warfarin total:   17.5 mg   Plan last modified:   Amanda Perera, PharmD (2019)   Next INR check:   2019   Target end date:   Indefinite    Indications    Atrial flutter (HCC) [I48.92]  Long term (current) use of anticoagulants [Z79.01] [Z79.01]  Paroxysmal atrial fibrillation (HCC) [I48.0]             Anticoagulation Episode Summary     INR check location:   Anticoagulation Clinic    Preferred lab:       Send INR reminders to:       Comments:         Anticoagulation Care Providers     Provider Role Specialty Phone number    [facility]   [phone]        Anticoagulation Patient Findings      HPI:   Kelly Lovett seen in clinic today, on anticoagulation therapy with warfarin (a high risk medication) for atrial fibrillation, CHADS-VASC = 5      Pt is here today to evaluate anticoagulation therapy    Previous INR was  3.1 on 10-3-19    Pt was instructed to continue current regimen    Confirmed warfarin dosing regimen, denies missed or extra doses of coumadin.   Diet has been consistent with foods rich in vitamin K: Yes  Changes in ETOH:  No  Changes in smoking status: No  Changes in medication: No   Cost restriction: No  S/s of bleeding:  No  Falls or accidents since last visit No  Signs/symptoms  thrombosis since the last appt: No    A/P   INR  therapeutic today,   Continue current warfarin regimen           check referral    Pt educated to contact our clinic with any changes in medications or s/s of bleeding or thrombosis. Pt is aware to seek immediate medical attention for falls, head injury or deep cuts    Follow up appointment in 3 week(s) to  reduce risk of adverse events from warfarin   Amanda Perera, PharmD

## 2019-10-30 NOTE — PROGRESS NOTES
S: Monitoring pulmonary artery pressures with the CardioMEMS remote monitor implant for chronic heart failure monitoring- Monitoring for October 1, 2019 - October 31, 2019  B: s/p sensor implant 06/30/2016 Patient PA Diastolic threshold: 8-30 mmHg  A: Assessment: Patient remained within defined PaD pressure throughout the month without intervention.   R: Recommendation: Continue to monitor twice weekly. Make Adjustments as needed.         10-, 11:23 AM 33 mmHg 16 mmHg 23 mmHg 64 bpm    10-, 10:37 AM 36 mmHg 25 mmHg 29 mmHg 96 bpm    10-, 11:00 AM 36 mmHg 17 mmHg 25 mmHg 81 bpm    10-, 02:45 PM 37 mmHg 18 mmHg 25 mmHg 75 bpm    10-, 11:26 AM 31 mmHg 14 mmHg 20 mmHg 64 bpm    10-, 10:13 AM 38 mmHg 17 mmHg 24 mmHg 63 bpm    10-, 12:06 PM 38 mmHg 17 mmHg 24 mmHg 62 bpm    10-, 03:13 PM 42 mmHg 18 mmHg 27 mmHg 70 bpm

## 2019-10-31 ENCOUNTER — NON-PROVIDER VISIT (OUTPATIENT)
Dept: CARDIOLOGY | Facility: MEDICAL CENTER | Age: 74
End: 2019-10-31
Payer: MEDICARE

## 2019-10-31 ENCOUNTER — PATIENT MESSAGE (OUTPATIENT)
Dept: PULMONOLOGY | Facility: HOSPICE | Age: 74
End: 2019-10-31

## 2019-10-31 DIAGNOSIS — I50.22 CHRONIC SYSTOLIC CONGESTIVE HEART FAILURE (HCC): ICD-10-CM

## 2019-10-31 PROCEDURE — 93264 REM MNTR WRLS P-ART PRS SNR: CPT | Performed by: NURSE PRACTITIONER

## 2019-11-11 ENCOUNTER — TELEPHONE (OUTPATIENT)
Dept: CARDIOLOGY | Facility: MEDICAL CENTER | Age: 74
End: 2019-11-11

## 2019-11-11 NOTE — TELEPHONE ENCOUNTER
Called patient regarding no new readings since Thursday 11/7- left voicemail- requested that patient send in a new reading or call the office if she is unable to.

## 2019-11-21 ENCOUNTER — ANTICOAGULATION VISIT (OUTPATIENT)
Dept: MEDICAL GROUP | Facility: MEDICAL CENTER | Age: 74
End: 2019-11-21
Payer: MEDICARE

## 2019-11-21 DIAGNOSIS — Z79.01 LONG TERM (CURRENT) USE OF ANTICOAGULANTS: Primary | ICD-10-CM

## 2019-11-21 DIAGNOSIS — I48.0 PAROXYSMAL ATRIAL FIBRILLATION (HCC): ICD-10-CM

## 2019-11-21 DIAGNOSIS — I48.92 ATRIAL FLUTTER, UNSPECIFIED TYPE (HCC): ICD-10-CM

## 2019-11-21 LAB — INR PPP: 2.3 (ref 2–3.5)

## 2019-11-21 PROCEDURE — 93793 ANTICOAG MGMT PT WARFARIN: CPT | Performed by: NURSE PRACTITIONER

## 2019-11-21 PROCEDURE — 85610 PROTHROMBIN TIME: CPT | Performed by: NURSE PRACTITIONER

## 2019-11-21 NOTE — PROGRESS NOTES
OP Anticoagulation Service Note    Date: 2019  There were no vitals filed for this visit.   pt declined vitals    Anticoagulation Summary  As of 2019    INR goal:   2.0-3.0   TTR:   83.9 % (4.7 mo)   INR used for dosin.30 (2019)   Warfarin maintenance plan:   2.5 mg (2.5 mg x 1) every day   Weekly warfarin total:   17.5 mg   Plan last modified:   Amanda Perera, PharmD (2019)   Next INR check:   2019   Target end date:   Indefinite    Indications    Atrial flutter (HCC) [I48.92]  Long term (current) use of anticoagulants [Z79.01] [Z79.01]  Paroxysmal atrial fibrillation (HCC) [I48.0]             Anticoagulation Episode Summary     INR check location:   Anticoagulation Clinic    Preferred lab:       Send INR reminders to:       Comments:         Anticoagulation Care Providers     Provider Role Specialty Phone number    [facility]   [phone]        Anticoagulation Patient Findings      HPI:   Kelly Lovett seen in clinic today, on anticoagulation therapy with warfarin (a high risk medication) for atrial fibrillation, CHADS-VASC = 5      Pt is here today to evaluate anticoagulation therapy    Previous INR was  2.6 on 10-28-19    Pt was instructed to continue current regimen    Confirmed warfarin dosing regimen, denies missed or extra doses of coumadin.   Diet has been consistent with foods rich in vitamin K: Yes  Changes in ETOH:  No  Changes in smoking status: No  Changes in medication: No   Cost restriction: No  S/s of bleeding:  No  Falls or accidents since last visit No  Signs/symptoms  thrombosis since the last appt: No    A/P   INR  therapeutic todayc   Continue current warfarin regimen           check referral    Pt educated to contact our clinic with any changes in medications or s/s of bleeding or thrombosis. Pt is aware to seek immediate medical attention for falls, head injury or deep cuts    Follow up appointment in 4 week(s) to  reduce risk of adverse events from warfarin   Amanda Perera, PharmD

## 2019-12-06 NOTE — PROGRESS NOTES
S: Monitoring pulmonary artery pressures with the CardioMEMS remote monitor implant for chronic heart failure monitoring- Monitoring for November 1, 2019 - November 30, 2019  B: s/p sensor implant 06/30/2016 Patient PA Diastolic threshold: 8-30 mmHg  A: Assessment: Patient remained within defined PaD pressure throughout the month without intervention.  R: Recommendation: continue monitoring twice weekly and adjust medications as needed.    Taken on    PA Systolic    PA Diastolic    +/- Goal †    PA Mean    Heart Rate      11-, 11:47 AM 40 mmHg 19 mmHg n/a 27 mmHg 69 bpm   11-, 03:29 PM 37 mmHg 17 mmHg n/a 25 mmHg 68 bpm   11-, 11:15 AM 33 mmHg 16 mmHg n/a 22 mmHg 62 bpm   11-, 12:13 PM 39 mmHg 19 mmHg n/a 27 mmHg 74 bpm   11-, 09:55 AM 35 mmHg 17 mmHg n/a 24 mmHg 61 bpm

## 2019-12-11 ENCOUNTER — NON-PROVIDER VISIT (OUTPATIENT)
Dept: CARDIOLOGY | Facility: MEDICAL CENTER | Age: 74
End: 2019-12-11
Payer: MEDICARE

## 2019-12-11 DIAGNOSIS — I50.22 CHRONIC SYSTOLIC CONGESTIVE HEART FAILURE (HCC): ICD-10-CM

## 2019-12-11 PROCEDURE — 93264 REM MNTR WRLS P-ART PRS SNR: CPT | Performed by: INTERNAL MEDICINE

## 2019-12-17 DIAGNOSIS — Z79.01 CHRONIC ANTICOAGULATION: ICD-10-CM

## 2019-12-17 RX ORDER — WARFARIN SODIUM 2.5 MG/1
2.5 TABLET ORAL DAILY
Qty: 30 TAB | Refills: 1 | Status: SHIPPED | OUTPATIENT
Start: 2019-12-17 | End: 2020-03-16 | Stop reason: SDUPTHER

## 2019-12-19 ENCOUNTER — ANTICOAGULATION VISIT (OUTPATIENT)
Dept: MEDICAL GROUP | Facility: MEDICAL CENTER | Age: 74
End: 2019-12-19
Payer: MEDICARE

## 2019-12-19 DIAGNOSIS — I48.92 ATRIAL FLUTTER, UNSPECIFIED TYPE (HCC): ICD-10-CM

## 2019-12-19 DIAGNOSIS — Z79.01 LONG TERM (CURRENT) USE OF ANTICOAGULANTS: Primary | ICD-10-CM

## 2019-12-19 DIAGNOSIS — I48.0 PAROXYSMAL ATRIAL FIBRILLATION (HCC): ICD-10-CM

## 2019-12-19 LAB — INR PPP: 3.2 (ref 2–3.5)

## 2019-12-19 PROCEDURE — 99211 OFF/OP EST MAY X REQ PHY/QHP: CPT | Performed by: INTERNAL MEDICINE

## 2019-12-19 PROCEDURE — 85610 PROTHROMBIN TIME: CPT | Performed by: PHYSICIAN ASSISTANT

## 2019-12-19 NOTE — PROGRESS NOTES
OP Anticoagulation Service Note    Date: 12/19/2019  There were no vitals filed for this visit.   pt declined vitals    Anticoagulation Summary  As of 12/19/2019    INR goal:   2.0-3.0   TTR:   82.9 % (5.6 mo)   INR used for dosing:   3.20! (12/19/2019)   Warfarin maintenance plan:   2.5 mg (2.5 mg x 1) every day   Weekly warfarin total:   17.5 mg   Plan last modified:   Amanda Perera, PharmD (8/29/2019)   Next INR check:   1/16/2020   Target end date:   Indefinite    Indications    Atrial flutter (HCC) [I48.92]  Long term (current) use of anticoagulants [Z79.01] [Z79.01]  Paroxysmal atrial fibrillation (HCC) [I48.0]             Anticoagulation Episode Summary     INR check location:   Anticoagulation Clinic    Preferred lab:       Send INR reminders to:       Comments:         Anticoagulation Care Providers     Provider Role Specialty Phone number    [facility]   [phone]        Anticoagulation Patient Findings      HPI:   Kelly Lovett seen in clinic today, on anticoagulation therapy with warfarin (a high risk medication) for atrial fibrillation, CHADS-VASC = 5      Pt is here today to evaluate anticoagulation therapy    Previous INR was  2.3 on 11-    Pt was instructed to continue current regimen    Confirmed warfarin dosing regimen, denies missed or extra doses of coumadin.   Diet has been consistent with foods rich in vitamin K: Yes  Changes in ETOH:  No  Changes in smoking status: No  Changes in medication: No   Cost restriction: No  S/s of bleeding:  No  Falls or accidents since last visit No  Signs/symptoms  thrombosis since the last appt: No    A/P   INR  therapeutic today  Tomorrow take 1/2 tablet then Continue current warfarin regimen          6/20 check referral    Pt educated to contact our clinic with any changes in medications or s/s of bleeding or thrombosis. Pt is aware to seek immediate medical attention for falls, head injury or deep cuts    Follow up  appointment in 4 week(s) to reduce risk of adverse events from warfarin   Amanda Perera, PharmD

## 2019-12-20 ENCOUNTER — TELEPHONE (OUTPATIENT)
Dept: PULMONOLOGY | Facility: HOSPICE | Age: 74
End: 2019-12-20

## 2019-12-20 DIAGNOSIS — R09.02 HYPOXEMIA: ICD-10-CM

## 2020-01-02 DIAGNOSIS — I10 ESSENTIAL (PRIMARY) HYPERTENSION: Primary | ICD-10-CM

## 2020-01-02 DIAGNOSIS — I48.0 PAF (PAROXYSMAL ATRIAL FIBRILLATION) (HCC): ICD-10-CM

## 2020-01-02 RX ORDER — METOPROLOL SUCCINATE 100 MG/1
100 TABLET, EXTENDED RELEASE ORAL DAILY
Qty: 100 TAB | Refills: 2 | Status: SHIPPED | OUTPATIENT
Start: 2020-01-02 | End: 2020-12-23 | Stop reason: SDUPTHER

## 2020-01-08 ENCOUNTER — PATIENT MESSAGE (OUTPATIENT)
Dept: CARDIOLOGY | Facility: MEDICAL CENTER | Age: 75
End: 2020-01-08

## 2020-01-08 ENCOUNTER — TELEPHONE (OUTPATIENT)
Dept: MEDICAL GROUP | Facility: LAB | Age: 75
End: 2020-01-08

## 2020-01-08 DIAGNOSIS — J44.9 CHRONIC OBSTRUCTIVE PULMONARY DISEASE, UNSPECIFIED COPD TYPE (HCC): ICD-10-CM

## 2020-01-08 DIAGNOSIS — I48.0 PAF (PAROXYSMAL ATRIAL FIBRILLATION) (HCC): ICD-10-CM

## 2020-01-08 RX ORDER — AMIODARONE HYDROCHLORIDE 200 MG/1
200 TABLET ORAL DAILY
Qty: 60 TAB | Refills: 0 | Status: SHIPPED | OUTPATIENT
Start: 2020-01-08 | End: 2020-06-19 | Stop reason: SDUPTHER

## 2020-01-08 RX ORDER — AMIODARONE HYDROCHLORIDE 200 MG/1
200 TABLET ORAL DAILY
Qty: 30 TAB | Refills: 0 | Status: SHIPPED | OUTPATIENT
Start: 2020-01-08 | End: 2020-01-08 | Stop reason: SDUPTHER

## 2020-01-08 NOTE — TELEPHONE ENCOUNTER
1. Caller Name: Kelly Lovett    Call Back Number: [phone] (home)         Patient approves a detailed voicemail message: N\A    [facility] 2020 drug formulary changes, current drug is Proair HFA 90mcg INH. New formulary is proair respiclick, or generic albuterol.

## 2020-01-09 NOTE — TELEPHONE ENCOUNTER
Help! [facility] faxed you to prescribe a new ok for Fluticasone Discus. No one has heard back & I have none left. Please respond ASAP

## 2020-01-15 ENCOUNTER — NON-PROVIDER VISIT (OUTPATIENT)
Dept: CARDIOLOGY | Facility: MEDICAL CENTER | Age: 75
End: 2020-01-15
Payer: MEDICARE

## 2020-01-15 DIAGNOSIS — I50.20 ACC/AHA STAGE C SYSTOLIC HEART FAILURE (HCC): ICD-10-CM

## 2020-01-15 PROCEDURE — 93264 REM MNTR WRLS P-ART PRS SNR: CPT | Performed by: NURSE PRACTITIONER

## 2020-01-15 NOTE — PROGRESS NOTES
CardioMEMS Pulmonary Artery Pressure Monitoring  S: Monitoring pulmonary artery pressures with the CardioMEMS remote monitor implant for chronic heart failure monitoring- Monitoring for December 1- December 31, 2019  B: s/p sensor implant 06/30/2016 Patient PA Diastolic threshold: 8-30 mmHg  A: Assessment: Patient remained within defined PaD pressure throughout the month without intervention.  R: Recommendation: Continue to Monitor cardioMEMs readings twice a weekly. Make Adjustments as needed    Taken on    PA Systolic    PA Diastolic    +/- Goal †    PA Mean    Heart Rate    Waveform    Status      12-, 12:04 PM 30 mmHg 14 mmHg n/a 20 mmHg 65 bpm     12-, 12:01 PM 42 mmHg 17 mmHg n/a 25 mmHg 66 bpm     12-, 09:52 AM 35 mmHg 13 mmHg n/a 21 mmHg 61 bpm     12-, 09:59 AM 35 mmHg 15 mmHg n/a 23 mmHg 70 bpm     12-, 11:36 AM 35 mmHg 14 mmHg n/a 22 mmHg 64 bpm     12-, 02:44 PM 36 mmHg 15 mmHg n/a 23 mmHg 76 bpm

## 2020-01-16 ENCOUNTER — ANTICOAGULATION VISIT (OUTPATIENT)
Dept: MEDICAL GROUP | Facility: MEDICAL CENTER | Age: 75
End: 2020-01-16
Payer: MEDICARE

## 2020-01-16 DIAGNOSIS — I48.0 PAROXYSMAL ATRIAL FIBRILLATION (HCC): ICD-10-CM

## 2020-01-16 DIAGNOSIS — I48.92 ATRIAL FLUTTER, UNSPECIFIED TYPE (HCC): ICD-10-CM

## 2020-01-16 DIAGNOSIS — Z79.01 LONG TERM (CURRENT) USE OF ANTICOAGULANTS: ICD-10-CM

## 2020-01-16 LAB — INR PPP: 3.8 (ref 2–3.5)

## 2020-01-16 PROCEDURE — 85610 PROTHROMBIN TIME: CPT | Performed by: INTERNAL MEDICINE

## 2020-01-16 PROCEDURE — 99211 OFF/OP EST MAY X REQ PHY/QHP: CPT | Performed by: INTERNAL MEDICINE

## 2020-01-16 NOTE — PROGRESS NOTES
OP Anticoagulation Service Note    Date: 1/16/2020  There were no vitals filed for this visit.  pt declined vitals    Anticoagulation Summary  As of 1/16/2020    INR goal:   2.0-3.0   TTR:   71.1 % (6.5 mo)   INR used for dosing:   3.80! (1/16/2020)   Warfarin maintenance plan:   1.25 mg (2.5 mg x 0.5) every Thu, Sat; 2.5 mg (2.5 mg x 1) all other days   Weekly warfarin total:   15 mg   Plan last modified:   Gaby Holley, PharmD (1/16/2020)   Next INR check:   1/23/2020   Target end date:   Indefinite    Indications    Atrial flutter (HCC) [I48.92]  Long term (current) use of anticoagulants [Z79.01] [Z79.01]  Paroxysmal atrial fibrillation (HCC) [I48.0]             Anticoagulation Episode Summary     INR check location:   Anticoagulation Clinic    Preferred lab:       Send INR reminders to:       Comments:         Anticoagulation Care Providers     Provider Role Specialty Phone number    [facility]   [phone]        Anticoagulation Patient Findings  Patient Findings     Negatives:   Signs/symptoms of thrombosis, Signs/symptoms of bleeding, Laboratory test error suspected, Change in health, Change in alcohol use, Change in activity, Upcoming invasive procedure, Emergency department visit, Upcoming dental procedure, Missed doses, Extra doses, Change in medications, Change in diet/appetite, Hospital admission, Bruising, Other complaints          HPI:   Kelly Ileana Lovett seen in clinic today, on anticoagulation therapy with warfarin (a high risk medication) for atrial fibrillation, CHADS-VASC = 5      Pt is here today to evaluate anticoagulation therapy    Previous INR was  3.2 on 12/19/2019    Pt was instructed to reduce dose x 1 day, then continue regimen    Confirmed warfarin dosing regimen, denies missed or extra doses of coumadin.   Diet has been consistent with foods rich in vitamin K: Yes  Changes in ETOH:  No  Changes in smoking status: No  Changes in medication: No   Cost  restriction: No  S/s of bleeding:  Yes  Falls or accidents since last visit No  Signs/symptoms  thrombosis since the last appt: No    A/P   INR  supra-therapeutic today, will require dose adjust ment today to prevent bleeding complications    Reduce weekly regimen - pt already took warfarin today so she will take reduced dose on 1/17 06/20 check referral    Pt educated to contact our clinic with any changes in medications or s/s of bleeding or thrombosis. Pt is aware to seek immediate medical attention for falls, head injury or deep cuts    Follow up appointment in 2 week(s) to reduce risk of adverse events from warfarin  Gaby Holley, DariD

## 2020-01-22 RX ORDER — AMIODARONE HYDROCHLORIDE 200 MG/1
200 TABLET ORAL DAILY
Qty: 90 TAB | Refills: 1 | Status: SHIPPED
Start: 2020-01-22 | End: 2020-03-27

## 2020-01-23 ENCOUNTER — ANTICOAGULATION VISIT (OUTPATIENT)
Dept: MEDICAL GROUP | Facility: MEDICAL CENTER | Age: 75
End: 2020-01-23
Payer: MEDICARE

## 2020-01-23 DIAGNOSIS — Z79.01 LONG TERM (CURRENT) USE OF ANTICOAGULANTS: ICD-10-CM

## 2020-01-23 DIAGNOSIS — I48.92 ATRIAL FLUTTER, UNSPECIFIED TYPE (HCC): ICD-10-CM

## 2020-01-23 DIAGNOSIS — I48.0 PAROXYSMAL ATRIAL FIBRILLATION (HCC): ICD-10-CM

## 2020-01-23 LAB — INR PPP: 3 (ref 2–3.5)

## 2020-01-23 PROCEDURE — 99211 OFF/OP EST MAY X REQ PHY/QHP: CPT | Performed by: NURSE PRACTITIONER

## 2020-01-23 PROCEDURE — 85610 PROTHROMBIN TIME: CPT | Performed by: NURSE PRACTITIONER

## 2020-01-23 NOTE — PROGRESS NOTES
OP Anticoagulation Service Note    Date: 1/23/2020  There were no vitals filed for this visit.   pt declined vitals    Anticoagulation Summary  As of 1/23/2020    INR goal:   2.0-3.0   TTR:   68.6 % (6.8 mo)   INR used for dosing:   3.00 (1/23/2020)   Warfarin maintenance plan:   1.25 mg (2.5 mg x 0.5) every Mon, Fri; 2.5 mg (2.5 mg x 1) all other days   Weekly warfarin total:   15 mg   Plan last modified:   Amanda Perera, PharmD (1/23/2020)   Next INR check:   2/13/2020   Target end date:   Indefinite    Indications    Atrial flutter (HCC) [I48.92]  Long term (current) use of anticoagulants [Z79.01] [Z79.01]  Paroxysmal atrial fibrillation (HCC) [I48.0]             Anticoagulation Episode Summary     INR check location:   Anticoagulation Clinic    Preferred lab:       Send INR reminders to:       Comments:         Anticoagulation Care Providers     Provider Role Specialty Phone number    [facility]   [phone]        Anticoagulation Patient Findings      HPI:   Kelly Ileana Lovett seen in clinic today, on anticoagulation therapy with warfarin (a high risk medication) for atrial fibrillation, CHADS-VASC = 5      Pt is here today to evaluate anticoagulation therapy    Previous INR was  3.8 on 1-16-20    Pt was instructed to take 1.25 mg and reduce weekly regimen    Confirmed warfarin dosing regimen, denies missed or extra doses of coumadin.   Diet has been consistent with foods rich in vitamin K: Yes  Changes in ETOH:  No  Changes in smoking status: No  Changes in medication: No   Cost restriction: No  S/s of bleeding:  No  Falls or accidents since last visit No  Signs/symptoms  thrombosis since the last appt: No    A/P   INR  therapeutic today,  will require close follow up as previous INR was supra-therapeutic   Continue with lower weekly regimen, adjusted 1/2 tablet days to be more spread out during week.       6/20 check referral    Pt educated to contact our clinic with any changes  in medications or s/s of bleeding or thrombosis. Pt is aware to seek immediate medical attention for falls, head injury or deep cuts    Follow up appointment in 3 week(s) to reduce risk of adverse events from warfarin   Amanda Perera, PharmD

## 2020-02-05 NOTE — TELEPHONE ENCOUNTER
----- Message from Kelly Lovett sent at 2/7/2018  1:06 PM PST -----  Regarding: Prescription Question  Contact: [phone]  i am trying to fill my prescription for Pot Chloride. Apparently you need to answer Jovanni 's request before they can fill it. I am down to 2 They faxed you on Mon.  Please respond  
Refill done.  Ritika Jurado M.D.    
Was the patient seen in the last year in this department? Yes     Does patient have an active prescription for medications requested? No     Received Request Via: Pharmacy  
No

## 2020-02-13 ENCOUNTER — ANTICOAGULATION VISIT (OUTPATIENT)
Dept: MEDICAL GROUP | Facility: MEDICAL CENTER | Age: 75
End: 2020-02-13
Payer: MEDICARE

## 2020-02-13 DIAGNOSIS — Z79.01 LONG TERM (CURRENT) USE OF ANTICOAGULANTS: ICD-10-CM

## 2020-02-13 DIAGNOSIS — I48.0 PAROXYSMAL ATRIAL FIBRILLATION (HCC): ICD-10-CM

## 2020-02-13 LAB — INR PPP: 2.7 (ref 2–3.5)

## 2020-02-13 PROCEDURE — 85610 PROTHROMBIN TIME: CPT | Performed by: PHYSICIAN ASSISTANT

## 2020-02-13 PROCEDURE — 93793 ANTICOAG MGMT PT WARFARIN: CPT | Performed by: PHYSICIAN ASSISTANT

## 2020-02-13 NOTE — PROGRESS NOTES
OP Anticoagulation Service Note    Date: 2020  There were no vitals filed for this visit.   pt declined vitals    Anticoagulation Summary  As of 2020    INR goal:   2.0-3.0   TTR:   71.6 % (7.5 mo)   INR used for dosin.70 (2020)   Warfarin maintenance plan:   1.25 mg (2.5 mg x 0.5) every Mon, Fri; 2.5 mg (2.5 mg x 1) all other days   Weekly warfarin total:   15 mg   Plan last modified:   Amanda Perera, PharmD (2020)   Next INR check:   3/12/2020   Target end date:   Indefinite    Indications    Atrial flutter (HCC) [I48.92]  Long term (current) use of anticoagulants [Z79.01] [Z79.01]  Paroxysmal atrial fibrillation (HCC) [I48.0]             Anticoagulation Episode Summary     INR check location:   Anticoagulation Clinic    Preferred lab:       Send INR reminders to:       Comments:         Anticoagulation Care Providers     Provider Role Specialty Phone number    [facility]   [phone]        Anticoagulation Patient Findings      HPI:   Kelly Ileana Lovett seen in clinic today, on anticoagulation therapy with warfarin (a high risk medication) for atrial fibrillation, CHADS-VASC = 5      Pt is here today to evaluate anticoagulation therapy    Previous INR was  3.0 on 20    Pt was instructed to continue current regimen    Confirmed warfarin dosing regimen, denies missed or extra doses of coumadin.   Diet has been consistent with foods rich in vitamin K: Yes  Changes in ETOH:  No  Changes in smoking status: No  Changes in medication: No   Cost restriction: No  S/s of bleeding:  No  Falls or accidents since last visit No  Signs/symptoms  thrombosis since the last appt: No    A/P   INR  therapeutic today,  Continue current warfarin regimen           check referral    Pt educated to contact our clinic with any changes in medications or s/s of bleeding or thrombosis. Pt is aware to seek immediate medical attention for falls, head injury or deep cuts    Follow  up appointment in 4 week(s) to reduce risk of adverse events from warfarin  Amanda Perera, PharmD

## 2020-03-12 ENCOUNTER — ANTICOAGULATION VISIT (OUTPATIENT)
Dept: MEDICAL GROUP | Facility: MEDICAL CENTER | Age: 75
End: 2020-03-12
Payer: MEDICARE

## 2020-03-12 DIAGNOSIS — I48.92 ATRIAL FLUTTER, UNSPECIFIED TYPE (HCC): ICD-10-CM

## 2020-03-12 DIAGNOSIS — I48.0 PAROXYSMAL ATRIAL FIBRILLATION (HCC): ICD-10-CM

## 2020-03-12 DIAGNOSIS — Z79.01 LONG TERM (CURRENT) USE OF ANTICOAGULANTS: Primary | ICD-10-CM

## 2020-03-12 LAB — INR PPP: 1.5 (ref 2–3.5)

## 2020-03-12 PROCEDURE — 85610 PROTHROMBIN TIME: CPT | Mod: QW | Performed by: INTERNAL MEDICINE

## 2020-03-12 PROCEDURE — 99211 OFF/OP EST MAY X REQ PHY/QHP: CPT | Performed by: INTERNAL MEDICINE

## 2020-03-12 NOTE — PROGRESS NOTES
OP Anticoagulation Service Note    Date: 3/12/2020  There were no vitals filed for this visit.   pt declined vitals    Anticoagulation Summary  As of 3/12/2020    INR goal:   2.0-3.0   TTR:   70.1 % (8.4 mo)   INR used for dosin.50! (3/12/2020)   Warfarin maintenance plan:   1.25 mg (2.5 mg x 0.5) every Mon, Fri; 2.5 mg (2.5 mg x 1) all other days   Weekly warfarin total:   15 mg   Plan last modified:   Amanda Perera, PharmD (2020)   Next INR check:   2020   Target end date:   Indefinite    Indications    Atrial flutter (HCC) [I48.92]  Long term (current) use of anticoagulants [Z79.01] [Z79.01]  Paroxysmal atrial fibrillation (HCC) [I48.0]             Anticoagulation Episode Summary     INR check location:   Anticoagulation Clinic    Preferred lab:       Send INR reminders to:       Comments:         Anticoagulation Care Providers     Provider Role Specialty Phone number    [facility]   [phone]        Anticoagulation Patient Findings      HPI:   Kelly Lovett seen in clinic today, on anticoagulation therapy with warfarin (a high risk medication) for atrial fibrillation, CHADS-VASC = 5      Pt is here today to evaluate anticoagulation therapy    Previous INR was  2.7 on 20    Pt was instructed to continue current regimen    Confirmed warfarin dosing regimen, She thinks she a dose this week  Diet has been consistent with foods rich in vitamin K: Yes  Changes in ETOH:  No  Changes in smoking status: No  Changes in medication: No   Cost restriction: No  S/s of bleeding:  No  Falls or accidents since last visit No  Signs/symptoms  thrombosis since the last appt: No    A/P   INR SUB therapeutic today, will require dose adjust ment today to prevent stroke and closer follow up.   Tonight 3.75 mg then continue current regimen        check referral    Pt educated to contact our clinic with any changes in medications or s/s of bleeding or thrombosis. Pt is aware to  seek immediate medical attention for falls, head injury or deep cuts    Follow up appointment in 3 week(s) to reduce risk of adverse events from warfarin   Amanda Perera, PharmD

## 2020-03-16 DIAGNOSIS — Z79.01 CHRONIC ANTICOAGULATION: ICD-10-CM

## 2020-03-16 RX ORDER — WARFARIN SODIUM 2.5 MG/1
1.25-2.5 TABLET ORAL DAILY
Qty: 30 TAB | Refills: 1 | Status: SHIPPED | OUTPATIENT
Start: 2020-03-16 | End: 2020-06-12

## 2020-03-27 ENCOUNTER — OFFICE VISIT (OUTPATIENT)
Dept: MEDICAL GROUP | Facility: LAB | Age: 75
End: 2020-03-27
Payer: MEDICARE

## 2020-03-27 VITALS
RESPIRATION RATE: 12 BRPM | DIASTOLIC BLOOD PRESSURE: 74 MMHG | SYSTOLIC BLOOD PRESSURE: 120 MMHG | BODY MASS INDEX: 18.8 KG/M2 | OXYGEN SATURATION: 96 % | HEART RATE: 64 BPM | HEIGHT: 66 IN | WEIGHT: 117 LBS | TEMPERATURE: 97.6 F

## 2020-03-27 DIAGNOSIS — Z12.12 SCREENING FOR COLORECTAL CANCER: ICD-10-CM

## 2020-03-27 DIAGNOSIS — F32.1 MODERATE SINGLE CURRENT EPISODE OF MAJOR DEPRESSIVE DISORDER (HCC): ICD-10-CM

## 2020-03-27 DIAGNOSIS — Z12.11 SCREENING FOR COLORECTAL CANCER: ICD-10-CM

## 2020-03-27 DIAGNOSIS — J44.9 CHRONIC OBSTRUCTIVE PULMONARY DISEASE, UNSPECIFIED COPD TYPE (HCC): ICD-10-CM

## 2020-03-27 DIAGNOSIS — F41.9 ANXIETY: ICD-10-CM

## 2020-03-27 DIAGNOSIS — L98.9 SKIN LESIONS: ICD-10-CM

## 2020-03-27 DIAGNOSIS — I50.22 CHRONIC SYSTOLIC CONGESTIVE HEART FAILURE (HCC): ICD-10-CM

## 2020-03-27 PROCEDURE — 99214 OFFICE O/P EST MOD 30 MIN: CPT | Performed by: FAMILY MEDICINE

## 2020-03-27 RX ORDER — SERTRALINE HYDROCHLORIDE 100 MG/1
100 TABLET, FILM COATED ORAL DAILY
Qty: 90 TAB | Refills: 3 | Status: SHIPPED | OUTPATIENT
Start: 2020-03-27 | End: 2022-03-03

## 2020-03-27 ASSESSMENT — PATIENT HEALTH QUESTIONNAIRE - PHQ9
3. TROUBLE FALLING OR STAYING ASLEEP OR SLEEPING TOO MUCH: NOT AT ALL
2. FEELING DOWN, DEPRESSED, IRRITABLE, OR HOPELESS: SEVERAL DAYS
9. THOUGHTS THAT YOU WOULD BE BETTER OFF DEAD, OR OF HURTING YOURSELF: NOT AT ALL
SUM OF ALL RESPONSES TO PHQ QUESTIONS 1-9: 3
1. LITTLE INTEREST OR PLEASURE IN DOING THINGS: SEVERAL DAYS
7. TROUBLE CONCENTRATING ON THINGS, SUCH AS READING THE NEWSPAPER OR WATCHING TELEVISION: NOT AT ALL
6. FEELING BAD ABOUT YOURSELF - OR THAT YOU ARE A FAILURE OR HAVE LET YOURSELF OR YOUR FAMILY DOWN: NOT AL ALL
5. POOR APPETITE OR OVEREATING: NOT AT ALL
4. FEELING TIRED OR HAVING LITTLE ENERGY: SEVERAL DAYS
8. MOVING OR SPEAKING SO SLOWLY THAT OTHER PEOPLE COULD HAVE NOTICED. OR THE OPPOSITE, BEING SO FIGETY OR RESTLESS THAT YOU HAVE BEEN MOVING AROUND A LOT MORE THAN USUAL: NOT AT ALL
SUM OF ALL RESPONSES TO PHQ9 QUESTIONS 1 AND 2: 2

## 2020-03-27 ASSESSMENT — ENCOUNTER SYMPTOMS
SHORTNESS OF BREATH: 0
VOMITING: 0
FOCAL WEAKNESS: 0
SORE THROAT: 0
ABDOMINAL PAIN: 0
NAUSEA: 0
COUGH: 0
CHILLS: 0
PALPITATIONS: 0
DIZZINESS: 0
DIARRHEA: 0
FEVER: 0
HEADACHES: 0
WHEEZING: 0
CONSTIPATION: 0
GENERAL WELL-BEING: GOOD

## 2020-03-27 ASSESSMENT — ACTIVITIES OF DAILY LIVING (ADL): BATHING_REQUIRES_ASSISTANCE: 0

## 2020-03-27 ASSESSMENT — FIBROSIS 4 INDEX: FIB4 SCORE: 1.32

## 2020-03-27 NOTE — PROGRESS NOTES
Kelly Tejeda [facility] is a 74 y.o. female here to establish care and discuss chronic medical conditions.    HPI:      COPD  Follows with pulmonology.  He is on oxygen.  Stable symptom wise, no shortness of breath.  Continues on Advair and Spiriva.    Systolic HF, a-fib, history of mitral valve repair.  Has a cardioMEMS remote monitor  for chronic heart failure monitoring.  Follows with cardiology.  She is on amiodarone, digoxin, metoprolol, and warfarin.    Skin lesions  She has a few nonpainful, crusty lesions on her arms and hands.     Depression/Anxiety  Stable on sertraline for years.  No concerns today.    Current medicines (including changes today)  Current Outpatient Medications   Medication Sig Dispense Refill   • sertraline (ZOLOFT) 100 MG Tab Take 1 Tab by mouth every day. 90 Tab 3   • warfarin (COUMADIN) 2.5 MG Tab Take 0.5-1 Tabs by mouth every day. 30 Tab 1   • fluticasone-salmeterol (ADVAIR) 250-50 MCG/DOSE AEROSOL POWDER, BREATH ACTIVATED Inhale 1 Puff by mouth 2 times a day. 3 Inhaler 3   • amiodarone (CORDARONE) 200 MG Tab Take 1 Tab by mouth every day. 60 Tab 0   • metoprolol SR (TOPROL XL) 100 MG TABLET SR 24 HR Take 1 Tab by mouth every day. 100 Tab 2   • SPIRIVA RESPIMAT 1.25 MCG/ACT Aero Soln INHALE TWO PUFFS BY MOUTH DAILY 12 g 2   • digoxin (LANOXIN) 125 MCG Tab TAKE 1 TABLET BY MOUTH EVERY DAY AT 6 PM. 90 Tab 1   • Calcium Carb-Cholecalciferol (CALCIUM 1000 + D PO) Take 1 Cap by mouth every day.     • atorvastatin (LIPITOR) 40 MG Tab Take 1 Tab by mouth every day. 100 Tab 3   • magnesium oxide (MAG-OX) 400 (241.3 Mg) MG Tab tablet Take 1 Tab by mouth every day.     • albuterol 108 (90 Base) MCG/ACT Aero Soln inhalation aerosol Inhale 2 Puffs by mouth every 6 hours as needed for Shortness of Breath.     • acetaminophen (TYLENOL) 500 MG Tab Take 500 mg by mouth 1 time daily as needed. Headache       No current facility-administered medications for this visit.      She  has a past medical  history of Asthma, Breath shortness, Chronic systolic congestive heart failure (HCC) (2016), COPD (chronic obstructive pulmonary disease) (HCC), Dilated cardiomyopathy (HCC), Emphysema of lung (HCC), Heart valve disease, High cholesterol, History of heart surgery, Hyperlipidemia, Hypertension, Hypotension due to hypovolemia (3/1/2019), and Sleep apnea.  She  has a past surgical history that includes oophorectomy; appendectomy; mitral valve repair (2017); maze procedure (Left, 2017); and idalmis (2017).  Social History     Tobacco Use   • Smoking status: Former Smoker     Packs/day: 0.50     Years: 38.00     Pack years: 19.00     Types: Cigarettes     Last attempt to quit: 10/11/2001     Years since quittin.4   • Smokeless tobacco: Never Used   Substance Use Topics   • Alcohol use: Yes     Alcohol/week: 8.4 oz     Types: 14 Shots of liquor per week     Comment: 4x week   • Drug use: No     Social History     Social History Narrative   • Not on file     Family History   Problem Relation Age of Onset   • Cancer Father         colon cancer    • Hypertension Mother    • Cancer Sister 68        ovarian cancer     Family Status   Relation Name Status   • Fa     • Mo     • Sis  Alive   • MGMo     • MGFa     • PGMo     • PGFa         ROS  Review of Systems   Constitutional: Negative for chills and fever.   HENT: Negative for congestion and sore throat.    Respiratory: Negative for cough, shortness of breath and wheezing.    Cardiovascular: Negative for chest pain and palpitations.   Gastrointestinal: Negative for abdominal pain, constipation, diarrhea, nausea and vomiting.   Skin: Negative for rash.   Neurological: Negative for dizziness, focal weakness and headaches.   All other systems reviewed and are negative.        Objective:     Physical Exam:  /74 (BP Location: Left arm, Patient Position: Sitting, BP Cuff Size: Adult)   Pulse 64   Temp 36.4  "°C (97.6 °F)   Resp 12   Ht 1.676 m (5' 6\")   Wt 53.1 kg (117 lb)   SpO2 96%  Body mass index is 18.88 kg/m².  Constitutional: Alert. Well appearing. No distress.  Skin: Warm, dry, good turgor, no visible rashes.  Scattered scaly lesions on arms and hands.  Eye: Equal, round and reactive to light, conjunctiva clear, lids normal.  ENMT: Moist mucous membranes. Normal dentition. Oropharynx clear.  Neck: Trachea midline.  Respiratory: Normal effort. Lungs are clear to auscultation bilaterally.  Portable oxygen in place.  Cardiovascular: Regular rate and rhythm. Normal S1/S2. No murmurs, rubs or gallops.   Neuro: Moves all four extremities. No facial droop.  Psych: Answers questions appropriately. Normal affect and mood.    Assessment and Plan:     1. Moderate single current episode of major depressive disorder (HCC)  2. Anxiety  Chronic issues, stable.  Refilled Zoloft.  - sertraline (ZOLOFT) 100 MG Tab; Take 1 Tab by mouth every day.  Dispense: 90 Tab; Refill: 3    3. Chronic obstructive pulmonary disease, unspecified COPD type (HCC)  Chronic issue, stable.  Follows with pulmonology.  She is on oxygen and continues on Spiriva and Advair.  - CBC WITH DIFFERENTIAL; Future  - Comp Metabolic Panel; Future    4. Chronic systolic congestive heart failure (HCC)  Chronic issue, stable. Follows with cardiology.  She is on amiodarone, digoxin, metoprolol, and warfarin.    5. Skin lesions  Multiple scaly lesions on arms and hands.  Discussed cryotherapy today but  does follow with dermatology and she plans to establish with dermatology.  - REFERRAL TO DERMATOLOGY    6. Screening for colorectal cancer  - REFERRAL TO GI FOR COLONOSCOPY    Follow up: Return in about 3 months (around 6/27/2020).         PLEASE NOTE: This note was created using voice recognition software.   "

## 2020-04-01 NOTE — PROGRESS NOTES
CardioMEMS Pulmonary Artery Pressure Monitoring  S: Monitoring pulmonary artery pressures with the CardioMEMS remote monitor implant for chronic heart failure monitoring- Monitoring for March 1st-31st, 2020  B: s/p sensor implant 06/30/2016 Patient PA Diastolic threshold: 8-30 mmHg  A: Assessment: Patient remained within defined PaD threshold throughout the month without intervention.  R: Recommendation: I personally reviewed and continue treatment via protocol.

## 2020-04-02 ENCOUNTER — ANTICOAGULATION VISIT (OUTPATIENT)
Dept: MEDICAL GROUP | Facility: MEDICAL CENTER | Age: 75
End: 2020-04-02
Payer: MEDICARE

## 2020-04-02 ENCOUNTER — NON-PROVIDER VISIT (OUTPATIENT)
Dept: CARDIOLOGY | Facility: MEDICAL CENTER | Age: 75
End: 2020-04-02
Payer: MEDICARE

## 2020-04-02 DIAGNOSIS — I50.22 CHRONIC SYSTOLIC CONGESTIVE HEART FAILURE (HCC): ICD-10-CM

## 2020-04-02 DIAGNOSIS — Z79.01 LONG TERM (CURRENT) USE OF ANTICOAGULANTS: Primary | ICD-10-CM

## 2020-04-02 DIAGNOSIS — I48.0 PAROXYSMAL ATRIAL FIBRILLATION (HCC): ICD-10-CM

## 2020-04-02 DIAGNOSIS — I48.92 ATRIAL FLUTTER, UNSPECIFIED TYPE (HCC): ICD-10-CM

## 2020-04-02 LAB — INR PPP: 4.3 (ref 2–3.5)

## 2020-04-02 PROCEDURE — 93264 REM MNTR WRLS P-ART PRS SNR: CPT | Performed by: INTERNAL MEDICINE

## 2020-04-02 PROCEDURE — 99211 OFF/OP EST MAY X REQ PHY/QHP: CPT | Performed by: INTERNAL MEDICINE

## 2020-04-02 PROCEDURE — 85610 PROTHROMBIN TIME: CPT | Performed by: INTERNAL MEDICINE

## 2020-04-02 NOTE — PROGRESS NOTES
OP Anticoagulation Service Note    Date: 2020  There were no vitals filed for this visit.   pt declined vitals    Anticoagulation Summary  As of 2020    INR goal:   2.0-3.0   TTR:   67.5 % (9.1 mo)   INR used for dosin.30! (2020)   Warfarin maintenance plan:   1.25 mg (2.5 mg x 0.5) every Mon, Fri; 2.5 mg (2.5 mg x 1) all other days   Weekly warfarin total:   15 mg   Plan last modified:   Amanda Perera, PharmD (2020)   Next INR check:   2020   Target end date:   Indefinite    Indications    Atrial flutter (HCC) [I48.92]  Long term (current) use of anticoagulants [Z79.01] [Z79.01]  Paroxysmal atrial fibrillation (HCC) [I48.0]             Anticoagulation Episode Summary     INR check location:   Anticoagulation Clinic    Preferred lab:       Send INR reminders to:       Comments:         Anticoagulation Care Providers     Provider Role Specialty Phone number    [facility]   [phone]        Anticoagulation Patient Findings      HPI:   Kelly Casebernadine seen in clinic today, on anticoagulation therapy with warfarin (a high risk medication) for atrial fibrillation, CHADS-VASC = 5      Pt is here today to evaluate anticoagulation therapy    Previous INR was  1.5 on 3-12-20    Pt was instructed to take 3.75 mg then resume usual regimen    She has been taking 3.75 mg on Mon/Fri then 2.5 mg ROW.   Diet has been consistent with foods rich in vitamin K: Yes  Changes in ETOH:  No  Changes in smoking status: No  Changes in medication: No   Cost restriction: No  S/s of bleeding:  No  Falls or accidents since last visit No  Signs/symptoms  thrombosis since the last appt: No    A/P   INR Supra therapeutic today, will require dose adjust ment today to prevent bleeding complications and closer follow up.   HOLD today then resume previous warfarin regimen        check referral    Pt educated to contact our clinic with any changes in medications or s/s of bleeding or  thrombosis. Pt is aware to seek immediate medical attention for falls, head injury or deep cuts    Follow up appointment in 3 week(s) to reduce risk of adverse events from warfarin  Amanda Perera, PharmD

## 2020-04-15 NOTE — TELEPHONE ENCOUNTER
Received request via: Pharmacy    Was the patient seen in the last year in this department? Yes  3/27/20  Does the patient have an active prescription (recently filled or refills available) for medication(s) requested? No

## 2020-04-16 RX ORDER — DIGOXIN 125 MCG
TABLET ORAL
Qty: 90 TAB | Refills: 0 | Status: SHIPPED | OUTPATIENT
Start: 2020-04-16 | End: 2020-07-29 | Stop reason: SDUPTHER

## 2020-04-23 ENCOUNTER — ANTICOAGULATION VISIT (OUTPATIENT)
Dept: MEDICAL GROUP | Facility: MEDICAL CENTER | Age: 75
End: 2020-04-23
Payer: MEDICARE

## 2020-04-23 DIAGNOSIS — I48.0 PAROXYSMAL ATRIAL FIBRILLATION (HCC): ICD-10-CM

## 2020-04-23 DIAGNOSIS — Z79.01 LONG TERM (CURRENT) USE OF ANTICOAGULANTS: ICD-10-CM

## 2020-04-23 DIAGNOSIS — I48.92 ATRIAL FLUTTER, UNSPECIFIED TYPE (HCC): ICD-10-CM

## 2020-04-23 LAB — INR PPP: 3.3 (ref 2–3.5)

## 2020-04-23 PROCEDURE — 99211 OFF/OP EST MAY X REQ PHY/QHP: CPT | Performed by: INTERNAL MEDICINE

## 2020-04-23 PROCEDURE — 85610 PROTHROMBIN TIME: CPT | Performed by: INTERNAL MEDICINE

## 2020-04-23 NOTE — PROGRESS NOTES
OP Anticoagulation Service Note    Date: 4/23/2020  There were no vitals filed for this visit.   pt declined vitals    Anticoagulation Summary  As of 4/23/2020    INR goal:   2.0-3.0   TTR:   62.7 % (9.8 mo)   INR used for dosing:   3.30! (4/23/2020)   Warfarin maintenance plan:   1.25 mg (2.5 mg x 0.5) every Mon, Wed, Fri; 2.5 mg (2.5 mg x 1) all other days   Weekly warfarin total:   13.75 mg   Plan last modified:   Amanda Perera, PharmD (4/23/2020)   Next INR check:   5/7/2020   Target end date:   Indefinite    Indications    Atrial flutter (HCC) [I48.92]  Long term (current) use of anticoagulants [Z79.01] [Z79.01]  Paroxysmal atrial fibrillation (HCC) [I48.0]             Anticoagulation Episode Summary     INR check location:   Anticoagulation Clinic    Preferred lab:       Send INR reminders to:       Comments:         Anticoagulation Care Providers     Provider Role Specialty Phone number    [facility]   [phone]        Anticoagulation Patient Findings      HPI:   Kelly Tejeda Rachell seen in clinic today, on anticoagulation therapy with warfarin (a high risk medication) for atrial fibrillation, CHADS-VASC = 5      Pt is here today to evaluate anticoagulation therapy    Previous INR was  4.3 on 4-2-20    Pt was instructed to HOLD then lower dose    Confirmed warfarin dosing regimen, denies missed or extra doses of coumadin.   Diet has been consistent with foods rich in vitamin K: Yes  Changes in ETOH:  No  Changes in smoking status: No  Changes in medication: No   Cost restriction: No  S/s of bleeding:  No  Falls or accidents since last visit No  Signs/symptoms  thrombosis since the last appt: No    A/P   INR  supra-therapeutic today, will require dose adjust ment today to prevent bleeding complications  and closer follow up.   Lower weekly regimen       6/20 check referral    Pt educated to contact our clinic with any changes in medications or s/s of bleeding or thrombosis. Pt is  aware to seek immediate medical attention for falls, head injury or deep cuts    Follow up appointment in 2 week(s) to reduce risk of adverse events from warfarin   Amanda Perera, PharmD

## 2020-05-07 ENCOUNTER — ANTICOAGULATION VISIT (OUTPATIENT)
Dept: MEDICAL GROUP | Facility: MEDICAL CENTER | Age: 75
End: 2020-05-07
Payer: MEDICARE

## 2020-05-07 DIAGNOSIS — I48.0 PAROXYSMAL ATRIAL FIBRILLATION (HCC): ICD-10-CM

## 2020-05-07 DIAGNOSIS — Z79.01 LONG TERM (CURRENT) USE OF ANTICOAGULANTS: Primary | ICD-10-CM

## 2020-05-07 DIAGNOSIS — I48.92 ATRIAL FLUTTER, UNSPECIFIED TYPE (HCC): ICD-10-CM

## 2020-05-07 LAB — INR PPP: 2.6 (ref 2–3.5)

## 2020-05-07 PROCEDURE — 93793 ANTICOAG MGMT PT WARFARIN: CPT | Performed by: INTERNAL MEDICINE

## 2020-05-07 PROCEDURE — 85610 PROTHROMBIN TIME: CPT | Performed by: INTERNAL MEDICINE

## 2020-05-07 NOTE — PROGRESS NOTES
OP Anticoagulation Service Note    Date: 2020  There were no vitals filed for this visit.   pt declined vitals    Anticoagulation Summary  As of 2020    INR goal:   2.0-3.0   TTR:   62.4 % (10.3 mo)   INR used for dosin.60 (2020)   Warfarin maintenance plan:   1.25 mg (2.5 mg x 0.5) every Mon, Wed, Fri; 2.5 mg (2.5 mg x 1) all other days   Weekly warfarin total:   13.75 mg   Plan last modified:   Amanda Perera, PharmD (2020)   Next INR check:   2020   Target end date:   Indefinite    Indications    Atrial flutter (HCC) [I48.92]  Long term (current) use of anticoagulants [Z79.01] [Z79.01]  Paroxysmal atrial fibrillation (HCC) [I48.0]             Anticoagulation Episode Summary     INR check location:   Anticoagulation Clinic    Preferred lab:       Send INR reminders to:       Comments:         Anticoagulation Care Providers     Provider Role Specialty Phone number    [facility]   [phone]        Anticoagulation Patient Findings      HPI:   Kelly Tejeda Rachell seen in clinic today, on anticoagulation therapy with warfarin (a high risk medication) for atrial fibrillation, CHADS-VASC = 5      Pt is here today to evaluate anticoagulation therapy    Previous INR was  3.3 on 20    Pt was instructed to lower weekly regimen    Confirmed warfarin dosing regimen, denies missed or extra doses of coumadin.   Diet has been consistent with foods rich in vitamin K: Yes  Changes in ETOH:  No  Changes in smoking status: No  Changes in medication: No   Cost restriction: No  S/s of bleeding:  No  Falls or accidents since last visit No  Signs/symptoms  thrombosis since the last appt: No    A/P   INR  therapeutic today,  will require close follow up as previous INR was supra-therapeutic   Continue current warfarin regimen           check referral    Pt educated to contact our clinic with any changes in medications or s/s of bleeding or thrombosis. Pt is aware to seek  immediate medical attention for falls, head injury or deep cuts    Follow up appointment in 3 week(s) to reduce risk of adverse events from warfarin   Amanda Perera, PharmD

## 2020-05-12 NOTE — PROGRESS NOTES
CardioMEMS Pulmonary Artery Pressure Monitoring  S: Monitoring pulmonary artery pressures with the CardioMEMS remote monitor implant for chronic heart failure monitoring- Monitoring for April 1, 2020 to April 30, 2020  B: s/p sensor implant 06/30/2016 Patient PA Diastolic threshold: 8-30 mmHg  A: Assessment: Patient remained within defined PaD threshold throughout the month without intervention.  R: Recommendation: I personally reviewed data. Continue treatment as is via protocol.

## 2020-05-13 ENCOUNTER — NON-PROVIDER VISIT (OUTPATIENT)
Dept: CARDIOLOGY | Facility: MEDICAL CENTER | Age: 75
End: 2020-05-13
Payer: MEDICARE

## 2020-05-13 DIAGNOSIS — I50.22 CHRONIC SYSTOLIC CONGESTIVE HEART FAILURE (HCC): ICD-10-CM

## 2020-05-13 PROCEDURE — 93264 REM MNTR WRLS P-ART PRS SNR: CPT | Performed by: INTERNAL MEDICINE

## 2020-05-28 ENCOUNTER — ANTICOAGULATION VISIT (OUTPATIENT)
Dept: MEDICAL GROUP | Facility: MEDICAL CENTER | Age: 75
End: 2020-05-28
Payer: MEDICARE

## 2020-05-28 DIAGNOSIS — I48.0 PAROXYSMAL ATRIAL FIBRILLATION (HCC): ICD-10-CM

## 2020-05-28 DIAGNOSIS — I48.92 ATRIAL FLUTTER, UNSPECIFIED TYPE (HCC): ICD-10-CM

## 2020-05-28 DIAGNOSIS — Z79.01 LONG TERM (CURRENT) USE OF ANTICOAGULANTS: ICD-10-CM

## 2020-05-28 LAB — INR PPP: 2.8 (ref 2–3.5)

## 2020-05-28 PROCEDURE — 85610 PROTHROMBIN TIME: CPT | Performed by: INTERNAL MEDICINE

## 2020-05-28 PROCEDURE — 93793 ANTICOAG MGMT PT WARFARIN: CPT | Performed by: INTERNAL MEDICINE

## 2020-05-28 NOTE — PROGRESS NOTES
Anticoagulation Summary  As of 2020    INR goal:   2.0-3.0   TTR:   64.8 % (11 mo)   INR used for dosin.80 (2020)   Warfarin maintenance plan:   1.25 mg (2.5 mg x 0.5) every Mon, Wed, Fri; 2.5 mg (2.5 mg x 1) all other days   Weekly warfarin total:   13.75 mg   Plan last modified:   Kelly Dean (2020)   Next INR check:   2020   Target end date:   Indefinite    Indications    Atrial flutter (HCC) [I48.92]  Long term (current) use of anticoagulants [Z79.01] [Z79.01]  Paroxysmal atrial fibrillation (HCC) [I48.0]             Anticoagulation Episode Summary     INR check location:   Anticoagulation Clinic    Preferred lab:       Send INR reminders to:       Comments:         Anticoagulation Care Providers     Provider Role Specialty Phone number    [facility]   [phone]        Anticoagulation Patient Findings          History of Present Illness: follow up appointment for chronic anticoagulation with the high risk medication, warfarin for atrial fibrillation    Pt remains therapeutic today. Continue current dosing regimen.  Follow up in 4 weeks, to reduce the risk of adverse events related to this high risk medication, warfarin.  Pt declines vitals today      Kelly Dean, Clinical Pharmacist

## 2020-06-05 DIAGNOSIS — Z79.01 CHRONIC ANTICOAGULATION: ICD-10-CM

## 2020-06-11 DIAGNOSIS — Z79.01 CHRONIC ANTICOAGULATION: ICD-10-CM

## 2020-06-12 RX ORDER — WARFARIN SODIUM 2.5 MG/1
TABLET ORAL
Qty: 90 TAB | Refills: 1 | Status: SHIPPED | OUTPATIENT
Start: 2020-06-12 | End: 2021-02-26

## 2020-06-19 ENCOUNTER — PATIENT MESSAGE (OUTPATIENT)
Dept: CARDIOLOGY | Facility: MEDICAL CENTER | Age: 75
End: 2020-06-19

## 2020-06-19 DIAGNOSIS — I48.0 PAF (PAROXYSMAL ATRIAL FIBRILLATION) (HCC): ICD-10-CM

## 2020-06-19 RX ORDER — AMIODARONE HYDROCHLORIDE 200 MG/1
200 TABLET ORAL DAILY
Qty: 90 TAB | Refills: 0 | Status: SHIPPED | OUTPATIENT
Start: 2020-06-19 | End: 2020-10-05 | Stop reason: SDUPTHER

## 2020-06-25 ENCOUNTER — ANTICOAGULATION VISIT (OUTPATIENT)
Dept: MEDICAL GROUP | Facility: MEDICAL CENTER | Age: 75
End: 2020-06-25
Payer: MEDICARE

## 2020-06-25 DIAGNOSIS — I48.0 PAROXYSMAL ATRIAL FIBRILLATION (HCC): ICD-10-CM

## 2020-06-25 DIAGNOSIS — Z79.01 LONG TERM (CURRENT) USE OF ANTICOAGULANTS: Primary | ICD-10-CM

## 2020-06-25 LAB — INR PPP: 3.8 (ref 2–3.5)

## 2020-06-25 PROCEDURE — 99211 OFF/OP EST MAY X REQ PHY/QHP: CPT | Performed by: INTERNAL MEDICINE

## 2020-06-25 PROCEDURE — 85610 PROTHROMBIN TIME: CPT | Performed by: INTERNAL MEDICINE

## 2020-06-25 NOTE — PROGRESS NOTES
OP Anticoagulation Service Note    Date: 6/25/2020  There were no vitals filed for this visit.   pt declined vitals    Anticoagulation Summary  As of 6/25/2020    INR goal:   2.0-3.0   TTR:   61.3 % (11.9 mo)   INR used for dosing:   3.80! (6/25/2020)   Warfarin maintenance plan:   1.25 mg (2.5 mg x 0.5) every Mon, Wed, Fri; 2.5 mg (2.5 mg x 1) all other days   Weekly warfarin total:   13.75 mg   Plan last modified:   Kelly JUAN Dianne (5/28/2020)   Next INR check:   7/2/2020   Target end date:   Indefinite    Indications    Atrial flutter (HCC) [I48.92]  Long term (current) use of anticoagulants [Z79.01] [Z79.01]  Paroxysmal atrial fibrillation (HCC) [I48.0]             Anticoagulation Episode Summary     INR check location:   Anticoagulation Clinic    Preferred lab:       Send INR reminders to:       Comments:         Anticoagulation Care Providers     Provider Role Specialty Phone number    [facility]   [phone]        Anticoagulation Patient Findings      HPI:   Kelly Tejeda Rachell seen in clinic today, on anticoagulation therapy with warfarin (a high risk medication) for atrial fibrillation, CHADS-VASC = 5      Pt is here today to evaluate anticoagulation therapy    Previous INR was  2.8 on 5-28-20    Pt was instructed to continue current regimen    Confirmed warfarin dosing regimen, denies missed or extra doses of coumadin.   Diet has been consistent with foods rich in vitamin K: Yes  Changes in ETOH:  No  Changes in smoking status: No  Changes in medication: No   Cost restriction: No  S/s of bleeding:  No  Falls or accidents since last visit No  Signs/symptoms  thrombosis since the last appt: No    A/P   INR  Supra-therapeutic today, will require dose adjust ment today to prevent bleeding complications  and closer follow up.   HOLD tomorrow then resume usual regimen       Pt educated to contact our clinic with any changes in medications or s/s of bleeding or thrombosis. Pt is aware  to seek immediate medical attention for falls, head injury or deep cuts    Follow up appointment in 1 week(s) to reduce risk of adverse events from warfarin   Amanda Perera, PharmD

## 2020-07-02 ENCOUNTER — ANTICOAGULATION VISIT (OUTPATIENT)
Dept: MEDICAL GROUP | Facility: MEDICAL CENTER | Age: 75
End: 2020-07-02
Payer: MEDICARE

## 2020-07-02 DIAGNOSIS — Z79.01 LONG TERM (CURRENT) USE OF ANTICOAGULANTS: Primary | ICD-10-CM

## 2020-07-02 DIAGNOSIS — I48.0 PAROXYSMAL ATRIAL FIBRILLATION (HCC): ICD-10-CM

## 2020-07-02 LAB — INR PPP: 4.3 (ref 2–3.5)

## 2020-07-02 PROCEDURE — 99211 OFF/OP EST MAY X REQ PHY/QHP: CPT | Performed by: INTERNAL MEDICINE

## 2020-07-02 PROCEDURE — 85610 PROTHROMBIN TIME: CPT | Performed by: INTERNAL MEDICINE

## 2020-07-02 NOTE — PROGRESS NOTES
OP Anticoagulation Service Note    Date: 2020  There were no vitals filed for this visit.   pt declined vitals    Anticoagulation Summary  As of 2020    INR goal:   2.0-3.0   TTR:   60.1 % (1 y)   INR used for dosin.30! (2020)   Warfarin maintenance plan:   2.5 mg (2.5 mg x 1) every Sun, Tue, Thu; 1.25 mg (2.5 mg x 0.5) all other days   Weekly warfarin total:   12.5 mg   Plan last modified:   Amanda Perera, PharmD (2020)   Next INR check:   2020   Target end date:   Indefinite    Indications    Atrial flutter (HCC) [I48.92]  Long term (current) use of anticoagulants [Z79.01] [Z79.01]  Paroxysmal atrial fibrillation (HCC) [I48.0]             Anticoagulation Episode Summary     INR check location:   Anticoagulation Clinic    Preferred lab:       Send INR reminders to:       Comments:         Anticoagulation Care Providers     Provider Role Specialty Phone number    [facility]   [phone]        Anticoagulation Patient Findings      HPI:   [facility] on anticoagulation therapy with warfarin (a high risk medication) for atrial flutter, CHADS-VASC = 5      Pt is here today to evaluate anticoagulation therapy    Previous INR was  3.8 on     Pt was instructed to HOLD x 1 then resume usual regimen    Confirmed warfarin dosing regimen, denies missed or extra doses of coumadin.   Diet has been consistent with foods rich in vitamin K: Yes  Changes in ETOH:  No  Changes in smoking status: No  Changes in medication: No   Cost restriction: No  S/s of bleeding:  No  Falls or accidents since last visit No  Signs/symptoms  thrombosis since the last appt: No    A/P   INR  supratherapeutic today, will require dose adjust ment today to prevent bleeding complications  and closer follow up.   HOLD x 2 days then begin lower weekly regimen     Pt is going up to [place], lab order provided for pt to get INR while up there.       Pt educated to  contact our clinic with any changes in medications or s/s of bleeding or thrombosis. Pt is aware to seek immediate medical attention for falls, head injury or deep cuts    Follow up appointment in 2 week(s) to reduce risk of adverse events from warfarin  Amanda Perera, PharmD

## 2020-07-17 ENCOUNTER — ANTICOAGULATION MONITORING (OUTPATIENT)
Dept: VASCULAR LAB | Facility: MEDICAL CENTER | Age: 75
End: 2020-07-17

## 2020-07-17 DIAGNOSIS — Z79.01 LONG TERM (CURRENT) USE OF ANTICOAGULANTS: ICD-10-CM

## 2020-07-17 DIAGNOSIS — I48.0 PAROXYSMAL ATRIAL FIBRILLATION (HCC): ICD-10-CM

## 2020-07-17 DIAGNOSIS — I48.92 ATRIAL FLUTTER, UNSPECIFIED TYPE (HCC): ICD-10-CM

## 2020-07-17 LAB — INR PPP: 2.2 (ref 2–3.5)

## 2020-07-17 NOTE — PROGRESS NOTES
Anticoagulation Summary  As of 2020    INR goal:   2.0-3.0   TTR:   59.3 % (1 y)   INR used for dosin.20 (2020)   Warfarin maintenance plan:   2.5 mg (2.5 mg x 1) every Sun, e, Thu; 1.25 mg (2.5 mg x 0.5) all other days   Weekly warfarin total:   12.5 mg   Plan last modified:   Kelly Dean (2020)   Next INR check:   2020   Target end date:   Indefinite    Indications    Atrial flutter (HCC) [I48.92]  Long term (current) use of anticoagulants [Z79.01] [Z79.01]  Paroxysmal atrial fibrillation (HCC) [I48.0]             Anticoagulation Episode Summary     INR check location:   Anticoagulation Clinic    Preferred lab:       Send INR reminders to:       Comments:         Anticoagulation Care Providers     Provider Role Specialty Phone number    [facility]   [phone]        Anticoagulation Patient Findings          Spoke with Kelly to report a therapeutic INR of 2.2. Continue current dosing regimen.  Follow up in 4 weeks, to reduce the risk of adverse events related to this high risk medication, warfarin.    Kelly Dean, Clinical Pharmacist

## 2020-07-20 ENCOUNTER — TELEPHONE (OUTPATIENT)
Dept: CARDIOLOGY | Facility: MEDICAL CENTER | Age: 75
End: 2020-07-20

## 2020-07-20 NOTE — TELEPHONE ENCOUNTER
Called patient regarding lack of cardiomems readings since July 6th, Patient has been out of town and did not bring cardiomems machine, will be back sometime the end of the month.

## 2020-07-27 DIAGNOSIS — E78.2 MIXED HYPERLIPIDEMIA: ICD-10-CM

## 2020-07-28 RX ORDER — ATORVASTATIN CALCIUM 40 MG/1
TABLET, FILM COATED ORAL
Qty: 100 TAB | Refills: 0 | Status: SHIPPED | OUTPATIENT
Start: 2020-07-28 | End: 2020-11-13

## 2020-07-29 ENCOUNTER — APPOINTMENT (RX ONLY)
Dept: URBAN - METROPOLITAN AREA CLINIC 4 | Facility: CLINIC | Age: 75
Setting detail: DERMATOLOGY
End: 2020-07-29

## 2020-07-29 DIAGNOSIS — L57.0 ACTINIC KERATOSIS: ICD-10-CM

## 2020-07-29 DIAGNOSIS — L70.8 OTHER ACNE: ICD-10-CM

## 2020-07-29 DIAGNOSIS — S31000A OPEN WOUND(S) (MULTIPLE) OF UNSPECIFIED SITE(S), WITHOUT MENTION OF COMPLICATION: ICD-10-CM

## 2020-07-29 PROBLEM — S51.812A LACERATION WITHOUT FOREIGN BODY OF LEFT FOREARM, INITIAL ENCOUNTER: Status: ACTIVE | Noted: 2020-07-29

## 2020-07-29 PROBLEM — S81.012A LACERATION WITHOUT FOREIGN BODY, LEFT KNEE, INITIAL ENCOUNTER: Status: ACTIVE | Noted: 2020-07-29

## 2020-07-29 PROCEDURE — 17003 DESTRUCT PREMALG LES 2-14: CPT

## 2020-07-29 PROCEDURE — ? DIAGNOSIS COMMENT

## 2020-07-29 PROCEDURE — ? COUNSELING

## 2020-07-29 PROCEDURE — 17000 DESTRUCT PREMALG LESION: CPT

## 2020-07-29 PROCEDURE — 99202 OFFICE O/P NEW SF 15 MIN: CPT | Mod: 25

## 2020-07-29 PROCEDURE — ? LIQUID NITROGEN

## 2020-07-29 RX ORDER — DIGOXIN 125 MCG
TABLET ORAL
Qty: 90 TAB | Refills: 0 | Status: SHIPPED | OUTPATIENT
Start: 2020-07-29 | End: 2020-11-16 | Stop reason: SDUPTHER

## 2020-07-29 ASSESSMENT — LOCATION SIMPLE DESCRIPTION DERM
LOCATION SIMPLE: RIGHT HAND
LOCATION SIMPLE: LEFT KNEE
LOCATION SIMPLE: LEFT FOREARM
LOCATION SIMPLE: LEFT HAND
LOCATION SIMPLE: RIGHT FOREARM

## 2020-07-29 ASSESSMENT — LOCATION DETAILED DESCRIPTION DERM
LOCATION DETAILED: RIGHT VENTRAL PROXIMAL FOREARM
LOCATION DETAILED: RIGHT RADIAL DORSAL HAND
LOCATION DETAILED: LEFT KNEE
LOCATION DETAILED: LEFT RADIAL DORSAL HAND
LOCATION DETAILED: LEFT PROXIMAL DORSAL FOREARM

## 2020-07-29 ASSESSMENT — LOCATION ZONE DERM
LOCATION ZONE: ARM
LOCATION ZONE: LEG
LOCATION ZONE: HAND

## 2020-07-29 NOTE — PROCEDURE: MIPS QUALITY
Quality 111:Pneumonia Vaccination Status For Older Adults: Pneumococcal Vaccination Previously Received
Detail Level: Generalized
Quality 130: Documentation Of Current Medications In The Medical Record: Current Medications Documented
Quality 226: Preventive Care And Screening: Tobacco Use: Screening And Cessation Intervention: Patient screened for tobacco use and is an ex/non-smoker

## 2020-07-29 NOTE — TELEPHONE ENCOUNTER
Received request via: Pharmacy    Was the patient seen in the last year in this department? Yes  LOV 03/27/2020  Does the patient have an active prescription (recently filled or refills available) for medication(s) requested? No

## 2020-07-29 NOTE — PROCEDURE: DIAGNOSIS COMMENT
Comment: Patient fell 3 days ago in her home and sustained 3 skin tears. Wounds were cleaned immediately. \\nWounds healing appropriately\\nWounds cleaned, vaseline applied, nonadherent bandage and coban placed. Wound care discussed.
Detail Level: Detailed

## 2020-07-29 NOTE — PROCEDURE: LIQUID NITROGEN
Detail Level: Detailed
Render Note In Bullet Format When Appropriate: No
Post-Care Instructions: I reviewed with the patient in detail post-care instructions. Patient is to wear sunprotection, and avoid picking at any of the treated lesions. Pt may apply Vaseline to crusted or scabbing areas.
Duration Of Freeze Thaw-Cycle (Seconds): 3
Consent: The patient's consent was obtained including but not limited to risks of crusting, scabbing, blistering, scarring, darker or lighter pigmentary change, recurrence, incomplete removal and infection.

## 2020-07-30 ENCOUNTER — APPOINTMENT (OUTPATIENT)
Dept: MEDICAL GROUP | Facility: MEDICAL CENTER | Age: 75
End: 2020-07-30
Payer: MEDICARE

## 2020-08-05 NOTE — TELEPHONE ENCOUNTER
Received request via: Patient    Was the patient seen in the last year in this department? Yes  LOV 03/27/2020  Does the patient have an active prescription (recently filled or refills available) for medication(s) requested? No

## 2020-08-06 ENCOUNTER — ANTICOAGULATION VISIT (OUTPATIENT)
Dept: MEDICAL GROUP | Facility: MEDICAL CENTER | Age: 75
End: 2020-08-06
Payer: MEDICARE

## 2020-08-06 DIAGNOSIS — Z79.01 LONG TERM (CURRENT) USE OF ANTICOAGULANTS: Primary | ICD-10-CM

## 2020-08-06 DIAGNOSIS — I48.0 PAROXYSMAL ATRIAL FIBRILLATION (HCC): ICD-10-CM

## 2020-08-06 DIAGNOSIS — I48.92 ATRIAL FLUTTER, UNSPECIFIED TYPE (HCC): ICD-10-CM

## 2020-08-06 LAB — INR PPP: 2.1 (ref 2–3.5)

## 2020-08-06 PROCEDURE — 85610 PROTHROMBIN TIME: CPT | Performed by: PHYSICIAN ASSISTANT

## 2020-08-06 PROCEDURE — 93793 ANTICOAG MGMT PT WARFARIN: CPT | Performed by: PHYSICIAN ASSISTANT

## 2020-08-06 RX ORDER — TIOTROPIUM BROMIDE INHALATION SPRAY 1.56 UG/1
2 SPRAY, METERED RESPIRATORY (INHALATION) DAILY
Qty: 12 G | Refills: 2 | Status: SHIPPED | OUTPATIENT
Start: 2020-08-06 | End: 2021-05-11

## 2020-08-06 NOTE — PROGRESS NOTES
OP Anticoagulation Service Note    Date: 2020  There were no vitals filed for this visit.   pt declined vitals    Anticoagulation Summary  As of 2020    INR goal:  2.0-3.0   TTR:  61.4 % (1.1 y)   INR used for dosin.10 (2020)   Warfarin maintenance plan:  2.5 mg (2.5 mg x 1) every Sun, Tue, Thu; 1.25 mg (2.5 mg x 0.5) all other days   Weekly warfarin total:  12.5 mg   Plan last modified:  Kelly JUAN Dianne (2020)   Next INR check:  2020   Target end date:  Indefinite    Indications    Atrial flutter (HCC) [I48.92]  Long term (current) use of anticoagulants [Z79.01] [Z79.01]  Paroxysmal atrial fibrillation (HCC) [I48.0]             Anticoagulation Episode Summary     INR check location:  Anticoagulation Clinic    Preferred lab:      Send INR reminders to:      Comments:        Anticoagulation Care Providers     Provider Role Specialty Phone number    [facility]   [phone]        Anticoagulation Patient Findings      HPI:   Kelly Lovett seen in clinic today, on anticoagulation therapy with warfarin (a high risk medication) for atrial fibrillation, CHADS-VASC = 5      Pt is here today to evaluate anticoagulation therapy    Previous INR was  2.2 on 20    Pt was instructed to continue current regimen    Confirmed warfarin dosing regimen, denies missed or extra doses of coumadin.   Diet has been consistent with foods rich in vitamin K: Yes  Changes in ETOH:  No  Changes in smoking status: No  Changes in medication: No   Cost restriction: No  S/s of bleeding:  No  Falls or accidents since last visit No  Signs/symptoms  thrombosis since the last appt: No    A/P   INR  therapeutic today,    Continue current warfarin regimen           check referral    Pt educated to contact our clinic with any changes in medications or s/s of bleeding or thrombosis. Pt is aware to seek immediate medical attention for falls, head injury or deep cuts    Follow up appointment  in 6 week(s) to reduce risk of adverse events from warfarin  Amanda Perera, PharmD

## 2020-08-21 ENCOUNTER — TELEPHONE (OUTPATIENT)
Dept: MEDICAL GROUP | Facility: LAB | Age: 75
End: 2020-08-21

## 2020-08-21 DIAGNOSIS — I48.0 PAROXYSMAL ATRIAL FIBRILLATION (HCC): ICD-10-CM

## 2020-08-21 DIAGNOSIS — I70.0 AORTIC ATHEROSCLEROSIS (HCC): ICD-10-CM

## 2020-08-21 NOTE — TELEPHONE ENCOUNTER
ANNUAL WELLNESS VISIT PRE-VISIT PLANNING WITHOUT OUTREACH    1.  Reviewed note from last office visit with PCP: YES    2.  If any orders were placed at last visit, do we have Results/Consult Notes?        •  Labs - Labs ordered, NOT completed. Patient advised to complete prior to next appointment.  Note: If patient appointment is for lab review and patient did not complete labs, check with provider if OK to reschedule patient until labs completed.       •  Imaging - Imaging was not ordered at last office visit.       •  Referrals - Referral ordered, patient has NOT been seen.    3.  Immunizations were updated in Epic using WebIZ?: Epic matches WebIZ       •  WebIZ Recommendations: HEPATITIS A  and SHINGRIX (Shingles)        •  Is patient due for Tdap? NO       •  Is patient due for Shingrix? YES. Patient was not notified of copay/out of pocket cost.     4.  Patient is due for the following Health Maintenance Topics:   Health Maintenance Due   Topic Date Due   • Annual Wellness Visit  01/11/2019   • COLONOSCOPY  05/06/2019       - Patient already has appointment scheduled for Annual Wellness Visit (AWV).    5.  Reviewed/Updated the following with patient:       •   Preferred Pharmacy? YES       •   Preferred Lab? YES       •   Preferred Communication? YES       •   Allergies? YES       •   Medications? YES. Was Abstract Encounter opened and chart updated? YES       •   Social History? YES. Was Abstract Encounter opened and chart updated? YES       •   Family History (document living status of immediate family members and if + hx of  cancer, diabetes, hypertension, hyperlipidemia, heart attack, stroke) YES. Was Abstract Encounter opened and chart updated? YES    6.  Care Team Updated:       •   DME Company (gait device, O2, CPAP, etc.): YES       •   Other Specialists (eye doctor, derm, GYN, cardiology, endo, etc): YES    7. Orders for overdue Health Maintenance topics pended in Pre-Charting? YES and NO    8.  Patient  has the following Care Path diagnoses on Problem List:  COPD    1.  Is patient under the care of a pulmonologist? Yes, CareTeam was updated.  2.  Has patient ever completed a PFT or spirometry? No.  3.  Is patient on oxygen or CPAP? Yes, CareTeam was updated.  4.  Has patient ever had instruction on inhaler technique by health care professional? No  5.  Is patient interested in a referral to respiratory therapy for more information on COPD, inhaler technique, and/or information on establishing an action plan?  No, patient is NOT interested.     DEPRESSION     Is patient seeing a counselor, psychiatrist or other healthcare provider regarding their mental health? No, and not interested.     Depression Screen (PHQ-2/PHQ-9) 3/6/2019 3/20/2019 3/27/2020   PHQ-2 Total Score 0 - 2   PHQ-2 Total Score - - -   PHQ-2 Total Score - 0 -   PHQ-9 Total Score - - 3   PHQ-9 Total Score - - -       Interpretation of PHQ-9 Total Score   Score Severity   1-4 No Depression   5-9 Mild Depression   10-14 Moderate Depression   15-19 Moderately Severe Depression   20-27 Severe Depression    9.  Patient was advised: “This is a free wellness visit. The provider will screen for medical conditions to help you stay healthy. If you have other concerns to address you may be asked to discuss these at a separate visit or there may be an additional fee.”     10.  Patient was informed to arrive 15 min prior to their scheduled appointment and bring in their medication bottles.

## 2020-08-25 ENCOUNTER — HOSPITAL ENCOUNTER (OUTPATIENT)
Dept: LAB | Facility: MEDICAL CENTER | Age: 75
End: 2020-08-25
Attending: FAMILY MEDICINE
Payer: MEDICARE

## 2020-08-25 ENCOUNTER — OFFICE VISIT (OUTPATIENT)
Dept: MEDICAL GROUP | Facility: LAB | Age: 75
End: 2020-08-25
Payer: MEDICARE

## 2020-08-25 VITALS
WEIGHT: 118 LBS | HEART RATE: 61 BPM | RESPIRATION RATE: 16 BRPM | TEMPERATURE: 98.6 F | HEIGHT: 66 IN | SYSTOLIC BLOOD PRESSURE: 110 MMHG | OXYGEN SATURATION: 97 % | BODY MASS INDEX: 18.96 KG/M2 | DIASTOLIC BLOOD PRESSURE: 64 MMHG

## 2020-08-25 DIAGNOSIS — M85.89 OSTEOPENIA OF MULTIPLE SITES: ICD-10-CM

## 2020-08-25 DIAGNOSIS — I34.0 NON-RHEUMATIC MITRAL REGURGITATION: ICD-10-CM

## 2020-08-25 DIAGNOSIS — J41.0 SIMPLE CHRONIC BRONCHITIS (HCC): ICD-10-CM

## 2020-08-25 DIAGNOSIS — Z79.01 LONG TERM (CURRENT) USE OF ANTICOAGULANTS: ICD-10-CM

## 2020-08-25 DIAGNOSIS — I48.0 PAROXYSMAL ATRIAL FIBRILLATION (HCC): ICD-10-CM

## 2020-08-25 DIAGNOSIS — Z80.0 FAMILY HISTORY OF COLON CANCER: ICD-10-CM

## 2020-08-25 DIAGNOSIS — I11.0 HYPERTENSIVE HEART DISEASE WITH HEART FAILURE (HCC): ICD-10-CM

## 2020-08-25 DIAGNOSIS — J44.9 CHRONIC OBSTRUCTIVE PULMONARY DISEASE, UNSPECIFIED COPD TYPE (HCC): ICD-10-CM

## 2020-08-25 DIAGNOSIS — I70.0 ATHEROSCLEROSIS OF AORTA (HCC): ICD-10-CM

## 2020-08-25 DIAGNOSIS — R73.03 PREDIABETES: ICD-10-CM

## 2020-08-25 DIAGNOSIS — G47.33 OBSTRUCTIVE SLEEP APNEA SYNDROME: ICD-10-CM

## 2020-08-25 DIAGNOSIS — Z86.010 HISTORY OF COLONIC POLYPS: ICD-10-CM

## 2020-08-25 DIAGNOSIS — Z12.11 SCREEN FOR COLON CANCER: ICD-10-CM

## 2020-08-25 DIAGNOSIS — Z91.81 RISK FOR FALLS: ICD-10-CM

## 2020-08-25 DIAGNOSIS — I50.22 CHRONIC SYSTOLIC CONGESTIVE HEART FAILURE (HCC): ICD-10-CM

## 2020-08-25 DIAGNOSIS — E78.2 MIXED HYPERLIPIDEMIA: ICD-10-CM

## 2020-08-25 DIAGNOSIS — I27.22 PULMONARY HYPERTENSION DUE TO LEFT HEART DISEASE (HCC): ICD-10-CM

## 2020-08-25 DIAGNOSIS — I48.92 ATRIAL FLUTTER, UNSPECIFIED TYPE (HCC): ICD-10-CM

## 2020-08-25 DIAGNOSIS — J96.11 CHRONIC RESPIRATORY FAILURE WITH HYPOXIA (HCC): ICD-10-CM

## 2020-08-25 DIAGNOSIS — F32.1 MODERATE SINGLE CURRENT EPISODE OF MAJOR DEPRESSIVE DISORDER (HCC): ICD-10-CM

## 2020-08-25 DIAGNOSIS — Z00.00 MEDICARE ANNUAL WELLNESS VISIT, SUBSEQUENT: Primary | ICD-10-CM

## 2020-08-25 DIAGNOSIS — I10 ESSENTIAL (PRIMARY) HYPERTENSION: ICD-10-CM

## 2020-08-25 PROBLEM — F41.9 ANXIETY: Status: RESOLVED | Noted: 2017-03-15 | Resolved: 2020-08-25

## 2020-08-25 LAB
ALBUMIN SERPL BCP-MCNC: 4.3 G/DL (ref 3.2–4.9)
ALBUMIN/GLOB SERPL: 1.8 G/DL
ALP SERPL-CCNC: 84 U/L (ref 30–99)
ALT SERPL-CCNC: 108 U/L (ref 2–50)
ANION GAP SERPL CALC-SCNC: 14 MMOL/L (ref 7–16)
AST SERPL-CCNC: 79 U/L (ref 12–45)
BASOPHILS # BLD AUTO: 0.9 % (ref 0–1.8)
BASOPHILS # BLD: 0.09 K/UL (ref 0–0.12)
BILIRUB SERPL-MCNC: 0.4 MG/DL (ref 0.1–1.5)
BUN SERPL-MCNC: 16 MG/DL (ref 8–22)
CALCIUM SERPL-MCNC: 9.3 MG/DL (ref 8.5–10.5)
CHLORIDE SERPL-SCNC: 97 MMOL/L (ref 96–112)
CO2 SERPL-SCNC: 28 MMOL/L (ref 20–33)
CREAT SERPL-MCNC: 0.82 MG/DL (ref 0.5–1.4)
EOSINOPHIL # BLD AUTO: 0.13 K/UL (ref 0–0.51)
EOSINOPHIL NFR BLD: 1.3 % (ref 0–6.9)
ERYTHROCYTE [DISTWIDTH] IN BLOOD BY AUTOMATED COUNT: 48.1 FL (ref 35.9–50)
FASTING STATUS PATIENT QL REPORTED: NORMAL
GLOBULIN SER CALC-MCNC: 2.4 G/DL (ref 1.9–3.5)
GLUCOSE SERPL-MCNC: 93 MG/DL (ref 65–99)
HCT VFR BLD AUTO: 39.8 % (ref 37–47)
HGB BLD-MCNC: 12.6 G/DL (ref 12–16)
IMM GRANULOCYTES # BLD AUTO: 0.06 K/UL (ref 0–0.11)
IMM GRANULOCYTES NFR BLD AUTO: 0.6 % (ref 0–0.9)
LYMPHOCYTES # BLD AUTO: 1.72 K/UL (ref 1–4.8)
LYMPHOCYTES NFR BLD: 17.6 % (ref 22–41)
MCH RBC QN AUTO: 30.4 PG (ref 27–33)
MCHC RBC AUTO-ENTMCNC: 31.7 G/DL (ref 33.6–35)
MCV RBC AUTO: 96.1 FL (ref 81.4–97.8)
MONOCYTES # BLD AUTO: 0.87 K/UL (ref 0–0.85)
MONOCYTES NFR BLD AUTO: 8.9 % (ref 0–13.4)
NEUTROPHILS # BLD AUTO: 6.91 K/UL (ref 2–7.15)
NEUTROPHILS NFR BLD: 70.7 % (ref 44–72)
NRBC # BLD AUTO: 0 K/UL
NRBC BLD-RTO: 0 /100 WBC
PLATELET # BLD AUTO: 239 K/UL (ref 164–446)
PMV BLD AUTO: 10.2 FL (ref 9–12.9)
POTASSIUM SERPL-SCNC: 4.1 MMOL/L (ref 3.6–5.5)
PROT SERPL-MCNC: 6.7 G/DL (ref 6–8.2)
RBC # BLD AUTO: 4.14 M/UL (ref 4.2–5.4)
SODIUM SERPL-SCNC: 139 MMOL/L (ref 135–145)
WBC # BLD AUTO: 9.8 K/UL (ref 4.8–10.8)

## 2020-08-25 PROCEDURE — 36415 COLL VENOUS BLD VENIPUNCTURE: CPT

## 2020-08-25 PROCEDURE — 80053 COMPREHEN METABOLIC PANEL: CPT

## 2020-08-25 PROCEDURE — 85025 COMPLETE CBC W/AUTO DIFF WBC: CPT

## 2020-08-25 PROCEDURE — G0439 PPPS, SUBSEQ VISIT: HCPCS | Performed by: FAMILY MEDICINE

## 2020-08-25 PROCEDURE — 8041 PR SCP AHA: Performed by: FAMILY MEDICINE

## 2020-08-25 ASSESSMENT — ENCOUNTER SYMPTOMS: GENERAL WELL-BEING: GOOD

## 2020-08-25 ASSESSMENT — PATIENT HEALTH QUESTIONNAIRE - PHQ9
SUM OF ALL RESPONSES TO PHQ QUESTIONS 1-9: 3
5. POOR APPETITE OR OVEREATING: 0 - NOT AT ALL
CLINICAL INTERPRETATION OF PHQ2 SCORE: 3

## 2020-08-25 ASSESSMENT — FIBROSIS 4 INDEX: FIB4 SCORE: 1.32

## 2020-08-25 ASSESSMENT — ACTIVITIES OF DAILY LIVING (ADL): BATHING_REQUIRES_ASSISTANCE: 0

## 2020-08-26 DIAGNOSIS — R79.89 ELEVATED LFTS: ICD-10-CM

## 2020-09-10 ENCOUNTER — HOSPITAL ENCOUNTER (OUTPATIENT)
Dept: LAB | Facility: MEDICAL CENTER | Age: 75
End: 2020-09-10
Attending: FAMILY MEDICINE
Payer: MEDICARE

## 2020-09-10 DIAGNOSIS — I70.0 AORTIC ATHEROSCLEROSIS (HCC): ICD-10-CM

## 2020-09-10 DIAGNOSIS — R79.89 ELEVATED LFTS: ICD-10-CM

## 2020-09-10 DIAGNOSIS — I48.0 PAROXYSMAL ATRIAL FIBRILLATION (HCC): ICD-10-CM

## 2020-09-10 LAB
ALBUMIN SERPL BCP-MCNC: 4.3 G/DL (ref 3.2–4.9)
ALBUMIN/GLOB SERPL: 1.9 G/DL
ALP SERPL-CCNC: 90 U/L (ref 30–99)
ALT SERPL-CCNC: 101 U/L (ref 2–50)
ANION GAP SERPL CALC-SCNC: 13 MMOL/L (ref 7–16)
AST SERPL-CCNC: 73 U/L (ref 12–45)
BILIRUB SERPL-MCNC: 0.4 MG/DL (ref 0.1–1.5)
BUN SERPL-MCNC: 18 MG/DL (ref 8–22)
CALCIUM SERPL-MCNC: 9.4 MG/DL (ref 8.5–10.5)
CHLORIDE SERPL-SCNC: 98 MMOL/L (ref 96–112)
CHOLEST SERPL-MCNC: 180 MG/DL (ref 100–199)
CO2 SERPL-SCNC: 29 MMOL/L (ref 20–33)
CREAT SERPL-MCNC: 0.88 MG/DL (ref 0.5–1.4)
FASTING STATUS PATIENT QL REPORTED: NORMAL
GLOBULIN SER CALC-MCNC: 2.3 G/DL (ref 1.9–3.5)
GLUCOSE SERPL-MCNC: 83 MG/DL (ref 65–99)
HDLC SERPL-MCNC: 118 MG/DL
LDLC SERPL CALC-MCNC: 48 MG/DL
POTASSIUM SERPL-SCNC: 4.2 MMOL/L (ref 3.6–5.5)
PROT SERPL-MCNC: 6.6 G/DL (ref 6–8.2)
SODIUM SERPL-SCNC: 140 MMOL/L (ref 135–145)
TRIGL SERPL-MCNC: 72 MG/DL (ref 0–149)
TSH SERPL DL<=0.005 MIU/L-ACNC: 2.52 UIU/ML (ref 0.38–5.33)

## 2020-09-10 PROCEDURE — 84443 ASSAY THYROID STIM HORMONE: CPT

## 2020-09-10 PROCEDURE — 36415 COLL VENOUS BLD VENIPUNCTURE: CPT

## 2020-09-10 PROCEDURE — 80053 COMPREHEN METABOLIC PANEL: CPT

## 2020-09-10 PROCEDURE — 80061 LIPID PANEL: CPT

## 2020-09-17 ENCOUNTER — ANTICOAGULATION VISIT (OUTPATIENT)
Dept: MEDICAL GROUP | Facility: MEDICAL CENTER | Age: 75
End: 2020-09-17
Payer: MEDICARE

## 2020-09-17 DIAGNOSIS — Z79.01 LONG TERM (CURRENT) USE OF ANTICOAGULANTS: ICD-10-CM

## 2020-09-17 DIAGNOSIS — I48.0 PAROXYSMAL ATRIAL FIBRILLATION (HCC): ICD-10-CM

## 2020-09-17 LAB — INR PPP: 2.3 (ref 2–3.5)

## 2020-09-17 PROCEDURE — 85610 PROTHROMBIN TIME: CPT | Performed by: INTERNAL MEDICINE

## 2020-09-17 PROCEDURE — 93793 ANTICOAG MGMT PT WARFARIN: CPT | Performed by: INTERNAL MEDICINE

## 2020-09-17 NOTE — PROGRESS NOTES
OP Anticoagulation Service Note    Date: 2020  There were no vitals filed for this visit.   pt declined vitals    Anticoagulation Summary  As of 2020    INR goal:  2.0-3.0   TTR:  65.0 % (1.2 y)   INR used for dosin.30 (2020)   Warfarin maintenance plan:  2.5 mg (2.5 mg x 1) every Sun, Tue, Thu; 1.25 mg (2.5 mg x 0.5) all other days   Weekly warfarin total:  12.5 mg   Plan last modified:  Kelly M Dianne (2020)   Next INR check:  2020   Target end date:  Indefinite    Indications    Atrial flutter (HCC) [I48.92]  Long term (current) use of anticoagulants [Z79.01] [Z79.01]  Paroxysmal atrial fibrillation (HCC) [I48.0]             Anticoagulation Episode Summary     INR check location:  Anticoagulation Clinic    Preferred lab:      Send INR reminders to:      Comments:        Anticoagulation Care Providers     Provider Role Specialty Phone number    [facility]   [phone]        Anticoagulation Patient Findings      HPI:   Kelly Lovett seen in clinic today, on anticoagulation therapy with warfarin (a high risk medication) for atrial fibrillation, CHADS-VASC = 5      Pt is here today to evaluate anticoagulation therapy    Previous INR was  2.1 on 2020    Pt was instructed to continue current regimen    Confirmed warfarin dosing regimen, denies missed or extra doses of coumadin.   Diet has been consistent with foods rich in vitamin K: Yes  Changes in ETOH:  No  Changes in smoking status: No  Changes in medication: No   Cost restriction: No  S/s of bleeding:  No  Falls or accidents since last visit No  Signs/symptoms  thrombosis since the last appt: No    A/P   INR  therapeutic today,  Continue current warfarin regimen           check referral    Pt educated to contact our clinic with any changes in medications or s/s of bleeding or thrombosis. Pt is aware to seek immediate medical attention for falls, head injury or deep cuts    Follow up  appointment in 8 week(s) to reduce risk of adverse events from warfarin  Amanda Perera, PharmD

## 2020-09-18 ENCOUNTER — NON-PROVIDER VISIT (OUTPATIENT)
Dept: MEDICAL GROUP | Facility: LAB | Age: 75
End: 2020-09-18
Payer: MEDICARE

## 2020-09-18 ENCOUNTER — PATIENT MESSAGE (OUTPATIENT)
Dept: MEDICAL GROUP | Facility: LAB | Age: 75
End: 2020-09-18

## 2020-09-18 DIAGNOSIS — J41.0 SIMPLE CHRONIC BRONCHITIS (HCC): ICD-10-CM

## 2020-09-18 RX ORDER — ALBUTEROL SULFATE 90 UG/1
2 AEROSOL, METERED RESPIRATORY (INHALATION) EVERY 6 HOURS PRN
Qty: 8.5 G | Refills: 1 | Status: SHIPPED | OUTPATIENT
Start: 2020-09-18 | End: 2021-03-30 | Stop reason: SDUPTHER

## 2020-09-21 ENCOUNTER — TELEPHONE (OUTPATIENT)
Dept: MEDICAL GROUP | Facility: LAB | Age: 75
End: 2020-09-21

## 2020-09-21 NOTE — TELEPHONE ENCOUNTER
1. Caller Name: Kelly Tejeda [facility]                      Call Back Number: [phone]      How would the patient prefer to be contacted with a response: Phone call OK to leave a detailed message    Pt is asking for her DMV for to be signed. She said this needs to be turned in on Tuesday.

## 2020-09-22 NOTE — TELEPHONE ENCOUNTER
Called and spoke to patient's  as Kelly's phone does not have good service. Patient was seen by eye doctor for DMV paperwork. Nothing else is needed at this time on our end. Advised to call back if needed.

## 2020-10-01 NOTE — PROGRESS NOTES
Patient came in for an eye exam. Patient could not read the board. At 20 feet away patient was reading at 20/70.  referred patient to opthalmology to get form completed.

## 2020-10-05 ENCOUNTER — PATIENT MESSAGE (OUTPATIENT)
Dept: CARDIOLOGY | Facility: MEDICAL CENTER | Age: 75
End: 2020-10-05

## 2020-10-05 DIAGNOSIS — I48.0 PAF (PAROXYSMAL ATRIAL FIBRILLATION) (HCC): ICD-10-CM

## 2020-10-05 RX ORDER — AMIODARONE HYDROCHLORIDE 200 MG/1
200 TABLET ORAL DAILY
Qty: 30 TAB | Refills: 1 | Status: SHIPPED | OUTPATIENT
Start: 2020-10-05 | End: 2020-12-23 | Stop reason: SDUPTHER

## 2020-11-03 ENCOUNTER — TELEPHONE (OUTPATIENT)
Dept: MEDICAL GROUP | Facility: LAB | Age: 75
End: 2020-11-03

## 2020-11-03 DIAGNOSIS — Z23 NEED FOR VACCINATION: ICD-10-CM

## 2020-11-03 NOTE — TELEPHONE ENCOUNTER
1. Caller Name: Kelly Ceja is on the MA Schedule 11/5/2020 for HD FLU/ SHINGRIX vaccine/injection.    SPECIFIC Action To Be Taken: Orders pending, please sign.

## 2020-11-04 ENCOUNTER — APPOINTMENT (RX ONLY)
Dept: URBAN - METROPOLITAN AREA CLINIC 4 | Facility: CLINIC | Age: 75
Setting detail: DERMATOLOGY
End: 2020-11-04

## 2020-11-04 DIAGNOSIS — Z71.89 OTHER SPECIFIED COUNSELING: ICD-10-CM

## 2020-11-04 DIAGNOSIS — L82.1 OTHER SEBORRHEIC KERATOSIS: ICD-10-CM

## 2020-11-04 DIAGNOSIS — L57.0 ACTINIC KERATOSIS: ICD-10-CM

## 2020-11-04 DIAGNOSIS — D18.0 HEMANGIOMA: ICD-10-CM

## 2020-11-04 DIAGNOSIS — L81.4 OTHER MELANIN HYPERPIGMENTATION: ICD-10-CM

## 2020-11-04 DIAGNOSIS — L21.8 OTHER SEBORRHEIC DERMATITIS: ICD-10-CM

## 2020-11-04 DIAGNOSIS — D22 MELANOCYTIC NEVI: ICD-10-CM

## 2020-11-04 PROBLEM — D22.61 MELANOCYTIC NEVI OF RIGHT UPPER LIMB, INCLUDING SHOULDER: Status: ACTIVE | Noted: 2020-11-04

## 2020-11-04 PROBLEM — D22.5 MELANOCYTIC NEVI OF TRUNK: Status: ACTIVE | Noted: 2020-11-04

## 2020-11-04 PROBLEM — D18.01 HEMANGIOMA OF SKIN AND SUBCUTANEOUS TISSUE: Status: ACTIVE | Noted: 2020-11-04

## 2020-11-04 PROBLEM — D22.62 MELANOCYTIC NEVI OF LEFT UPPER LIMB, INCLUDING SHOULDER: Status: ACTIVE | Noted: 2020-11-04

## 2020-11-04 PROBLEM — D22.39 MELANOCYTIC NEVI OF OTHER PARTS OF FACE: Status: ACTIVE | Noted: 2020-11-04

## 2020-11-04 PROCEDURE — 17000 DESTRUCT PREMALG LESION: CPT

## 2020-11-04 PROCEDURE — 99213 OFFICE O/P EST LOW 20 MIN: CPT | Mod: 25

## 2020-11-04 PROCEDURE — ? LIQUID NITROGEN

## 2020-11-04 PROCEDURE — ? COUNSELING

## 2020-11-04 PROCEDURE — ? ADDITIONAL NOTES

## 2020-11-04 ASSESSMENT — LOCATION SIMPLE DESCRIPTION DERM
LOCATION SIMPLE: LEFT UPPER BACK
LOCATION SIMPLE: LEFT FOREHEAD
LOCATION SIMPLE: LEFT CHEEK
LOCATION SIMPLE: RIGHT UPPER ARM
LOCATION SIMPLE: LEFT FOREARM
LOCATION SIMPLE: LEFT UPPER ARM
LOCATION SIMPLE: ABDOMEN
LOCATION SIMPLE: RIGHT SCALP
LOCATION SIMPLE: CHEST
LOCATION SIMPLE: UPPER BACK
LOCATION SIMPLE: RIGHT CHEEK

## 2020-11-04 ASSESSMENT — LOCATION DETAILED DESCRIPTION DERM
LOCATION DETAILED: SUPERIOR THORACIC SPINE
LOCATION DETAILED: LEFT SUPERIOR MEDIAL UPPER BACK
LOCATION DETAILED: LOWER STERNUM
LOCATION DETAILED: MIDDLE STERNUM
LOCATION DETAILED: INFERIOR THORACIC SPINE
LOCATION DETAILED: LEFT INFERIOR CENTRAL MALAR CHEEK
LOCATION DETAILED: RIGHT MEDIAL FRONTAL SCALP
LOCATION DETAILED: LEFT INFERIOR MEDIAL FOREHEAD
LOCATION DETAILED: RIGHT ANTERIOR DISTAL UPPER ARM
LOCATION DETAILED: EPIGASTRIC SKIN
LOCATION DETAILED: LEFT MEDIAL UPPER BACK
LOCATION DETAILED: RIGHT ANTERIOR LATERAL DISTAL UPPER ARM
LOCATION DETAILED: LEFT VENTRAL PROXIMAL FOREARM
LOCATION DETAILED: RIGHT INFERIOR CENTRAL MALAR CHEEK
LOCATION DETAILED: LEFT ANTERIOR DISTAL UPPER ARM

## 2020-11-04 ASSESSMENT — LOCATION ZONE DERM
LOCATION ZONE: ARM
LOCATION ZONE: FACE
LOCATION ZONE: TRUNK
LOCATION ZONE: SCALP

## 2020-11-04 NOTE — PROCEDURE: LIQUID NITROGEN
Post-Care Instructions: I reviewed with the patient in detail post-care instructions. Patient is to wear sunprotection, and avoid picking at any of the treated lesions. Pt may apply Vaseline to crusted or scabbing areas.
Detail Level: Detailed
Render Note In Bullet Format When Appropriate: No
Duration Of Freeze Thaw-Cycle (Seconds): 3
Consent: The patient's consent was obtained including but not limited to risks of crusting, scabbing, blistering, scarring, darker or lighter pigmentary change, recurrence, incomplete removal and infection.

## 2020-11-05 ENCOUNTER — APPOINTMENT (OUTPATIENT)
Dept: RADIOLOGY | Facility: MEDICAL CENTER | Age: 75
End: 2020-11-05
Attending: EMERGENCY MEDICINE
Payer: MEDICARE

## 2020-11-05 ENCOUNTER — NON-PROVIDER VISIT (OUTPATIENT)
Dept: MEDICAL GROUP | Facility: LAB | Age: 75
End: 2020-11-05
Payer: MEDICARE

## 2020-11-05 ENCOUNTER — HOSPITAL ENCOUNTER (EMERGENCY)
Facility: MEDICAL CENTER | Age: 75
End: 2020-11-05
Attending: EMERGENCY MEDICINE
Payer: MEDICARE

## 2020-11-05 VITALS
TEMPERATURE: 98.8 F | HEART RATE: 74 BPM | RESPIRATION RATE: 13 BRPM | DIASTOLIC BLOOD PRESSURE: 61 MMHG | WEIGHT: 118 LBS | SYSTOLIC BLOOD PRESSURE: 117 MMHG | BODY MASS INDEX: 19.33 KG/M2 | OXYGEN SATURATION: 100 %

## 2020-11-05 DIAGNOSIS — R11.2 NON-INTRACTABLE VOMITING WITH NAUSEA, UNSPECIFIED VOMITING TYPE: ICD-10-CM

## 2020-11-05 DIAGNOSIS — R53.1 GENERALIZED WEAKNESS: ICD-10-CM

## 2020-11-05 DIAGNOSIS — Z23 NEED FOR VACCINATION: ICD-10-CM

## 2020-11-05 LAB
ALBUMIN SERPL BCP-MCNC: 4.1 G/DL (ref 3.2–4.9)
ALBUMIN/GLOB SERPL: 1.9 G/DL
ALP SERPL-CCNC: 76 U/L (ref 30–99)
ALT SERPL-CCNC: 53 U/L (ref 2–50)
ANION GAP SERPL CALC-SCNC: 13 MMOL/L (ref 7–16)
AST SERPL-CCNC: 48 U/L (ref 12–45)
BASOPHILS # BLD AUTO: 0.2 % (ref 0–1.8)
BASOPHILS # BLD: 0.04 K/UL (ref 0–0.12)
BILIRUB SERPL-MCNC: 0.6 MG/DL (ref 0.1–1.5)
BUN SERPL-MCNC: 21 MG/DL (ref 8–22)
CALCIUM SERPL-MCNC: 9.2 MG/DL (ref 8.5–10.5)
CHLORIDE SERPL-SCNC: 102 MMOL/L (ref 96–112)
CO2 SERPL-SCNC: 26 MMOL/L (ref 20–33)
CREAT SERPL-MCNC: 0.98 MG/DL (ref 0.5–1.4)
EKG IMPRESSION: NORMAL
EOSINOPHIL # BLD AUTO: 0.03 K/UL (ref 0–0.51)
EOSINOPHIL NFR BLD: 0.2 % (ref 0–6.9)
ERYTHROCYTE [DISTWIDTH] IN BLOOD BY AUTOMATED COUNT: 49.4 FL (ref 35.9–50)
GLOBULIN SER CALC-MCNC: 2.2 G/DL (ref 1.9–3.5)
GLUCOSE SERPL-MCNC: 130 MG/DL (ref 65–99)
HCT VFR BLD AUTO: 37 % (ref 37–47)
HGB BLD-MCNC: 11.7 G/DL (ref 12–16)
IMM GRANULOCYTES # BLD AUTO: 0.1 K/UL (ref 0–0.11)
IMM GRANULOCYTES NFR BLD AUTO: 0.6 % (ref 0–0.9)
LIPASE SERPL-CCNC: 25 U/L (ref 11–82)
LYMPHOCYTES # BLD AUTO: 0.25 K/UL (ref 1–4.8)
LYMPHOCYTES NFR BLD: 1.5 % (ref 22–41)
MCH RBC QN AUTO: 31.2 PG (ref 27–33)
MCHC RBC AUTO-ENTMCNC: 31.6 G/DL (ref 33.6–35)
MCV RBC AUTO: 98.7 FL (ref 81.4–97.8)
MONOCYTES # BLD AUTO: 1.3 K/UL (ref 0–0.85)
MONOCYTES NFR BLD AUTO: 7.8 % (ref 0–13.4)
NEUTROPHILS # BLD AUTO: 14.98 K/UL (ref 2–7.15)
NEUTROPHILS NFR BLD: 89.7 % (ref 44–72)
NRBC # BLD AUTO: 0 K/UL
NRBC BLD-RTO: 0 /100 WBC
PLATELET # BLD AUTO: 215 K/UL (ref 164–446)
PMV BLD AUTO: 9.9 FL (ref 9–12.9)
POTASSIUM SERPL-SCNC: 3.8 MMOL/L (ref 3.6–5.5)
PROT SERPL-MCNC: 6.3 G/DL (ref 6–8.2)
RBC # BLD AUTO: 3.75 M/UL (ref 4.2–5.4)
SODIUM SERPL-SCNC: 141 MMOL/L (ref 135–145)
TROPONIN T SERPL-MCNC: 12 NG/L (ref 6–19)
WBC # BLD AUTO: 16.7 K/UL (ref 4.8–10.8)

## 2020-11-05 PROCEDURE — 71045 X-RAY EXAM CHEST 1 VIEW: CPT

## 2020-11-05 PROCEDURE — 90662 IIV NO PRSV INCREASED AG IM: CPT | Performed by: FAMILY MEDICINE

## 2020-11-05 PROCEDURE — G0008 ADMIN INFLUENZA VIRUS VAC: HCPCS | Performed by: FAMILY MEDICINE

## 2020-11-05 PROCEDURE — 99284 EMERGENCY DEPT VISIT MOD MDM: CPT

## 2020-11-05 PROCEDURE — 85025 COMPLETE CBC W/AUTO DIFF WBC: CPT

## 2020-11-05 PROCEDURE — 83690 ASSAY OF LIPASE: CPT

## 2020-11-05 PROCEDURE — 80053 COMPREHEN METABOLIC PANEL: CPT

## 2020-11-05 PROCEDURE — 700105 HCHG RX REV CODE 258: Performed by: EMERGENCY MEDICINE

## 2020-11-05 PROCEDURE — 84484 ASSAY OF TROPONIN QUANT: CPT

## 2020-11-05 PROCEDURE — 93005 ELECTROCARDIOGRAM TRACING: CPT | Performed by: EMERGENCY MEDICINE

## 2020-11-05 RX ORDER — ONDANSETRON 4 MG/1
4 TABLET, ORALLY DISINTEGRATING ORAL EVERY 8 HOURS PRN
Qty: 10 TAB | Refills: 0 | Status: SHIPPED | OUTPATIENT
Start: 2020-11-05 | End: 2020-12-04

## 2020-11-05 RX ORDER — SODIUM CHLORIDE 9 MG/ML
1000 INJECTION, SOLUTION INTRAVENOUS ONCE
Status: COMPLETED | OUTPATIENT
Start: 2020-11-05 | End: 2020-11-05

## 2020-11-05 RX ADMIN — SODIUM CHLORIDE 1000 ML: 9 INJECTION, SOLUTION INTRAVENOUS at 20:59

## 2020-11-05 ASSESSMENT — FIBROSIS 4 INDEX: FIB4 SCORE: 2.31

## 2020-11-05 NOTE — TELEPHONE ENCOUNTER
Called patient regarding cardiomems readings, Patient has been having trouble getting proper positioning on pillow, requested that she call technical support when she has time to see if they can assist her in sending the readings with better waveforms    will take from pcp

## 2020-11-05 NOTE — PROGRESS NOTES
"Kelly Tejeda [facility] is a 75 y.o. female here for a non-provider visit for:   FLU    Reason for immunization: Annual Flu Vaccine  Immunization records indicate need for vaccine: Yes, confirmed with Epic  Minimum interval has been met for this vaccine: Yes  ABN completed: Not Indicated    Order and dose verified by: AB  VIS Dated  08/15/2019 was given to patient: Yes  All IAC Questionnaire questions were answered \"No.\"    Patient tolerated injection and no adverse effects were observed or reported: Yes    Pt scheduled for next dose in series: No  "

## 2020-11-06 NOTE — ED TRIAGE NOTES
Chief Complaint   Patient presents with   • Vomiting     Pt BIBA c/o [place] x1 after flu shot today. EMS stated SOB o2 sat 88% on arrival pt denies SOB 97% on Rx O2.    • Shortness of Breath

## 2020-11-06 NOTE — ED NOTES
Discharge instructions given and discussed, signed copy in chart. Pt verbalized understanding and all questions answered. Pt discharged home in stable condition. Personal belongings with patient. IV removed and tolerated well.

## 2020-11-06 NOTE — ED PROVIDER NOTES
ED Provider Note    CHIEF COMPLAINT  Chief Complaint   Patient presents with   • Vomiting     Pt BIBA c/o NV x1 after flu shot today. EMS stated SOB o2 sat 88% on arrival pt denies SOB 97% on Rx O2.    • Shortness of Breath       HPI  Kelly Lovett is a 75 y.o. female who presents for evaluation of 3 episodes of vomiting a few hours after receiving her flu shot.  She feels the sense of generalized weakness, she is had some diarrhea as well.  No abdominal pain, no chest pain or shortness of breath beyond her normal.  She states that last year when she had her flu shot she also had a reaction to it the same day.  She has a history of COPD, a cardiomyopathy status post valve replacement, hypercholesterolemia, hypertension.  No focal weakness numbness or tingling.  She arrives via ambulance, received some antiemetics and is feeling improved.  Her nausea has resolved, she has not vomited since leaving her home.    REVIEW OF SYSTEMS  Negative for fever, rash, chest pain, abdominal pain, headache, focal weakness, focal numbness, focal tingling, back pain. All other systems are negative.     PAST MEDICAL HISTORY  Past Medical History:   Diagnosis Date   • Asthma     inhalers   • Breath shortness     pt reports using o2 at night 2L, no problems at this time   • Chronic systolic congestive heart failure (HCC) 7/7/2016   • COPD (chronic obstructive pulmonary disease) (HCC)    • Dilated cardiomyopathy (HCC)    • Emphysema of lung (HCC)    • Heart valve disease    • High cholesterol    • History of heart surgery    • Hyperlipidemia    • Hypertension    • Hypotension due to hypovolemia 3/1/2019   • Sleep apnea     uses cpap       FAMILY HISTORY  Family History   Problem Relation Age of Onset   • Cancer Father         colon cancer    • Hypertension Mother    • Cancer Sister 68        ovarian cancer       SOCIAL HISTORY  Social History     Tobacco Use   • Smoking status: Former Smoker     Packs/day: 0.50     Years: 38.00      Pack years: 19.00     Types: Cigarettes     Quit date: 10/11/2001     Years since quittin.0   • Smokeless tobacco: Never Used   Substance Use Topics   • Alcohol use: Yes     Alcohol/week: 8.4 oz     Types: 14 Shots of liquor per week     Comment: 4x week   • Drug use: No       SURGICAL HISTORY  Past Surgical History:   Procedure Laterality Date   • MITRAL VALVE REPAIR  2017    Procedure: MITRAL VALVE REPAIR ;  Surgeon: Lacey Porras M.D.;  Location: SURGERY [facility] [place];  Service:    • MAZE PROCEDURE Left 2017    Procedure: MAZE PROCEDURE, Left atrial appendage ligation;  Surgeon: Lacey Porras M.D.;  Location: SURGERY [facility] [place];  Service:    • PAGE  2017    Procedure: PAGE;  Surgeon: Lacey Porras M.D.;  Location: SURGERY [facility] [place];  Service:    • APPENDECTOMY      1967   • OOPHORECTOMY         CURRENT MEDICATIONS  I personally reviewed the medication list in the charting documentation.     ALLERGIES  Allergies   Allergen Reactions   • Sulfa Drugs Rash     Rxn - years ago in her 20's       MEDICAL RECORD  I have reviewed patient's medical record and pertinent results are listed above.      PHYSICAL EXAM  VITAL SIGNS: /58   Pulse 79   Temp 37.1 °C (98.8 °F) (Temporal)   Resp 13   Wt 53.5 kg (118 lb)   LMP  (LMP Unknown)   SpO2 96%   BMI 19.33 kg/m²    Constitutional: Elderly otherwise well appearing patient in no acute distress.  Not toxic, nor ill in appearance.  HENT: Normocephalic, no evidence of acute trauma. Dry mucous membranes  Eyes: No scleral icterus. Normal conjunctiva.    Neck: Supple, comfortable, nonpainful range of motion.   Cardiovascular: Regular heart rate and rhythm.   Thorax & Lungs: Chest is nontender.  Lungs are clear to auscultation with good air movement bilaterally.  No wheeze, rhonchi, nor rales.   Abdomen: Soft, with no tenderness, rebound nor guarding.  No mass or pulsatile mass appreciated.  Skin: Warm, dry. No rash  appreciated  Extremities/Musculoskeletal: No sign of trauma. No asymmetric calf tenderness, erythema or edema. Normal range of motion   Neurologic: Alert & oriented. No focal deficits observed.   Psychiatric: Normal affect appropriate for the clinical situation.    DIAGNOSTIC STUDIES / PROCEDURES    LABS/EKGs  Results for orders placed or performed during the hospital encounter of 11/05/20   CBC WITH DIFFERENTIAL   Result Value Ref Range    WBC 16.7 (H) 4.8 - 10.8 K/uL    RBC 3.75 (L) 4.20 - 5.40 M/uL    Hemoglobin 11.7 (L) 12.0 - 16.0 g/dL    Hematocrit 37.0 37.0 - 47.0 %    MCV 98.7 (H) 81.4 - 97.8 fL    MCH 31.2 27.0 - 33.0 pg    MCHC 31.6 (L) 33.6 - 35.0 g/dL    RDW 49.4 35.9 - 50.0 fL    Platelet Count 215 164 - 446 K/uL    MPV 9.9 9.0 - 12.9 fL    Neutrophils-Polys 89.70 (H) 44.00 - 72.00 %    Lymphocytes 1.50 (L) 22.00 - 41.00 %    Monocytes 7.80 0.00 - 13.40 %    Eosinophils 0.20 0.00 - 6.90 %    Basophils 0.20 0.00 - 1.80 %    Immature Granulocytes 0.60 0.00 - 0.90 %    Nucleated RBC 0.00 /100 WBC    Neutrophils (Absolute) 14.98 (H) 2.00 - 7.15 K/uL    Lymphs (Absolute) 0.25 (L) 1.00 - 4.80 K/uL    Monos (Absolute) 1.30 (H) 0.00 - 0.85 K/uL    Eos (Absolute) 0.03 0.00 - 0.51 K/uL    Baso (Absolute) 0.04 0.00 - 0.12 K/uL    Immature Granulocytes (abs) 0.10 0.00 - 0.11 K/uL    NRBC (Absolute) 0.00 K/uL   COMP METABOLIC PANEL   Result Value Ref Range    Sodium 141 135 - 145 mmol/L    Potassium 3.8 3.6 - 5.5 mmol/L    Chloride 102 96 - 112 mmol/L    Co2 26 20 - 33 mmol/L    Anion Gap 13.0 7.0 - 16.0    Glucose 130 (H) 65 - 99 mg/dL    Bun 21 8 - 22 mg/dL    Creatinine 0.98 0.50 - 1.40 mg/dL    Calcium 9.2 8.5 - 10.5 mg/dL    AST(SGOT) 48 (H) 12 - 45 U/L    ALT(SGPT) 53 (H) 2 - 50 U/L    Alkaline Phosphatase 76 30 - 99 U/L    Total Bilirubin 0.6 0.1 - 1.5 mg/dL    Albumin 4.1 3.2 - 4.9 g/dL    Total Protein 6.3 6.0 - 8.2 g/dL    Globulin 2.2 1.9 - 3.5 g/dL    A-G Ratio 1.9 g/dL   TROPONIN   Result Value Ref  Range    Troponin T 12 6 - 19 ng/L   LIPASE   Result Value Ref Range    Lipase 25 11 - 82 U/L   ESTIMATED GFR   Result Value Ref Range    GFR If African American >60 >60 mL/min/1.73 m 2    GFR If Non African American 55 (A) >60 mL/min/1.73 m 2   EKG   Result Value Ref Range    Report       [facility]    Test Date:  2020  Pt Name:    JOSE QUIROS             Department: ER  MRN:        [id number]                      Room:        24  Gender:     Female                       Technician: [id number]  :        1945                   Requested By:MELA CHAIDEZ  Order #:    [id number]                    Reading MD:    Measurements  Intervals                                Axis  Rate:       75                           P:          69  ND:         188                          QRS:        -32  QRSD:       98                           T:          61  QT:         396  QTc:        443    Interpretive Statements  SINUS RHYTHM  ANTERIOR INFARCT, AGE INDETERMINATE  Compared to ECG 2019 15:54:31  Myocardial infarct finding now present  Ectopic atrial rhythm no longer present  Early repolarization no longer present  Possible ischemia no longer present          RADIOLOGY  DX-CHEST-LIMITED (1 VIEW)   Final Result         1.  No acute cardiopulmonary disease.   2.  Cardiomegaly   3.  Atherosclerosis            COURSE & MEDICAL DECISION MAKING  I have reviewed any medical record information, laboratory studies and radiographic results as noted above.  Differential diagnoses includes: Dehydration, electrolyte abnormalities, anemia, pancreatitis, hepatitis, ACS, pneumothorax, pneumonia    Encounter Summary: This is a 75 y.o. female with 3 episodes of vomiting with some diarrhea after getting her flu shot, she is concerned that she is dehydrated and feels generally weak, she appears somewhat dehydrated on examination otherwise no focal findings on exam.  The patient arrives via  ambulance, she received some Zofran on the ambulance and is feeling improved with resolved nausea.  Will administer IV fluids, check blood work and EKG and troponin and reevaluate ------ blood work reveals leukocytosis, otherwise very reassuring.  Upon reevaluation the patient is comfortable, she has been asymptomatic her whole time here, although she does seem hesitant to 1 to be discharged I explained the risks and benefits of admission and ultimately since she does not meet any acute needs for admission I think it would be in her best interest to go home.  I will prescribe some Zofran, strict return instructions discussed with her and her , stable and appropriate for discharge.  /61   Pulse 74   Temp 37.1 °C (98.8 °F) (Temporal)   Resp 13   Wt 53.5 kg (118 lb)   LMP  (LMP Unknown)   SpO2 100%   BMI 19.33 kg/m²       DISPOSITION: Discharged home in stable condition      FINAL IMPRESSION  1. Non-intractable vomiting with nausea, unspecified vomiting type    2. Generalized weakness           This dictation was created using voice recognition software. The accuracy of the dictation is limited to the abilities of the software. I expect there may be some errors of grammar and possibly content. The nursing notes were reviewed and certain aspects of this information were incorporated into this note.    Electronically signed by: Armando Patiño M.D., 11/5/2020 7:47 PM

## 2020-11-09 ENCOUNTER — PATIENT OUTREACH (OUTPATIENT)
Dept: HEALTH INFORMATION MANAGEMENT | Facility: OTHER | Age: 75
End: 2020-11-09

## 2020-11-12 ENCOUNTER — APPOINTMENT (OUTPATIENT)
Dept: MEDICAL GROUP | Facility: MEDICAL CENTER | Age: 75
End: 2020-11-12
Payer: MEDICARE

## 2020-11-12 DIAGNOSIS — E78.2 MIXED HYPERLIPIDEMIA: ICD-10-CM

## 2020-11-13 RX ORDER — ATORVASTATIN CALCIUM 40 MG/1
TABLET, FILM COATED ORAL
Qty: 100 TAB | Refills: 0 | Status: SHIPPED | OUTPATIENT
Start: 2020-11-13 | End: 2021-02-25 | Stop reason: SDUPTHER

## 2020-11-16 DIAGNOSIS — I48.0 PAROXYSMAL ATRIAL FIBRILLATION (HCC): ICD-10-CM

## 2020-11-16 NOTE — PROGRESS NOTES
Received request via: Patient    Was the patient seen in the last year in this department? Yes  8/25/2020  Does the patient have an active prescription (recently filled or refills available) for medication(s) requested? No

## 2020-11-17 RX ORDER — DIGOXIN 125 MCG
TABLET ORAL
Qty: 90 TAB | Refills: 3 | Status: SHIPPED
Start: 2020-11-17 | End: 2021-06-02

## 2020-11-19 ENCOUNTER — ANTICOAGULATION VISIT (OUTPATIENT)
Dept: MEDICAL GROUP | Facility: MEDICAL CENTER | Age: 75
End: 2020-11-19
Payer: MEDICARE

## 2020-11-19 DIAGNOSIS — I48.0 PAROXYSMAL ATRIAL FIBRILLATION (HCC): ICD-10-CM

## 2020-11-19 DIAGNOSIS — Z79.01 LONG TERM (CURRENT) USE OF ANTICOAGULANTS: Primary | ICD-10-CM

## 2020-11-19 LAB — INR PPP: 1.9 (ref 2–3.5)

## 2020-11-19 PROCEDURE — 85610 PROTHROMBIN TIME: CPT | Performed by: INTERNAL MEDICINE

## 2020-11-19 PROCEDURE — 93793 ANTICOAG MGMT PT WARFARIN: CPT | Performed by: INTERNAL MEDICINE

## 2020-11-19 NOTE — PROGRESS NOTES
OP Anticoagulation Service Note    Date: 2020  There were no vitals filed for this visit.   pt declined vitals    Anticoagulation Summary  As of 2020    INR goal:  2.0-3.0   TTR:  66.3 % (1.4 y)   INR used for dosin.90 (2020)   Warfarin maintenance plan:  2.5 mg (2.5 mg x 1) every Sun, Tue, Thu; 1.25 mg (2.5 mg x 0.5) all other days   Weekly warfarin total:  12.5 mg   Plan last modified:  Kelly JUAN Dianne (2020)   Next INR check:  12/10/2020   Target end date:  Indefinite    Indications    Atrial flutter (HCC) [I48.92]  Long term (current) use of anticoagulants [Z79.01] [Z79.01]  Paroxysmal atrial fibrillation (HCC) [I48.0]             Anticoagulation Episode Summary     INR check location:  Anticoagulation Clinic    Preferred lab:      Send INR reminders to:      Comments:        Anticoagulation Care Providers     Provider Role Specialty Phone number    [facility]   [phone]        Anticoagulation Patient Findings      HPI:   Kelly Casebernadine seen in clinic today, on anticoagulation therapy with warfarin (a high risk medication) for atrial fibrillation, CHADS-VASC = 5      Pt is here today to evaluate anticoagulation therapy    Previous INR was  2.3 on 2020    Pt was instructed to continue current regimen    Confirmed warfarin dosing regimen, denies missed or extra doses of coumadin.   Diet has been consistent with foods rich in vitamin K: Yes  Changes in ETOH:  No  Changes in smoking status: No  Changes in medication: No   Cost restriction: No  S/s of bleeding:  No  Falls or accidents since last visit No  Signs/symptoms  thrombosis since the last appt: No    A/P   INR  SUBtherapeutic today, but close to range.   Continue current warfarin regimen           check referral    Pt educated to contact our clinic with any changes in medications or s/s of bleeding or thrombosis. Pt is aware to seek immediate medical attention for falls, head injury or  deep cuts    Follow up appointment in 3 week(s) to reduce risk of adverse events from warfarin   Amanda Perera, PharmD

## 2020-12-02 ENCOUNTER — TELEMEDICINE (OUTPATIENT)
Dept: MEDICAL GROUP | Facility: LAB | Age: 75
End: 2020-12-02
Payer: MEDICARE

## 2020-12-02 ENCOUNTER — HOME HEALTH ADMISSION (OUTPATIENT)
Dept: HOME HEALTH SERVICES | Facility: HOME HEALTHCARE | Age: 75
End: 2020-12-02
Payer: MEDICARE

## 2020-12-02 VITALS — HEIGHT: 66 IN | BODY MASS INDEX: 18.96 KG/M2 | WEIGHT: 118 LBS | HEART RATE: 71 BPM | OXYGEN SATURATION: 96 %

## 2020-12-02 DIAGNOSIS — R29.6 FALLS FREQUENTLY: ICD-10-CM

## 2020-12-02 DIAGNOSIS — Z79.01 LONG TERM (CURRENT) USE OF ANTICOAGULANTS: ICD-10-CM

## 2020-12-02 PROCEDURE — 99213 OFFICE O/P EST LOW 20 MIN: CPT | Mod: 95,CR | Performed by: FAMILY MEDICINE

## 2020-12-02 ASSESSMENT — FIBROSIS 4 INDEX: FIB4 SCORE: 2.33

## 2020-12-02 NOTE — PROGRESS NOTES
"Virtual Visit: Established Patient   This visit was conducted via Zoom using secure and encrypted videoconferencing technology. The patient was in a private location in the state of [place].    The patient's identity was confirmed and verbal consent was obtained for this virtual visit.    Subjective:   CC:   Chief Complaint   Patient presents with   • Follow-Up       Kelly Lovett is a 75 y.o. female presenting for evaluation and management of:    Falls  Visit scheduled today after recent discussion with her daughters surrounding concern for frequent falls.  She and  live alone.  She is anticoagulated for a-flutter.  She does report that she is having falls about 1-2 times monthly, feels like \"legs giving out \".  Is not concerned about any recent fall and currently feels well.    ROS  Denies any recent fevers or chills. No nausea or vomiting. No chest pains or shortness of breath.     Allergies   Allergen Reactions   • Sulfa Drugs Rash     Rxn - years ago in her 20's       Current medicines (including changes today)  Current Outpatient Medications   Medication Sig Dispense Refill   • digoxin (LANOXIN) 125 MCG Tab TAKE 1 TABLET BY MOUTH EVERY DAY AT 6 PM. 90 Tab 3   • atorvastatin (LIPITOR) 40 MG Tab TAKE ONE TABLET BY MOUTH ONE TIME DAILY 100 Tab 0   • ondansetron (ZOFRAN ODT) 4 MG TABLET DISPERSIBLE Take 1 Tab by mouth every 8 hours as needed. 10 Tab 0   • amiodarone (CORDARONE) 200 MG Tab Take 1 Tab by mouth every day. 30 Tab 1   • albuterol 108 (90 Base) MCG/ACT Aero Soln inhalation aerosol Inhale 2 Puffs by mouth every 6 hours as needed for Shortness of Breath. 8.5 g 1   • Tiotropium Bromide Monohydrate (SPIRIVA RESPIMAT) 1.25 MCG/ACT Aero Soln Inhale 2 Puffs by mouth every day. 12 g 2   • warfarin (COUMADIN) 2.5 MG Tab Take one-half to one tablet by mouth daily or as directed by anticoagulation clinic 90 Tab 1   • sertraline (ZOLOFT) 100 MG Tab Take 1 Tab by mouth every day. 90 Tab 3   • " fluticasone-salmeterol (ADVAIR) 250-50 MCG/DOSE AEROSOL POWDER, BREATH ACTIVATED Inhale 1 Puff by mouth 2 times a day. 3 Inhaler 3   • metoprolol SR (TOPROL XL) 100 MG TABLET SR 24 HR Take 1 Tab by mouth every day. 100 Tab 2   • Calcium Carb-Cholecalciferol (CALCIUM 1000 + D PO) Take 1 Cap by mouth every day.     • magnesium oxide (MAG-OX) 400 (241.3 Mg) MG Tab tablet Take 1 Tab by mouth every day.     • acetaminophen (TYLENOL) 500 MG Tab Take 500 mg by mouth 1 time daily as needed. Headache       No current facility-administered medications for this visit.        Patient Active Problem List    Diagnosis Date Noted   • Non-rheumatic mitral regurgitation 03/08/2017     Priority: High   • Chronic systolic congestive heart failure ([facility]) 07/07/2016     Priority: High   • COPD (chronic obstructive pulmonary disease) ([facility]) 10/11/2016     Priority: Medium   • Obstructive sleep apnea syndrome 10/11/2016     Priority: Medium   • Atrial flutter ([facility]) 12/15/2015     Priority: Medium   • Chronic respiratory failure with hypoxia ([facility]) 11/23/2015     Priority: Medium   • Hyperlipidemia 09/10/2015     Priority: Medium   • Debility 03/03/2019     Priority: Low   • Long term (current) use of anticoagulants [Z79.01] 08/06/2018     Priority: Low   • Hypertensive heart disease with heart failure ([facility]) 03/20/2019   • Pulmonary hypertension due to left heart disease ([facility]) 03/20/2019   • DNR (do not resuscitate) 02/26/2019   • Voice hoarseness 07/18/2018   • Risk for falls 01/10/2018   • Moderate single current episode of major depressive disorder ([facility]) 12/05/2017   • Paroxysmal atrial fibrillation ([facility]) 06/19/2017   • Essential (primary) hypertension 03/17/2017   • Osteopenia 10/11/2016   • Aortic atherosclerosis ([facility]) 09/10/2015   • Prediabetes 08/15/2013   • History of colonic polyps 03/03/2008       Family History   Problem Relation Age of Onset   • Cancer Father         colon cancer    • Hypertension Mother    • Cancer Sister 68       "  ovarian cancer       She  has a past medical history of Asthma, Breath shortness, Chronic systolic congestive heart failure (HCC) (7/7/2016), COPD (chronic obstructive pulmonary disease) (HCC), Dilated cardiomyopathy (HCC), Emphysema of lung (HCC), Heart valve disease, High cholesterol, History of heart surgery, Hyperlipidemia, Hypertension, Hypotension due to hypovolemia (3/1/2019), and Sleep apnea.  She  has a past surgical history that includes oophorectomy; appendectomy; mitral valve repair (4/20/2017); maze procedure (Left, 4/20/2017); and idalmis (4/20/2017).       Objective:   Pulse 71 Comment: Verbal  Ht 1.664 m (5' 5.51\") Comment: Verbal  Wt 53.5 kg (118 lb) Comment: Verbal  LMP  (LMP Unknown)   SpO2 96% Comment: Verbal  BMI 19.33 kg/m²     Physical Exam:  Constitutional: Alert, no distress, well-groomed.  Skin: No rashes in visible areas.  Eye: Round. Conjunctiva clear, lids normal. No icterus.   ENMT: Lips pink without lesions, good dentition, moist mucous membranes. Phonation normal.  Neck: No masses, no thyromegaly. Moves freely without pain.  Respiratory: Unlabored respiratory effort, no cough or audible wheeze  Psych: Alert and oriented x3, normal affect and mood.       Assessment and Plan:   The following treatment plan was discussed:     1. Falls frequently  2. Long term (current) use of anticoagulants [Z79.01]  Virtual visit today after I had a conversation with her daughters regarding concern for falls at home.  She does report that she is following 1-2 times monthly, no recent fall and currently feels well.  Currently does not use a walker or cane, offered to order a walker but she declines at this point.  She will try cane at home and she is also open to home physical therapy.  She is relatively homebound due to chronic oxygen use.  Also discussed concern that alcohol use could be contributing to falls and recommendation that she limit this.  - REFERRAL TO HOME HEALTH    Follow-up: Return if " symptoms worsen or fail to improve.       Face to Face Supporting Documentation - Home Health    The encounter with this patient was in whole or in part the primary reason for home health admission.    Date of encounter:   Patient:                    MRN:                       YOB: 2021  Kelly Lovett  [id number]  1945     Home health to see patient for:  Physical Therapy evaluation and treatment    Skilled need for:  Recent Deterioration of Health Status increasing falls    Skilled nursing interventions to include:  Comment: PT only requested    Homebound status evidenced by:  Need the aid of supportive devices such as crutches, canes, wheelchairs or walkers or Needs the assistance of another person in order to leave the home. Leaving home requires a considerable and taxing effort. There is a normal inability to leave the home.    Community Physician to provide follow up care: Chinmay Figueredo M.D.     Optional Interventions? No      I certify the face to face encounter for this home health care referral meets the CMS requirements and the encounter/clinical assessment with the patient was, in whole, or in part, for the medical condition(s) listed above, which is the primary reason for home health care. Based on my clinical findings: the service(s) are medically necessary, support the need for home health care, and the homebound criteria are met.  I certify that this patient has had a face to face encounter by myself.  Chinmay Figueredo M.D. - NPI: [id number]

## 2020-12-04 ENCOUNTER — HOME CARE VISIT (OUTPATIENT)
Dept: HOME HEALTH SERVICES | Facility: HOME HEALTHCARE | Age: 75
End: 2020-12-04
Payer: MEDICARE

## 2020-12-04 PROCEDURE — 665001 SOC-HOME HEALTH

## 2020-12-04 PROCEDURE — G0151 HHCP-SERV OF PT,EA 15 MIN: HCPCS

## 2020-12-04 SDOH — ECONOMIC STABILITY: FOOD INSECURITY: MEALS PER DAY: 3

## 2020-12-04 ASSESSMENT — ENCOUNTER SYMPTOMS
LAST BOWEL MOVEMENT: 65717
APPETITE LEVEL: GOOD
NAUSEA: DENIES
VOMITING: DENIES
CONSTIPATION: 1

## 2020-12-04 ASSESSMENT — FIBROSIS 4 INDEX: FIB4 SCORE: 2.33

## 2020-12-04 ASSESSMENT — PATIENT HEALTH QUESTIONNAIRE - PHQ9: CLINICAL INTERPRETATION OF PHQ2 SCORE: 0

## 2020-12-05 ENCOUNTER — HOME CARE VISIT (OUTPATIENT)
Dept: HOME HEALTH SERVICES | Facility: HOME HEALTHCARE | Age: 75
End: 2020-12-05
Payer: MEDICARE

## 2020-12-05 VITALS
BODY MASS INDEX: 19.06 KG/M2 | SYSTOLIC BLOOD PRESSURE: 112 MMHG | WEIGHT: 116.38 LBS | DIASTOLIC BLOOD PRESSURE: 60 MMHG | HEART RATE: 62 BPM | TEMPERATURE: 98.2 F | RESPIRATION RATE: 18 BRPM | OXYGEN SATURATION: 98 %

## 2020-12-05 SDOH — ECONOMIC STABILITY: HOUSING INSECURITY
HOME SAFETY: FIRE ESCAPE PLAN DEVELOPED. PATIENT DOES NOT HAVE FLAMMABLE MATERIALS PRESENT IN THE HOME PRESENTING A FIRE HAZARD. NO EVIDENCE FOUND OF SMOKING MATERIALS PRESENT IN THE HOME.  PATIENT'S HUSBAND DECLINED TO MOVE 2 OXYGEN CYLINDER TANKS; STATES THAT

## 2020-12-05 SDOH — ECONOMIC STABILITY: HOUSING INSECURITY: EVIDENCE OF SMOKING MATERIAL: 0

## 2020-12-05 SDOH — ECONOMIC STABILITY: HOUSING INSECURITY
HOME SAFETY: THEY ARE NOT IN A RACK BUT SECURED. P.T. INSTRUCTED PATIENT TO NOT USE GAS STOVE WITH OXYGEN ON.  PREFERRED HOME CARE IS OXYGEN PROVIDER.

## 2020-12-05 ASSESSMENT — ACTIVITIES OF DAILY LIVING (ADL)
CURRENT_FUNCTION: SUPERVISION
PHYSICAL TRANSFERS ASSESSED: 1
AMBULATION ASSISTANCE: 1
AMBULATION ASSISTANCE ON FLAT SURFACES: 1
AMBULATION ASSISTANCE: SUPERVISION

## 2020-12-05 ASSESSMENT — ENCOUNTER SYMPTOMS
SHORTNESS OF BREATH: T
HYPERTENSION: 1

## 2020-12-06 NOTE — PROGRESS NOTES
MED REC- call placed to review medications with pt, as she was admitted 12/4/20 by PT. Pt did not answer phone today, left lengthy message to call HH for any questions. Pt noted to be on Coumadin, last INR was 11/19/2020- INR was 1.9, left on same med regime, next INR 12/10/20. Will contact [facility] and request INR order for the 10th be placed and HHN will do the INR. Left HH call back number.

## 2020-12-07 ENCOUNTER — HOME CARE VISIT (OUTPATIENT)
Dept: HOME HEALTH SERVICES | Facility: HOME HEALTHCARE | Age: 75
End: 2020-12-07
Payer: MEDICARE

## 2020-12-07 ENCOUNTER — ANTICOAGULATION MONITORING (OUTPATIENT)
Dept: MEDICAL GROUP | Facility: PHYSICIAN GROUP | Age: 75
End: 2020-12-07

## 2020-12-07 VITALS
OXYGEN SATURATION: 96 % | RESPIRATION RATE: 18 BRPM | SYSTOLIC BLOOD PRESSURE: 122 MMHG | TEMPERATURE: 97.8 F | HEART RATE: 61 BPM | DIASTOLIC BLOOD PRESSURE: 70 MMHG

## 2020-12-07 DIAGNOSIS — Z79.01 LONG TERM (CURRENT) USE OF ANTICOAGULANTS: ICD-10-CM

## 2020-12-07 DIAGNOSIS — I48.0 PAROXYSMAL ATRIAL FIBRILLATION (HCC): ICD-10-CM

## 2020-12-07 PROCEDURE — G0151 HHCP-SERV OF PT,EA 15 MIN: HCPCS

## 2020-12-07 ASSESSMENT — ACTIVITIES OF DAILY LIVING (ADL)
AMBULATION ASSISTANCE ON FLAT SURFACES: 1
AMBULATION_DISTANCE/DURATION_TOLERATED: <150 FT ONE WAY

## 2020-12-07 NOTE — PROGRESS NOTES
Received referral from [facility]. Medications reviewed.     Drug-Drug: digoxin and amiodarone  Pharmacologic effects and plasma concentrations of Digoxin may be increased by Amiodarone. Toxicity characterized by gastrointestinal and neuropsychiatric symptoms may occur. Cardiac arrhythmias are possible.    Drug-Drug: warfarin and amiodarone  Amiodarone may inhibit hepatic metabolism and increase the anticoagulant effect of Anticoagulants. Bleeding may occur.      We will check her INR again this week, we manage her warfarin.    Estiven Balderas, PharmD, MS, BCACP, LCC    [facility]  Phone [phone] fax [phone]    This note was created using voice recognition software (Dragon). The accuracy of the dictation is limited by the abilities of the software. I have reviewed the note prior to signing, however some errors in grammar and context are still possible. If you have any questions related to this note please do not hesitate to contact our office.

## 2020-12-08 ENCOUNTER — HOME CARE VISIT (OUTPATIENT)
Dept: HOME HEALTH SERVICES | Facility: HOME HEALTHCARE | Age: 75
End: 2020-12-08
Payer: MEDICARE

## 2020-12-08 VITALS
TEMPERATURE: 98.1 F | HEART RATE: 60 BPM | BODY MASS INDEX: 19.17 KG/M2 | WEIGHT: 117 LBS | SYSTOLIC BLOOD PRESSURE: 116 MMHG | DIASTOLIC BLOOD PRESSURE: 64 MMHG | RESPIRATION RATE: 18 BRPM | OXYGEN SATURATION: 99 %

## 2020-12-08 PROCEDURE — G0299 HHS/HOSPICE OF RN EA 15 MIN: HCPCS

## 2020-12-08 ASSESSMENT — ENCOUNTER SYMPTOMS
VOMITING: DENIES
MUSCLE WEAKNESS: 1
NAUSEA: DENIES

## 2020-12-08 ASSESSMENT — FIBROSIS 4 INDEX: FIB4 SCORE: 2.33

## 2020-12-10 ENCOUNTER — HOME CARE VISIT (OUTPATIENT)
Dept: HOME HEALTH SERVICES | Facility: HOME HEALTHCARE | Age: 75
End: 2020-12-10
Payer: MEDICARE

## 2020-12-10 ENCOUNTER — ANTICOAGULATION VISIT (OUTPATIENT)
Dept: MEDICAL GROUP | Facility: MEDICAL CENTER | Age: 75
End: 2020-12-10
Payer: MEDICARE

## 2020-12-10 VITALS
TEMPERATURE: 97.6 F | HEART RATE: 71 BPM | OXYGEN SATURATION: 93 % | RESPIRATION RATE: 18 BRPM | WEIGHT: 117 LBS | SYSTOLIC BLOOD PRESSURE: 100 MMHG | DIASTOLIC BLOOD PRESSURE: 60 MMHG | BODY MASS INDEX: 19.17 KG/M2

## 2020-12-10 DIAGNOSIS — I48.0 PAROXYSMAL ATRIAL FIBRILLATION (HCC): ICD-10-CM

## 2020-12-10 DIAGNOSIS — Z79.01 LONG TERM (CURRENT) USE OF ANTICOAGULANTS: Primary | ICD-10-CM

## 2020-12-10 LAB — INR PPP: 1.7 (ref 2–3.5)

## 2020-12-10 PROCEDURE — G0151 HHCP-SERV OF PT,EA 15 MIN: HCPCS

## 2020-12-10 PROCEDURE — 99211 OFF/OP EST MAY X REQ PHY/QHP: CPT | Performed by: INTERNAL MEDICINE

## 2020-12-10 PROCEDURE — G0493 RN CARE EA 15 MIN HH/HOSPICE: HCPCS

## 2020-12-10 PROCEDURE — 85610 PROTHROMBIN TIME: CPT | Performed by: INTERNAL MEDICINE

## 2020-12-10 SDOH — ECONOMIC STABILITY: HOUSING INSECURITY: EVIDENCE OF SMOKING MATERIAL: 0

## 2020-12-10 ASSESSMENT — ENCOUNTER SYMPTOMS
NAUSEA: DENIES
MUSCLE WEAKNESS: 1
VOMITING: DENIES

## 2020-12-10 ASSESSMENT — FIBROSIS 4 INDEX: FIB4 SCORE: 2.33

## 2020-12-10 NOTE — PROGRESS NOTES
OP Anticoagulation Service Note    Date: 12/10/2020  There were no vitals filed for this visit.   pt declined vitals    Anticoagulation Summary  As of 12/10/2020    INR goal:  2.0-3.0   TTR:  63.7 % (1.4 y)   INR used for dosin.70 (12/10/2020)   Warfarin maintenance plan:  1.25 mg (2.5 mg x 0.5) every Mon, Wed, Fri; 2.5 mg (2.5 mg x 1) all other days   Weekly warfarin total:  13.75 mg   Plan last modified:  Amanda Perera, PharmD (12/10/2020)   Next INR check:  2020   Target end date:  Indefinite    Indications    Atrial flutter (HCC) [I48.92]  Long term (current) use of anticoagulants [Z79.01] [Z79.01]  Paroxysmal atrial fibrillation (HCC) [I48.0]             Anticoagulation Episode Summary     INR check location:  Anticoagulation Clinic    Preferred lab:      Send INR reminders to:      Comments:        Anticoagulation Care Providers     Provider Role Specialty Phone number    [facility]   [phone]        Anticoagulation Patient Findings      HPI:   Kelly Ileana Lovett seen in clinic today, on anticoagulation therapy with warfarin (a high risk medication) for atrial fibrillation, CHADS-VASC = 5      Pt is here today to evaluate anticoagulation therapy    Previous INR was  1.9 on 2020    Pt was instructed to continue current regimen    Confirmed warfarin dosing regimen, denies missed or extra doses of coumadin.   Diet has been consistent with foods rich in vitamin K: Yes  Changes in ETOH:  No  Changes in smoking status: No  Changes in medication: No   Cost restriction: No  S/s of bleeding:  No  Falls or accidents since last visit No  Signs/symptoms  thrombosis since the last appt: No    A/P   INR  SUB-therapeutic today, will require dose adjust ment today to prevent stroke) and closer follow up.   Increase weekly regimen, pt has Solvoyo, placed order for INR 2020       Pt educated to contact our clinic with any changes in medications or s/s of bleeding or  thrombosis. Pt is aware to seek immediate medical attention for falls, head injury or deep cuts    Follow up appointment in 1 week(s) to reduce risk of adverse events from warfarin   Amanda Perera, PharmD

## 2020-12-11 VITALS
HEART RATE: 71 BPM | RESPIRATION RATE: 18 BRPM | SYSTOLIC BLOOD PRESSURE: 100 MMHG | OXYGEN SATURATION: 93 % | DIASTOLIC BLOOD PRESSURE: 60 MMHG | TEMPERATURE: 97.6 F

## 2020-12-11 ASSESSMENT — ACTIVITIES OF DAILY LIVING (ADL)
OASIS_M1830: 03
AMBULATION ASSISTANCE ON FLAT SURFACES: 1

## 2020-12-15 ENCOUNTER — HOME CARE VISIT (OUTPATIENT)
Dept: HOME HEALTH SERVICES | Facility: HOME HEALTHCARE | Age: 75
End: 2020-12-15
Payer: MEDICARE

## 2020-12-15 VITALS
OXYGEN SATURATION: 92 % | DIASTOLIC BLOOD PRESSURE: 70 MMHG | TEMPERATURE: 97.8 F | SYSTOLIC BLOOD PRESSURE: 130 MMHG | RESPIRATION RATE: 18 BRPM | HEART RATE: 62 BPM

## 2020-12-15 PROCEDURE — G0151 HHCP-SERV OF PT,EA 15 MIN: HCPCS

## 2020-12-15 ASSESSMENT — ACTIVITIES OF DAILY LIVING (ADL)
AMBULATION ASSISTANCE ON FLAT SURFACES: 1
AMBULATION_DISTANCE/DURATION_TOLERATED: <200FT ONE WAY

## 2020-12-16 ENCOUNTER — HOME CARE VISIT (OUTPATIENT)
Dept: HOME HEALTH SERVICES | Facility: HOME HEALTHCARE | Age: 75
End: 2020-12-16
Payer: MEDICARE

## 2020-12-16 ENCOUNTER — ANTICOAGULATION MONITORING (OUTPATIENT)
Dept: VASCULAR LAB | Facility: MEDICAL CENTER | Age: 75
End: 2020-12-16

## 2020-12-16 DIAGNOSIS — I48.92 ATRIAL FLUTTER, UNSPECIFIED TYPE (HCC): ICD-10-CM

## 2020-12-16 DIAGNOSIS — I48.0 PAROXYSMAL ATRIAL FIBRILLATION (HCC): ICD-10-CM

## 2020-12-16 DIAGNOSIS — Z79.01 LONG TERM (CURRENT) USE OF ANTICOAGULANTS: ICD-10-CM

## 2020-12-16 LAB
INR PPP: 2.1 (ref 2–3.5)
INR PPP: 2.1 (ref 2–3.5)

## 2020-12-16 PROCEDURE — G0299 HHS/HOSPICE OF RN EA 15 MIN: HCPCS

## 2020-12-16 PROCEDURE — 6650331 HCR  COAGCHECK STRIPS

## 2020-12-16 ASSESSMENT — FIBROSIS 4 INDEX: FIB4 SCORE: 2.33

## 2020-12-16 NOTE — PROGRESS NOTES
Anticoagulation Summary  As of 2020    INR goal:  2.0-3.0   TTR:  63.3 % (1.5 y)   INR used for dosin.10 (2020)   Warfarin maintenance plan:  1.25 mg (2.5 mg x 0.5) every Mon, Wed, Fri; 2.5 mg (2.5 mg x 1) all other days   Weekly warfarin total:  13.75 mg   Plan last modified:  Amanda Perera PharmD (12/10/2020)   Next INR check:  2020   Target end date:  Indefinite    Indications    Atrial flutter (HCC) [I48.92]  Long term (current) use of anticoagulants [Z79.01] [Z79.01]  Paroxysmal atrial fibrillation (HCC) [I48.0]             Anticoagulation Episode Summary     INR check location:  Anticoagulation Clinic    Preferred lab:      Send INR reminders to:      Comments:        Anticoagulation Care Providers     Provider Role Specialty Phone number    [facility]   [phone]        Anticoagulation Patient Findings      Spoke with pt.  INR is therapeutic.   Pt denies any unusual s/s of bleeding, bruising, clotting or any changes to diet or medications. Denies any etoh, cranberries, supplements, or illness.   Pt verifies warfarin weekly dosing.     Will have pt continue regimen    Repeat INR in 1 week(s) via [facility].     Gaby Holley, DariD

## 2020-12-17 ENCOUNTER — HOME CARE VISIT (OUTPATIENT)
Dept: HOME HEALTH SERVICES | Facility: HOME HEALTHCARE | Age: 75
End: 2020-12-17
Payer: MEDICARE

## 2020-12-17 VITALS
BODY MASS INDEX: 19.17 KG/M2 | DIASTOLIC BLOOD PRESSURE: 62 MMHG | SYSTOLIC BLOOD PRESSURE: 110 MMHG | HEART RATE: 64 BPM | WEIGHT: 117 LBS | OXYGEN SATURATION: 96 % | TEMPERATURE: 97.7 F | RESPIRATION RATE: 18 BRPM

## 2020-12-17 PROCEDURE — G0151 HHCP-SERV OF PT,EA 15 MIN: HCPCS

## 2020-12-17 ASSESSMENT — ENCOUNTER SYMPTOMS
VOMITING: DENIES
MUSCLE WEAKNESS: 1
NAUSEA: DENIES

## 2020-12-20 VITALS
HEART RATE: 68 BPM | RESPIRATION RATE: 18 BRPM | OXYGEN SATURATION: 96 % | DIASTOLIC BLOOD PRESSURE: 65 MMHG | TEMPERATURE: 97.8 F | SYSTOLIC BLOOD PRESSURE: 120 MMHG

## 2020-12-20 ASSESSMENT — ACTIVITIES OF DAILY LIVING (ADL)
AMBULATION_DISTANCE/DURATION_TOLERATED: FUNCTIONAL DISTANCES INDOOR SETTING
AMBULATION ASSISTANCE ON FLAT SURFACES: 1

## 2020-12-22 ENCOUNTER — HOME CARE VISIT (OUTPATIENT)
Dept: HOME HEALTH SERVICES | Facility: HOME HEALTHCARE | Age: 75
End: 2020-12-22
Payer: MEDICARE

## 2020-12-22 DIAGNOSIS — J44.9 CHRONIC OBSTRUCTIVE PULMONARY DISEASE, UNSPECIFIED COPD TYPE (HCC): ICD-10-CM

## 2020-12-22 PROCEDURE — G0151 HHCP-SERV OF PT,EA 15 MIN: HCPCS

## 2020-12-22 NOTE — TELEPHONE ENCOUNTER
Received request via: Pharmacy    Was the patient seen in the last year in this department? Yes  12/2/2020  Does the patient have an active prescription (recently filled or refills available) for medication(s) requested? No

## 2020-12-23 ENCOUNTER — HOME CARE VISIT (OUTPATIENT)
Dept: HOME HEALTH SERVICES | Facility: HOME HEALTHCARE | Age: 75
End: 2020-12-23
Payer: MEDICARE

## 2020-12-23 ENCOUNTER — ANTICOAGULATION MONITORING (OUTPATIENT)
Dept: VASCULAR LAB | Facility: MEDICAL CENTER | Age: 75
End: 2020-12-23

## 2020-12-23 VITALS
DIASTOLIC BLOOD PRESSURE: 80 MMHG | OXYGEN SATURATION: 93 % | TEMPERATURE: 97.6 F | SYSTOLIC BLOOD PRESSURE: 130 MMHG | RESPIRATION RATE: 18 BRPM | HEART RATE: 64 BPM

## 2020-12-23 VITALS
RESPIRATION RATE: 18 BRPM | HEART RATE: 68 BPM | TEMPERATURE: 98.3 F | SYSTOLIC BLOOD PRESSURE: 104 MMHG | DIASTOLIC BLOOD PRESSURE: 64 MMHG | OXYGEN SATURATION: 93 %

## 2020-12-23 DIAGNOSIS — I10 ESSENTIAL (PRIMARY) HYPERTENSION: ICD-10-CM

## 2020-12-23 DIAGNOSIS — I48.92 ATRIAL FLUTTER, UNSPECIFIED TYPE (HCC): ICD-10-CM

## 2020-12-23 DIAGNOSIS — I48.0 PAROXYSMAL ATRIAL FIBRILLATION (HCC): ICD-10-CM

## 2020-12-23 DIAGNOSIS — I48.0 PAF (PAROXYSMAL ATRIAL FIBRILLATION) (HCC): ICD-10-CM

## 2020-12-23 DIAGNOSIS — Z79.01 LONG TERM (CURRENT) USE OF ANTICOAGULANTS: Primary | ICD-10-CM

## 2020-12-23 LAB
INR PPP: 2.6 (ref 2–3.5)
INR PPP: 2.6 (ref 2–3.5)

## 2020-12-23 PROCEDURE — G0495 RN CARE TRAIN/EDU IN HH: HCPCS

## 2020-12-23 RX ORDER — AMIODARONE HYDROCHLORIDE 200 MG/1
200 TABLET ORAL DAILY
Qty: 100 TAB | Refills: 0 | Status: SHIPPED | OUTPATIENT
Start: 2020-12-23 | End: 2021-04-15 | Stop reason: SDUPTHER

## 2020-12-23 RX ORDER — METOPROLOL SUCCINATE 100 MG/1
100 TABLET, EXTENDED RELEASE ORAL DAILY
Qty: 100 TAB | Refills: 2 | Status: SHIPPED
Start: 2020-12-23 | End: 2021-10-19

## 2020-12-24 ENCOUNTER — HOME CARE VISIT (OUTPATIENT)
Dept: HOME HEALTH SERVICES | Facility: HOME HEALTHCARE | Age: 75
End: 2020-12-24
Payer: MEDICARE

## 2020-12-24 PROCEDURE — G0151 HHCP-SERV OF PT,EA 15 MIN: HCPCS

## 2020-12-24 NOTE — PROGRESS NOTES
Anticoagulation Summary  As of 2020    INR goal:  2.0-3.0   TTR:  63.8 % (1.5 y)   INR used for dosin.60 (2020)   Warfarin maintenance plan:  1.25 mg (2.5 mg x 0.5) every Mon, Wed, Fri; 2.5 mg (2.5 mg x 1) all other days   Weekly warfarin total:  13.75 mg   Plan last modified:  Amanda Perera PharmD (12/10/2020)   Next INR check:  2021   Target end date:  Indefinite    Indications    Atrial flutter (HCC) [I48.92]  Long term (current) use of anticoagulants [Z79.01] [Z79.01]  Paroxysmal atrial fibrillation (HCC) [I48.0]             Anticoagulation Episode Summary     INR check location:  Anticoagulation Clinic    Preferred lab:      Send INR reminders to:      Comments:        Anticoagulation Care Providers     Provider Role Specialty Phone number    [facility]   [phone]        Anticoagulation Patient Findings      Spoke with patient via telephone to report a therapeutic INR of 2.6  Confirmed the current warfarin dosing regimen and patient compliance.   Patient denies any interval changes to diet and/or medications.   Patient denies any signs/symptoms of bleeding or clotting.   Pt denies any s/s of bleeding, bruising, clotting or any changes to diet or medication.      Patient instructed to continue with the current warfarin dosing regimen, and asked to follow up again in 2 weeks.    Orders faxed to [facility]     Curtis Thomas PharmD

## 2020-12-27 VITALS
TEMPERATURE: 98.1 F | DIASTOLIC BLOOD PRESSURE: 70 MMHG | SYSTOLIC BLOOD PRESSURE: 122 MMHG | RESPIRATION RATE: 18 BRPM | HEART RATE: 72 BPM | OXYGEN SATURATION: 93 %

## 2020-12-30 ENCOUNTER — HOME CARE VISIT (OUTPATIENT)
Dept: HOME HEALTH SERVICES | Facility: HOME HEALTHCARE | Age: 75
End: 2020-12-30
Payer: MEDICARE

## 2020-12-30 PROCEDURE — G0299 HHS/HOSPICE OF RN EA 15 MIN: HCPCS

## 2020-12-30 ASSESSMENT — FIBROSIS 4 INDEX: FIB4 SCORE: 2.33

## 2020-12-31 ASSESSMENT — ENCOUNTER SYMPTOMS
NAUSEA: DENIES
VOMITING: DENIES

## 2020-12-31 NOTE — PROGRESS NOTES
Pt signed [facility] for 1/6/21 to discharge from [facility]. All goals met.

## 2021-01-04 ENCOUNTER — TELEPHONE (OUTPATIENT)
Dept: MEDICAL GROUP | Facility: LAB | Age: 76
End: 2021-01-04

## 2021-01-04 NOTE — TELEPHONE ENCOUNTER
Received a call from Livia at [facility] about INR order. She stated the order form was incomplete. It needs the Physicians signature and boxes on the bottom marked accordingly. They will also need the practice name on the form. Please advise.

## 2021-01-06 ENCOUNTER — HOME CARE VISIT (OUTPATIENT)
Dept: HOME HEALTH SERVICES | Facility: HOME HEALTHCARE | Age: 76
End: 2021-01-06
Payer: MEDICARE

## 2021-01-06 ENCOUNTER — ANTICOAGULATION MONITORING (OUTPATIENT)
Dept: VASCULAR LAB | Facility: MEDICAL CENTER | Age: 76
End: 2021-01-06

## 2021-01-06 VITALS
WEIGHT: 116.5 LBS | RESPIRATION RATE: 18 BRPM | BODY MASS INDEX: 19.08 KG/M2 | TEMPERATURE: 97.4 F | SYSTOLIC BLOOD PRESSURE: 122 MMHG | HEART RATE: 77 BPM | OXYGEN SATURATION: 100 % | DIASTOLIC BLOOD PRESSURE: 66 MMHG

## 2021-01-06 VITALS
SYSTOLIC BLOOD PRESSURE: 124 MMHG | BODY MASS INDEX: 19.17 KG/M2 | WEIGHT: 117 LBS | HEART RATE: 62 BPM | TEMPERATURE: 98.9 F | DIASTOLIC BLOOD PRESSURE: 60 MMHG | OXYGEN SATURATION: 96 % | RESPIRATION RATE: 16 BRPM

## 2021-01-06 DIAGNOSIS — I48.92 ATRIAL FLUTTER, UNSPECIFIED TYPE (HCC): ICD-10-CM

## 2021-01-06 DIAGNOSIS — Z79.01 LONG TERM (CURRENT) USE OF ANTICOAGULANTS: ICD-10-CM

## 2021-01-06 DIAGNOSIS — I48.0 PAROXYSMAL ATRIAL FIBRILLATION (HCC): ICD-10-CM

## 2021-01-06 LAB
INR PPP: 3.5 (ref 2–3.5)
INR PPP: 3.5 (ref 2–3.5)

## 2021-01-06 PROCEDURE — G0493 RN CARE EA 15 MIN HH/HOSPICE: HCPCS

## 2021-01-06 PROCEDURE — 6650331 HCR  COAGCHECK STRIPS

## 2021-01-06 PROCEDURE — 665001 SOC-HOME HEALTH

## 2021-01-06 SDOH — ECONOMIC STABILITY: HOUSING INSECURITY: EVIDENCE OF SMOKING MATERIAL: 0

## 2021-01-06 ASSESSMENT — ACTIVITIES OF DAILY LIVING (ADL)
OASIS_M1830: 00
HOME_HEALTH_OASIS: 00

## 2021-01-06 ASSESSMENT — FIBROSIS 4 INDEX: FIB4 SCORE: 2.33

## 2021-01-06 ASSESSMENT — PATIENT HEALTH QUESTIONNAIRE - PHQ9: CLINICAL INTERPRETATION OF PHQ2 SCORE: 0

## 2021-01-07 ASSESSMENT — ENCOUNTER SYMPTOMS
MUSCLE WEAKNESS: 1
NAUSEA: DENIES
VOMITING: DENIES

## 2021-01-07 NOTE — PROGRESS NOTES
Anticoagulation Summary  As of 1/6/2021    INR goal:  2.0-3.0   TTR:  63.2 % (1.5 y)   INR used for dosing:  3.50 (1/6/2021)   Warfarin maintenance plan:  1.25 mg (2.5 mg x 0.5) every Mon, Wed, Fri; 2.5 mg (2.5 mg x 1) all other days   Weekly warfarin total:  13.75 mg   Plan last modified:  Amanda Perera PharmD (12/10/2020)   Next INR check:  1/13/2021   Target end date:  Indefinite    Indications    Atrial flutter (HCC) [I48.92]  Long term (current) use of anticoagulants [Z79.01] [Z79.01]  Paroxysmal atrial fibrillation (HCC) [I48.0]             Anticoagulation Episode Summary     INR check location:  Anticoagulation Clinic    Preferred lab:      Send INR reminders to:      Comments:        Anticoagulation Care Providers     Provider Role Specialty Phone number    [facility]   [phone]        Anticoagulation Patient Findings      Left voicemail message to report a supratherapeutic INR of 3.5.    Will have pt HOLD dose of warfarin today of 1.25 mg and then   Pt to continue with current warfarin dosing regimen. Requested pt contact the clinic for any s/s of unusual bleeding, bruising, clotting or any changes to diet or medication.    FU INR in 1 week(s) via [facility].    Gaby Holley, DariD

## 2021-01-11 DIAGNOSIS — Z23 NEED FOR VACCINATION: ICD-10-CM

## 2021-01-14 ENCOUNTER — ANTICOAGULATION VISIT (OUTPATIENT)
Dept: MEDICAL GROUP | Facility: MEDICAL CENTER | Age: 76
End: 2021-01-14
Payer: MEDICARE

## 2021-01-14 DIAGNOSIS — Z79.01 LONG TERM (CURRENT) USE OF ANTICOAGULANTS: ICD-10-CM

## 2021-01-14 DIAGNOSIS — I48.0 PAROXYSMAL ATRIAL FIBRILLATION (HCC): ICD-10-CM

## 2021-01-14 DIAGNOSIS — I48.92 ATRIAL FLUTTER, UNSPECIFIED TYPE (HCC): ICD-10-CM

## 2021-01-14 LAB — INR PPP: 3.6 (ref 2–3.5)

## 2021-01-14 PROCEDURE — 99211 OFF/OP EST MAY X REQ PHY/QHP: CPT | Performed by: INTERNAL MEDICINE

## 2021-01-14 PROCEDURE — 85610 PROTHROMBIN TIME: CPT | Performed by: FAMILY MEDICINE

## 2021-01-14 NOTE — PROGRESS NOTES
OP Anticoagulation Service Note    Date: 1/14/2021  There were no vitals filed for this visit.   pt declined vitals    Anticoagulation Summary  As of 1/14/2021    INR goal:  2.0-3.0   TTR:  62.3 % (1.5 y)   INR used for dosing:  3.60 (1/14/2021)   Warfarin maintenance plan:  2.5 mg (2.5 mg x 1) every Sun, Tue, Thu; 1.25 mg (2.5 mg x 0.5) all other days   Weekly warfarin total:  12.5 mg   Plan last modified:  Gaby Holley, PharmD (1/14/2021)   Next INR check:  1/21/2021   Target end date:  Indefinite    Indications    Atrial flutter (HCC) [I48.92]  Long term (current) use of anticoagulants [Z79.01] [Z79.01]  Paroxysmal atrial fibrillation (HCC) [I48.0]             Anticoagulation Episode Summary     INR check location:  Anticoagulation Clinic    Preferred lab:      Send INR reminders to:      Comments:        Anticoagulation Care Providers     Provider Role Specialty Phone number    [facility]   [phone]        Anticoagulation Patient Findings  Patient Findings     Negatives:  Signs/symptoms of thrombosis, Signs/symptoms of bleeding, Laboratory test error suspected, Change in health, Change in alcohol use, Change in activity, Upcoming invasive procedure, Emergency department visit, Upcoming dental procedure, Missed doses, Extra doses, Change in medications, Change in diet/appetite, Hospital admission, Bruising, Other complaints          HPI:   Kelly Ileana Lovett seen in clinic today, on anticoagulation therapy with warfarin (a high risk medication) for atrial fibrillation, CHADS-VASC = 5      Pt is here today to evaluate anticoagulation therapy    Previous INR was  3.6 on 1/6/21    Pt was instructed to hold x 1 day then continue regimen    Confirmed warfarin dosing regimen, denies missed or extra doses of coumadin.   Diet has been consistent with foods rich in vitamin K: Yes  Changes in ETOH:  No  Changes in smoking status: No  Changes in medication: No   Cost restriction: No  S/s  of bleeding:  No  Falls or accidents since last visit No  Signs/symptoms  thrombosis since the last appt: No  =    A/P   INR  supra-therapeutic today, will require dose adjust ment today to prevent bleeding complications and closer follow up.       Hold x 1 day then reduce weekly regimen by 9%  Pt states she signed up for a home monitor. Scheduled f/u appt for 1 week but if pt gets HM before then, she will call to cancel appt. The HM inder was signed by her PCP, so instructed pt to call in INR until we can get the [employer] company to transfer care over to our clinic providers.    06/2021 check referral    Pt educated to contact our clinic with any changes in medications or s/s of bleeding or thrombosis. Pt is aware to seek immediate medical attention for falls, head injury or deep cuts    Follow up appointment in 1 week(s) to reduce risk of adverse events from warfarin   Gaby Holley, DariD

## 2021-01-21 ENCOUNTER — ANTICOAGULATION VISIT (OUTPATIENT)
Dept: MEDICAL GROUP | Facility: MEDICAL CENTER | Age: 76
End: 2021-01-21
Payer: MEDICARE

## 2021-01-21 ENCOUNTER — IMMUNIZATION (OUTPATIENT)
Dept: FAMILY PLANNING/WOMEN'S HEALTH CLINIC | Facility: IMMUNIZATION CENTER | Age: 76
End: 2021-01-21
Attending: INTERNAL MEDICINE
Payer: MEDICARE

## 2021-01-21 DIAGNOSIS — Z79.01 LONG TERM (CURRENT) USE OF ANTICOAGULANTS: ICD-10-CM

## 2021-01-21 DIAGNOSIS — I48.0 PAROXYSMAL ATRIAL FIBRILLATION (HCC): ICD-10-CM

## 2021-01-21 DIAGNOSIS — Z23 ENCOUNTER FOR VACCINATION: Primary | ICD-10-CM

## 2021-01-21 DIAGNOSIS — Z23 NEED FOR VACCINATION: ICD-10-CM

## 2021-01-21 DIAGNOSIS — I48.92 ATRIAL FLUTTER, UNSPECIFIED TYPE (HCC): ICD-10-CM

## 2021-01-21 LAB — INR PPP: 3.1 (ref 2–3.5)

## 2021-01-21 PROCEDURE — 85610 PROTHROMBIN TIME: CPT | Performed by: INTERNAL MEDICINE

## 2021-01-21 PROCEDURE — 91300 PFIZER SARS-COV-2 VACCINE: CPT | Performed by: INTERNAL MEDICINE

## 2021-01-21 PROCEDURE — 0001A PFIZER SARS-COV-2 VACCINE: CPT | Performed by: INTERNAL MEDICINE

## 2021-01-21 PROCEDURE — 99211 OFF/OP EST MAY X REQ PHY/QHP: CPT | Performed by: INTERNAL MEDICINE

## 2021-01-21 NOTE — Clinical Note
Jose Peng!   So this pt's PCP at [facility] submitted and signed her HM application for [facility] (scanned in media tab on 1/18/21). Says it was submitted on 12/30/2020. Pt hasnt heard back from them. I assume it's because it's the beginning of the year and insurance companies are always chaotic at this time.    Can you look into the status of the application when you get the chance?    Let me know if you need anything.  Gaby

## 2021-01-21 NOTE — PROGRESS NOTES
OP Anticoagulation Service Note    Date: 1/21/2021  There were no vitals filed for this visit.   pt declined vitals    Anticoagulation Summary  As of 1/21/2021    INR goal:  2.0-3.0   TTR:  61.5 % (1.6 y)   INR used for dosing:  3.10 (1/21/2021)   Warfarin maintenance plan:  2.5 mg (2.5 mg x 1) every Sun, Tue; 1.25 mg (2.5 mg x 0.5) all other days   Weekly warfarin total:  11.25 mg   Plan last modified:  Gaby Holley, PharmD (1/21/2021)   Next INR check:  1/28/2021   Target end date:  Indefinite    Indications    Atrial flutter (HCC) [I48.92]  Long term (current) use of anticoagulants [Z79.01] [Z79.01]  Paroxysmal atrial fibrillation (HCC) [I48.0]             Anticoagulation Episode Summary     INR check location:  Anticoagulation Clinic    Preferred lab:      Send INR reminders to:      Comments:        Anticoagulation Care Providers     Provider Role Specialty Phone number    [facility]   [phone]        Anticoagulation Patient Findings      H[facility]:   [facility] on anticoagulation therapy with warfarin (a high risk medication) for atrial flutter/atrial fibrillation, CHADS-VASC = 5      Pt is here today to evaluate anticoagulation therapy    Previous INR was  3.6 on 1/14/21    Pt was instructed to hold x 1 day then reduce weekly regimen    Confirmed warfarin dosing regimen, denies missed or extra doses of coumadin.   Diet has been consistent with foods rich in vitamin K: Yes  Changes in ETOH:  No  Changes in smoking status: No  Changes in medication: No   Cost restriction: No  S/s of bleeding:  No  Falls or accidents since last visit No  Signs/symptoms  thrombosis since the last appt: No  =    A/P   INR  slightly supra-therapeutic today, will require dose adjust ment today to prevent bleeding complications     Reduce weekly regimen by 9%  Pt still hasn't heard back from [employer]. Will ask Ginette to look into status of application.    06/21 check  referral    Pt educated to contact our clinic with any changes in medications or s/s of bleeding or thrombosis. Pt is aware to seek immediate medical attention for falls, head injury or deep cuts    Follow up appointment in 1 week(s) to reduce risk of adverse events from warfarin Gaby Holley, PharmSUZANNE    Addendum 1/21/21 1521:   Ginette Holley, Mojgan             Hi,     I called [facility] today and they said that the application was recently sent to insurance and takes 7 business for processing. I suggest having the PCP follow-up if pt doesn't receive a call in the next couple of weeks.     Thanks,     Ginette

## 2021-02-11 ENCOUNTER — IMMUNIZATION (OUTPATIENT)
Dept: FAMILY PLANNING/WOMEN'S HEALTH CLINIC | Facility: IMMUNIZATION CENTER | Age: 76
End: 2021-02-11
Attending: INTERNAL MEDICINE
Payer: MEDICARE

## 2021-02-11 DIAGNOSIS — Z23 ENCOUNTER FOR VACCINATION: Primary | ICD-10-CM

## 2021-02-11 PROCEDURE — 0002A PFIZER SARS-COV-2 VACCINE: CPT

## 2021-02-11 PROCEDURE — 91300 PFIZER SARS-COV-2 VACCINE: CPT

## 2021-02-16 DIAGNOSIS — J44.9 CHRONIC OBSTRUCTIVE PULMONARY DISEASE, UNSPECIFIED COPD TYPE (HCC): ICD-10-CM

## 2021-02-16 NOTE — TELEPHONE ENCOUNTER
Received request via: Patient    Was the patient seen in the last year in this department? Yes  LOV 12/02/2020 - Telemedicine  Does the patient have an active prescription (recently filled or refills available) for medication(s) requested? No

## 2021-02-22 ENCOUNTER — TELEPHONE (OUTPATIENT)
Dept: MEDICAL GROUP | Facility: MEDICAL CENTER | Age: 76
End: 2021-02-22

## 2021-02-25 ENCOUNTER — ANTICOAGULATION VISIT (OUTPATIENT)
Dept: MEDICAL GROUP | Facility: MEDICAL CENTER | Age: 76
End: 2021-02-25
Payer: MEDICARE

## 2021-02-25 ENCOUNTER — OFFICE VISIT (OUTPATIENT)
Dept: MEDICAL GROUP | Facility: LAB | Age: 76
End: 2021-02-25
Payer: MEDICARE

## 2021-02-25 ENCOUNTER — APPOINTMENT (OUTPATIENT)
Dept: MEDICAL GROUP | Facility: LAB | Age: 76
End: 2021-02-25
Payer: MEDICARE

## 2021-02-25 VITALS
RESPIRATION RATE: 12 BRPM | DIASTOLIC BLOOD PRESSURE: 86 MMHG | TEMPERATURE: 97.7 F | SYSTOLIC BLOOD PRESSURE: 128 MMHG | BODY MASS INDEX: 17.52 KG/M2 | HEART RATE: 60 BPM | WEIGHT: 109 LBS | HEIGHT: 66 IN | OXYGEN SATURATION: 99 %

## 2021-02-25 DIAGNOSIS — I48.0 PAROXYSMAL ATRIAL FIBRILLATION (HCC): ICD-10-CM

## 2021-02-25 DIAGNOSIS — Z79.01 CHRONIC ANTICOAGULATION: ICD-10-CM

## 2021-02-25 DIAGNOSIS — I48.92 ATRIAL FLUTTER, UNSPECIFIED TYPE (HCC): ICD-10-CM

## 2021-02-25 DIAGNOSIS — Z79.01 LONG TERM (CURRENT) USE OF ANTICOAGULANTS: ICD-10-CM

## 2021-02-25 DIAGNOSIS — Z91.81 RISK FOR FALLS: ICD-10-CM

## 2021-02-25 DIAGNOSIS — I27.22 PULMONARY HYPERTENSION DUE TO LEFT HEART DISEASE (HCC): ICD-10-CM

## 2021-02-25 DIAGNOSIS — E78.2 MIXED HYPERLIPIDEMIA: ICD-10-CM

## 2021-02-25 DIAGNOSIS — J96.11 CHRONIC RESPIRATORY FAILURE WITH HYPOXIA (HCC): ICD-10-CM

## 2021-02-25 DIAGNOSIS — Z12.11 SCREEN FOR COLON CANCER: ICD-10-CM

## 2021-02-25 DIAGNOSIS — I70.0 ATHEROSCLEROSIS OF AORTA (HCC): ICD-10-CM

## 2021-02-25 DIAGNOSIS — R63.4 WEIGHT LOSS: ICD-10-CM

## 2021-02-25 DIAGNOSIS — I50.22 CHRONIC SYSTOLIC CONGESTIVE HEART FAILURE (HCC): ICD-10-CM

## 2021-02-25 DIAGNOSIS — J44.9 CHRONIC OBSTRUCTIVE PULMONARY DISEASE, UNSPECIFIED COPD TYPE (HCC): ICD-10-CM

## 2021-02-25 DIAGNOSIS — I11.0 HYPERTENSIVE HEART DISEASE WITH HEART FAILURE (HCC): ICD-10-CM

## 2021-02-25 DIAGNOSIS — F32.1 MODERATE SINGLE CURRENT EPISODE OF MAJOR DEPRESSIVE DISORDER (HCC): ICD-10-CM

## 2021-02-25 LAB — INR PPP: 2.8 (ref 2–3.5)

## 2021-02-25 PROCEDURE — 99999 PR NO CHARGE: CPT | Performed by: INTERNAL MEDICINE

## 2021-02-25 PROCEDURE — 99214 OFFICE O/P EST MOD 30 MIN: CPT | Performed by: FAMILY MEDICINE

## 2021-02-25 PROCEDURE — 85610 PROTHROMBIN TIME: CPT | Performed by: INTERNAL MEDICINE

## 2021-02-25 RX ORDER — ATORVASTATIN CALCIUM 40 MG/1
TABLET, FILM COATED ORAL
Qty: 90 TABLET | Refills: 3 | Status: SHIPPED | OUTPATIENT
Start: 2021-02-25 | End: 2021-04-28

## 2021-02-25 ASSESSMENT — FIBROSIS 4 INDEX: FIB4 SCORE: 2.33

## 2021-02-25 NOTE — PROGRESS NOTES
Subjective:     CC: ***    HPI:   Kelly presents today with ***      Needs cardiology appt    Health Maintenance: {COMPLETED:793530}    Medications, past medical history, allergies, and social history have been reviewed and updated.    ROS:  ROS       Objective:       Exam:  LMP  (LMP Unknown)  There is no height or weight on file to calculate BMI.    Constitutional: Alert. Well appearing. No distress.  Skin: Warm, dry, good turgor, no visible rashes.  Eye: Equal, round and reactive to light, conjunctiva clear, lids normal.  ENMT: Moist mucous membranes. Normal dentition.  Respiratory: Normal effort. Lungs are clear to auscultation bilaterally.  Cardiovascular: Regular rate and rhythm. Normal S1/S2. No murmurs, rubs or gallops.   Neuro: Moves all four extremities. No facial droop.  Psych: Answers questions appropriately. Normal affect and mood.    Labs: ***    Assessment & Plan:     75 y.o. female with the following -     1. Pulmonary hypertension due to left heart disease (HCC)  ***    2. Paroxysmal atrial fibrillation (HCC)  ***    3. Long term (current) use of anticoagulants [Z79.01]  ***    4. Hypertensive heart disease with heart failure (HCC)  ***    5. Simple chronic bronchitis (HCC)  ***    6. Chronic systolic congestive heart failure (HCC)  ***    7. Chronic respiratory failure with hypoxia (HCC)  ***       Please note that this note was created using voice recognition software.

## 2021-02-25 NOTE — PROGRESS NOTES
Annual Health Assessment Questions:    1.  Are you currently engaging in any exercise or physical activity? No    2.  How would you describe your mood or emotional well-being today? fair    3.  Have you had any falls in the last year? Yes    4.  Have you noticed any problems with your balance or had difficulty walking? No    5.  In the last six months have you experienced any leakage of urine? No    6. DPA/Advanced Directive: Patient has Advanced Directive on file.

## 2021-02-25 NOTE — PROGRESS NOTES
OP Anticoagulation Service Note    Date: 2021  There were no vitals filed for this visit.   pt declined vitals    Anticoagulation Summary  As of 2021    INR goal:  2.0-3.0   TTR:  61.8 % (1.6 y)   INR used for dosin.80 (2021)   Warfarin maintenance plan:  2.5 mg (2.5 mg x 1) every Sun, Tue; 1.25 mg (2.5 mg x 0.5) all other days   Weekly warfarin total:  11.25 mg   Plan last modified:  Gaby Holley, PharmD (2021)   Next INR check:  3/25/2021   Target end date:  Indefinite    Indications    Atrial flutter (HCC) [I48.92]  Long term (current) use of anticoagulants [Z79.01] [Z79.01]  Paroxysmal atrial fibrillation (HCC) [I48.0]             Anticoagulation Episode Summary     INR check location:  Anticoagulation Clinic    Preferred lab:      Send INR reminders to:      Comments:        Anticoagulation Care Providers     Provider Role Specialty Phone number    [facility]   [phone]        Anticoagulation Patient Findings  Patient Findings     Negatives:  Signs/symptoms of thrombosis, Signs/symptoms of bleeding, Laboratory test error suspected, Change in health, Change in alcohol use, Change in activity, Upcoming invasive procedure, Emergency department visit, Upcoming dental procedure, Missed doses, Extra doses, Change in medications, Change in diet/appetite, Hospital admission, Bruising, Other complaints          HPI:   Kelly Ileana Lovett seen in clinic today, on anticoagulation therapy with warfarin (a high risk medication) for atrial fibrillation    Pt is here today to evaluate anticoagulation therapy    Previous INR was  3.1 on 2021    Pt was instructed to continue regimen    Confirmed warfarin dosing regimen, denies missed or extra doses of coumadin.   Diet has been consistent with foods rich in vitamin K: Yes  Changes in ETOH:  No  Changes in smoking status: No  Changes in medication: No   Cost restriction: No  S/s of bleeding:  No  Falls or accidents  since last visit No  Signs/symptoms  thrombosis since the last appt: No      A/P   INR  therapeutic today    Pt is to continue with current warfarin dosing regimen.    06/21 check referral    Pt educated to contact our clinic with any changes in medications or s/s of bleeding or thrombosis. Pt is aware to seek immediate medical attention for falls, head injury or deep cuts    Follow up appointment in 4 week(s) to reduce risk of adverse events from warfarin   Gaby Holley, DariD

## 2021-02-25 NOTE — PROGRESS NOTES
"Subjective:     CC: Follow-up, weight loss    HPI:   Kelly is a 75-year-old female with a complex medical history including COPD, A. fib, CHF and depression who presents today with concern for weight loss.    Weight loss  Has lost about 9 pounds over the last month.  She reports she is does not seem to be eating much because she gets tired of eating.  Reports her appetite is good.  Has not had any nausea, vomiting, or diarrhea.  No fatigue or night sweats.  Reports mood \" has been better\" and does think this is playing a role in her limited oral intake.  Has had trouble with isolation with Covid.  She has received her second vaccine and her and  plan to increase social interactions in the future.     She otherwise feels well.  She is on her baseline 3 L oxygen.  Denies shortness of breath or chest pain.   She did have one fall a few weeks ago when she tripped and hit her elbow.  Has a skin tear to her left elbow.  She does report she has not fallen since then, home health physical therapy did help with her mobility but she states she has not continued with home exercises.    Health Maintenance: Completed    Medications, past medical history, allergies, and social history have been reviewed and updated.    ROS:  See HPI      Objective:       Exam:  /86 (BP Location: Right arm, Patient Position: Sitting, BP Cuff Size: Adult)   Pulse 60   Temp 36.5 °C (97.7 °F)   Resp 12   Ht 1.664 m (5' 5.51\")   Wt 49.4 kg (109 lb)   LMP  (LMP Unknown)   SpO2 99%   BMI 17.86 kg/m²  Body mass index is 17.86 kg/m².    Constitutional: Alert. Well appearing. No distress.  Skin: Warm, dry, good turgor, no visible rashes.  Healing skin tear to the left elbow.  Eye: Equal, round and reactive to light, conjunctiva clear, lids normal.  ENMT: Moist mucous membranes. Normal dentition.  Respiratory: Normal effort. Lungs are clear to auscultation bilaterally.  Poor air movement bilaterally.  Oxygen in place.  Cardiovascular: " Iregular rhythm. Normal S1/S2. No murmurs, rubs or gallops.   Abdomen: Soft, nontender, nondistended.  Neuro: Moves all four extremities. No facial droop.  Psych: Answers questions appropriately. Normal affect and mood.      Assessment & Plan:     75 y.o. female with the following -     1. Weight loss  She has lost 9 pounds over the last month.  She does think she has been eating less and that this may be related to low mood.  She denies fatigue, fevers, night sweats.  Encouraged frequent, small, high calorie meals.  Labs as below.  Close follow-up and further work-up if weight continues to decrease.  - CBC WITH DIFFERENTIAL; Future  - Comp Metabolic Panel; Future  - TSH WITH REFLEX TO FT4; Future    2. Moderate single current episode of major depressive disorder (HCC)  Possible cause of weight loss as above.  She is on Zoloft 100 mg daily.  She does think this will improve in the near future with ability to do more social activities after she has the Covid vaccine.  We will continue to follow closely and could consider referral for therapy or medication changes.    3. Risk for falls  One recent fall but she does think overall these are improved after home health visits for physical therapy.  Encouraged her to continue with home exercises, avoid or limit alcohol use.    4. Paroxysmal atrial fibrillation (HCC)  5. Long term (current) use of anticoagulants [Z79.01]  6. Hypertensive heart disease with heart failure (HCC)  7. Chronic systolic congestive heart failure (HCC)  8. Chronic respiratory failure with hypoxia (HCC)  9. Atrial flutter, unspecified type (HCC)*  10. Aortic atherosclerosis (HCC)  Stable symptom wise and no recent changes to medications.  She is overdue for cardiology follow-up and encouraged her to schedule this.  She also has follow-up with anticoagulation clinic this afternoon.    11. Chronic obstructive pulmonary disease, unspecified COPD type (HCC)  12. Pulmonary hypertension due to left heart  disease (HCC)  Stable, on baseline 3 L oxygen.  She is due for pulmonology follow-up and encouraged her to schedule this.    13. Mixed hyperlipidemia  Atorvastatin refilled.  - atorvastatin (LIPITOR) 40 MG Tab; TAKE ONE TABLET BY MOUTH ONE TIME DAILY  Dispense: 90 tablet; Refill: 3    14. Screen for colon cancer  Normal colonoscopy in 2009.  - COLOGUARD (FIT DNA)    Please note that this note was created using voice recognition software.

## 2021-02-26 DIAGNOSIS — E78.2 MIXED HYPERLIPIDEMIA: ICD-10-CM

## 2021-02-26 RX ORDER — ATORVASTATIN CALCIUM 40 MG/1
TABLET, FILM COATED ORAL
Qty: 90 TABLET | Refills: 4 | Status: SHIPPED | OUTPATIENT
Start: 2021-02-26 | End: 2021-02-26 | Stop reason: SDUPTHER

## 2021-02-26 RX ORDER — WARFARIN SODIUM 2.5 MG/1
TABLET ORAL
Qty: 90 TABLET | Refills: 1 | Status: SHIPPED | OUTPATIENT
Start: 2021-02-26 | End: 2021-10-28 | Stop reason: SDUPTHER

## 2021-02-26 RX ORDER — ATORVASTATIN CALCIUM 40 MG/1
TABLET, FILM COATED ORAL
Qty: 100 TABLET | Refills: 4 | Status: SHIPPED | OUTPATIENT
Start: 2021-02-26 | End: 2022-04-06 | Stop reason: SDUPTHER

## 2021-03-17 ENCOUNTER — HOSPITAL ENCOUNTER (OUTPATIENT)
Dept: LAB | Facility: MEDICAL CENTER | Age: 76
End: 2021-03-17
Attending: FAMILY MEDICINE
Payer: MEDICARE

## 2021-03-17 ENCOUNTER — HOSPITAL ENCOUNTER (EMERGENCY)
Facility: MEDICAL CENTER | Age: 76
End: 2021-03-17
Payer: MEDICARE

## 2021-03-17 DIAGNOSIS — R63.4 WEIGHT LOSS: ICD-10-CM

## 2021-03-17 LAB
ALBUMIN SERPL BCP-MCNC: 4.3 G/DL (ref 3.2–4.9)
ALBUMIN/GLOB SERPL: 1.6 G/DL
ALP SERPL-CCNC: 106 U/L (ref 30–99)
ALT SERPL-CCNC: 82 U/L (ref 2–50)
ANION GAP SERPL CALC-SCNC: 9 MMOL/L (ref 7–16)
AST SERPL-CCNC: 80 U/L (ref 12–45)
BASOPHILS # BLD AUTO: 0.6 % (ref 0–1.8)
BASOPHILS # BLD: 0.07 K/UL (ref 0–0.12)
BILIRUB SERPL-MCNC: 0.4 MG/DL (ref 0.1–1.5)
BUN SERPL-MCNC: 17 MG/DL (ref 8–22)
CALCIUM SERPL-MCNC: 10 MG/DL (ref 8.5–10.5)
CHLORIDE SERPL-SCNC: 100 MMOL/L (ref 96–112)
CO2 SERPL-SCNC: 31 MMOL/L (ref 20–33)
CREAT SERPL-MCNC: 0.92 MG/DL (ref 0.5–1.4)
EOSINOPHIL # BLD AUTO: 0.09 K/UL (ref 0–0.51)
EOSINOPHIL NFR BLD: 0.8 % (ref 0–6.9)
ERYTHROCYTE [DISTWIDTH] IN BLOOD BY AUTOMATED COUNT: 51.8 FL (ref 35.9–50)
GLOBULIN SER CALC-MCNC: 2.7 G/DL (ref 1.9–3.5)
GLUCOSE SERPL-MCNC: 102 MG/DL (ref 65–99)
HCT VFR BLD AUTO: 41.7 % (ref 37–47)
HGB BLD-MCNC: 12.9 G/DL (ref 12–16)
IMM GRANULOCYTES # BLD AUTO: 0.07 K/UL (ref 0–0.11)
IMM GRANULOCYTES NFR BLD AUTO: 0.6 % (ref 0–0.9)
LYMPHOCYTES # BLD AUTO: 1.54 K/UL (ref 1–4.8)
LYMPHOCYTES NFR BLD: 13.5 % (ref 22–41)
MCH RBC QN AUTO: 30.3 PG (ref 27–33)
MCHC RBC AUTO-ENTMCNC: 30.9 G/DL (ref 33.6–35)
MCV RBC AUTO: 97.9 FL (ref 81.4–97.8)
MONOCYTES # BLD AUTO: 0.83 K/UL (ref 0–0.85)
MONOCYTES NFR BLD AUTO: 7.3 % (ref 0–13.4)
NEUTROPHILS # BLD AUTO: 8.82 K/UL (ref 2–7.15)
NEUTROPHILS NFR BLD: 77.2 % (ref 44–72)
NRBC # BLD AUTO: 0 K/UL
NRBC BLD-RTO: 0 /100 WBC
PLATELET # BLD AUTO: 292 K/UL (ref 164–446)
PMV BLD AUTO: 10.3 FL (ref 9–12.9)
POTASSIUM SERPL-SCNC: 4 MMOL/L (ref 3.6–5.5)
PROT SERPL-MCNC: 7 G/DL (ref 6–8.2)
RBC # BLD AUTO: 4.26 M/UL (ref 4.2–5.4)
SODIUM SERPL-SCNC: 140 MMOL/L (ref 135–145)
TSH SERPL DL<=0.005 MIU/L-ACNC: 1.89 UIU/ML (ref 0.38–5.33)
WBC # BLD AUTO: 11.4 K/UL (ref 4.8–10.8)

## 2021-03-17 PROCEDURE — 84443 ASSAY THYROID STIM HORMONE: CPT

## 2021-03-17 PROCEDURE — 85025 COMPLETE CBC W/AUTO DIFF WBC: CPT

## 2021-03-17 PROCEDURE — 36415 COLL VENOUS BLD VENIPUNCTURE: CPT

## 2021-03-17 PROCEDURE — 80053 COMPREHEN METABOLIC PANEL: CPT

## 2021-03-18 ENCOUNTER — OFFICE VISIT (OUTPATIENT)
Dept: MEDICAL GROUP | Facility: LAB | Age: 76
End: 2021-03-18
Payer: MEDICARE

## 2021-03-18 ENCOUNTER — TELEPHONE (OUTPATIENT)
Dept: MEDICAL GROUP | Facility: LAB | Age: 76
End: 2021-03-18

## 2021-03-18 VITALS
SYSTOLIC BLOOD PRESSURE: 116 MMHG | RESPIRATION RATE: 12 BRPM | HEART RATE: 66 BPM | BODY MASS INDEX: 18.64 KG/M2 | DIASTOLIC BLOOD PRESSURE: 78 MMHG | TEMPERATURE: 98.3 F | OXYGEN SATURATION: 96 % | HEIGHT: 66 IN | WEIGHT: 116 LBS

## 2021-03-18 DIAGNOSIS — S50.811A CAT SCRATCH OF RIGHT FOREARM, INITIAL ENCOUNTER: ICD-10-CM
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
## 2021-03-18 PROCEDURE — 99214 OFFICE O/P EST MOD 30 MIN: CPT | Performed by: FAMILY MEDICINE

## 2021-03-18 RX ORDER — AZITHROMYCIN 250 MG/1
TABLET, FILM COATED ORAL
Qty: 6 TABLET | Refills: 0 | Status: SHIPPED | OUTPATIENT
Start: 2021-03-18 | End: 2021-03-26

## 2021-03-18 RX ORDER — AMOXICILLIN AND CLAVULANATE POTASSIUM 875; 125 MG/1; MG/1
1 TABLET, FILM COATED ORAL 2 TIMES DAILY
Qty: 10 TABLET | Refills: 0 | Status: SHIPPED | OUTPATIENT
Start: 2021-03-18 | End: 2021-03-23

## 2021-03-18 ASSESSMENT — FIBROSIS 4 INDEX: FIB4 SCORE: 2.3

## 2021-03-18 NOTE — TELEPHONE ENCOUNTER
ESTABLISHED PATIENT PRE-VISIT PLANNING     Patient was NOT contacted to complete PVP.     Note: Patient will not be contacted if there is no indication to call.     1.  Reviewed notes from the last few office visits within the medical group: Yes    2.  If any orders were placed at last visit or intended to be done for this visit (i.e. 6 mos follow-up), do we have Results/Consult Notes?         •  Labs - Labs ordered, completed on 3/17/21 and results are in chart.  Note: If patient appointment is for lab review and patient did not complete labs, check with provider if OK to reschedule patient until labs completed.       •  Imaging - Imaging was not ordered at last office visit.       •  Referrals - Referral ordered, patient has NOT been seen.    3. Is this appointment scheduled as a Hospital Follow-Up? No    4.  Immunizations were updated in Epic using Reconcile Outside Information activity? Yes    5.  Patient is due for the following Health Maintenance Topics:   Health Maintenance Due   Topic Date Due   • IMM ZOSTER VACCINES (2 of 3) 10/10/2013   • COLONOSCOPY  05/06/2019          6.  AHA (Pulse[employer]) form printed for Provider? Email sent to [facility] requesting form if needed

## 2021-03-18 NOTE — PROGRESS NOTES
"Subjective:     CC: Cat scratch    HPI:   Kelly presents today with:    Cat scratch  Cat scratch her right forearm a few days ago.  Today she was pulling off a Band-Aid and it tore the skin surrounding the scratch and she has had a small amount of pus coming from the wound.  No spreading redness from the wound.  No fevers or chills.  No swelling or pain in the axilla.      Medications, past medical history, allergies, and social history have been reviewed and updated.    ROS:  See HPI    Objective:       Exam:  /78 (BP Location: Left arm, Patient Position: Sitting, BP Cuff Size: Adult)   Pulse 66   Temp 36.8 °C (98.3 °F)   Resp 12   Ht 1.664 m (5' 5.51\")   Wt 52.6 kg (116 lb)   LMP  (LMP Unknown)   SpO2 96%   BMI 19.00 kg/m²  Body mass index is 19 kg/m².    Constitutional: Alert. Well appearing. No distress.  Skin: Warm, dry.  To the right forearm there is a superficial wound ~ 2 x 2 cm with small amount of purulent drainage.  No obvious fluctuance.  Eye: Equal, round and reactive to light, conjunctiva clear, lids normal.  ENMT: Moist mucous membranes.   Respiratory: Normal effort.  Neuro: Moves all four extremities. No facial droop.  Psych: Answers questions appropriately. Normal affect and mood.      Assessment & Plan:     75 y.o. female with the following -     1. Cat scratch of right forearm, initial encounter  Initially with cat scratch and then wound/skin tear after she tore off a Band-Aid.  She does have some purulent drainage.  Given this occurred with a cat scratch will cover with Augmentin and also add azithromycin for B. Henselae coverage.  Discussed reasons to seek care.  Discussed wound care and avoiding Band-Aids as to not worsen the skin tear.  She does have follow-up next week.  - amoxicillin-clavulanate (AUGMENTIN) 875-125 MG Tab; Take 1 tablet by mouth 2 times a day for 5 days.  Dispense: 10 tablet; Refill: 0  - azithromycin (ZITHROMAX) 250 MG Tab; Take 2 tablets by mouth on day 1, " then 1 tablet daily for 4 more days  Dispense: 6 tablet; Refill: 0       Please note that this note was created using voice recognition software.

## 2021-03-26 ENCOUNTER — OFFICE VISIT (OUTPATIENT)
Dept: MEDICAL GROUP | Facility: LAB | Age: 76
End: 2021-03-26
Payer: MEDICARE

## 2021-03-26 VITALS
OXYGEN SATURATION: 96 % | DIASTOLIC BLOOD PRESSURE: 82 MMHG | HEIGHT: 66 IN | HEART RATE: 68 BPM | TEMPERATURE: 97.8 F | SYSTOLIC BLOOD PRESSURE: 124 MMHG | BODY MASS INDEX: 17.52 KG/M2 | WEIGHT: 109 LBS | RESPIRATION RATE: 12 BRPM

## 2021-03-26 DIAGNOSIS — T14.8XXA OPEN WOUND OF SKIN: ICD-10-CM

## 2021-03-26 PROCEDURE — 99213 OFFICE O/P EST LOW 20 MIN: CPT | Performed by: FAMILY MEDICINE

## 2021-03-26 ASSESSMENT — FIBROSIS 4 INDEX: FIB4 SCORE: 2.3

## 2021-03-26 NOTE — PROGRESS NOTES
"Subjective:     CC: Wound check    HPI:   Kelly presents today to follow-up on wound to right forearm.  8 days ago she was initially for this.  She initially had a small wound from a cat scratch but then ended up with a large skin tear after she was pulling off a Band-Aid surround the scratch.  At that time she did have some erythema and pus around the wound.  He has since completed an Augmentin course.  She reports that the redness has resolved, no increasing pain.  Still with open skin wound.    Medications, past medical history, allergies, and social history have been reviewed and updated.    ROS:  See HPI      Objective:       Exam:  /82 (BP Location: Left arm, Patient Position: Sitting, BP Cuff Size: Adult)   Pulse 68   Temp 36.6 °C (97.8 °F)   Resp 12   Ht 1.664 m (5' 5.51\")   Wt 49.4 kg (109 lb)   LMP  (LMP Unknown)   SpO2 96%   BMI 17.86 kg/m²  Body mass index is 17.86 kg/m².    Constitutional: Alert. Well appearing. No distress.  Skin: Warm, dry.  To the right forearm there is a 2 x 2 centimeter superficial wound.  No drainage or significant exudate.  No surrounding erythema.  Eye: Equal, round and reactive to light, conjunctiva clear, lids normal.  ENMT: Moist mucous membranes. Normal dentition.  Respiratory: Normal effort. .    Assessment & Plan:     75 y.o. female with the following -     1. Open wound of skin  Skin tear to right forearm.  Initially with cat scratch, eventually with larger skin tear after she pulled a Band-Aid off.  No signs of infection at this point after Augmentin course.  Dressing change changed and discussed continued home wound care.  Discussed seeking care for signs of infection.    Please note that this note was created using voice recognition software.      "

## 2021-03-30 ENCOUNTER — PATIENT MESSAGE (OUTPATIENT)
Dept: MEDICAL GROUP | Facility: LAB | Age: 76
End: 2021-03-30

## 2021-03-30 DIAGNOSIS — J41.0 SIMPLE CHRONIC BRONCHITIS (HCC): ICD-10-CM

## 2021-03-30 RX ORDER — ALBUTEROL SULFATE 90 UG/1
2 AEROSOL, METERED RESPIRATORY (INHALATION) EVERY 6 HOURS PRN
Qty: 8.5 G | Refills: 1 | Status: SHIPPED | OUTPATIENT
Start: 2021-03-30 | End: 2021-04-14 | Stop reason: SDUPTHER

## 2021-03-30 NOTE — TELEPHONE ENCOUNTER
----- Message from Kelly Lovett sent at 3/30/2021  1:42 PM PDT -----  Regarding: Prescription Question  Contact: [phone]  Dr BARBOZA  I need to refill [email or web address]. Could you please send it to [facility] pharmacy. Thank you

## 2021-03-30 NOTE — PATIENT COMMUNICATION
Received request via: Patient    Was the patient seen in the last year in this department? Yes  3/26/2021  Does the patient have an active prescription (recently filled or refills available) for medication(s) requested? No

## 2021-04-01 ENCOUNTER — TELEPHONE (OUTPATIENT)
Dept: VASCULAR LAB | Facility: MEDICAL CENTER | Age: 76
End: 2021-04-01

## 2021-04-01 NOTE — TELEPHONE ENCOUNTER
Pt missed their anticoag appointment on 3/25.    Unable to LVM to reschedule.   Sent [employer] message    Gaby Holley, DariD

## 2021-04-02 ENCOUNTER — PATIENT MESSAGE (OUTPATIENT)
Dept: HEALTH INFORMATION MANAGEMENT | Facility: OTHER | Age: 76
End: 2021-04-02

## 2021-04-07 ENCOUNTER — TELEPHONE (OUTPATIENT)
Dept: MEDICAL GROUP | Facility: LAB | Age: 76
End: 2021-04-07

## 2021-04-07 NOTE — TELEPHONE ENCOUNTER
ESTABLISHED PATIENT PRE-VISIT PLANNING     Patient was NOT contacted to complete PVP.     Note: Patient will not be contacted if there is no indication to call.     1.  Reviewed notes from the last few office visits within the medical group: Yes    2.  If any orders were placed at last visit or intended to be done for this visit (i.e. 6 mos follow-up), do we have Results/Consult Notes?         •  Labs - Labs ordered, completed on 3/17/21 and results are in chart.  Note: If patient appointment is for lab review and patient did not complete labs, check with provider if OK to reschedule patient until labs completed.       •  Imaging - Imaging was not ordered at last office visit.       •  Referrals - No referrals were ordered at last office visit.    3. Is this appointment scheduled as a Hospital Follow-Up? No    4.  Immunizations were updated in Epic using Reconcile Outside Information activity? Yes    5.  Patient is due for the following Health Maintenance Topics:   Health Maintenance Due   Topic Date Due   • IMM ZOSTER VACCINES (2 of 3) 10/10/2013   • COLONOSCOPY  05/06/2019         6.  AHA (Pulse8) form printed for Provider?  Pulse 8 done 2/25/21 no alerts

## 2021-04-08 ENCOUNTER — ANTICOAGULATION VISIT (OUTPATIENT)
Dept: MEDICAL GROUP | Facility: MEDICAL CENTER | Age: 76
End: 2021-04-08
Payer: MEDICARE

## 2021-04-08 DIAGNOSIS — Z79.01 LONG TERM (CURRENT) USE OF ANTICOAGULANTS: ICD-10-CM

## 2021-04-08 DIAGNOSIS — I48.92 ATRIAL FLUTTER, UNSPECIFIED TYPE (HCC): ICD-10-CM

## 2021-04-08 DIAGNOSIS — I48.0 PAROXYSMAL ATRIAL FIBRILLATION (HCC): ICD-10-CM

## 2021-04-08 LAB — INR PPP: 2.1 (ref 2–3.5)

## 2021-04-08 PROCEDURE — 93793 ANTICOAG MGMT PT WARFARIN: CPT | Performed by: INTERNAL MEDICINE

## 2021-04-08 PROCEDURE — 85610 PROTHROMBIN TIME: CPT | Performed by: INTERNAL MEDICINE

## 2021-04-08 PROCEDURE — 99999 PR NO CHARGE: CPT | Performed by: INTERNAL MEDICINE

## 2021-04-08 NOTE — PROGRESS NOTES
OP Anticoagulation Service Note    Date: 2021  There were no vitals filed for this visit.   pt declined vitals    Anticoagulation Summary  As of 2021    INR goal:  2.0-3.0   TTR:  64.3 % (1.8 y)   INR used for dosin.10 (2021)   Warfarin maintenance plan:  2.5 mg (2.5 mg x 1) every Sun, Tue; 1.25 mg (2.5 mg x 0.5) all other days   Weekly warfarin total:  11.25 mg   Plan last modified:  Gaby Holley, PharmD (2021)   Next INR check:  2021   Target end date:  Indefinite    Indications    Atrial flutter (HCC) [I48.92]  Long term (current) use of anticoagulants [Z79.01] [Z79.01]  Paroxysmal atrial fibrillation (HCC) [I48.0]             Anticoagulation Episode Summary     INR check location:  Anticoagulation Clinic    Preferred lab:      Send INR reminders to:      Comments:        Anticoagulation Care Providers     Provider Role Specialty Phone number    [facility]   [phone]        Anticoagulation Patient Findings      HPI:   [facility] on anticoagulation therapy with warfarin (a high risk medication) for atrial fibrillation    Pt is here today to evaluate anticoagulation therapy    Previous INR was  2.8 on 21    Pt was instructed to continue regimen    Confirmed warfarin dosing regimen, denies missed or extra doses of coumadin.   Diet has been consistent with foods rich in vitamin K: Yes  Changes in ETOH:  No  Changes in smoking status: No  Changes in medication: No   Cost restriction: No  S/s of bleeding:  No  Falls or accidents since last visit No  Signs/symptoms  thrombosis since the last appt: No      A/P   INR  therapeutic today    Pt is to continue with current warfarin dosing regimen.     check referral    Pt educated to contact our clinic with any changes in medications or s/s of bleeding or thrombosis. Pt is aware to seek immediate medical attention for falls, head injury or deep cuts    Follow up appointment  in 10 week(s) to reduce risk of adverse events from warfarin   Gaby Holley, DariD

## 2021-04-15 DIAGNOSIS — I48.0 PAF (PAROXYSMAL ATRIAL FIBRILLATION) (HCC): ICD-10-CM

## 2021-04-16 RX ORDER — AMIODARONE HYDROCHLORIDE 200 MG/1
200 TABLET ORAL DAILY
Qty: 30 TABLET | Refills: 0 | Status: SHIPPED | OUTPATIENT
Start: 2021-04-16 | End: 2021-05-24 | Stop reason: SDUPTHER

## 2021-04-20 ENCOUNTER — PATIENT OUTREACH (OUTPATIENT)
Dept: HEALTH INFORMATION MANAGEMENT | Facility: OTHER | Age: 76
End: 2021-04-20

## 2021-04-20 NOTE — PROGRESS NOTES
Outcome: Left Message Unable leave voice mail  for Comprehensive Geriatric Assessment    Please transfer to Patient Outreach Team at [phone] when patient returns call.        Attempt # 1

## 2021-04-21 ENCOUNTER — TELEPHONE (OUTPATIENT)
Dept: CARDIOLOGY | Facility: MEDICAL CENTER | Age: 76
End: 2021-04-21

## 2021-04-21 NOTE — TELEPHONE ENCOUNTER
4/21/21 tried to call patient to make follow up visit with Dr. Li but no answer and her mailbox is full. vw

## 2021-04-21 NOTE — TELEPHONE ENCOUNTER
----- Message from Angelica Velázquez, Med Ass't sent at 4/15/2021  1:28 PM PDT -----  Regarding: Needs appointment  Please schedule patient for F/V with  over due.    Thank you  Angelica

## 2021-04-28 ENCOUNTER — OFFICE VISIT (OUTPATIENT)
Dept: MEDICAL GROUP | Facility: LAB | Age: 76
End: 2021-04-28
Payer: MEDICARE

## 2021-04-28 VITALS
HEART RATE: 78 BPM | HEIGHT: 66 IN | BODY MASS INDEX: 17.68 KG/M2 | RESPIRATION RATE: 12 BRPM | SYSTOLIC BLOOD PRESSURE: 110 MMHG | WEIGHT: 110 LBS | TEMPERATURE: 97.9 F | OXYGEN SATURATION: 96 % | DIASTOLIC BLOOD PRESSURE: 64 MMHG

## 2021-04-28 DIAGNOSIS — T14.8XXA OPEN WOUND OF SKIN: ICD-10-CM

## 2021-04-28 DIAGNOSIS — R79.89 ELEVATED LFTS: ICD-10-CM

## 2021-04-28 PROCEDURE — 99213 OFFICE O/P EST LOW 20 MIN: CPT | Performed by: FAMILY MEDICINE

## 2021-04-28 ASSESSMENT — FIBROSIS 4 INDEX: FIB4 SCORE: 2.3

## 2021-04-28 NOTE — PROGRESS NOTES
"Subjective:     CC: Wound check    HPI:   Kelly presents today to follow-up on skin tear of right arm.  Initially had cat scratch and then skin tear after pulling bandage off.  Did previously appear infected and she completed course of Augmentin.  She reports it has improved, no increasing redness, no discharge from the wound, no fevers or chills.    Medications, past medical history, allergies, and social history have been reviewed and updated.    ROS:  See HPI      Objective:       Exam:  /64 (BP Location: Left arm, Patient Position: Sitting, BP Cuff Size: Adult)   Pulse 78   Temp 36.6 °C (97.9 °F)   Resp 12   Ht 1.664 m (5' 5.51\")   Wt 49.9 kg (110 lb)   LMP  (LMP Unknown)   SpO2 96%   BMI 18.02 kg/m²  Body mass index is 18.02 kg/m².    Constitutional: Alert. Well appearing. No distress.  Skin: Warm, dry, good turgor.  Well-healing skin tear to the right forearm.  Eye: Equal, round and reactive to light, conjunctiva clear, lids normal.  ENMT: Moist mucous membranes. Normal dentition.  Respiratory: Normal effort. Lungs are clear to auscultation bilaterally.  Oxygen in place.  Cardiovascular: Irregular rate and rhythm. Normal S1/S2. No murmurs, rubs or gallops.   Neuro: Moves all four extremities. No facial droop.  Psych: Answers questions appropriately. Normal affect and mood.      Assessment & Plan:     75 y.o. female with the following -     1. Open wound of skin  Skin tear to right forearm, completed course of Augmentin for initial infection.  Now appears well-healing.  Follow-up for signs of infection.    2. Elevated LFTs  Likely secondary to alcohol use.  Encouraged significant reduction or complete cessation.  Rechecking.  - CBC WITH DIFFERENTIAL; Future  - Comp Metabolic Panel; Future      Please note that this note was created using voice recognition software.      "

## 2021-04-29 ENCOUNTER — TELEPHONE (OUTPATIENT)
Dept: CARDIOLOGY | Facility: MEDICAL CENTER | Age: 76
End: 2021-04-29

## 2021-04-29 NOTE — TELEPHONE ENCOUNTER
Called patient regarding very low cardiomems reading, patient VM box full, could not leave message

## 2021-05-10 NOTE — TELEPHONE ENCOUNTER
Received request via: Pharmacy    Was the patient seen in the last year in this department? Yes  4/28/2021  Does the patient have an active prescription (recently filled or refills available) for medication(s) requested? No

## 2021-05-11 RX ORDER — TIOTROPIUM BROMIDE INHALATION SPRAY 1.56 UG/1
SPRAY, METERED RESPIRATORY (INHALATION)
Qty: 12 G | Refills: 2 | Status: SHIPPED | OUTPATIENT
Start: 2021-05-11 | End: 2022-01-26 | Stop reason: SDUPTHER

## 2021-05-24 ENCOUNTER — PATIENT MESSAGE (OUTPATIENT)
Dept: CARDIOLOGY | Facility: MEDICAL CENTER | Age: 76
End: 2021-05-24

## 2021-05-24 DIAGNOSIS — I48.0 PAF (PAROXYSMAL ATRIAL FIBRILLATION) (HCC): ICD-10-CM

## 2021-05-24 RX ORDER — AMIODARONE HYDROCHLORIDE 200 MG/1
200 TABLET ORAL DAILY
Qty: 15 TABLET | Refills: 0 | Status: SHIPPED
Start: 2021-05-24 | End: 2021-06-02

## 2021-05-26 DIAGNOSIS — Z79.01 CHRONIC ANTICOAGULATION: ICD-10-CM

## 2021-05-27 ENCOUNTER — TELEPHONE (OUTPATIENT)
Dept: CARDIOLOGY | Facility: MEDICAL CENTER | Age: 76
End: 2021-05-27

## 2021-05-27 NOTE — TELEPHONE ENCOUNTER
Called patient due to lack of cardiomems readings, last reading was 5/4, patient states she just keeps forgetting, offered patient to inactivate her cardiomems if she is no longer interested in using the tool, she declined, requested readings at a minimum of 1-2 a week

## 2021-06-02 ENCOUNTER — OFFICE VISIT (OUTPATIENT)
Dept: CARDIOLOGY | Facility: MEDICAL CENTER | Age: 76
End: 2021-06-02
Payer: MEDICARE

## 2021-06-02 VITALS
BODY MASS INDEX: 17.84 KG/M2 | SYSTOLIC BLOOD PRESSURE: 102 MMHG | HEIGHT: 66 IN | DIASTOLIC BLOOD PRESSURE: 60 MMHG | WEIGHT: 111 LBS | RESPIRATION RATE: 14 BRPM | HEART RATE: 62 BPM | OXYGEN SATURATION: 100 %

## 2021-06-02 DIAGNOSIS — E78.2 MIXED HYPERLIPIDEMIA: ICD-10-CM

## 2021-06-02 DIAGNOSIS — I50.20 ACC/AHA STAGE C SYSTOLIC HEART FAILURE (HCC): ICD-10-CM

## 2021-06-02 DIAGNOSIS — I48.0 PAF (PAROXYSMAL ATRIAL FIBRILLATION) (HCC): Primary | ICD-10-CM

## 2021-06-02 DIAGNOSIS — Z98.890 HISTORY OF MITRAL VALVE REPAIR: ICD-10-CM

## 2021-06-02 DIAGNOSIS — I50.22 CHRONIC SYSTOLIC CONGESTIVE HEART FAILURE (HCC): ICD-10-CM

## 2021-06-02 DIAGNOSIS — Z98.890 STATUS POST LIGATION OF LEFT ATRIAL APPENDAGE: ICD-10-CM

## 2021-06-02 DIAGNOSIS — I51.89 LEFT VENTRICULAR SYSTOLIC DYSFUNCTION, NYHA CLASS 3: ICD-10-CM

## 2021-06-02 DIAGNOSIS — Z98.890 H/O MAZE PROCEDURE: ICD-10-CM

## 2021-06-02 PROCEDURE — 99215 OFFICE O/P EST HI 40 MIN: CPT | Performed by: INTERNAL MEDICINE

## 2021-06-02 PROCEDURE — 93000 ELECTROCARDIOGRAM COMPLETE: CPT | Performed by: INTERNAL MEDICINE

## 2021-06-02 RX ORDER — SACUBITRIL AND VALSARTAN 24; 26 MG/1; MG/1
1 TABLET, FILM COATED ORAL 2 TIMES DAILY
Qty: 60 TABLET | Refills: 11 | Status: SHIPPED | OUTPATIENT
Start: 2021-06-02 | End: 2022-06-27

## 2021-06-02 RX ORDER — AMIODARONE HYDROCHLORIDE 100 MG/1
100 TABLET ORAL DAILY
Qty: 90 TABLET | Refills: 4 | Status: SHIPPED | OUTPATIENT
Start: 2021-06-02 | End: 2022-07-18

## 2021-06-02 ASSESSMENT — ENCOUNTER SYMPTOMS
ABDOMINAL PAIN: 0
FALLS: 0
SENSORY CHANGE: 0
FEVER: 0
DIZZINESS: 0
DIAPHORESIS: 0
DOUBLE VISION: 0
MYALGIAS: 0
PALPITATIONS: 0
MEMORY LOSS: 0
BRUISES/BLEEDS EASILY: 0
SHORTNESS OF BREATH: 1
BLURRED VISION: 0
DEPRESSION: 0
HEADACHES: 0
COUGH: 0

## 2021-06-02 ASSESSMENT — FIBROSIS 4 INDEX: FIB4 SCORE: 2.3

## 2021-06-02 NOTE — PATIENT INSTRUCTIONS
Will reduce Amiodarone to 100 mg once a day.    Will start Entresto 24/26 mg twice a day.    Stop Digoxin.

## 2021-06-02 NOTE — PROGRESS NOTES
Chief Complaint   Patient presents with   • Atrial Flutter   • CHF (Chronic)     F/V Dx: ACC/AHA stage C systolic heart failure (HCC)   • Aortic Atherosclerosis   • Atrial Fibrillation       Subjective:   Kelly Lovett is a 75 y.o. female who presents today for cardiac care and evaluation for her prior mitral valve repair, maze, now with Stage C systolic HF.     In 02/219, she went into the hospital because of heart failure exacerbation.  She was found to have a new reduction in LV function which is now at 20%.  She does have significant history of daily drinking [place].  We optimized her medication diuresed and she was discharged.     I have independently reviewed blood tests results with patient in clinic which showed elevated LDL level, but normal renal and liver function.     I have independently reviewed patient's ECG with patient in clinic today, which shows normal sinus rhythm, normal MO, QT intervals. No evidence of acute coronary syndrome.     03/2019 Patient was able to complete 183 m during his 6 minute walk test. her O2 saturation at baseline was 99% and at the end of the test, the O2 saturation was 96%. she reported 4 level of dyspnea on Yen scale.    07/2019 She underwent successful cardioversion with Amiodarone loading.    Since then, she is feeling better.    I have personally interpreted her EKG today with patient, in sinus rhythm.    Patient still gets winded with daily living activities and exertion. No symptoms at rest.     I have independently interpreted and reviewed blood tests results with patient in clinic which shows normal  renal function.    Past Medical History:   Diagnosis Date   • Asthma     inhalers   • Breath shortness     pt reports using o2 at night 2L, no problems at this time   • Chronic systolic congestive heart failure (HCC) 7/7/2016   • COPD (chronic obstructive pulmonary disease) (HCC)    • Dilated cardiomyopathy (HCC)    • Emphysema of lung (HCC)    • Heart  valve disease    • High cholesterol    • History of heart surgery    • Hyperlipidemia    • Hypertension    • Hypotension due to hypovolemia 3/1/2019   • Sleep apnea     uses cpap     Past Surgical History:   Procedure Laterality Date   • MITRAL VALVE REPAIR  2017    Procedure: MITRAL VALVE REPAIR ;  Surgeon: Lacey Porras M.D.;  Location: SURGERY [facility];  Service:    • MAZE PROCEDURE Left 2017    Procedure: MAZE PROCEDURE, Left atrial appendage ligation;  Surgeon: Lacey Porras M.D.;  Location: SURGERY [facility];  Service:    • PAGE  2017    Procedure: PAGE;  Surgeon: Lacey Porras M.D.;  Location: SURGERY [facility];  Service:    • APPENDECTOMY      1967   • OOPHORECTOMY       Family History   Problem Relation Age of Onset   • Cancer Father         colon cancer    • Hypertension Mother    • Cancer Sister 68        ovarian cancer     Social History     Socioeconomic History   • Marital status:      Spouse name: Not on file   • Number of children: Not on file   • Years of education: Not on file   • Highest education level: Not on file   Occupational History   • Not on file   Tobacco Use   • Smoking status: Former Smoker     Packs/day: 0.50     Years: 38.00     Pack years: 19.00     Types: Cigarettes     Quit date: 10/11/2001     Years since quittin.6   • Smokeless tobacco: Never Used   Vaping Use   • Vaping Use: Never used   Substance and Sexual Activity   • Alcohol use: Yes     Alcohol/week: 8.4 oz     Types: 14 Shots of liquor per week     Comment: 2 drinks a day   • Drug use: No   • Sexual activity: Yes     Partners: Male   Other Topics Concern   • Not on file   Social History Narrative   • Not on file     Social Determinants of Health     Financial Resource Strain:    • Difficulty of Paying Living Expenses:    Food Insecurity:    • Worried About Running Out of Food in the Last Year:    • Ran Out of Food in the Last Year:    Transportation Needs:    • Lack of  Transportation (Medical):    • Lack of Transportation (Non-Medical):    Physical Activity:    • Days of Exercise per Week:    • Minutes of Exercise per Session:    Stress:    • Feeling of Stress :    Social Connections:    • Frequency of Communication with Friends and Family:    • Frequency of Social Gatherings with Friends and Family:    • Attends [employer] orth[employer] Services:    • Active Member of Clubs or Organizations:    • Attends Club or Organization Meetings:    • Marital Status:    Intimate Partner Violence:    • Fear of Current or Ex-Partner:    • Emotionally Abused:    • Physically Abused:    • Sexually Abused:      Allergies   Allergen Reactions   • Sulfa Drugs Rash     Rxn - years ago in her 20's     Outpatient Encounter Medications as of 6/2/2021   Medication Sig Dispense Refill   • fluticasone-salmeterol (ADVAIR) 250-50 MCG/DOSE AEROSOL POWDER, BREATH ACTIVATED      • amiodarone (CORDARONE) 100 MG tablet Take 1 tablet by mouth every day. 90 tablet 4   • sacubitril-valsartan (ENTRESTO) 24-26 MG Tab tablet Take 1 tablet by mouth 2 times a day. 60 tablet 11   • SPIRIVA RESPIMAT 1.25 MCG/ACT Aero Soln INHALE TWO PUFFS BY MOUTH DAILY  12 g 2   • albuterol 108 (90 Base) MCG/ACT Aero Soln inhalation aerosol Inhale 2 Puffs every 6 hours as needed for Shortness of Breath. 8 g 1   • warfarin (COUMADIN) 2.5 MG Tab TAKE ONE-HALF TO ONE TABLET BY MOUTH DAILY OR AS DIRECTED BY ANTICOAGULATION CLINIC 90 tablet 1   • atorvastatin (LIPITOR) 40 MG Tab TAKE ONE TABLET BY MOUTH ONE TIME DAILY 100 tablet 4   • metoprolol SR (TOPROL XL) 100 MG TABLET SR 24 HR Take 1 Tab by mouth every day. 100 Tab 2   • Cholecalciferol (VITAMIN D3) 50 MCG (2000 UT) Tab Take 2,000 Units by mouth every day.     • diphenhydrAMINE HCl, Sleep, 50 MG Tab Take 50 mg by mouth at bedtime as needed (sleep).     • Home Care Oxygen Inhale 3 L/min continuous. Oxygen dose range: 2.5-3 L/min  Respiratory route via: Nasal cannula  Oxygen supplier: [facility]     • sertraline (ZOLOFT) 100 MG Tab Take 1 Tab by mouth every day. 90 Tab 3   • Calcium Carb-Cholecalciferol (CALCIUM 1000 + D PO) Take 1 Cap by mouth every day.     • magnesium oxide (MAG-OX) 400 (241.3 Mg) MG Tab tablet Take 1 Tab by mouth every day. (Patient taking differently: Take 400 mg by mouth every day. 500 mg tablets)     • acetaminophen (TYLENOL) 500 MG Tab Take 500 mg by mouth 1 time daily as needed. Headache     • [DISCONTINUED] amiodarone (CORDARONE) 200 MG Tab Take 1 tablet by mouth every day. MUST be seen for further refills. 15 tablet 0   • [DISCONTINUED] digoxin (LANOXIN) 125 MCG Tab TAKE 1 TABLET BY MOUTH EVERY DAY AT 6 PM. 90 Tab 3     No facility-administered encounter medications on file as of 6/2/2021.     Review of Systems   Constitutional: Negative for diaphoresis and fever.   HENT: Negative for nosebleeds.    Eyes: Negative for blurred vision and double vision.   Respiratory: Positive for shortness of breath. Negative for cough.    Cardiovascular: Negative for chest pain and palpitations.   Gastrointestinal: Negative for abdominal pain.   Genitourinary: Negative for dysuria and frequency.   Musculoskeletal: Negative for falls and myalgias.   Skin: Negative for rash.   Neurological: Negative for dizziness, sensory change and headaches.   Endo/Heme/Allergies: Does not bruise/bleed easily.   Psychiatric/Behavioral: Negative for depression and memory loss.        Objective:   /60 (BP Location: Left arm, Patient Position: Sitting, BP Cuff Size: Adult)   Pulse 62   Resp 14   Ht 1.664 m (5' 5.51\")   Wt 50.3 kg (111 lb)   LMP  (LMP Unknown)   SpO2 100%   BMI 18.18 kg/m²     Physical Exam   Constitutional: She is oriented to person, place, and time. No distress.   Patient is dependent on supplemental oxygen.     HENT:   Head: Normocephalic and atraumatic.   Right Ear: External ear normal.   Left Ear: External ear normal.   Eyes: Right eye exhibits no discharge. Left eye exhibits " no discharge.   Neck: No JVD present. No thyromegaly present.   Cardiovascular: Normal rate, regular rhythm and normal heart sounds. Exam reveals no gallop and no friction rub.   No murmur heard.  Pulmonary/Chest: Breath sounds normal. No respiratory distress.   Abdominal: Bowel sounds are normal. She exhibits no distension. There is no abdominal tenderness.   Musculoskeletal:         General: No tenderness.   Neurological: She is alert and oriented to person, place, and time. No cranial nerve deficit.   Skin: Skin is warm and dry. She is not diaphoretic.   Psychiatric: Her behavior is normal.   Nursing note and vitals reviewed.      Assessment:     1. PAF (paroxysmal atrial fibrillation) ([facility])  EKG   2. ACC/AHA stage C systolic heart failure ([facility])  Basic Metabolic Panel   3. Left ventricular systolic dysfunction, NYHA class 3  Basic Metabolic Panel   4. Chronic systolic congestive heart failure ([facility])     5. History of mitral valve repair     6. H/O maze procedure     7. Status post ligation of left atrial appendage     8. Mixed hyperlipidemia  Basic Metabolic Panel       Medical Decision Making:  Today's Assessment / Status / Plan:   Today, based on physical examination findings, patient is euvolemic. No JVD, lungs are clear to auscultation, no pitting edema in bilateral lower extremities, no ascites.    Dry weight is 111 lbs.    Patient definitely feels better when she is in sinus rhythm.  Therefore our priority is to keep her in sinus rhythm.  Will reduce Amiodarone to 100 mg once a day to avoid toxicity.    Her blood pressure is better today. Will start Entresto 24/26 mg bid.     Continue to hold Spironolactone.     She is status Cardiomems implant.     In the meantime, we will continue current medical therapy of Toprol XL  100 mg daily and will stop digoxin 125 mcg daily.     Patient did not like anticoagulation therapy in the past.  Therefore, I do think that it it is okay for us to not push her with  anticoagulation therapy.    Will continue to closely monitor for side effects of patient's high risk medication(s) including liver, renal function and electrolytes.    I will see patient back in our Heart Failure Clinic in 4 weeks.    I thank you for referring patient to our Heart Failure Clinic today.

## 2021-06-03 LAB — EKG IMPRESSION: NORMAL

## 2021-06-04 NOTE — PROGRESS NOTES
Chief Complaint   Patient presents with   • Medicare Annual Wellness     SCP         HPI:  Kelly is a 74 y.o. here for Medicare Annual Wellness Visit.   She has no current concerns.      Patient Active Problem List    Diagnosis Date Noted   • Non-rheumatic mitral regurgitation 03/08/2017     Priority: High   • Chronic systolic congestive heart failure (HCC) 07/07/2016     Priority: High   • COPD (chronic obstructive pulmonary disease) ([facility]) 10/11/2016     Priority: Medium   • Obstructive sleep apnea syndrome 10/11/2016     Priority: Medium   • Atrial flutter ([facility]) 12/15/2015     Priority: Medium   • Chronic respiratory failure with hypoxia ([facility]) 11/23/2015     Priority: Medium   • Hyperlipidemia 09/10/2015     Priority: Medium   • Debility 03/03/2019     Priority: Low   • Long term (current) use of anticoagulants [Z79.01] 08/06/2018     Priority: Low   • Anxiety 03/15/2017     Priority: Low   • Hypertensive heart disease with heart failure ([facility]) 03/20/2019   • Pulmonary hypertension due to left heart disease ([facility]) 03/20/2019   • DNR (do not resuscitate) 02/26/2019   • Voice hoarseness 07/18/2018   • Risk for falls 01/10/2018   • Moderate single current episode of major depressive disorder ([facility]) 12/05/2017   • Paroxysmal atrial fibrillation ([facility]) 06/19/2017   • Essential (primary) hypertension 03/17/2017   • Osteopenia 10/11/2016   • Aortic atherosclerosis ([facility]) 09/10/2015   • Prediabetes 08/15/2013   • History of colonic polyps 03/03/2008       Current Outpatient Medications   Medication Sig Dispense Refill   • Tiotropium Bromide Monohydrate (SPIRIVA RESPIMAT) 1.25 MCG/ACT Aero Soln Inhale 2 Puffs by mouth every day. 12 g 2   • digoxin (LANOXIN) 125 MCG Tab TAKE 1 TABLET BY MOUTH EVERY DAY AT 6 PM. 90 Tab 0   • atorvastatin (LIPITOR) 40 MG Tab TAKE ONE TABLET BY MOUTH ONE TIME DAILY 100 Tab 0   • amiodarone (CORDARONE) 200 MG Tab Take 1 Tab by mouth every day. 90 Tab 0   • warfarin (COUMADIN) 2.5 MG Tab Take  EMERGENCY DEPARTMENT HISTORY AND PHYSICAL EXAM    Date: 6/4/2021  Patient Name: Jefry Ritter    History of Presenting Illness     Chief Complaint   Patient presents with    Abscess         History Provided By: Patient    Chief Complaint: skin problem  Duration: onset a few months ago worsening past few days   Timing:  Acute  Location: axilla sacrum groin area  Quality: Burning and Sharp  Severity: 10 out of 10  Modifying Factors: sitting worsens pain  Associated Symptoms: drainage      HPI: Jefry Ritter is a 21 y.o. male with a PMH of Hidradenitis suppurativa who presents with drainage from right and left axilla and groin area which has been ongoing for the past few months. Patient states past few days drainage has been worse and he is taking doxycycline which is not helping. Patient denies fever. Patient reports drainage is excessive and he has had to use diapers to contain it. He denies previous surgery. PCP: Rubén Engel NP    Current Facility-Administered Medications   Medication Dose Route Frequency Provider Last Rate Last Admin    sodium chloride (NS) flush 5-10 mL  5-10 mL IntraVENous PRN Anson Starks NP        sodium chloride 0.9 % bolus infusion 67 mL  67 mL IntraVENous ONCE Anson Starks NP         Current Outpatient Medications   Medication Sig Dispense Refill    trimethoprim-sulfamethoxazole (BACTRIM DS, SEPTRA DS) 160-800 mg per tablet Take 1 Tablet by mouth two (2) times a day for 10 days. 20 Tablet 0    traMADoL (ULTRAM) 50 mg tablet Take 1 Tablet by mouth every eight (8) hours as needed for Pain for up to 3 days. Max Daily Amount: 150 mg. For severe pain (Patient not taking: Reported on 6/4/2021) 12 Tablet 0    ibuprofen (MOTRIN) 800 mg tablet Take 1 Tablet by mouth every eight (8) hours as needed for Pain.  With food (Patient not taking: Reported on 6/4/2021) 60 Tablet 0       Past History     Past Medical History:  No past medical history on file.    Past Surgical History:  No past surgical history on file. Family History:  No family history on file. Social History:  Social History     Tobacco Use    Smoking status: Never Smoker    Smokeless tobacco: Never Used   Vaping Use    Vaping Use: Never used   Substance Use Topics    Alcohol use: Yes     Comment: Socially     Drug use: Never       Allergies: Allergies   Allergen Reactions    Penicillins Anaphylaxis    Pcn [Penicillins] Swelling         Review of Systems   Review of Systems   Constitutional: Negative for chills, fatigue and fever. HENT: Negative for congestion and sore throat. Eyes: Negative for redness. Respiratory: Negative for cough, chest tightness and wheezing. Cardiovascular: Negative for chest pain. Gastrointestinal: Negative for abdominal pain. Genitourinary: Negative for dysuria. Musculoskeletal: Negative for arthralgias, back pain, myalgias, neck pain and neck stiffness. Skin: Negative for rash. Multiple axillary skin lesions groin lesion and lesion on sacrum coccyx   Neurological: Negative for dizziness, syncope, weakness, light-headedness, numbness and headaches. Hematological: Negative for adenopathy. All other systems reviewed and are negative. Physical Exam     Vitals:    06/04/21 1926 06/04/21 1927 06/04/21 1928 06/04/21 1929   BP:       Pulse:       Resp:       Temp:       SpO2: 99% 96% 98% 100%   Weight:       Height:         Physical Exam  Vitals and nursing note reviewed. Constitutional:       Appearance: He is well-developed and normal weight. He is ill-appearing. HENT:      Head: Normocephalic and atraumatic. Right Ear: External ear normal.      Nose: Nose normal.      Mouth/Throat:      Mouth: Mucous membranes are moist.   Eyes:      General:         Right eye: No discharge. Left eye: No discharge.       Conjunctiva/sclera: Conjunctivae normal.   Cardiovascular:      Rate and Rhythm: Normal rate and regular one-half to one tablet by mouth daily or as directed by anticoagulation clinic 90 Tab 1   • sertraline (ZOLOFT) 100 MG Tab Take 1 Tab by mouth every day. 90 Tab 3   • fluticasone-salmeterol (ADVAIR) 250-50 MCG/DOSE AEROSOL POWDER, BREATH ACTIVATED Inhale 1 Puff by mouth 2 times a day. 3 Inhaler 3   • metoprolol SR (TOPROL XL) 100 MG TABLET SR 24 HR Take 1 Tab by mouth every day. 100 Tab 2   • Calcium Carb-Cholecalciferol (CALCIUM 1000 + D PO) Take 1 Cap by mouth every day.     • magnesium oxide (MAG-OX) 400 (241.3 Mg) MG Tab tablet Take 1 Tab by mouth every day.     • albuterol 108 (90 Base) MCG/ACT Aero Soln inhalation aerosol Inhale 2 Puffs by mouth every 6 hours as needed for Shortness of Breath.     • acetaminophen (TYLENOL) 500 MG Tab Take 500 mg by mouth 1 time daily as needed. Headache       No current facility-administered medications for this visit.         Patient is taking medications as noted in medication list.  Current supplements as per medication list.     Allergies: Sulfa drugs    Current social contact/activities: [employer] facebook, zoom meetings, spending time with  and sister and niece, phone calls    Is patient current with immunizations? Yes.    She  reports that she quit smoking about 18 years ago. Her smoking use included cigarettes. She has a 19.00 pack-year smoking history. She has never used smokeless tobacco. She reports current alcohol use of about 8.4 oz of alcohol per week. She reports that she does not use drugs.  Counseling given: No        DPA/Advanced directive: Patient has Advanced Directive on file.     ROS:    Gait: Uses a cane occasionally   Ostomy: No   Other tubes: No   Amputations: No   Chronic oxygen use Yes   Last eye exam 1 year ago   Wears hearing aids: No   : Denies any urinary leakage during the last 6 months        Depression Screening    Little interest or pleasure in doing things?  0 - not at all  Feeling down, depressed, or hopeless? 3 - nearly every  day  Trouble falling or staying asleep, or sleeping too much?  0 - not at all  Feeling tired or having little energy?  0 - not at all  Poor appetite or overeating?  0 - not at all  Feeling bad about yourself - or that you are a failure or have let yourself or your family down? 0 - not at all  Trouble concentrating on things, such as reading the newspaper or watching television? 0 - not at all  Moving or speaking so slowly that other people could have noticed.  Or the opposite - being so fidgety or restless that you have been moving around a lot more than usual?  0 - not at all  Thoughts that you would be better off dead, or of hurting yourself?  0 - not at all  Patient Health Questionnaire Score: 3      If depressive symptoms identified deferred to follow up visit unless specifically addressed in assessment and plan.    Interpretation of PHQ-9 Total Score   Score Severity   1-4 No Depression   5-9 Mild Depression   10-14 Moderate Depression   15-19 Moderately Severe Depression   20-27 Severe Depression      Screening for Cognitive Impairment    Three Minute Recall (river, nation, finger)  2/3 Missed river  Draw clock face with all 12 numbers and set the hands to show 10 past 11.  Yes 5/5  If cognitive concerns identified, deferred for follow up unless specifically addressed in assessment and plan.    Fall Risk Assessment    Has the patient had two or more falls in the last year or any fall with injury in the last year?  Yes  If fall risk identified, deferred for follow up unless specifically addressed in assessment and plan.      Safety Assessment    Throw rugs on floor.  Yes  Handrails on all stairs.  Yes  Good lighting in all hallways.  Yes  Difficulty hearing.  No  Patient counseled about all safety risks that were identified.    Functional Assessment ADLs    Are there any barriers preventing you from cooking for yourself or meeting nutritional needs?  No.    Are there any barriers preventing you from driving  rhythm. Heart sounds: Normal heart sounds. Pulmonary:      Effort: Pulmonary effort is normal. No respiratory distress. Breath sounds: Normal breath sounds. No wheezing. Abdominal:      General: Bowel sounds are normal.      Palpations: Abdomen is soft. Tenderness: There is no abdominal tenderness. Musculoskeletal:         General: Normal range of motion. Cervical back: Normal range of motion and neck supple. Lymphadenopathy:      Cervical: No cervical adenopathy. Skin:     General: Skin is warm and dry. Comments: Multiple deep crevices in right and left axilla left worse than right. Copious amounts of serous drainage. 1 cm open area well-defined borders on sacrum coccyx draining serous fluid skin excoriation to groins bilaterally with chafing of skin and large amount of serous drainage. Neurological:      Mental Status: He is alert and oriented to person, place, and time. Cranial Nerves: No cranial nerve deficit. Psychiatric:         Behavior: Behavior normal.         Thought Content: Thought content normal.         Judgment: Judgment normal.           Diagnostic Study Results     Labs -     Recent Results (from the past 12 hour(s))   LACTIC ACID    Collection Time: 06/04/21  3:44 PM   Result Value Ref Range    Lactic acid 1.9 0.4 - 2.0 MMOL/L   METABOLIC PANEL, COMPREHENSIVE    Collection Time: 06/04/21  3:44 PM   Result Value Ref Range    Sodium 136 136 - 145 mmol/L    Potassium 4.1 3.5 - 5.1 mmol/L    Chloride 97 97 - 108 mmol/L    CO2 30 21 - 32 mmol/L    Anion gap 9 5 - 15 mmol/L    Glucose 112 (H) 65 - 100 mg/dL    BUN 5 (L) 6 - 20 MG/DL    Creatinine 0.97 0.70 - 1.30 MG/DL    BUN/Creatinine ratio 5 (L) 12 - 20      GFR est AA >60 >60 ml/min/1.73m2    GFR est non-AA >60 >60 ml/min/1.73m2    Calcium 9.0 8.5 - 10.1 MG/DL    Bilirubin, total 0.5 0.2 - 1.0 MG/DL    ALT (SGPT) 15 12 - 78 U/L    AST (SGOT) 14 (L) 15 - 37 U/L    Alk.  phosphatase 152 (H) 45 - 117 U/L Protein, total 8.1 6.4 - 8.2 g/dL    Albumin 2.3 (L) 3.5 - 5.0 g/dL    Globulin 5.8 (H) 2.0 - 4.0 g/dL    A-G Ratio 0.4 (L) 1.1 - 2.2     CBC WITH AUTOMATED DIFF    Collection Time: 06/04/21  3:44 PM   Result Value Ref Range    WBC 17.2 (H) 4.1 - 11.1 K/uL    RBC 4.20 4. 10 - 5.70 M/uL    HGB 9.1 (L) 12.1 - 17.0 g/dL    HCT 30.1 (L) 36.6 - 50.3 %    MCV 71.7 (L) 80.0 - 99.0 FL    MCH 21.7 (L) 26.0 - 34.0 PG    MCHC 30.2 30.0 - 36.5 g/dL    RDW 16.3 (H) 11.5 - 14.5 %    PLATELET 892 (H) 399 - 400 K/uL    MPV 8.7 (L) 8.9 - 12.9 FL    NRBC 0.0 0  WBC    ABSOLUTE NRBC 0.00 0.00 - 0.01 K/uL    NEUTROPHILS 88 (H) 32 - 75 %    LYMPHOCYTES 4 (L) 12 - 49 %    MONOCYTES 6 5 - 13 %    EOSINOPHILS 1 0 - 7 %    BASOPHILS 0 0 - 1 %    IMMATURE GRANULOCYTES 1 (H) 0.0 - 0.5 %    ABS. NEUTROPHILS 15.1 (H) 1.8 - 8.0 K/UL    ABS. LYMPHOCYTES 0.7 (L) 0.8 - 3.5 K/UL    ABS. MONOCYTES 1.0 0.0 - 1.0 K/UL    ABS. EOSINOPHILS 0.2 0.0 - 0.4 K/UL    ABS. BASOPHILS 0.0 0.0 - 0.1 K/UL    ABS. IMM. GRANS. 0.2 (H) 0.00 - 0.04 K/UL    DF SMEAR SCANNED      RBC COMMENTS ANISOCYTOSIS  1+           Radiologic Studies -   No orders to display     CT Results  (Last 48 hours)    None        CXR Results  (Last 48 hours)    None            Medical Decision Making   I am the first provider for this patient. I reviewed the vital signs, available nursing notes, past medical history, past surgical history, family history and social history. Vital Signs-Reviewed the patient's vital signs. Records Reviewed: Nursing Notes    Provider Notes (Medical Decision Making):   DDX hidradenitis suppurativa sepsis axillary abscesses necrotizing fasciitis  24-year-old male history of hidradenitis suppurativa treatment failure with multiple courses of oral antibiotics. Will order labs sepsis work-up      Patient is being transferred to [facility], transfer accepted by Dr. Muna Morris.  The reasons for their transfer have been discussed with them and available safely or obtaining transportation?  No.    Are there any barriers preventing you from using a telephone or calling for help?  No.    Are there any barriers preventing you from shopping?  No.    Are there any barriers preventing you from taking care of your own finances?  No.    Are there any barriers preventing you from managing your medications?    No.    Are there any barriers preventing you from showering, bathing or dressing yourself?  No.    Are you currently engaging in any exercise or physical activity?  No.     What is your perception of your health?  Good.    Health Maintenance Summary                Annual Wellness Visit Overdue 1/11/2019      Done 1/10/2018 Visit Dx: Medicare annual wellness visit, subsequent     Patient has more history with this topic...    COLONOSCOPY Overdue 5/6/2019      Done 5/6/2009 REFERRAL TO GI FOR COLONOSCOPY    IMM ZOSTER VACCINES Postponed 8/27/2020 Originally 10/10/2013. System: vaccine not available, other system reasons     Done 8/15/2013 Imm Admin: Zoster Vaccine Live (ZVL) (Zostavax) - HISTORICAL DATA    IMM INFLUENZA Next Due 9/1/2020      Done 10/24/2019 Imm Admin: Influenza Vaccine Adult HD     Patient has more history with this topic...    IMM DTaP/Tdap/Td Vaccine Next Due 9/17/2022      Done 9/17/2012 Imm Admin: Tdap Vaccine     Patient has more history with this topic...    BONE DENSITY Next Due 3/28/2023      Done 3/28/2018 DS-BONE DENSITY STUDY (DEXA)          Patient Care Team:  Chinmay Figueredo M.D. as PCP - General (Family Medicine)  Dalia Li M.D. as Consulting Physician (Cardiology)  [facility] as Home Health Provider  Alexsander Gardner M.D. as Consulting Physician (Pulmonary Medicine)  Lacey Porras M.D. as Consulting Physician (Cardiac Surgery)  [facility]  Elena Harvey P.A.-C. (Cardiology)  [facility] as [facility]       Social History     Tobacco Use   • Smoking status: Former  "Smoker     Packs/day: 0.50     Years: 38.00     Pack years: 19.00     Types: Cigarettes     Quit date: 10/11/2001     Years since quittin.8   • Smokeless tobacco: Never Used   Substance Use Topics   • Alcohol use: Yes     Alcohol/week: 8.4 oz     Types: 14 Shots of liquor per week     Comment: 4x week   • Drug use: No     Family History   Problem Relation Age of Onset   • Cancer Father         colon cancer    • Hypertension Mother    • Cancer Sister 68        ovarian cancer     She  has a past medical history of Asthma, Breath shortness, Chronic systolic congestive heart failure (HCC) (2016), COPD (chronic obstructive pulmonary disease) (HCC), Dilated cardiomyopathy (HCC), Emphysema of lung (HCC), Heart valve disease, High cholesterol, History of heart surgery, Hyperlipidemia, Hypertension, Hypotension due to hypovolemia (3/1/2019), and Sleep apnea.   Past Surgical History:   Procedure Laterality Date   • MITRAL VALVE REPAIR  2017    Procedure: MITRAL VALVE REPAIR ;  Surgeon: Lacey Porras M.D.;  Location: SURGERY [facility];  Service:    • MAZE PROCEDURE Left 2017    Procedure: MAZE PROCEDURE, Left atrial appendage ligation;  Surgeon: Lacey Porras M.D.;  Location: SURGERY [facility];  Service:    • PAGE  2017    Procedure: PAGE;  Surgeon: Lacey Porras M.D.;  Location: SURGERY [facility];  Service:    • APPENDECTOMY      1967   • OOPHORECTOMY         Exam:     /64 (BP Location: Left arm, Patient Position: Sitting, BP Cuff Size: Adult)   Pulse 61   Temp 37 °C (98.6 °F) (Temporal)   Resp 16   Ht 1.664 m (5' 5.51\")   Wt 53.5 kg (118 lb)   SpO2 97%  Body mass index is 19.33 kg/m².    Hearing good.    Dentition fair  Alert, oriented in no acute distress.  Eye contact is good, speech goal directed, affect calm      Assessment and Plan. The following treatment and monitoring plan is recommended:    1. Medicare annual wellness visit, subsequent  Subsequent Annual " family. They convey agreement and understanding for the need to be transferred as explained to them by this provider. Procedures:  Procedures    Please note that this dictation was completed with Dragon, computer voice recognition software. Quite often unanticipated grammatical, syntax, homophones, and other interpretive errors are inadvertently transcribed by the computer software. Please disregard these errors. Additionally, please excuse any errors that have escaped final proofreading. Diagnosis     Clinical Impression:   1.  Hidradenitis suppurativa Wellness Visit - Includes PPPS ()   2. Chronic respiratory failure with hypoxia (HCC)     3. Family history of colon cancer     4. Screen for colon cancer     5. Aortic atherosclerosis (HCC)     6. Atrial flutter, unspecified type (HCC)     7. Chronic systolic congestive heart failure (HCC)     8. Simple chronic bronchitis (HCC)     9. Essential (primary) hypertension     10. History of colonic polyps     11. Mixed hyperlipidemia     12. Hypertensive heart disease with heart failure (HCC)     13. Long term (current) use of anticoagulants [Z79.01]     14. Moderate single current episode of major depressive disorder (HCC)     15. Non-rheumatic mitral regurgitation     16. Obstructive sleep apnea syndrome     17. Osteopenia of multiple sites     18. Paroxysmal atrial fibrillation (HCC)     19. Prediabetes     20. Pulmonary hypertension due to left heart disease (HCC)     21. Risk for falls       She follows with cardiology and pulmonology for multiple chronic conditions.  Discussed mood today and she is not currently concerned, she does feel like has been tough with isolation surrounding COVID pandemic.  She has screening labs ordered from prior visits will have these drawn soon.  She will schedule a colonoscopy, and we will plan for this to be her last colonoscopy.    Services suggested: No services needed at this time  Health Care Screening recommendations as per orders if indicated.  Referrals offered: PT/OT/Nutrition counseling/Behavioral Health/Smoking cessation as per orders if indicated.    Discussion today about general wellness and lifestyle habits:    · Prevent falls and reduce trip hazards; Cautioned about securing or removing rugs.  · Have a working fire alarm and carbon monoxide detector;   · Engage in regular physical activity and social activities       Follow-up: Return in about 6 months (around 2/25/2021).

## 2021-06-17 ENCOUNTER — HOSPITAL ENCOUNTER (OUTPATIENT)
Dept: LAB | Facility: MEDICAL CENTER | Age: 76
End: 2021-06-17
Attending: FAMILY MEDICINE
Payer: MEDICARE

## 2021-06-17 ENCOUNTER — ANTICOAGULATION VISIT (OUTPATIENT)
Dept: MEDICAL GROUP | Facility: MEDICAL CENTER | Age: 76
End: 2021-06-17
Payer: MEDICARE

## 2021-06-17 DIAGNOSIS — I48.0 PAROXYSMAL ATRIAL FIBRILLATION (HCC): ICD-10-CM

## 2021-06-17 DIAGNOSIS — Z79.01 LONG TERM (CURRENT) USE OF ANTICOAGULANTS: Primary | ICD-10-CM

## 2021-06-17 DIAGNOSIS — R79.89 ELEVATED LFTS: ICD-10-CM

## 2021-06-17 LAB
BASOPHILS # BLD AUTO: 0.8 % (ref 0–1.8)
BASOPHILS # BLD: 0.07 K/UL (ref 0–0.12)
EOSINOPHIL # BLD AUTO: 0.16 K/UL (ref 0–0.51)
EOSINOPHIL NFR BLD: 1.8 % (ref 0–6.9)
ERYTHROCYTE [DISTWIDTH] IN BLOOD BY AUTOMATED COUNT: 48.9 FL (ref 35.9–50)
HCT VFR BLD AUTO: 40.1 % (ref 37–47)
HGB BLD-MCNC: 12.3 G/DL (ref 12–16)
IMM GRANULOCYTES # BLD AUTO: 0.04 K/UL (ref 0–0.11)
IMM GRANULOCYTES NFR BLD AUTO: 0.4 % (ref 0–0.9)
INR PPP: 2.3 (ref 2–3.5)
LYMPHOCYTES # BLD AUTO: 1.83 K/UL (ref 1–4.8)
LYMPHOCYTES NFR BLD: 20.3 % (ref 22–41)
MCH RBC QN AUTO: 30.2 PG (ref 27–33)
MCHC RBC AUTO-ENTMCNC: 30.7 G/DL (ref 33.6–35)
MCV RBC AUTO: 98.5 FL (ref 81.4–97.8)
MONOCYTES # BLD AUTO: 0.9 K/UL (ref 0–0.85)
MONOCYTES NFR BLD AUTO: 10 % (ref 0–13.4)
NEUTROPHILS # BLD AUTO: 6.03 K/UL (ref 2–7.15)
NEUTROPHILS NFR BLD: 66.7 % (ref 44–72)
NRBC # BLD AUTO: 0 K/UL
NRBC BLD-RTO: 0 /100 WBC
PLATELET # BLD AUTO: 239 K/UL (ref 164–446)
PMV BLD AUTO: 10.5 FL (ref 9–12.9)
RBC # BLD AUTO: 4.07 M/UL (ref 4.2–5.4)
WBC # BLD AUTO: 9 K/UL (ref 4.8–10.8)

## 2021-06-17 PROCEDURE — 80053 COMPREHEN METABOLIC PANEL: CPT

## 2021-06-17 PROCEDURE — 85610 PROTHROMBIN TIME: CPT | Performed by: INTERNAL MEDICINE

## 2021-06-17 PROCEDURE — 36415 COLL VENOUS BLD VENIPUNCTURE: CPT

## 2021-06-17 PROCEDURE — 93793 ANTICOAG MGMT PT WARFARIN: CPT | Performed by: INTERNAL MEDICINE

## 2021-06-17 PROCEDURE — 85025 COMPLETE CBC W/AUTO DIFF WBC: CPT

## 2021-06-17 NOTE — PROGRESS NOTES
Kelly Lovett is a 74 y.o. female here for a non-provider visit for FLU VACC    If abnormal was an in office provider notified today (if so, indicate provider)? Yes  Routed to PCP? No     [FreeTextEntry1] : CBC reviewed 6/17/21:\par WBC 6.0\par ALC 1.57\par ANC 4.10\par Hgb 13.7\par Hct 41.5\par Plt 248.0

## 2021-06-17 NOTE — PROGRESS NOTES
OP Anticoagulation Service Note    Date: 2021  There were no vitals filed for this visit.   pt declined vitals    Anticoagulation Summary  As of 2021    INR goal:  2.0-3.0   TTR:  67.8 % (2 y)   INR used for dosin.30 (2021)   Warfarin maintenance plan:  2.5 mg (2.5 mg x 1) every Sun, Tue; 1.25 mg (2.5 mg x 0.5) all other days   Weekly warfarin total:  11.25 mg   Plan last modified:  Gaby Holley, PharmD (2021)   Next INR check:  2021   Target end date:  Indefinite    Indications    Atrial flutter (HCC) [I48.92]  Long term (current) use of anticoagulants [Z79.01] [Z79.01]  Paroxysmal atrial fibrillation (HCC) [I48.0]             Anticoagulation Episode Summary     INR check location:  Anticoagulation Clinic    Preferred lab:      Send INR reminders to:      Comments:        Anticoagulation Care Providers     Provider Role Specialty Phone number    [facility]   [phone]        Anticoagulation Patient Findings      HPI:   [facility] seen in clinic today, on anticoagulation therapy with warfarin (a high risk medication) for atrial fibrillation      Pt is here today to evaluate anticoagulation therapy    Previous INR was  2.1 on 21    Pt was instructed to continue current regimen    Confirmed warfarin dosing regimen, denies missed or extra doses of coumadin.   Diet has been consistent with foods rich in vitamin K: Yes  Changes in ETOH:  No  Changes in smoking status: No  Changes in medication: Yes - amiodarone dose decreased to 100 mb, digoxin stopped.   Pt is not on antiplatelet/NSAID therapy  Cost restriction: No  S/s of bleeding:  No  Falls or accidents since last visit No  Signs/symptoms  thrombosis since the last appt: No      A/P   INR  -therapeutic today,   Continue current warfarin regimen         check referral    Pt educated to contact our clinic with any changes in medications or s/s of bleeding or thrombosis. Pt is aware to seek  immediate medical attention for falls, head injury or deep cuts    Follow up appointment in 12 week(s) to reduce risk of adverse events from warfarin  Amanda Perera, PharmD

## 2021-06-18 LAB
ALBUMIN SERPL BCP-MCNC: 3.8 G/DL (ref 3.2–4.9)
ALBUMIN/GLOB SERPL: 1.5 G/DL
ALP SERPL-CCNC: 84 U/L (ref 30–99)
ALT SERPL-CCNC: 24 U/L (ref 2–50)
ANION GAP SERPL CALC-SCNC: 9 MMOL/L (ref 7–16)
AST SERPL-CCNC: 27 U/L (ref 12–45)
BILIRUB SERPL-MCNC: 0.3 MG/DL (ref 0.1–1.5)
BUN SERPL-MCNC: 20 MG/DL (ref 8–22)
CALCIUM SERPL-MCNC: 9.1 MG/DL (ref 8.5–10.5)
CHLORIDE SERPL-SCNC: 102 MMOL/L (ref 96–112)
CO2 SERPL-SCNC: 27 MMOL/L (ref 20–33)
CREAT SERPL-MCNC: 0.9 MG/DL (ref 0.5–1.4)
GLOBULIN SER CALC-MCNC: 2.5 G/DL (ref 1.9–3.5)
GLUCOSE SERPL-MCNC: 95 MG/DL (ref 65–99)
POTASSIUM SERPL-SCNC: 4.1 MMOL/L (ref 3.6–5.5)
PROT SERPL-MCNC: 6.3 G/DL (ref 6–8.2)
SODIUM SERPL-SCNC: 138 MMOL/L (ref 135–145)

## 2021-06-21 ENCOUNTER — HOSPITAL ENCOUNTER (OUTPATIENT)
Dept: LAB | Facility: MEDICAL CENTER | Age: 76
End: 2021-06-21
Attending: INTERNAL MEDICINE
Payer: MEDICARE

## 2021-06-21 DIAGNOSIS — I50.20 ACC/AHA STAGE C SYSTOLIC HEART FAILURE (HCC): ICD-10-CM

## 2021-06-21 DIAGNOSIS — E78.2 MIXED HYPERLIPIDEMIA: ICD-10-CM

## 2021-06-21 DIAGNOSIS — I51.89 LEFT VENTRICULAR SYSTOLIC DYSFUNCTION, NYHA CLASS 3: ICD-10-CM

## 2021-06-21 LAB
ANION GAP SERPL CALC-SCNC: 11 MMOL/L (ref 7–16)
BUN SERPL-MCNC: 24 MG/DL (ref 8–22)
CALCIUM SERPL-MCNC: 9.2 MG/DL (ref 8.5–10.5)
CHLORIDE SERPL-SCNC: 99 MMOL/L (ref 96–112)
CO2 SERPL-SCNC: 30 MMOL/L (ref 20–33)
CREAT SERPL-MCNC: 1.18 MG/DL (ref 0.5–1.4)
GLUCOSE SERPL-MCNC: 80 MG/DL (ref 65–99)
POTASSIUM SERPL-SCNC: 3.9 MMOL/L (ref 3.6–5.5)
SODIUM SERPL-SCNC: 140 MMOL/L (ref 135–145)

## 2021-06-21 PROCEDURE — 36415 COLL VENOUS BLD VENIPUNCTURE: CPT

## 2021-06-21 PROCEDURE — 80048 BASIC METABOLIC PNL TOTAL CA: CPT

## 2021-06-23 ENCOUNTER — OFFICE VISIT (OUTPATIENT)
Dept: CARDIOLOGY | Facility: MEDICAL CENTER | Age: 76
End: 2021-06-23
Payer: MEDICARE

## 2021-06-23 VITALS
WEIGHT: 112 LBS | HEIGHT: 66 IN | BODY MASS INDEX: 18 KG/M2 | HEART RATE: 73 BPM | RESPIRATION RATE: 16 BRPM | OXYGEN SATURATION: 98 % | SYSTOLIC BLOOD PRESSURE: 102 MMHG | DIASTOLIC BLOOD PRESSURE: 70 MMHG

## 2021-06-23 DIAGNOSIS — E78.2 MIXED HYPERLIPIDEMIA: ICD-10-CM

## 2021-06-23 DIAGNOSIS — Z98.890 STATUS POST LIGATION OF LEFT ATRIAL APPENDAGE: ICD-10-CM

## 2021-06-23 DIAGNOSIS — I50.20 ACC/AHA STAGE C SYSTOLIC HEART FAILURE (HCC): ICD-10-CM

## 2021-06-23 DIAGNOSIS — Z98.890 H/O MAZE PROCEDURE: ICD-10-CM

## 2021-06-23 DIAGNOSIS — J44.9 CHRONIC OBSTRUCTIVE PULMONARY DISEASE, UNSPECIFIED COPD TYPE (HCC): ICD-10-CM

## 2021-06-23 DIAGNOSIS — I48.19 PERSISTENT ATRIAL FIBRILLATION (HCC): ICD-10-CM

## 2021-06-23 DIAGNOSIS — Z79.899 HIGH RISK MEDICATION USE: ICD-10-CM

## 2021-06-23 DIAGNOSIS — R06.09 DYSPNEA ON EXERTION: ICD-10-CM

## 2021-06-23 DIAGNOSIS — I10 HTN (HYPERTENSION), MALIGNANT: ICD-10-CM

## 2021-06-23 DIAGNOSIS — Z98.890 HISTORY OF MITRAL VALVE REPAIR: ICD-10-CM

## 2021-06-23 DIAGNOSIS — I48.0 PAF (PAROXYSMAL ATRIAL FIBRILLATION) (HCC): ICD-10-CM

## 2021-06-23 DIAGNOSIS — I50.22 CHRONIC SYSTOLIC CONGESTIVE HEART FAILURE (HCC): ICD-10-CM

## 2021-06-23 DIAGNOSIS — I51.89 LEFT VENTRICULAR SYSTOLIC DYSFUNCTION, NYHA CLASS 3: ICD-10-CM

## 2021-06-23 LAB — EKG IMPRESSION: NORMAL

## 2021-06-23 PROCEDURE — 93000 ELECTROCARDIOGRAM COMPLETE: CPT | Performed by: INTERNAL MEDICINE

## 2021-06-23 PROCEDURE — 99214 OFFICE O/P EST MOD 30 MIN: CPT | Performed by: INTERNAL MEDICINE

## 2021-06-23 ASSESSMENT — ENCOUNTER SYMPTOMS
SHORTNESS OF BREATH: 1
DIAPHORESIS: 0
HEADACHES: 0
SENSORY CHANGE: 0
DOUBLE VISION: 0
BRUISES/BLEEDS EASILY: 0
ABDOMINAL PAIN: 0
FEVER: 0
PALPITATIONS: 0
DIZZINESS: 0
COUGH: 0
BLURRED VISION: 0
DEPRESSION: 0
MEMORY LOSS: 0
FALLS: 0
MYALGIAS: 0

## 2021-06-23 ASSESSMENT — FIBROSIS 4 INDEX: FIB4 SCORE: 1.75

## 2021-06-23 NOTE — PROGRESS NOTES
Chief Complaint   Patient presents with   • Atrial Fibrillation     F/V Dx: PAF (paroxysmal atrial fibrillation) (HCC)   • Congestive Heart Failure     F/V Dx: Chronic systolic congestive heart failure (HCC); ACC/AHA stage C systolic heart failure (HCC)   • Atrial Flutter       Subjective:   Kelly Lovett is a 75 y.o. female who presents today for cardiac care and evaluation for her prior mitral valve repair, maze, now with Stage C systolic HF.     In 02/219, she went into the hospital because of heart failure exacerbation.  She was found to have a new reduction in LV function which is now at 20%.  She does have significant history of daily drinking [place].  We optimized her medication diuresed and she was discharged.     I have independently reviewed blood tests results with patient in clinic which showed elevated LDL level, but normal renal and liver function.     I have independently reviewed patient's ECG with patient in clinic today, which shows normal sinus rhythm, normal NJ, QT intervals. No evidence of acute coronary syndrome.     03/2019 Patient was able to complete 183 m during his 6 minute walk test. her O2 saturation at baseline was 99% and at the end of the test, the O2 saturation was 96%. she reported 4 level of dyspnea on Yen scale.    07/2019 She underwent successful cardioversion with Amiodarone loading.    Since then, she is feeling better.    I have personally interpreted her EKG today with patient, in sinus rhythm.    Patient still gets winded with daily living activities and exertion. No symptoms at rest.    I have personally interpreted EKG today with patient, there is no evidence of acute coronary syndrome, no evidence of prior infarct, normal NJ and QT interval, no significant conduction disease. Sinus rhythm.     I have independently interpreted and reviewed blood tests results with patient in clinic which shows GFR of 44, slightly reduced from before.      Past Medical  History:   Diagnosis Date   • Asthma     inhalers   • Breath shortness     pt reports using o2 at night 2L, no problems at this time   • Chronic systolic congestive heart failure (HCC) 2016   • COPD (chronic obstructive pulmonary disease) (HCC)    • Dilated cardiomyopathy (HCC)    • Emphysema of lung (HCC)    • Heart valve disease    • High cholesterol    • History of heart surgery    • Hyperlipidemia    • Hypertension    • Hypotension due to hypovolemia 3/1/2019   • Sleep apnea     uses cpap     Past Surgical History:   Procedure Laterality Date   • MITRAL VALVE REPAIR  2017    Procedure: MITRAL VALVE REPAIR ;  Surgeon: Lacey Porras M.D.;  Location: SURGERY [facility];  Service:    • MAZE PROCEDURE Left 2017    Procedure: MAZE PROCEDURE, Left atrial appendage ligation;  Surgeon: Lacey Porras M.D.;  Location: SURGERY [facility];  Service:    • PAGE  2017    Procedure: PAGE;  Surgeon: Lacey Porras M.D.;  Location: SURGERY [facility];  Service:    • APPENDECTOMY      1967   • OOPHORECTOMY       Family History   Problem Relation Age of Onset   • Cancer Father         colon cancer    • Hypertension Mother    • Cancer Sister 68        ovarian cancer     Social History     Socioeconomic History   • Marital status:      Spouse name: Not on file   • Number of children: Not on file   • Years of education: Not on file   • Highest education level: Not on file   Occupational History   • Not on file   Tobacco Use   • Smoking status: Former Smoker     Packs/day: 0.50     Years: 38.00     Pack years: 19.00     Types: Cigarettes     Quit date: 10/11/2001     Years since quittin.7   • Smokeless tobacco: Never Used   Vaping Use   • Vaping Use: Never used   Substance and Sexual Activity   • Alcohol use: Yes     Alcohol/week: 8.4 oz     Types: 14 Shots of liquor per week     Comment: 2 drinks a day   • Drug use: No   • Sexual activity: Yes     Partners: Male   Other Topics Concern    • Not on file   Social History Narrative   • Not on file     Social Determinants of Health     Financial Resource Strain:    • Difficulty of Paying Living Expenses:    Food Insecurity:    • Worried About Running Out of Food in the Last Year:    • Ran Out of Food in the Last Year:    Transportation Needs:    • Lack of Transportation (Medical):    • Lack of Transportation (Non-Medical):    Physical Activity:    • Days of Exercise per Week:    • Minutes of Exercise per Session:    Stress:    • Feeling of Stress :    Social Connections:    • Frequency of Communication with Friends and Family:    • Frequency of Social Gatherings with Friends and Family:    • Attends [employer] Services:    • Active Member of Clubs or Organizations:    • Attends Club or Organization Meetings:    • Marital Status:    Intimate Partner Violence:    • Fear of Current or Ex-Partner:    • Emotionally Abused:    • Physically Abused:    • Sexually Abused:      Allergies   Allergen Reactions   • Sulfa Drugs Rash     Rxn - years ago in her 20's     Outpatient Encounter Medications as of 6/23/2021   Medication Sig Dispense Refill   • fluticasone-salmeterol (ADVAIR) 250-50 MCG/DOSE AEROSOL POWDER, BREATH ACTIVATED      • amiodarone (CORDARONE) 100 MG tablet Take 1 tablet by mouth every day. 90 tablet 4   • sacubitril-valsartan (ENTRESTO) 24-26 MG Tab tablet Take 1 tablet by mouth 2 times a day. 60 tablet 11   • SPIRIVA RESPIMAT 1.25 MCG/ACT Aero Soln INHALE TWO PUFFS BY MOUTH DAILY  12 g 2   • albuterol 108 (90 Base) MCG/ACT Aero Soln inhalation aerosol Inhale 2 Puffs every 6 hours as needed for Shortness of Breath. 8 g 1   • warfarin (COUMADIN) 2.5 MG Tab TAKE ONE-HALF TO ONE TABLET BY MOUTH DAILY OR AS DIRECTED BY ANTICOAGULATION CLINIC 90 tablet 1   • atorvastatin (LIPITOR) 40 MG Tab TAKE ONE TABLET BY MOUTH ONE TIME DAILY 100 tablet 4   • metoprolol SR (TOPROL XL) 100 MG TABLET SR 24 HR Take 1 Tab by mouth every day. 100 Tab 2   •  "Cholecalciferol (VITAMIN D3) 50 MCG (2000 UT) Tab Take 2,000 Units by mouth every day.     • Home Care Oxygen Inhale 3 L/min continuous. Oxygen dose range: 2.5-3 L/min  Respiratory route via: Nasal cannula  Oxygen supplier: [facility]     • sertraline (ZOLOFT) 100 MG Tab Take 1 Tab by mouth every day. 90 Tab 3   • Calcium Carb-Cholecalciferol (CALCIUM 1000 + D PO) Take 1 Cap by mouth every day.     • magnesium oxide (MAG-OX) 400 (241.3 Mg) MG Tab tablet Take 1 Tab by mouth every day. (Patient taking differently: Take 400 mg by mouth every day. 500 mg tablets)     • acetaminophen (TYLENOL) 500 MG Tab Take 500 mg by mouth 1 time daily as needed. Headache     • diphenhydrAMINE HCl, Sleep, 50 MG Tab Take 50 mg by mouth at bedtime as needed (sleep). (Patient not taking: Reported on 6/23/2021)       No facility-administered encounter medications on file as of 6/23/2021.     Review of Systems   Constitutional: Negative for diaphoresis and fever.   HENT: Negative for nosebleeds.    Eyes: Negative for blurred vision and double vision.   Respiratory: Positive for shortness of breath. Negative for cough.    Cardiovascular: Negative for chest pain and palpitations.   Gastrointestinal: Negative for abdominal pain.   Genitourinary: Negative for dysuria and frequency.   Musculoskeletal: Negative for falls and myalgias.   Skin: Negative for rash.   Neurological: Negative for dizziness, sensory change and headaches.   Endo/Heme/Allergies: Does not bruise/bleed easily.   Psychiatric/Behavioral: Negative for depression and memory loss.        Objective:   /70 (BP Location: Right arm, Patient Position: Sitting, BP Cuff Size: Adult)   Pulse 73   Resp 16   Ht 1.664 m (5' 5.51\")   Wt 50.8 kg (112 lb)   LMP  (LMP Unknown)   SpO2 98%   BMI 18.35 kg/m²     Physical Exam   Constitutional: She is oriented to person, place, and time. No distress.   Patient is dependent on supplemental oxygen.     HENT:   Head: " Normocephalic and atraumatic.   Right Ear: External ear normal.   Left Ear: External ear normal.   Eyes: Right eye exhibits no discharge. Left eye exhibits no discharge.   Neck: No JVD present. No thyromegaly present.   Cardiovascular: Normal rate, regular rhythm and normal heart sounds. Exam reveals no gallop and no friction rub.   No murmur heard.  Pulmonary/Chest: Breath sounds normal. No respiratory distress.   Abdominal: Bowel sounds are normal. She exhibits no distension. There is no abdominal tenderness.   Musculoskeletal:         General: No tenderness.   Neurological: She is alert and oriented to person, place, and time. No cranial nerve deficit.   Skin: Skin is warm and dry. She is not diaphoretic.   Psychiatric: Her behavior is normal.   Nursing note and vitals reviewed.      Assessment:     1. ACC/AHA stage C systolic heart failure (HCC)     2. Left ventricular systolic dysfunction, NYHA class 3     3. Persistent atrial fibrillation (HCC)     4. PAF (paroxysmal atrial fibrillation) ([facility])  EKG   5. History of mitral valve repair     6. H/O maze procedure     7. Status post ligation of left atrial appendage     8. Chronic systolic congestive heart failure (HCC)     9. Mixed hyperlipidemia     10. Chronic obstructive pulmonary disease, unspecified COPD type (HCC)     11. HTN (hypertension), malignant     12. Dyspnea on exertion     13. High risk medication use         Medical Decision Making:  Today's Assessment / Status / Plan:   Today, based on physical examination findings, patient is euvolemic. No JVD, lungs are clear to auscultation, no pitting edema in bilateral lower extremities, no ascites.    Dry weight is 111 lbs.    Patient definitely feels better when she is in sinus rhythm.  Therefore our priority is to keep her in sinus rhythm.  Will reduce Amiodarone to 100 mg once a day to avoid toxicity.    Her blood pressure is better today. Will continue Entresto 24/26 mg bid as GFR is slightly  reduced.    Continue to hold Spironolactone.     She is status Cardiomems implant.     In the meantime, we will continue current medical therapy of Toprol XL  100 mg daily and will stop digoxin 125 mcg daily.     Patient did not like anticoagulation therapy in the past.  Therefore, I do think that it it is okay for us to not push her with anticoagulation therapy.    Will continue to closely monitor for side effects of patient's high risk medication(s) including liver, renal function and electrolytes.    I will see patient back in our Heart Failure Clinic in 4 weeks.    I thank you for referring patient to our Heart Failure Clinic today.

## 2021-07-30 ENCOUNTER — TELEPHONE (OUTPATIENT)
Dept: CARDIOLOGY | Facility: MEDICAL CENTER | Age: 76
End: 2021-07-30

## 2021-07-30 NOTE — TELEPHONE ENCOUNTER
LVM for patient in regards to  labs that were ordered at previous office visit for upcoming appointment. Office number given for call back. Pending call back from patient. Patient is scheduled to see  ANNE-MARIE Rodriguez  on 08/04/2021.

## 2021-08-02 ENCOUNTER — HOSPITAL ENCOUNTER (OUTPATIENT)
Dept: LAB | Facility: MEDICAL CENTER | Age: 76
End: 2021-08-02
Attending: INTERNAL MEDICINE
Payer: MEDICARE

## 2021-08-02 DIAGNOSIS — Z98.890 H/O MAZE PROCEDURE: ICD-10-CM

## 2021-08-02 DIAGNOSIS — I10 HTN (HYPERTENSION), MALIGNANT: ICD-10-CM

## 2021-08-02 DIAGNOSIS — J44.9 CHRONIC OBSTRUCTIVE PULMONARY DISEASE, UNSPECIFIED COPD TYPE (HCC): ICD-10-CM

## 2021-08-02 DIAGNOSIS — I50.22 CHRONIC SYSTOLIC CONGESTIVE HEART FAILURE (HCC): ICD-10-CM

## 2021-08-02 DIAGNOSIS — I48.0 PAF (PAROXYSMAL ATRIAL FIBRILLATION) (HCC): ICD-10-CM

## 2021-08-02 DIAGNOSIS — R06.09 DYSPNEA ON EXERTION: ICD-10-CM

## 2021-08-02 DIAGNOSIS — I51.89 LEFT VENTRICULAR SYSTOLIC DYSFUNCTION, NYHA CLASS 3: ICD-10-CM

## 2021-08-02 DIAGNOSIS — I48.19 PERSISTENT ATRIAL FIBRILLATION (HCC): ICD-10-CM

## 2021-08-02 DIAGNOSIS — I50.20 ACC/AHA STAGE C SYSTOLIC HEART FAILURE (HCC): ICD-10-CM

## 2021-08-02 DIAGNOSIS — Z98.890 HISTORY OF MITRAL VALVE REPAIR: ICD-10-CM

## 2021-08-02 DIAGNOSIS — Z79.899 HIGH RISK MEDICATION USE: ICD-10-CM

## 2021-08-02 DIAGNOSIS — Z98.890 STATUS POST LIGATION OF LEFT ATRIAL APPENDAGE: ICD-10-CM

## 2021-08-02 DIAGNOSIS — E78.2 MIXED HYPERLIPIDEMIA: ICD-10-CM

## 2021-08-02 LAB
ANION GAP SERPL CALC-SCNC: 14 MMOL/L (ref 7–16)
BUN SERPL-MCNC: 21 MG/DL (ref 8–22)
CALCIUM SERPL-MCNC: 9.6 MG/DL (ref 8.5–10.5)
CHLORIDE SERPL-SCNC: 101 MMOL/L (ref 96–112)
CO2 SERPL-SCNC: 27 MMOL/L (ref 20–33)
CREAT SERPL-MCNC: 1.19 MG/DL (ref 0.5–1.4)
GLUCOSE SERPL-MCNC: 108 MG/DL (ref 65–99)
POTASSIUM SERPL-SCNC: 4.2 MMOL/L (ref 3.6–5.5)
SODIUM SERPL-SCNC: 142 MMOL/L (ref 135–145)

## 2021-08-02 PROCEDURE — 36415 COLL VENOUS BLD VENIPUNCTURE: CPT

## 2021-08-02 PROCEDURE — 80048 BASIC METABOLIC PNL TOTAL CA: CPT

## 2021-08-03 NOTE — TELEPHONE ENCOUNTER
----- Message from Kelly Lovett sent at 8/3/2021  9:36 AM PDT -----  Regarding: Prescription Question  Contact: [phone]  Could you please send in an order for Fluticasone -Advair Disku. I am completely out & need it to be refilled by johnny   Thank you, Kelly

## 2021-08-04 ENCOUNTER — OFFICE VISIT (OUTPATIENT)
Dept: CARDIOLOGY | Facility: MEDICAL CENTER | Age: 76
End: 2021-08-04
Payer: MEDICARE

## 2021-08-04 ENCOUNTER — TELEPHONE (OUTPATIENT)
Dept: CARDIOLOGY | Facility: MEDICAL CENTER | Age: 76
End: 2021-08-04

## 2021-08-04 VITALS
BODY MASS INDEX: 18.09 KG/M2 | WEIGHT: 112.6 LBS | HEART RATE: 71 BPM | OXYGEN SATURATION: 94 % | RESPIRATION RATE: 13 BRPM | SYSTOLIC BLOOD PRESSURE: 110 MMHG | HEIGHT: 66 IN | DIASTOLIC BLOOD PRESSURE: 60 MMHG

## 2021-08-04 DIAGNOSIS — Z98.890 H/O MAZE PROCEDURE: ICD-10-CM

## 2021-08-04 DIAGNOSIS — I50.20 ACC/AHA STAGE C SYSTOLIC HEART FAILURE (HCC): ICD-10-CM

## 2021-08-04 DIAGNOSIS — I10 HTN (HYPERTENSION), MALIGNANT: ICD-10-CM

## 2021-08-04 DIAGNOSIS — Z98.890 STATUS POST LIGATION OF LEFT ATRIAL APPENDAGE: ICD-10-CM

## 2021-08-04 DIAGNOSIS — I48.0 PAF (PAROXYSMAL ATRIAL FIBRILLATION) (HCC): ICD-10-CM

## 2021-08-04 DIAGNOSIS — E78.2 MIXED HYPERLIPIDEMIA: ICD-10-CM

## 2021-08-04 DIAGNOSIS — J44.9 CHRONIC OBSTRUCTIVE PULMONARY DISEASE, UNSPECIFIED COPD TYPE (HCC): ICD-10-CM

## 2021-08-04 DIAGNOSIS — Z98.890 HISTORY OF MITRAL VALVE REPAIR: ICD-10-CM

## 2021-08-04 DIAGNOSIS — I51.89 LEFT VENTRICULAR SYSTOLIC DYSFUNCTION, NYHA CLASS 3: ICD-10-CM

## 2021-08-04 DIAGNOSIS — Z79.899 HIGH RISK MEDICATION USE: ICD-10-CM

## 2021-08-04 PROCEDURE — 99214 OFFICE O/P EST MOD 30 MIN: CPT | Performed by: NURSE PRACTITIONER

## 2021-08-04 RX ORDER — SPIRONOLACTONE 25 MG/1
25 TABLET ORAL DAILY
Qty: 30 TABLET | Refills: 3 | Status: SHIPPED | OUTPATIENT
Start: 2021-08-04 | End: 2021-10-19 | Stop reason: SDUPTHER

## 2021-08-04 ASSESSMENT — ENCOUNTER SYMPTOMS
COUGH: 0
SHORTNESS OF BREATH: 1
ORTHOPNEA: 0
ABDOMINAL PAIN: 0
DIZZINESS: 0
MYALGIAS: 0
PALPITATIONS: 0
CLAUDICATION: 0
FEVER: 0
PND: 0

## 2021-08-04 ASSESSMENT — MINNESOTA LIVING WITH HEART FAILURE QUESTIONNAIRE (MLHF)
DIFFICULTY WITH RECREATIONAL PASTIMES, SPORTS, HOBBIES: 0
WALKING ABOUT OR CLIMBING STAIRS DIFFICULT: 0
TOTAL_SCORE: 14
DIFFICULTY WORKING TO EARN A LIVING: 0
DIFFICULTY SLEEPING WELL AT NIGHT: 0
DIFFICULTY TO CONCENTRATE OR REMEMBERING THINGS: 0
WORKING AROUND THE HOUSE OR YARD DIFFICULT: 1
TIRED, FATIGUED OR LOW ON ENERGY: 0
COSTING YOU MONEY FOR MEDICAL CARE: 0
DIFFICULTY SOCIALIZING WITH FAMILY OR FRIENDS: 1
HAVING TO SIT OR LIE DOWN DURING THE DAY: 0
MAKING YOU STAY IN A HOSPITAL: 0
DIFFICULTY WITH SEXUAL ACTIVITIES: 0
SWELLING IN ANKLES OR LEGS: 2
DIFFICULTY GOING AWAY FROM HOME: 1
LOSS OF SELF CONTROL IN YOUR LIFE: 2
MAKING YOU WORRY: 2
MAKING YOU FEEL DEPRESSED: 2
FEELING LIKE A BURDEN TO FAMILY AND FRIENDS: 2
MAKING YOU SHORT OF BREATH: 1
GIVING YOU SIDE EFFECTS FROM TREATMENTS: 0
EATING LESS FOODS YOU LIKE: 0

## 2021-08-04 ASSESSMENT — FIBROSIS 4 INDEX: FIB4 SCORE: 1.75

## 2021-08-04 NOTE — PROGRESS NOTES
Chief Complaint   Patient presents with   • Congestive Heart Failure   • Atrial Fibrillation     F/V Dx: PAF (paroxysmal atrial fibrillation) ([facility])        Subjective:   Kelly Lovett is a 75 y.o. female who presents today for follow up on her heart failure.     Patient of Dr. Li. She was last seen in clinic on 6/23/2021 with Dr. Li.  During that visit, no changes were made to her medical regimen.  She was sent for follow-up lab testing.    Patient comes in the office today reporting she has been doing fairly well.  She does continue to have shortness of breath using oxygen at 3 L.  She otherwise denies chest pain, palpitations, orthopnea, PND, edema or dizziness/lightheadedness.    She has not been weighing herself consistently at home.    She does have a CardioMEMS device, but does not send readings regularly because she forgets.    Patient reporting recent scratch on her left arm from a cat, but states it is healing, she did accidentally pull off the scab recently.    Additonally, patient has the following medical problems:    -Dilated cardiomyopathy, LVEF 20%    -Atrial fibrillation: History of maze, cardioversion in 7/20/2019, taking amiodarone    -Mitral valve repair with maze, ERI ligation in 2017, now with moderate mitral regurgitation    -COPD    -Hypertension    -Dyslipidemia  Past Medical History:   Diagnosis Date   • Asthma     inhalers   • Breath shortness     pt reports using o2 at night 2L, no problems at this time   • Chronic systolic congestive heart failure (HCC) 7/7/2016   • COPD (chronic obstructive pulmonary disease) (HCC)    • Dilated cardiomyopathy (HCC)    • Emphysema of lung (HCC)    • Heart valve disease    • High cholesterol    • History of heart surgery    • Hyperlipidemia    • Hypertension    • Hypotension due to hypovolemia 3/1/2019   • Sleep apnea     uses cpap     Past Surgical History:   Procedure Laterality Date   • MITRAL VALVE REPAIR  4/20/2017    Procedure: MITRAL VALVE  REPAIR ;  Surgeon: Lacey Porras M.D.;  Location: SURGERY [facility];  Service:    • MAZE PROCEDURE Left 2017    Procedure: MAZE PROCEDURE, Left atrial appendage ligation;  Surgeon: Lacey Porras M.D.;  Location: SURGERY [facility];  Service:    • PAGE  2017    Procedure: PAGE;  Surgeon: Lacey Porras M.D.;  Location: SURGERY [facility];  Service:    • APPENDECTOMY      1967   • OOPHORECTOMY       Family History   Problem Relation Age of Onset   • Cancer Father         colon cancer    • Hypertension Mother    • Cancer Sister 68        ovarian cancer     Social History     Socioeconomic History   • Marital status:      Spouse name: Not on file   • Number of children: Not on file   • Years of education: Not on file   • Highest education level: Not on file   Occupational History   • Not on file   Tobacco Use   • Smoking status: Former Smoker     Packs/day: 0.50     Years: 38.00     Pack years: 19.00     Types: Cigarettes     Quit date: 10/11/2001     Years since quittin.8   • Smokeless tobacco: Never Used   Vaping Use   • Vaping Use: Never used   Substance and Sexual Activity   • Alcohol use: Yes     Alcohol/week: 8.4 oz     Types: 14 Shots of liquor per week     Comment: 2 drinks a day   • Drug use: No   • Sexual activity: Yes     Partners: Male   Other Topics Concern   • Not on file   Social History Narrative   • Not on file     Social Determinants of Health     Financial Resource Strain:    • Difficulty of Paying Living Expenses:    Food Insecurity:    • Worried About Running Out of Food in the Last Year:    • Ran Out of Food in the Last Year:    Transportation Needs:    • Lack of Transportation (Medical):    • Lack of Transportation (Non-Medical):    Physical Activity:    • Days of Exercise per Week:    • Minutes of Exercise per Session:    Stress:    • Feeling of Stress :    Social Connections:    • Frequency of Communication with Friends and Family:    • Frequency of  Social Gatherings with Friends and Family:    • Attends [employer] Services:    • Active Member of Clubs or Organizations:    • Attends Club or Organization Meetings:    • Marital Status:    Intimate Partner Violence:    • Fear of Current or Ex-Partner:    • Emotionally Abused:    • Physically Abused:    • Sexually Abused:      Allergies   Allergen Reactions   • Sulfa Drugs Rash     Rxn - years ago in her 20's     Outpatient Encounter Medications as of 8/4/2021   Medication Sig Dispense Refill   • spironolactone (ALDACTONE) 25 MG Tab Take 1 tablet by mouth every day. 30 tablet 3   • fluticasone-salmeterol (ADVAIR) 250-50 MCG/DOSE AEROSOL POWDER, BREATH ACTIVATED Inhale 1 Puff 2 times a day. 1 Each 3   • amiodarone (CORDARONE) 100 MG tablet Take 1 tablet by mouth every day. 90 tablet 4   • sacubitril-valsartan (ENTRESTO) 24-26 MG Tab tablet Take 1 tablet by mouth 2 times a day. 60 tablet 11   • SPIRIVA RESPIMAT 1.25 MCG/ACT Aero Soln INHALE TWO PUFFS BY MOUTH DAILY  12 g 2   • albuterol 108 (90 Base) MCG/ACT Aero Soln inhalation aerosol Inhale 2 Puffs every 6 hours as needed for Shortness of Breath. 8 g 1   • warfarin (COUMADIN) 2.5 MG Tab TAKE ONE-HALF TO ONE TABLET BY MOUTH DAILY OR AS DIRECTED BY ANTICOAGULATION CLINIC 90 tablet 1   • atorvastatin (LIPITOR) 40 MG Tab TAKE ONE TABLET BY MOUTH ONE TIME DAILY 100 tablet 4   • metoprolol SR (TOPROL XL) 100 MG TABLET SR 24 HR Take 1 Tab by mouth every day. 100 Tab 2   • Cholecalciferol (VITAMIN D3) 50 MCG (2000 UT) Tab Take 2,000 Units by mouth every day.     • Home Care Oxygen Inhale 3 L/min continuous. Oxygen dose range: 2.5-3 L/min  Respiratory route via: Nasal cannula  Oxygen supplier: [facility]     • sertraline (ZOLOFT) 100 MG Tab Take 1 Tab by mouth every day. 90 Tab 3   • Calcium Carb-Cholecalciferol (CALCIUM 1000 + D PO) Take 1 Cap by mouth every day.     • acetaminophen (TYLENOL) 500 MG Tab Take 500 mg by mouth 1 time daily as needed. Headache     •  "magnesium oxide (MAG-OX) 400 (241.3 Mg) MG Tab tablet Take 1 Tab by mouth every day. (Patient taking differently: Take 400 mg by mouth every day. 500 mg tablets)       No facility-administered encounter medications on file as of 8/4/2021.     Review of Systems   Constitutional: Negative for fever and malaise/fatigue.   Respiratory: Positive for shortness of breath. Negative for cough.    Cardiovascular: Negative for chest pain, palpitations, orthopnea, claudication, leg swelling and PND.   Gastrointestinal: Negative for abdominal pain.   Musculoskeletal: Negative for myalgias.   Neurological: Negative for dizziness.   All other systems reviewed and are negative.       Objective:   /60 (BP Location: Left arm, Patient Position: Sitting, BP Cuff Size: Adult)   Pulse 71   Resp 13   Ht 1.664 m (5' 5.51\")   Wt 51.1 kg (112 lb 9.6 oz)   LMP  (LMP Unknown)   SpO2 94%   BMI 18.45 kg/m²     Physical Exam   Constitutional: She is oriented to person, place, and time. She appears well-developed.   HENT:   Head: Normocephalic and atraumatic.   Eyes: Pupils are equal, round, and reactive to light.   Neck: No JVD present.   Cardiovascular: Normal rate, regular rhythm and normal heart sounds.   Pulmonary/Chest: Effort normal and breath sounds normal. No respiratory distress. She has no wheezes. She has no rales.   Abdominal: Soft. Bowel sounds are normal.   Musculoskeletal:         General: Normal range of motion.      Cervical back: Normal range of motion and neck supple.      Right lower leg: No edema.      Left lower leg: No edema.   Neurological: She is alert and oriented to person, place, and time.   Skin: Skin is warm and dry.   Psychiatric: Her behavior is normal.   Vitals reviewed.    Lab Results   Component Value Date/Time    CHOLSTRLTOT 180 09/10/2020 09:49 AM    LDL 48 09/10/2020 09:49 AM     09/10/2020 09:49 AM    TRIGLYCERIDE 72 09/10/2020 09:49 AM       Lab Results   Component Value Date/Time    " "SODIUM 142 08/02/2021 01:24 PM    POTASSIUM 4.2 08/02/2021 01:24 PM    CHLORIDE 101 08/02/2021 01:24 PM    CO2 27 08/02/2021 01:24 PM    GLUCOSE 108 (H) 08/02/2021 01:24 PM    BUN 21 08/02/2021 01:24 PM    CREATININE 1.19 08/02/2021 01:24 PM     Lab Results   Component Value Date/Time    ALKPHOSPHAT 84 06/17/2021 12:11 PM    ASTSGOT 27 06/17/2021 12:11 PM    ALTSGPT 24 06/17/2021 12:11 PM    TBILIRUBIN 0.3 06/17/2021 12:11 PM      Transthoracic Echo Report 3/7/2017  Left ventricular ejection fraction is visually estimated to be 60%,   although based on the mitral regurgitation, the \"effective\" LVEF is   much lower.  Severe, explosive mitral regurgitation. Prolapse of the posterior   mitral leaflet was present.  Right ventricular systolic pressure is estimated to be at least 45   mmHg.  Compared with the outside study of 2015 - the regurgitation is still significant.  If clinically indicated, a transesophageal study would be useful.   Ordering provider notified at time of reading.      Transesophageal Echo Report 3/13/2017  No mitral stenosis. Severe mitral regurgitation with eccentric jets.   Prolapse of the anterior mitral leaflet was present. Prolapse of the posterior mitral leaflet was present. There are redundant tissues seen on the mitral valve leaflets.   No aortic stenosis. Trace aortic insufficiency.   The aortic valve was not thoroughly evaluated due to desaturation and focus was on the mitral valve.    Heart cath 3/17/2017  POSTOPERATIVE DIAGNOSES:  1.  Severe mitral regurgitation.  2.  Normal left ventricular systolic function, ejection fraction greater than 75%.  3.  No significant coronary artery disease.     Transesophageal Echo Report 4/20/2017  Intraoperative PAGE during mitral valve repair, left atrial appendage ligation.  Pre-CPB images show normal biventricular systolic function.   P2 flail with ruptured chordae and severe mitral regurgitation.   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  Post   CPB images show preserved biventricular function.  The mitral valve has been repaired with p2 resection and 36 mm annuloplasty band.  There is no residual mitral regurgitation and mean gradient across the valve is 3 mm Hg.  Findings communicated at the time of exam.      Transthoracic Echo Report 2/22/2019  Comapred to the prior echocardiogram dated 3/7/2017, significant changes are noted.     1. Severely reduced left ventricular systolic function.  Left   ventricular ejection fraction is visually estimated to be 20%.     2. Severely dilated left atrium.     3. Estimated right ventricular systolic pressure  is 50 mmHg.  Mildly dilated right ventricle.  Reduced right ventricular systolic function.       Dr. Lainez was notified of critical results by [employer] at 9:21 AM on 2/22/2019.     Transthoracic Echo Report 2/25/2019  Limited Study to rule out LV thrombus.  No thrombus observed in the LV with contrast administration.   Left ventricular ejection fraction is visually estimated to be 20%.     Compared to the previous echocardiogram performed on 2/22/19: There has been no significant change.     Transesophageal Echo Report 2/26/2019  Known mitral valve repair functioning adequately with moderate   regurgitation in the setting of severe left ventricular systolic   dysfunction.     Right heart cath and CardioMEMS implantation on 6/12/2019  Hemodynamics:  1) Pulmonary arterial pressure: Systolic of 42 mm Hg, diastolic of 16 mm Hg, mean of 29 mm Hg.  2) Pulmonary arterial wedge pressure with mean of 19 mm Hg.  3) Cardiac output of 4.5 and Cardiac index of 2.8 through thermodilution method.  4) Cardiac output of 4.3 and Cardiac index of 2.7 through Concepcion's method.  5) Right ventricular pressure: Systolic of 34 mm Hg, end diastolic pressure of 7 mm Hg.  6) Right atrial pressure: A wave of 11 mm Hg, V wave of 12 mm Hg, mean of 10 mm Hg.  7) Pulmonary vascular resistance of 2.3 Wood  Units.     Conclusions:  1) Successful implantation of Cardiomems device for remote monitor of intracardiac pressures.  2) Patient will be monitored as part of our protocol in our heart failure program to further reduce repeated heart failure hospitalization and also to improve overall quality of life.  3) Patient appears to be hypovolemic. IVF hydration was given.  4) Hypotensive but asymptomatic. Ok to be discharged.  5) Advised patient to hold Entresto for now until being evaluated again in clinic next week.       Assessment:     1. ACC/AHA stage C systolic heart failure (HCC)  Basic Metabolic Panel   2. Left ventricular systolic dysfunction, NYHA class 3  Basic Metabolic Panel   3. High risk medication use  Basic Metabolic Panel   4. PAF (paroxysmal atrial fibrillation) ([facility])     5. History of mitral valve repair     6. H/O maze procedure     7. Status post ligation of left atrial appendage     8. HTN (hypertension), malignant     9. Mixed hyperlipidemia     10. Chronic obstructive pulmonary disease, unspecified COPD type ([facility])         Medical Decision Making:  Today's Assessment / Status / Plan:   1. HFrEF, Stage C, Class 3, LVEF 20%: Based on physical examination findings, patient is euvolemic. No JVD, lungs are clear to auscultation, no pitting edema in bilateral lower extremities, no ascites.  -Heart failure due to nonischemic  -ACE-I/ARB/ARNI: Continue Entresto 24-26 mg twice a day (switched over to Entresto in June 2021)  -Evidence Based Beta-blocker: Continue metoprolol  mg daily  -Aldosterone Antagonist: Discussed with patient about trying spironolactone again 25 mg daily  -Diuretic: Furosemide 20 mg daily as needed  -Labs: BMP in 1 week to closely follow her kidney function  -Repeat Echo in 2-3 months, if LVEF not >35%, then will discuss/consider ICD for primary Prevention  -Reinforced s/sx of worsening heart failure with patient and weight monitoring. Pt verbalizes understanding. Pt to call  office or RTC if present.    -Start monitoring weights at home daily. Call office if weight increasing greater than 3 lbs in 1 day or greater than 5 lbs in 1 week.   -PUMP line number [phone] (PUMP)  -Heart Failure Education: Education reinforced  -Advanced care planning: Advanced directive and POLST are on file  -Discussed the importance of regular CardioMEMS readings, patient does understand, but states she forgets and gets busy with her life.    2.  History of mitral stenosis with mitral valve repair in 2017:  -Will obtain a follow-up echo soon    3.  Paroxysmal atrial fibrillation, history of maze, cardioversion:  -Continue warfarin, followed by the anticoagulation clinic  -Continue amiodarone 100 mg daily     4.  Hypertension: Stable  -Recommendations per above    5.  Hyperlipidemia:  -Last LDL 48 on 9/10/2020  -Continue atorvastatin 40 mg daily    6.  COPD, respiratory failure:  -Continue oxygen use  -Continue follow-up with pulmonary    FU in clinic in 3 weeks with labs. Sooner if needed.    Patient verbalizes understanding and agrees with the plan of care.     PLEASE NOTE: This Note was created using voice recognition Software. I have made every reasonable attempt to correct obvious errors, but I expect that there are errors of grammar and possibly content that I did not discover before finalizing the note

## 2021-08-04 NOTE — TELEPHONE ENCOUNTER
NICOLÁS Degroot from [facility] pharmacy called with a drug interaction with spironolactone and entresto. Please call Mikayla back at [phone].    Thank you

## 2021-08-05 ENCOUNTER — HOSPITAL ENCOUNTER (OUTPATIENT)
Dept: LAB | Facility: MEDICAL CENTER | Age: 76
End: 2021-08-05
Attending: NURSE PRACTITIONER
Payer: MEDICARE

## 2021-08-05 DIAGNOSIS — I50.20 ACC/AHA STAGE C SYSTOLIC HEART FAILURE (HCC): ICD-10-CM

## 2021-08-05 DIAGNOSIS — Z79.899 HIGH RISK MEDICATION USE: ICD-10-CM

## 2021-08-05 DIAGNOSIS — I51.89 LEFT VENTRICULAR SYSTOLIC DYSFUNCTION, NYHA CLASS 3: ICD-10-CM

## 2021-08-05 LAB
ANION GAP SERPL CALC-SCNC: 11 MMOL/L (ref 7–16)
BUN SERPL-MCNC: 27 MG/DL (ref 8–22)
CALCIUM SERPL-MCNC: 9.8 MG/DL (ref 8.5–10.5)
CHLORIDE SERPL-SCNC: 103 MMOL/L (ref 96–112)
CO2 SERPL-SCNC: 26 MMOL/L (ref 20–33)
CREAT SERPL-MCNC: 1.07 MG/DL (ref 0.5–1.4)
GLUCOSE SERPL-MCNC: 88 MG/DL (ref 65–99)
POTASSIUM SERPL-SCNC: 4.8 MMOL/L (ref 3.6–5.5)
SODIUM SERPL-SCNC: 140 MMOL/L (ref 135–145)

## 2021-08-05 PROCEDURE — 80048 BASIC METABOLIC PNL TOTAL CA: CPT

## 2021-08-05 PROCEDURE — 36415 COLL VENOUS BLD VENIPUNCTURE: CPT

## 2021-08-12 ENCOUNTER — PATIENT MESSAGE (OUTPATIENT)
Dept: CARDIOLOGY | Facility: MEDICAL CENTER | Age: 76
End: 2021-08-12

## 2021-08-12 NOTE — PATIENT COMMUNICATION
Spoke to patient in regards to [place] message asking about blood work. Informed patient the last time she saw Sarah, she ordered a BMP that has been done already. Patient states she had thought so and remembered she had one done on 8/2/21 and 8/5/21. Confirmed patient's appointment date, time and location.

## 2021-08-12 NOTE — TELEPHONE ENCOUNTER
----- Message from Kelly Lovett sent at 8/12/2021  1:25 PM PDT -----  Regarding: Blood test  Do I need to get a blood test before my next visit?

## 2021-08-24 DIAGNOSIS — J41.0 SIMPLE CHRONIC BRONCHITIS (HCC): ICD-10-CM

## 2021-08-24 RX ORDER — ALBUTEROL SULFATE 90 UG/1
2 AEROSOL, METERED RESPIRATORY (INHALATION) EVERY 6 HOURS PRN
Qty: 9 G | Refills: 3 | Status: SHIPPED | OUTPATIENT
Start: 2021-08-24 | End: 2021-11-04 | Stop reason: SDUPTHER

## 2021-08-25 ENCOUNTER — OFFICE VISIT (OUTPATIENT)
Dept: CARDIOLOGY | Facility: MEDICAL CENTER | Age: 76
End: 2021-08-25
Payer: MEDICARE

## 2021-08-25 VITALS
RESPIRATION RATE: 14 BRPM | OXYGEN SATURATION: 98 % | HEIGHT: 65 IN | WEIGHT: 113.2 LBS | HEART RATE: 68 BPM | SYSTOLIC BLOOD PRESSURE: 92 MMHG | DIASTOLIC BLOOD PRESSURE: 70 MMHG | BODY MASS INDEX: 18.86 KG/M2

## 2021-08-25 DIAGNOSIS — Z98.890 STATUS POST LIGATION OF LEFT ATRIAL APPENDAGE: ICD-10-CM

## 2021-08-25 DIAGNOSIS — J44.9 CHRONIC OBSTRUCTIVE PULMONARY DISEASE, UNSPECIFIED COPD TYPE (HCC): ICD-10-CM

## 2021-08-25 DIAGNOSIS — I50.20 ACC/AHA STAGE C SYSTOLIC HEART FAILURE (HCC): ICD-10-CM

## 2021-08-25 DIAGNOSIS — Z79.899 HIGH RISK MEDICATION USE: ICD-10-CM

## 2021-08-25 DIAGNOSIS — Z79.01 CHRONIC ANTICOAGULATION: ICD-10-CM

## 2021-08-25 DIAGNOSIS — E78.2 MIXED HYPERLIPIDEMIA: ICD-10-CM

## 2021-08-25 DIAGNOSIS — I10 HTN (HYPERTENSION), MALIGNANT: ICD-10-CM

## 2021-08-25 DIAGNOSIS — Z98.890 H/O MAZE PROCEDURE: ICD-10-CM

## 2021-08-25 DIAGNOSIS — I51.89 LEFT VENTRICULAR SYSTOLIC DYSFUNCTION, NYHA CLASS 3: ICD-10-CM

## 2021-08-25 DIAGNOSIS — I48.0 PAF (PAROXYSMAL ATRIAL FIBRILLATION) (HCC): ICD-10-CM

## 2021-08-25 DIAGNOSIS — Z98.890 HISTORY OF MITRAL VALVE REPAIR: ICD-10-CM

## 2021-08-25 PROCEDURE — 99214 OFFICE O/P EST MOD 30 MIN: CPT | Performed by: NURSE PRACTITIONER

## 2021-08-25 ASSESSMENT — FIBROSIS 4 INDEX: FIB4 SCORE: 1.75

## 2021-08-25 ASSESSMENT — ENCOUNTER SYMPTOMS
PALPITATIONS: 0
ORTHOPNEA: 0
FEVER: 0
ABDOMINAL PAIN: 0
SHORTNESS OF BREATH: 1
DIZZINESS: 0
COUGH: 0
CLAUDICATION: 0
MYALGIAS: 0
PND: 0

## 2021-08-25 NOTE — PROGRESS NOTES
Chief Complaint   Patient presents with   • Congestive Heart Failure       Subjective:   Kelly Lovett is a 75 y.o. female who presents today for follow up on her heart failure.     Patient of Dr. Li. She was last seen in clinic on 8/4/2021.  During that visit, patient was started on spironolactone 25 mg daily.    Patient reports no significant problems with the addition of spironolactone.  She does report some days feeling lightheaded, but otherwise denies significant change from her shortness of breath.  She denies chest pain, palpitations, orthopnea, PND or edema.    She has not been weighing herself regularly at home.    She states she has not stayed well-hydrated today.    She reports not weighing herself regularly recently.    She does have a CardioMEMS did not device, does not send regular readings because she forgets.  She will try to send one today.      She uses continuous oxygen at 3 L.    Additonally, patient has the following medical problems:    -Dilated cardiomyopathy, LVEF 20%    -Atrial fibrillation: History of maze, cardioversion in 7/20/2019, taking amiodarone    -Mitral valve repair with maze, ERI ligation in 2017, now with moderate mitral regurgitation    -COPD    -Hypertension    -Dyslipidemia  Past Medical History:   Diagnosis Date   • Asthma     inhalers   • Breath shortness     pt reports using o2 at night 2L, no problems at this time   • Chronic systolic congestive heart failure (HCC) 7/7/2016   • COPD (chronic obstructive pulmonary disease) (HCC)    • Dilated cardiomyopathy (HCC)    • Emphysema of lung (HCC)    • Heart valve disease    • High cholesterol    • History of heart surgery    • Hyperlipidemia    • Hypertension    • Hypotension due to hypovolemia 3/1/2019   • Sleep apnea     uses cpap     Past Surgical History:   Procedure Laterality Date   • MITRAL VALVE REPAIR  4/20/2017    Procedure: MITRAL VALVE REPAIR ;  Surgeon: Lacey Porras M.D.;  Location: SURGERY [facility]  ORS;  Service:    • MAZE PROCEDURE Left 2017    Procedure: MAZE PROCEDURE, Left atrial appendage ligation;  Surgeon: Lacey Porras M.D.;  Location: SURGERY [facility] ORS;  Service:    • PAGE  2017    Procedure: PAGE;  Surgeon: Lacey Porras M.D.;  Location: SURGERY [facility];  Service:    • APPENDECTOMY      1967   • OOPHORECTOMY       Family History   Problem Relation Age of Onset   • Cancer Father         colon cancer    • Hypertension Mother    • Cancer Sister 68        ovarian cancer     Social History     Socioeconomic History   • Marital status:      Spouse name: Not on file   • Number of children: Not on file   • Years of education: Not on file   • Highest education level: Not on file   Occupational History   • Not on file   Tobacco Use   • Smoking status: Former Smoker     Packs/day: 0.50     Years: 38.00     Pack years: 19.00     Types: Cigarettes     Quit date: 10/11/2001     Years since quittin.8   • Smokeless tobacco: Never Used   Vaping Use   • Vaping Use: Never used   Substance and Sexual Activity   • Alcohol use: Yes     Alcohol/week: 8.4 oz     Types: 14 Shots of liquor per week     Comment: 2 drinks a day   • Drug use: No   • Sexual activity: Yes     Partners: Male   Other Topics Concern   • Not on file   Social History Narrative   • Not on file     Social Determinants of Health     Financial Resource Strain:    • Difficulty of Paying Living Expenses:    Food Insecurity:    • Worried About Running Out of Food in the Last Year:    • Ran Out of Food in the Last Year:    Transportation Needs:    • Lack of Transportation (Medical):    • Lack of Transportation (Non-Medical):    Physical Activity:    • Days of Exercise per Week:    • Minutes of Exercise per Session:    Stress:    • Feeling of Stress :    Social Connections:    • Frequency of Communication with Friends and Family:    • Frequency of Social Gatherings with Friends and Family:    • Attends [employer] Services:     • Active Member of Clubs or Organizations:    • Attends Club or Organization Meetings:    • Marital Status:    Intimate Partner Violence:    • Fear of Current or Ex-Partner:    • Emotionally Abused:    • Physically Abused:    • Sexually Abused:      Allergies   Allergen Reactions   • Sulfa Drugs Rash     Rxn - years ago in her 20's     Outpatient Encounter Medications as of 8/25/2021   Medication Sig Dispense Refill   • albuterol 108 (90 Base) MCG/ACT Aero Soln inhalation aerosol INHALE 2 PUFFS EVERY 6 HOURS AS NEEDED FOR SHORTNESS OF BREATH. 9 g 3   • spironolactone (ALDACTONE) 25 MG Tab Take 1 tablet by mouth every day. 30 tablet 3   • fluticasone-salmeterol (ADVAIR) 250-50 MCG/DOSE AEROSOL POWDER, BREATH ACTIVATED Inhale 1 Puff 2 times a day. 1 Each 3   • amiodarone (CORDARONE) 100 MG tablet Take 1 tablet by mouth every day. 90 tablet 4   • sacubitril-valsartan (ENTRESTO) 24-26 MG Tab tablet Take 1 tablet by mouth 2 times a day. 60 tablet 11   • SPIRIVA RESPIMAT 1.25 MCG/ACT Aero Soln INHALE TWO PUFFS BY MOUTH DAILY  12 g 2   • warfarin (COUMADIN) 2.5 MG Tab TAKE ONE-HALF TO ONE TABLET BY MOUTH DAILY OR AS DIRECTED BY ANTICOAGULATION CLINIC 90 tablet 1   • atorvastatin (LIPITOR) 40 MG Tab TAKE ONE TABLET BY MOUTH ONE TIME DAILY 100 tablet 4   • metoprolol SR (TOPROL XL) 100 MG TABLET SR 24 HR Take 1 Tab by mouth every day. 100 Tab 2   • Cholecalciferol (VITAMIN D3) 50 MCG (2000 UT) Tab Take 2,000 Units by mouth every day.     • Home Care Oxygen Inhale 3 L/min continuous. Oxygen dose range: 2.5-3 L/min  Respiratory route via: Nasal cannula  Oxygen supplier: [facility]     • sertraline (ZOLOFT) 100 MG Tab Take 1 Tab by mouth every day. 90 Tab 3   • Calcium Carb-Cholecalciferol (CALCIUM 1000 + D PO) Take 1 Cap by mouth every day.     • magnesium oxide (MAG-OX) 400 (241.3 Mg) MG Tab tablet Take 1 Tab by mouth every day. (Patient taking differently: Take 400 mg by mouth every day. 500 mg tablets)     •  "acetaminophen (TYLENOL) 500 MG Tab Take 500 mg by mouth 1 time daily as needed. Headache       No facility-administered encounter medications on file as of 8/25/2021.     Review of Systems   Constitutional: Negative for fever and malaise/fatigue.   Respiratory: Positive for shortness of breath. Negative for cough.    Cardiovascular: Negative for chest pain, palpitations, orthopnea, claudication, leg swelling and PND.   Gastrointestinal: Negative for abdominal pain.   Musculoskeletal: Negative for myalgias.   Neurological: Negative for dizziness (Lightheaded).   All other systems reviewed and are negative.       Objective:   BP (!) 92/70 (BP Location: Left arm, Patient Position: Sitting)   Pulse 68   Resp 14   Ht 1.654 m (5' 5.1\")   Wt 51.3 kg (113 lb 3.2 oz)   LMP  (LMP Unknown)   SpO2 98%   BMI 18.78 kg/m²     Physical Exam   Constitutional: She is oriented to person, place, and time. She appears well-developed.   HENT:   Head: Normocephalic and atraumatic.   Eyes: Pupils are equal, round, and reactive to light.   Neck: No JVD present.   Cardiovascular: Normal rate, regular rhythm and normal heart sounds.   Pulmonary/Chest: Effort normal and breath sounds normal. No respiratory distress. She has no wheezes. She has no rales.   Abdominal: Soft. Bowel sounds are normal.   Musculoskeletal:         General: Normal range of motion.      Cervical back: Normal range of motion and neck supple.      Right lower leg: No edema.      Left lower leg: No edema.   Neurological: She is alert and oriented to person, place, and time.   Skin: Skin is warm and dry.   Psychiatric: Her behavior is normal.   Vitals reviewed.    Lab Results   Component Value Date/Time    CHOLSTRLTOT 180 09/10/2020 09:49 AM    LDL 48 09/10/2020 09:49 AM     09/10/2020 09:49 AM    TRIGLYCERIDE 72 09/10/2020 09:49 AM       Lab Results   Component Value Date/Time    SODIUM 140 08/05/2021 01:13 PM    POTASSIUM 4.8 08/05/2021 01:13 PM    CHLORIDE " "103 08/05/2021 01:13 PM    CO2 26 08/05/2021 01:13 PM    GLUCOSE 88 08/05/2021 01:13 PM    BUN 27 (H) 08/05/2021 01:13 PM    CREATININE 1.07 08/05/2021 01:13 PM     Lab Results   Component Value Date/Time    ALKPHOSPHAT 84 06/17/2021 12:11 PM    ASTSGOT 27 06/17/2021 12:11 PM    ALTSGPT 24 06/17/2021 12:11 PM    TBILIRUBIN 0.3 06/17/2021 12:11 PM      Transthoracic Echo Report 3/7/2017  Left ventricular ejection fraction is visually estimated to be 60%,   although based on the mitral regurgitation, the \"effective\" LVEF is   much lower.  Severe, explosive mitral regurgitation. Prolapse of the posterior   mitral leaflet was present.  Right ventricular systolic pressure is estimated to be at least 45   mmHg.  Compared with the outside study of 2015 - the regurgitation is still significant.  If clinically indicated, a transesophageal study would be useful.   Ordering provider notified at time of reading.      Transesophageal Echo Report 3/13/2017  No mitral stenosis. Severe mitral regurgitation with eccentric jets.   Prolapse of the anterior mitral leaflet was present. Prolapse of the posterior mitral leaflet was present. There are redundant tissues seen on the mitral valve leaflets.   No aortic stenosis. Trace aortic insufficiency.   The aortic valve was not thoroughly evaluated due to desaturation and focus was on the mitral valve.    Heart cath 3/17/2017  POSTOPERATIVE DIAGNOSES:  1.  Severe mitral regurgitation.  2.  Normal left ventricular systolic function, ejection fraction greater than 75%.  3.  No significant coronary artery disease.     Transesophageal Echo Report 4/20/2017  Intraoperative PAGE during mitral valve repair, left atrial appendage ligation.  Pre-CPB images show normal biventricular systolic function.   P2 flail with ruptured chordae and severe mitral regurgitation.   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  Post   CPB images show preserved biventricular function.  The " mitral valve has been repaired with p2 resection and 36 mm annuloplasty band.  There is no residual mitral regurgitation and mean gradient across the valve is 3 mm Hg.  Findings communicated at the time of exam.      Transthoracic Echo Report 2/22/2019  Comapred to the prior echocardiogram dated 3/7/2017, significant changes are noted.     1. Severely reduced left ventricular systolic function.  Left   ventricular ejection fraction is visually estimated to be 20%.     2. Severely dilated left atrium.     3. Estimated right ventricular systolic pressure  is 50 mmHg.  Mildly dilated right ventricle.  Reduced right ventricular systolic function.       Dr. Lainez was notified of critical results by [place] at 9:21 AM on 2/22/2019.     Transthoracic Echo Report 2/25/2019  Limited Study to rule out LV thrombus.  No thrombus observed in the LV with contrast administration.   Left ventricular ejection fraction is visually estimated to be 20%.     Compared to the previous echocardiogram performed on 2/22/19: There has been no significant change.     Transesophageal Echo Report 2/26/2019  Known mitral valve repair functioning adequately with moderate   regurgitation in the setting of severe left ventricular systolic   dysfunction.     Right heart cath and CardioMEMS implantation on 6/12/2019  Hemodynamics:  1) Pulmonary arterial pressure: Systolic of 42 mm Hg, diastolic of 16 mm Hg, mean of 29 mm Hg.  2) Pulmonary arterial wedge pressure with mean of 19 mm Hg.  3) Cardiac output of 4.5 and Cardiac index of 2.8 through thermodilution method.  4) Cardiac output of 4.3 and Cardiac index of 2.7 through Concepcion's method.  5) Right ventricular pressure: Systolic of 34 mm Hg, end diastolic pressure of 7 mm Hg.  6) Right atrial pressure: A wave of 11 mm Hg, V wave of 12 mm Hg, mean of 10 mm Hg.  7) Pulmonary vascular resistance of 2.3 Wood Units.     Conclusions:  1) Successful implantation of Cardiomems device for remote monitor  of intracardiac pressures.  2) Patient will be monitored as part of our protocol in our heart failure program to further reduce repeated heart failure hospitalization and also to improve overall quality of life.  3) Patient appears to be hypovolemic. IVF hydration was given.  4) Hypotensive but asymptomatic. Ok to be discharged.  5) Advised patient to hold Entresto for now until being evaluated again in clinic next week.       Assessment:     1. ACC/AHA stage C systolic heart failure (HCC)  Basic Metabolic Panel    EC-ECHOCARDIOGRAM COMPLETE W/O CONT   2. Left ventricular systolic dysfunction, NYHA class 3  Basic Metabolic Panel    EC-ECHOCARDIOGRAM COMPLETE W/O CONT   3. High risk medication use  Basic Metabolic Panel    EC-ECHOCARDIOGRAM COMPLETE W/O CONT   4. History of mitral valve repair     5. H/O maze procedure     6. PAF (paroxysmal atrial fibrillation) ([facility])     7. Chronic anticoagulation     8. Mixed hyperlipidemia     9. HTN (hypertension), malignant     10. Status post ligation of left atrial appendage     11. Chronic obstructive pulmonary disease, unspecified COPD type ([facility])         Medical Decision Making:  Today's Assessment / Status / Plan:   1. HFrEF, Stage C, Class 3, LVEF 20%: Based on physical examination findings, patient is euvolemic. No JVD, lungs are clear to auscultation, no pitting edema in bilateral lower extremities, no ascites.  -Heart failure due to nonischemic  -ACE-I/ARB/ARNI: Continue Entresto 24-26 mg twice a day (switched over to Entresto in June 2021)  -Evidence Based Beta-blocker: Continue metoprolol  mg daily  -Aldosterone Antagonist: Continue spironolactone 25 mg daily  -Diuretic: Furosemide 20 mg daily as needed  -Labs: BMP in 1 week to closely follow her kidney function  -Discussed importance of staying adequately hydrated  -Patient to schedule echo, if LVEF not >35%, then will discuss/consider ICD for primary Prevention  -Reinforced s/sx of worsening heart failure with  patient and weight monitoring. Pt verbalizes understanding. Pt to call office or RTC if present.    -Start monitoring weights at home daily. Call office if weight increasing greater than 3 lbs in 1 day or greater than 5 lbs in 1 week.   -PUMP line number [phone] (PUMP)  -Heart Failure Education: Education reinforced  -Advanced care planning: Advanced directive and POLST are on file  -Discussed the importance of regular CardioMEMS readings, patient does understand, but states she forgets and gets busy with her life.  He will try to send 1 reading today    2.  History of mitral stenosis with mitral valve repair in 2017:  -Patient to get follow-up echo    3.  Paroxysmal atrial fibrillation, history of maze, cardioversion:  -Continue warfarin, followed by the anticoagulation clinic  -Continue amiodarone 100 mg daily     4.  Hypertension: Stable  -Recommendations per above    5.  Hyperlipidemia:  -Last LDL 48 on 9/10/2020  -Continue atorvastatin 40 mg daily    6.  COPD, respiratory failure:  -Continue oxygen use  -Continue follow-up with pulmonary    FU in clinic in 3 months with labs in the next week or so. Sooner if needed.    Patient verbalizes understanding and agrees with the plan of care.     PLEASE NOTE: This Note was created using voice recognition Software. I have made every reasonable attempt to correct obvious errors, but I expect that there are errors of grammar and possibly content that I did not discover before finalizing the note

## 2021-09-02 ENCOUNTER — DOCUMENTATION (OUTPATIENT)
Dept: CARDIOLOGY | Facility: MEDICAL CENTER | Age: 76
End: 2021-09-02

## 2021-09-02 ENCOUNTER — HOSPITAL ENCOUNTER (OUTPATIENT)
Dept: LAB | Facility: MEDICAL CENTER | Age: 76
End: 2021-09-02
Attending: INTERNAL MEDICINE
Payer: MEDICARE

## 2021-09-02 DIAGNOSIS — I50.20 ACC/AHA STAGE C SYSTOLIC HEART FAILURE (HCC): ICD-10-CM

## 2021-09-02 DIAGNOSIS — Z79.899 HIGH RISK MEDICATION USE: ICD-10-CM

## 2021-09-02 DIAGNOSIS — I51.89 LEFT VENTRICULAR SYSTOLIC DYSFUNCTION, NYHA CLASS 3: ICD-10-CM

## 2021-09-02 LAB
ANION GAP SERPL CALC-SCNC: 13 MMOL/L (ref 7–16)
BUN SERPL-MCNC: 31 MG/DL (ref 8–22)
CALCIUM SERPL-MCNC: 10 MG/DL (ref 8.5–10.5)
CHLORIDE SERPL-SCNC: 99 MMOL/L (ref 96–112)
CO2 SERPL-SCNC: 24 MMOL/L (ref 20–33)
CREAT SERPL-MCNC: 1.19 MG/DL (ref 0.5–1.4)
GLUCOSE SERPL-MCNC: 90 MG/DL (ref 65–99)
POTASSIUM SERPL-SCNC: 5 MMOL/L (ref 3.6–5.5)
SODIUM SERPL-SCNC: 136 MMOL/L (ref 135–145)

## 2021-09-02 PROCEDURE — 80048 BASIC METABOLIC PNL TOTAL CA: CPT

## 2021-09-02 PROCEDURE — 36415 COLL VENOUS BLD VENIPUNCTURE: CPT

## 2021-09-02 NOTE — LETTER
September 2, 2021         Kelly Tejeda [facility]  [address]  [place] [address]        Dear Kelly:    After several attempts, we have been unable to contact you about your lack of CardioMEMS pulmonary artery implant readings and about the inability for the Heart Failure Program to treat and assist you in your heart failure care. We will nilay you as inactive in the CardioMEMS monitoring system. If you receive this letter and you would like to restart the CardioMEMS program, please call our office at [phone] at your earliest convenience. If you are not planning to continue with the CardioMEMS readings please let us know. If we are able to assist you with any heart care, please call and schedule an appointment at [phone].    Thank you for your prompt attention to this matter.    Sincerely,      Latha Cote R.N.    Advanced Heart Failure Program at [facility]   [address] [place] [address], [place]  [address]  P: [phone]    Electronically Signed

## 2021-09-02 NOTE — PROGRESS NOTES
After multiple attempts at contacting patient regarding lack of compliance with cardiomems readings, the attached letter has been sent

## 2021-09-09 ENCOUNTER — DOCUMENTATION (OUTPATIENT)
Dept: CARDIOLOGY | Facility: MEDICAL CENTER | Age: 76
End: 2021-09-09

## 2021-09-09 ENCOUNTER — APPOINTMENT (OUTPATIENT)
Dept: MEDICAL GROUP | Facility: MEDICAL CENTER | Age: 76
End: 2021-09-09
Payer: MEDICARE

## 2021-09-09 NOTE — RESULT ENCOUNTER NOTE
Please let patient know that her kidney function is slightly down from before, I will not make any changes at this time, but would like follow-up lab testing (BMP) before her appointment in November.

## 2021-09-09 NOTE — PROGRESS NOTES
Patient received certified letter regarding inactivity with cardiomems on 9/4, have not received a return call, will inactivate unless we hear back

## 2021-09-10 ENCOUNTER — HOSPITAL ENCOUNTER (OUTPATIENT)
Facility: MEDICAL CENTER | Age: 76
End: 2021-09-10
Attending: FAMILY MEDICINE
Payer: MEDICARE

## 2021-09-10 ENCOUNTER — HOSPITAL ENCOUNTER (OUTPATIENT)
Dept: RADIOLOGY | Facility: MEDICAL CENTER | Age: 76
End: 2021-09-10
Attending: FAMILY MEDICINE
Payer: MEDICARE

## 2021-09-10 ENCOUNTER — OFFICE VISIT (OUTPATIENT)
Dept: MEDICAL GROUP | Facility: LAB | Age: 76
End: 2021-09-10
Payer: MEDICARE

## 2021-09-10 VITALS
DIASTOLIC BLOOD PRESSURE: 56 MMHG | SYSTOLIC BLOOD PRESSURE: 100 MMHG | HEIGHT: 65 IN | BODY MASS INDEX: 19.99 KG/M2 | HEART RATE: 82 BPM | TEMPERATURE: 97.4 F | WEIGHT: 120 LBS | RESPIRATION RATE: 14 BRPM | OXYGEN SATURATION: 96 %

## 2021-09-10 DIAGNOSIS — J44.9 CHRONIC OBSTRUCTIVE PULMONARY DISEASE, UNSPECIFIED COPD TYPE (HCC): ICD-10-CM

## 2021-09-10 DIAGNOSIS — J96.11 CHRONIC RESPIRATORY FAILURE WITH HYPOXIA (HCC): ICD-10-CM

## 2021-09-10 DIAGNOSIS — R07.81 RIB PAIN: ICD-10-CM

## 2021-09-10 DIAGNOSIS — Z20.828 EXPOSURE TO SARS-ASSOCIATED CORONAVIRUS: ICD-10-CM

## 2021-09-10 DIAGNOSIS — J44.1 COPD EXACERBATION (HCC): ICD-10-CM

## 2021-09-10 DIAGNOSIS — Z11.59 SCREENING FOR VIRAL DISEASE: ICD-10-CM

## 2021-09-10 LAB — COVID ORDER STATUS COVID19: NORMAL

## 2021-09-10 PROCEDURE — 99214 OFFICE O/P EST MOD 30 MIN: CPT | Mod: CS | Performed by: FAMILY MEDICINE

## 2021-09-10 PROCEDURE — 71100 X-RAY EXAM RIBS UNI 2 VIEWS: CPT | Mod: LT

## 2021-09-10 PROCEDURE — U0005 INFEC AGEN DETEC AMPLI PROBE: HCPCS

## 2021-09-10 PROCEDURE — U0003 INFECTIOUS AGENT DETECTION BY NUCLEIC ACID (DNA OR RNA); SEVERE ACUTE RESPIRATORY SYNDROME CORONAVIRUS 2 (SARS-COV-2) (CORONAVIRUS DISEASE [COVID-19]), AMPLIFIED PROBE TECHNIQUE, MAKING USE OF HIGH THROUGHPUT TECHNOLOGIES AS DESCRIBED BY CMS-2020-01-R: HCPCS

## 2021-09-10 PROCEDURE — 71046 X-RAY EXAM CHEST 2 VIEWS: CPT

## 2021-09-10 RX ORDER — AZITHROMYCIN 250 MG/1
TABLET, FILM COATED ORAL
Qty: 6 TABLET | Refills: 0 | Status: SHIPPED | OUTPATIENT
Start: 2021-09-10 | End: 2021-09-30

## 2021-09-10 RX ORDER — PREDNISONE 20 MG/1
40 TABLET ORAL DAILY
Qty: 10 TABLET | Refills: 0 | Status: SHIPPED | OUTPATIENT
Start: 2021-09-10 | End: 2021-09-15

## 2021-09-10 ASSESSMENT — FIBROSIS 4 INDEX: FIB4 SCORE: 1.75

## 2021-09-10 NOTE — PROGRESS NOTES
"Subjective:     CC: Cough, fall with rib pain    HPI:   Kelly presents as a 75-year-old female with a history of chronic systolic heart failure, PAF and chronic respiratory failure secondary to COPD who presents today with.    Fell  Ground-level fall 2 nights ago, hit left posterior ribs and has had some mild pain and tenderness over the area since then.  Mild pain with very deep breath.  No injuries or pain anywhere else.  She is on warfarin.  No severe headache, no change in mental status.  No numbness/weakness.    Cough  Worsening cough for the last few days.  She does not generally have a baseline cough with COPD.  Current cough happens throughout the day, rarely productive of a small amount of sputum.  She has no trouble breathing, does have some mild lightheadedness at times.  She has been stable on her baseline 3 L, is using albuterol about once per day.  No fevers and she otherwise feels well.  No worsening edema.    She was exposed to Covid 7 days ago, was tested 3 days ago and this was negative.    Medications, past medical history, allergies, and social history have been reviewed and updated.    ROS:  See HPI      Objective:       Exam:  /56 (BP Location: Right arm, Patient Position: Sitting, BP Cuff Size: Adult)   Pulse 82   Temp 36.3 °C (97.4 °F)   Resp 14   Ht 1.654 m (5' 5.12\")   Wt 54.4 kg (120 lb)   LMP  (LMP Unknown)   SpO2 96%   BMI 19.90 kg/m²  Body mass index is 19.9 kg/m².    Constitutional: Alert. Well appearing. Oxygen in place.  Skin: Warm, dry, good turgor, no visible rashes.  Eye: Equal, round and reactive to light, conjunctiva clear, lids normal.  Respiratory: Normal effort.  Poor air movement bilaterally but lung fields are clear.  Cardiovascular: Irregular rate and rhythm. Normal S1/S2. No murmurs, rubs or gallops.   MSK: There is mild tenderness over the posterior left ribs, no overlying edema, or ecchymosis.  Ext: Trace edema to b/l LE  Neuro: Moves all four extremities. " No facial droop.  Cranial nerves are grossly intact.  Psych: Answers questions appropriately. Normal affect and mood.      Assessment & Plan:     75 y.o. female with the following -     1. Chronic obstructive pulmonary disease, unspecified COPD type (HCC)  2. Chronic respiratory failure with hypoxia (HCC)  3. COPD exacerbation (HCC)  Worsening cough occasionally productive of sputum.  History of your COPD with chronic respiratory failure, no increase in oxygen need, no respiratory distress.  Likely COPD exacerbation either triggered by viral illness or environmental smoke.  Start prednisone and azithromycin, chest x-ray to evaluate for PNA and also for rib pain as below.  Extensively discussed ER precautions.  - DX-CHEST-2 VIEWS; Future  - predniSONE (DELTASONE) 20 MG Tab; Take 2 Tablets by mouth every day for 5 days.  Dispense: 10 Tablet; Refill: 0  - azithromycin (ZITHROMAX) 250 MG Tab; Take 500 mg in a single loading dose on day 1, followed by 250 mg once daily on days 2 to 5  Dispense: 6 Tablet; Refill: 0    4. Rib pain  Ground level fall 2 days ago with resultant mild pain to left upper ribs.  She is on warfarin but no other apparent injuries and neurologically intact.  We discussed fall prevention, including avoiding alcohol use.  X-rays as below.  - DX-CHEST-2 VIEWS; Future  - XI-TYEY-UANSTSLYJO (W/O CXR) LEFT; Future    5. Screening for viral disease  6. Exposure to SARS-associated coronavirus  Exposure 7 days ago with negative test 3 days ago.  I think unlikely that she has Covid but given her high risk status and that testing was done only 4 days after exposure, we will retest.  - COVID/SARS CoV-2 PCR; Future      Please note that this note was created using voice recognition software.

## 2021-09-11 ENCOUNTER — TELEPHONE (OUTPATIENT)
Dept: MEDICAL GROUP | Facility: PHYSICIAN GROUP | Age: 76
End: 2021-09-11

## 2021-09-11 DIAGNOSIS — J41.0 SIMPLE CHRONIC BRONCHITIS (HCC): ICD-10-CM

## 2021-09-11 RX ORDER — DOXYCYCLINE HYCLATE 100 MG
100 TABLET ORAL 2 TIMES DAILY
Qty: 14 TABLET | Refills: 0 | Status: SHIPPED | OUTPATIENT
Start: 2021-09-11 | End: 2022-01-06

## 2021-09-11 NOTE — TELEPHONE ENCOUNTER
After hours phone call  Call on 9/11/2021 at 10:00 AM from [facility] [phone] (home)  who reports PCP: Chinmay Figueredo M.D..  Pt reports: Spoke with patient's .  Pharmacy did not dispense azithromycin because of potential interaction with her medications.  Plan: I spoke with the pharmacist.  There is interaction noted with azithromycin and both amiodarone and warfarin.  With warfarin it can cause INR elevation (Level 3 interaction) and with amiodarone QT prolongation (level 2 interaction).  I will change azithromycin to doxycycline 100 mg 1 tablet twice a day for 7 days.  There is interaction with doxycycline and warfarin causing INR elevation.  I told pharmacy to dispense doxycycline and patient will have close monitoring of INR.  Advised  to monitor for any signs of bleeding including bruising, spontaneous bleeding.  Patient has appointment with anticoagulation clinic to get the INR checked on 9/16/2021 and advised to make sure she keeps that appointment.  Marilyn Gracia M.D.

## 2021-09-12 LAB
SARS-COV-2 RNA RESP QL NAA+PROBE: DETECTED
SPECIMEN SOURCE: ABNORMAL

## 2021-09-13 ENCOUNTER — DOCUMENTATION (OUTPATIENT)
Dept: VASCULAR LAB | Facility: MEDICAL CENTER | Age: 76
End: 2021-09-13

## 2021-09-14 ENCOUNTER — TELEMEDICINE (OUTPATIENT)
Dept: MEDICAL GROUP | Facility: LAB | Age: 76
End: 2021-09-14
Payer: MEDICARE

## 2021-09-14 VITALS — WEIGHT: 120 LBS | BODY MASS INDEX: 19.99 KG/M2 | HEIGHT: 65 IN

## 2021-09-14 DIAGNOSIS — U07.1 COVID-19: ICD-10-CM

## 2021-09-14 PROCEDURE — 99213 OFFICE O/P EST LOW 20 MIN: CPT | Mod: 95 | Performed by: PHYSICIAN ASSISTANT

## 2021-09-14 ASSESSMENT — FIBROSIS 4 INDEX: FIB4 SCORE: 1.75

## 2021-09-14 NOTE — ASSESSMENT & PLAN NOTE
Pt is/is___ not a candidate for REGEN-COV therapy given her age and comorbidities.     Pt is aware of the risks/benefits of treatment. Pt verbally consents to REGEN-COV treatment.    Paper orders for REGEN-COV completed and faxed today.     Discussed red flag symptoms and ER precautions with patient.

## 2021-09-14 NOTE — PROGRESS NOTES
Virtual Visit: Established Patient   This visit was conducted over the phone  The patient was in a private location in the state of [place].    The patient's identity was confirmed and verbal consent was obtained for this virtual visit.    Subjective:   CC: covid-19 positive  Kelly Lovett is a 75 y.o. female presenting for evaluation and management of:    1. COVID-19  Pt was diagnosed with Covid-19 09/10/2021 via NP swab PCR test.     Pt has a hx of COPD, chronic respiratory failure with hypoxia, hypertension and heart failure. Combined with her age, pt is at a high risk for Covid-19 complications.     Pt had the opportunity to review the Patient Fact Sheet for REGEN-COV. Pt questions about treatment were addressed. Pt verbally consents to being treated with a EUA medication.     At this time pt is symptomatic with a cough. Denies CP, SOB, fever or chills.    She uses 3L of oxygen at baseline, she has not required an increase in her baseline oxygen during the course of her covid-19 infection    Date of symptom onset: 09/08/2021  Date of COVID test: 09/10/2021      ROS   All ROS negative except for pertinent positives listed above.     Current medicines (including changes today)  Current Outpatient Medications   Medication Sig Dispense Refill   • doxycycline (VIBRAMYCIN) 100 MG Tab Take 1 Tablet by mouth 2 times a day. 14 Tablet 0   • predniSONE (DELTASONE) 20 MG Tab Take 2 Tablets by mouth every day for 5 days. 10 Tablet 0   • azithromycin (ZITHROMAX) 250 MG Tab Take 500 mg in a single loading dose on day 1, followed by 250 mg once daily on days 2 to 5 6 Tablet 0   • albuterol 108 (90 Base) MCG/ACT Aero Soln inhalation aerosol INHALE 2 PUFFS EVERY 6 HOURS AS NEEDED FOR SHORTNESS OF BREATH. 9 g 3   • spironolactone (ALDACTONE) 25 MG Tab Take 1 tablet by mouth every day. 30 tablet 3   • fluticasone-salmeterol (ADVAIR) 250-50 MCG/DOSE AEROSOL POWDER, BREATH ACTIVATED Inhale 1 Puff 2 times a day. 1 Each 3   •  amiodarone (CORDARONE) 100 MG tablet Take 1 tablet by mouth every day. 90 tablet 4   • sacubitril-valsartan (ENTRESTO) 24-26 MG Tab tablet Take 1 tablet by mouth 2 times a day. 60 tablet 11   • SPIRIVA RESPIMAT 1.25 MCG/ACT Aero Soln INHALE TWO PUFFS BY MOUTH DAILY  12 g 2   • warfarin (COUMADIN) 2.5 MG Tab TAKE ONE-HALF TO ONE TABLET BY MOUTH DAILY OR AS DIRECTED BY ANTICOAGULATION CLINIC 90 tablet 1   • atorvastatin (LIPITOR) 40 MG Tab TAKE ONE TABLET BY MOUTH ONE TIME DAILY 100 tablet 4   • metoprolol SR (TOPROL XL) 100 MG TABLET SR 24 HR Take 1 Tab by mouth every day. 100 Tab 2   • Cholecalciferol (VITAMIN D3) 50 MCG (2000 UT) Tab Take 2,000 Units by mouth every day.     • Home Care Oxygen Inhale 3 L/min continuous. Oxygen dose range: 2.5-3 L/min  Respiratory route via: Nasal cannula  Oxygen supplier: [facility]     • sertraline (ZOLOFT) 100 MG Tab Take 1 Tab by mouth every day. 90 Tab 3   • Calcium Carb-Cholecalciferol (CALCIUM 1000 + D PO) Take 1 Cap by mouth every day.     • magnesium oxide (MAG-OX) 400 (241.3 Mg) MG Tab tablet Take 1 Tab by mouth every day. (Patient taking differently: Take 400 mg by mouth every day. 500 mg tablets)     • acetaminophen (TYLENOL) 500 MG Tab Take 500 mg by mouth 1 time daily as needed. Headache       No current facility-administered medications for this visit.       Patient Active Problem List    Diagnosis Date Noted   • COVID-19 09/14/2021   • Hypertensive heart disease with heart failure (HCC) 03/20/2019   • Pulmonary hypertension due to left heart disease (HCC) 03/20/2019   • Debility 03/03/2019   • DNR (do not resuscitate) 02/26/2019   • Long term (current) use of anticoagulants [Z79.01] 08/06/2018   • Voice hoarseness 07/18/2018   • Risk for falls 01/10/2018   • Moderate single current episode of major depressive disorder (HCC) 12/05/2017   • Paroxysmal atrial fibrillation (HCC) 06/19/2017   • Essential (primary) hypertension 03/17/2017   • Non-rheumatic mitral  "regurgitation 03/08/2017   • COPD (chronic obstructive pulmonary disease) ([facility]) 10/11/2016   • Osteopenia 10/11/2016   • Obstructive sleep apnea syndrome 10/11/2016   • Chronic systolic congestive heart failure ([facility]) 07/07/2016   • Atrial flutter ([facility]) 12/15/2015   • Chronic respiratory failure with hypoxia ([facility]) 11/23/2015   • Aortic atherosclerosis ([facility]) 09/10/2015   • Hyperlipidemia 09/10/2015   • Prediabetes 08/15/2013   • History of colonic polyps 03/03/2008        Objective:     Physical Exam:  Pt sounded alert and oriented  No gasping, wheezing, speaking in clear sentences    Assessment and Plan:   The following treatment plan was discussed:     1. COVID-19  Pt is a candidate for REGEN-COV therapy given her age and comorbidities.     Pt is aware of the risks/benefits of treatment. Pt verbally consents to REGEN-COV treatment.    Paper orders for REGEN-COV completed and faxed today.     Discussed red flag symptoms and ER precautions with patient.    I have provided the patient with the \"fact sheet for patients and parents/caregivers\".       I informed them of therapeutic alternatives to Casirivimab & Imdevimab and the risks and benefits of those alternatives.       I informed them that they have the option to accept or refuse Casirivimab & Imdevimab.       I informed them that Casirivimab & Imdevimab is not FDA approved, but that it is authorized for use under emergency by the FDA.       I informed them of the known risks and benefits of Casirivimab & Imdevimab and discussed the extent to which such risks and benefits are unknown.       The patient consents to undergoing the Regeneron injections at [facility].         Follow-up: No follow-ups on file.     My total time spent caring for the patient on the day of the encounter was 20 minutes.   This does not include time spent on separately billable procedures/tests.         "

## 2021-09-16 ENCOUNTER — ANTICOAGULATION VISIT (OUTPATIENT)
Dept: MEDICAL GROUP | Facility: MEDICAL CENTER | Age: 76
End: 2021-09-16
Payer: MEDICARE

## 2021-09-16 ENCOUNTER — TELEPHONE (OUTPATIENT)
Dept: CARDIOLOGY | Facility: MEDICAL CENTER | Age: 76
End: 2021-09-16

## 2021-09-16 DIAGNOSIS — I48.0 PAROXYSMAL ATRIAL FIBRILLATION (HCC): ICD-10-CM

## 2021-09-16 DIAGNOSIS — Z79.01 LONG TERM (CURRENT) USE OF ANTICOAGULANTS: ICD-10-CM

## 2021-09-16 DIAGNOSIS — Z79.899 HIGH RISK MEDICATION USE: ICD-10-CM

## 2021-09-16 LAB — INR PPP: 1 (ref 2–3.5)

## 2021-09-16 PROCEDURE — 99211 OFF/OP EST MAY X REQ PHY/QHP: CPT | Performed by: INTERNAL MEDICINE

## 2021-09-16 PROCEDURE — 85610 PROTHROMBIN TIME: CPT | Performed by: INTERNAL MEDICINE

## 2021-09-16 NOTE — TELEPHONE ENCOUNTER
LISSETH Arevalo.  Dilip Miller R.N.  Please let patient know that her kidney function is slightly down from before, I will not make any changes at this time, but would like follow-up lab testing (BMP) before her appointment in November.       Order placed, Jelastic message sent

## 2021-09-16 NOTE — PROGRESS NOTES
Anticoagulation Summary  As of 2021    INR goal:  2.0-3.0   TTR:  62.7 % (2.2 y)   INR used for dosin.00 (2021)   Warfarin maintenance plan:  2.5 mg (2.5 mg x 1) every Sun, Tue; 1.25 mg (2.5 mg x 0.5) all other days   Weekly warfarin total:  11.25 mg   Plan last modified:  Gaby Holley, PharmD (2021)   Next INR check:  2021   Target end date:  Indefinite    Indications    Atrial flutter (HCC) [I48.92]  Long term (current) use of anticoagulants [Z79.01] [Z79.01]  Paroxysmal atrial fibrillation (HCC) [I48.0]             Anticoagulation Episode Summary     INR check location:  Anticoagulation Clinic    Preferred lab:      Send INR reminders to:      Comments:        Anticoagulation Care Providers     Provider Role Specialty Phone number    [facility]   [phone]        Anticoagulation Patient Findings  Patient Findings     Positives:  Missed doses, Change in medications    Negatives:  Signs/symptoms of thrombosis, Signs/symptoms of bleeding, Laboratory test error suspected, Change in health, Change in alcohol use, Change in activity, Upcoming invasive procedure, Emergency department visit, Upcoming dental procedure, Extra doses, Change in diet/appetite, Hospital admission, Bruising, Other complaints              History of Present Illness: follow up appointment for chronic anticoagulation with the high risk medication, warfarin for AF.  Pt has been noncompliant with  anticoagulation follow up visits    Medications reconciled    Pt is not on antiplatelet therapy    Last INR was at goal, pt is now critically sub therapeutic today.  Pt is taking a short course of doxycycline and zpak.  Will BOLUS dose with 5mg po qhs x3 then resume current dosing regimen. Follow up in 4 DAYS, to reduce the risk of adverse events related to this high risk medication, warfarin.    Kelly Dean, Clinical Pharmacist    Unable to do vitals, due to car visit.  Both  and Kelly are  covid positive.  Kelly Dean, Clinical Pharmacist, CDE, CACP

## 2021-09-17 ENCOUNTER — OUTPATIENT INFUSION SERVICES (OUTPATIENT)
Dept: ONCOLOGY | Facility: MEDICAL CENTER | Age: 76
End: 2021-09-17
Attending: PHYSICIAN ASSISTANT
Payer: MEDICARE

## 2021-09-17 VITALS
DIASTOLIC BLOOD PRESSURE: 60 MMHG | RESPIRATION RATE: 18 BRPM | HEART RATE: 81 BPM | TEMPERATURE: 97.6 F | OXYGEN SATURATION: 100 % | SYSTOLIC BLOOD PRESSURE: 108 MMHG

## 2021-09-17 DIAGNOSIS — U07.1 COVID-19: ICD-10-CM

## 2021-09-17 PROCEDURE — 700111 HCHG RX REV CODE 636 W/ 250 OVERRIDE (IP): Performed by: PHYSICIAN ASSISTANT

## 2021-09-17 PROCEDURE — M0243 CASIRIVI AND IMDEVI INFUSION: HCPCS

## 2021-09-17 PROCEDURE — 96372 THER/PROPH/DIAG INJ SC/IM: CPT

## 2021-09-17 RX ADMIN — CASIRIVIMAB AND IMDEVIMAB 1200 MG: 600; 600 INJECTION, SOLUTION, CONCENTRATE INTRAVENOUS at 13:30

## 2021-09-17 NOTE — PROGRESS NOTES
Patient arrived to clinic alert, oriented x4, via wheelchair. Arrived with . C/o fatigue and productive cough related to covid. Educated on casirivimab/imdevimab injections and side effects to monitor for and report to physician regarding. Medicated per MAR. 1 hour post injection monitoring completed. Patient tolerated treatment well without adverse s/s. Discharged home to self care in no apparent distress.

## 2021-09-20 ENCOUNTER — ANTICOAGULATION VISIT (OUTPATIENT)
Dept: MEDICAL GROUP | Facility: MEDICAL CENTER | Age: 76
End: 2021-09-20
Payer: MEDICARE

## 2021-09-20 DIAGNOSIS — I48.0 PAROXYSMAL ATRIAL FIBRILLATION (HCC): ICD-10-CM

## 2021-09-20 DIAGNOSIS — Z79.01 LONG TERM (CURRENT) USE OF ANTICOAGULANTS: Primary | ICD-10-CM

## 2021-09-20 LAB — INR PPP: 2.3 (ref 2–3.5)

## 2021-09-20 PROCEDURE — 85610 PROTHROMBIN TIME: CPT | Performed by: FAMILY MEDICINE

## 2021-09-20 PROCEDURE — 93793 ANTICOAG MGMT PT WARFARIN: CPT | Performed by: FAMILY MEDICINE

## 2021-09-20 NOTE — PROGRESS NOTES
OP Anticoagulation Service Note    Date: 2021  There were no vitals filed for this visit.   pt declined vitals    Anticoagulation Summary  As of 2021    INR goal:  2.0-3.0   TTR:  62.5 % (2.2 y)   INR used for dosin.30 (2021)   Warfarin maintenance plan:  2.5 mg (2.5 mg x 1) every Sun, Tue; 1.25 mg (2.5 mg x 0.5) all other days   Weekly warfarin total:  11.25 mg   Plan last modified:  Gaby Holley, PharmD (2021)   Next INR check:  2021   Target end date:  Indefinite    Indications    Atrial flutter (HCC) [I48.92]  Long term (current) use of anticoagulants [Z79.01] [Z79.01]  Paroxysmal atrial fibrillation (HCC) [I48.0]             Anticoagulation Episode Summary     INR check location:  Anticoagulation Clinic    Preferred lab:      Send INR reminders to:      Comments:        Anticoagulation Care Providers     Provider Role Specialty Phone number    [facility]   [phone]            HPI:   Kelly [facility]le[facility] seen in clinic today, on anticoagulation therapy with warfarin (a high risk medication) for atrial fibrillation      Pt is here today to evaluate anticoagulation therapy    Previous INR was  1.0 on 21    Pt was instructed to take 5 mg x 2 then resume usual regimen    Anticoagulation Patient Findings  Patient Findings     Positives:  Change in medications (casirivimab/imdevimab), Other complaints (feeling poorly d/t covid)    Negatives:  Signs/symptoms of thrombosis, Signs/symptoms of bleeding, Laboratory test error suspected, Change in health, Change in alcohol use, Change in activity, Upcoming invasive procedure, Emergency department visit, Upcoming dental procedure, Missed doses, Extra doses, Change in diet/appetite, Hospital admission, Bruising          Confirmed warfarin dosing regimen    Pt is not on antiplatelet/NSAID therapy    Falls or accidents since last visit No        A/P   INR  therapeutic today,will require close follow up as  previous INR was sub-therapeutic   Continue current warfarin regimen            Pt educated to contact our clinic with any changes in medications or s/s of bleeding or thrombosis. Pt is aware to seek immediate medical attention for falls, head injury or deep cuts    Follow up appointment in 1 week(s) to reduce risk of adverse events from warfarin  Amanda Perera, PharmD

## 2021-09-23 ENCOUNTER — TELEPHONE (OUTPATIENT)
Dept: MEDICAL GROUP | Facility: LAB | Age: 76
End: 2021-09-23

## 2021-09-23 NOTE — TELEPHONE ENCOUNTER
ESTABLISHED PATIENT PRE-VISIT PLANNING     Patient was NOT contacted to complete PVP.     Note: Patient will not be contacted if there is no indication to call.     1.  Reviewed notes from the last few office visits within the medical group: Yes    2.  If any orders were placed at last visit or intended to be done for this visit (i.e. 6 mos follow-up), do we have Results/Consult Notes?         •  Labs - Labs ordered, completed on 9/20/21 and results are in chart.  Note: If patient appointment is for lab review and patient did not complete labs, check with provider if OK to reschedule patient until labs completed.       •  Imaging - Imaging was not ordered at last office visit.       •  Referrals - No referrals were ordered at last office visit.    3. Is this appointment scheduled as a Hospital Follow-Up? No    4.  Immunizations were updated in Epic using Reconcile Outside Information activity? Yes    5.  Patient is due for the following Health Maintenance Topics:   Health Maintenance Due   Topic Date Due   • IMM ZOSTER VACCINES (2 of 3) 10/10/2013   • COLORECTAL CANCER SCREENING  05/06/2019   • Annual Wellness Visit  08/26/2021   • IMM INFLUENZA (1) 09/01/2021     6.  AHA (Pulse8) form printed for Provider? No, patient does not have any open alerts

## 2021-09-30 ENCOUNTER — ANTICOAGULATION VISIT (OUTPATIENT)
Dept: MEDICAL GROUP | Facility: MEDICAL CENTER | Age: 76
End: 2021-09-30
Payer: MEDICARE

## 2021-09-30 ENCOUNTER — OFFICE VISIT (OUTPATIENT)
Dept: MEDICAL GROUP | Facility: LAB | Age: 76
End: 2021-09-30
Payer: MEDICARE

## 2021-09-30 VITALS
SYSTOLIC BLOOD PRESSURE: 104 MMHG | RESPIRATION RATE: 14 BRPM | WEIGHT: 109 LBS | DIASTOLIC BLOOD PRESSURE: 76 MMHG | HEIGHT: 65 IN | BODY MASS INDEX: 18.16 KG/M2 | TEMPERATURE: 97.1 F | HEART RATE: 84 BPM | OXYGEN SATURATION: 96 %

## 2021-09-30 DIAGNOSIS — Z79.01 LONG TERM (CURRENT) USE OF ANTICOAGULANTS: ICD-10-CM

## 2021-09-30 DIAGNOSIS — J44.9 CHRONIC OBSTRUCTIVE PULMONARY DISEASE, UNSPECIFIED COPD TYPE (HCC): ICD-10-CM

## 2021-09-30 DIAGNOSIS — I48.0 PAROXYSMAL ATRIAL FIBRILLATION (HCC): ICD-10-CM

## 2021-09-30 DIAGNOSIS — J96.11 CHRONIC RESPIRATORY FAILURE WITH HYPOXIA (HCC): ICD-10-CM

## 2021-09-30 DIAGNOSIS — U07.1 COVID-19: ICD-10-CM

## 2021-09-30 LAB — INR PPP: 4.2 (ref 2–3.5)

## 2021-09-30 PROCEDURE — 99213 OFFICE O/P EST LOW 20 MIN: CPT | Performed by: FAMILY MEDICINE

## 2021-09-30 PROCEDURE — 99211 OFF/OP EST MAY X REQ PHY/QHP: CPT | Performed by: INTERNAL MEDICINE

## 2021-09-30 PROCEDURE — 85610 PROTHROMBIN TIME: CPT | Performed by: INTERNAL MEDICINE

## 2021-09-30 ASSESSMENT — FIBROSIS 4 INDEX: FIB4 SCORE: 1.75

## 2021-09-30 NOTE — PROGRESS NOTES
"Subjective:     CC: Covid 19 follow up    HPI:   Kelly is a 75-year-old female with a history of chronic respiratory failure with hypoxia, COPD, and A. fib who presents for follow-up after recent COVID-19 infection.    Positive Covid test on 9/10.  She reports generally mild symptoms with cough being her main symptom.   This is occasionally productive of clear phlegm/mucus, no hemoptysis. She did receive regen-COV on 9/17.  She has continued on her 3 to 4 L of baseline oxygen.  No fevers or chills.  No chest pain or shortness of breath.  She did have some lightheadedness this morning.    She had also received prednisone burst and doxycycline for COPD exacerbation while Covid results are pending.    Medications, past medical history, allergies, and social history have been reviewed and updated.    ROS:  See HPI      Objective:       Exam:  /76 (BP Location: Right arm, Patient Position: Sitting, BP Cuff Size: Adult)   Pulse 84   Temp 36.2 °C (97.1 °F)   Resp 14   Ht 1.654 m (5' 5.12\")   Wt 49.4 kg (109 lb)   LMP  (LMP Unknown)   SpO2 96%   BMI 18.07 kg/m²  Body mass index is 18.07 kg/m².    Constitutional: Alert. No distress.  Skin: Warm, dry, good turgor, no visible rashes.  Eye: Equal, round and reactive to light, conjunctiva clear, lids normal.  Respiratory: Normal effort. Lungs are clear to auscultation bilaterally.  Decreased air movement bilaterally.  Oxygen in place.  Cardiovascular: Irregular rate and rhythm. Normal S1/S2. No murmurs, rubs or gallops.   Neuro: Moves all four extremities. No facial droop.  Psych: Answers questions appropriately. Normal affect and mood.      Assessment & Plan:     Kelly is a 75-year-old female with a history of chronic respiratory failure with hypoxia, COPD, and A. fib who presents for follow-up after recent COVID-19 infection.    1. COVID-19  2. Chronic obstructive pulmonary disease, unspecified COPD type (HCC)  3. Chronic respiratory failure with hypoxia " (HCC)  Positive Covid test on 9/10.  She had been vaccinated.  She did receive regen-COV on 9/17.  Only continued symptom is productive cough and lightheadedness.  Denies increased O2 need, shortness of breath.  Also previously received prednisone course and doxycycline due to COPD exacerbation.  Given her high risk status will check CXR, basic labs ordered as well.  Encouraged rest and hydration.  Discussed ER precautions for any new or worsening symptoms including increasing O2 need, shortness of breath, chest pain.    - DX-CHEST-2 VIEWS; Future  - CBC WITH DIFFERENTIAL; Future  - Comp Metabolic Panel; Future       Please note that this note was created using voice recognition software.

## 2021-09-30 NOTE — PROGRESS NOTES
Anticoagulation Summary  As of 2021    INR goal:  2.0-3.0   TTR:  62.2 % (2.2 y)   INR used for dosin.20 (2021)   Warfarin maintenance plan:  2.5 mg (2.5 mg x 1) every Sun, Tue; 1.25 mg (2.5 mg x 0.5) all other days   Weekly warfarin total:  11.25 mg   Plan last modified:  Gaby Holley, PharmD (2021)   Next INR check:     Target end date:  Indefinite    Indications    Atrial flutter (HCC) [I48.92]  Long term (current) use of anticoagulants [Z79.01] [Z79.01]  Paroxysmal atrial fibrillation (HCC) [I48.0]             Anticoagulation Episode Summary     INR check location:  Anticoagulation Clinic    Preferred lab:      Send INR reminders to:      Comments:        Anticoagulation Care Providers     Provider Role Specialty Phone number    [facility]   [phone]        Anticoagulation Patient Findings  Patient Findings     Positives:  Change in diet/appetite    Negatives:  Signs/symptoms of thrombosis, Signs/symptoms of bleeding, Laboratory test error suspected, Change in health, Change in alcohol use, Change in activity, Upcoming invasive procedure, Emergency department visit, Upcoming dental procedure, Missed doses, Extra doses, Change in medications, Hospital admission, Bruising, Other complaints              History of Present Illness: follow up appointment for chronic anticoagulation with the high risk medication, warfarin for AF    Medications reconciled yes  Pt is not on antiplatelet therapy    Pt is just out of quarantine for COVID.  Pt has received monoclonal antibodies.  Pt reports decreased appetite from COVID.  Her appetite has since returned.  Will HOLD warfarin tonight, then resume current dosing regimen.Follow up in 2 weeks, to reduce the risk of adverse events related to this high risk medication, warfarin.    Kelly Dean, Clinical Pharmacist

## 2021-10-05 ENCOUNTER — TELEPHONE (OUTPATIENT)
Dept: MEDICAL GROUP | Facility: LAB | Age: 76
End: 2021-10-05

## 2021-10-05 NOTE — TELEPHONE ENCOUNTER
Colorectal Care Gap Outreach    1. Confirmed patient is between the ages of 50-75: NO     2. Confirmed that patient IS overdue or due soon for colorectal cancer screening: YES     3. Were orders placed within the last 12 months to complete screening: YES     Phone Number Called: [phone]  Call outcome: reminded patient of existing order for Cologuard.left msg to call back to see if she saw someone for colonoscopy or did cologuard test     _____________________________________________________________________    Colon Cancer Screening Guidelines:     Important: If patient has any history of colon polyps or family history of colorectal cancer, FIT and Cologuard are NOT appropriate options. A colonoscopy is the recommended test for this set of patients.    • Colonoscopy  o Always recommend colonoscopy first.   o A colonoscopy is recommended over the other tests because it provides direct visualization of the colon and if there are small polyps these can also be removed with one procedure.  o If negative and no family history, could be cleared for 10 years.     • Cologuard/FIT  o Cologuard is completed once every 3 years.  o FIT is completed annually.  o If positive, Cologuard and FIT will require a diagnostic colonoscopy. Screening colonoscopies are classically covered by insurances, however, diagnostic colonoscopies may result in a bill.

## 2021-10-07 ENCOUNTER — HOSPITAL ENCOUNTER (OUTPATIENT)
Dept: RADIOLOGY | Facility: MEDICAL CENTER | Age: 76
End: 2021-10-07
Attending: FAMILY MEDICINE
Payer: MEDICARE

## 2021-10-07 ENCOUNTER — HOSPITAL ENCOUNTER (OUTPATIENT)
Dept: LAB | Facility: MEDICAL CENTER | Age: 76
End: 2021-10-07
Attending: FAMILY MEDICINE
Payer: MEDICARE

## 2021-10-07 DIAGNOSIS — U07.1 COVID-19: ICD-10-CM

## 2021-10-07 LAB
ALBUMIN SERPL BCP-MCNC: 3.9 G/DL (ref 3.2–4.9)
ALBUMIN/GLOB SERPL: 1.5 G/DL
ALP SERPL-CCNC: 90 U/L (ref 30–99)
ALT SERPL-CCNC: 15 U/L (ref 2–50)
ANION GAP SERPL CALC-SCNC: 14 MMOL/L (ref 7–16)
AST SERPL-CCNC: 18 U/L (ref 12–45)
BASOPHILS # BLD AUTO: 1 % (ref 0–1.8)
BASOPHILS # BLD: 0.07 K/UL (ref 0–0.12)
BILIRUB SERPL-MCNC: 0.3 MG/DL (ref 0.1–1.5)
BUN SERPL-MCNC: 19 MG/DL (ref 8–22)
CALCIUM SERPL-MCNC: 9.7 MG/DL (ref 8.5–10.5)
CHLORIDE SERPL-SCNC: 102 MMOL/L (ref 96–112)
CO2 SERPL-SCNC: 25 MMOL/L (ref 20–33)
CREAT SERPL-MCNC: 1.03 MG/DL (ref 0.5–1.4)
EOSINOPHIL # BLD AUTO: 0.14 K/UL (ref 0–0.51)
EOSINOPHIL NFR BLD: 2 % (ref 0–6.9)
ERYTHROCYTE [DISTWIDTH] IN BLOOD BY AUTOMATED COUNT: 52.4 FL (ref 35.9–50)
GLOBULIN SER CALC-MCNC: 2.6 G/DL (ref 1.9–3.5)
GLUCOSE SERPL-MCNC: 81 MG/DL (ref 65–99)
HCT VFR BLD AUTO: 36 % (ref 37–47)
HGB BLD-MCNC: 11.3 G/DL (ref 12–16)
IMM GRANULOCYTES # BLD AUTO: 0.05 K/UL (ref 0–0.11)
IMM GRANULOCYTES NFR BLD AUTO: 0.7 % (ref 0–0.9)
LYMPHOCYTES # BLD AUTO: 1.55 K/UL (ref 1–4.8)
LYMPHOCYTES NFR BLD: 22.4 % (ref 22–41)
MCH RBC QN AUTO: 31.1 PG (ref 27–33)
MCHC RBC AUTO-ENTMCNC: 31.4 G/DL (ref 33.6–35)
MCV RBC AUTO: 99.2 FL (ref 81.4–97.8)
MONOCYTES # BLD AUTO: 0.81 K/UL (ref 0–0.85)
MONOCYTES NFR BLD AUTO: 11.7 % (ref 0–13.4)
NEUTROPHILS # BLD AUTO: 4.3 K/UL (ref 2–7.15)
NEUTROPHILS NFR BLD: 62.2 % (ref 44–72)
NRBC # BLD AUTO: 0 K/UL
NRBC BLD-RTO: 0 /100 WBC
PLATELET # BLD AUTO: 216 K/UL (ref 164–446)
PMV BLD AUTO: 10.5 FL (ref 9–12.9)
POTASSIUM SERPL-SCNC: 4.2 MMOL/L (ref 3.6–5.5)
PROT SERPL-MCNC: 6.5 G/DL (ref 6–8.2)
RBC # BLD AUTO: 3.63 M/UL (ref 4.2–5.4)
SODIUM SERPL-SCNC: 141 MMOL/L (ref 135–145)
WBC # BLD AUTO: 6.9 K/UL (ref 4.8–10.8)

## 2021-10-07 PROCEDURE — 85025 COMPLETE CBC W/AUTO DIFF WBC: CPT

## 2021-10-07 PROCEDURE — 80053 COMPREHEN METABOLIC PANEL: CPT

## 2021-10-07 PROCEDURE — 36415 COLL VENOUS BLD VENIPUNCTURE: CPT

## 2021-10-07 PROCEDURE — 71046 X-RAY EXAM CHEST 2 VIEWS: CPT

## 2021-10-14 ENCOUNTER — ANTICOAGULATION VISIT (OUTPATIENT)
Dept: MEDICAL GROUP | Facility: MEDICAL CENTER | Age: 76
End: 2021-10-14
Payer: MEDICARE

## 2021-10-14 DIAGNOSIS — I48.0 PAROXYSMAL ATRIAL FIBRILLATION (HCC): ICD-10-CM

## 2021-10-14 DIAGNOSIS — Z79.01 LONG TERM (CURRENT) USE OF ANTICOAGULANTS: ICD-10-CM

## 2021-10-14 LAB — INR PPP: 1.9 (ref 2–3.5)

## 2021-10-14 PROCEDURE — 99211 OFF/OP EST MAY X REQ PHY/QHP: CPT | Performed by: INTERNAL MEDICINE

## 2021-10-14 PROCEDURE — 85610 PROTHROMBIN TIME: CPT | Performed by: INTERNAL MEDICINE

## 2021-10-14 NOTE — PROGRESS NOTES
OP Anticoagulation Service Note    Date: 10/14/2021  There were no vitals filed for this visit.   pt declined vitals    Anticoagulation Summary  As of 10/14/2021    INR goal:  2.0-3.0   TTR:  61.9 % (2.3 y)   INR used for dosin.90 (10/14/2021)   Warfarin maintenance plan:  2.5 mg (2.5 mg x 1) every Sun, Tue; 1.25 mg (2.5 mg x 0.5) all other days   Weekly warfarin total:  11.25 mg   Plan last modified:  Gaby Holley, PharmD (2021)   Next INR check:  10/28/2021   Target end date:  Indefinite    Indications    Atrial flutter (HCC) [I48.92]  Long term (current) use of anticoagulants [Z79.01] [Z79.01]  Paroxysmal atrial fibrillation (HCC) [I48.0]             Anticoagulation Episode Summary     INR check location:  Anticoagulation Clinic    Preferred lab:      Send INR reminders to:      Comments:        Anticoagulation Care Providers     Provider Role Specialty Phone number    [facility]   [phone]            HPI:   [facility] seen in clinic today, on anticoagulation therapy with warfarin (a high risk medication) for atrial flutter    Pt is here today to evaluate anticoagulation therapy    Previous INR was  4.2 on 21    Pt was instructed to hold x 1 day then continue regimen    Anticoagulation Patient Findings  Patient Findings     Negatives:  Signs/symptoms of thrombosis, Signs/symptoms of bleeding, Laboratory test error suspected, Change in health, Change in alcohol use, Change in activity, Upcoming invasive procedure, Emergency department visit, Upcoming dental procedure, Missed doses, Extra doses, Change in medications, Change in diet/appetite, Hospital admission, Bruising, Other complaints          Confirmed warfarin dosing regimen    Pt is not on antiplatelet/NSAID therapy    Falls or accidents since last visit No        A/P   INR  sub-therapeutic today    Pt is to continue with current warfarin dosing regimen as pt was previously supratherapeutic      05/22 check referral    Pt educated to contact our clinic with any changes in medications or s/s of bleeding or thrombosis. Pt is aware to seek immediate medical attention for falls, head injury or deep cuts    Follow up appointment in 2 week(s) to reduce risk of adverse events from warfarin  Gaby Holley, DariD

## 2021-10-15 ENCOUNTER — TELEPHONE (OUTPATIENT)
Dept: MEDICAL GROUP | Facility: LAB | Age: 76
End: 2021-10-15

## 2021-10-15 NOTE — TELEPHONE ENCOUNTER
ESTABLISHED PATIENT PRE-VISIT PLANNING     Patient was NOT contacted to complete PVP.     Note: Patient will not be contacted if there is no indication to call.     1.  Reviewed notes from the last few office visits within the medical group: Yes    2.  If any orders were placed at last visit or intended to be done for this visit (i.e. 6 mos follow-up), do we have Results/Consult Notes?         •  Labs - Labs ordered, completed on 10/14/21 and results are in chart.  Note: If patient appointment is for lab review and patient did not complete labs, check with provider if OK to reschedule patient until labs completed.       •  Imaging - Imaging ordered, completed and results are in chart.       •  Referrals - No referrals were ordered at last office visit.    3. Is this appointment scheduled as a Hospital Follow-Up? No    4.  Immunizations were updated in Epic using Reconcile Outside Information activity? Yes    5.  Patient is due for the following Health Maintenance Topics:   Health Maintenance Due   Topic Date Due   • IMM ZOSTER VACCINES (2 of 3) 10/10/2013   • COLORECTAL CANCER SCREENING  05/06/2019   • Annual Wellness Visit  08/26/2021   • IMM INFLUENZA (1) 09/01/2021         6.  AHA (Pulse8) form printed for Provider? No, patient does not have any open alerts

## 2021-10-19 ENCOUNTER — OFFICE VISIT (OUTPATIENT)
Dept: MEDICAL GROUP | Facility: LAB | Age: 76
End: 2021-10-19
Payer: MEDICARE

## 2021-10-19 VITALS
HEIGHT: 65 IN | HEART RATE: 70 BPM | BODY MASS INDEX: 18.49 KG/M2 | SYSTOLIC BLOOD PRESSURE: 104 MMHG | RESPIRATION RATE: 12 BRPM | DIASTOLIC BLOOD PRESSURE: 76 MMHG | OXYGEN SATURATION: 97 % | WEIGHT: 111 LBS | TEMPERATURE: 96.8 F

## 2021-10-19 DIAGNOSIS — Z23 NEED FOR VACCINATION: ICD-10-CM

## 2021-10-19 DIAGNOSIS — R42 LIGHTHEADED: ICD-10-CM

## 2021-10-19 DIAGNOSIS — J41.0 SIMPLE CHRONIC BRONCHITIS (HCC): ICD-10-CM

## 2021-10-19 DIAGNOSIS — I50.22 CHRONIC SYSTOLIC CONGESTIVE HEART FAILURE (HCC): ICD-10-CM

## 2021-10-19 DIAGNOSIS — R06.02 SHORTNESS OF BREATH: ICD-10-CM

## 2021-10-19 DIAGNOSIS — J96.11 CHRONIC RESPIRATORY FAILURE WITH HYPOXIA (HCC): ICD-10-CM

## 2021-10-19 DIAGNOSIS — I10 ESSENTIAL (PRIMARY) HYPERTENSION: ICD-10-CM

## 2021-10-19 PROBLEM — Z86.16 HISTORY OF COVID-19: Status: ACTIVE | Noted: 2021-09-14

## 2021-10-19 PROCEDURE — G0008 ADMIN INFLUENZA VIRUS VAC: HCPCS | Performed by: FAMILY MEDICINE

## 2021-10-19 PROCEDURE — 99214 OFFICE O/P EST MOD 30 MIN: CPT | Mod: 25 | Performed by: FAMILY MEDICINE

## 2021-10-19 PROCEDURE — 90662 IIV NO PRSV INCREASED AG IM: CPT | Performed by: FAMILY MEDICINE

## 2021-10-19 RX ORDER — METOPROLOL SUCCINATE 50 MG/1
50 TABLET, EXTENDED RELEASE ORAL DAILY
Qty: 30 TABLET | Refills: 1 | Status: SHIPPED | OUTPATIENT
Start: 2021-10-19 | End: 2021-11-12 | Stop reason: SDUPTHER

## 2021-10-19 RX ORDER — SPIRONOLACTONE 25 MG/1
25 TABLET ORAL DAILY
Qty: 30 TABLET | Refills: 3 | Status: SHIPPED | OUTPATIENT
Start: 2021-10-19 | End: 2022-02-23 | Stop reason: SDUPTHER

## 2021-10-19 ASSESSMENT — FIBROSIS 4 INDEX: FIB4 SCORE: 1.66

## 2021-10-19 NOTE — PROGRESS NOTES
"Subjective:     CC: Lightheadedness, shortness of breath    HPI:   Kelly is a 76-year-old female with a complex medical history including chronic respiratory failure with hypoxia, CHF, COPD, and paroxysmal A. Fib.  She also recently had Covid 19 with positive test about 5 weeks ago.    For the last 2 to 3 weeks she has had episodic lightheadedness as well as shortness of breath.  Lightheadedness generally occurs with rising quickly from a seated position.  No fainting or syncope.  No falls.    Shortness of breath happens occasionally with exertion, she has been using her rescue inhaler more often and this seems to have been helping.  Cough has continued since Covid, no change in sputum production.  She was also treated for a COPD flare prior to testing positive for Covid.  Denies orthopnea or PND.  No edema.  Stopped spironolactone for unknown reason, she thinks pharmacy may have told her to.  Does not take Lasix regularly.  No other recent changes in medications.  Does have echo scheduled within the next few weeks along with cardiology follow-up.  Continues on baseline 3 L oxygen.  CXR with no acute findings on 10/7.     No chest pain.  No palpitations.  No fevers or chills.     Orthostatic vitals   Supine: 104/76, heart rate 70   Sitting 90/66, heart rate 78  Standing 96/68, heart rate 80    Medications, past medical history, allergies, and social history have been reviewed and updated.    ROS:  See HPI      Objective:       Exam:  /76 (BP Location: Right arm, Patient Position: Supine, BP Cuff Size: Adult)   Pulse 70   Temp 36 °C (96.8 °F)   Resp 12   Ht 1.654 m (5' 5.12\")   Wt 50.3 kg (111 lb)   LMP  (LMP Unknown)   SpO2 97%   BMI 18.40 kg/m²  Body mass index is 18.4 kg/m².    Constitutional: Alert. No distress.  Skin: Warm, dry, good turgor, no visible rashes.  Eye: Equal, round and reactive to light, conjunctiva clear, lids normal.  ENMT: Moist mucous membranes. Normal dentition.  Respiratory: " Normal effort.  Poor air movement bilaterally.  Lung fields are clear.  Cardiovascular: Irregular rate and rhythm. Normal S1/S2.   Neuro: Moves all four extremities. No facial droop.  Psych: Answers questions appropriately. Normal affect and mood.      Assessment & Plan:     76 y.o. female with the following -     1. Lightheaded  Episodic lightheadedness triggered by standing up.  Appears likely secondary to low blood pressure/orthostasis.  Will decrease metoprolol to 50 mg daily.  Has close follow-up scheduled with cardiology.    2. Shortness of breath  3. Chronic respiratory failure with hypoxia (HCC)Mild shortness of breath with exertion for the last few weeks.  No increase in oxygen need.  No chest pain.  No obvious signs of fluid overload on exam.  Appears more likely due to COPD but CHF exacerbation is possible as well.  Her Advair dose is increased as below.  Checking BNP, BMP and she will do CXR if symptoms or not improving.  Extensively discussed ER precautions.  - Basic Metabolic Panel; Future  - DX-CHEST-2 VIEWS; Future    4. Chronic systolic congestive heart failure (HCC)  Possible cause of shortness of breath as above.  Restart spironolactone as unclear why this was stopped.  Decreasing metoprolol due to lightheadedness/orthostasis.  Repeat echo scheduled and cardiology follow-up afterwards.  - proBrain Natriuretic Peptide, NT; Future  - CBC WITH DIFFERENTIAL; Future  - spironolactone (ALDACTONE) 25 MG Tab; Take 1 Tablet by mouth every day.  Dispense: 30 Tablet; Refill: 3  - metoprolol SR (TOPROL XL) 50 MG TABLET SR 24 HR; Take 1 Tablet by mouth every day.  Dispense: 30 Tablet; Refill: 1    5. Essential (primary) hypertension  Low BP.  Decreasing metoprolol.  - metoprolol SR (TOPROL XL) 50 MG TABLET SR 24 HR; Take 1 Tablet by mouth every day.  Dispense: 30 Tablet; Refill: 1    6. Simple chronic bronchitis (HCC)  Increasing Advair dosage due to dyspnea as above.  - fluticasone-salmeterol (ADVAIR) 500-50  MCG/DOSE AEROSOL POWDER, BREATH ACTIVATED; Inhale 1 Puff every 12 hours.  Dispense: 1 Each; Refill: 1    7. Need for vaccination  - Influenza Vaccine, High Dose (65+ Only)      Please note that this note was created using voice recognition software.

## 2021-10-19 NOTE — PATIENT INSTRUCTIONS
-RESTART Spironolactone  -Will decrease metoprolol to 50 mg daily - new Rx is sent  -Increasing dose of Advair - new Rx sent  -Do labs today  -Chest x ray if not improving within 3-4 days  -Keep ECHO and cardiology follow up

## 2021-10-20 ENCOUNTER — HOSPITAL ENCOUNTER (OUTPATIENT)
Dept: LAB | Facility: MEDICAL CENTER | Age: 76
End: 2021-10-20
Attending: FAMILY MEDICINE
Payer: MEDICARE

## 2021-10-20 DIAGNOSIS — J96.11 CHRONIC RESPIRATORY FAILURE WITH HYPOXIA (HCC): ICD-10-CM

## 2021-10-20 DIAGNOSIS — I50.22 CHRONIC SYSTOLIC CONGESTIVE HEART FAILURE (HCC): ICD-10-CM

## 2021-10-20 LAB
ANION GAP SERPL CALC-SCNC: 13 MMOL/L (ref 7–16)
BASOPHILS # BLD AUTO: 0.5 % (ref 0–1.8)
BASOPHILS # BLD: 0.06 K/UL (ref 0–0.12)
BUN SERPL-MCNC: 23 MG/DL (ref 8–22)
CALCIUM SERPL-MCNC: 9.5 MG/DL (ref 8.5–10.5)
CHLORIDE SERPL-SCNC: 104 MMOL/L (ref 96–112)
CO2 SERPL-SCNC: 27 MMOL/L (ref 20–33)
CREAT SERPL-MCNC: 1.04 MG/DL (ref 0.5–1.4)
EOSINOPHIL # BLD AUTO: 0.07 K/UL (ref 0–0.51)
EOSINOPHIL NFR BLD: 0.6 % (ref 0–6.9)
ERYTHROCYTE [DISTWIDTH] IN BLOOD BY AUTOMATED COUNT: 52.5 FL (ref 35.9–50)
GLUCOSE SERPL-MCNC: 113 MG/DL (ref 65–99)
HCT VFR BLD AUTO: 35.3 % (ref 37–47)
HGB BLD-MCNC: 10.8 G/DL (ref 12–16)
IMM GRANULOCYTES # BLD AUTO: 0.12 K/UL (ref 0–0.11)
IMM GRANULOCYTES NFR BLD AUTO: 1 % (ref 0–0.9)
LYMPHOCYTES # BLD AUTO: 1.83 K/UL (ref 1–4.8)
LYMPHOCYTES NFR BLD: 15.5 % (ref 22–41)
MCH RBC QN AUTO: 30.9 PG (ref 27–33)
MCHC RBC AUTO-ENTMCNC: 30.6 G/DL (ref 33.6–35)
MCV RBC AUTO: 100.9 FL (ref 81.4–97.8)
MONOCYTES # BLD AUTO: 0.98 K/UL (ref 0–0.85)
MONOCYTES NFR BLD AUTO: 8.3 % (ref 0–13.4)
NEUTROPHILS # BLD AUTO: 8.75 K/UL (ref 2–7.15)
NEUTROPHILS NFR BLD: 74.1 % (ref 44–72)
NRBC # BLD AUTO: 0 K/UL
NRBC BLD-RTO: 0 /100 WBC
NT-PROBNP SERPL IA-MCNC: 996 PG/ML (ref 0–125)
PLATELET # BLD AUTO: 284 K/UL (ref 164–446)
PMV BLD AUTO: 10.4 FL (ref 9–12.9)
POTASSIUM SERPL-SCNC: 4 MMOL/L (ref 3.6–5.5)
RBC # BLD AUTO: 3.5 M/UL (ref 4.2–5.4)
SODIUM SERPL-SCNC: 144 MMOL/L (ref 135–145)
WBC # BLD AUTO: 11.8 K/UL (ref 4.8–10.8)

## 2021-10-20 PROCEDURE — 36415 COLL VENOUS BLD VENIPUNCTURE: CPT

## 2021-10-20 PROCEDURE — 85025 COMPLETE CBC W/AUTO DIFF WBC: CPT

## 2021-10-20 PROCEDURE — 80048 BASIC METABOLIC PNL TOTAL CA: CPT

## 2021-10-20 PROCEDURE — 83880 ASSAY OF NATRIURETIC PEPTIDE: CPT

## 2021-10-20 NOTE — TELEPHONE ENCOUNTER
Can someone clarify with patient what else needs to be done to have her call to schedule with pulmonary rehab?   Detail Level: Simple I gave her samples of Eucerin Roughness Relief. I gave her the sun protection handout. I counseled her on the UV index and how to use it when planning her day.

## 2021-10-28 ENCOUNTER — ANTICOAGULATION VISIT (OUTPATIENT)
Dept: MEDICAL GROUP | Facility: MEDICAL CENTER | Age: 76
End: 2021-10-28
Payer: MEDICARE

## 2021-10-28 DIAGNOSIS — Z79.01 LONG TERM (CURRENT) USE OF ANTICOAGULANTS: ICD-10-CM

## 2021-10-28 DIAGNOSIS — I48.0 PAROXYSMAL ATRIAL FIBRILLATION (HCC): ICD-10-CM

## 2021-10-28 DIAGNOSIS — Z79.01 CHRONIC ANTICOAGULATION: ICD-10-CM

## 2021-10-28 LAB — INR PPP: 1.4 (ref 2–3.5)

## 2021-10-28 PROCEDURE — 99211 OFF/OP EST MAY X REQ PHY/QHP: CPT | Performed by: FAMILY MEDICINE

## 2021-10-28 PROCEDURE — 85610 PROTHROMBIN TIME: CPT | Performed by: FAMILY MEDICINE

## 2021-10-28 RX ORDER — WARFARIN SODIUM 2.5 MG/1
TABLET ORAL
Qty: 90 TABLET | Refills: 1 | Status: SHIPPED | OUTPATIENT
Start: 2021-10-28 | End: 2022-06-27

## 2021-10-28 NOTE — PROGRESS NOTES
OP Anticoagulation Service Note    Date: 10/28/2021  There were no vitals filed for this visit.   pt declined vitals    Anticoagulation Summary  As of 10/28/2021    INR goal:  2.0-3.0   TTR:  60.9 % (2.3 y)   INR used for dosin.40 (10/28/2021)   Warfarin maintenance plan:  2.5 mg (2.5 mg x 1) every Sun, Tue, Thu; 1.25 mg (2.5 mg x 0.5) all other days   Weekly warfarin total:  12.5 mg   Plan last modified:  Gaby Holley, PharmD (10/28/2021)   Next INR check:  2021   Target end date:  Indefinite    Indications    Atrial flutter (HCC) [I48.92]  Long term (current) use of anticoagulants [Z79.01] [Z79.01]  Paroxysmal atrial fibrillation (HCC) [I48.0]             Anticoagulation Episode Summary     INR check location:  Anticoagulation Clinic    Preferred lab:      Send INR reminders to:      Comments:        Anticoagulation Care Providers     Provider Role Specialty Phone number    [facility]   [phone]            HPI:   [facility] on anticoagulation therapy with warfarin (a high risk medication) for atrial fibrillation    Pt is here today to evaluate anticoagulation therapy    Previous INR was  1.9 on 10/14/21    Pt was instructed to continue regimen    Anticoagulation Patient Findings  Patient Findings     Negatives:  Signs/symptoms of thrombosis, Signs/symptoms of bleeding, Laboratory test error suspected, Change in health, Change in alcohol use, Change in activity, Upcoming invasive procedure, Emergency department visit, Upcoming dental procedure, Missed doses, Extra doses, Change in medications, Change in diet/appetite, Hospital admission, Bruising, Other complaints          Confirmed warfarin dosing regimen    Pt is not on antiplatelet/NSAID therapy    Falls or accidents since last visit No        A/P   INR  sub-therapeutic today    Increase weekly regimen      check referral    Pt educated to contact our clinic with any changes in  medications or s/s of bleeding or thrombosis. Pt is aware to seek immediate medical attention for falls, head injury or deep cuts    Follow up appointment in 2 week(s) to reduce risk of adverse events from warfarin  Dari WatsonD

## 2021-11-04 ENCOUNTER — PATIENT MESSAGE (OUTPATIENT)
Dept: MEDICAL GROUP | Facility: LAB | Age: 76
End: 2021-11-04

## 2021-11-04 DIAGNOSIS — J41.0 SIMPLE CHRONIC BRONCHITIS (HCC): ICD-10-CM

## 2021-11-04 RX ORDER — ALBUTEROL SULFATE 90 UG/1
2 AEROSOL, METERED RESPIRATORY (INHALATION) EVERY 6 HOURS PRN
Qty: 8 G | Refills: 3 | Status: SHIPPED | OUTPATIENT
Start: 2021-11-04 | End: 2022-03-15

## 2021-11-05 ENCOUNTER — HOSPITAL ENCOUNTER (OUTPATIENT)
Dept: CARDIOLOGY | Facility: MEDICAL CENTER | Age: 76
End: 2021-11-05
Attending: NURSE PRACTITIONER
Payer: MEDICARE

## 2021-11-05 DIAGNOSIS — Z79.899 HIGH RISK MEDICATION USE: ICD-10-CM

## 2021-11-05 DIAGNOSIS — I51.89 LEFT VENTRICULAR SYSTOLIC DYSFUNCTION, NYHA CLASS 3: ICD-10-CM

## 2021-11-05 DIAGNOSIS — I50.20 ACC/AHA STAGE C SYSTOLIC HEART FAILURE (HCC): ICD-10-CM

## 2021-11-05 LAB
LV EJECT FRACT  99904: 55
LV EJECT FRACT MOD 2C 99903: 60.29
LV EJECT FRACT MOD 4C 99902: 45.72
LV EJECT FRACT MOD BP 99901: 54.94

## 2021-11-05 PROCEDURE — 93306 TTE W/DOPPLER COMPLETE: CPT | Mod: 26 | Performed by: INTERNAL MEDICINE

## 2021-11-05 PROCEDURE — 93306 TTE W/DOPPLER COMPLETE: CPT

## 2021-11-08 ENCOUNTER — PATIENT MESSAGE (OUTPATIENT)
Dept: MEDICAL GROUP | Facility: LAB | Age: 76
End: 2021-11-08

## 2021-11-08 DIAGNOSIS — I50.22 CHRONIC SYSTOLIC CONGESTIVE HEART FAILURE (HCC): ICD-10-CM

## 2021-11-08 DIAGNOSIS — I10 ESSENTIAL (PRIMARY) HYPERTENSION: ICD-10-CM

## 2021-11-09 ENCOUNTER — TELEPHONE (OUTPATIENT)
Dept: CARDIOLOGY | Facility: MEDICAL CENTER | Age: 76
End: 2021-11-09

## 2021-11-09 NOTE — TELEPHONE ENCOUNTER
I called patient and advised her on checking the Cardiomems via protocol. She promised to do so.    Dalia Li M.D.

## 2021-11-11 ENCOUNTER — ANTICOAGULATION VISIT (OUTPATIENT)
Dept: MEDICAL GROUP | Facility: MEDICAL CENTER | Age: 76
End: 2021-11-11
Payer: MEDICARE

## 2021-11-11 DIAGNOSIS — I48.0 PAROXYSMAL ATRIAL FIBRILLATION (HCC): ICD-10-CM

## 2021-11-11 DIAGNOSIS — Z79.01 LONG TERM (CURRENT) USE OF ANTICOAGULANTS: ICD-10-CM

## 2021-11-11 LAB — INR PPP: 2 (ref 2–3.5)

## 2021-11-11 PROCEDURE — 99999 PR NO CHARGE: CPT | Performed by: INTERNAL MEDICINE

## 2021-11-11 PROCEDURE — 85610 PROTHROMBIN TIME: CPT | Performed by: INTERNAL MEDICINE

## 2021-11-11 PROCEDURE — 93793 ANTICOAG MGMT PT WARFARIN: CPT | Performed by: INTERNAL MEDICINE

## 2021-11-11 NOTE — PROGRESS NOTES
Anticoagulation Summary  As of 2021    INR goal:  2.0-3.0   TTR:  59.9 % (2.4 y)   INR used for dosin.00 (2021)   Warfarin maintenance plan:  2.5 mg (2.5 mg x 1) every Sun, e, Thu; 1.25 mg (2.5 mg x 0.5) all other days   Weekly warfarin total:  12.5 mg   Plan last modified:  Gaby Holley, PharmD (10/28/2021)   Next INR check:  2021   Target end date:  Indefinite    Indications    Atrial flutter (HCC) [I48.92]  Long term (current) use of anticoagulants [Z79.01] [Z79.01]  Paroxysmal atrial fibrillation (HCC) [I48.0]             Anticoagulation Episode Summary     INR check location:  Anticoagulation Clinic    Preferred lab:      Send INR reminders to:      Comments:        Anticoagulation Care Providers     Provider Role Specialty Phone number    [facility]   [phone]        Anticoagulation Patient Findings  Patient Findings     Negatives:  Signs/symptoms of thrombosis, Signs/symptoms of bleeding, Laboratory test error suspected, Change in health, Change in alcohol use, Change in activity, Upcoming invasive procedure, Emergency department visit, Upcoming dental procedure, Missed doses, Extra doses, Change in medications, Change in diet/appetite, Hospital admission, Bruising, Other complaints              History of Present Illness: follow up appointment for chronic anticoagulation with the high risk medication, warfarin for AF    Medications reconciled yes  Pt is not on antiplatelet therapy    PT remains therapeutic today. Continue current dosing regimen.  Follow up in 2 weeks, to reduce the risk of adverse events related to this high risk medication, warfarin.    Kelly Dean, Clinical Pharmacist

## 2021-11-12 RX ORDER — METOPROLOL SUCCINATE 50 MG/1
50 TABLET, EXTENDED RELEASE ORAL DAILY
Qty: 90 TABLET | Refills: 1 | Status: SHIPPED | OUTPATIENT
Start: 2021-11-12 | End: 2021-12-14 | Stop reason: SDUPTHER

## 2021-12-02 ENCOUNTER — ANTICOAGULATION VISIT (OUTPATIENT)
Dept: MEDICAL GROUP | Facility: MEDICAL CENTER | Age: 76
End: 2021-12-02
Payer: MEDICARE

## 2021-12-02 VITALS — SYSTOLIC BLOOD PRESSURE: 106 MMHG | HEART RATE: 77 BPM | DIASTOLIC BLOOD PRESSURE: 61 MMHG

## 2021-12-02 DIAGNOSIS — I48.0 PAROXYSMAL ATRIAL FIBRILLATION (HCC): ICD-10-CM

## 2021-12-02 DIAGNOSIS — Z79.01 LONG TERM (CURRENT) USE OF ANTICOAGULANTS: ICD-10-CM

## 2021-12-02 LAB — INR PPP: 1.3 (ref 2–3.5)

## 2021-12-02 PROCEDURE — 85610 PROTHROMBIN TIME: CPT | Performed by: FAMILY MEDICINE

## 2021-12-02 PROCEDURE — 99211 OFF/OP EST MAY X REQ PHY/QHP: CPT | Performed by: FAMILY MEDICINE

## 2021-12-02 NOTE — PROGRESS NOTES
Anticoagulation Summary  As of 2021    INR goal:  2.0-3.0   TTR:  58.5 % (2.4 y)   INR used for dosin.30 (2021)   Warfarin maintenance plan:  2.5 mg (2.5 mg x 1) every Sun, Tue, Thu; 1.25 mg (2.5 mg x 0.5) all other days   Weekly warfarin total:  12.5 mg   Plan last modified:  Gaby Holley, PharmD (10/28/2021)   Next INR check:  2021   Target end date:  Indefinite    Indications    Atrial flutter (HCC) [I48.92]  Long term (current) use of anticoagulants [Z79.01] [Z79.01]  Paroxysmal atrial fibrillation (HCC) [I48.0]             Anticoagulation Episode Summary     INR check location:  Anticoagulation Clinic    Preferred lab:      Send INR reminders to:      Comments:        Anticoagulation Care Providers     Provider Role Specialty Phone number    [facility]   [phone]        Anticoagulation Patient Findings  Patient Findings     Negatives:  Signs/symptoms of thrombosis, Signs/symptoms of bleeding, Laboratory test error suspected, Change in health, Change in alcohol use, Change in activity, Upcoming invasive procedure, Emergency department visit, Upcoming dental procedure, Missed doses, Extra doses, Change in medications, Change in diet/appetite, Hospital admission, Bruising, Other complaints              History of Present Illness: follow up appointment for chronic anticoagulation with the high risk medication, warfarin for AF    Medications reconciled yes  Pt is not on antiplatelet therapy    Last INR was at goal, pt is now critically sub therapeutic today  She spent last week in [place], and may have missed a dose or two.  Will bolus dose with 5mg po qhs x2 then resume current dosing regimen.   Follow up in 1 weeks, to reduce the risk of adverse events related to this high risk medication, warfarin.    Kelly Dean, Clinical Pharmacist

## 2021-12-09 ENCOUNTER — ANTICOAGULATION VISIT (OUTPATIENT)
Dept: MEDICAL GROUP | Facility: MEDICAL CENTER | Age: 76
End: 2021-12-09
Payer: MEDICARE

## 2021-12-09 DIAGNOSIS — Z79.01 LONG TERM (CURRENT) USE OF ANTICOAGULANTS: Primary | ICD-10-CM

## 2021-12-09 DIAGNOSIS — J41.0 SIMPLE CHRONIC BRONCHITIS (HCC): ICD-10-CM

## 2021-12-09 DIAGNOSIS — I48.0 PAROXYSMAL ATRIAL FIBRILLATION (HCC): ICD-10-CM

## 2021-12-09 LAB — INR PPP: 2.3 (ref 2–3.5)

## 2021-12-09 PROCEDURE — 85610 PROTHROMBIN TIME: CPT | Performed by: INTERNAL MEDICINE

## 2021-12-09 PROCEDURE — 99211 OFF/OP EST MAY X REQ PHY/QHP: CPT | Performed by: INTERNAL MEDICINE

## 2021-12-09 NOTE — PROGRESS NOTES
OP Anticoagulation Service Note    Date: 2021  There were no vitals filed for this visit.   pt declined vitals    Anticoagulation Summary  As of 2021    INR goal:  2.0-3.0   TTR:  58.3 % (2.4 y)   INR used for dosin.30 (2021)   Warfarin maintenance plan:  1.25 mg (2.5 mg x 0.5) every Mon, Wed, Fri; 2.5 mg (2.5 mg x 1) all other days   Weekly warfarin total:  13.75 mg   Plan last modified:  Amanda Perera, PharmD (2021)   Next INR check:  2021   Target end date:  Indefinite    Indications    Atrial flutter (HCC) [I48.92]  Long term (current) use of anticoagulants [Z79.01] [Z79.01]  Paroxysmal atrial fibrillation (HCC) [I48.0]             Anticoagulation Episode Summary     INR check location:  Anticoagulation Clinic    Preferred lab:      Send INR reminders to:      Comments:        Anticoagulation Care Providers     Provider Role Specialty Phone number    [facility]   [phone]            HPI:   Kelly Ileana [facility] seen in clinic today, on anticoagulation therapy with warfarin (a high risk medication) for atrial fibrillation      Pt is here today to evaluate anticoagulation therapy    Previous INR was  1.3 on 2021    Pt was instructed to take 5 mg x 2 then resume usual regimen    Anticoagulation Patient Findings  Patient Findings     Negatives:  Signs/symptoms of thrombosis, Signs/symptoms of bleeding, Laboratory test error suspected, Change in health, Change in alcohol use, Change in activity, Upcoming invasive procedure, Emergency department visit, Upcoming dental procedure, Missed doses, Extra doses, Change in medications, Change in diet/appetite, Hospital admission, Bruising, Other complaints          Confirmed warfarin dosing regimen    Pt is not on antiplatelet/NSAID therapy    Falls or accidents since last visit No        A/P   INR  -therapeutic today,  will require close follow up as previous INR was sub-therapeutic   Increase weekly warfarin  regimen     5/22 check referral    Pt educated to contact our clinic with any changes in medications or s/s of bleeding or thrombosis. Pt is aware to seek immediate medical attention for falls, head injury or deep cuts    Follow up appointment in 2 week(s) to reduce risk of adverse events from warfarin  Amanda Perera, PharmD

## 2021-12-09 NOTE — TELEPHONE ENCOUNTER
Received request via: Patient    Was the patient seen in the last year in this department? Yes  10/19/21  Does the patient have an active prescription (recently filled or refills available) for medication(s) requested? No

## 2021-12-09 NOTE — TELEPHONE ENCOUNTER
----- Message from Kelly Lovett sent at 12/9/2021  9:29 AM PST -----  Regarding: Fluticasone  Dr BARBOZA   Could you please send a prescription for the Diskus. I’m about to run out  Thank you

## 2021-12-14 DIAGNOSIS — I50.22 CHRONIC SYSTOLIC CONGESTIVE HEART FAILURE (HCC): ICD-10-CM

## 2021-12-14 DIAGNOSIS — I10 ESSENTIAL (PRIMARY) HYPERTENSION: ICD-10-CM

## 2021-12-14 RX ORDER — METOPROLOL SUCCINATE 50 MG/1
50 TABLET, EXTENDED RELEASE ORAL DAILY
Qty: 90 TABLET | Refills: 3 | Status: SHIPPED
Start: 2021-12-14 | End: 2022-03-03

## 2021-12-16 ENCOUNTER — ANTICOAGULATION VISIT (OUTPATIENT)
Dept: MEDICAL GROUP | Facility: MEDICAL CENTER | Age: 76
End: 2021-12-16
Payer: MEDICARE

## 2021-12-16 DIAGNOSIS — I48.0 PAROXYSMAL ATRIAL FIBRILLATION (HCC): ICD-10-CM

## 2021-12-16 DIAGNOSIS — Z79.01 LONG TERM (CURRENT) USE OF ANTICOAGULANTS: ICD-10-CM

## 2021-12-16 LAB — INR PPP: 2.2 (ref 2–3.5)

## 2021-12-16 PROCEDURE — 93793 ANTICOAG MGMT PT WARFARIN: CPT | Performed by: INTERNAL MEDICINE

## 2021-12-16 PROCEDURE — 85610 PROTHROMBIN TIME: CPT | Performed by: INTERNAL MEDICINE

## 2021-12-16 PROCEDURE — 99999 PR NO CHARGE: CPT | Performed by: INTERNAL MEDICINE

## 2021-12-16 NOTE — PROGRESS NOTES
Anticoagulation Summary  As of 2021    INR goal:  2.0-3.0   TTR:  58.6 % (2.5 y)   INR used for dosin.20 (2021)   Warfarin maintenance plan:  1.25 mg (2.5 mg x 0.5) every Mon, Wed, Fri; 2.5 mg (2.5 mg x 1) all other days   Weekly warfarin total:  13.75 mg   Plan last modified:  Amanda Perera, PharmD (2021)   Next INR check:     Target end date:  Indefinite    Indications    Atrial flutter (HCC) [I48.92]  Long term (current) use of anticoagulants [Z79.01] [Z79.01]  Paroxysmal atrial fibrillation (HCC) [I48.0]             Anticoagulation Episode Summary     INR check location:  Anticoagulation Clinic    Preferred lab:      Send INR reminders to:      Comments:        Anticoagulation Care Providers     Provider Role Specialty Phone number    [facility]   [phone]        Anticoagulation Patient Findings  Patient Findings     Negatives:  Signs/symptoms of thrombosis, Signs/symptoms of bleeding, Laboratory test error suspected, Change in health, Change in alcohol use, Change in activity, Upcoming invasive procedure, Emergency department visit, Upcoming dental procedure, Missed doses, Extra doses, Change in medications, Change in diet/appetite, Hospital admission, Bruising, Other complaints              History of Present Illness: follow up appointment for chronic anticoagulation with the high risk medication, warfarin for AF    Medications reconciled yes  Pt is not on antiplatelet therapy    Pt remains therapeutic today. Continue current dosing regimen.  Follow up in 2 weeks, to reduce the risk of adverse events related to this high risk medication, warfarin.    Kelly Dean, Clinical Pharmacist

## 2021-12-18 ENCOUNTER — PHARMACY VISIT (OUTPATIENT)
Dept: PHARMACY | Facility: MEDICAL CENTER | Age: 76
End: 2021-12-18
Payer: COMMERCIAL

## 2021-12-18 PROCEDURE — RXMED WILLOW AMBULATORY MEDICATION CHARGE: Performed by: INTERNAL MEDICINE

## 2021-12-18 RX ORDER — RNA INGREDIENT BNT-162B2 0.23 G/1.8ML
INJECTION, SUSPENSION INTRAMUSCULAR
Qty: 0.3 ML | Refills: 0 | Status: SHIPPED | OUTPATIENT
Start: 2021-12-18 | End: 2022-01-06

## 2022-01-06 ENCOUNTER — ANTICOAGULATION VISIT (OUTPATIENT)
Dept: MEDICAL GROUP | Facility: MEDICAL CENTER | Age: 77
End: 2022-01-06
Payer: MEDICARE

## 2022-01-06 DIAGNOSIS — I48.0 PAROXYSMAL ATRIAL FIBRILLATION (HCC): ICD-10-CM

## 2022-01-06 DIAGNOSIS — Z79.01 LONG TERM (CURRENT) USE OF ANTICOAGULANTS: ICD-10-CM

## 2022-01-06 LAB — INR PPP: 2.2 (ref 2–3.5)

## 2022-01-06 PROCEDURE — 99999 PR NO CHARGE: CPT | Performed by: INTERNAL MEDICINE

## 2022-01-06 PROCEDURE — 85610 PROTHROMBIN TIME: CPT | Performed by: INTERNAL MEDICINE

## 2022-01-06 PROCEDURE — 93793 ANTICOAG MGMT PT WARFARIN: CPT | Performed by: INTERNAL MEDICINE

## 2022-01-06 NOTE — PROGRESS NOTES
Anticoagulation Summary  As of 2022    INR goal:  2.0-3.0   TTR:  59.5 % (2.5 y)   INR used for dosin.20 (2022)   Warfarin maintenance plan:  1.25 mg (2.5 mg x 0.5) every Mon, Wed, Fri; 2.5 mg (2.5 mg x 1) all other days   Weekly warfarin total:  13.75 mg   Plan last modified:  Amanda Perera, PharmD (2021)   Next INR check:     Target end date:  Indefinite    Indications    Atrial flutter (HCC) [I48.92]  Long term (current) use of anticoagulants [Z79.01] [Z79.01]  Paroxysmal atrial fibrillation (HCC) [I48.0]             Anticoagulation Episode Summary     INR check location:  Anticoagulation Clinic    Preferred lab:      Send INR reminders to:      Comments:        Anticoagulation Care Providers     Provider Role Specialty Phone number    [facility]   [phone]        Anticoagulation Patient Findings          History of Present Illness: follow up appointment for chronic anticoagulation with the high risk medication, warfarin for AF    Medications reconciled yes  Pt is not on antiplatelet therapy    Pt remains at goal. Continue current dosing regimen.  Follow up in 4 weeks, to reduce the risk of adverse events related to this high risk medication, warfarin.    Kelly Dean, Clinical Pharmacist

## 2022-01-26 ENCOUNTER — PATIENT MESSAGE (OUTPATIENT)
Dept: MEDICAL GROUP | Facility: LAB | Age: 77
End: 2022-01-26

## 2022-01-26 RX ORDER — TIOTROPIUM BROMIDE INHALATION SPRAY 1.56 UG/1
2 SPRAY, METERED RESPIRATORY (INHALATION) DAILY
Qty: 12 G | Refills: 2 | Status: SHIPPED
Start: 2022-01-26 | End: 2022-04-01

## 2022-02-03 ENCOUNTER — ANTICOAGULATION VISIT (OUTPATIENT)
Dept: MEDICAL GROUP | Facility: MEDICAL CENTER | Age: 77
End: 2022-02-03
Payer: MEDICARE

## 2022-02-03 DIAGNOSIS — I48.0 PAROXYSMAL ATRIAL FIBRILLATION (HCC): ICD-10-CM

## 2022-02-03 DIAGNOSIS — Z79.01 LONG TERM (CURRENT) USE OF ANTICOAGULANTS: ICD-10-CM

## 2022-02-03 LAB — INR PPP: 1.9 (ref 2–3.5)

## 2022-02-03 PROCEDURE — 85610 PROTHROMBIN TIME: CPT | Performed by: STUDENT IN AN ORGANIZED HEALTH CARE EDUCATION/TRAINING PROGRAM

## 2022-02-03 PROCEDURE — 93793 ANTICOAG MGMT PT WARFARIN: CPT | Performed by: FAMILY MEDICINE

## 2022-02-03 NOTE — PROGRESS NOTES
OP Anticoagulation Service Note    Date: 2/3/2022  There were no vitals filed for this visit.   pt declined vitals    Anticoagulation Summary  As of 2/3/2022    INR goal:  2.0-3.0   TTR:  59.7 % (2.6 y)   INR used for dosin.90 (2/3/2022)   Warfarin maintenance plan:  1.25 mg (2.5 mg x 0.5) every Mon, Wed, Fri; 2.5 mg (2.5 mg x 1) all other days   Weekly warfarin total:  13.75 mg   Plan last modified:  Amanda Perera, PharmD (2021)   Next INR check:  2022   Target end date:  Indefinite    Indications    Atrial flutter (HCC) [I48.92]  Long term (current) use of anticoagulants [Z79.01] [Z79.01]  Paroxysmal atrial fibrillation (HCC) [I48.0]             Anticoagulation Episode Summary     INR check location:  Anticoagulation Clinic    Preferred lab:      Send INR reminders to:      Comments:        Anticoagulation Care Providers     Provider Role Specialty Phone number    [facility]   [phone]            HPI:   Kelly Ileana [facility] seen in clinic today, on anticoagulation therapy with warfarin (a high risk medication) for AF      Pt is here today to evaluate anticoagulation therapy    Previous INR was  2.2 on 22    Pt was instructed to continue regimen    Anticoagulation Patient Findings  Patient Findings     Positives:  Change in diet/appetite (Possible slight increase in vitamin k over past week - does not expect this to be consistent )    Negatives:  Signs/symptoms of thrombosis, Signs/symptoms of bleeding, Laboratory test error suspected, Change in health, Change in alcohol use, Change in activity, Upcoming invasive procedure, Emergency department visit, Upcoming dental procedure, Missed doses, Extra doses, Change in medications, Hospital admission, Bruising, Other complaints          Confirmed warfarin dosing regimen    Pt is not on antiplatelet/NSAID therapy  Falls or accidents since last visit No        A/P   INR  slightly SUB-therapeutic today. Pt will try to reduce  vitamin K intake to prevent stroke and agreed to closer follow up   Pt is to continue with current warfarin dosing regimen.       5/2022 check referral    Pt educated to contact our clinic with any changes in medications or s/s of bleeding or thrombosis. Pt is aware to seek immediate medical attention for falls, head injury or deep cuts    Follow up appointment in 2 week(s) to reduce risk of adverse events from warfarin  Curtis Thomas, DariD

## 2022-02-17 ENCOUNTER — ANTICOAGULATION VISIT (OUTPATIENT)
Dept: MEDICAL GROUP | Facility: MEDICAL CENTER | Age: 77
End: 2022-02-17
Payer: MEDICARE

## 2022-02-17 DIAGNOSIS — Z79.01 LONG TERM (CURRENT) USE OF ANTICOAGULANTS: Primary | ICD-10-CM

## 2022-02-17 DIAGNOSIS — I48.0 PAROXYSMAL ATRIAL FIBRILLATION (HCC): ICD-10-CM

## 2022-02-17 LAB — INR PPP: 2.2 (ref 2–3.5)

## 2022-02-17 PROCEDURE — 93793 ANTICOAG MGMT PT WARFARIN: CPT | Performed by: FAMILY MEDICINE

## 2022-02-17 PROCEDURE — 85610 PROTHROMBIN TIME: CPT | Performed by: FAMILY MEDICINE

## 2022-02-17 NOTE — PROGRESS NOTES
OP Anticoagulation Service Note    Date: 2022  There were no vitals filed for this visit.   pt declined vitals    Anticoagulation Summary  As of 2022    INR goal:  2.0-3.0   TTR:  59.8 % (2.6 y)   INR used for dosin.20 (2022)   Warfarin maintenance plan:  1.25 mg (2.5 mg x 0.5) every Mon, Wed, Fri; 2.5 mg (2.5 mg x 1) all other days   Weekly warfarin total:  13.75 mg   Plan last modified:  Amanda Perera, PharmD (2021)   Next INR check:  3/3/2022   Target end date:  Indefinite    Indications    Atrial flutter (HCC) [I48.92]  Long term (current) use of anticoagulants [Z79.01] [Z79.01]  Paroxysmal atrial fibrillation (HCC) [I48.0]             Anticoagulation Episode Summary     INR check location:  Anticoagulation Clinic    Preferred lab:      Send INR reminders to:      Comments:        Anticoagulation Care Providers     Provider Role Specialty Phone number    [facility]   [phone]            HPI:   Kelly Tejeda [facility] seen in clinic today, on anticoagulation therapy with warfarin (a high risk medication) for atrial fibrillation      Pt is here today to evaluate anticoagulation therapy    Previous INR was  1.9 on 2/3/22    Pt was instructed to continue current regimen    Anticoagulation Patient Findings  Patient Findings     Negatives:  Signs/symptoms of thrombosis, Signs/symptoms of bleeding, Laboratory test error suspected, Change in health, Change in alcohol use, Change in activity, Upcoming invasive procedure, Emergency department visit, Upcoming dental procedure, Missed doses, Extra doses, Change in medications, Change in diet/appetite, Hospital admission, Bruising, Other complaints          Confirmed warfarin dosing regimen    Pt is not on antiplatelet/NSAID therapy    Falls or accidents since last visit No        A/P   INR  -therapeutic today,   Continue current warfarin regimen        Pt educated to contact our clinic with any changes in medications or  s/s of bleeding or thrombosis. Pt is aware to seek immediate medical attention for falls, head injury or deep cuts    Follow up appointment in 2 week(s) to reduce risk of adverse events from warfarin  Amanda Perera, PharmD

## 2022-02-23 DIAGNOSIS — I50.22 CHRONIC SYSTOLIC CONGESTIVE HEART FAILURE (HCC): ICD-10-CM

## 2022-02-23 RX ORDER — SPIRONOLACTONE 25 MG/1
25 TABLET ORAL DAILY
Qty: 30 TABLET | Refills: 3 | Status: SHIPPED | OUTPATIENT
Start: 2022-02-23 | End: 2022-06-07

## 2022-02-23 NOTE — TELEPHONE ENCOUNTER
Received request via: Patient    Was the patient seen in the last year in this department? Yes  LOV 10/19/2021  Does the patient have an active prescription (recently filled or refills available) for medication(s) requested? No

## 2022-02-24 NOTE — DISCHARGE PLANNING
ROSALVA-intern called in by the family. Choice for SNF obtained. Pt and family decided to choose [facility] as first choice and [facility] as 2nd option.    Choice form faxed to [facility]   Rowena,    Please see below.    Thanks,  DAVIN Young  [facility]

## 2022-03-03 ENCOUNTER — OFFICE VISIT (OUTPATIENT)
Dept: MEDICAL GROUP | Facility: LAB | Age: 77
End: 2022-03-03
Payer: MEDICARE

## 2022-03-03 ENCOUNTER — ANTICOAGULATION VISIT (OUTPATIENT)
Dept: MEDICAL GROUP | Facility: MEDICAL CENTER | Age: 77
End: 2022-03-03
Payer: MEDICARE

## 2022-03-03 VITALS
BODY MASS INDEX: 18.83 KG/M2 | WEIGHT: 113 LBS | HEIGHT: 65 IN | DIASTOLIC BLOOD PRESSURE: 60 MMHG | OXYGEN SATURATION: 98 % | SYSTOLIC BLOOD PRESSURE: 96 MMHG | HEART RATE: 72 BPM | TEMPERATURE: 98.2 F | RESPIRATION RATE: 14 BRPM

## 2022-03-03 DIAGNOSIS — Z79.01 LONG TERM (CURRENT) USE OF ANTICOAGULANTS: Primary | ICD-10-CM

## 2022-03-03 DIAGNOSIS — I48.0 PAROXYSMAL ATRIAL FIBRILLATION (HCC): ICD-10-CM

## 2022-03-03 DIAGNOSIS — R42 DIZZINESS: ICD-10-CM

## 2022-03-03 LAB — INR PPP: 1.8 (ref 2–3.5)

## 2022-03-03 PROCEDURE — 85610 PROTHROMBIN TIME: CPT | Performed by: INTERNAL MEDICINE

## 2022-03-03 PROCEDURE — 93793 ANTICOAG MGMT PT WARFARIN: CPT | Performed by: INTERNAL MEDICINE

## 2022-03-03 PROCEDURE — 99213 OFFICE O/P EST LOW 20 MIN: CPT | Performed by: FAMILY MEDICINE

## 2022-03-03 RX ORDER — METOPROLOL SUCCINATE 25 MG/1
25 TABLET, EXTENDED RELEASE ORAL DAILY
Qty: 90 TABLET | Refills: 3 | Status: SHIPPED | OUTPATIENT
Start: 2022-03-03 | End: 2022-06-01

## 2022-03-03 ASSESSMENT — ENCOUNTER SYMPTOMS
CHILLS: 0
DIARRHEA: 0
CONSTIPATION: 0
VOMITING: 0
FEVER: 0
BLURRED VISION: 0
NAUSEA: 0
PALPITATIONS: 0
ABDOMINAL PAIN: 0

## 2022-03-03 ASSESSMENT — FIBROSIS 4 INDEX: FIB4 SCORE: 1.26

## 2022-03-03 NOTE — PROGRESS NOTES
OP Anticoagulation Service Note    Date: 3/3/2022  There were no vitals filed for this visit.   pt declined vitals    Anticoagulation Summary  As of 3/3/2022    INR goal:  2.0-3.0   TTR:  59.7 % (2.7 y)   INR used for dosin.80 (3/3/2022)   Warfarin maintenance plan:  1.25 mg (2.5 mg x 0.5) every Mon, Wed, Fri; 2.5 mg (2.5 mg x 1) all other days   Weekly warfarin total:  13.75 mg   Plan last modified:  Amanda Perera, PharmD (2021)   Next INR check:  3/24/2022   Target end date:  Indefinite    Indications    Atrial flutter (HCC) [I48.92]  Long term (current) use of anticoagulants [Z79.01] [Z79.01]  Paroxysmal atrial fibrillation (HCC) [I48.0]             Anticoagulation Episode Summary     INR check location:  Anticoagulation Clinic    Preferred lab:      Send INR reminders to:      Comments:        Anticoagulation Care Providers     Provider Role Specialty Phone number    [facility]   [phone]            HPI:   Kelly Ileana [facility] seen in clinic today, on anticoagulation therapy with warfarin (a high risk medication) for atrial fibrillation    Pt is here today to evaluate anticoagulation therapy    Previous INR was  2.2 on 21    Pt was instructed to continue current regimen    Anticoagulation Patient Findings  Patient Findings     Positives:  Bruising    Negatives:  Signs/symptoms of thrombosis, Signs/symptoms of bleeding, Laboratory test error suspected, Change in health, Change in alcohol use, Change in activity, Upcoming invasive procedure, Emergency department visit, Upcoming dental procedure, Missed doses, Extra doses, Change in medications, Change in diet/appetite, Hospital admission, Other complaints          Confirmed warfarin dosing regimen    Pt is not on antiplatelet/NSAID therapy    Falls or accidents since last visit No        A/P   INR  sub-therapeutic today, will require dose adjust ment today to prevent stroke and closer follow up.    Tomorrow take 2.5 mg  then Continue current warfarin regimen   Discussed switching to a DOAC, pt will consider         Pt educated to contact our clinic with any changes in medications or s/s of bleeding or thrombosis. Pt is aware to seek immediate medical attention for falls, head injury or deep cuts    Follow up appointment in 3 week(s) to reduce risk of adverse events from warfarin  Amanda Perera, PharmD

## 2022-03-03 NOTE — PROGRESS NOTES
Subjective:   Kelly Lovett is a 76 y.o. female here today for   Chief Complaint   Patient presents with   • Shortness of Breath   • Dizziness     Dizziness and lightheaded, not new but worsening,would like to review medication t determine cause        #Shortness of breath/dizziness:  -Patient has a history of chronic systolic heart failure, chronic respiratory failure secondary to COPD, currently on oxygen running at 3 L.  Patient states that she occasionally has episodes of dizziness, lightheadedness, shortness of breath but has become more prevalent recently.  Patient states that she has been noticing them more when going from laying to standing or sitting to standing.  Is accompanied by a couple minutes of dizziness, shortness of breath.  She denies any difficulty sleeping, lower extremity swelling, paroxysmal nocturnal dyspnea, wheezing, cough.  They are planning on going out of town soon and here to make sure that she is doing okay.       Allergies   Allergen Reactions   • Sulfa Drugs Rash     Rxn - years ago in her 20's         Current medicines (including changes today)  Current Outpatient Medications   Medication Sig Dispense Refill   • spironolactone (ALDACTONE) 25 MG Tab Take 1 Tablet by mouth every day. 30 Tablet 3   • Tiotropium Bromide Monohydrate (SPIRIVA RESPIMAT) 1.25 MCG/ACT Aero Soln Inhale 2 Puffs every day. 12 g 2   • metoprolol SR (TOPROL XL) 50 MG TABLET SR 24 HR Take 1 Tablet by mouth every day. 90 Tablet 3   • fluticasone-salmeterol (ADVAIR) 500-50 MCG/DOSE AEROSOL POWDER, BREATH ACTIVATED Inhale 1 Puff every 12 hours. 1 Each 5   • albuterol 108 (90 Base) MCG/ACT Aero Soln inhalation aerosol Inhale 2 Puffs every 6 hours as needed for Shortness of Breath. 8 g 3   • warfarin (COUMADIN) 2.5 MG Tab Take one-half to one (1/2-1) tablet daily as directed by [facility] 90 Tablet 1   • amiodarone (CORDARONE) 100 MG tablet Take 1 tablet by mouth every day. 90 tablet 4   •  "sacubitril-valsartan (ENTRESTO) 24-26 MG Tab tablet Take 1 tablet by mouth 2 times a day. 60 tablet 11   • atorvastatin (LIPITOR) 40 MG Tab TAKE ONE TABLET BY MOUTH ONE TIME DAILY 100 tablet 4   • Home Care Oxygen Inhale 3 L/min continuous. Oxygen dose range: 2.5-3 L/min  Respiratory route via: Nasal cannula  Oxygen supplier: [facility]     • acetaminophen (TYLENOL) 500 MG Tab Take 500 mg by mouth 1 time daily as needed. Headache     • sertraline (ZOLOFT) 100 MG Tab Take 1 Tab by mouth every day. 90 Tab 3   • magnesium oxide (MAG-OX) 400 (241.3 Mg) MG Tab tablet Take 1 Tab by mouth every day. (Patient taking differently: Take 400 mg by mouth every day. 500 mg tablets)       No current facility-administered medications for this visit.     She  has a past medical history of Asthma, Breath shortness, Chronic systolic congestive heart failure (HCC) (7/7/2016), COPD (chronic obstructive pulmonary disease) (HCC), Dilated cardiomyopathy (HCC), Emphysema of lung (HCC), Heart valve disease, High cholesterol, History of heart surgery, Hyperlipidemia, Hypertension, Hypotension due to hypovolemia (3/1/2019), and Sleep apnea.    ROS   Review of Systems   Constitutional: Negative for chills and fever.   HENT: Negative for hearing loss.    Eyes: Negative for blurred vision.   Cardiovascular: Negative for chest pain and palpitations.   Gastrointestinal: Negative for abdominal pain, constipation, diarrhea, nausea and vomiting.        Objective:     Physical Exam:  BP (!) 96/60   Pulse 72   Temp 36.8 °C (98.2 °F) (Temporal)   Resp 14   Ht 1.654 m (5' 5.12\")   Wt 51.3 kg (113 lb)   SpO2 98%  Body mass index is 18.74 kg/m².   Constitutional: Alert, no distress.  Skin: Warm, dry, good turgor, no rashes in visible areas.  Eye: Equal, round and reactive, conjunctiva clear, lids normal.  Respiratory: Unlabored respiratory effort, lungs clear to auscultation, no wheezes, no rhonchi.  Cardiovascular: Normal S1, S2, no murmur, " no edema.  Abdomen: Soft, non-tender, no masses, no hepatosplenomegaly.  Psych: Alert and oriented x3, normal affect and mood.    Assessment and Plan:     1. Dizziness  -At this time I do think some of the dizziness that she is experiencing is due to some orthostatic hypotension.  Her blood pressure today is 96/60, slightly lower than her normal of 100/60.  Given symptoms of dizziness that usually occurs with going from sitting to standing or laying to standing as well as no concerning symptoms for exacerbation of heart failure I do think we could possibly alleviate some of the symptoms by decreasing the metoprolol to 25 mg daily.  Patient will make this change and then follow-up for check in 1 month with PCP.      Followup: No follow-ups on file.         PLEASE NOTE: This dictation was created using voice recognition software. I have made every reasonable attempt to correct obvious errors, but I expect that there are errors of grammar and possibly content that I did not discover before finalizing the note.

## 2022-03-11 ENCOUNTER — PATIENT MESSAGE (OUTPATIENT)
Dept: MEDICAL GROUP | Facility: LAB | Age: 77
End: 2022-03-11
Payer: MEDICARE

## 2022-03-11 DIAGNOSIS — R05.9 COUGH: ICD-10-CM

## 2022-03-11 RX ORDER — GUAIFENESIN AND DEXTROMETHORPHAN HYDROBROMIDE 100; 10 MG/5ML; MG/5ML
5 SOLUTION ORAL EVERY 6 HOURS PRN
Qty: 473 ML | Refills: 1 | Status: SHIPPED | OUTPATIENT
Start: 2022-03-11

## 2022-03-15 ENCOUNTER — TELEPHONE (OUTPATIENT)
Dept: MEDICAL GROUP | Facility: LAB | Age: 77
End: 2022-03-15
Payer: MEDICARE

## 2022-03-15 DIAGNOSIS — J41.0 SIMPLE CHRONIC BRONCHITIS (HCC): ICD-10-CM

## 2022-03-15 RX ORDER — ALBUTEROL SULFATE 90 UG/1
2 AEROSOL, METERED RESPIRATORY (INHALATION) EVERY 6 HOURS PRN
Qty: 8 G | Refills: 1 | Status: SHIPPED | OUTPATIENT
Start: 2022-03-15 | End: 2022-05-16 | Stop reason: SDUPTHER

## 2022-03-15 NOTE — TELEPHONE ENCOUNTER
ESTABLISHED PATIENT PRE-VISIT PLANNING     Patient was NOT contacted to complete PVP.     Note: Patient will not be contacted if there is no indication to call.     1.  Reviewed notes from the last few office visits within the medical group: Yes    2.  If any orders were placed at last visit or intended to be done for this visit (i.e. 6 mos follow-up), do we have Results/Consult Notes?         •  Labs - Labs were not ordered at last office visit.  Note: If patient appointment is for lab review and patient did not complete labs, check with provider if OK to reschedule patient until labs completed.       •  Imaging - Imaging was not ordered at last office visit.       •  Referrals - No referrals were ordered at last office visit.    3. Is this appointment scheduled as a Hospital Follow-Up? No    4.  Immunizations were updated in Epic using Reconcile Outside Information activity? Yes    5.  Patient is due for the following Health Maintenance Topics:   Health Maintenance Due   Topic Date Due   • IMM ZOSTER VACCINES (2 of 3) 10/10/2013   • COLORECTAL CANCER SCREENING  05/06/2019   • Annual Wellness Visit  08/26/2021         6.  AHA (Pulse8) form printed for Provider? Email sent to [facility] requesting form

## 2022-03-15 NOTE — TELEPHONE ENCOUNTER
Received request via: Pharmacy    Was the patient seen in the last year in this department? Yes  3/3/2022  Does the patient have an active prescription (recently filled or refills available) for medication(s) requested? No

## 2022-03-16 ENCOUNTER — OFFICE VISIT (OUTPATIENT)
Dept: MEDICAL GROUP | Facility: LAB | Age: 77
End: 2022-03-16
Payer: MEDICARE

## 2022-03-16 VITALS
RESPIRATION RATE: 16 BRPM | SYSTOLIC BLOOD PRESSURE: 102 MMHG | OXYGEN SATURATION: 100 % | HEART RATE: 88 BPM | TEMPERATURE: 96.9 F | DIASTOLIC BLOOD PRESSURE: 68 MMHG | BODY MASS INDEX: 17.99 KG/M2 | HEIGHT: 65 IN | WEIGHT: 108 LBS

## 2022-03-16 DIAGNOSIS — N18.31 STAGE 3A CHRONIC KIDNEY DISEASE: ICD-10-CM

## 2022-03-16 DIAGNOSIS — I50.22 CHRONIC SYSTOLIC CONGESTIVE HEART FAILURE (HCC): ICD-10-CM

## 2022-03-16 DIAGNOSIS — J96.11 CHRONIC RESPIRATORY FAILURE WITH HYPOXIA (HCC): ICD-10-CM

## 2022-03-16 DIAGNOSIS — R05.9 COUGH: ICD-10-CM

## 2022-03-16 DIAGNOSIS — J44.1 CHRONIC OBSTRUCTIVE PULMONARY DISEASE WITH ACUTE EXACERBATION (HCC): ICD-10-CM

## 2022-03-16 DIAGNOSIS — F32.1 MODERATE SINGLE CURRENT EPISODE OF MAJOR DEPRESSIVE DISORDER (HCC): ICD-10-CM

## 2022-03-16 DIAGNOSIS — I70.0 ATHEROSCLEROSIS OF AORTA (HCC): ICD-10-CM

## 2022-03-16 DIAGNOSIS — I48.0 PAROXYSMAL ATRIAL FIBRILLATION (HCC): ICD-10-CM

## 2022-03-16 PROCEDURE — 99214 OFFICE O/P EST MOD 30 MIN: CPT | Performed by: FAMILY MEDICINE

## 2022-03-16 RX ORDER — AMOXICILLIN AND CLAVULANATE POTASSIUM 875; 125 MG/1; MG/1
1 TABLET, FILM COATED ORAL 2 TIMES DAILY
Qty: 10 TABLET | Refills: 0 | Status: SHIPPED | OUTPATIENT
Start: 2022-03-16 | End: 2022-03-21

## 2022-03-16 RX ORDER — PREDNISONE 20 MG/1
20 TABLET ORAL DAILY
Qty: 5 TABLET | Refills: 0 | Status: SHIPPED | OUTPATIENT
Start: 2022-03-16 | End: 2022-03-21

## 2022-03-16 ASSESSMENT — FIBROSIS 4 INDEX: FIB4 SCORE: 1.26

## 2022-03-16 NOTE — PROGRESS NOTES
"Subjective:     CC: Cough    HPI:   Kelly is a 76-year-old female with a complex medical history including chronic respiratory failure, COPD, CHF, A. fib, and CKD who presents today with continued cough.    Cough has been going on for about a month but seems to be worse over the last 2 weeks.  Does seem to be worse at night but denies orthopnea or PND.  No increase in O2 need.  She is on 2 to 3 L chronically at home.  No change in sputum production although she is using her rescue inhaler much more frequently than normal, up to 3-4 times daily.  She is continue to use her Spiriva and Advair.  Seeing mild improvement with Robitussin.  She does report some occasional episodes of shortness of breath and wheezing although nothing continued, is also still having some episodes of lightheadedness.  No chest pain, no fevers, no worsening fatigue, body aches.  Has not felt ill recently besides cough.  Did have Covid in December 2021 with relatively mild symptoms.  She has had had her Covid vaccine and booster.    Medications, past medical history, allergies, and social history have been reviewed and updated.      Objective:       Exam:  /68   Pulse 88   Temp 36.1 °C (96.9 °F)   Resp 16   Ht 1.654 m (5' 5.12\")   Wt 49 kg (108 lb)   LMP  (LMP Unknown)   SpO2 100%   BMI 17.91 kg/m²  Body mass index is 17.91 kg/m².    Constitutional: Alert.  No distress.  Skin: Warm, dry, good turgor, no visible rashes.  Eye: Equal, round and reactive to light, conjunctiva clear, lids normal.  ENMT: Moist mucous membranes. Normal dentition.  Respiratory: Normal effort.  Diffuse end expiratory wheezing, slightly reduced air movement bilaterally.  Cardiovascular: Irregular rate and rhythm. Normal S1/S2. No murmurs, rubs or gallops.   Ext: No peripheral edema  Neuro: Moves all four extremities. No facial droop.  Psych: Answers questions appropriately. Normal affect and mood.    Assessment & Plan:     76 y.o. female with the following " -     1. Chronic respiratory failure with hypoxia (HCC)  2. Cough  3. Chronic obstructive pulmonary disease with acute exacerbation (HCC)  Presents with 1 month of cough that is worsening over the last 2 weeks along with episodes of shortness of breath and wheezing.  Diffuse wheezing on exam but no increased work of breathing and no increased O2 need..  I think her cough is likely secondary to a COPD exacerbation.  Possibly triggered by viral URI or allergies.  Start prednisone.  Antibiotic selection limited secondary to interactions with warfarin and will do Augmentin.  CXR ordered to further evaluate for fluid overload or CAP given her multiple comorbidities.  Close follow-up scheduled with extensively discussed ER precautions.  - DX-CHEST-2 VIEWS; Future  - predniSONE (DELTASONE) 20 MG Tab; Take 1 Tablet by mouth every day for 5 days.  Dispense: 5 Tablet; Refill: 0  - amoxicillin-clavulanate (AUGMENTIN) 875-125 MG Tab; Take 1 Tablet by mouth 2 times a day for 5 days.  Dispense: 10 Tablet; Refill: 0      4. Chronic systolic congestive heart failure (HCC)  I think current symptoms are more likely secondary to COPD exacerbation but CHF exacerbation is also on the differential.  CXR ordered.  - DX-CHEST-2 VIEWS; Future    5. Paroxysmal atrial fibrillation (HCC)  Stable, continue metoprolol, amiodarone and warfarin.    6. Moderate single current episode of major depressive disorder (HCC)  Stable, no current concerns.    7. Aortic atherosclerosis (HCC)  Continue Lipitor.    8. Stage 3a chronic kidney disease (HCC)  GFR stable.  Avoid nephrotoxins.         Please note that this note was created using voice recognition software.

## 2022-03-16 NOTE — PROGRESS NOTES
Pt seen social with peers in group room. Denies suicidal/homicidal ideation and contracts for safety. Pt will be d/c later around 3pm. States she will be staying at mom's. Denies any further needs at this time.    Subjective:     Kelly Lovett is a 76 y.o. female here today for *** and Annual Health Assessment.    No problem-specific Assessment & Plan notes found for this encounter.   ***    Health Maintenance Summary          Overdue - IMM ZOSTER VACCINES (2 of 3) Overdue since 10/10/2013    08/15/2013  Imm Admin: Zoster Vaccine Live (ZVL) (Zostavax) - HISTORICAL DATA          Overdue - COLORECTAL CANCER SCREENING (COLONOSCOPY - Every 10 Years) Overdue since 5/6/2019 05/06/2009  REFERRAL TO GI FOR COLONOSCOPY          Overdue - Annual Wellness Visit (Every 366 Days) Overdue since 8/26/2021 08/25/2020  Visit Dx: Medicare annual wellness visit, subsequent    08/25/2020  Subsequent Annual Wellness Visit - Includes PPPS ()    01/10/2018  Visit Dx: Medicare annual wellness visit, subsequent    10/27/2016  Visit Dx: Medicare annual wellness visit, initial          IMM DTaP/Tdap/Td Vaccine (3 - Td or Tdap) Next due on 9/17/2022 09/17/2012  Imm Admin: Tdap Vaccine    01/25/2012  Imm Admin: Tdap Vaccine          BONE DENSITY (Every 5 Years) Tentatively due on 3/28/2023    03/28/2018  DS-BONE DENSITY STUDY (DEXA)          IMM HEP B VACCINE (Series Information) Aged Out    01/27/2000  Imm Admin: Hepatitis B Vaccine (Adol/Adult)          IMM PNEUMOCOCCAL VACCINE: 65+ Years (Series Information) Completed    10/27/2016  Imm Admin: Pneumococcal polysaccharide vaccine (PPSV-23)    09/10/2015  Imm Admin: Pneumococcal Conjugate Vaccine (Prevnar/PCV-13)    09/17/2012  Imm Admin: Pneumococcal polysaccharide vaccine (PPSV-23)          IMM INFLUENZA (Series Information) Completed    10/19/2021  Imm Admin: Influenza Vaccine Adult HD    11/05/2020  Imm Admin: Influenza Vaccine Adult HD    10/24/2019  Imm Admin: Influenza Vaccine Adult HD    11/13/2015  Imm Admin: Influenza Vaccine Adult HD    01/01/2014  Imm Admin: INFLUENZA TIV (IM)    Only the first 5 history entries have been loaded, but more history exists.           COVID-19 Vaccine (Series Information) Completed    12/18/2021  Imm Admin: Pfizer SARS-CoV-2 Vaccine 12+    02/11/2021  Imm Admin: Pfizer SARS-CoV-2 Vaccine    01/21/2021  Imm Admin: Pfizer SARS-CoV-2 Vaccine          IMM MENINGOCOCCAL VACCINE (MCV4) (Series Information) Aged Out    No completion history exists for this topic.          Discontinued - MAMMOGRAM  Discontinued    01/03/2018  MA-MAMMO SCREENING BILAT W/KELTON W/CAD    11/29/2016  MA-MAMMO SCREENING BILAT W/KELTON W/CAD    09/11/2015  [id number]    09/11/2015  [id number]          Discontinued - PAP SMEAR  Discontinued    No completion history exists for this topic.          Discontinued - Annual Pulmonary Function Test / Spirometry  Discontinued    10/19/2016  PFT DICTATED RESULTS             ***    Annual Health Assessment Questions: ***    1.  Are you currently engaging in any exercise or physical activity? No    2.  How would you describe your mood or emotional well-being today? depressed    3.  Have you had any falls in the last year? Yes    4.  Have you noticed any problems with your balance or had difficulty walking? No    5.  In the last six months have you experienced any leakage of urine? No    6. DPA/Advanced Directive: Patient has Advanced Directive on file.     Current medicines (including changes today)  Current Outpatient Medications   Medication Sig Dispense Refill   • albuterol 108 (90 Base) MCG/ACT Aero Soln inhalation aerosol INHALE 2 PUFFS EVERY 6 HOURS AS NEEDED FOR SHORTNESS OF BREATH. 8 g 1   • Dextromethorphan-guaiFENesin (TUSSIN DM)  MG/5ML Syrup Take 5 mL by mouth every 6 hours as needed. 473 mL 1   • metoprolol SR (TOPROL XL) 25 MG TABLET SR 24 HR Take 1 Tablet by mouth every day for 90 days. 90 Tablet 3   • spironolactone (ALDACTONE) 25 MG Tab Take 1 Tablet by mouth every day. 30 Tablet 3   • Tiotropium Bromide Monohydrate (SPIRIVA RESPIMAT) 1.25 MCG/ACT Aero Soln Inhale 2 Puffs every day. 12 g 2   •  "fluticasone-salmeterol (ADVAIR) 500-50 MCG/DOSE AEROSOL POWDER, BREATH ACTIVATED Inhale 1 Puff every 12 hours. 1 Each 5   • warfarin (COUMADIN) 2.5 MG Tab Take one-half to one (1/2-1) tablet daily as directed by [facility] 90 Tablet 1   • amiodarone (CORDARONE) 100 MG tablet Take 1 tablet by mouth every day. 90 tablet 4   • sacubitril-valsartan (ENTRESTO) 24-26 MG Tab tablet Take 1 tablet by mouth 2 times a day. 60 tablet 11   • atorvastatin (LIPITOR) 40 MG Tab TAKE ONE TABLET BY MOUTH ONE TIME DAILY 100 tablet 4   • Home Care Oxygen Inhale 3 L/min continuous. Oxygen dose range: 2.5-3 L/min  Respiratory route via: Nasal cannula  Oxygen supplier: [facility]     • acetaminophen (TYLENOL) 500 MG Tab Take 500 mg by mouth 1 time daily as needed. Headache       No current facility-administered medications for this visit.       She  has a past medical history of Asthma, Breath shortness, Chronic systolic congestive heart failure (HCC) (7/7/2016), COPD (chronic obstructive pulmonary disease) ([facility]), Dilated cardiomyopathy ([facility]), Emphysema of lung ([facility]), Heart valve disease, High cholesterol, History of heart surgery, Hyperlipidemia, Hypertension, Hypotension due to hypovolemia (3/1/2019), and Sleep apnea.    Sulfa drugs    She  reports that she quit smoking about 20 years ago. Her smoking use included cigarettes. She has a 19.00 pack-year smoking history. She has never used smokeless tobacco. She reports current alcohol use of about 8.4 oz of alcohol per week. She reports that she does not use drugs.  Counseling given: Not Answered      ROS ***  No chest pain, no shortness of breath, no abdominal pain.     Objective:     Physical Exam:  /68   Pulse 88   Temp 36.1 °C (96.9 °F)   Resp 16   Ht 1.654 m (5' 5.12\")   Wt 49 kg (108 lb)   SpO2 100%  Body mass index is 17.91 kg/m². ***  Constitutional: Alert, no distress.  Skin: Warm, dry, good turgor, no rashes in visible areas.  Eye: Equal, " round and reactive, conjunctiva clear, lids normal.  ENMT: Lips without lesions, good dentition, oropharynx clear.  Neck: Trachea midline, no masses, no thyromegaly. No cervical or supraclavicular lymphadenopathy.  Respiratory: Unlabored respiratory effort, lungs clear to auscultation, no wheezes, no rhonchi.  Cardiovascular: Normal S1, S2, no murmur, no edema.  Abdomen: Soft, non-tender, no masses, no hepatosplenomegaly.  Psych: Alert and oriented x3, normal affect and mood.    Assessment and Plan:     1. Chronic systolic congestive heart failure (HCC)  ***    2. Paroxysmal atrial fibrillation (HCC)  ***    3. Moderate single current episode of major depressive disorder (HCC)  ***    4. Chronic respiratory failure with hypoxia (HCC)  ***    5. Aortic atherosclerosis (HCC)  ***    6. Chronic obstructive pulmonary disease, unspecified COPD type (HCC)  ***    7. Stage 3a chronic kidney disease (HCC)  ***      Discussion today about general wellness and lifestyle habits:    · Engage in regular physical activity and social activities.  · Prevent falls and reduce trip hazards; using ambulatory aides, hearing and vision testing if appropriate.  · Steps to improve urinary incontinence.  · Advanced care planning.    Follow-Up: No follow-ups on file.         PLEASE NOTE: This dictation was created using voice recognition software. I have made every reasonable attempt to correct obvious errors, but I expect that there are errors of grammar and possibly content that I did not discover before finalizing the note.

## 2022-03-16 NOTE — PROGRESS NOTES
Annual Health Assessment Questions:    1.  Are you currently engaging in any exercise or physical activity? No    2.  How would you describe your mood or emotional well-being today? depressed    3.  Have you had any falls in the last year? Yes    4.  Have you noticed any problems with your balance or had difficulty walking? No    5.  In the last six months have you experienced any leakage of urine? No    6. DPA/Advanced Directive: Patient has Advanced Directive on file.

## 2022-03-17 ENCOUNTER — TELEPHONE (OUTPATIENT)
Dept: MEDICAL GROUP | Facility: LAB | Age: 77
End: 2022-03-17
Payer: MEDICARE

## 2022-03-17 NOTE — TELEPHONE ENCOUNTER
ESTABLISHED PATIENT PRE-VISIT PLANNING     Patient was NOT contacted to complete PVP.     Note: Patient will not be contacted if there is no indication to call.     1.  Reviewed notes from the last few office visits within the medical group: Yes    2.  If any orders were placed at last visit or intended to be done for this visit (i.e. 6 mos follow-up), do we have Results/Consult Notes?         •  Labs - Labs were not ordered at last office visit.  Note: If patient appointment is for lab review and patient did not complete labs, check with provider if OK to reschedule patient until labs completed.       •  Imaging - Imaging ordered, NOT completed. Patient advised to complete prior to next appointment.       •  Referrals - No referrals were ordered at last office visit.    3. Is this appointment scheduled as a Hospital Follow-Up? No    4.  Immunizations were updated in Epic using Reconcile Outside Information activity? Yes    5.  Patient is due for the following Health Maintenance Topics:   Health Maintenance Due   Topic Date Due   • IMM ZOSTER VACCINES (2 of 3) 10/10/2013   • COLORECTAL CANCER SCREENING  05/06/2019   • Annual Wellness Visit  08/26/2021         6.  AHA (Pulse8) form printed for Provider? No, already completed

## 2022-03-21 ENCOUNTER — HOSPITAL ENCOUNTER (OUTPATIENT)
Dept: RADIOLOGY | Facility: MEDICAL CENTER | Age: 77
End: 2022-03-21
Attending: FAMILY MEDICINE
Payer: MEDICARE

## 2022-03-21 DIAGNOSIS — I50.22 CHRONIC SYSTOLIC CONGESTIVE HEART FAILURE (HCC): ICD-10-CM

## 2022-03-21 DIAGNOSIS — J96.11 CHRONIC RESPIRATORY FAILURE WITH HYPOXIA (HCC): ICD-10-CM

## 2022-03-21 DIAGNOSIS — J44.1 CHRONIC OBSTRUCTIVE PULMONARY DISEASE WITH ACUTE EXACERBATION (HCC): ICD-10-CM

## 2022-03-21 DIAGNOSIS — R05.9 COUGH: ICD-10-CM

## 2022-03-21 PROCEDURE — 71046 X-RAY EXAM CHEST 2 VIEWS: CPT

## 2022-03-23 ENCOUNTER — HOSPITAL ENCOUNTER (OUTPATIENT)
Dept: LAB | Facility: MEDICAL CENTER | Age: 77
End: 2022-03-23
Attending: FAMILY MEDICINE
Payer: MEDICARE

## 2022-03-23 ENCOUNTER — OFFICE VISIT (OUTPATIENT)
Dept: MEDICAL GROUP | Facility: LAB | Age: 77
End: 2022-03-23
Payer: MEDICARE

## 2022-03-23 VITALS
RESPIRATION RATE: 16 BRPM | SYSTOLIC BLOOD PRESSURE: 120 MMHG | DIASTOLIC BLOOD PRESSURE: 74 MMHG | HEART RATE: 94 BPM | TEMPERATURE: 97.9 F | OXYGEN SATURATION: 96 % | HEIGHT: 65 IN | BODY MASS INDEX: 17.91 KG/M2

## 2022-03-23 DIAGNOSIS — J44.1 CHRONIC OBSTRUCTIVE PULMONARY DISEASE WITH ACUTE EXACERBATION (HCC): ICD-10-CM

## 2022-03-23 DIAGNOSIS — I50.22 CHRONIC SYSTOLIC CONGESTIVE HEART FAILURE (HCC): ICD-10-CM

## 2022-03-23 DIAGNOSIS — J96.11 CHRONIC RESPIRATORY FAILURE WITH HYPOXIA (HCC): ICD-10-CM

## 2022-03-23 DIAGNOSIS — R05.9 COUGH: ICD-10-CM

## 2022-03-23 LAB
ALBUMIN SERPL BCP-MCNC: 4.5 G/DL (ref 3.2–4.9)
ALBUMIN/GLOB SERPL: 2.4 G/DL
ALP SERPL-CCNC: 71 U/L (ref 30–99)
ALT SERPL-CCNC: 18 U/L (ref 2–50)
ANION GAP SERPL CALC-SCNC: 17 MMOL/L (ref 7–16)
AST SERPL-CCNC: 20 U/L (ref 12–45)
BASOPHILS # BLD AUTO: 0.6 % (ref 0–1.8)
BASOPHILS # BLD: 0.07 K/UL (ref 0–0.12)
BILIRUB SERPL-MCNC: 0.4 MG/DL (ref 0.1–1.5)
BUN SERPL-MCNC: 45 MG/DL (ref 8–22)
CALCIUM SERPL-MCNC: 10 MG/DL (ref 8.5–10.5)
CHLORIDE SERPL-SCNC: 101 MMOL/L (ref 96–112)
CO2 SERPL-SCNC: 19 MMOL/L (ref 20–33)
CREAT SERPL-MCNC: 1.42 MG/DL (ref 0.5–1.4)
EOSINOPHIL # BLD AUTO: 0.09 K/UL (ref 0–0.51)
EOSINOPHIL NFR BLD: 0.7 % (ref 0–6.9)
ERYTHROCYTE [DISTWIDTH] IN BLOOD BY AUTOMATED COUNT: 50.5 FL (ref 35.9–50)
GFR SERPLBLD CREATININE-BSD FMLA CKD-EPI: 38 ML/MIN/1.73 M 2
GLOBULIN SER CALC-MCNC: 1.9 G/DL (ref 1.9–3.5)
GLUCOSE SERPL-MCNC: 126 MG/DL (ref 65–99)
HCT VFR BLD AUTO: 42.1 % (ref 37–47)
HGB BLD-MCNC: 13.4 G/DL (ref 12–16)
IMM GRANULOCYTES # BLD AUTO: 0.21 K/UL (ref 0–0.11)
IMM GRANULOCYTES NFR BLD AUTO: 1.7 % (ref 0–0.9)
LYMPHOCYTES # BLD AUTO: 1.82 K/UL (ref 1–4.8)
LYMPHOCYTES NFR BLD: 15.1 % (ref 22–41)
MCH RBC QN AUTO: 31.2 PG (ref 27–33)
MCHC RBC AUTO-ENTMCNC: 31.8 G/DL (ref 33.6–35)
MCV RBC AUTO: 98.1 FL (ref 81.4–97.8)
MONOCYTES # BLD AUTO: 1.24 K/UL (ref 0–0.85)
MONOCYTES NFR BLD AUTO: 10.3 % (ref 0–13.4)
NEUTROPHILS # BLD AUTO: 8.65 K/UL (ref 2–7.15)
NEUTROPHILS NFR BLD: 71.6 % (ref 44–72)
NRBC # BLD AUTO: 0 K/UL
NRBC BLD-RTO: 0 /100 WBC
NT-PROBNP SERPL IA-MCNC: 332 PG/ML (ref 0–125)
PLATELET # BLD AUTO: 393 K/UL (ref 164–446)
PMV BLD AUTO: 9.5 FL (ref 9–12.9)
POTASSIUM SERPL-SCNC: 4.6 MMOL/L (ref 3.6–5.5)
PROT SERPL-MCNC: 6.4 G/DL (ref 6–8.2)
RBC # BLD AUTO: 4.29 M/UL (ref 4.2–5.4)
SODIUM SERPL-SCNC: 137 MMOL/L (ref 135–145)
WBC # BLD AUTO: 12.1 K/UL (ref 4.8–10.8)

## 2022-03-23 PROCEDURE — 99214 OFFICE O/P EST MOD 30 MIN: CPT | Performed by: FAMILY MEDICINE

## 2022-03-23 PROCEDURE — 80053 COMPREHEN METABOLIC PANEL: CPT

## 2022-03-23 PROCEDURE — 83880 ASSAY OF NATRIURETIC PEPTIDE: CPT

## 2022-03-23 PROCEDURE — 36415 COLL VENOUS BLD VENIPUNCTURE: CPT

## 2022-03-23 PROCEDURE — 85025 COMPLETE CBC W/AUTO DIFF WBC: CPT

## 2022-03-23 NOTE — PROGRESS NOTES
"Subjective:     CC: Shortness of breath    HPI:   Kelly a 76-year-old female with a complex medical history including chronic respiratory failure, COPD, CHF, A. fib, and CKD who presents today with concern for shortness of breath.    She was initially seen for this 1 week ago with cough and more frequent use of rescue inhaler.  Treated for COPD exacerbation with prednisone and antibiotics.  CXR was without acute changes.    She reports cough is about the same but she has noticed increasing shortness of breath, mainly with activity.  Symptoms now going on for 3+ weeks. No change in sputum production.  Late last night they did increase her home oxygen up to 5 L (baseline 3L) due to a feeling of shortness of breath and this seemed to help.  She does not currently feel short of breath.      No chest pain or pain with deep breaths.  No fevers.  No congestion.  No palpitations, no edema.  She does have some chronic lightheadedness upon standing.  Does not notice PND or orthopnea but does not lay flat often.    O2 sat - upper 90s on 2L here    Medications, past medical history, allergies, and social history have been reviewed and updated.      Objective:       Exam:  /74 (BP Location: Right arm, Patient Position: Sitting, BP Cuff Size: Adult)   Pulse 94   Temp 36.6 °C (97.9 °F)   Resp 16   Ht 1.654 m (5' 5.12\")   LMP  (LMP Unknown)   SpO2 96%   BMI 17.91 kg/m²  Body mass index is 17.91 kg/m².    Constitutional: Alert. No distress.  In wheelchair.  Skin: Warm, dry, good turgor, no visible rashes.  Eye: Equal, round and reactive to light, conjunctiva clear, lids normal.  ENMT: Moist mucous membranes.   Respiratory: Normal effort.  Mildly reduced air movement bilaterally lungs are clear to auscultation bilaterally.  Cardiovascular: Irregular rate and rhythm. Normal S1/S2. No murmurs, rubs or gallops.  No peripheral edema.  Radial pulses are intact and symmetric.  Neuro: Moves all four extremities. No facial " droop.  Psych: Answers questions appropriately. Normal affect and mood.      Assessment & Plan:     76 y.o. female with the following -     1. Chronic respiratory failure with hypoxia (HCC)  2. Cough  Presents with 3+ weeks of continued shortness of breath and stable increased cough.  Did not see significant improvement with treatment for COPD exacerbation with prednisone and Augmentin.  CXR nonacute.  Exam unremarkable.  She is otherwise without signs of acute illness.  No increased work of breathing here and although they report increased O2 need at home, she is saturating well on 2 L here.  Multiple of her chronic conditions could be contributing, most notably CHF and COPD.  Question whether this is disease progression of COPD but I do think needs work-up to rule out other causes. Echo is ordered but given suspicion that this may take some time to get done we will go  get CT chest to evaluate for additional pulmonary pathology.  Extensively discussed ER precautions with patient and .  We will also help them to schedule follow-up appointments with her cardiologist and pulmonologist.  - CBC WITH DIFFERENTIAL; Future  - Comp Metabolic Panel; Future  - CT-CHEST (THORAX) W/O; Future  - EC-ECHOCARDIOGRAM COMPLETE W/O CONT; Future  - proBrain Natriuretic Peptide, NT; Future    3. Chronic obstructive pulmonary disease with acute exacerbation (HCC)  - CT-CHEST (THORAX) W/O; Future  - EC-ECHOCARDIOGRAM COMPLETE W/O CONT; Future    4. Chronic systolic congestive heart failure (HCC)  - EC-ECHOCARDIOGRAM COMPLETE W/O CONT; Future  - proBrain Natriuretic Peptide, NT; Future      Please note that this note was created using voice recognition software.

## 2022-03-24 ENCOUNTER — TELEPHONE (OUTPATIENT)
Dept: MEDICAL GROUP | Facility: LAB | Age: 77
End: 2022-03-24

## 2022-03-24 ENCOUNTER — APPOINTMENT (OUTPATIENT)
Dept: MEDICAL GROUP | Facility: MEDICAL CENTER | Age: 77
End: 2022-03-24
Payer: MEDICARE

## 2022-03-24 ENCOUNTER — HOSPITAL ENCOUNTER (OUTPATIENT)
Dept: CARDIOLOGY | Facility: MEDICAL CENTER | Age: 77
End: 2022-03-24
Attending: FAMILY MEDICINE
Payer: MEDICARE

## 2022-03-24 DIAGNOSIS — I50.22 CHRONIC SYSTOLIC CONGESTIVE HEART FAILURE (HCC): ICD-10-CM

## 2022-03-24 DIAGNOSIS — J96.11 CHRONIC RESPIRATORY FAILURE WITH HYPOXIA (HCC): ICD-10-CM

## 2022-03-24 DIAGNOSIS — J44.1 CHRONIC OBSTRUCTIVE PULMONARY DISEASE WITH ACUTE EXACERBATION (HCC): ICD-10-CM

## 2022-03-24 LAB
LV EJECT FRACT MOD 2C 99903: 46.62
LV EJECT FRACT MOD 4C 99902: 47.9
LV EJECT FRACT MOD BP 99901: 47.98

## 2022-03-24 PROCEDURE — 93306 TTE W/DOPPLER COMPLETE: CPT

## 2022-03-24 PROCEDURE — 93306 TTE W/DOPPLER COMPLETE: CPT | Mod: 26 | Performed by: INTERNAL MEDICINE

## 2022-03-24 NOTE — TELEPHONE ENCOUNTER
----- Message from Chinmay Figueredo M.D. sent at 3/23/2022  4:45 PM PDT -----  Please inform patient kidney function is decreased on initial labs.  Some labs still pending.  Would like her to try to increase oral intake of fluids and I will be in touch with the rest of the labs tomorrow.  Strict ER precautions overnight for any worsening or concerning symptoms. Thanks

## 2022-03-28 ENCOUNTER — HOSPITAL ENCOUNTER (OUTPATIENT)
Dept: RADIOLOGY | Facility: MEDICAL CENTER | Age: 77
End: 2022-03-28
Attending: FAMILY MEDICINE
Payer: MEDICARE

## 2022-03-28 DIAGNOSIS — J44.1 CHRONIC OBSTRUCTIVE PULMONARY DISEASE WITH ACUTE EXACERBATION (HCC): ICD-10-CM

## 2022-03-28 DIAGNOSIS — J96.11 CHRONIC RESPIRATORY FAILURE WITH HYPOXIA (HCC): ICD-10-CM

## 2022-03-28 PROCEDURE — 71250 CT THORAX DX C-: CPT | Mod: ME

## 2022-03-29 DIAGNOSIS — J18.9 COMMUNITY ACQUIRED PNEUMONIA OF RIGHT LOWER LOBE OF LUNG: ICD-10-CM

## 2022-03-29 RX ORDER — DOXYCYCLINE HYCLATE 100 MG
100 TABLET ORAL 2 TIMES DAILY
Qty: 10 TABLET | Refills: 0 | Status: SHIPPED | OUTPATIENT
Start: 2022-03-29 | End: 2022-04-03

## 2022-03-29 NOTE — PROGRESS NOTES
Discussed CT and echo results with patient and  over the phone.  Overall symptoms are stable, she still seems to fluctuate with shortness of breath but not needing any more home O2.  No fevers.    Chest CT did show questionable lower lobe pneumonia.  She had already completed a course of Augmentin.  She has contraindications to Levaquin with her amiodarone, will try additional course of doxycycline.  She has follow-up scheduled with pulmonology and cardiology.  Again discussed ER precautions with patient and .  So encouraged her to get labs that were ordered last week drawn.

## 2022-03-31 ENCOUNTER — ANTICOAGULATION VISIT (OUTPATIENT)
Dept: MEDICAL GROUP | Facility: MEDICAL CENTER | Age: 77
End: 2022-03-31
Payer: MEDICARE

## 2022-03-31 ENCOUNTER — HOSPITAL ENCOUNTER (OUTPATIENT)
Dept: LAB | Facility: MEDICAL CENTER | Age: 77
End: 2022-03-31
Attending: FAMILY MEDICINE
Payer: MEDICARE

## 2022-03-31 DIAGNOSIS — Z79.01 LONG TERM (CURRENT) USE OF ANTICOAGULANTS: ICD-10-CM

## 2022-03-31 DIAGNOSIS — I48.0 PAROXYSMAL ATRIAL FIBRILLATION (HCC): ICD-10-CM

## 2022-03-31 DIAGNOSIS — J96.11 CHRONIC RESPIRATORY FAILURE WITH HYPOXIA (HCC): ICD-10-CM

## 2022-03-31 LAB
ANION GAP SERPL CALC-SCNC: 18 MMOL/L (ref 7–16)
BASOPHILS # BLD AUTO: 0.8 % (ref 0–1.8)
BASOPHILS # BLD: 0.11 K/UL (ref 0–0.12)
BUN SERPL-MCNC: 40 MG/DL (ref 8–22)
CALCIUM SERPL-MCNC: 9.7 MG/DL (ref 8.5–10.5)
CHLORIDE SERPL-SCNC: 105 MMOL/L (ref 96–112)
CO2 SERPL-SCNC: 18 MMOL/L (ref 20–33)
CREAT SERPL-MCNC: 1.34 MG/DL (ref 0.5–1.4)
EOSINOPHIL # BLD AUTO: 0.17 K/UL (ref 0–0.51)
EOSINOPHIL NFR BLD: 1.3 % (ref 0–6.9)
ERYTHROCYTE [DISTWIDTH] IN BLOOD BY AUTOMATED COUNT: 51.8 FL (ref 35.9–50)
GFR SERPLBLD CREATININE-BSD FMLA CKD-EPI: 41 ML/MIN/1.73 M 2
GLUCOSE SERPL-MCNC: 98 MG/DL (ref 65–99)
HCT VFR BLD AUTO: 39.1 % (ref 37–47)
HGB BLD-MCNC: 12 G/DL (ref 12–16)
IMM GRANULOCYTES # BLD AUTO: 0.14 K/UL (ref 0–0.11)
IMM GRANULOCYTES NFR BLD AUTO: 1 % (ref 0–0.9)
INR PPP: 2.5 (ref 2–3.5)
LYMPHOCYTES # BLD AUTO: 1.19 K/UL (ref 1–4.8)
LYMPHOCYTES NFR BLD: 8.8 % (ref 22–41)
MCH RBC QN AUTO: 31.7 PG (ref 27–33)
MCHC RBC AUTO-ENTMCNC: 30.7 G/DL (ref 33.6–35)
MCV RBC AUTO: 103.4 FL (ref 81.4–97.8)
MONOCYTES # BLD AUTO: 1.1 K/UL (ref 0–0.85)
MONOCYTES NFR BLD AUTO: 8.1 % (ref 0–13.4)
NEUTROPHILS # BLD AUTO: 10.87 K/UL (ref 2–7.15)
NEUTROPHILS NFR BLD: 80 % (ref 44–72)
NRBC # BLD AUTO: 0 K/UL
NRBC BLD-RTO: 0 /100 WBC
PLATELET # BLD AUTO: 260 K/UL (ref 164–446)
PMV BLD AUTO: 10.5 FL (ref 9–12.9)
POTASSIUM SERPL-SCNC: 5.2 MMOL/L (ref 3.6–5.5)
RBC # BLD AUTO: 3.78 M/UL (ref 4.2–5.4)
SODIUM SERPL-SCNC: 141 MMOL/L (ref 135–145)
WBC # BLD AUTO: 13.6 K/UL (ref 4.8–10.8)

## 2022-03-31 PROCEDURE — 80048 BASIC METABOLIC PNL TOTAL CA: CPT

## 2022-03-31 PROCEDURE — 36415 COLL VENOUS BLD VENIPUNCTURE: CPT

## 2022-03-31 PROCEDURE — 85025 COMPLETE CBC W/AUTO DIFF WBC: CPT

## 2022-03-31 PROCEDURE — 93793 ANTICOAG MGMT PT WARFARIN: CPT | Performed by: FAMILY MEDICINE

## 2022-03-31 PROCEDURE — 85610 PROTHROMBIN TIME: CPT | Performed by: INTERNAL MEDICINE

## 2022-03-31 NOTE — PROGRESS NOTES
OP Anticoagulation Service Note    Date: 3/31/2022    Anticoagulation Summary  As of 3/31/2022    INR goal:  2.0-3.0   TTR:  60.0 % (2.7 y)   INR used for dosin.50 (3/31/2022)   Warfarin maintenance plan:  1.25 mg (2.5 mg x 0.5) every Mon, Wed, Fri; 2.5 mg (2.5 mg x 1) all other days   Weekly warfarin total:  13.75 mg   Plan last modified:  Amanda Perera, PharmD (2021)   Next INR check:  2022   Target end date:  Indefinite    Indications    Atrial flutter (HCC) [I48.92]  Long term (current) use of anticoagulants [Z79.01] [Z79.01]  Paroxysmal atrial fibrillation (HCC) [I48.0]             Anticoagulation Episode Summary     INR check location:  Anticoagulation Clinic    Preferred lab:      Send INR reminders to:      Comments:        Anticoagulation Care Providers     Provider Role Specialty Phone number    [facility]   [phone]        Anticoagulation Patient Findings  Patient Findings     Positives:  Missed doses (Pt unable to recall when), Change in medications (Pt started 5 day course of doxycycline yesterday)    Negatives:  Signs/symptoms of thrombosis, Signs/symptoms of bleeding, Laboratory test error suspected, Change in health, Change in alcohol use, Change in activity, Upcoming invasive procedure, Emergency department visit, Upcoming dental procedure, Extra doses, Change in diet/appetite, Hospital admission, Bruising, Other complaints          HPI:   Kelly Lovett is in the Anticoagulation Clinic today for a INR check on their anticoagulation therapy.     The reason for today's visit is to prevent morbidity and mortality from a blood clot and/or stroke and to reduce the risk of bleeding while on a anticoagulant.     PCP:  Chinmay Figueredo M.D.  [address] [address] [phone]    3 vitals included with today's appt-unless patient declined:  (BP, HR, weight, ht, RR)   There were no vitals filed for this visit.    Assessment:    INR  therapeutic.   Confirmed warfarin dosing regimen: Yes  Interval Changes with foods rich in vitamin K: No  Interval Changes in ETOH or cranberries:   No  Interval Changes in smoking status:  No  Interval Changes in medication:  No   S/S of bleeding or bruising:  No  Signs/symptoms  thrombosis since the last appt:  No  Any upcoming procedures that require stopping warfarin and/or using bridge therapy: None    Pt is NOT on antiplatelet therapy.    Plan:  Instructed pt to continue on with current regimen.    Follow up:  Follow up appointment in 4 week(s).       Other info:  Pt educated to contact our clinic with any changes in medications or s/s of bleeding or thrombosis.  Education was provided today regarding tips to reduce their bleed risk and dietary constraints while on a anticoagulant.    National Recommendations:  The CHEST guidelines recommends frequent INR monitoring at regular intervals (a few days up to a max of 12 weeks) to ensure patients are on the proper dose of warfarin, and patients are not having any complications from therapy.  INRs can dramatically change over a short time period due to diet, medications, and medical conditions.     Clovis Kamara, PharmD, BCACP    [facility]  Phone [phone] fax [phone]

## 2022-04-01 ENCOUNTER — OFFICE VISIT (OUTPATIENT)
Dept: SLEEP MEDICINE | Facility: MEDICAL CENTER | Age: 77
End: 2022-04-01
Payer: MEDICARE

## 2022-04-01 VITALS
HEART RATE: 98 BPM | BODY MASS INDEX: 18.66 KG/M2 | RESPIRATION RATE: 16 BRPM | SYSTOLIC BLOOD PRESSURE: 120 MMHG | HEIGHT: 65 IN | WEIGHT: 112 LBS | OXYGEN SATURATION: 99 % | DIASTOLIC BLOOD PRESSURE: 78 MMHG

## 2022-04-01 DIAGNOSIS — J44.9 CHRONIC OBSTRUCTIVE PULMONARY DISEASE, UNSPECIFIED COPD TYPE (HCC): ICD-10-CM

## 2022-04-01 DIAGNOSIS — J96.11 CHRONIC RESPIRATORY FAILURE WITH HYPOXIA (HCC): ICD-10-CM

## 2022-04-01 PROCEDURE — 99214 OFFICE O/P EST MOD 30 MIN: CPT | Performed by: NURSE PRACTITIONER

## 2022-04-01 ASSESSMENT — COPD QUESTIONNAIRES
CAT_TOTALSCORE: 9
QUESTION8_ENERGYLEVEL: LOTS OF ENERGY
MMRC DYSPNEA SCALE: I STOP FOR BREATH AFTER WALKING 100 YARDS OR AFTER A FEW MINUTES ON LEVEL GROUND
QUESTION3_CHESTTIGHTNESS: NO TIGHTNESS AT ALL
QUESTION4_WALKINCLINE: 3
QUESTION7_SLEEPQUALITY: SLEEPS VERY SOUNDLY
TOTAL_EXACERBATIONS_PASTYEAR: 0 OR 1 WITHOUT HOSPITALIZATION
QUESTION5_HOMEACTIVITIES: NOT LIMITED TO ACTIVITIES AT HOME
QUESTION1_COUGHFREQUENCY: 4
QUESTION2_PHLEGM: 2
QUESTION6_LEAVINGHOUSE: CONFIDENT LEAVING HOME

## 2022-04-01 ASSESSMENT — FIBROSIS 4 INDEX: FIB4 SCORE: 1.4

## 2022-04-01 NOTE — PROGRESS NOTES
Chief Complaint   Patient presents with   • COPD       HPI:  Kelly Lovett is a 76 y.o. year old female here today for follow-up on COPD and chronic respiratory failure with hypoxia.  Last seen 8/13/2019 by Dr. Gardner.  Significant medical history includes CHF, A. fib, and CKD. Patient also has a significant cardiac history of mitral valve repair and maze procedure.  Patient also had previous pneumothorax due to complications from procedure.  Patient referred from PCP as she recently complained of cough and chest congestion which was worse at night.  Patient was treated with Augmentin, prednisone, doxycycline.    Imaging was conducted including a chest CT scan on 3/28/2022 which showed probable right lower lobe scar with superimposed pneumonia with the presence of bronchial secretions as well as moderate emphysema.    Echocardiogram conducted on 3/24/2022 results as follows:  The left ventricular ejection fraction is visually estimated to be >75%, hyperdynamic.  Mildly dilated right ventricle with normal function.  Biatrial enlargement, left atrium severely dilated.  Known mitral valve repair from 2017 which is functioning normally: MG 2mmHg, HR 98 bpm.  At least moderate tricuspid regurgitation.  Estimated right ventricular systolic pressure is 32 mmHg.  Compared to prior study 11-5-21, some progression of TR, mildly dilated   right ventricle, LV function is hyperdynamic.    Regards to COPD, patient states that her main symptoms are shortness of breath, productive cough and chest congestion.  She was stepped up on her Advair to 500 approximately 3 months ago due to worsening dyspnea with exertion.  Patient attributes dyspnea due to deconditioning.  As far as activity intolerance, she states that it is mostly because her legs are weak because she does not ambulate frequently.  Since she presented initially to her PCP, she feels that her symptoms have improved as far as the productive cough and chest  congestion.  She denies any frequent rescue inhaler use, but is using it approximately 3 times daily along with the Advair and Spiriva.    ROS: As per HPI and otherwise negative if not stated.    Past Medical History:   Diagnosis Date   • Asthma     inhalers   • Breath shortness     pt reports using o2 at night 2L, no problems at this time   • Chronic systolic congestive heart failure (HCC) 2016   • COPD (chronic obstructive pulmonary disease) (HCC)    • Dilated cardiomyopathy (HCC)    • Emphysema of lung (HCC)    • Heart valve disease    • High cholesterol    • History of heart surgery    • Hyperlipidemia    • Hypertension    • Hypotension due to hypovolemia 3/1/2019   • Sleep apnea     uses cpap       Past Surgical History:   Procedure Laterality Date   • MITRAL VALVE REPAIR  2017    Procedure: MITRAL VALVE REPAIR ;  Surgeon: Lacey Porras M.D.;  Location: SURGERY [facility];  Service:    • MAZE PROCEDURE Left 2017    Procedure: MAZE PROCEDURE, Left atrial appendage ligation;  Surgeon: Lacey Porras M.D.;  Location: SURGERY [facility];  Service:    • PAGE  2017    Procedure: PAGE;  Surgeon: Lacey Porras M.D.;  Location: SURGERY [facility];  Service:    • APPENDECTOMY      1967   • OOPHORECTOMY         Family History   Problem Relation Age of Onset   • Cancer Father         colon cancer    • Hypertension Mother    • Cancer Sister 68        ovarian cancer       Social History     Socioeconomic History   • Marital status:      Spouse name: Not on file   • Number of children: Not on file   • Years of education: Not on file   • Highest education level: Not on file   Occupational History   • Not on file   Tobacco Use   • Smoking status: Former Smoker     Packs/day: 0.50     Years: 38.00     Pack years: 19.00     Types: Cigarettes     Quit date: 10/11/2001     Years since quittin.4   • Smokeless tobacco: Never Used   Vaping Use   • Vaping Use: Never used   Substance and  "Sexual Activity   • Alcohol use: Yes     Alcohol/week: 8.4 oz     Types: 14 Shots of liquor per week     Comment: 2 drinks a day   • Drug use: No   • Sexual activity: Yes     Partners: Male   Other Topics Concern   • Not on file   Social History Narrative   • Not on file     Social Determinants of Health     Financial Resource Strain: Not on file   Food Insecurity: Not on file   Transportation Needs: Not on file   Physical Activity: Not on file   Stress: Not on file   Social Connections: Not on file   Intimate Partner Violence: Not on file   Housing Stability: Not on file       Allergies as of 04/01/2022 - Reviewed 04/01/2022   Allergen Reaction Noted   • Sulfa drugs Rash 10/05/2016        Vitals:  /78 (BP Location: Left arm, Patient Position: Sitting, BP Cuff Size: Adult)   Pulse 98   Resp 16   Ht 1.651 m (5' 5\")   Wt 50.8 kg (112 lb)   SpO2 99%     Current medications as of today   Current Outpatient Medications   Medication Sig Dispense Refill   • Fluticasone-Umeclidin-Vilant (TRELEGY ELLIPTA) 100-62.5-25 MCG/INH AEROSOL POWDER, BREATH ACTIVATED inhalation Inhale 1 Inhalation every day. 1 Each 3   • Fluticasone-Umeclidin-Vilant (TRELEGY ELLIPTA) 100-62.5-25 MCG/INH AEROSOL POWDER, BREATH ACTIVATED inhalation Inhale 1 Inhalation every day. 1 Each 0   • doxycycline (VIBRAMYCIN) 100 MG Tab Take 1 Tablet by mouth 2 times a day for 5 days. 10 Tablet 0   • albuterol 108 (90 Base) MCG/ACT Aero Soln inhalation aerosol INHALE 2 PUFFS EVERY 6 HOURS AS NEEDED FOR SHORTNESS OF BREATH. 8 g 1   • Dextromethorphan-guaiFENesin (TUSSIN DM)  MG/5ML Syrup Take 5 mL by mouth every 6 hours as needed. 473 mL 1   • metoprolol SR (TOPROL XL) 25 MG TABLET SR 24 HR Take 1 Tablet by mouth every day for 90 days. 90 Tablet 3   • warfarin (COUMADIN) 2.5 MG Tab Take one-half to one (1/2-1) tablet daily as directed by [facility] 90 Tablet 1   • amiodarone (CORDARONE) 100 MG tablet Take 1 tablet by mouth " every day. 90 tablet 4   • sacubitril-valsartan (ENTRESTO) 24-26 MG Tab tablet Take 1 tablet by mouth 2 times a day. 60 tablet 11   • atorvastatin (LIPITOR) 40 MG Tab TAKE ONE TABLET BY MOUTH ONE TIME DAILY 100 tablet 4   • Home Care Oxygen Inhale 3 L/min continuous. Oxygen dose range: 2.5-3 L/min  Respiratory route via: Nasal cannula  Oxygen supplier: [facility]     • acetaminophen (TYLENOL) 500 MG Tab Take 500 mg by mouth 1 time daily as needed. Headache     • spironolactone (ALDACTONE) 25 MG Tab Take 1 Tablet by mouth every day. 30 Tablet 3     No current facility-administered medications for this visit.         Physical Exam:   Gen:           Alert and oriented, No apparent distress. Mood and affect appropriate, normal interaction with examiner.  Eyes:          PERRL, EOM intact, sclere white, conjunctive moist.  Ears:          Not examined. No lesions or deformities.  Hearing:     Grossly intact.  Nose:          Normal, no lesions or deformities.  Dentition:    Good dentition.  Oropharynx:   Tongue normal, posterior pharynx without erythema or exudate.  Neck:        Supple, trachea midline, no masses.  Respiratory Effort: No intercostal retractions or use of accessory muscles.   Lung Auscultation:      Clear to auscultation bilaterally; no rales, rhonchi or wheezing.  CV:            Regular rate and rhythm. No murmurs, rubs or gallops.  Abd:           Not examined. Soft non tender, non distended. Normal active bowel sounds. No masses.  Lymphadenopathy: No palpable nodes or edema.  Gait and Station: Normal.  Digits and Nails: No clubbing, cyanosis, petechiae, or nodes.   Cranial Nerves: II-XII grossly intact.  Skin:        No rashes, lesions or ulcers noted.               Ext:           No cyanosis or edema.      Assessment:  1. Chronic obstructive pulmonary disease, unspecified COPD type (HCC)  PULMONARY FUNCTION TESTS -Test requested: Complete Pulmonary Function Test; Include MIPS/MEPS? No    Multiple  Oximetry    Overnight Oximetry   2. Chronic respiratory failure with hypoxia (HCC)  Overnight Oximetry         Plan:  1.  Today I am changing maintenance inhalers.  I am giving a sample of Breo 100 for patients to try for the next 2 weeks.  If she finds benefit I am refilling this medication.  We will also update PFTs before next visit.  Patient concerned about poor sleep quality so I am ordering overnight oximetry and we will recheck this before next appointment.  Ambulatory multiple oximetry done while sitting today showed patient stable SPO2 at 2 L/min where SPO2 did not drop below 96%.  Recommended patient titrating supplemental oxygen as needed for shortness of breath or SPO2 less than 89%.  Patient instructed to stop using the Advair and Spiriva at this time and we will try to see if Trelegy is a benefit of the patient.  Patient previously treated for pneumonia and seems that the symptoms have been resolving.  Follow-up in 3 months or sooner if needed.    Please note that this dictation was created using voice recognition software. I have made every reasonable attempt to correct obvious errors, but it is possible there are errors of grammar and possibly content that I did not discover before finalizing the note.

## 2022-04-03 ENCOUNTER — PATIENT MESSAGE (OUTPATIENT)
Dept: HEALTH INFORMATION MANAGEMENT | Facility: OTHER | Age: 77
End: 2022-04-03

## 2022-04-06 DIAGNOSIS — E78.2 MIXED HYPERLIPIDEMIA: ICD-10-CM

## 2022-04-06 RX ORDER — ATORVASTATIN CALCIUM 40 MG/1
TABLET, FILM COATED ORAL
Qty: 100 TABLET | Refills: 4 | Status: SHIPPED | OUTPATIENT
Start: 2022-04-06

## 2022-04-06 NOTE — TELEPHONE ENCOUNTER
Received request via: Pharmacy    Was the patient seen in the last year in this department? Yes  3/23/2022  Does the patient have an active prescription (recently filled or refills available) for medication(s) requested? No

## 2022-04-08 ENCOUNTER — OFFICE VISIT (OUTPATIENT)
Dept: CARDIOLOGY | Facility: MEDICAL CENTER | Age: 77
End: 2022-04-08
Payer: MEDICARE

## 2022-04-08 VITALS
WEIGHT: 116 LBS | HEART RATE: 87 BPM | DIASTOLIC BLOOD PRESSURE: 70 MMHG | BODY MASS INDEX: 19.33 KG/M2 | HEIGHT: 65 IN | SYSTOLIC BLOOD PRESSURE: 92 MMHG | OXYGEN SATURATION: 98 % | RESPIRATION RATE: 15 BRPM

## 2022-04-08 DIAGNOSIS — I51.89 LEFT VENTRICULAR SYSTOLIC DYSFUNCTION, NYHA CLASS 3: ICD-10-CM

## 2022-04-08 DIAGNOSIS — J44.1 CHRONIC OBSTRUCTIVE PULMONARY DISEASE WITH ACUTE EXACERBATION (HCC): ICD-10-CM

## 2022-04-08 DIAGNOSIS — Z79.01 CHRONIC ANTICOAGULATION: ICD-10-CM

## 2022-04-08 DIAGNOSIS — I48.0 PAF (PAROXYSMAL ATRIAL FIBRILLATION) (HCC): ICD-10-CM

## 2022-04-08 DIAGNOSIS — J96.11 CHRONIC RESPIRATORY FAILURE WITH HYPOXIA (HCC): ICD-10-CM

## 2022-04-08 DIAGNOSIS — Z98.890 HISTORY OF MITRAL VALVE REPAIR: ICD-10-CM

## 2022-04-08 DIAGNOSIS — E78.2 MIXED HYPERLIPIDEMIA: ICD-10-CM

## 2022-04-08 DIAGNOSIS — I10 HTN (HYPERTENSION), MALIGNANT: ICD-10-CM

## 2022-04-08 DIAGNOSIS — I50.20 ACC/AHA STAGE C SYSTOLIC HEART FAILURE (HCC): ICD-10-CM

## 2022-04-08 DIAGNOSIS — Z98.890 H/O MAZE PROCEDURE: ICD-10-CM

## 2022-04-08 DIAGNOSIS — Z79.899 HIGH RISK MEDICATION USE: ICD-10-CM

## 2022-04-08 PROCEDURE — 99214 OFFICE O/P EST MOD 30 MIN: CPT | Performed by: NURSE PRACTITIONER

## 2022-04-08 RX ORDER — FUROSEMIDE 20 MG/1
20 TABLET ORAL
Qty: 30 TABLET | Refills: 3 | Status: SHIPPED
Start: 2022-04-08 | End: 2022-07-28

## 2022-04-08 ASSESSMENT — FIBROSIS 4 INDEX: FIB4 SCORE: 1.4

## 2022-04-08 ASSESSMENT — ENCOUNTER SYMPTOMS
PALPITATIONS: 0
CLAUDICATION: 0
ORTHOPNEA: 0
PND: 0
ABDOMINAL PAIN: 0
MYALGIAS: 0
SHORTNESS OF BREATH: 1
DIZZINESS: 1
COUGH: 0
FEVER: 0

## 2022-04-08 NOTE — PROGRESS NOTES
Chief Complaint   Patient presents with   • Congestive Heart Failure       Subjective:   Kelly Lovett is a 75 y.o. female who presents today for follow up on her heart failure With her , Cruz.     Patient of Dr. Li. She was last seen in clinic on 8/25/2021.  During that visit, patient was sent for follow-up lab testing and to reschedule echocardiogram.    Patient comes in the office today reporting increasing shortness of breath.  She also mentions having occasional episodes with dizziness with getting up.  She denies chest pain, palpitations, orthopnea, PND or edema.    She did have a recent follow-up with her pulmonologist and they recommended inhaler changes and additional testing.  Patient has not scheduled testing yet.    Patient admits she is not very good about sending CardioMEMS transmissions.    She continues to use oxygen at 3 L.    Patient did see her PCP and had a recent CT scan which showed concern of pneumonia, patient did complete course of Augmentin and doxycycline.     She does not weigh herself regularly at home.    Additonally, patient has the following medical problems:    -Dilated cardiomyopathy, LVEF 20%    -Atrial fibrillation: History of maze, cardioversion in 7/20/2019, taking amiodarone    -Mitral valve repair with maze, ERI ligation in 2017, now with moderate mitral regurgitation    -COPD    -Hypertension    -Dyslipidemia  Past Medical History:   Diagnosis Date   • Asthma     inhalers   • Breath shortness     pt reports using o2 at night 2L, no problems at this time   • Chronic systolic congestive heart failure (HCC) 7/7/2016   • COPD (chronic obstructive pulmonary disease) (HCC)    • Dilated cardiomyopathy (HCC)    • Emphysema of lung (HCC)    • Heart valve disease    • High cholesterol    • History of heart surgery    • Hyperlipidemia    • Hypertension    • Hypotension due to hypovolemia 3/1/2019   • Sleep apnea     uses cpap     Past Surgical History:   Procedure  Laterality Date   • MITRAL VALVE REPAIR  2017    Procedure: MITRAL VALVE REPAIR ;  Surgeon: Lacey Porras M.D.;  Location: SURGERY [facility];  Service:    • MAZE PROCEDURE Left 2017    Procedure: MAZE PROCEDURE, Left atrial appendage ligation;  Surgeon: Lacey Porras M.D.;  Location: SURGERY [facility];  Service:    • PAGE  2017    Procedure: PAGE;  Surgeon: Lacey Porras M.D.;  Location: SURGERY [facility];  Service:    • APPENDECTOMY      1967   • OOPHORECTOMY       Family History   Problem Relation Age of Onset   • Cancer Father         colon cancer    • Hypertension Mother    • Cancer Sister 68        ovarian cancer     Social History     Socioeconomic History   • Marital status:      Spouse name: Not on file   • Number of children: Not on file   • Years of education: Not on file   • Highest education level: Not on file   Occupational History   • Not on file   Tobacco Use   • Smoking status: Former Smoker     Packs/day: 0.50     Years: 38.00     Pack years: 19.00     Types: Cigarettes     Quit date: 10/11/2001     Years since quittin.5   • Smokeless tobacco: Never Used   Vaping Use   • Vaping Use: Never used   Substance and Sexual Activity   • Alcohol use: Yes     Alcohol/week: 8.4 oz     Types: 14 Shots of liquor per week     Comment: 2 drinks a day   • Drug use: No   • Sexual activity: Yes     Partners: Male   Other Topics Concern   • Not on file   Social History Narrative   • Not on file     Social Determinants of Health     Financial Resource Strain: Not on file   Food Insecurity: Not on file   Transportation Needs: Not on file   Physical Activity: Not on file   Stress: Not on file   Social Connections: Not on file   Intimate Partner Violence: Not on file   Housing Stability: Not on file     Allergies   Allergen Reactions   • Sulfa Drugs Rash     Rxn - years ago in her 20's     Outpatient Encounter Medications as of 2022   Medication Sig Dispense Refill   •  furosemide (LASIX) 20 MG Tab Take 1 Tablet by mouth 1 time a day as needed. 30 Tablet 3   • atorvastatin (LIPITOR) 40 MG Tab TAKE ONE TABLET BY MOUTH ONE TIME DAILY 100 Tablet 4   • Fluticasone-Umeclidin-Vilant (TRELEGY ELLIPTA) 100-62.5-25 MCG/INH AEROSOL POWDER, BREATH ACTIVATED inhalation Inhale 1 Inhalation every day. 1 Each 3   • albuterol 108 (90 Base) MCG/ACT Aero Soln inhalation aerosol INHALE 2 PUFFS EVERY 6 HOURS AS NEEDED FOR SHORTNESS OF BREATH. 8 g 1   • Dextromethorphan-guaiFENesin (TUSSIN DM)  MG/5ML Syrup Take 5 mL by mouth every 6 hours as needed. 473 mL 1   • metoprolol SR (TOPROL XL) 25 MG TABLET SR 24 HR Take 1 Tablet by mouth every day for 90 days. 90 Tablet 3   • spironolactone (ALDACTONE) 25 MG Tab Take 1 Tablet by mouth every day. 30 Tablet 3   • warfarin (COUMADIN) 2.5 MG Tab Take one-half to one (1/2-1) tablet daily as directed by [facility] 90 Tablet 1   • amiodarone (CORDARONE) 100 MG tablet Take 1 tablet by mouth every day. 90 tablet 4   • sacubitril-valsartan (ENTRESTO) 24-26 MG Tab tablet Take 1 tablet by mouth 2 times a day. 60 tablet 11   • Home Care Oxygen Inhale 3 L/min continuous. Oxygen dose range: 2.5-3 L/min  Respiratory route via: Nasal cannula  Oxygen supplier: [facility]     • acetaminophen (TYLENOL) 500 MG Tab Take 500 mg by mouth 1 time daily as needed. Headache     • [DISCONTINUED] Fluticasone-Umeclidin-Vilant (TRELEGY ELLIPTA) 100-62.5-25 MCG/INH AEROSOL POWDER, BREATH ACTIVATED inhalation Inhale 1 Inhalation every day. (Patient not taking: Reported on 4/8/2022) 1 Each 0     No facility-administered encounter medications on file as of 4/8/2022.     Review of Systems   Constitutional: Negative for fever and malaise/fatigue.   Respiratory: Positive for shortness of breath. Negative for cough.    Cardiovascular: Negative for chest pain, palpitations, orthopnea, claudication, leg swelling and PND.   Gastrointestinal: Negative for abdominal  "pain.   Musculoskeletal: Negative for myalgias.   Neurological: Positive for dizziness (occ Lightheaded when getting up).   All other systems reviewed and are negative.       Objective:   BP (!) 92/70 (BP Location: Left arm, Patient Position: Sitting, BP Cuff Size: Adult)   Pulse 87   Resp 15   Ht 1.654 m (5' 5.12\")   Wt 52.6 kg (116 lb)   LMP  (LMP Unknown)   SpO2 98%   BMI 19.23 kg/m²     Physical Exam  Vitals reviewed.   Constitutional:       Appearance: She is well-developed.   HENT:      Head: Normocephalic and atraumatic.   Eyes:      Pupils: Pupils are equal, round, and reactive to light.   Neck:      Vascular: No JVD.   Cardiovascular:      Rate and Rhythm: Normal rate and regular rhythm.      Heart sounds: Normal heart sounds.   Pulmonary:      Effort: Pulmonary effort is normal. No respiratory distress.      Breath sounds: Normal breath sounds. No wheezing or rales.   Abdominal:      General: Bowel sounds are normal.      Palpations: Abdomen is soft.   Musculoskeletal:         General: Normal range of motion.      Cervical back: Normal range of motion and neck supple.      Right lower leg: No edema.      Left lower leg: No edema.   Skin:     General: Skin is warm and dry.   Neurological:      General: No focal deficit present.      Mental Status: She is alert and oriented to person, place, and time.   Psychiatric:         Behavior: Behavior normal.       Lab Results   Component Value Date/Time    CHOLSTRLTOT 180 09/10/2020 09:49 AM    LDL 48 09/10/2020 09:49 AM     09/10/2020 09:49 AM    TRIGLYCERIDE 72 09/10/2020 09:49 AM       Lab Results   Component Value Date/Time    SODIUM 141 03/31/2022 12:17 PM    POTASSIUM 5.2 03/31/2022 12:17 PM    CHLORIDE 105 03/31/2022 12:17 PM    CO2 18 (L) 03/31/2022 12:17 PM    GLUCOSE 98 03/31/2022 12:17 PM    BUN 40 (H) 03/31/2022 12:17 PM    CREATININE 1.34 03/31/2022 12:17 PM     Lab Results   Component Value Date/Time    ALKPHOSPHAT 71 03/23/2022 12:13 PM " "   ASTSGOT 20 03/23/2022 12:13 PM    ALTSGPT 18 03/23/2022 12:13 PM    TBILIRUBIN 0.4 03/23/2022 12:13 PM      Transthoracic Echo Report 3/7/2017  Left ventricular ejection fraction is visually estimated to be 60%,   although based on the mitral regurgitation, the \"effective\" LVEF is   much lower.  Severe, explosive mitral regurgitation. Prolapse of the posterior   mitral leaflet was present.  Right ventricular systolic pressure is estimated to be at least 45   mmHg.  Compared with the outside study of 2015 - the regurgitation is still significant.  If clinically indicated, a transesophageal study would be useful.   Ordering provider notified at time of reading.      Transesophageal Echo Report 3/13/2017  No mitral stenosis. Severe mitral regurgitation with eccentric jets.   Prolapse of the anterior mitral leaflet was present. Prolapse of the posterior mitral leaflet was present. There are redundant tissues seen on the mitral valve leaflets.   No aortic stenosis. Trace aortic insufficiency.   The aortic valve was not thoroughly evaluated due to desaturation and focus was on the mitral valve.    Heart cath 3/17/2017  POSTOPERATIVE DIAGNOSES:  1.  Severe mitral regurgitation.  2.  Normal left ventricular systolic function, ejection fraction greater than 75%.  3.  No significant coronary artery disease.     Transesophageal Echo Report 4/20/2017  Intraoperative PAGE during mitral valve repair, left atrial appendage ligation.  Pre-CPB images show normal biventricular systolic function.   P2 flail with ruptured chordae and severe mitral regurgitation.   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  Post   CPB images show preserved biventricular function.  The mitral valve has been repaired with p2 resection and 36 mm annuloplasty band.  There is no residual mitral regurgitation and mean gradient across the valve is 3 mm Hg.  Findings communicated at the time of exam.      Transthoracic Echo Report " 2/22/2019  Comapred to the prior echocardiogram dated 3/7/2017, significant changes are noted.     1. Severely reduced left ventricular systolic function.  Left   ventricular ejection fraction is visually estimated to be 20%.     2. Severely dilated left atrium.     3. Estimated right ventricular systolic pressure  is 50 mmHg.  Mildly dilated right ventricle.  Reduced right ventricular systolic function.       Dr. Lainez was notified of critical results by [employer] at 9:21 AM on 2/22/2019.     Transthoracic Echo Report 2/25/2019  Limited Study to rule out LV thrombus.  No thrombus observed in the LV with contrast administration.   Left ventricular ejection fraction is visually estimated to be 20%.     Compared to the previous echocardiogram performed on 2/22/19: There has been no significant change.     Transesophageal Echo Report 2/26/2019  Known mitral valve repair functioning adequately with moderate   regurgitation in the setting of severe left ventricular systolic   dysfunction.     Right heart cath and CardioMEMS implantation on 6/12/2019  Hemodynamics:  1) Pulmonary arterial pressure: Systolic of 42 mm Hg, diastolic of 16 mm Hg, mean of 29 mm Hg.  2) Pulmonary arterial wedge pressure with mean of 19 mm Hg.  3) Cardiac output of 4.5 and Cardiac index of 2.8 through thermodilution method.  4) Cardiac output of 4.3 and Cardiac index of 2.7 through Concepcion's method.  5) Right ventricular pressure: Systolic of 34 mm Hg, end diastolic pressure of 7 mm Hg.  6) Right atrial pressure: A wave of 11 mm Hg, V wave of 12 mm Hg, mean of 10 mm Hg.  7) Pulmonary vascular resistance of 2.3 Wood Units.     Conclusions:  1) Successful implantation of Cardiomems device for remote monitor of intracardiac pressures.  2) Patient will be monitored as part of our protocol in our heart failure program to further reduce repeated heart failure hospitalization and also to improve overall quality of life.  3) Patient appears to be  hypovolemic. IVF hydration was given.  4) Hypotensive but asymptomatic. Ok to be discharged.  5) Advised patient to hold Entresto for now until being evaluated again in clinic next week.     Transthoracic Echo Report 11/5/2021  Compared to the images of the prior study 2/25/19 the patient is not in   atrial flutter  Normal left ventricular systolic function.  The left ventricular ejection fraction is visually estimated to be 55%.  Grade III diastolic dysfunction.  Normal right ventricular size and systolic function.  No significant valvular abnormalities.      Transthoracic Echo Report 3/24/2022  Fair quality study.  The left ventricular ejection fraction is visually estimated to be >  75%, hyperdynamic.  Mildly dilated right ventricle with normal function.  Biatrial enlargement, left atrium severely dilated.  Known mitral valve repair from 2017 which is functioning normally: MG 2   mmHg, HR 98 bpm.  At least moderate tricuspid regurgitation.  Estimated right ventricular systolic pressure is 32 mmHg.     Compared to prior study 11-5-21, some progression of TR, mildly dilated   right ventricle, LV function is hyperdynamic.      Assessment:     1. Chronic obstructive pulmonary disease with acute exacerbation (HCC)  Basic Metabolic Panel   2. High risk medication use  Basic Metabolic Panel   3. ACC/AHA stage C systolic heart failure (HCC)     4. Left ventricular systolic dysfunction, NYHA class 3     5. Chronic respiratory failure with hypoxia (HCC)     6. History of mitral valve repair     7. H/O maze procedure     8. PAF (paroxysmal atrial fibrillation) (HCC)     9. Mixed hyperlipidemia     10. Chronic anticoagulation     11. HTN (hypertension), malignant         Medical Decision Making:  Today's Assessment / Status / Plan:   1. HFrEF, Stage C, Class 3, LVEF greater than 75% improved from 20%: Based on physical examination findings, patient is euvolemic. No JVD, lungs are clear to auscultation, no pitting edema in  bilateral lower extremities, no ascites.  -Heart failure due to nonischemic  -ACE-I/ARB/ARNI: Continue Entresto 24-26 mg twice a day (switched over to Entresto in June 2021)  -Evidence Based Beta-blocker: Continue metoprolol  mg daily  -Aldosterone Antagonist: Continue spironolactone 25 mg daily  -Diuretic: Recommend patient to try taking furosemide 20 mg daily for 2 days then furosemide 20 mg daily as needed.  Patient to contact our office if she finds improvement with the diuretic use  -Labs: BMP in 4 weeks  -No indication for ICD as LVEF improved  -Reinforced s/sx of worsening heart failure with patient and weight monitoring. Pt verbalizes understanding. Pt to call office or RTC if present.    -Start monitoring weights at home daily. Call office if weight increasing greater than 3 lbs in 1 day or greater than 5 lbs in 1 week.   -PUMP line number [phone] (PUMP)  -Heart Failure Education: Education reinforced  -Advanced care planning: Advanced directive and POLST are on file  -Discussed the importance of regular CardioMEMS readings, patient does understand, but states she forgets and gets busy with her life.  Reinforced that this device is useful as a tool, but requires patient to be consistent.    2.  History of mitral stenosis with mitral valve repair in 2017:  -Valve functioning normally    3.  Paroxysmal atrial fibrillation, history of maze, cardioversion:  -Continue warfarin, followed by the anticoagulation clinic  -Continue amiodarone 100 mg daily     4.  Hypertension: Stable  -Recommendations per above    5.  Hyperlipidemia:  -Last LDL 48 on 9/10/2020  -Continue atorvastatin 40 mg daily    6.  COPD, respiratory failure:  -Continue oxygen use at 3 L  -Continue follow-up with pulmonary  -Encourage patient to contact pulmonary office regarding scheduling of tests    FU in clinic in 3 months with Dr. Li and labs. Sooner if needed.    Patient verbalizes understanding and agrees with the plan of care.      PLEASE NOTE: This Note was created using voice recognition Software. I have made every reasonable attempt to correct obvious errors, but I expect that there are errors of grammar and possibly content that I did not discover before finalizing the note

## 2022-04-08 NOTE — PATIENT INSTRUCTIONS
Follow up on Pulmonary tests     Take furosemide 20 mg daily for 2 days and notify our office if you symptoms improve    BMP in 4 weeks

## 2022-04-11 ENCOUNTER — PATIENT MESSAGE (OUTPATIENT)
Dept: MEDICAL GROUP | Facility: LAB | Age: 77
End: 2022-04-11
Payer: MEDICARE

## 2022-04-11 DIAGNOSIS — J44.9 CHRONIC OBSTRUCTIVE PULMONARY DISEASE, UNSPECIFIED COPD TYPE (HCC): ICD-10-CM

## 2022-04-11 NOTE — TELEPHONE ENCOUNTER
----- Message from Kelly Lovett sent at 4/11/2022  2:32 PM PDT -----  Regarding: Advair  I need you to call in prescription for flute as one. I’m about to run out. Thank you, Kelly

## 2022-04-14 ENCOUNTER — TELEPHONE (OUTPATIENT)
Dept: MEDICAL GROUP | Facility: LAB | Age: 77
End: 2022-04-14
Payer: MEDICARE

## 2022-04-14 NOTE — TELEPHONE ENCOUNTER
Colorectal Care Gap Outreach    1. Confirmed patient is between the ages of 50-75: NO     2. Confirmed that patient IS overdue or due soon for colorectal cancer screening: YES     3. Were orders placed within the last 12 months to complete screening: NO     Phone Number Called: [phone]  Call outcome: left message for patient to call back regarding message below: Colonoscopy overdue and would like to know if you would like to have one in future or discontinue. [phone]    _____________________________________________________________________    Colon Cancer Screening Guidelines:     Important: If patient has any history of colon polyps or family history of colorectal cancer, FIT and Cologuard are NOT appropriate options. A colonoscopy is the recommended test for this set of patients.    • Colonoscopy  o Always recommend colonoscopy first.   o A colonoscopy is recommended over the other tests because it provides direct visualization of the colon and if there are small polyps these can also be removed with one procedure.  o If negative and no family history, could be cleared for 10 years.     • Cologuard/FIT  o Cologuard is completed once every 3 years.  o FIT is completed annually.  o If positive, Cologuard and FIT will require a diagnostic colonoscopy. Screening colonoscopies are classically covered by insurances, however, diagnostic colonoscopies may result in a bill.

## 2022-04-28 ENCOUNTER — ANTICOAGULATION VISIT (OUTPATIENT)
Dept: MEDICAL GROUP | Facility: MEDICAL CENTER | Age: 77
End: 2022-04-28
Payer: MEDICARE

## 2022-04-28 DIAGNOSIS — I48.0 PAROXYSMAL ATRIAL FIBRILLATION (HCC): ICD-10-CM

## 2022-04-28 DIAGNOSIS — Z79.01 LONG TERM (CURRENT) USE OF ANTICOAGULANTS: ICD-10-CM

## 2022-04-28 LAB — INR PPP: 2.8 (ref 2–3.5)

## 2022-04-28 PROCEDURE — 85610 PROTHROMBIN TIME: CPT | Performed by: FAMILY MEDICINE

## 2022-04-28 PROCEDURE — 93793 ANTICOAG MGMT PT WARFARIN: CPT | Performed by: FAMILY MEDICINE

## 2022-04-28 NOTE — PROGRESS NOTES
OP Anticoagulation Service Note    Date: 2022    Anticoagulation Summary  As of 2022    INR goal:  2.0-3.0   TTR:  61.1 % (2.8 y)   INR used for dosin.80 (2022)   Warfarin maintenance plan:  1.25 mg (2.5 mg x 0.5) every Mon, Wed, Fri; 2.5 mg (2.5 mg x 1) all other days   Weekly warfarin total:  13.75 mg   Plan last modified:  Amanda Perera, PharmD (2021)   Next INR check:  2022   Target end date:  Indefinite    Indications    Atrial flutter (HCC) [I48.92]  Long term (current) use of anticoagulants [Z79.01] [Z79.01]  Paroxysmal atrial fibrillation (HCC) [I48.0]             Anticoagulation Episode Summary     INR check location:  Anticoagulation Clinic    Preferred lab:      Send INR reminders to:      Comments:        Anticoagulation Care Providers     Provider Role Specialty Phone number    [facility]   [phone]        Anticoagulation Patient Findings      HPI:   Kelly Lovett is in the Anticoagulation Clinic today for an INR check on their anticoagulation therapy.     The reason for today's visit is to prevent morbidity and mortality from a blood clot and/or stroke and to reduce the risk of bleeding while on a anticoagulant.     PCP:  Chinmay Figueredo M.D.  [address] [id number] [phone]    3 vitals included with today's appt-unless patient declined:  (BP, HR, weight, ht, RR)   There were no vitals filed for this visit.    Verified current warfarin dosing schedule.    Medications reconciled   Pt is not on antiplatelet therapy    Assessment:   INR  therapeutic.     Plan:  Instructed pt to continue on with current regimen.    Follow up:  Follow up appointment in 5 week(s).       Other info:  Pt educated to contact our clinic with any changes in medications or s/s of bleeding or thrombosis.  Education was provided today regarding tips to reduce their bleed risk and dietary constraints while on a  anticoagulant.    National Recommendations:  The CHEST guidelines recommends frequent INR monitoring at regular intervals (a few days up to a max of 12 weeks) to ensure patients are on the proper dose of warfarin, and patients are not having any complications from therapy.  INRs can dramatically change over a short time period due to diet, medications, and medical conditions.     Clovis Kamara, PharmD, BCACP    [facility]  Phone [phone] fax [phone]

## 2022-05-10 DIAGNOSIS — Z79.01 CHRONIC ANTICOAGULATION: ICD-10-CM

## 2022-05-16 DIAGNOSIS — J41.0 SIMPLE CHRONIC BRONCHITIS (HCC): ICD-10-CM

## 2022-05-16 NOTE — TELEPHONE ENCOUNTER
Received request via: Patient    Was the patient seen in the last year in this department? Yes  3/2322  Does the patient have an active prescription (recently filled or refills available) for medication(s) requested? No

## 2022-05-17 RX ORDER — ALBUTEROL SULFATE 90 UG/1
2 AEROSOL, METERED RESPIRATORY (INHALATION) EVERY 6 HOURS PRN
Qty: 18 G | Refills: 3 | Status: SHIPPED | OUTPATIENT
Start: 2022-05-17 | End: 2022-08-17 | Stop reason: SDUPTHER

## 2022-06-02 ENCOUNTER — ANTICOAGULATION VISIT (OUTPATIENT)
Dept: MEDICAL GROUP | Facility: MEDICAL CENTER | Age: 77
End: 2022-06-02
Payer: MEDICARE

## 2022-06-02 DIAGNOSIS — I48.0 PAROXYSMAL ATRIAL FIBRILLATION (HCC): ICD-10-CM

## 2022-06-02 DIAGNOSIS — Z79.01 LONG TERM (CURRENT) USE OF ANTICOAGULANTS: ICD-10-CM

## 2022-06-02 LAB — INR PPP: 3.5 (ref 2–3.5)

## 2022-06-02 PROCEDURE — 99211 OFF/OP EST MAY X REQ PHY/QHP: CPT | Performed by: FAMILY MEDICINE

## 2022-06-02 PROCEDURE — 85610 PROTHROMBIN TIME: CPT | Performed by: FAMILY MEDICINE

## 2022-06-02 NOTE — PROGRESS NOTES
OP Anticoagulation Service Note    Date: 6/2/2022    Anticoagulation Summary  As of 6/2/2022    INR goal:  2.0-3.0   TTR:  60.1 % (2.9 y)   INR used for dosing:  3.50 (6/2/2022)   Warfarin maintenance plan:  1.25 mg (2.5 mg x 0.5) every Mon, Wed, Fri; 2.5 mg (2.5 mg x 1) all other days   Weekly warfarin total:  13.75 mg   Plan last modified:  Amanda Perera, PharmD (12/9/2021)   Next INR check:  6/23/2022   Target end date:  Indefinite    Indications    Atrial flutter (HCC) [I48.92]  Long term (current) use of anticoagulants [Z79.01] [Z79.01]  Paroxysmal atrial fibrillation (HCC) [I48.0]             Anticoagulation Episode Summary     INR check location:  Anticoagulation Clinic    Preferred lab:      Send INR reminders to:      Comments:        Anticoagulation Care Providers     Provider Role Specialty Phone number    [facility]   [phone]        Anticoagulation Patient Findings  Patient Findings     Negatives:  Signs/symptoms of thrombosis, Signs/symptoms of bleeding, Laboratory test error suspected, Change in health, Change in alcohol use, Change in activity, Upcoming invasive procedure, Emergency department visit, Upcoming dental procedure, Missed doses, Extra doses, Change in medications, Change in diet/appetite, Hospital admission, Bruising, Other complaints          HPI:   Kelly Lovett is in the Anticoagulation Clinic today for an INR check on their anticoagulation therapy.     The reason for today's visit is to prevent morbidity and mortality from a blood clot and/or stroke and to reduce the risk of bleeding while on a anticoagulant.     PCP:  Chinmay Figueredo M.D.  [address] [id number] [phone]    3 vitals included with today's appt-unless patient declined:  (BP, HR, weight, ht, RR)   There were no vitals filed for this visit.    Verified current warfarin dosing schedule.    Medications reconciled   Pt is not on antiplatelet  therapy    Assessment:   INR  SUPRA-therapeutic.     Plan:  Instructed pt to HOLD x 1 dose tomorrow (already took her dose today - counseled) and to then continue on w/ her current regimen.    Follow up:  Follow up appointment in 3 week(s).       Other info:  Pt educated to contact our clinic with any changes in medications or s/s of bleeding or thrombosis.  Education was provided today regarding tips to reduce their bleed risk and dietary constraints while on a anticoagulant.    National Recommendations:  The CHEST guidelines recommends frequent INR monitoring at regular intervals (a few days up to a max of 12 weeks) to ensure patients are on the proper dose of warfarin, and patients are not having any complications from therapy.  INRs can dramatically change over a short time period due to diet, medications, and medical conditions.     Clovis Kamara, PharmD, BCACP    [facility]  Phone [phone] fax [phone]

## 2022-06-06 ENCOUNTER — TELEPHONE (OUTPATIENT)
Dept: MEDICAL GROUP | Facility: LAB | Age: 77
End: 2022-06-06
Payer: MEDICARE

## 2022-06-06 NOTE — TELEPHONE ENCOUNTER
ESTABLISHED PATIENT PRE-VISIT PLANNING     Patient was NOT contacted to complete PVP.     Note: Patient will not be contacted if there is no indication to call.     1.  Reviewed notes from the last few office visits within the medical group: Yes    2.  If any orders were placed at last visit or intended to be done for this visit (i.e. 6 mos follow-up), do we have Results/Consult Notes?         •  Labs - Labs ordered, completed on 3/31/2022 and results are in chart.  Note: If patient appointment is for lab review and patient did not complete labs, check with provider if OK to reschedule patient until labs completed.       •  Imaging - Imaging ordered, completed and results are in chart.       •  Referrals - Referral ordered, patient has NOT been seen.    3. Is this appointment scheduled as a Hospital Follow-Up? No    4.  Immunizations were updated in Epic using Reconcile Outside Information activity? Yes    5.  Patient is due for the following Health Maintenance Topics:   Health Maintenance Due   Topic Date Due   • IMM ZOSTER VACCINES (2 of 3) 10/10/2013   • COLORECTAL CANCER SCREENING  05/06/2019   • Annual Wellness Visit  08/26/2021           6.  AHA (Pulse8) form printed for Provider? No, already completed

## 2022-06-07 ENCOUNTER — OFFICE VISIT (OUTPATIENT)
Dept: MEDICAL GROUP | Facility: LAB | Age: 77
End: 2022-06-07
Payer: MEDICARE

## 2022-06-07 ENCOUNTER — HOSPITAL ENCOUNTER (OUTPATIENT)
Dept: LAB | Facility: MEDICAL CENTER | Age: 77
End: 2022-06-07
Attending: FAMILY MEDICINE
Payer: MEDICARE

## 2022-06-07 VITALS
HEART RATE: 90 BPM | BODY MASS INDEX: 17.49 KG/M2 | DIASTOLIC BLOOD PRESSURE: 60 MMHG | SYSTOLIC BLOOD PRESSURE: 100 MMHG | HEIGHT: 65 IN | RESPIRATION RATE: 14 BRPM | OXYGEN SATURATION: 98 % | TEMPERATURE: 97 F | WEIGHT: 105 LBS

## 2022-06-07 DIAGNOSIS — I50.22 CHRONIC SYSTOLIC CONGESTIVE HEART FAILURE (HCC): ICD-10-CM

## 2022-06-07 DIAGNOSIS — R42 ORTHOSTATIC LIGHTHEADEDNESS: ICD-10-CM

## 2022-06-07 DIAGNOSIS — S81.819A SKIN TEAR OF LOWER LEG WITHOUT COMPLICATION, INITIAL ENCOUNTER: ICD-10-CM

## 2022-06-07 LAB
ALBUMIN SERPL BCP-MCNC: 4.1 G/DL (ref 3.2–4.9)
ALBUMIN/GLOB SERPL: 1.8 G/DL
ALP SERPL-CCNC: 72 U/L (ref 30–99)
ALT SERPL-CCNC: 11 U/L (ref 2–50)
ANION GAP SERPL CALC-SCNC: 14 MMOL/L (ref 7–16)
AST SERPL-CCNC: 19 U/L (ref 12–45)
BASOPHILS # BLD AUTO: 0.7 % (ref 0–1.8)
BASOPHILS # BLD: 0.07 K/UL (ref 0–0.12)
BILIRUB SERPL-MCNC: 0.5 MG/DL (ref 0.1–1.5)
BUN SERPL-MCNC: 28 MG/DL (ref 8–22)
CALCIUM SERPL-MCNC: 9.4 MG/DL (ref 8.5–10.5)
CHLORIDE SERPL-SCNC: 100 MMOL/L (ref 96–112)
CO2 SERPL-SCNC: 25 MMOL/L (ref 20–33)
CREAT SERPL-MCNC: 1.18 MG/DL (ref 0.5–1.4)
EOSINOPHIL # BLD AUTO: 0.07 K/UL (ref 0–0.51)
EOSINOPHIL NFR BLD: 0.7 % (ref 0–6.9)
ERYTHROCYTE [DISTWIDTH] IN BLOOD BY AUTOMATED COUNT: 49.5 FL (ref 35.9–50)
GFR SERPLBLD CREATININE-BSD FMLA CKD-EPI: 47 ML/MIN/1.73 M 2
GLOBULIN SER CALC-MCNC: 2.3 G/DL (ref 1.9–3.5)
GLUCOSE SERPL-MCNC: 115 MG/DL (ref 65–99)
HCT VFR BLD AUTO: 35.2 % (ref 37–47)
HGB BLD-MCNC: 10.7 G/DL (ref 12–16)
IMM GRANULOCYTES # BLD AUTO: 0.06 K/UL (ref 0–0.11)
IMM GRANULOCYTES NFR BLD AUTO: 0.6 % (ref 0–0.9)
LYMPHOCYTES # BLD AUTO: 1.2 K/UL (ref 1–4.8)
LYMPHOCYTES NFR BLD: 11.3 % (ref 22–41)
MCH RBC QN AUTO: 30.8 PG (ref 27–33)
MCHC RBC AUTO-ENTMCNC: 30.4 G/DL (ref 33.6–35)
MCV RBC AUTO: 101.4 FL (ref 81.4–97.8)
MONOCYTES # BLD AUTO: 1.03 K/UL (ref 0–0.85)
MONOCYTES NFR BLD AUTO: 9.7 % (ref 0–13.4)
NEUTROPHILS # BLD AUTO: 8.23 K/UL (ref 2–7.15)
NEUTROPHILS NFR BLD: 77 % (ref 44–72)
NRBC # BLD AUTO: 0 K/UL
NRBC BLD-RTO: 0 /100 WBC
PLATELET # BLD AUTO: 305 K/UL (ref 164–446)
PMV BLD AUTO: 10.2 FL (ref 9–12.9)
POTASSIUM SERPL-SCNC: 4.9 MMOL/L (ref 3.6–5.5)
PROT SERPL-MCNC: 6.4 G/DL (ref 6–8.2)
RBC # BLD AUTO: 3.47 M/UL (ref 4.2–5.4)
SODIUM SERPL-SCNC: 139 MMOL/L (ref 135–145)
WBC # BLD AUTO: 10.7 K/UL (ref 4.8–10.8)

## 2022-06-07 PROCEDURE — 85025 COMPLETE CBC W/AUTO DIFF WBC: CPT

## 2022-06-07 PROCEDURE — 36415 COLL VENOUS BLD VENIPUNCTURE: CPT

## 2022-06-07 PROCEDURE — 99214 OFFICE O/P EST MOD 30 MIN: CPT | Performed by: FAMILY MEDICINE

## 2022-06-07 PROCEDURE — 80053 COMPREHEN METABOLIC PANEL: CPT

## 2022-06-07 ASSESSMENT — FIBROSIS 4 INDEX: FIB4 SCORE: 1.4

## 2022-06-07 NOTE — PROGRESS NOTES
"Subjective:     CC: Fall, lightheaded    HPI:   Kelly is a 76-year-old female with a medical history which includes chronic respiratory failure secondary to COPD, CHF, A. fib, and CKD who presents after recent lightheadedness and fall.  Three days ago she arose quickly from a seated position and felt lightheaded, she then tried to sit down quickly but fell and hit her knee.  Paramedics did come to the house and evaluate her, no head trauma and she was not taken to the ER.  [place] well since then but continues to have feelings of lightheadedness.  No worsening shortness of breath, on baseline 3 L O2, no chest pain.  No headache.      Currently  not taking her Lasix which is prescribed as needed, no recent medication changes.    She and  have been dressing the knee with gauze, denies increasing redness, pain, pus.    Medicatons, past medical history, allergies, and social history have been reviewed and updated.      Objective:       Exam:  /60 (BP Location: Left arm, Patient Position: Sitting, BP Cuff Size: Adult)   Pulse 90   Temp 36.1 °C (97 °F)   Resp 14   Ht 1.654 m (5' 5.12\")   Wt 47.6 kg (105 lb)   LMP  (LMP Unknown)   SpO2 98%   BMI 17.41 kg/m²  Body mass index is 17.41 kg/m².    Constitutional: Alert. Well appearing. No distress.  Eye: Equal, round and reactive to light, conjunctiva clear, lids normal.  ENMT: Moist mucous membranes.   Respiratory: Normal effort.  Decreased air movement bilaterally.  Cardiovascular: Irregular rate and rhythm. Normal S1/S2. No murmurs, rubs or gallops.   Extremities: No edema, multiple ecchymosis.  To the left knee there is a ~ 3 cm skin tear, not currently bleeding.  Minimal surrounding erythema.  Neuro: Moves all four extremities. No facial droop.  Psych: Answers questions appropriately. Normal affect and mood.    Assessment & Plan:     76 y.o. female with the following -     1. Orthostatic lightheadedness  Continued issue resulting in falls.  Stop " spironolactone.  Close follow-up scheduled.  Checking basic labs as well.    2. Chronic systolic congestive heart failure (HCC)  Recent echo in March, no signs of fluid overload on exam.  Stopping spironolactone as above due to orthostasis and borderline hypotension.  - CBC WITH DIFFERENTIAL; Future  - Comp Metabolic Panel; Future    3. Skin tear of lower leg without complication, initial encounter  No active bleeding, no signs of infection.  No indication for closure at this point.  Given instructions for home wound care.  Discussed following up for signs of infection.    Please note that this note was created using voice recognition software.

## 2022-06-13 ENCOUNTER — APPOINTMENT (OUTPATIENT)
Dept: SLEEP MEDICINE | Facility: MEDICAL CENTER | Age: 77
End: 2022-06-13
Payer: MEDICARE

## 2022-06-13 ENCOUNTER — TELEPHONE (OUTPATIENT)
Dept: SLEEP MEDICINE | Facility: MEDICAL CENTER | Age: 77
End: 2022-06-13

## 2022-06-13 ENCOUNTER — NON-PROVIDER VISIT (OUTPATIENT)
Dept: SLEEP MEDICINE | Facility: MEDICAL CENTER | Age: 77
End: 2022-06-13
Attending: NURSE PRACTITIONER
Payer: MEDICARE

## 2022-06-13 VITALS — BODY MASS INDEX: 17.99 KG/M2 | WEIGHT: 108 LBS | HEIGHT: 65 IN

## 2022-06-13 DIAGNOSIS — J44.9 CHRONIC OBSTRUCTIVE PULMONARY DISEASE, UNSPECIFIED COPD TYPE (HCC): ICD-10-CM

## 2022-06-13 PROCEDURE — 94726 PLETHYSMOGRAPHY LUNG VOLUMES: CPT | Performed by: INTERNAL MEDICINE

## 2022-06-13 PROCEDURE — 94729 DIFFUSING CAPACITY: CPT | Performed by: INTERNAL MEDICINE

## 2022-06-13 PROCEDURE — 94060 EVALUATION OF WHEEZING: CPT | Performed by: INTERNAL MEDICINE

## 2022-06-13 ASSESSMENT — PULMONARY FUNCTION TESTS
FVC_PERCENT_PREDICTED: 66
FEV1/FVC_PERCENT_PREDICTED: 67
FEV1_PERCENT_CHANGE: 3
FVC_PERCENT_PREDICTED: 64
FEV1/FVC_PERCENT_CHANGE: 0
FEV1/FVC: 51
FVC: 1.8
FEV1/FVC: 51
FEV1_PREDICTED: 2.08
FEV1/FVC_PERCENT_CHANGE: 67
FEV1: 0.92
FEV1_LLN: 1.74
FEV1_PERCENT_PREDICTED: 43
FEV1/FVC_PERCENT_PREDICTED: 65
FVC: 1.75
FEV1/FVC_PERCENT_PREDICTED: 67
FEV1_PERCENT_PREDICTED: 44
FEV1/FVC_PERCENT_PREDICTED: 76
FEV1: 0.9
FEV1/FVC_PERCENT_LLN: 64
FEV1_PERCENT_CHANGE: 2
FVC_LLN: 2.27
FEV1/FVC_PREDICTED: 77
FVC_PREDICTED: 2.72
FEV1/FVC: 51.11
FEV1/FVC: 51
FEV1/FVC_PERCENT_PREDICTED: 66

## 2022-06-13 ASSESSMENT — FIBROSIS 4 INDEX: FIB4 SCORE: 1.45

## 2022-06-13 NOTE — TELEPHONE ENCOUNTER
The patient came in for a multi-ox at 10:00 and a PFT at 11:30 am.  Apparently, she sat in the lobby for 40 minutes because the  failed to notify anybody that she was here.  I went out and got her and took her to a room.  She was upset and said that we better have oxygen for her to use because she has been waiting so long.  I told her that we did have oxygen and I went to get it.  I told her that we would need it for her test anyway.  She asked what test?  I told her that we were doing a multi-ox.  She got even more upset and stated that she would not do the multi-ox, that she already had her oxygen and she was going to wait in her car for the PFT.  She wanted to take our oxygen out to her car and I told her that we cannot take our oxygen from the premises.  She said that we aren't helpful at all and she left.

## 2022-06-13 NOTE — PROCEDURES
Technician: Chelsea Kitchen RRT, CPFT  Good patient effort & cooperation.  The results of this test meet the ATS/ERS standards for acceptability & reproducibility.  Test was performed on the Tap[employer] Body Plethysmograph-Elite DX system.  Predicted equations for Spirometry are GLI-2012, ITS for lung volumes, and GLI-2017 for DLCO.  The DLCO was uncorrected for Hgb.  A bronchodilator of Ventolin HFA -2puffs via spacer administered.  DLCO performed during dilation period.    Interpretation;   Baseline spirometry shows airflow obstruction with FEV1/FVC ratio 51 and FEV1 of 0.9 L or 43% predicted.  No significant bronchodilator response.  Total lung capacity is within normal limits at 5.67 L or 108% predicted.  There is air trapping with residual volume of 165% predicted.  Diffusion capacity is reduced at 40% predicted.  Pulmonary function testing shows airflow obstruction with air trapping and reduced DLCO consistent with chronic obstructive lung disease.

## 2022-06-22 ENCOUNTER — TELEPHONE (OUTPATIENT)
Dept: MEDICAL GROUP | Facility: LAB | Age: 77
End: 2022-06-22
Payer: MEDICARE

## 2022-06-22 RX ORDER — METOPROLOL SUCCINATE 25 MG/1
25 TABLET, EXTENDED RELEASE ORAL DAILY
COMMUNITY
Start: 2022-06-11 | End: 2022-08-09

## 2022-06-22 NOTE — TELEPHONE ENCOUNTER
ESTABLISHED PATIENT PRE-VISIT PLANNING     Patient was NOT contacted to complete PVP.     Note: Patient will not be contacted if there is no indication to call.     1.  Reviewed notes from the last few office visits within the medical group: Yes    2.  If any orders were placed at last visit or intended to be done for this visit (i.e. 6 mos follow-up), do we have Results/Consult Notes?         •  Labs - Labs ordered, completed on 6/7/2022 and results are in chart.  Note: If patient appointment is for lab review and patient did not complete labs, check with provider if OK to reschedule patient until labs completed.       •  Imaging - Imaging was not ordered at last office visit.       •  Referrals - No referrals were ordered at last office visit.    3. Is this appointment scheduled as a Hospital Follow-Up? No    4.  Immunizations were updated in Epic using Reconcile Outside Information activity? Yes    5.  Patient is due for the following Health Maintenance Topics:   Health Maintenance Due   Topic Date Due   • IMM ZOSTER VACCINES (2 of 3) 10/10/2013   • COLORECTAL CANCER SCREENING  05/06/2019   • Annual Wellness Visit  08/26/2021         6.  AHA (Pulse8) form printed for Provider? No, already completed

## 2022-06-23 ENCOUNTER — ANTICOAGULATION VISIT (OUTPATIENT)
Dept: MEDICAL GROUP | Facility: MEDICAL CENTER | Age: 77
End: 2022-06-23
Payer: MEDICARE

## 2022-06-23 DIAGNOSIS — I48.0 PAROXYSMAL ATRIAL FIBRILLATION (HCC): ICD-10-CM

## 2022-06-23 DIAGNOSIS — Z79.01 LONG TERM (CURRENT) USE OF ANTICOAGULANTS: ICD-10-CM

## 2022-06-23 LAB — INR PPP: 1.6 (ref 2–3.5)

## 2022-06-23 PROCEDURE — 99211 OFF/OP EST MAY X REQ PHY/QHP: CPT | Performed by: FAMILY MEDICINE

## 2022-06-23 PROCEDURE — 85610 PROTHROMBIN TIME: CPT | Performed by: FAMILY MEDICINE

## 2022-06-23 NOTE — PROGRESS NOTES
OP Anticoagulation Service Note    Date: 2022    Anticoagulation Summary  As of 2022    INR goal:  2.0-3.0   TTR:  59.9 % (3 y)   INR used for dosin.60 (2022)   Warfarin maintenance plan:  1.25 mg (2.5 mg x 0.5) every Mon, Wed, Fri; 2.5 mg (2.5 mg x 1) all other days   Weekly warfarin total:  13.75 mg   Plan last modified:  Amanda Perera, PharmD (2021)   Next INR check:  2022   Target end date:  Indefinite    Indications    Atrial flutter (HCC) [I48.92]  Long term (current) use of anticoagulants [Z79.01] [Z79.01]  Paroxysmal atrial fibrillation (HCC) [I48.0]             Anticoagulation Episode Summary     INR check location:  Anticoagulation Clinic    Preferred lab:      Send INR reminders to:      Comments:        Anticoagulation Care Providers     Provider Role Specialty Phone number    [facility]   [phone]        Anticoagulation Patient Findings      HPI:   Kelly Lovett is in the Anticoagulation Clinic today for an INR check on their anticoagulation therapy.     The reason for today's visit is to prevent morbidity and mortality from a blood clot and/or stroke and to reduce the risk of bleeding while on a anticoagulant.     PCP:  Chinmay Figueredo M.D.  [address] [address] [id number] [phone]    3 vitals included with today's appt-unless patient declined:  (BP, HR, weight, ht, RR)   There were no vitals filed for this visit.    Verified current warfarin dosing schedule.    Medications reconciled   Pt is not on antiplatelet therapy    Assessment:   INR  SUB-therapeutic.     Plan:  Instructed pt to BOLUS x 1 dose w/ 5 mg and to then continue on w/ her current regimen.    Chadsvasc: 5 - no bridging indicated today    Follow up:  Follow up appointment in 3 week(s) per pt.       Other info:  Pt educated to contact our clinic with any changes in medications or s/s of bleeding or thrombosis.  Education was provided today  regarding tips to reduce their bleed risk and dietary constraints while on a anticoagulant.    National Recommendations:  The CHEST guidelines recommends frequent INR monitoring at regular intervals (a few days up to a max of 12 weeks) to ensure patients are on the proper dose of warfarin, and patients are not having any complications from therapy.  INRs can dramatically change over a short time period due to diet, medications, and medical conditions.     Clovis Kamara, PharmD, BCACP    [facility]  Phone [phone] fax [phone]

## 2022-06-25 DIAGNOSIS — Z79.01 CHRONIC ANTICOAGULATION: ICD-10-CM

## 2022-06-27 ENCOUNTER — TELEPHONE (OUTPATIENT)
Dept: MEDICAL GROUP | Facility: LAB | Age: 77
End: 2022-06-27
Payer: MEDICARE

## 2022-06-27 RX ORDER — WARFARIN SODIUM 2.5 MG/1
TABLET ORAL
Qty: 90 TABLET | Refills: 1 | Status: SHIPPED | OUTPATIENT
Start: 2022-06-27

## 2022-06-29 ENCOUNTER — OFFICE VISIT (OUTPATIENT)
Dept: MEDICAL GROUP | Facility: LAB | Age: 77
End: 2022-06-29
Payer: MEDICARE

## 2022-06-29 ENCOUNTER — PATIENT OUTREACH (OUTPATIENT)
Dept: HEALTH INFORMATION MANAGEMENT | Facility: OTHER | Age: 77
End: 2022-06-29

## 2022-06-29 VITALS
WEIGHT: 107.6 LBS | RESPIRATION RATE: 14 BRPM | DIASTOLIC BLOOD PRESSURE: 76 MMHG | OXYGEN SATURATION: 93 % | SYSTOLIC BLOOD PRESSURE: 106 MMHG | TEMPERATURE: 97 F | HEIGHT: 65 IN | HEART RATE: 86 BPM | BODY MASS INDEX: 17.93 KG/M2

## 2022-06-29 DIAGNOSIS — J44.1 CHRONIC OBSTRUCTIVE PULMONARY DISEASE WITH ACUTE EXACERBATION (HCC): ICD-10-CM

## 2022-06-29 DIAGNOSIS — R42 ORTHOSTATIC LIGHTHEADEDNESS: ICD-10-CM

## 2022-06-29 DIAGNOSIS — Z91.81 RISK FOR FALLS: ICD-10-CM

## 2022-06-29 DIAGNOSIS — D53.9 MACROCYTIC ANEMIA: ICD-10-CM

## 2022-06-29 DIAGNOSIS — N18.31 STAGE 3A CHRONIC KIDNEY DISEASE: ICD-10-CM

## 2022-06-29 DIAGNOSIS — I11.0 HYPERTENSIVE HEART DISEASE WITH HEART FAILURE (HCC): ICD-10-CM

## 2022-06-29 PROCEDURE — 99213 OFFICE O/P EST LOW 20 MIN: CPT | Performed by: FAMILY MEDICINE

## 2022-06-29 ASSESSMENT — FIBROSIS 4 INDEX: FIB4 SCORE: 1.45

## 2022-06-29 NOTE — PROGRESS NOTES
Spoke to patient in the office to introduce personal care management services. Pt initially agreed to participate. Called later in the afternoon to notify RN that she has decided against joining the program. Pt states she does not feel like she needs any services at this time. Pt has care coordination number if she changes her mind in the future.

## 2022-06-29 NOTE — PROGRESS NOTES
"Subjective:     CC: Follow up blood pressure, lightheadedness    HPI:   Kelly is a 76-year-old female with a medical history which includes chronic respiratory failure secondary to COPD, CHF, A. fib, and CKD who is here to follow-up after spironolactone was recently stopped due to continued lightheadedness and low blood pressure.  She reports she has felt much better since then with basically resolved lightheadedness, no falls.    Medications, past medical history, allergies, and social history have been reviewed and updated.      Objective:       Exam:  /76 (BP Location: Left arm, Patient Position: Sitting, BP Cuff Size: Adult)   Pulse 86   Temp 36.1 °C (97 °F)   Resp 14   Ht 1.651 m (5' 5\")   Wt 48.8 kg (107 lb 9.6 oz)   LMP  (LMP Unknown)   SpO2 93%   BMI 17.91 kg/m²  Body mass index is 17.91 kg/m².    Constitutional: Alert. Well appearing. No distress.  Skin: Warm, dry, good turgor, no visible rashes.  Respiratory: Normal effort. Lungs are clear to auscultation bilaterally.  Cardiovascular: Irregular rate and rhythm. Normal S1/S2. No murmurs, rubs or gallops.   Neuro: Moves all four extremities. No facial droop.  Psych: Answers questions appropriately. Normal affect and mood.    Assessment & Plan:     76 y.o. female with the following -     1. Orthostatic lightheadedness  Improved/resolved with stopping spironolactone.  We will continue to hold this for now.  She has follow-up with cardiology in 2 months.  Discussed continued fall precautions. No medication changes today.    2. Macrocytic anemia  Chronic and intermittent, was worsened on recent labs.  She reports no increase in alcohol use.  No new medications.  We will recheck in 3 months.  - CBC WITH DIFFERENTIAL; Future    Please note that this note was created using voice recognition software.      "
05-Feb-2020 12:00

## 2022-06-30 ENCOUNTER — DOCUMENTATION (OUTPATIENT)
Dept: CARDIOLOGY | Facility: MEDICAL CENTER | Age: 77
End: 2022-06-30
Payer: MEDICARE

## 2022-07-08 NOTE — PROGRESS NOTES
"Pulmonary Clinic Note    Date of Visit: 7/11/2022     Chief Complaint:  Chief Complaint   Patient presents with   • COPD     COPD- Last seen 04/01/2022   • Results     PFT- 06/13/2022     HPI:   Kelly Lovett is a very pleasant 76 y.o. year old female former smoker (19 pack-years, quit in 2001), with a PMHx of COPD, chronic respiratory failure, a flutter, history of COVID-19, atrial fibrillation on long-term anticoagulants, pulmonary hypertension, MARY, mitral regurgitation valve repair, maze procedure who presented to the Pulmonary Clinic for a regular  follow up. Last seen in the office on 4/1/2022 with ANNE-MARIE Celestin.    \"Patient also had previous pneumothorax due to complications from procedure.  Patient referred from PCP as she recently complained of cough and chest congestion which was worse at night.  Patient was treated with Augmentin, prednisone, doxycycline.     Imaging was conducted including a chest CT scan on 3/28/2022 which showed probable right lower lobe scar with superimposed pneumonia with the presence of bronchial secretions as well as moderate emphysema.     Echocardiogram conducted on 3/24/2022 results as follows:  Estimated right ventricular systolic pressure is 32 mmHg.     Regards to COPD, patient states that her main symptoms are shortness of breath, productive cough and chest congestion.  She was stepped up on her Advair to 500 approximately 3 months ago due to worsening dyspnea with exertion.\"      Today she presents for PFT results.  She denies any significant shortness of breath, cough, wheezing.  She states that she has been at her baseline.  She is currently on 3 LPM of oxygen 24/7.  She is currently using Advair, at her last visit she was prescribed trilogy, but did not feel that it provided any relief.  She does use her albuterol 1 time a day.  Her echocardiogram on 3/2022 showed a RVSP of 32 mmHg.  She is previously diagnosed with MARY, but has been off Pap therapy for " many years.  She denies any morning headaches, snoring, gasping for air, or daytime drowsiness.  She is currently fully vaccinated against COVID, influenza, and pneumococcal.    Exacerbations this year: 0    MMRC Grade: 2- Walks slower than friends due to breathlessness, has to stop at own pace     Past Medical History:   Diagnosis Date   • Asthma     inhalers   • Breath shortness     pt reports using o2 at night 2L, no problems at this time   • Chronic systolic congestive heart failure (HCC) 2016   • COPD (chronic obstructive pulmonary disease) (HCC)    • Dilated cardiomyopathy (HCC)    • Emphysema of lung (HCC)    • Heart valve disease    • High cholesterol    • History of heart surgery    • Hyperlipidemia    • Hypertension    • Hypotension due to hypovolemia 3/1/2019   • Sleep apnea     uses cpap     Past Surgical History:   Procedure Laterality Date   • MITRAL VALVE REPAIR  2017    Procedure: MITRAL VALVE REPAIR ;  Surgeon: Lacey Porras M.D.;  Location: SURGERY [facility];  Service:    • MAZE PROCEDURE Left 2017    Procedure: MAZE PROCEDURE, Left atrial appendage ligation;  Surgeon: Lacey Porras M.D.;  Location: SURGERY [facility];  Service:    • PAGE  2017    Procedure: PAGE;  Surgeon: Lacey Porras M.D.;  Location: SURGERY [facility];  Service:    • APPENDECTOMY      1967   • OOPHORECTOMY       Social History     Socioeconomic History   • Marital status:      Spouse name: Not on file   • Number of children: Not on file   • Years of education: Not on file   • Highest education level: Not on file   Occupational History   • Not on file   Tobacco Use   • Smoking status: Former Smoker     Packs/day: 0.50     Years: 38.00     Pack years: 19.00     Types: Cigarettes     Quit date: 10/11/2001     Years since quittin.7   • Smokeless tobacco: Never Used   Vaping Use   • Vaping Use: Never used   Substance and Sexual Activity   • Alcohol use: Yes     Alcohol/week: 8.4 oz      Types: 14 Shots of liquor per week     Comment: 2 drinks a day   • Drug use: No   • Sexual activity: Yes     Partners: Male   Other Topics Concern   • Not on file   Social History Narrative   • Not on file     Social Determinants of Health     Financial Resource Strain: Not on file   Food Insecurity: Not on file   Transportation Needs: Not on file   Physical Activity: Not on file   Stress: Not on file   Social Connections: Not on file   Intimate Partner Violence: Not on file   Housing Stability: Not on file        Family History   Problem Relation Age of Onset   • Cancer Father         colon cancer    • Hypertension Mother    • Cancer Sister 68        ovarian cancer     Current Outpatient Medications on File Prior to Visit   Medication Sig Dispense Refill   • warfarin (COUMADIN) 2.5 MG Tab TAKE ONE-HALF TO ONE (1/2-1) TABLET DAILY AS DIRECTED BY [facility] 90 Tablet 1   • metoprolol SR (TOPROL XL) 25 MG TABLET SR 24 HR      • albuterol 108 (90 Base) MCG/ACT Aero Soln inhalation aerosol Inhale 2 Puffs every 6 hours as needed for Shortness of Breath. 18 g 3   • fluticasone-salmeterol (ADVAIR) 250-50 MCG/DOSE AEROSOL POWDER, BREATH ACTIVATED Inhale 1 Puff 2 times a day. 1 Each 3   • atorvastatin (LIPITOR) 40 MG Tab TAKE ONE TABLET BY MOUTH ONE TIME DAILY 100 Tablet 4   • amiodarone (CORDARONE) 100 MG tablet Take 1 tablet by mouth every day. 90 tablet 4   • acetaminophen (TYLENOL) 500 MG Tab Take 500 mg by mouth 1 time daily as needed. Headache     • ENTRESTO 24-26 MG Tab tablet TAKE ONE TABLET BY MOUTH TWICE DAILY 180 Tablet 3   • furosemide (LASIX) 20 MG Tab Take 1 Tablet by mouth 1 time a day as needed. (Patient not taking: Reported on 7/11/2022) 30 Tablet 3   • Dextromethorphan-guaiFENesin (TUSSIN DM)  MG/5ML Syrup Take 5 mL by mouth every 6 hours as needed. 473 mL 1   • Home Care Oxygen Inhale 3 L/min continuous. Oxygen dose range: 2.5-3 L/min  Respiratory route via: Nasal  "cannula  Oxygen supplier: [facility]       No current facility-administered medications on file prior to visit.     Allergies: Sulfa drugs    ROS:   Review of Systems   Constitutional: Negative for chills, diaphoresis, fever and malaise/fatigue.   HENT: Negative for congestion and sinus pain.    Respiratory: Negative for cough, hemoptysis, sputum production, shortness of breath and wheezing.    Cardiovascular: Negative for chest pain, palpitations and leg swelling.   Gastrointestinal: Negative for diarrhea, heartburn, nausea and vomiting.   Musculoskeletal: Negative for falls and myalgias.   Neurological: Negative for dizziness, weakness and headaches.       Vitals:  /68 (BP Location: Left arm, Patient Position: Sitting, BP Cuff Size: Adult)   Pulse 84   Ht 1.676 m (5' 6\")   Wt 49.4 kg (109 lb)   SpO2 98%     Physical Exam:  Physical Exam  Constitutional:       General: She is not in acute distress.     Appearance: Normal appearance. She is not ill-appearing, toxic-appearing or diaphoretic.   Cardiovascular:      Rate and Rhythm: Normal rate and regular rhythm.      Heart sounds: No murmur heard.    No friction rub. No gallop.   Pulmonary:      Effort: No respiratory distress.      Breath sounds: Normal breath sounds. No stridor. No wheezing, rhonchi or rales.   Musculoskeletal:         General: No swelling.      Right lower leg: No edema.      Left lower leg: No edema.   Skin:     General: Skin is warm.   Neurological:      General: No focal deficit present.      Mental Status: She is alert and oriented to person, place, and time.   Psychiatric:         Mood and Affect: Mood normal.         Behavior: Behavior normal.         Thought Content: Thought content normal.         Judgment: Judgment normal.         Laboratory Data:  PFTs (Date: 6/13/2022)-    Impression:  Baseline spirometry shows airflow obstruction with FEV1/FVC ratio 51 and FEV1 of 0.9 L or 43% predicted.  No significant " bronchodilator response.  Total lung capacity is within normal limits at 5.67 L or 108% predicted.  There is air trapping with residual volume of 165% predicted.  Diffusion capacity is reduced at 40% predicted.  Pulmonary function testing shows airflow obstruction with air trapping and reduced DLCO consistent with chronic obstructive lung disease.    CT Chest: (Date: 3/28/2022)-  Impression:  1.  Probable RIGHT lower lobe scar with superimposed pneumonia given the presence of bronchial secretions.  2.  Moderate emphysema.  3.  Prior open heart surgery.    ECHO: (Date: 3/24/2022)-  Impression:  Fair quality study.  The left ventricular ejection fraction is visually estimated to be >  75%, hyperdynamic.  Mildly dilated right ventricle with normal function.  Biatrial enlargement, left atrium severely dilated.  Known mitral valve repair from 2017 which is functioning normally: MG 2   mmHg, HR 98 bpm.  At least moderate tricuspid regurgitation.  Estimated right ventricular systolic pressure is 32 mmHg.     Compared to prior study 11-5-21, some progression of TR, mildly dilated   right ventricle, LV function is hyperdynamic.      Assessment and Plan:    Problem List Items Addressed This Visit     COPD (chronic obstructive pulmonary disease) (HCC)     PFTs in 2015 show severe obstructive lung disease with a FVC of 1.60 L or 33%, FEV1 0.68 L or 70%, FEV1/FVC 42%, %, %, and DLCO 29% predicted with a positive bronchodilator response.     PFTs on 6/13/2022 showed a FVC 1.75 L or 64%, FEV1 0.90 L or 43%, FEV1/FVC 51%, %, %, and DLCO 40% predicted with no significant bronchodilator response.    Symptomatically, she denies any significant shortness of breath, cough, wheeze.  She is currently using Advair and finds good effect.  She uses albuterol about 1 time a day.  -- Continue Advair.  At this time the patient seems stable on dual therapy, if she progresses symptomatically will increase her to triple  therapy.  -- Continue albuterol as needed  -- Encourage patient remain fully vaccinated against COVID, influenza, and pneumococcal           Chronic respiratory failure with hypoxia (HCC)     Patient is currently on 3 LPM of oxygen 24/7.  She does monitor her oxygen saturations at home and states that they remain above 90%.  -- Continue oxygen use at 3 LPM 24/7  -- Encourage patient to continue to monitor oxygen saturations to keep above 90%  -- Exercise as tolerated           Obstructive sleep apnea syndrome     Patient was previously treated with CPAP therapy several years ago.  Most recent echocardiogram on 3/2022 showed RVSP 32.  Symptomatically, she denies any morning headaches, snoring, gasping for air, or daytime drowsiness.   -- We will get some patient to the sleep center to have another in lab sleep study done to restart patient on treatment, but patient would like to think about it as she did not tolerate CPAP.  The patient will MyChart message me if she decides to go through with testing.                 Diagnostic studies have been reviewed with the patient.    Return in about 6 months (around 1/11/2023), or if symptoms worsen or fail to improve.     This note was generated using voice recognition software which has a chance of producing errors of grammar and possibly content.  I have made every reasonable attempt to find and correct any obvious errors, but it should be expected that some may not be found prior to finalization of this note.    Time spent in record review prior to patient arrival, reviewing results, and in face-to-face encounter totaled 36 min.  __________  ANNE-MARIE Hassan  Pulmonary Medicine  Pending sale to [facility]

## 2022-07-09 ENCOUNTER — TELEPHONE (OUTPATIENT)
Dept: HEALTH INFORMATION MANAGEMENT | Facility: OTHER | Age: 77
End: 2022-07-09
Payer: MEDICARE

## 2022-07-11 ENCOUNTER — OFFICE VISIT (OUTPATIENT)
Dept: SLEEP MEDICINE | Facility: MEDICAL CENTER | Age: 77
End: 2022-07-11
Payer: MEDICARE

## 2022-07-11 VITALS
SYSTOLIC BLOOD PRESSURE: 122 MMHG | HEIGHT: 66 IN | DIASTOLIC BLOOD PRESSURE: 68 MMHG | BODY MASS INDEX: 17.52 KG/M2 | HEART RATE: 84 BPM | WEIGHT: 109 LBS | OXYGEN SATURATION: 98 %

## 2022-07-11 DIAGNOSIS — J96.11 CHRONIC RESPIRATORY FAILURE WITH HYPOXIA (HCC): ICD-10-CM

## 2022-07-11 DIAGNOSIS — J43.9 PULMONARY EMPHYSEMA, UNSPECIFIED EMPHYSEMA TYPE (HCC): ICD-10-CM

## 2022-07-11 DIAGNOSIS — G47.33 OBSTRUCTIVE SLEEP APNEA SYNDROME: ICD-10-CM

## 2022-07-11 PROCEDURE — 99214 OFFICE O/P EST MOD 30 MIN: CPT

## 2022-07-11 ASSESSMENT — ENCOUNTER SYMPTOMS
SHORTNESS OF BREATH: 0
SPUTUM PRODUCTION: 0
NAUSEA: 0
VOMITING: 0
FEVER: 0
HEADACHES: 0
HEMOPTYSIS: 0
CHILLS: 0
MYALGIAS: 0
PALPITATIONS: 0
HEARTBURN: 0
FALLS: 0
DIAPHORESIS: 0
WEAKNESS: 0
COUGH: 0
SINUS PAIN: 0
DIARRHEA: 0
WHEEZING: 0
DIZZINESS: 0

## 2022-07-11 ASSESSMENT — FIBROSIS 4 INDEX: FIB4 SCORE: 1.45

## 2022-07-11 NOTE — ASSESSMENT & PLAN NOTE
PFTs in 2015 show severe obstructive lung disease with a FVC of 1.60 L or 33%, FEV1 0.68 L or 70%, FEV1/FVC 42%, %, %, and DLCO 29% predicted with a positive bronchodilator response.     PFTs on 6/13/2022 showed a FVC 1.75 L or 64%, FEV1 0.90 L or 43%, FEV1/FVC 51%, %, %, and DLCO 40% predicted with no significant bronchodilator response.    Symptomatically, she denies any significant shortness of breath, cough, wheeze.  She is currently using Advair and finds good effect.  She uses albuterol about 1 time a day.  -- Continue Advair.  At this time the patient seems stable on dual therapy, if she progresses symptomatically will increase her to triple therapy.  -- Continue albuterol as needed  -- Encourage patient remain fully vaccinated against COVID, influenza, and pneumococcal

## 2022-07-11 NOTE — ASSESSMENT & PLAN NOTE
Patient is currently on 3 LPM of oxygen 24/7.  She does monitor her oxygen saturations at home and states that they remain above 90%.  -- Continue oxygen use at 3 LPM 24/7  -- Encourage patient to continue to monitor oxygen saturations to keep above 90%  -- Exercise as tolerated

## 2022-07-11 NOTE — ASSESSMENT & PLAN NOTE
Patient was previously treated with CPAP therapy several years ago.  Most recent echocardiogram on 3/2022 showed RVSP 32.  Symptomatically, she denies any morning headaches, snoring, gasping for air, or daytime drowsiness.   -- We will get some patient to the sleep center to have another in lab sleep study done to restart patient on treatment, but patient would like to think about it as she did not tolerate CPAP.  The patient will MyChart message me if she decides to go through with testing.

## 2022-07-14 ENCOUNTER — ANTICOAGULATION VISIT (OUTPATIENT)
Dept: MEDICAL GROUP | Facility: MEDICAL CENTER | Age: 77
End: 2022-07-14
Payer: MEDICARE

## 2022-07-14 DIAGNOSIS — Z79.01 LONG TERM (CURRENT) USE OF ANTICOAGULANTS: ICD-10-CM

## 2022-07-14 DIAGNOSIS — I48.0 PAROXYSMAL ATRIAL FIBRILLATION (HCC): ICD-10-CM

## 2022-07-14 LAB — INR PPP: 3.3 (ref 2–3.5)

## 2022-07-14 PROCEDURE — 85610 PROTHROMBIN TIME: CPT | Performed by: INTERNAL MEDICINE

## 2022-07-14 PROCEDURE — 99211 OFF/OP EST MAY X REQ PHY/QHP: CPT | Performed by: INTERNAL MEDICINE

## 2022-07-14 NOTE — PROGRESS NOTES
OP Anticoagulation Service Note    Date: 7/14/2022    Anticoagulation Summary  As of 7/14/2022    INR goal:  2.0-3.0   TTR:  59.8 % (3 y)   INR used for dosing:  3.30 (7/14/2022)   Warfarin maintenance plan:  1.25 mg (2.5 mg x 0.5) every Mon, Wed, Fri; 2.5 mg (2.5 mg x 1) all other days   Weekly warfarin total:  13.75 mg   Plan last modified:  Amanda Perera, PharmD (12/9/2021)   Next INR check:  7/28/2022   Target end date:  Indefinite    Indications    Atrial flutter (HCC) [I48.92]  Long term (current) use of anticoagulants [Z79.01] [Z79.01]  Paroxysmal atrial fibrillation (HCC) [I48.0]             Anticoagulation Episode Summary     INR check location:  Anticoagulation Clinic    Preferred lab:      Send INR reminders to:      Comments:        Anticoagulation Care Providers     Provider Role Specialty Phone number    [facility]   [phone]        Anticoagulation Patient Findings  Patient Findings     Negatives:  Signs/symptoms of thrombosis, Signs/symptoms of bleeding, Laboratory test error suspected, Change in health, Change in alcohol use, Change in activity, Upcoming invasive procedure, Emergency department visit, Upcoming dental procedure, Missed doses, Extra doses, Change in medications, Change in diet/appetite, Hospital admission, Bruising, Other complaints          HPI:   Kelly Lovett is in the Anticoagulation Clinic today for an INR check on their anticoagulation therapy.     The reason for today's visit is to prevent morbidity and mortality from a blood clot and/or stroke and to reduce the risk of bleeding while on a anticoagulant.     PCP:  Chinmay Figueredo M.D.  [address] [id number] [phone]    3 vitals included with today's appt-unless patient declined:  (BP, HR, weight, ht, RR)   There were no vitals filed for this visit.    Verified current warfarin dosing schedule.    Medications reconciled   Pt is not on antiplatelet  therapy    Assessment:   INR  SUPRA-therapeutic.     Plan:  Instructed pt to HOLD x 1 dose tomorrow (already took warfarin this AM) and to then continue on w/ her current regimen.    Follow up:  Follow up appointment in 2 week(s).       Other info:  Pt educated to contact our clinic with any changes in medications or s/s of bleeding or thrombosis.  Education was provided today regarding tips to reduce their bleed risk and dietary constraints while on a anticoagulant.    National Recommendations:  The CHEST guidelines recommends frequent INR monitoring at regular intervals (a few days up to a max of 12 weeks) to ensure patients are on the proper dose of warfarin, and patients are not having any complications from therapy.  INRs can dramatically change over a short time period due to diet, medications, and medical conditions.     Clovis Kamara, PharmD, BCACP    [facility]  Phone [phone] fax [phone]

## 2022-07-18 DIAGNOSIS — I50.22 CHRONIC SYSTOLIC CONGESTIVE HEART FAILURE (HCC): ICD-10-CM

## 2022-07-18 RX ORDER — SPIRONOLACTONE 25 MG/1
TABLET ORAL
Qty: 30 TABLET | Refills: 3 | Status: SHIPPED
Start: 2022-07-18 | End: 2022-08-02

## 2022-07-18 NOTE — TELEPHONE ENCOUNTER
Received request via: Pharmacy    Was the patient seen in the last year in this department? Yes  LOV 06/29/2022  Does the patient have an active prescription (recently filled or refills available) for medication(s) requested? No

## 2022-07-18 NOTE — PROGRESS NOTES
Patient sent letter  On 6/13 due to 1 month of inactivity of cardiomems readings with no communication to the office after multiple attempts to reach out. Patient will be inactivated.      Pt presents to ED with reports of prepping for a colonoscopy with 1 hard bowel movement.  No other bowel movements, complains of some abdominal discomfort 2/10.  Pt having routine colonoscopy.     Triage Assessment     Row Name 07/18/22 0633       Triage Assessment (Adult)    Airway WDL WDL       Respiratory WDL    Respiratory WDL WDL       Skin Circulation/Temperature WDL    Skin Circulation/Temperature WDL WDL       Cardiac WDL    Cardiac WDL WDL       Peripheral/Neurovascular WDL    Peripheral Neurovascular WDL WDL       Cognitive/Neuro/Behavioral WDL    Cognitive/Neuro/Behavioral WDL WDL

## 2022-07-18 NOTE — TELEPHONE ENCOUNTER
Is the patient due for a refill? Yes    Was the patient seen the past year? Yes    Date of last office visit: 4/8/22    Does the patient have an upcoming appointment?  Yes   If yes, When? 8/9/22    Provider to refill: NICOLÁS    Does the patients insurance require a 100 day supply? Yes

## 2022-07-19 ENCOUNTER — TELEPHONE (OUTPATIENT)
Dept: MEDICAL GROUP | Facility: LAB | Age: 77
End: 2022-07-19
Payer: MEDICARE

## 2022-07-19 RX ORDER — AMIODARONE HYDROCHLORIDE 100 MG/1
TABLET ORAL
Qty: 100 TABLET | Refills: 0 | Status: SHIPPED | OUTPATIENT
Start: 2022-07-19

## 2022-07-19 NOTE — TELEPHONE ENCOUNTER
ESTABLISHED PATIENT PRE-VISIT PLANNING     Patient was NOT contacted to complete PVP.     Note: Patient will not be contacted if there is no indication to call.     1.  Reviewed notes from the last few office visits within the medical group: Yes    2.  If any orders were placed at last visit or intended to be done for this visit (i.e. 6 mos follow-up), do we have Results/Consult Notes?         •  Labs - Labs ordered, NOT completed. Patient advised to complete prior to next appointment.  Note: If patient appointment is for lab review and patient did not complete labs, check with provider if OK to reschedule patient until labs completed.       •  Imaging - Imaging was not ordered at last office visit.       •  Referrals - No referrals were ordered at last office visit.    3. Is this appointment scheduled as a Hospital Follow-Up? No    4.  Immunizations were updated in Epic using Reconcile Outside Information activity? Yes    5.  Patient is due for the following Health Maintenance Topics:   Health Maintenance Due   Topic Date Due   • IMM ZOSTER VACCINES (2 of 3) 10/10/2013   • Annual Wellness Visit  08/26/2021         6.  AHA (Pulse8) form printed for Provider? No, already completed

## 2022-07-25 ENCOUNTER — TELEPHONE (OUTPATIENT)
Dept: MEDICAL GROUP | Facility: LAB | Age: 77
End: 2022-07-25
Payer: MEDICARE

## 2022-07-26 ENCOUNTER — HOME HEALTH ADMISSION (OUTPATIENT)
Dept: HOME HEALTH SERVICES | Facility: HOME HEALTHCARE | Age: 77
End: 2022-07-26
Payer: MEDICARE

## 2022-07-26 ENCOUNTER — OFFICE VISIT (OUTPATIENT)
Dept: MEDICAL GROUP | Facility: LAB | Age: 77
End: 2022-07-26
Payer: MEDICARE

## 2022-07-26 VITALS
HEIGHT: 66 IN | TEMPERATURE: 97 F | OXYGEN SATURATION: 99 % | BODY MASS INDEX: 19.32 KG/M2 | RESPIRATION RATE: 14 BRPM | DIASTOLIC BLOOD PRESSURE: 70 MMHG | WEIGHT: 120.2 LBS | SYSTOLIC BLOOD PRESSURE: 114 MMHG | HEART RATE: 84 BPM

## 2022-07-26 DIAGNOSIS — Z11.1 TUBERCULOSIS SCREENING: ICD-10-CM

## 2022-07-26 DIAGNOSIS — Z91.81 AT HIGH RISK FOR INJURY RELATED TO FALL: ICD-10-CM

## 2022-07-26 DIAGNOSIS — S51.011A SKIN TEAR OF RIGHT ELBOW WITHOUT COMPLICATION, INITIAL ENCOUNTER: ICD-10-CM

## 2022-07-26 DIAGNOSIS — R42 DIZZINESS: ICD-10-CM

## 2022-07-26 PROCEDURE — 99214 OFFICE O/P EST MOD 30 MIN: CPT | Performed by: FAMILY MEDICINE

## 2022-07-26 ASSESSMENT — FIBROSIS 4 INDEX: FIB4 SCORE: 1.45

## 2022-07-26 NOTE — PROGRESS NOTES
"Subjective:     CC: Follow up, skin tear, multiple concerns    HPI:   Kelly is a 76-year-old female with a complex medical history including chronic respiratory failure secondary to COPD, A. fib, CHF, CKD, and high fall risk who presents today with  for multiple concerns.  [place] living facility and needs paperwork completed for this.    Her chronic cough as well as intermittent dizziness continues.  Continues to have erratic sleep.  She reports these all are at her baseline and nothing is currently worsened.  However, did have a mechanical fall when she tripped walking out of her room last week and has had about 3 falls in the last month.  Most recent fall she hit her elbow and has some skin tears.  Paramedics did come to the house and dressed the wound, did not think she needed to be brought to the ER, no head trauma.    Medications, past medical history, allergies, and social history have been reviewed and updated.      Objective:       Exam:  /70 (BP Location: Left arm, Patient Position: Sitting, BP Cuff Size: Adult)   Pulse 84   Temp 36.1 °C (97 °F)   Resp 14   Ht 1.676 m (5' 6\")   Wt 54.5 kg (120 lb 3.2 oz)   LMP  (LMP Unknown)   SpO2 99%   BMI 19.40 kg/m²  Body mass index is 19.4 kg/m².    Constitutional: Alert. No distress.  Skin: Warm, dry, good turgor, no visible rashes.  Eye: Equal, round and reactive to light, conjunctiva clear, lids normal.  ENMT: Moist mucous membranes.   Respiratory: Normal effort.  Skin: Two skin tears to the right elbow, and right forearm.  Significant surrounding ecchymosis, no obvious bony tenderness.  Minimal edema.  Gauze is stuck to both wounds, no significant surrounding erythema, no purulent exudate.  Neuro: Moves all four extremities. No facial droop.  Psych: Answers questions appropriately. Normal affect and mood.      Assessment & Plan:     76 y.o. female with the following -     1. Skin tear of " right elbow without complication, initial encounter  Secondary to recent mechanical fall.  No signs of infection but difficult to dress wound today as gauze is stuck to both wounds.  I do think she would benefit from home wound care as this is difficult for her and  to do on their own.  Wound is redressed today and she is given instructions to care for this at home until home wound care can be established.  - Referral to Home Health    2. Dizziness  3. At high risk for injury related to fall  Chronic issues, blood pressure is improved and overall orthostasis is improved from prior.  She does see cardiology next month.  No medication changes today.    She and  are planning to move back to the [place] area to live in assisted living within the next few weeks.  Report completed for assisted living facility today.    4. Tuberculosis screening  - Quantiferon Gold TB (PPD); Future        Please note that this note was created using voice recognition software.      Face to Face Supporting Documentation - Home Health    The encounter with this patient was in whole or in part the primary reason for home health admission.    Date of encounter:   Patient:                    MRN:                       YOB: 2023  Kelly Tejeda [facility]  [id number]  1945     Home health to see patient for:  Wound Care    Skilled need for:  Comment:  wound care    Skilled nursing interventions to include:  Wound Care    Homebound status evidenced by:  Need the aid of supportive devices such as crutches, canes, wheelchairs or walkers. Leaving home requires a considerable and taxing effort. There is a normal inability to leave the home.    Community Physician to provide follow up care: Chinmay Figueredo M.D.     Optional Interventions? No      I certify the face to face encounter for this home health care referral meets the CMS requirements and the encounter/clinical assessment with the patient was, in  whole, or in part, for the medical condition(s) listed above, which is the primary reason for home health care. Based on my clinical findings: the service(s) are medically necessary, support the need for home health care, and the homebound criteria are met.  I certify that this patient has had a face to face encounter by myself.  Chinmay Figueredo M.D. - NPI: [id number]

## 2022-07-26 NOTE — LETTER
July 26, 2022        [facility]    Patient Active Problem List   Diagnosis   • COPD (chronic obstructive pulmonary disease) (HCC)   • Osteopenia   • Obstructive sleep apnea syndrome   • Aortic atherosclerosis (HCC)   • Atrial flutter (HCC)   • Chronic systolic congestive heart failure (HCC)   • Chronic respiratory failure with hypoxia (HCC)   • History of colonic polyps   • Hyperlipidemia   • Prediabetes   • Non-rheumatic mitral regurgitation   • Essential (primary) hypertension   • Paroxysmal atrial fibrillation (HCC)   • Moderate single current episode of major depressive disorder (HCC)   • Risk for falls   • Voice hoarseness   • Long term (current) use of anticoagulants [Z79.01]   • DNR (do not resuscitate)   • Debility   • Hypertensive heart disease with heart failure (HCC)   • Pulmonary hypertension due to left heart disease (HCC)   • History of COVID-19   • Stage 3a chronic kidney disease (HCC)

## 2022-07-26 NOTE — LETTER
July 26, 2022        Kelly Tejeda [facility]      Current Outpatient Medications:   •  amiodarone, TAKE ONE TABLET BY MOUTH ONE TIME DAILY  •  spironolactone, TAKE ONE TABLET BY MOUTH ONE TIME DAILY  •  Entresto, TAKE ONE TABLET BY MOUTH TWICE DAILY  •  warfarin, TAKE ONE-HALF TO ONE (1/2-1) TABLET DAILY AS DIRECTED BY [facility]  •  metoprolol SR,   •  albuterol, 2 Puff, Inhalation, Q6HRS PRN  •  fluticasone-salmeterol, 1 Puff, Inhalation, BID  •  furosemide, 20 mg, Oral, QDAY PRN (Patient not taking: Reported on 7/11/2022)  •  atorvastatin, TAKE ONE TABLET BY MOUTH ONE TIME DAILY  •  Dextromethorphan-guaiFENesin, 5 mL, Oral, Q6HRS PRN  •  Home Care Oxygen, 3 L/min, Inhalation, Continuous  •  acetaminophen, 500 mg, Oral, QDAY PRN

## 2022-07-27 DIAGNOSIS — Z79.01 CHRONIC ANTICOAGULATION: ICD-10-CM

## 2022-07-27 NOTE — PROGRESS NOTES
S/w [facility]  nurse as patient is being opened to services.  Order for INR placed in [facility], appt at [facility] cancelled.  Keyon Cortez, PharmD, BCACP

## 2022-07-28 ENCOUNTER — HOME CARE VISIT (OUTPATIENT)
Dept: HOME HEALTH SERVICES | Facility: HOME HEALTHCARE | Age: 77
End: 2022-07-28
Payer: MEDICARE

## 2022-07-28 ENCOUNTER — APPOINTMENT (OUTPATIENT)
Dept: MEDICAL GROUP | Facility: MEDICAL CENTER | Age: 77
End: 2022-07-28
Payer: MEDICARE

## 2022-07-28 ENCOUNTER — ANTICOAGULATION MONITORING (OUTPATIENT)
Dept: VASCULAR LAB | Facility: MEDICAL CENTER | Age: 77
End: 2022-07-28

## 2022-07-28 ENCOUNTER — TELEPHONE (OUTPATIENT)
Dept: MEDICAL GROUP | Facility: LAB | Age: 77
End: 2022-07-28

## 2022-07-28 VITALS
BODY MASS INDEX: 17.33 KG/M2 | OXYGEN SATURATION: 99 % | SYSTOLIC BLOOD PRESSURE: 132 MMHG | HEART RATE: 85 BPM | RESPIRATION RATE: 16 BRPM | WEIGHT: 107.8 LBS | DIASTOLIC BLOOD PRESSURE: 82 MMHG | TEMPERATURE: 98.7 F | HEIGHT: 66 IN

## 2022-07-28 DIAGNOSIS — I48.0 PAROXYSMAL ATRIAL FIBRILLATION (HCC): ICD-10-CM

## 2022-07-28 DIAGNOSIS — Z79.01 LONG TERM (CURRENT) USE OF ANTICOAGULANTS: ICD-10-CM

## 2022-07-28 LAB
INR PPP: 5 (ref 2–3.5)
INR PPP: 5 - CRITICAL LOW: < 0.8, CRITICAL HIGH: > 6.5 (ref 2–3.5)

## 2022-07-28 PROCEDURE — A6402 STERILE GAUZE <= 16 SQ IN: HCPCS

## 2022-07-28 PROCEDURE — 665001 SOC-HOME HEALTH

## 2022-07-28 PROCEDURE — G0493 RN CARE EA 15 MIN HH/HOSPICE: HCPCS

## 2022-07-28 PROCEDURE — A4216 STERILE WATER/SALINE, 10 ML: HCPCS

## 2022-07-28 PROCEDURE — A6207 CONTACT LAYER >16<= 48 SQ IN: HCPCS

## 2022-07-28 PROCEDURE — A5120 SKIN BARRIER, WIPE OR SWAB: HCPCS

## 2022-07-28 PROCEDURE — A4450 NON-WATERPROOF TAPE: HCPCS

## 2022-07-28 RX ORDER — FLUTICASONE PROPIONATE AND SALMETEROL 250; 50 UG/1; UG/1
1 POWDER RESPIRATORY (INHALATION) EVERY 12 HOURS
Qty: 14 EACH | Refills: 1 | Status: SHIPPED | OUTPATIENT
Start: 2022-07-28 | End: 2022-07-29

## 2022-07-28 SDOH — ECONOMIC STABILITY: HOUSING INSECURITY: EVIDENCE OF SMOKING MATERIAL: 0

## 2022-07-28 SDOH — ECONOMIC STABILITY: HOUSING INSECURITY: HOME SAFETY: SN EDUCATED PT/CG IN REGARDS TO FALL PREVENTION USING HANDOUT IN WHITE BINDER

## 2022-07-28 ASSESSMENT — ENCOUNTER SYMPTOMS
DYSPNEA ACTIVITY LEVEL: AFTER AMBULATING 10 - 20 FT
PAIN LOCATION - PAIN DURATION: SHORT
SHORTNESS OF BREATH: 1
PAIN LOCATION: RUE
PAIN LOCATION - RELIEVING FACTORS: REST
PAIN LOCATION: R RIBS
HIGHEST PAIN SEVERITY IN PAST 24 HOURS: 4/10
PAIN LOCATION - PAIN QUALITY: ACHY, SHARP
PAIN LOCATION - PAIN FREQUENCY: INFREQUENT
PAIN LOCATION - PAIN QUALITY: SHARP
LOWEST PAIN SEVERITY IN PAST 24 HOURS: 0/10
PAIN LOCATION - PAIN FREQUENCY: INFREQUENT
PAIN LOCATION - PAIN SEVERITY: 4/10
PERSON REPORTING PAIN: PATIENT
PAIN LOCATION - PAIN DURATION: SHORT
SUBJECTIVE PAIN PROGRESSION: GRADUALLY IMPROVING
DEPRESSED MOOD: 1
VOMITING: PT DENIES
PAIN LOCATION - PAIN SEVERITY: 2/10
PAIN SEVERITY GOAL: 0/10
DIFFICULTY THINKING: 1
NAUSEA: PT DENIES
PAIN: 1

## 2022-07-28 ASSESSMENT — FIBROSIS 4 INDEX: FIB4 SCORE: 1.45

## 2022-07-28 ASSESSMENT — PATIENT HEALTH QUESTIONNAIRE - PHQ9
SUM OF ALL RESPONSES TO PHQ QUESTIONS 1-9: 2
1. LITTLE INTEREST OR PLEASURE IN DOING THINGS: 00
5. POOR APPETITE OR OVEREATING: 0 - NOT AT ALL
2. FEELING DOWN, DEPRESSED, IRRITABLE, OR HOPELESS: 01
CLINICAL INTERPRETATION OF PHQ2 SCORE: 1

## 2022-07-28 ASSESSMENT — ACTIVITIES OF DAILY LIVING (ADL)
TRANSPORTATION COMMENTS: PT NEEDS ASSISTANCE TO LEAVE HOME.
OASIS_M1830: 02

## 2022-07-28 NOTE — TELEPHONE ENCOUNTER
1. Caller Name: April OLIVIER                        Call Back Number:      How would the patient prefer to be contacted with a response:     Voicemail left reporting pt is still taking Spironolactone. This was on her medication list today during visit.

## 2022-07-28 NOTE — TELEPHONE ENCOUNTER
Received request via: Patient    Was the patient seen in the last year in this department? Yes  LOV 07/26/2022  Does the patient have an active prescription (recently filled or refills available) for medication(s) requested? No       Could you please send in a prescription order for my advair. I will run out tomorrow   Thank you, Kelly

## 2022-07-28 NOTE — PROGRESS NOTES
Anticoagulation Summary  As of 2022    INR goal:  2.0-3.0   TTR:  59.0 % (3.1 y)   INR used for dosin.00 (2022)   Warfarin maintenance plan:  1.25 mg (2.5 mg x 0.5) every Mon, Wed, Fri; 2.5 mg (2.5 mg x 1) all other days   Weekly warfarin total:  13.75 mg   Plan last modified:  Dari GaliciaD (2021)   Next INR check:  2022   Target end date:  Indefinite    Indications    Atrial flutter (HCC) [I48.92]  Long term (current) use of anticoagulants [Z79.01] [Z79.01]  Paroxysmal atrial fibrillation (HCC) [I48.0]             Anticoagulation Episode Summary     INR check location:  Anticoagulation Clinic    Preferred lab:      Send INR reminders to:      Comments:  [facility] 22      Anticoagulation Care Providers     Provider Role Specialty Phone number    [facility]   [phone]          Refer to Anticoagulation Patient Findings for HPI  Patient Findings     Negatives:  Signs/symptoms of thrombosis, Signs/symptoms of bleeding, Laboratory test error suspected, Change in health, Change in alcohol use, Change in activity, Upcoming invasive procedure, Emergency department visit, Upcoming dental procedure, Missed doses, Extra doses, Change in medications, Change in diet/appetite, Hospital admission, Bruising, Other complaints          Spoke with pt.  INR is supratherapeutic.     Pt verifies warfarin weekly dosing.     Will have pt hold x 2 days then take 1.25 mg daily x 2 days    Repeat INR in 4 day(s) via [facility].     Gaby Ray, DariD

## 2022-07-29 ENCOUNTER — DOCUMENTATION (OUTPATIENT)
Dept: MEDICAL GROUP | Facility: PHYSICIAN GROUP | Age: 77
End: 2022-07-29
Payer: MEDICARE

## 2022-07-29 NOTE — PROGRESS NOTES
Medication chart review for [facility] services    Received referral from [facility].   Medications reviewed  compared with discharge summary if available.    Current medication list per [facility]     Current Outpatient Medications:   •  fluticasone-salmeterol, 1 Puff, Inhalation, Q12HRS  •  Vitamin C, 500 mg, Oral, DAILY  •  Vitamin D, 2,000 Units, Oral, DAILY  •  Centrum Adults, 1 Tablet, Oral, DAILY  •  Magnesium, 1 Tablet, Oral, DAILY  •  Calcium Carbonate-Vit D-Min (CALCIUM 1200 PO), 1,200 mg, Oral, DAILY  •  amiodarone, TAKE ONE TABLET BY MOUTH ONE TIME DAILY  •  spironolactone, TAKE ONE TABLET BY MOUTH ONE TIME DAILY (Patient not taking: No sig reported)  •  Entresto, TAKE ONE TABLET BY MOUTH TWICE DAILY  •  warfarin, TAKE ONE-HALF TO ONE (1/2-1) TABLET DAILY AS DIRECTED BY [facility] ANTICOAGULATION [facility]  •  metoprolol SR, 25 mg, Oral, DAILY  •  albuterol, 2 Puff, Inhalation, Q6HRS PRN  •  fluticasone-salmeterol, 1 Puff, Inhalation, BID  •  atorvastatin, TAKE ONE TABLET BY MOUTH ONE TIME DAILY  •  Dextromethorphan-guaiFENesin, 5 mL, Oral, Q6HRS PRN (Patient taking differently: 5 mL, Oral, EVERY 6 HOURS PRN, Indications: Cough)  •  Home Care Oxygen, 3 L/min, Inhalation, Continuous  •  acetaminophen, 500 mg, Oral, QDAY PRN    Location of hospital, and discharge summary date, if applicable:   N/a      Allergies   Allergen Reactions   • Sulfa Drugs Rash     Rxn - years ago in her 20's       Labs     Lab Results   Component Value Date/Time    SODIUM 139 06/07/2022 11:45 AM    POTASSIUM 4.9 06/07/2022 11:45 AM    CHLORIDE 100 06/07/2022 11:45 AM    CO2 25 06/07/2022 11:45 AM    GLUCOSE 115 (H) 06/07/2022 11:45 AM    BUN 28 (H) 06/07/2022 11:45 AM    CREATININE 1.18 06/07/2022 11:45 AM     Lab Results   Component Value Date/Time    ALKPHOSPHAT 72 06/07/2022 11:45 AM    ASTSGOT 19 06/07/2022 11:45 AM    ALTSGPT 11 06/07/2022 11:45 AM    TBILIRUBIN 0.5 06/07/2022 11:45 AM    INR 5.00 (A) 07/28/2022 10:02  AM    ALBUMIN 4.1 06/07/2022 11:45 AM        Assessment for clinically significant drug interactions, drug omissions/additions, duplicative therapies.            CC   Chinmay Figueredo M.D.  [address] [address]  Fax: [phone]    [facility]  Phone [phone] fax [phone]    This note was created using voice recognition software (Dragon). The accuracy of the dictation is limited by the abilities of the software. I have reviewed the note prior to signing, however some errors in grammar and context are still possible. If you have any questions related to this note please do not hesitate to contact our office.

## 2022-08-01 ENCOUNTER — ANTICOAGULATION MONITORING (OUTPATIENT)
Dept: MEDICAL GROUP | Facility: PHYSICIAN GROUP | Age: 77
End: 2022-08-01
Payer: MEDICARE

## 2022-08-01 ENCOUNTER — HOME CARE VISIT (OUTPATIENT)
Dept: HOME HEALTH SERVICES | Facility: HOME HEALTHCARE | Age: 77
End: 2022-08-01
Payer: MEDICARE

## 2022-08-01 VITALS
HEART RATE: 84 BPM | OXYGEN SATURATION: 98 % | SYSTOLIC BLOOD PRESSURE: 100 MMHG | DIASTOLIC BLOOD PRESSURE: 64 MMHG | TEMPERATURE: 98.3 F | RESPIRATION RATE: 20 BRPM

## 2022-08-01 DIAGNOSIS — I48.0 PAROXYSMAL ATRIAL FIBRILLATION (HCC): ICD-10-CM

## 2022-08-01 DIAGNOSIS — Z79.01 LONG TERM (CURRENT) USE OF ANTICOAGULANTS: ICD-10-CM

## 2022-08-01 DIAGNOSIS — I48.92 ATRIAL FLUTTER, UNSPECIFIED TYPE (HCC): ICD-10-CM

## 2022-08-01 LAB
INR PPP: 3.7 (ref 2–3.5)
INR PPP: 3.7 - CRITICAL LOW: < 0.8, CRITICAL HIGH: > 6.5 (ref 2–3.5)

## 2022-08-01 PROCEDURE — G0299 HHS/HOSPICE OF RN EA 15 MIN: HCPCS

## 2022-08-01 SDOH — ECONOMIC STABILITY: HOUSING INSECURITY: HOME SAFETY: E DECLINED INTERVENTION, STATED HE WILL TAKE CARE OF IT.

## 2022-08-01 SDOH — ECONOMIC STABILITY: HOUSING INSECURITY: EVIDENCE OF SMOKING MATERIAL: 0

## 2022-08-01 SDOH — ECONOMIC STABILITY: HOUSING INSECURITY
HOME SAFETY: PATIENT HAS A PORTABLE BATTERY OPERATED CONCENTRATOR, AND PORTABLE TANKS AS WELL, DECLINED SN OBSERVATION OF TANKS, STATES ARE STORED IN CLOSET, UPRIGHT, UNSECURED, INSTRUCTION PROVIDED ON PROPER STORAGE OF TANKS, SN OFFERRED TO LIE TANKS FLAT, SPOUS

## 2022-08-01 ASSESSMENT — ENCOUNTER SYMPTOMS
AGITATION: 1
NAUSEA: DENIES
DIFFICULTY THINKING: 1
PAIN LOCATION - PAIN DURATION: SINCE FALL
SEVERE DYSPNEA: 1
PAIN LOCATION - PAIN QUALITY: SHARP
PAIN LOCATION - EXACERBATING FACTORS: PALPATION
PAIN SEVERITY GOAL: 4/10
COUGH: 1
PAIN LOCATION - PAIN FREQUENCY: INTERMITTENT
SUBJECTIVE PAIN PROGRESSION: WAXING AND WANING
LOWEST PAIN SEVERITY IN PAST 24 HOURS: 4/10
HIGHEST PAIN SEVERITY IN PAST 24 HOURS: 6/10
PAIN LOCATION - PAIN SEVERITY: 6/10
PAIN: 1
VOMITING: DENIES
SHORTNESS OF BREATH: 1
FATIGUE: 1
COUGH CHARACTERISTICS: NON-PRODUCTIVE
MUSCLE WEAKNESS: 1

## 2022-08-01 NOTE — PROGRESS NOTES
OP Anticoagulation Service Note    Date: 8/1/2022    Anticoagulation Summary  As of 8/1/2022    INR goal:  2.0-3.0   TTR:  58.8 % (3.1 y)   INR used for dosing:  3.70 (8/1/2022)   Warfarin maintenance plan:  1.25 mg (2.5 mg x 0.5) every day   Weekly warfarin total:  8.75 mg   Plan last modified:  Estiven Balderas, PharmD (8/1/2022)   Next INR check:  8/4/2022   Target end date:  Indefinite    Indications    Atrial flutter (HCC) [I48.92]  Long term (current) use of anticoagulants [Z79.01] [Z79.01]  Paroxysmal atrial fibrillation (HCC) [I48.0]             Anticoagulation Episode Summary     INR check location:  Anticoagulation Clinic    Preferred lab:      Send INR reminders to:      Comments:  [facility] 7/28/22      Anticoagulation Care Providers     Provider Role Specialty Phone number    [facility]   [phone]        Anticoagulation Patient Findings        HPI:   The reason for today's call is to prevent morbidity and mortality from a blood clot and/or stroke and to reduce the risk of bleeding while on a anticoagulant.     PCP:  Chinmay Figueredo M.D.  [address] [address]    Assessment:     • INR  supra-therapeutic.     Lab Results   Component Value Date/Time    BUN 28 (H) 06/07/2022 11:45 AM    CREATININE 1.18 06/07/2022 11:45 AM     Lab Results   Component Value Date/Time    HEMOGLOBIN 10.7 (L) 06/07/2022 11:45 AM    HEMATOCRIT 35.2 (L) 06/07/2022 11:45 AM    PLATELETCT 305 06/07/2022 11:45 AM    ALKPHOSPHAT 72 06/07/2022 11:45 AM    ASTSGOT 19 06/07/2022 11:45 AM    ALTSGPT 11 06/07/2022 11:45 AM          Current Outpatient Medications:   •  Vitamin C, 500 mg, Oral, DAILY  •  Vitamin D, 2,000 Units, Oral, DAILY  •  Centrum Adults, 1 Tablet, Oral, DAILY  •  Magnesium, 1 Tablet, Oral, DAILY  •  Calcium Carbonate-Vit D-Min (CALCIUM 1200 PO), 1,200 mg, Oral, DAILY  •  amiodarone, TAKE ONE TABLET BY MOUTH ONE TIME DAILY  •  spironolactone, TAKE ONE TABLET BY MOUTH  ONE TIME DAILY (Patient not taking: No sig reported)  •  Entresto, TAKE ONE TABLET BY MOUTH TWICE DAILY  •  warfarin, TAKE ONE-HALF TO ONE (1/2-1) TABLET DAILY AS DIRECTED BY [facility]TICO[facility]ULATION SERVICES  •  metoprolol SR, 25 mg, Oral, DAILY  •  albuterol, 2 Puff, Inhalation, Q6HRS PRN  •  fluticasone-salmeterol, 1 Puff, Inhalation, BID  •  atorvastatin, TAKE ONE TABLET BY MOUTH ONE TIME DAILY  •  Dextromethorphan-guaiFENesin, 5 mL, Oral, Q6HRS PRN (Patient taking differently: 5 mL, Oral, EVERY 6 HOURS PRN, Indications: Cough)  •  Home Care Oxygen, 3 L/min, Inhalation, Continuous  •  acetaminophen, 500 mg, Oral, QDAY PRN      Plan:     • Hold today then decrease weekly warfarin dose as noted above.       Follow-up:     • test in 3 days         Additional information discussed with patient:     • Asked patient to please call the anticoagulation clinic if they have any signs/symptoms of bleeding and/or thrombosis or any changes to diet or medications.      National recommendations regarding anticoagulation therapy:     The CHEST guidelines recommends frequent INR monitoring at regular intervals (a few days up to a max of 12 weeks) to ensure patients are on the proper dose of warfarin, and patients are not having any complications from therapy.  INRs can dramatically change over a short time period due to diet, medications, and medical conditions.       Estiven Balderas, PharmD, MS, BCACP, C  [facility]  Phone: [phone]  Fax: [phone]  On call: [phone]  General scheduling/information [phone]  For emergencies please dial [phone]  Please do not use [facility] for urgent matters, call the phone numbers listed above.    This note was created using voice recognition software (Dragon). The accuracy of the dictation is limited by the abilities of the software. I have reviewed the note prior to signing, however some errors in grammar and context are still possible. If you have  any questions related to this note please do not hesitate to contact our office.

## 2022-08-02 ENCOUNTER — APPOINTMENT (OUTPATIENT)
Dept: RADIOLOGY | Facility: MEDICAL CENTER | Age: 77
End: 2022-08-02
Attending: EMERGENCY MEDICINE
Payer: MEDICARE

## 2022-08-02 ENCOUNTER — HOSPITAL ENCOUNTER (EMERGENCY)
Facility: MEDICAL CENTER | Age: 77
End: 2022-08-02
Attending: EMERGENCY MEDICINE
Payer: MEDICARE

## 2022-08-02 VITALS
OXYGEN SATURATION: 99 % | HEART RATE: 83 BPM | DIASTOLIC BLOOD PRESSURE: 57 MMHG | RESPIRATION RATE: 19 BRPM | SYSTOLIC BLOOD PRESSURE: 110 MMHG | TEMPERATURE: 97.4 F | HEIGHT: 66 IN | BODY MASS INDEX: 17.19 KG/M2 | WEIGHT: 107 LBS

## 2022-08-02 DIAGNOSIS — J44.1 ACUTE EXACERBATION OF CHRONIC OBSTRUCTIVE PULMONARY DISEASE (COPD) (HCC): ICD-10-CM

## 2022-08-02 LAB
ANION GAP SERPL CALC-SCNC: 14 MMOL/L (ref 7–16)
BASOPHILS # BLD AUTO: 0.7 % (ref 0–1.8)
BASOPHILS # BLD: 0.05 K/UL (ref 0–0.12)
BUN SERPL-MCNC: 27 MG/DL (ref 8–22)
CALCIUM SERPL-MCNC: 9.3 MG/DL (ref 8.5–10.5)
CHLORIDE SERPL-SCNC: 103 MMOL/L (ref 96–112)
CO2 SERPL-SCNC: 22 MMOL/L (ref 20–33)
CREAT SERPL-MCNC: 0.95 MG/DL (ref 0.5–1.4)
EKG IMPRESSION: NORMAL
EOSINOPHIL # BLD AUTO: 0.06 K/UL (ref 0–0.51)
EOSINOPHIL NFR BLD: 0.9 % (ref 0–6.9)
ERYTHROCYTE [DISTWIDTH] IN BLOOD BY AUTOMATED COUNT: 49.2 FL (ref 35.9–50)
GFR SERPLBLD CREATININE-BSD FMLA CKD-EPI: 61 ML/MIN/1.73 M 2
GLUCOSE SERPL-MCNC: 101 MG/DL (ref 65–99)
HCT VFR BLD AUTO: 33.4 % (ref 37–47)
HGB BLD-MCNC: 10.5 G/DL (ref 12–16)
IMM GRANULOCYTES # BLD AUTO: 0.03 K/UL (ref 0–0.11)
IMM GRANULOCYTES NFR BLD AUTO: 0.4 % (ref 0–0.9)
LYMPHOCYTES # BLD AUTO: 1.46 K/UL (ref 1–4.8)
LYMPHOCYTES NFR BLD: 21.7 % (ref 22–41)
MCH RBC QN AUTO: 31.9 PG (ref 27–33)
MCHC RBC AUTO-ENTMCNC: 31.4 G/DL (ref 33.6–35)
MCV RBC AUTO: 101.5 FL (ref 81.4–97.8)
MONOCYTES # BLD AUTO: 0.57 K/UL (ref 0–0.85)
MONOCYTES NFR BLD AUTO: 8.5 % (ref 0–13.4)
NEUTROPHILS # BLD AUTO: 4.57 K/UL (ref 2–7.15)
NEUTROPHILS NFR BLD: 67.8 % (ref 44–72)
NRBC # BLD AUTO: 0 K/UL
NRBC BLD-RTO: 0 /100 WBC
NT-PROBNP SERPL IA-MCNC: 229 PG/ML (ref 0–125)
PLATELET # BLD AUTO: 297 K/UL (ref 164–446)
PMV BLD AUTO: 8.9 FL (ref 9–12.9)
POTASSIUM SERPL-SCNC: 4.4 MMOL/L (ref 3.6–5.5)
RBC # BLD AUTO: 3.29 M/UL (ref 4.2–5.4)
SODIUM SERPL-SCNC: 139 MMOL/L (ref 135–145)
TROPONIN T SERPL-MCNC: 20 NG/L (ref 6–19)
WBC # BLD AUTO: 6.7 K/UL (ref 4.8–10.8)

## 2022-08-02 PROCEDURE — 71045 X-RAY EXAM CHEST 1 VIEW: CPT

## 2022-08-02 PROCEDURE — 700101 HCHG RX REV CODE 250: Performed by: EMERGENCY MEDICINE

## 2022-08-02 PROCEDURE — 83880 ASSAY OF NATRIURETIC PEPTIDE: CPT

## 2022-08-02 PROCEDURE — 80048 BASIC METABOLIC PNL TOTAL CA: CPT

## 2022-08-02 PROCEDURE — 85025 COMPLETE CBC W/AUTO DIFF WBC: CPT

## 2022-08-02 PROCEDURE — 99284 EMERGENCY DEPT VISIT MOD MDM: CPT

## 2022-08-02 PROCEDURE — 94760 N-INVAS EAR/PLS OXIMETRY 1: CPT

## 2022-08-02 PROCEDURE — 36415 COLL VENOUS BLD VENIPUNCTURE: CPT

## 2022-08-02 PROCEDURE — 94640 AIRWAY INHALATION TREATMENT: CPT

## 2022-08-02 PROCEDURE — 84484 ASSAY OF TROPONIN QUANT: CPT

## 2022-08-02 PROCEDURE — 93005 ELECTROCARDIOGRAM TRACING: CPT | Performed by: EMERGENCY MEDICINE

## 2022-08-02 RX ORDER — PREDNISONE 20 MG/1
60 TABLET ORAL DAILY
Qty: 15 TABLET | Refills: 0 | Status: SHIPPED | OUTPATIENT
Start: 2022-08-02 | End: 2022-08-07

## 2022-08-02 RX ORDER — DOXYCYCLINE 100 MG/1
100 CAPSULE ORAL 2 TIMES DAILY
Qty: 20 CAPSULE | Refills: 0 | Status: SHIPPED | OUTPATIENT
Start: 2022-08-02 | End: 2022-08-09

## 2022-08-02 RX ADMIN — IPRATROPIUM BROMIDE 0.5 MG: 0.5 SOLUTION RESPIRATORY (INHALATION) at 06:18

## 2022-08-02 RX ADMIN — ALBUTEROL SULFATE 2.5 MG: 2.5 SOLUTION RESPIRATORY (INHALATION) at 06:18

## 2022-08-02 ASSESSMENT — FIBROSIS 4 INDEX: FIB4 SCORE: 1.45

## 2022-08-02 NOTE — ED NOTES
Received report from SOY Rolle. Pt waiting for  to bring oxygen tank. PIV removed and pt dressed.

## 2022-08-02 NOTE — ED NOTES
Pt provided discharge instructions and follow-up information. Pt verbalized understanding and all questions answered. Waiting for pt's  to bring oxygen tank.   Report to Silvana OLIVIER.

## 2022-08-02 NOTE — ED PROVIDER NOTES
ED Provider Note    Scribed for Khalif Cano M.D. by Linnea Pugh. 2022  5:36 AM    Primary care provider: Chinmay Figueredo M.D.  Means of arrival: EMS  History obtained from: Patient   History limited by: None     CHIEF COMPLAINT  Chief Complaint   Patient presents with    Shortness of Breath       HPI  Kelly Lovett is a 76 y.o. female who presents to the Emergency Department for evaluation of shortness of breath onset morning prior to arrival. Per patient's , he woke up in the middle of the night and noticed patient was experiencing labored breathing and was panting through her mouth. Patient has associated symptoms of vertigo. She denies any fever. Patient and her  have tested positive for COVID-19 sometime last year. She has no alleviating factors. She also states that she is on oxygen at home 3.5L.     REVIEW OF SYSTEMS  Pertinent positives include shortness of breath and vertigo.   Pertinent negatives include no fever.    All other systems reviewed and negative. See HPI for further details.       PAST MEDICAL HISTORY   has a past medical history of Asthma, Breath shortness, Chronic systolic congestive heart failure (HCC) (2016), COPD (chronic obstructive pulmonary disease) (HCC), Dilated cardiomyopathy (HCC), Emphysema of lung (HCC), Heart valve disease, High cholesterol, History of heart surgery, Hyperlipidemia, Hypertension, Hypotension due to hypovolemia (2019), and Sleep apnea.    SURGICAL HISTORY   has a past surgical history that includes oophorectomy; appendectomy; mitral valve repair (2017); maze procedure (Left, 2017); and idalmis (2017).    SOCIAL HISTORY  Social History     Tobacco Use    Smoking status: Former Smoker     Packs/day: 0.50     Years: 38.00     Pack years: 19.00     Types: Cigarettes     Quit date: 10/11/2001     Years since quittin.8    Smokeless tobacco: Never Used   Vaping Use    Vaping Use: Never used   Substance Use  "Topics    Alcohol use: Yes     Alcohol/week: 8.4 oz     Types: 14 Shots of liquor per week     Comment: 1 drinks a day    Drug use: No      Social History     Substance and Sexual Activity   Drug Use No       FAMILY HISTORY  Family History   Problem Relation Age of Onset    Cancer Father         colon cancer     Hypertension Mother     Cancer Sister 68        ovarian cancer       CURRENT MEDICATIONS  Home Medications    **Home medications have not yet been reviewed for this encounter**         ALLERGIES  Allergies   Allergen Reactions    Sulfa Drugs Rash     Rxn - years ago in her 20's       PHYSICAL EXAM  VITAL SIGNS: /87   Pulse 82   Temp 36.3 °C (97.3 °F) (Temporal)   Resp 16   Ht 1.676 m (5' 6\")   Wt 48.5 kg (107 lb)   LMP  (LMP Unknown)   SpO2 99%   BMI 17.27 kg/m²     Nursing note and vitals reviewed.  Constitutional: Well-developed and well-nourished. No distress.   HENT: Head is normocephalic and atraumatic. Oropharynx is clear and moist without exudate or erythema.   Eyes: Pupils are equal, round, and reactive to light. Conjunctiva are normal.   Cardiovascular: Normal rate and regular rhythm. No murmur heard. Normal radial pulses.  Pulmonary/Chest: Diminished breath sounds throughout. No wheezes or rales.   Abdominal: Soft and non-tender. No distention    Musculoskeletal: Extremities exhibit normal range of motion without edema or tenderness.   Neurological: Awake, alert and oriented to person, place, and time. No focal deficits noted.  Skin: Skin is warm and dry. No rash.   Psychiatric: Normal mood and affect. Appropriate for clinical situation.    DIAGNOSTIC STUDIES / PROCEDURES    EKG Interpretation  Interpreted by me as below    LABS  Results for orders placed or performed during the hospital encounter of 08/02/22   CBC WITH DIFFERENTIAL   Result Value Ref Range    WBC 6.7 4.8 - 10.8 K/uL    RBC 3.29 (L) 4.20 - 5.40 M/uL    Hemoglobin 10.5 (L) 12.0 - 16.0 g/dL    Hematocrit 33.4 (L) 37.0 - " 47.0 %    .5 (H) 81.4 - 97.8 fL    MCH 31.9 27.0 - 33.0 pg    MCHC 31.4 (L) 33.6 - 35.0 g/dL    RDW 49.2 35.9 - 50.0 fL    Platelet Count 297 164 - 446 K/uL    MPV 8.9 (L) 9.0 - 12.9 fL    Neutrophils-Polys 67.80 44.00 - 72.00 %    Lymphocytes 21.70 (L) 22.00 - 41.00 %    Monocytes 8.50 0.00 - 13.40 %    Eosinophils 0.90 0.00 - 6.90 %    Basophils 0.70 0.00 - 1.80 %    Immature Granulocytes 0.40 0.00 - 0.90 %    Nucleated RBC 0.00 /100 WBC    Neutrophils (Absolute) 4.57 2.00 - 7.15 K/uL    Lymphs (Absolute) 1.46 1.00 - 4.80 K/uL    Monos (Absolute) 0.57 0.00 - 0.85 K/uL    Eos (Absolute) 0.06 0.00 - 0.51 K/uL    Baso (Absolute) 0.05 0.00 - 0.12 K/uL    Immature Granulocytes (abs) 0.03 0.00 - 0.11 K/uL    NRBC (Absolute) 0.00 K/uL   BASIC METABOLIC PANEL   Result Value Ref Range    Sodium 139 135 - 145 mmol/L    Potassium 4.4 3.6 - 5.5 mmol/L    Chloride 103 96 - 112 mmol/L    Co2 22 20 - 33 mmol/L    Glucose 101 (H) 65 - 99 mg/dL    Bun 27 (H) 8 - 22 mg/dL    Creatinine 0.95 0.50 - 1.40 mg/dL    Calcium 9.3 8.5 - 10.5 mg/dL    Anion Gap 14.0 7.0 - 16.0   TROPONIN   Result Value Ref Range    Troponin T 20 (H) 6 - 19 ng/L   proBrain Natriuretic Peptide, NT   Result Value Ref Range    NT-proBNP 229 (H) 0 - 125 pg/mL   ESTIMATED GFR   Result Value Ref Range    GFR (CKD-EPI) 61 >60 mL/min/1.73 m 2   EKG   Result Value Ref Range    Report       [facility]    Test Date:  2022  [facility]             Department: ER  MRN:        [id number]                      Room:       [facility]  Gender:     Female                       Technician: [id number]  :        [id number]                   Requested By:ANNY SANTO  Order #:    [id number]                    Reading MD:    Measurements  Intervals                                Axis  Rate:       82                           P:          0  KY:         138                          QRS:        -20  QRSD:       100                           T:          19  QT:         400  QTc:        468    Interpretive Statements  SINUS RHYTHM  VENTRICULAR PREMATURE COMPLEX  LOW VOLTAGE IN FRONTAL LEADS  CONSIDER ANTERIOR INFARCT  Compared to ECG 06/23/2021 14:16:40  Ventricular premature complex(es) now present  Low QRS voltage now present  Myocardial infarct finding now present  Intraventricular conduction delay no longer present       RADIOLOGY  DX-CHEST-PORTABLE (1 VIEW)   Final Result         1.  No acute cardiopulmonary disease.   2.  Cardiomegaly   3.  Atherosclerosis   4.  Hyperexpansion of lungs favors changes of COPD.        The radiologist's interpretation of all radiological studies have been reviewed by me.    COURSE & MEDICAL DECISION MAKING  Nursing notes, VS, PMSFHx reviewed in chart.     Review of past medical records shows the patient has a history of COPD and CHF.      5:36 AM - Patient seen and examined at bedside. Patient will be treated with Proventil 2.5 mg and Atrovent 0.5 mg. Ordered DX-Chest, CBC w/ diff, BMP, proBrain Natriuretic Peptide, EKG, and Troponin to evaluate her symptoms. The differential diagnoses include but are not limited to: Pneumonia, ACS, COPD exacerbation, and Arrhythmia.     White blood cell count is normal.  Metabolic panel is unrevealing.  Troponins not significantly elevated.  BNP mildly elevated but not diagnostic of CHF.  Feel her overall clinical presentation and evaluation is consistent with a COPD exacerbation.    6:56 AM - Discussed with patient plans for discharge including prescription of Monodox 100 mg and Delta stone 20 mg tab. Patient verbalizes understanding and agreement to this plan of care.     The patient will return for new or worsening symptoms and is stable at the time of discharge.    The patient is referred to a primary physician for blood pressure management, diabetic screening, and for all other preventative health concerns.    DISPOSITION:  Patient will be discharged home in stable  condition.    FOLLOW UP:  Chinmay Figueredo M.D.  [address] [id number] [phone]    Schedule an appointment as soon as possible for a visit       [facility], Emergency Dept  [address]  [place] [address] [phone]    If symptoms worsen    OUTPATIENT MEDICATIONS:  Discharge Medication List as of 8/2/2022  7:01 AM        START taking these medications    Details   predniSONE (DELTASONE) 20 MG Tab Take 3 Tablets by mouth every day for 5 days., Disp-15 Tablet, R-0, Normal      doxycycline (MONODOX) 100 MG capsule Take 1 Capsule by mouth 2 times a day for 10 days., Disp-20 Capsule, R-0, Normal               FINAL IMPRESSION  1. Acute exacerbation of chronic obstructive pulmonary disease (COPD) ([facility])          Linnea NJ (Scribe), am scribing for, and in the presence of, Khalif Cano M.D..    Electronically signed by: Linnea Pugh (Scribe), 8/2/2022    Khalif NJ M.D. personally performed the services described in this documentation, as scribed by Linnea Pugh in my presence, and it is both accurate and complete.    The note accurately reflects work and decisions made by me.  Khalif Cano M.D.  8/2/2022  11:02 AM

## 2022-08-02 NOTE — DISCHARGE PLANNING
TCN following. HTH/SCP chart review completed. Patient already discharged from ED before TCN could meet with mbr.

## 2022-08-02 NOTE — ED TRIAGE NOTES
"Chief Complaint   Patient presents with   • Shortness of Breath     Pt BIB EMS from home. Pt was sleeping at home when  woke her up, per EMS  said she was \"breathing weird\". Pt denies SOB and chest pain, but does endorse mild right sided rib pain from a GLF a few days ago. Pt has hx of COPD and wears 3.5L at baseline, pt currently on baseline O2 at 100%. Changed into gown and connected to monitor. Chart up for ERP.   "

## 2022-08-02 NOTE — ED NOTES
ERP at bedside.    Problem: DISCHARGE PLANNING - CARE MANAGEMENT  Goal: Discharge to post-acute care or home with appropriate resources  INTERVENTIONS:  - Conduct assessment to determine patient/family and health care team treatment goals, and need for post-acute services based on payer coverage, community resources, and patient preferences, and barriers to discharge  - Address psychosocial, clinical, and financial barriers to discharge as identified in assessment in conjunction with the patient/family and health care team  - Arrange appropriate level of post-acute services according to patient's   needs and preference and payer coverage in collaboration with the physician and health care team  - Communicate with and update the patient/family, physician, and health care team regarding progress on the discharge plan  - Arrange appropriate transportation to post-acute venues  Outcome: Progressing  LOS 1  Not a bundle; Not a readmission  Cm received a script for Eliquis in order to find cost through insurance  CM sent script to [address] [place] [address] in order to run script and provide first 30 days free  CM will continue to follow up with pharmacy in order to assist with DCP

## 2022-08-03 ENCOUNTER — HOME CARE VISIT (OUTPATIENT)
Dept: HOME HEALTH SERVICES | Facility: HOME HEALTHCARE | Age: 77
End: 2022-08-03
Payer: MEDICARE

## 2022-08-03 NOTE — CASE COMMUNICATION
I agree with these changes  ----- Message -----  From: Crystal Orozco R.N.  Sent: 8/3/2022   1:09 PM PDT  To: April Mitchell R.N.      Quality Review Completed for 7/28 SOC OASIS by DESTINY Orozco, RN on 8/3/2022:  Edits completed by DESTINY Orozco, RN:  1.  changed to #4 per 8.1 SN visit pain affects sleep.  2. Narrative indicates supervision required for all functional abilities, and patient has dizziness/falling history, changed 184 0 to 1, 1845 to 1, 1850 to 2, 1860 to 3, 1870 to 1  3.  changed to 8.1 for collaboration  4.  changed to 3 per guidelines when ambulation is supervised  5.  changed to #2 due to [id number] is #3  6. [id number] E, F changed to #4 per narrative  7. Added proper med use and ambulate only with assist to safety measures and F2F on 485 forms

## 2022-08-03 NOTE — CASE COMMUNICATION
Quality Review Completed for 7/28 SOC OASIS by DESTINY Orozco RN on 8/3/2022:  Edits completed by DESTINY Orozco RN:  1.  changed to #4 per 8.1 SN visit pain affects sleep.  2. Narrative indicates supervision required for all functional abilities, and patient has dizziness/falling history, changed 1840 to 1, 1845 to 1, 1850 to 2, 1860 to 3, 1870 to 1  3.  changed to 8.1 for collaboration  4.  changed to 3 per guidelines when dawson armstrong is supervised  5.  changed to #2 due to [id number] is #3  6. [id number] E, F changed to #4 per narrative  7. Added proper med use and ambulate only with assist to safety measures and F2F on 485 forms

## 2022-08-04 ENCOUNTER — HOSPITAL ENCOUNTER (OUTPATIENT)
Dept: LAB | Facility: MEDICAL CENTER | Age: 77
End: 2022-08-04
Attending: FAMILY MEDICINE
Payer: MEDICARE

## 2022-08-04 ENCOUNTER — HOME CARE VISIT (OUTPATIENT)
Dept: HOME HEALTH SERVICES | Facility: HOME HEALTHCARE | Age: 77
End: 2022-08-04
Payer: MEDICARE

## 2022-08-04 ENCOUNTER — ANTICOAGULATION MONITORING (OUTPATIENT)
Dept: MEDICAL GROUP | Facility: MEDICAL CENTER | Age: 77
End: 2022-08-04

## 2022-08-04 ENCOUNTER — TELEPHONE (OUTPATIENT)
Dept: SOCIAL WORK | Facility: CLINIC | Age: 77
End: 2022-08-04
Payer: MEDICARE

## 2022-08-04 DIAGNOSIS — I48.0 PAROXYSMAL ATRIAL FIBRILLATION (HCC): ICD-10-CM

## 2022-08-04 DIAGNOSIS — Z11.1 TUBERCULOSIS SCREENING: ICD-10-CM

## 2022-08-04 DIAGNOSIS — Z79.01 LONG TERM (CURRENT) USE OF ANTICOAGULANTS: ICD-10-CM

## 2022-08-04 LAB
INR PPP: 1.8 (ref 2–3.5)
INR PPP: 1.8 - CRITICAL LOW: < 0.8, CRITICAL HIGH: > 6.5 (ref 2–3.5)

## 2022-08-04 PROCEDURE — G0299 HHS/HOSPICE OF RN EA 15 MIN: HCPCS

## 2022-08-04 NOTE — PROGRESS NOTES
Anticoagulation Summary  As of 2022    INR goal:  2.0-3.0   TTR:  58.7 % (3.1 y)   INR used for dosin.80 (2022)   Warfarin maintenance plan:  1.25 mg (2.5 mg x 0.5) every day   Weekly warfarin total:  8.75 mg   Plan last modified:  Dari StaffordD (2022)   Next INR check:  2022   Target end date:  Indefinite    Indications    Atrial flutter (HCC) [I48.92]  Long term (current) use of anticoagulants [Z79.01] [Z79.01]  Paroxysmal atrial fibrillation (HCC) [I48.0]             Anticoagulation Episode Summary     INR check location:  Anticoagulation Clinic    Preferred lab:      Send INR reminders to:      Comments:  [facility] 22      Anticoagulation Care Providers     Provider Role Specialty Phone number    [facility]   [phone]        Anticoagulation Patient Findings       Left voicemail message to report a SUB therapeutic INR of 1.8.  Pt to bolus today then continue with current warfarin dosing regimen. Requested pt contact the clinic for any s/s of unusual bleeding, bruising, clotting or any changes to diet or medication. FU 4 days.     Severiano Rosas, PharmD    CC  [facility]

## 2022-08-04 NOTE — TELEPHONE ENCOUNTER
ED Follow-up  Call Attempts: 1  Date of ED visit: 08/02/22  Call Outcome: Reviewed ED visit with patient  Since you've been home, how have you been feeling?: Better  Were you prescribed any medications?: Yes  Did you  your medications from the pharmacy?: Yes  Do you have any questions regarding your medications?: Yes  Additional details: Member did not know what her meds were for. Explained to her their purpose and to be sure to take them until they are gone. Member verbalized understanding.   Do you have any questions about your discharge instructions?: No  RN Recommendations: Other  Additional details: Reminded member to make a f/u appt. with PCP. Verbalized understanding.   Total time spent (mins): 5

## 2022-08-05 ENCOUNTER — HOSPITAL ENCOUNTER (OUTPATIENT)
Facility: MEDICAL CENTER | Age: 77
End: 2022-08-05
Attending: FAMILY MEDICINE
Payer: MEDICARE

## 2022-08-05 PROCEDURE — 86480 TB TEST CELL IMMUN MEASURE: CPT

## 2022-08-05 PROCEDURE — G0180 MD CERTIFICATION HHA PATIENT: HCPCS | Performed by: FAMILY MEDICINE

## 2022-08-06 VITALS
DIASTOLIC BLOOD PRESSURE: 60 MMHG | HEART RATE: 80 BPM | SYSTOLIC BLOOD PRESSURE: 100 MMHG | TEMPERATURE: 98 F | OXYGEN SATURATION: 99 % | RESPIRATION RATE: 18 BRPM

## 2022-08-06 ASSESSMENT — ENCOUNTER SYMPTOMS
ASSOCIATED SYMPTOMS: NO ASSOCIATED SYMPTOMS
VOMITING: NO
PAIN LOCATION - PAIN FREQUENCY: INTERMITTENT
PERSON REPORTING PAIN: PATIENT
SHORTNESS OF BREATH: 1
PAIN LOCATION - PAIN QUALITY: ACHY
PAIN LOCATION - PAIN DURATION: ON AND OFF
PAIN LOCATION - EXACERBATING FACTORS: NO
PAIN LOCATION: RIGHT RIBS
LOWEST PAIN SEVERITY IN PAST 24 HOURS: 0/10
PAIN: 1
PAIN LOCATION - PAIN SEVERITY: 5/10
PAIN SEVERITY GOAL: 0/10
PAIN LOCATION - RELIEVING FACTORS: NO
DYSPNEA ACTIVITY LEVEL: AFTER AMBULATING MORE THAN 20 FT
SUBJECTIVE PAIN PROGRESSION: UNCHANGED
MUSCLE WEAKNESS: 1
HIGHEST PAIN SEVERITY IN PAST 24 HOURS: 5/10
NAUSEA: NO

## 2022-08-06 NOTE — CASE COMMUNICATION
Post ED Visit Follow-up  Patient presented to [facility] ED on 22 due to COPD exacerbation without hospitalization. Discharged home with doxycycline and prednisone. Educated patient on these two new medications, reinforced the importance of taking as directed. Pt voiced understanding. Patient on 3.5 L of supplement oxygen at baseline today and denies any new s/s of COPD exacerbation.   INR 1.8. Result sent to [facility] electronically.  Patien t admits not taking daily weight as instructed. Home scale battery . SN doesn't have another scale at this time so couldn't check weight.  will purchase replacement battery. Educated both pt and spouse on importance of CHF home management including checking daily wt and reporting usual wt gain. Pt is partially receptive but agrees to check daily wt once scale is available.   Pt denies any new falls, is not interested in PT or  OT at this time.

## 2022-08-08 ENCOUNTER — HOME CARE VISIT (OUTPATIENT)
Dept: HOME HEALTH SERVICES | Facility: HOME HEALTHCARE | Age: 77
End: 2022-08-08
Payer: MEDICARE

## 2022-08-08 ENCOUNTER — ANTICOAGULATION MONITORING (OUTPATIENT)
Dept: CARDIOLOGY | Facility: MEDICAL CENTER | Age: 77
End: 2022-08-08
Payer: MEDICARE

## 2022-08-08 DIAGNOSIS — Z79.01 LONG TERM (CURRENT) USE OF ANTICOAGULANTS: ICD-10-CM

## 2022-08-08 DIAGNOSIS — I48.92 ATRIAL FLUTTER, UNSPECIFIED TYPE (HCC): ICD-10-CM

## 2022-08-08 DIAGNOSIS — I48.0 PAROXYSMAL ATRIAL FIBRILLATION (HCC): ICD-10-CM

## 2022-08-08 LAB
GAMMA INTERFERON BACKGROUND BLD IA-ACNC: 0.04 IU/ML
INR PPP: 1.3 (ref 2–3.5)
INR PPP: 1.3 - CRITICAL LOW: < 0.8, CRITICAL HIGH: > 6.5 (ref 2–3.5)
M TB IFN-G BLD-IMP: NEGATIVE
M TB IFN-G CD4+ BCKGRND COR BLD-ACNC: -0.02 IU/ML
MITOGEN IGNF BCKGRD COR BLD-ACNC: 1.88 IU/ML
QFT TB2 - NIL TBQ2: 0 IU/ML

## 2022-08-08 PROCEDURE — G0299 HHS/HOSPICE OF RN EA 15 MIN: HCPCS

## 2022-08-08 ASSESSMENT — ENCOUNTER SYMPTOMS
PAIN: 1
HIGHEST PAIN SEVERITY IN PAST 24 HOURS: 6/10
PAIN LOCATION: RIGHT ARM
LOWEST PAIN SEVERITY IN PAST 24 HOURS: 5/10
PERSON REPORTING PAIN: PATIENT

## 2022-08-08 ASSESSMENT — FIBROSIS 4 INDEX: FIB4 SCORE: 1.49

## 2022-08-08 NOTE — PROGRESS NOTES
OP   Telephone Anticoagulation Service Note      Anticoagulation Summary  As of 2022    INR goal:  2.0-3.0   TTR:  58.5 % (3.1 y)   INR used for dosin.30 (2022)   Warfarin maintenance plan:  1.25 mg (2.5 mg x 0.5) every day   Weekly warfarin total:  8.75 mg   Plan last modified:  Estiven Balderas PharmD (2022)   Next INR check:  2022   Target end date:  Indefinite    Indications    Atrial flutter (HCC) [I48.92]  Long term (current) use of anticoagulants [Z79.01] [Z79.01]  Paroxysmal atrial fibrillation (HCC) [I48.0]             Anticoagulation Episode Summary     INR check location:  Anticoagulation Clinic    Preferred lab:      Send INR reminders to:      Comments:  [facility] 22      Anticoagulation Care Providers     Provider Role Specialty Phone number    [facility]   [phone]        Anticoagulation Patient Findings  Patient Findings     Negatives:  Signs/symptoms of thrombosis, Signs/symptoms of bleeding, Laboratory test error suspected, Change in health, Change in alcohol use, Change in activity, Upcoming invasive procedure, Emergency department visit, Upcoming dental procedure, Missed doses, Extra doses, Change in medications, Change in diet/appetite, Hospital admission, Bruising, Other complaints        Spoke with the patient on the phone today, reporting a SUB-therapeutic INR of 1.30.   Confirmed the current warfarin dosing regimen and patient compliance.  Patient denies any missed doses.   Patient denies any interval changes to diet and/or medications.   Patient denies any signs/symptoms of bleeding or clotting.  Patient instructed to bolus with 3.75mg TONIGHT, then take 1.25mg, and 2.5mg on Wed and retest on Thursday.   Orders sent to [facility].     Jerry Dunn PharmD

## 2022-08-09 ENCOUNTER — OFFICE VISIT (OUTPATIENT)
Dept: CARDIOLOGY | Facility: MEDICAL CENTER | Age: 77
End: 2022-08-09
Payer: MEDICARE

## 2022-08-09 VITALS
DIASTOLIC BLOOD PRESSURE: 50 MMHG | HEART RATE: 86 BPM | BODY MASS INDEX: 17.23 KG/M2 | RESPIRATION RATE: 12 BRPM | OXYGEN SATURATION: 100 % | HEIGHT: 66 IN | SYSTOLIC BLOOD PRESSURE: 80 MMHG | WEIGHT: 107.2 LBS

## 2022-08-09 VITALS
TEMPERATURE: 97.4 F | RESPIRATION RATE: 16 BRPM | HEART RATE: 86 BPM | WEIGHT: 106.2 LBS | BODY MASS INDEX: 17.14 KG/M2 | DIASTOLIC BLOOD PRESSURE: 58 MMHG | OXYGEN SATURATION: 100 % | SYSTOLIC BLOOD PRESSURE: 102 MMHG

## 2022-08-09 DIAGNOSIS — I50.20 ACC/AHA STAGE C SYSTOLIC HEART FAILURE (HCC): ICD-10-CM

## 2022-08-09 DIAGNOSIS — Z79.01 CHRONIC ANTICOAGULATION: ICD-10-CM

## 2022-08-09 DIAGNOSIS — Z98.890 HISTORY OF MITRAL VALVE REPAIR: ICD-10-CM

## 2022-08-09 DIAGNOSIS — J44.1 CHRONIC OBSTRUCTIVE PULMONARY DISEASE WITH ACUTE EXACERBATION (HCC): ICD-10-CM

## 2022-08-09 DIAGNOSIS — Z79.899 HIGH RISK MEDICATION USE: ICD-10-CM

## 2022-08-09 DIAGNOSIS — I50.22 CHRONIC SYSTOLIC CONGESTIVE HEART FAILURE (HCC): ICD-10-CM

## 2022-08-09 DIAGNOSIS — I51.89 LEFT VENTRICULAR SYSTOLIC DYSFUNCTION, NYHA CLASS 3: ICD-10-CM

## 2022-08-09 DIAGNOSIS — I48.0 PAROXYSMAL ATRIAL FIBRILLATION (HCC): ICD-10-CM

## 2022-08-09 LAB — EKG IMPRESSION: NORMAL

## 2022-08-09 PROCEDURE — 99214 OFFICE O/P EST MOD 30 MIN: CPT | Performed by: INTERNAL MEDICINE

## 2022-08-09 PROCEDURE — 93000 ELECTROCARDIOGRAM COMPLETE: CPT | Performed by: INTERNAL MEDICINE

## 2022-08-09 SDOH — ECONOMIC STABILITY: HOUSING INSECURITY: EVIDENCE OF SMOKING MATERIAL: 0

## 2022-08-09 ASSESSMENT — ENCOUNTER SYMPTOMS
LIMITED RANGE OF MOTION: 1
BRUISES/BLEEDS EASILY: 0
DIAPHORESIS: 0
PAIN LOCATION - PAIN FREQUENCY: INTERMITTENT
MYALGIAS: 0
NAUSEA: DENIES
SENSORY CHANGE: 0
ABDOMINAL PAIN: 0
FALLS: 0
DEPRESSION: 0
SHORTNESS OF BREATH: 1
PAIN LOCATION - PAIN SEVERITY: 4/10
ASSOCIATED SYMPTOMS: DENIES
PAIN LOCATION - RELIEVING FACTORS: PAIN MED
PALPITATIONS: 0
DOUBLE VISION: 0
FEVER: 0
SUBJECTIVE PAIN PROGRESSION: GRADUALLY IMPROVING
BLURRED VISION: 0
PAIN SEVERITY GOAL: 0/10
MEMORY LOSS: 0
COUGH: 0
PAIN LOCATION - EXACERBATING FACTORS: MOVEMENT
SHORTNESS OF BREATH: 1
VOMITING: DENIES
DIZZINESS: 1
PAIN LOCATION - PAIN QUALITY: ACHY
DYSPNEA ACTIVITY LEVEL: AT REST
HEADACHES: 0

## 2022-08-09 ASSESSMENT — FIBROSIS 4 INDEX: FIB4 SCORE: 1.49

## 2022-08-09 NOTE — PROGRESS NOTES
Chief Complaint   Patient presents with   • Congestive Heart Failure     F/V Dx:Chronic systolic congestive heart failure (HCC)       • Atrial Flutter   • Hyperlipidemia       Subjective:   Kelly Lovett is a 76 y.o. female who presents today for cardiac care and evaluation for her prior mitral valve repair, maze, now with Stage C systolic HF, with normalization of LVEF.     In 02/219, she went into the hospital because of heart failure exacerbation.  She was found to have a new reduction in LV function which is now at 20%.  She does have significant history of daily drinking [place].  We optimized her medication diuresed and she was discharged.       03/2019 Patient was able to complete 183 m during his 6 minute walk test. her O2 saturation at baseline was 99% and at the end of the test, the O2 saturation was 96%. she reported 4 level of dyspnea on Yen scale.    07/2019 She underwent successful cardioversion with Amiodarone loading.    03/2022 LVEF of 75%. Known mitral valve repair from 2017 which is functioning normally: MG 2 mmHg, HR 98 bpm. I have independently interpreted and reviewed echocardiogram's actual images.     I have independently interpreted and reviewed blood tests results with patient in clinic which shows GFR of 61, NT pro BNP of 229, K of 4.4.    I have personally interpreted EKG today with patient, there is no evidence of acute coronary syndrome, no evidence of prior infarct, normal NV and QT interval, no significant conduction disease. Sinus rhythm.    Now with dizziness. Blood pressure is low.    Past Medical History:   Diagnosis Date   • Asthma     inhalers   • Breath shortness     pt reports using o2 at night 2L, no problems at this time   • Chronic systolic congestive heart failure (HCC) 07/07/2016   • COPD (chronic obstructive pulmonary disease) (HCC)    • Dilated cardiomyopathy (HCC)    • Emphysema of lung (HCC)    • Heart valve disease    • High cholesterol    • History of  heart surgery    • Hyperlipidemia    • Hypertension    • Hypotension due to hypovolemia 2019   • Sleep apnea      Past Surgical History:   Procedure Laterality Date   • MITRAL VALVE REPAIR  2017    Procedure: MITRAL VALVE REPAIR ;  Surgeon: Lacey Porras M.D.;  Location: SURGERY [facility];  Service:    • MAZE PROCEDURE Left 2017    Procedure: MAZE PROCEDURE, Left atrial appendage ligation;  Surgeon: Lacey Porras M.D.;  Location: SURGERY [facility];  Service:    • PAGE  2017    Procedure: PAGE;  Surgeon: Lacey Porras M.D.;  Location: SURGERY [facility];  Service:    • APPENDECTOMY      1967   • OOPHORECTOMY       Family History   Problem Relation Age of Onset   • Cancer Father         colon cancer    • Hypertension Mother    • Cancer Sister 68        ovarian cancer     Social History     Socioeconomic History   • Marital status:      Spouse name: Not on file   • Number of children: Not on file   • Years of education: Not on file   • Highest education level: Not on file   Occupational History   • Not on file   Tobacco Use   • Smoking status: Former Smoker     Packs/day: 0.50     Years: 38.00     Pack years: 19.00     Types: Cigarettes     Quit date: 10/11/2001     Years since quittin.8   • Smokeless tobacco: Never Used   Vaping Use   • Vaping Use: Never used   Substance and Sexual Activity   • Alcohol use: Yes     Alcohol/week: 8.4 oz     Types: 14 Shots of liquor per week     Comment: 1 drinks a day   • Drug use: No   • Sexual activity: Yes     Partners: Male   Other Topics Concern   • Not on file   Social History Narrative   • Not on file     Social Determinants of Health     Financial Resource Strain: Not on file   Food Insecurity: Not on file   Transportation Needs: Not on file   Physical Activity: Not on file   Stress: Not on file   Social Connections: Not on file   Intimate Partner Violence: Not on file   Housing Stability: Not on file     Allergies    Allergen Reactions   • Sulfa Drugs Rash     Rxn - years ago in her 20's     Outpatient Encounter Medications as of 8/9/2022   Medication Sig Dispense Refill   • Tiotropium Bromide Monohydrate (SPIRIVA HANDIHALER INH) Inhale.     • amiodarone (CORDARONE) 100 MG tablet TAKE ONE TABLET BY MOUTH ONE TIME DAILY 100 Tablet 0   • warfarin (COUMADIN) 2.5 MG Tab TAKE ONE-HALF TO ONE (1/2-1) TABLET DAILY AS DIRECTED BY [facility] (Patient taking differently: Take 1.25 mg by mouth every day. Take one-half to one (1/2-1) tablet daily as directed by [facility]) 90 Tablet 1   • albuterol 108 (90 Base) MCG/ACT Aero Soln inhalation aerosol Inhale 2 Puffs every 6 hours as needed for Shortness of Breath. 18 g 3   • fluticasone-salmeterol (ADVAIR) 250-50 MCG/DOSE AEROSOL POWDER, BREATH ACTIVATED Inhale 1 Puff 2 times a day. 1 Each 3   • atorvastatin (LIPITOR) 40 MG Tab TAKE ONE TABLET BY MOUTH ONE TIME DAILY 100 Tablet 4   • Dextromethorphan-guaiFENesin (TUSSIN DM)  MG/5ML Syrup Take 5 mL by mouth every 6 hours as needed. (Patient taking differently: Take 5 mL by mouth every 6 hours as needed. Indications: Cough) 473 mL 1   • Home Care Oxygen Inhale 3 L/min continuous. Oxygen dose range: 3-3.5 L/min  Respiratory route via: Nasal cannula  Oxygen supplier: [facility]     • acetaminophen (TYLENOL) 500 MG Tab Take 500 mg by mouth 1 time daily as needed. Headache     • [DISCONTINUED] doxycycline (MONODOX) 100 MG capsule Take 1 Capsule by mouth 2 times a day for 10 days. (Patient not taking: Reported on 8/9/2022) 20 Capsule 0   • [DISCONTINUED] Ascorbic Acid (VITAMIN C) 500 MG Cap Take 500 mg by mouth every day. (Patient not taking: Reported on 8/9/2022)     • [DISCONTINUED] Cholecalciferol (VITAMIN D) 50 MCG (2000 UT) Cap Take 2,000 Units by mouth every day. (Patient not taking: Reported on 8/9/2022)     • [DISCONTINUED] Multiple Vitamins-Minerals (CENTRUM ADULTS) Tab tablet Take 1  "Tablet by mouth every day. (Patient not taking: Reported on 8/9/2022)     • [DISCONTINUED] Magnesium 500 MG Tab Take 1 Tablet by mouth every day. (Patient not taking: Reported on 8/9/2022)     • [DISCONTINUED] Calcium Carbonate-Vit D-Min (CALCIUM 1200 PO) Take 1,200 mg by mouth every day. (Patient not taking: Reported on 8/9/2022)     • [DISCONTINUED] ENTRESTO 24-26 MG Tab tablet TAKE ONE TABLET BY MOUTH TWICE DAILY 180 Tablet 3   • [DISCONTINUED] metoprolol SR (TOPROL XL) 25 MG TABLET SR 24 HR Take 25 mg by mouth every day.       No facility-administered encounter medications on file as of 8/9/2022.     Review of Systems   Constitutional: Negative for diaphoresis and fever.   HENT: Negative for nosebleeds.    Eyes: Negative for blurred vision and double vision.   Respiratory: Positive for shortness of breath. Negative for cough.    Cardiovascular: Negative for chest pain and palpitations.   Gastrointestinal: Negative for abdominal pain.   Genitourinary: Negative for dysuria and frequency.   Musculoskeletal: Negative for falls and myalgias.   Skin: Negative for rash.   Neurological: Positive for dizziness. Negative for sensory change and headaches.   Endo/Heme/Allergies: Does not bruise/bleed easily.   Psychiatric/Behavioral: Negative for depression and memory loss.        Objective:   BP (!) 80/50 (BP Location: Right arm, Patient Position: Sitting, BP Cuff Size: Adult)   Pulse 86   Resp 12   Ht 1.676 m (5' 6\")   Wt 48.6 kg (107 lb 3.2 oz)   LMP  (LMP Unknown)   SpO2 100%   BMI 17.30 kg/m²     Physical Exam  Vitals and nursing note reviewed.   Constitutional:       General: She is not in acute distress.     Appearance: She is not diaphoretic.      Comments: Patient is dependent on supplemental oxygen.     HENT:      Head: Normocephalic and atraumatic.      Right Ear: External ear normal.      Left Ear: External ear normal.   Eyes:      General:         Right eye: No discharge.         Left eye: No discharge. "   Neck:      Thyroid: No thyromegaly.      Vascular: No JVD.   Cardiovascular:      Rate and Rhythm: Normal rate and regular rhythm.      Heart sounds: Normal heart sounds. No murmur heard.    No friction rub. No gallop.   Pulmonary:      Effort: No respiratory distress.      Breath sounds: Normal breath sounds.      Comments: Patient is dependent on supplemental oxygen.    Abdominal:      General: Bowel sounds are normal. There is no distension.      Tenderness: There is no abdominal tenderness.   Musculoskeletal:         General: No tenderness.   Skin:     General: Skin is warm and dry.   Neurological:      Mental Status: She is alert and oriented to person, place, and time.      Cranial Nerves: No cranial nerve deficit.   Psychiatric:         Behavior: Behavior normal.         Assessment:     1. ACC/AHA stage C systolic heart failure (HCC)  EKG   2. Left ventricular systolic dysfunction, NYHA class 3  EKG   3. Paroxysmal atrial fibrillation (HCC)  EKG   4. Chronic anticoagulation  EKG   5. Chronic obstructive pulmonary disease with acute exacerbation (HCC)  EKG   6. Chronic systolic congestive heart failure (HCC)  EKG   7. High risk medication use  EKG   8. History of mitral valve repair  EKG       Medical Decision Making:  Today's Assessment / Status / Plan:   Today, based on physical examination findings, patient is euvolemic. No JVD, lungs are clear to auscultation, no pitting edema in bilateral lower extremities, no ascites.    Dry weight is 111 lbs.    Patient definitely feels better when she is in sinus rhythm.  Therefore our priority is to keep her in sinus rhythm.  Will continue Amiodarone to 100 mg once a day to avoid toxicity.    Her blood pressure is low today. Will stop Toprol and Entresto.     She is status Cardiomems implant.    Encouraged fluid intake.    Optimize COPD care with PMD and Pulmonary.    Routine primary care for possible thyroid issues???     Continue Warfarin for anticoagulation.    I  thank you for referring patient to our Heart Failure Clinic today.

## 2022-08-11 ENCOUNTER — HOME CARE VISIT (OUTPATIENT)
Dept: HOME HEALTH SERVICES | Facility: HOME HEALTHCARE | Age: 77
End: 2022-08-11
Payer: MEDICARE

## 2022-08-11 ENCOUNTER — ANTICOAGULATION MONITORING (OUTPATIENT)
Dept: VASCULAR LAB | Facility: MEDICAL CENTER | Age: 77
End: 2022-08-11
Payer: MEDICARE

## 2022-08-11 VITALS
RESPIRATION RATE: 16 BRPM | DIASTOLIC BLOOD PRESSURE: 62 MMHG | TEMPERATURE: 97.6 F | OXYGEN SATURATION: 100 % | WEIGHT: 107.2 LBS | SYSTOLIC BLOOD PRESSURE: 108 MMHG | HEART RATE: 84 BPM | BODY MASS INDEX: 17.3 KG/M2

## 2022-08-11 DIAGNOSIS — I48.92 ATRIAL FLUTTER, UNSPECIFIED TYPE (HCC): ICD-10-CM

## 2022-08-11 DIAGNOSIS — I48.0 PAROXYSMAL ATRIAL FIBRILLATION (HCC): ICD-10-CM

## 2022-08-11 DIAGNOSIS — Z79.01 LONG TERM (CURRENT) USE OF ANTICOAGULANTS: ICD-10-CM

## 2022-08-11 LAB
INR PPP: 2.3 (ref 2–3.5)
INR PPP: 2.3 - CRITICAL LOW: < 0.8, CRITICAL HIGH: > 6.5 (ref 2–3.5)

## 2022-08-11 PROCEDURE — G0299 HHS/HOSPICE OF RN EA 15 MIN: HCPCS

## 2022-08-11 SDOH — ECONOMIC STABILITY: HOUSING INSECURITY: EVIDENCE OF SMOKING MATERIAL: 0

## 2022-08-11 ASSESSMENT — ENCOUNTER SYMPTOMS
PAIN LOCATION - RELIEVING FACTORS: REST, PAIN MED
LOWEST PAIN SEVERITY IN PAST 24 HOURS: 4/10
DYSPNEA ACTIVITY LEVEL: AT REST
PAIN LOCATION - PAIN SEVERITY: 4/10
PAIN LOCATION - EXACERBATING FACTORS: ACTIVITY
PAIN LOCATION: RIGHT RIBS
SHORTNESS OF BREATH: 1
MUSCLE WEAKNESS: 1
PAIN LOCATION - PAIN DURATION: DAILY
PAIN LOCATION - PAIN FREQUENCY: INTERMITTENT
SUBJECTIVE PAIN PROGRESSION: GRADUALLY IMPROVING
NAUSEA: DENIES
VOMITING: DENIES
HIGHEST PAIN SEVERITY IN PAST 24 HOURS: 5/10
PAIN SEVERITY GOAL: 0/10
PAIN LOCATION - PAIN QUALITY: ACHY
LIMITED RANGE OF MOTION: 1
PERSON REPORTING PAIN: PATIENT
PAIN: 1
ASSOCIATED SYMPTOMS: DENES

## 2022-08-11 ASSESSMENT — FIBROSIS 4 INDEX: FIB4 SCORE: 1.49

## 2022-08-11 NOTE — PROGRESS NOTES
Anticoagulation Summary  As of 2022      INR goal:  2.0-3.0   TTR:  58.5 % (3.1 y)   INR used for dosin.30 (2022)   Warfarin maintenance plan:  2.5 mg (2.5 mg x 1) every Mon, Wed, Fri; 1.25 mg (2.5 mg x 0.5) all other days   Weekly warfarin total:  12.5 mg   Plan last modified:  Keyon Cortez PharmD (2022)   Next INR check:  8/15/2022   Target end date:  Indefinite    Indications    Atrial flutter (HCC) [I48.92]  Long term (current) use of anticoagulants [Z79.01] [Z79.01]  Paroxysmal atrial fibrillation (HCC) [I48.0]                 Anticoagulation Episode Summary       INR check location:  Anticoagulation Clinic    Preferred lab:      Send INR reminders to:      Comments:  [facility] 22          Anticoagulation Care Providers       Provider Role Specialty Phone number    [facility]   [phone]          Anticoagulation Patient Findings    Spoke with patient today regarding therapeutic INR of 2.3.  Patient denies any signs/symptoms of bruising or bleeding or any changes in diet and medications.  Instructed patient to call clinic with any questions or concerns.    Pt is not on antiplatelet therapy    Instructed patient to increase weekly warfarin regimen as detailed above.  Follow up in 4 days, to reduce risk of adverse events related to this high risk medication,  Warfarin.    Keyon Cortez, Mojgan, BCACP

## 2022-08-15 ENCOUNTER — HOME CARE VISIT (OUTPATIENT)
Dept: HOME HEALTH SERVICES | Facility: HOME HEALTHCARE | Age: 77
End: 2022-08-15
Payer: MEDICARE

## 2022-08-15 ENCOUNTER — ANTICOAGULATION MONITORING (OUTPATIENT)
Dept: MEDICAL GROUP | Facility: PHYSICIAN GROUP | Age: 77
End: 2022-08-15
Payer: MEDICARE

## 2022-08-15 DIAGNOSIS — I48.92 ATRIAL FLUTTER, UNSPECIFIED TYPE (HCC): ICD-10-CM

## 2022-08-15 DIAGNOSIS — I48.0 PAROXYSMAL ATRIAL FIBRILLATION (HCC): ICD-10-CM

## 2022-08-15 DIAGNOSIS — Z79.01 LONG TERM (CURRENT) USE OF ANTICOAGULANTS: ICD-10-CM

## 2022-08-15 LAB
INR PPP: 3.6 (ref 2–3.5)
INR PPP: 3.6 - CRITICAL LOW: < 0.8, CRITICAL HIGH: > 6.5 (ref 2–3.5)

## 2022-08-15 PROCEDURE — G0299 HHS/HOSPICE OF RN EA 15 MIN: HCPCS

## 2022-08-15 ASSESSMENT — FIBROSIS 4 INDEX: FIB4 SCORE: 1.49

## 2022-08-15 NOTE — PROGRESS NOTES
Anticoagulation Summary  As of 8/15/2022      INR goal:  2.0-3.0   TTR:  58.4 % (3.1 y)   INR used for dosing:  3.60 (8/15/2022)   Warfarin maintenance plan:  2.5 mg (2.5 mg x 1) every Mon, Wed, Fri; 1.25 mg (2.5 mg x 0.5) all other days   Weekly warfarin total:  12.5 mg   Plan last modified:  Keyon Cortez PharmD (8/11/2022)   Next INR check:  8/18/2022   Target end date:  Indefinite    Indications    Atrial flutter (HCC) [I48.92]  Long term (current) use of anticoagulants [Z79.01] [Z79.01]  Paroxysmal atrial fibrillation (HCC) [I48.0]                 Anticoagulation Episode Summary       INR check location:  Anticoagulation Clinic    Preferred lab:      Send INR reminders to:      Comments:  [facility] 7/28/22          Anticoagulation Care Providers       Provider Role Specialty Phone number    [facility]   [phone]          Anticoagulation Patient Findings          HPI:  Kelly Hodgeselsbernadine, on anticoagulation therapy with warfarin for AF.   Changes to current medical/health status since last appt: none  Denies signs/symptoms of bleeding and/or thrombosis since the last appt.    Denies any interval changes to diet  Denies any interval changes to medications since last appt.   Denies any complications or cost restrictions with current therapy.     A/P   INR  SUPRA-therapeutic.   Pt already took today's dose.  Will reduce dose tomorrow then Pt is to continue with current warfarin dosing regimen.     Next INR in 3 days.     Severiano Rosas, DariD

## 2022-08-16 VITALS
WEIGHT: 109 LBS | RESPIRATION RATE: 16 BRPM | DIASTOLIC BLOOD PRESSURE: 60 MMHG | BODY MASS INDEX: 17.59 KG/M2 | OXYGEN SATURATION: 99 % | TEMPERATURE: 97.7 F | SYSTOLIC BLOOD PRESSURE: 112 MMHG | HEART RATE: 89 BPM

## 2022-08-16 SDOH — ECONOMIC STABILITY: HOUSING INSECURITY: EVIDENCE OF SMOKING MATERIAL: 0

## 2022-08-16 ASSESSMENT — ENCOUNTER SYMPTOMS
VOMITING: DENIES
NAUSEA: DENIES
SHORTNESS OF BREATH: 1
MUSCLE WEAKNESS: 1
PERSON REPORTING PAIN: PATIENT
DYSPNEA ACTIVITY LEVEL: AT REST
DENIES PAIN: 1

## 2022-08-17 DIAGNOSIS — J41.0 SIMPLE CHRONIC BRONCHITIS (HCC): ICD-10-CM

## 2022-08-17 RX ORDER — ALBUTEROL SULFATE 90 UG/1
2 AEROSOL, METERED RESPIRATORY (INHALATION) EVERY 6 HOURS PRN
Qty: 18 G | Refills: 3 | Status: SHIPPED | OUTPATIENT
Start: 2022-08-17

## 2022-08-17 NOTE — TELEPHONE ENCOUNTER
Received request via: Patient    Was the patient seen in the last year in this department? Yes  LOV 07/26/2022  Does the patient have an active prescription (recently filled or refills available) for medication(s) requested? No    Not all medications fully transferred to [facility] due to [facility] closing.

## 2022-08-18 ENCOUNTER — OFFICE VISIT (OUTPATIENT)
Dept: MEDICAL GROUP | Facility: LAB | Age: 77
End: 2022-08-18
Payer: MEDICARE

## 2022-08-18 ENCOUNTER — ANTICOAGULATION MONITORING (OUTPATIENT)
Dept: VASCULAR LAB | Facility: MEDICAL CENTER | Age: 77
End: 2022-08-18

## 2022-08-18 ENCOUNTER — HOME CARE VISIT (OUTPATIENT)
Dept: HOME HEALTH SERVICES | Facility: HOME HEALTHCARE | Age: 77
End: 2022-08-18
Payer: MEDICARE

## 2022-08-18 ENCOUNTER — HOSPITAL ENCOUNTER (OUTPATIENT)
Dept: LAB | Facility: MEDICAL CENTER | Age: 77
End: 2022-08-18
Attending: FAMILY MEDICINE
Payer: MEDICARE

## 2022-08-18 VITALS
SYSTOLIC BLOOD PRESSURE: 116 MMHG | OXYGEN SATURATION: 99 % | HEART RATE: 84 BPM | TEMPERATURE: 97.5 F | WEIGHT: 110 LBS | BODY MASS INDEX: 17.75 KG/M2 | DIASTOLIC BLOOD PRESSURE: 68 MMHG | RESPIRATION RATE: 18 BRPM

## 2022-08-18 VITALS
HEIGHT: 66 IN | RESPIRATION RATE: 14 BRPM | BODY MASS INDEX: 17.13 KG/M2 | SYSTOLIC BLOOD PRESSURE: 124 MMHG | OXYGEN SATURATION: 97 % | TEMPERATURE: 97.5 F | HEART RATE: 100 BPM | WEIGHT: 106.6 LBS | DIASTOLIC BLOOD PRESSURE: 72 MMHG

## 2022-08-18 DIAGNOSIS — Z13.29 THYROID DISORDER SCREEN: ICD-10-CM

## 2022-08-18 DIAGNOSIS — R42 DIZZINESS: ICD-10-CM

## 2022-08-18 DIAGNOSIS — I48.92 ATRIAL FLUTTER, UNSPECIFIED TYPE (HCC): ICD-10-CM

## 2022-08-18 DIAGNOSIS — I48.0 PAROXYSMAL ATRIAL FIBRILLATION (HCC): ICD-10-CM

## 2022-08-18 DIAGNOSIS — Z79.01 LONG TERM (CURRENT) USE OF ANTICOAGULANTS: ICD-10-CM

## 2022-08-18 LAB
INR PPP: 4.1 (ref 2–3.5)
INR PPP: 4.1 - CRITICAL LOW: < 0.8, CRITICAL HIGH: > 6.5 (ref 2–3.5)
T4 FREE SERPL-MCNC: 1.47 NG/DL (ref 0.93–1.7)
TSH SERPL DL<=0.005 MIU/L-ACNC: 0.16 UIU/ML (ref 0.38–5.33)

## 2022-08-18 PROCEDURE — 36415 COLL VENOUS BLD VENIPUNCTURE: CPT

## 2022-08-18 PROCEDURE — 84443 ASSAY THYROID STIM HORMONE: CPT

## 2022-08-18 PROCEDURE — 84439 ASSAY OF FREE THYROXINE: CPT

## 2022-08-18 PROCEDURE — 99214 OFFICE O/P EST MOD 30 MIN: CPT | Performed by: FAMILY MEDICINE

## 2022-08-18 PROCEDURE — G0299 HHS/HOSPICE OF RN EA 15 MIN: HCPCS

## 2022-08-18 ASSESSMENT — FIBROSIS 4 INDEX
FIB4 SCORE: 1.49
FIB4 SCORE: 1.49

## 2022-08-18 NOTE — PROGRESS NOTES
"Subjective:     CC: Dizziness    HPI:   Kelly is a 76-year-old female with a complex medical history including CHF, severe COPD with chronic respiratory failure, a fib/flutter, and CKD who presents with continued issues of overall unwell feeling, episodic dizziness.  Recently saw cardiology and all medications that could lower her blood pressure were stopped, she has had minimal improvement.  She does think some of this could be related to stress with planned move soon as well as poor p.o. intake.  No fevers, no recent falls, no nausea/vomiting.  No diarrhea.    Medications, past medical history, allergies, and social history have been reviewed and updated.      Objective:       Exam:  /72 (BP Location: Right arm, Patient Position: Sitting, BP Cuff Size: Adult)   Pulse 100   Temp 36.4 °C (97.5 °F)   Resp 14   Ht 1.676 m (5' 6\")   Wt 48.4 kg (106 lb 9.6 oz)   LMP  (LMP Unknown)   SpO2 97%   BMI 17.21 kg/m²  Body mass index is 17.21 kg/m².    Constitutional: Alert. Well appearing. No distress.  Skin: Warm, dry, good turgor, no visible rashes.  Eye: Equal, round and reactive to light, conjunctiva clear, lids normal.  ENMT: Moist mucous membranes. Normal dentition.  Respiratory: Normal effort. Lungs are clear to auscultation bilaterally.  O2 in place.  Cardiovascular: Regular rate and rhythm.   Neuro: Moves all four extremities. No facial droop.  Psych: Answers questions appropriately. Normal affect and mood.        Assessment & Plan:     76-year-old female with a complex medical history including CHF, severe COPD with chronic respiratory failure, a fib/flutter, and CKD who presents with continued issues of overall unwell feeling, episodic dizziness.      Has had multiple work-ups for this including labs, CXR, medication changes.  Minimal improvement with stopping all medicines that would lower her blood pressure.  She does note may be related to stress/anxiety with pending move.  Certainly chronic medical " conditions could be contributing as well.  At this point do not see any obvious further work-up or medication changes to make.  Only lab not recently checked his TSH and this is ordered.  Recommended regular p.o. intake with small frequent meals as well as water throughout the day.    1. Dizziness    2. Thyroid disorder screen    - TSH WITH REFLEX TO FT4; Future      Please note that this note was created using voice recognition software.

## 2022-08-18 NOTE — PROGRESS NOTES
Anticoagulation Summary  As of 2022      INR goal:  2.0-3.0   TTR:  58.3 % (3.1 y)   INR used for dosin.10 (2022)   Warfarin maintenance plan:  2.5 mg (2.5 mg x 1) every Mon, Wed, Fri; 1.25 mg (2.5 mg x 0.5) all other days   Weekly warfarin total:  12.5 mg   Plan last modified:  Keyon Cortez, PharmD (2022)   Next INR check:  2022   Target end date:  Indefinite    Indications    Atrial flutter (HCC) [I48.92]  Long term (current) use of anticoagulants [Z79.01] [Z79.01]  Paroxysmal atrial fibrillation (HCC) [I48.0]                 Anticoagulation Episode Summary       INR check location:  Anticoagulation Clinic    Preferred lab:      Send INR reminders to:      Comments:  [facility] 22          Anticoagulation Care Providers       Provider Role Specialty Phone number    [facility]   [phone]          Anticoagulation Patient Findings          Left a VM:    [facility]    Thank you for having your INR test completed on 22 .   It shows your INR result was 4.1, which is above  in your target range of 2 to 3.     Due to your result we would like you to take warfarin:    : No warfarin this day  : Start taking 1/2 tablet (tablet strength of 2.5 mg) of warfarin (1.25 mg of warfarin) daily.    Please notify our clinic if you have had any changes to your diet, dietary supplements, medications, or any signs or symptoms of bleeding or bruising. If you are having any abnormal bleeding, blood in your urine (pink urine), blood in your stool or a dark or tarry stool you can notify our clinic but please seek medical attention immediately.     We have your next INR lab test set for 4 days with Home Health .    Please contact us with any questions or concerns, [phone].    Thanks,   Severiano Rosas, PharmD

## 2022-08-19 ENCOUNTER — TELEPHONE (OUTPATIENT)
Dept: MEDICAL GROUP | Facility: LAB | Age: 77
End: 2022-08-19
Payer: MEDICARE

## 2022-08-19 NOTE — TELEPHONE ENCOUNTER
Discussed recent lab results consistent with subclinical hyperthyroidism with patient.  Possibly symptomatic given dizziness and other vague symptoms.  This is complicated by the need to to stay on amiodarone.  She is also moving to [place] within the next few weeks.  We discussed holding on any specific treatment or work-up for now given upcoming move.  Recommended that she discuss with new PCP and likely will need to establish with endocrinologist in [place].

## 2022-08-21 SDOH — ECONOMIC STABILITY: HOUSING INSECURITY: EVIDENCE OF SMOKING MATERIAL: 0

## 2022-08-21 ASSESSMENT — ENCOUNTER SYMPTOMS
MUSCLE WEAKNESS: 1
NAUSEA: DENIES
VOMITING: DENIES
PERSON REPORTING PAIN: PATIENT
DYSPNEA ACTIVITY LEVEL: AT REST
LIMITED RANGE OF MOTION: 1
SHORTNESS OF BREATH: 1
DENIES PAIN: 1

## 2022-08-22 ENCOUNTER — ANTICOAGULATION MONITORING (OUTPATIENT)
Dept: VASCULAR LAB | Facility: MEDICAL CENTER | Age: 77
End: 2022-08-22
Payer: MEDICARE

## 2022-08-22 ENCOUNTER — HOME CARE VISIT (OUTPATIENT)
Dept: HOME HEALTH SERVICES | Facility: HOME HEALTHCARE | Age: 77
End: 2022-08-22
Payer: MEDICARE

## 2022-08-22 VITALS
WEIGHT: 106.8 LBS | BODY MASS INDEX: 17.24 KG/M2 | DIASTOLIC BLOOD PRESSURE: 60 MMHG | TEMPERATURE: 97.8 F | OXYGEN SATURATION: 100 % | SYSTOLIC BLOOD PRESSURE: 120 MMHG | HEART RATE: 88 BPM

## 2022-08-22 DIAGNOSIS — I48.92 ATRIAL FLUTTER, UNSPECIFIED TYPE (HCC): ICD-10-CM

## 2022-08-22 DIAGNOSIS — Z79.01 LONG TERM (CURRENT) USE OF ANTICOAGULANTS: ICD-10-CM

## 2022-08-22 DIAGNOSIS — I48.0 PAROXYSMAL ATRIAL FIBRILLATION (HCC): ICD-10-CM

## 2022-08-22 LAB
INR PPP: 2.2 (ref 2–3.5)
INR PPP: 2.2 - CRITICAL LOW: < 0.8, CRITICAL HIGH: > 6.5 (ref 2–3.5)

## 2022-08-22 PROCEDURE — G0299 HHS/HOSPICE OF RN EA 15 MIN: HCPCS

## 2022-08-22 PROCEDURE — RXMED WILLOW AMBULATORY MEDICATION CHARGE: Performed by: NURSE PRACTITIONER

## 2022-08-22 RX ORDER — FLUTICASONE PROPIONATE AND SALMETEROL 250; 50 UG/1; UG/1
1 POWDER RESPIRATORY (INHALATION) EVERY 12 HOURS
Qty: 60 EACH | Refills: 1 | Status: SHIPPED | OUTPATIENT
Start: 2022-08-22

## 2022-08-22 SDOH — ECONOMIC STABILITY: HOUSING INSECURITY: EVIDENCE OF SMOKING MATERIAL: 0

## 2022-08-22 ASSESSMENT — ENCOUNTER SYMPTOMS
NAUSEA: DENIES
VOMITING: DENIES
DYSPNEA ACTIVITY LEVEL: AT REST
LIMITED RANGE OF MOTION: 1
COUGH CHARACTERISTICS: PRODUCTIVE
SHORTNESS OF BREATH: 1
MUSCLE WEAKNESS: 1
DENIES PAIN: 1
PERSON REPORTING PAIN: PATIENT
COUGH: 1

## 2022-08-22 ASSESSMENT — FIBROSIS 4 INDEX: FIB4 SCORE: 1.49

## 2022-08-22 NOTE — PROGRESS NOTES
Anticoagulation Summary  As of 2022      INR goal:  2.0-3.0   TTR:  58.3 % (3.1 y)   INR used for dosin.20 (2022)   Warfarin maintenance plan:  2.5 mg (2.5 mg x 1) every Mon, Fri; 1.25 mg (2.5 mg x 0.5) all other days   Weekly warfarin total:  11.25 mg   Plan last modified:  Keyon Cortez PharmD (2022)   Next INR check:  2022   Target end date:  Indefinite    Indications    Atrial flutter (HCC) [I48.92]  Long term (current) use of anticoagulants [Z79.01] [Z79.01]  Paroxysmal atrial fibrillation (HCC) [I48.0]                 Anticoagulation Episode Summary       INR check location:  Anticoagulation Clinic    Preferred lab:      Send INR reminders to:      Comments:  [facility] 22          Anticoagulation Care Providers       Provider Role Specialty Phone number    [facility]   [phone]          Anticoagulation Patient Findings    Spoke with patient today regarding therapeutic INR of 2.0.  Patient denies any signs/symptoms of bruising or bleeding or any changes in diet and medications.  Instructed patient to call clinic with any questions or concerns.    Pt is not on antiplatelet therapy    Instructed patient to decrease weekly warfarin regimen as detailed above.  Follow up in 3 days, to reduce risk of adverse events related to this high risk medication,  Warfarin.    Keyon Cortez, Mojgan, BCACP

## 2022-08-23 ENCOUNTER — PHARMACY VISIT (OUTPATIENT)
Dept: PHARMACY | Facility: MEDICAL CENTER | Age: 77
End: 2022-08-23
Payer: MEDICARE

## 2022-08-25 ENCOUNTER — ANTICOAGULATION MONITORING (OUTPATIENT)
Dept: VASCULAR LAB | Facility: MEDICAL CENTER | Age: 77
End: 2022-08-25
Payer: MEDICARE

## 2022-08-25 ENCOUNTER — HOME CARE VISIT (OUTPATIENT)
Dept: HOME HEALTH SERVICES | Facility: HOME HEALTHCARE | Age: 77
End: 2022-08-25
Payer: MEDICARE

## 2022-08-25 DIAGNOSIS — Z79.01 LONG TERM (CURRENT) USE OF ANTICOAGULANTS: ICD-10-CM

## 2022-08-25 DIAGNOSIS — I48.92 ATRIAL FLUTTER, UNSPECIFIED TYPE (HCC): ICD-10-CM

## 2022-08-25 DIAGNOSIS — I48.0 PAROXYSMAL ATRIAL FIBRILLATION (HCC): ICD-10-CM

## 2022-08-25 LAB
INR PPP: 1.7 (ref 2–3.5)
INR PPP: 1.7 - CRITICAL LOW: < 0.8, CRITICAL HIGH: > 6.5 (ref 2–3.5)

## 2022-08-25 PROCEDURE — G0299 HHS/HOSPICE OF RN EA 15 MIN: HCPCS

## 2022-08-25 ASSESSMENT — FIBROSIS 4 INDEX: FIB4 SCORE: 1.49

## 2022-08-25 NOTE — PROGRESS NOTES
Anticoagulation Summary  As of 2022      INR goal:  2.0-3.0   TTR:  58.2 % (3.1 y)   INR used for dosin.70 (2022)   Warfarin maintenance plan:  2.5 mg (2.5 mg x 1) every Mon, Fri; 1.25 mg (2.5 mg x 0.5) all other days   Weekly warfarin total:  11.25 mg   Plan last modified:  Keyon Cortez PharmD (2022)   Next INR check:  2022   Target end date:  Indefinite    Indications    Atrial flutter (HCC) [I48.92]  Long term (current) use of anticoagulants [Z79.01] [Z79.01]  Paroxysmal atrial fibrillation (HCC) [I48.0]                 Anticoagulation Episode Summary       INR check location:  Anticoagulation Clinic    Preferred lab:      Send INR reminders to:      Comments:  [facility] 22          Anticoagulation Care Providers       Provider Role Specialty Phone number    [facility]   [phone]          Anticoagulation Patient Findings      Left voicemail + MyChart message to report a subtherapeutic INR of 1.7.      Pt to continue with current warfarin dosing regimen as her INR has been labile and is likely low today because of skipping doses last week. Requested pt contact the clinic for any s/s of unusual bleeding, bruising, clotting or any changes to diet or medication.    FU INR in 4 day(s) via [facility].    Gaby Ray, DariD

## 2022-08-26 VITALS
WEIGHT: 109 LBS | RESPIRATION RATE: 16 BRPM | BODY MASS INDEX: 17.59 KG/M2 | HEART RATE: 92 BPM | SYSTOLIC BLOOD PRESSURE: 108 MMHG | DIASTOLIC BLOOD PRESSURE: 64 MMHG | OXYGEN SATURATION: 100 % | TEMPERATURE: 98.2 F

## 2022-08-26 ASSESSMENT — ENCOUNTER SYMPTOMS
COUGH: 1
LIMITED RANGE OF MOTION: 1
SHORTNESS OF BREATH: 1
MUSCLE WEAKNESS: 1
COUGH CHARACTERISTICS: NON-PRODUCTIVE
DENIES PAIN: 1
PERSON REPORTING PAIN: PATIENT

## 2022-08-29 ENCOUNTER — HOME CARE VISIT (OUTPATIENT)
Dept: HOME HEALTH SERVICES | Facility: HOME HEALTHCARE | Age: 77
End: 2022-08-29
Payer: MEDICARE

## 2022-08-29 ENCOUNTER — ANTICOAGULATION MONITORING (OUTPATIENT)
Dept: VASCULAR LAB | Facility: MEDICAL CENTER | Age: 77
End: 2022-08-29
Payer: MEDICARE

## 2022-08-29 DIAGNOSIS — I48.0 PAROXYSMAL ATRIAL FIBRILLATION (HCC): ICD-10-CM

## 2022-08-29 DIAGNOSIS — Z79.01 LONG TERM (CURRENT) USE OF ANTICOAGULANTS: ICD-10-CM

## 2022-08-29 DIAGNOSIS — I48.92 ATRIAL FLUTTER, UNSPECIFIED TYPE (HCC): ICD-10-CM

## 2022-08-29 LAB
INR PPP: 1.4 (ref 2–3.5)
INR PPP: 1.4 - CRITICAL LOW: < 0.8, CRITICAL HIGH: > 6.5 (ref 2–3.5)

## 2022-08-29 PROCEDURE — G0299 HHS/HOSPICE OF RN EA 15 MIN: HCPCS

## 2022-08-29 PROCEDURE — A6212 FOAM DRG <=16 SQ IN W/BORDER: HCPCS

## 2022-08-29 PROCEDURE — 665001 SOC-HOME HEALTH

## 2022-08-29 NOTE — PROGRESS NOTES
Anticoagulation Summary  As of 2022      INR goal:  2.0-3.0   TTR:  58.0 % (3.2 y)   INR used for dosin.40 (2022)   Warfarin maintenance plan:  2.5 mg (2.5 mg x 1) every Mon, Fri; 1.25 mg (2.5 mg x 0.5) all other days   Weekly warfarin total:  11.25 mg   Plan last modified:  Dari RendonD (2022)   Next INR check:  2022   Target end date:  Indefinite    Indications    Atrial flutter (HCC) [I48.92]  Long term (current) use of anticoagulants [Z79.01] [Z79.01]  Paroxysmal atrial fibrillation (HCC) [I48.0]                 Anticoagulation Episode Summary       INR check location:  Anticoagulation Clinic    Preferred lab:      Send INR reminders to:      Comments:  [facility] 22          Anticoagulation Care Providers       Provider Role Specialty Phone number    [facility]   [phone]          Anticoagulation Patient Findings          HPI:  Kelly Hodgeselsbernadine, on anticoagulation therapy with warfarin for PAF.   Changes to current medical/health status since last appt: none  Denies signs/symptoms of bleeding and/or thrombosis since the last appt.    Denies any interval changes to diet  Denies any interval changes to medications since last appt.   Denies any complications or cost restrictions with current therapy.     A/P   INR  SUB-therapeutic.   Bolus 2.5 mg x 2 days.     Next INR in 2 days (this might be the last day the pt has HH per the HH nurse.     Severiano Rosas, PharmD

## 2022-08-30 VITALS
RESPIRATION RATE: 16 BRPM | OXYGEN SATURATION: 99 % | HEART RATE: 92 BPM | DIASTOLIC BLOOD PRESSURE: 60 MMHG | SYSTOLIC BLOOD PRESSURE: 110 MMHG | TEMPERATURE: 97.8 F

## 2022-08-30 ASSESSMENT — ENCOUNTER SYMPTOMS
MENTAL STATUS CHANGE: 0
DIFFICULTY THINKING: 1
VOMITING: NO
DENIES PAIN: 1
MUSCLE WEAKNESS: 1
NAUSEA: NO
PERSON REPORTING PAIN: PATIENT

## 2022-08-31 ENCOUNTER — ANTICOAGULATION MONITORING (OUTPATIENT)
Dept: VASCULAR LAB | Facility: MEDICAL CENTER | Age: 77
End: 2022-08-31
Payer: MEDICARE

## 2022-08-31 ENCOUNTER — HOME CARE VISIT (OUTPATIENT)
Dept: HOME HEALTH SERVICES | Facility: HOME HEALTHCARE | Age: 77
End: 2022-08-31
Payer: MEDICARE

## 2022-08-31 VITALS
RESPIRATION RATE: 18 BRPM | OXYGEN SATURATION: 100 % | DIASTOLIC BLOOD PRESSURE: 70 MMHG | HEART RATE: 78 BPM | TEMPERATURE: 98.3 F | SYSTOLIC BLOOD PRESSURE: 120 MMHG

## 2022-08-31 DIAGNOSIS — I48.0 PAROXYSMAL ATRIAL FIBRILLATION (HCC): ICD-10-CM

## 2022-08-31 DIAGNOSIS — Z79.01 LONG TERM (CURRENT) USE OF ANTICOAGULANTS: ICD-10-CM

## 2022-08-31 DIAGNOSIS — I48.92 ATRIAL FLUTTER, UNSPECIFIED TYPE (HCC): ICD-10-CM

## 2022-08-31 LAB
INR PPP: 1.6 (ref 2–3.5)
INR PPP: 1.6 - CRITICAL LOW: < 0.8, CRITICAL HIGH: > 6.5 (ref 2–3.5)

## 2022-08-31 PROCEDURE — G0299 HHS/HOSPICE OF RN EA 15 MIN: HCPCS

## 2022-08-31 RX ORDER — TIOTROPIUM BROMIDE 18 UG/1
18 CAPSULE ORAL; RESPIRATORY (INHALATION) DAILY
Qty: 90 CAPSULE | Refills: 1 | Status: SHIPPED | OUTPATIENT
Start: 2022-08-31

## 2022-08-31 RX ORDER — TIOTROPIUM BROMIDE 18 UG/1
CAPSULE ORAL; RESPIRATORY (INHALATION)
OUTPATIENT
Start: 2022-08-31

## 2022-08-31 SDOH — ECONOMIC STABILITY: HOUSING INSECURITY: EVIDENCE OF SMOKING MATERIAL: 0

## 2022-08-31 ASSESSMENT — ENCOUNTER SYMPTOMS
SUBJECTIVE PAIN PROGRESSION: UNCHANGED
PAIN SEVERITY GOAL: 0/10
MUSCLE WEAKNESS: 1
DENIES PAIN: 1
VOMITING: DENIES
HIGHEST PAIN SEVERITY IN PAST 24 HOURS: 0/10
PERSON REPORTING PAIN: PATIENT
NAUSEA: DENIES
LOWEST PAIN SEVERITY IN PAST 24 HOURS: 0/10

## 2022-08-31 NOTE — PROGRESS NOTES
Anticoagulation Summary  As of 2022      INR goal:  2.0-3.0   TTR:  57.9 % (3.2 y)   INR used for dosin.60 (2022)   Warfarin maintenance plan:  1.25 mg (2.5 mg x 0.5) every Sun, e, Thu; 2.5 mg (2.5 mg x 1) all other days   Weekly warfarin total:  13.75 mg   Plan last modified:  Keyon Cortez PharmD (2022)   Next INR check:  2022   Target end date:  Indefinite    Indications    Atrial flutter (HCC) [I48.92]  Long term (current) use of anticoagulants [Z79.01] [Z79.01]  Paroxysmal atrial fibrillation (HCC) [I48.0]                 Anticoagulation Episode Summary       INR check location:  Anticoagulation Clinic    Preferred lab:      Send INR reminders to:      Comments:  [facility] 22          Anticoagulation Care Providers       Provider Role Specialty Phone number    [facility]   [phone]          Anticoagulation Patient Findings    Spoke with patient today regarding subtherapeutic INR of 1.6.  Patient denies any signs/symptoms of bruising or bleeding or any changes in diet and medications.  Instructed patient to call clinic with any questions or concerns.    Pt is not on antiplatelet therapy    Instructed patient to increase weekly warfarin regimen as detailed above.  Follow up in 5 days, to reduce risk of adverse events related to this high risk medication,  Warfarin.    Keyon Cortez, PharmSUZANNE, BCACP

## 2022-08-31 NOTE — TELEPHONE ENCOUNTER
Received request via: Patient    Was the patient seen in the last year in this department? Yes  LOV 08/18/2022  Does the patient have an active prescription (recently filled or refills available) for medication(s) requested? No    Refill needed before there is a possible lapse in her insurance.

## 2022-08-31 NOTE — TELEPHONE ENCOUNTER
Received request via: Patient    Was the patient seen in the last year in this department? Yes  8/18/22  Does the patient have an active prescription (recently filled or refills available) for medication(s) requested? No

## 2022-09-01 DIAGNOSIS — I48.92 ATRIAL FLUTTER, UNSPECIFIED TYPE (HCC): ICD-10-CM

## 2022-09-01 DIAGNOSIS — I48.0 PAROXYSMAL ATRIAL FIBRILLATION (HCC): ICD-10-CM

## 2022-09-01 NOTE — PROGRESS NOTES
Pt's  notified [facility] that pt will be moving to [place] to be closer to family.     They are moving on 9/7/22.    Provided Mack w/ [facility] for Kelly to obtain INR via outside lab until they can establish w/ [facility].    Clovis Kamara, PharmD, BCACP

## 2022-09-05 ENCOUNTER — HOME CARE VISIT (OUTPATIENT)
Dept: HOME HEALTH SERVICES | Facility: HOME HEALTHCARE | Age: 77
End: 2022-09-05
Payer: MEDICARE

## 2022-09-05 LAB
INR PPP: 2.1 (ref 2–3.5)
INR PPP: 2.1 - CRITICAL LOW: < 0.8, CRITICAL HIGH: > 6.5 (ref 2–3.5)

## 2022-09-05 PROCEDURE — G0493 RN CARE EA 15 MIN HH/HOSPICE: HCPCS

## 2022-09-06 ENCOUNTER — ANTICOAGULATION MONITORING (OUTPATIENT)
Dept: VASCULAR LAB | Facility: MEDICAL CENTER | Age: 77
End: 2022-09-06

## 2022-09-06 ENCOUNTER — HOME CARE VISIT (OUTPATIENT)
Dept: HOME HEALTH SERVICES | Facility: HOME HEALTHCARE | Age: 77
End: 2022-09-06
Payer: MEDICARE

## 2022-09-06 VITALS
RESPIRATION RATE: 18 BRPM | HEART RATE: 77 BPM | DIASTOLIC BLOOD PRESSURE: 78 MMHG | OXYGEN SATURATION: 100 % | TEMPERATURE: 98.3 F | SYSTOLIC BLOOD PRESSURE: 118 MMHG

## 2022-09-06 DIAGNOSIS — I48.0 PAROXYSMAL ATRIAL FIBRILLATION (HCC): ICD-10-CM

## 2022-09-06 DIAGNOSIS — I48.92 ATRIAL FLUTTER, UNSPECIFIED TYPE (HCC): ICD-10-CM

## 2022-09-06 DIAGNOSIS — Z79.01 LONG TERM (CURRENT) USE OF ANTICOAGULANTS: ICD-10-CM

## 2022-09-06 SDOH — ECONOMIC STABILITY: HOUSING INSECURITY: EVIDENCE OF SMOKING MATERIAL: 0

## 2022-09-06 ASSESSMENT — ACTIVITIES OF DAILY LIVING (ADL)
HOME_HEALTH_OASIS: 00
OASIS_M1830: 00

## 2022-09-06 ASSESSMENT — ENCOUNTER SYMPTOMS
SUBJECTIVE PAIN PROGRESSION: UNCHANGED
DENIES PAIN: 1
HIGHEST PAIN SEVERITY IN PAST 24 HOURS: 0/10
NAUSEA: DENIES
VOMITING: DENIES
MUSCLE WEAKNESS: 1
PERSON REPORTING PAIN: PATIENT
LOWEST PAIN SEVERITY IN PAST 24 HOURS: 0/10
PAIN SEVERITY GOAL: 0/10

## 2022-09-06 ASSESSMENT — PATIENT HEALTH QUESTIONNAIRE - PHQ9: CLINICAL INTERPRETATION OF PHQ2 SCORE: 0

## 2022-09-06 NOTE — CASE COMMUNICATION
Quality Review for 9.5.22 [place] OASIS performed on by LOTTIE Barrett RN on 9.6.2022:    Edits completed by LOTTIE Barrett RN:  1. Changed  A,E,F  to na per POC.  2. Changed  to 4, pt is moving to [place]

## 2022-09-06 NOTE — PROGRESS NOTES
[facility]      Anticoagulation Summary  As of 2022      INR goal:  2.0-3.0   TTR:  57.9 % (3.2 y)   INR used for dosin.10 (2022)   Warfarin maintenance plan:  1.25 mg (2.5 mg x 0.5) every Sun, Thu; 2.5 mg (2.5 mg x 1) all other days   Weekly warfarin total:  15 mg   Plan last modified:  Estiven Balderas PharmD (2022)   Next INR check:  2022   Target end date:  Indefinite    Indications    Atrial flutter (HCC) [I48.92]  Long term (current) use of anticoagulants [Z79.01] [Z79.01]  Paroxysmal atrial fibrillation (HCC) [I48.0]                 Anticoagulation Episode Summary       INR check location:  Anticoagulation Clinic    Preferred lab:      Send INR reminders to:      Comments:  [facility] 22          Anticoagulation Care Providers       Provider Role Specialty Phone number    [facility]   [phone]          Anticoagulation Patient Findings             Due to your results we would like you to:   Increase weekly warfarin dose as noted    We have your next INR test set for approximately 1 to 2 weeks.  She is moving to [place] and will be managed via [facility].      Please notify our clinic if you have had:   Any changes to your diet, dietary supplements or medications.   Any signs or symptoms of bleeding or bruising.    If you are having any abnormal bleeding, blood in your urine (pink urine), blood in your stool or a dark or tarry stool you can notify our clinic but please seek medical attention immediately.      Estiven Balderas, PharmD, MS, BCACP, C  [facility]  Phone: [phone]  Fax: [phone]  On call: [phone]  General scheduling/information [phone]  For emergencies please dial 911  Please do not use [employer] for urgent matters, call the phone numbers listed above.    This note was created using voice recognition software (Dragon). The accuracy of the dictation is limited by the  abilities of the software. I have reviewed the note prior to signing, however some errors in grammar and context are still possible. If you have any questions related to this note please do not hesitate to contact our office.

## 2022-09-07 RX ORDER — TIOTROPIUM BROMIDE INHALATION SPRAY 1.56 UG/1
2 SPRAY, METERED RESPIRATORY (INHALATION) DAILY
Qty: 12 G | Refills: 0 | Status: SHIPPED | OUTPATIENT
Start: 2022-09-07

## 2022-09-07 NOTE — CASE COMMUNICATION
I agree with changes  ----- Message -----  From: Shaunna Barrett R.N.  Sent: 9/6/2022   8:48 AM PDT  To: Destinee Garcia R.N.      Quality Review for 9.5.22 [place] OASIS performed on by LOTTIE Barrett RN on 9.6.2022:    Edits completed by LOTTIE Barrett RN:  1. Changed  A,E,F  to na per POC.  2. Changed  to 4, pt is moving to [place]

## 2022-09-17 ENCOUNTER — DOCUMENTATION (OUTPATIENT)
Dept: HEALTH INFORMATION MANAGEMENT | Facility: OTHER | Age: 77
End: 2022-09-17

## 2022-10-28 ENCOUNTER — DOCUMENTATION (OUTPATIENT)
Dept: HEALTH INFORMATION MANAGEMENT | Facility: OTHER | Age: 77
End: 2022-10-28

## 2023-05-05 NOTE — TELEPHONE ENCOUNTER
[employer] form completed and faxed, confirmation fax sent to scanning.    No-Patient/Caregiver offered and refused free interpretation services.

## 2023-05-10 DIAGNOSIS — Z79.01 LONG TERM (CURRENT) USE OF ANTICOAGULANTS: ICD-10-CM

## 2023-06-26 NOTE — TELEPHONE ENCOUNTER
1. Caller Name: SELF                      Call Back Number: [phone] (home)       2. Message: Calling you back.    3. Patient approves office to leave a detailed voicemail/MyChart message: N\A     None

## 2023-09-13 ENCOUNTER — ANTICOAGULATION MONITORING (OUTPATIENT)
Dept: VASCULAR LAB | Facility: MEDICAL CENTER | Age: 78
End: 2023-09-13

## 2023-09-13 DIAGNOSIS — I48.0 PAROXYSMAL ATRIAL FIBRILLATION (HCC): ICD-10-CM

## 2023-09-13 DIAGNOSIS — I48.92 ATRIAL FLUTTER, UNSPECIFIED TYPE (HCC): ICD-10-CM

## 2023-09-13 DIAGNOSIS — Z79.01 LONG TERM (CURRENT) USE OF ANTICOAGULANTS: ICD-10-CM

## 2023-09-13 NOTE — PROGRESS NOTES
Per previous notes, patient moved to [place] and is managed by another practice.  Will discharge from anticoagulation clinic.  Keyon Cortez, DariD, BCACP

## (undated) DEVICE — DRESSING TRANSPARENT FILM TEGADERM 2.375 X 2.75"  (100EA/BX)"

## (undated) DEVICE — SENSOR SPO2 NEO LNCS ADHESIVE (20/BX) SEE USER NOTES

## (undated) DEVICE — PROTECTOR ULNA NERVE - (36PR/CA)

## (undated) DEVICE — TUBE CHEST 32FR. STRAIGHT - (10EA/CA)

## (undated) DEVICE — SYS DLV COST CLS RM TEMP - INJECTATE (CO-SET II) (10EA/CA)

## (undated) DEVICE — SUCTION INSTRUMENT YANKAUER BULBOUS TIP W/O VENT (50EA/CA)

## (undated) DEVICE — GLOVE BIOGEL SZ 7 SURGICAL PF LTX - (50PR/BX 4BX/CA)

## (undated) DEVICE — CANISTER SUCTION 3000ML MECHANICAL FILTER AUTO SHUTOFF MEDI-VAC NONSTERILE LF DISP  (40EA/CA)

## (undated) DEVICE — TUBE CONNECT SUCTION CLEAR 120 X 1/4" (50EA/CA)"

## (undated) DEVICE — SET CATHETER CENTRAL VENOUS 5X15 ORDER BY THE EACH

## (undated) DEVICE — LEAD SET 6 DISP. EKG NIHON KOHDEN

## (undated) DEVICE — GLOVE BIOGEL INDICATOR SZ 6.5 SURGICAL PF LTX - (50PR/BX 4BX/CA)

## (undated) DEVICE — SET LEADWIRE 5 LEAD BEDSIDE DISPOSABLE ECG (1SET OF 5/EA)

## (undated) DEVICE — BLADE STERNUM SAW SURGICAL 32.0 X 6.4 MM STERILE (1/EA)

## (undated) DEVICE — SODIUM CHL IRRIGATION 0.9% 1000ML (12EA/CA)

## (undated) DEVICE — BANDAGE ELASTIC 4 IN X 5 YDS - LATEX FREE(10/BX 5BX/CA)

## (undated) DEVICE — SET EXTENSION WITH 2 PORTS (48EA/CA) ***PART #2C8610 IS A SUBSTITUTE*****

## (undated) DEVICE — SET FLUID WARMING STANDARD FLOW - (10/CA)

## (undated) DEVICE — PACK CV DRAPING/BASIN 2PART - (1/CA)

## (undated) DEVICE — BLADE SURGICAL #15 - (50/BX 3BX/CA)

## (undated) DEVICE — ORGANIZER SUTURE GABBAY-FRAT - ER STERILE (3/SET 4ST/BX)

## (undated) DEVICE — KIT CATHETERIZATION TWO-LUMEN VENOUS 8FR (5EA/CA)

## (undated) DEVICE — HEAD HOLDER JUNIOR/ADULT

## (undated) DEVICE — TRAY SURESTEP FOLEY TEMP SENSING 16FR (10EA/CA) ORDER  #18764 FOR TEMP FOLEY ONLY

## (undated) DEVICE — SUTURE 4-0 PROLENE RB-1 D/A 36 (36PK/BX)"

## (undated) DEVICE — WIRE STEEL 5-0 B&S 20 OHS - 5/PK 12PK/BX ITEM. D5329 OR D6625 CAN BE USED AS A SUB

## (undated) DEVICE — DRESSING TRANSPARENT FILM TEGADERM 4 X 4.75" (50EA/BX)"

## (undated) DEVICE — MICRODRIP PRIMARY VENTED 60 (48EA/CA) THIS WAS PART #2C8428 WHICH WAS DISCONTINUED

## (undated) DEVICE — DRAIN CHEST ADULT (6EA/CA) DELETED ITEM  ORDER #15909

## (undated) DEVICE — MASK ANESTHESIA ADULT  - (100/CA)

## (undated) DEVICE — DERMABOND ADVANCED - (12EA/BX)

## (undated) DEVICE — TRANSDUCER BIFURCATED MONITORING KIT (10EA/CA)

## (undated) DEVICE — SUTURE OHS

## (undated) DEVICE — PACK E SUTURE USED FOR - OPEN HEART  (5/BX)

## (undated) DEVICE — NEPTUNE 4 PORT MANIFOLD - (20/PK)

## (undated) DEVICE — ARMBOARD  SMALL IV 9 INLONG - (25EA/CA)

## (undated) DEVICE — TUBE E-T HI-LO CUFF 6.0MM (10/PK)

## (undated) DEVICE — KIT ROOM DECONTAMINATION

## (undated) DEVICE — GLOVE BIOGEL INDICATOR SZ 8 SURGICAL PF LTX - (50/BX 4BX/CA)

## (undated) DEVICE — SODIUM CHL. INJ. 0.9% 500ML (24EA/CA 50CA/PF)

## (undated) DEVICE — ELECTRODE 850 FOAM ADHESIVE - HYDROGEL RADIOTRNSPRNT (50/PK)

## (undated) DEVICE — SUTURE 5-0 PROLENE C-1 D/A 24 (36PK/BX)"

## (undated) DEVICE — SUTURE 4-0 PROLENE V-7 D/A (36PK/BX)

## (undated) DEVICE — TUBING PRSS MNTR 72IN M/ M LL - (25/BX) MONIT. LINE W/MALE L/L

## (undated) DEVICE — SET BIFURCATED BLOOD - (48EA/CS)

## (undated) DEVICE — LACTATED RINGERS INJ 1000 ML - (14EA/CA 60CA/PF)

## (undated) DEVICE — SUTURE 4-0 30CM STRATAFIX SPIRAL PS-2 (12EA/BX)

## (undated) DEVICE — BAG, SPONGE COUNT 50600

## (undated) DEVICE — FIBRILLAR SURGICEL 4X4 - 10/CA

## (undated) DEVICE — STOPCOCK MALE 4-WAY - (50/CA)

## (undated) DEVICE — SOD. CHL. INJ. 0.9% 1000 ML - (14EA/CA 60CA/PF)

## (undated) DEVICE — TUBE CHEST 32FR. RIGHT ANGLED (10EA/CA)

## (undated) DEVICE — GOWN WARMING STANDARD FLEX - (30/CA)

## (undated) DEVICE — TUBING CLEARLINK DUO-VENT - C-FLO (48EA/CA)

## (undated) DEVICE — SUTURE 4-0 ETHIBOND RB-1 EXCEL (12/BX)

## (undated) DEVICE — ADHESIVE DERMABOND HVD MINI (12EA/BX)

## (undated) DEVICE — SUTURE NONABSORBABLE ETHIBOND EXCEL 2-0 V-5 2 ARM BRAID GREEN WHITE (6EA/BX)

## (undated) DEVICE — LEAD PACING TEMP MYO - (12/BX)

## (undated) DEVICE — GLOVE BIOGEL SZ 7.5 SURGICAL PF LTX - (50PR/BX 4BX/CA)

## (undated) DEVICE — KIT ANESTHESIA W/CIRCUIT & 3/LT BAG W/FILTER (20EA/CA)

## (undated) DEVICE — GLOVE BIOGEL PI ORTHO SZ 6 SURGICAL PF LF (40PR/BX)

## (undated) DEVICE — BAG RESUSCITATION DISPOSABLE - WITH MASK (10 EA/CA)

## (undated) DEVICE — SLEEVE, VASO, THIGH, MED

## (undated) DEVICE — PROBE CRYOMAZE - (DAVINCI)

## (undated) DEVICE — ELECTRODE DUAL RETURN W/ CORD - (50/PK)

## (undated) DEVICE — GLOVE SURGICAL LATEX POWDER FREE STIRLE SZ 8 ENCORE MICROPTIC (200PR/CA)

## (undated) DEVICE — INSERT STEALTH #5 - (10/BX)

## (undated) DEVICE — SUTURE 6-0 PROLENE RB-2 D/A 30 (36PK/BX)"

## (undated) DEVICE — SENSOR CEREBRAL AND SOMATIC MONITORING (20/CA)

## (undated) DEVICE — KIT RADIAL ARTERY 20GA W/MAX BARRIER AND BIOPATCH  (5EA/CA) #10740 IS FOR THE SET RADIAL ARTERIAL